# Patient Record
Sex: FEMALE | Race: BLACK OR AFRICAN AMERICAN | NOT HISPANIC OR LATINO | Employment: OTHER | URBAN - METROPOLITAN AREA
[De-identification: names, ages, dates, MRNs, and addresses within clinical notes are randomized per-mention and may not be internally consistent; named-entity substitution may affect disease eponyms.]

---

## 2017-01-12 ENCOUNTER — OFFICE VISIT (OUTPATIENT)
Dept: INTERNAL MEDICINE | Facility: CLINIC | Age: 62
End: 2017-01-12
Payer: COMMERCIAL

## 2017-01-12 ENCOUNTER — PATIENT OUTREACH (OUTPATIENT)
Dept: OTHER | Facility: OTHER | Age: 62
End: 2017-01-12
Payer: COMMERCIAL

## 2017-01-12 VITALS
DIASTOLIC BLOOD PRESSURE: 90 MMHG | OXYGEN SATURATION: 95 % | BODY MASS INDEX: 28.61 KG/M2 | TEMPERATURE: 98 F | WEIGHT: 171.75 LBS | SYSTOLIC BLOOD PRESSURE: 145 MMHG | HEART RATE: 91 BPM | HEIGHT: 65 IN

## 2017-01-12 DIAGNOSIS — R06.00 PAROXYSMAL DYSPNEA: ICD-10-CM

## 2017-01-12 DIAGNOSIS — I10 ESSENTIAL HYPERTENSION: ICD-10-CM

## 2017-01-12 DIAGNOSIS — J44.9 CHRONIC OBSTRUCTIVE PULMONARY DISEASE, UNSPECIFIED COPD TYPE: ICD-10-CM

## 2017-01-12 DIAGNOSIS — R00.2 PALPITATIONS: Primary | ICD-10-CM

## 2017-01-12 DIAGNOSIS — I51.81 TAKOTSUBO CARDIOMYOPATHY: ICD-10-CM

## 2017-01-12 PROCEDURE — 99999 PR PBB SHADOW E&M-EST. PATIENT-LVL III: CPT | Mod: PBBFAC,,, | Performed by: INTERNAL MEDICINE

## 2017-01-12 PROCEDURE — 93005 ELECTROCARDIOGRAM TRACING: CPT | Mod: S$GLB,,, | Performed by: INTERNAL MEDICINE

## 2017-01-12 PROCEDURE — 3077F SYST BP >= 140 MM HG: CPT | Mod: S$GLB,,, | Performed by: INTERNAL MEDICINE

## 2017-01-12 PROCEDURE — 99214 OFFICE O/P EST MOD 30 MIN: CPT | Mod: S$GLB,,, | Performed by: INTERNAL MEDICINE

## 2017-01-12 PROCEDURE — 1159F MED LIST DOCD IN RCRD: CPT | Mod: S$GLB,,, | Performed by: INTERNAL MEDICINE

## 2017-01-12 PROCEDURE — 93010 ELECTROCARDIOGRAM REPORT: CPT | Mod: S$GLB,,, | Performed by: INTERNAL MEDICINE

## 2017-01-12 PROCEDURE — 3080F DIAST BP >= 90 MM HG: CPT | Mod: S$GLB,,, | Performed by: INTERNAL MEDICINE

## 2017-01-12 RX ORDER — HYDROCHLOROTHIAZIDE 25 MG/1
25 TABLET ORAL DAILY
Qty: 30 TABLET | Refills: 1 | Status: SHIPPED | OUTPATIENT
Start: 2017-01-12 | End: 2017-03-19 | Stop reason: SDUPTHER

## 2017-01-12 NOTE — PATIENT INSTRUCTIONS
"  Heart Palpitations    Palpitations are the feeling that your heart is beating hard, fast, or irregular. Some describe it as "pounding" or "skipped beats." Palpitations may occur in someone with heart disease, but can also occur in a healthy person.  Heart-related causes:  · Arrhythmia (a change from the heart's normal rhythm)  · Heart valve disease  · Disease of the heart muscle  · Coronary artery disease  · High blood pressure  Non-heart-related causes:  · Certain medicines (such as asthma inhalers and decongestants)  · Some herbal supplements, energy drinks and pills, and weight loss pills  · Illegal stimulant drugs (such as cocaine, crank, methamphetamine, PCP, bath salts, ecstasy)  · Caffeine, alcohol, and tobacco  · Medical conditions such as thyroid disease, anemia, anxiety, and panic disorder  Sometimes the cause can't be found.  Home care  Follow these home care tips:  · Avoid excess caffeine, alcohol, tobacco, and any stimulant drugs.  · Tell your doctor about any prescription or over-the-counter or herbal medicines you take.  Follow-up care  · Follow up with your doctor, or as advised.  Call 911  This is the fastest and safest way to get to the emergency department. The paramedics can also begin treatment on the way to the hospital, if needed.  Don't wait until your symptoms are severe to call 911. These are reasons to call 911:  · Chest pain  · Shortness of breath  · Feeling lightheaded, faint, or dizzy  · Fainting or loss of consciousness  · Very irregular heartbeat  · Rapid heartbeat that makes you uncomfortable  · Slower than usual heart rate associated with symptoms  · Slower than usual heart rate  · Chest pain with weakness, dizziness, heavy sweating, nausea, or vomiting  · Extreme drowsiness or confusion  · Weakness of an arm or leg, or on 1 side of the face  · Difficulty with speech or vision  When to seek medical advice  Get prompt medical attention if you have palpitations and any of the " following:  · Weakness  · Dizziness  · Lightheadedness  · Fainting  © 7977-8144 [employer]. [address] [place] [address], [place] [address]. All rights reserved. This information is not intended as a substitute for professional medical care. Always follow your healthcare professional's instructions.

## 2017-01-12 NOTE — PROGRESS NOTES
Subjective:       Patient ID: Ana Aguila is a 61 y.o. female.    Chief Complaint: Hypertension and Headache    HPI  60 y/o woman here for urgent visit.    Came to clinic yesterday, seen briefly (not at visit) and reported headache, episodes of sudden dyspnea/chest tightness +palpitations +headache which started ~2 weeks ago. She felt that this was due to some of her medications, and has stopped several; her BP has been higher since doing this.    Initial episode happened at work, EMS was called, she was put on oxygen and given a breathing treatment, and was told her BP was high. They offered to take her to the hospital but she declined.   She is concerned that these episodes have started since she was put on medications for her BP / heart (NSTEMI, dx with takotsubo cardiomyopathy in 10/2016).   She has had ~7 episodes like this in the past 2 weeks.   These episodes last 5-10 min, happens sometimes if exerting herself (going up steps) or if she needs to urinate and can't get to bathroom; doesn't happen at rest or when sleeping. +sudden onset dyspnea, +palpitations / feels her heartbeat is fast, +severe headache. Reports that she checked her pulse ox with an episode on Monday and her SaO2 was 80; reports her pulse was also in the mid-80s during the episode. Episodes resolve only with using supplemental O2 (NC at 2L); headache takes some time to resolve.   Most recent episode was 4 days ago.  She was concerned that some of her medications were contributing to this, so 4 days ago she stopped her immodium, and then stopped her carvedilol, losartan, and atorvastatin. She felt better the next day after stopping this medication. She is not currently taking lasix, and has not for some time; she was previously taking this only for leg swelling.    Took HCTZ 12.5mg (medication she was on prior to hospitalization with NSTEMI/takotsubo CM) yesterday morning, then came to clinic because her BP was still high in the  "afternoon. Took losartan 25mg last night, and then took HCTZ 25mg this morning.   She does not want to restart the losartan; she is very anxious about this.  No repeat episode of dyspnea (beyond her usual severe COPD) since stopping these medications.    Review of Systems   HENT: Negative for congestion, postnasal drip and sore throat.    Respiratory: Positive for shortness of breath. Negative for cough and wheezing.    Cardiovascular: Positive for palpitations. Negative for chest pain and leg swelling.   Gastrointestinal: Negative for abdominal pain, diarrhea and nausea.   Genitourinary: Negative.    Musculoskeletal: Negative.    Skin: Negative.    Neurological: Positive for light-headedness and headaches. Negative for syncope, weakness and numbness.   Psychiatric/Behavioral: The patient is nervous/anxious.      As noted in HPI, otherwise negative.    Past medical history, surgical history, and family medical history reviewed and updated as appropriate.    Medications and allergies reviewed.     Objective:          Vitals:    01/12/17 1618   BP: (!) 145/90   BP Location: Right arm   Patient Position: Sitting   Pulse: 91   Temp: 98 °F (36.7 °C)   TempSrc: Oral   SpO2: 95%   Weight: 77.9 kg (171 lb 11.8 oz)   Height: 5' 5" (1.651 m)     Body mass index is 28.58 kg/(m^2).  Physical Exam   Constitutional: She is oriented to person, place, and time. She appears well-developed and well-nourished. No distress.   HENT:   Head: Normocephalic and atraumatic.   Nose: Nose normal.   Mouth/Throat: Oropharynx is clear and moist.   Eyes: Conjunctivae and EOM are normal. Pupils are equal, round, and reactive to light.   Neck: Normal range of motion. Neck supple.   Cardiovascular: Normal rate, regular rhythm, normal heart sounds and intact distal pulses.    No murmur heard.  Varicose veins b/l LE   Pulmonary/Chest: Effort normal. No respiratory distress. She has no wheezes. She has no rales.   Prolonged expiratory phase, but good " air movement throughout. No cough.   Abdominal: Soft. Bowel sounds are normal. She exhibits no distension. There is no tenderness.   Well-healed surgical scars  +hepatomegaly; liver edge palpable just below costal margin   Musculoskeletal: She exhibits no edema or tenderness.   Lymphadenopathy:     She has no cervical adenopathy.   Neurological: She is alert and oriented to person, place, and time. She has normal strength. No cranial nerve deficit. Gait normal.   Skin: Skin is warm and dry.   Psychiatric:   Anxious. Affect and speech normal   Vitals reviewed.      Lab Results   Component Value Date    WBC 4.91 10/18/2016    HGB 9.9 (L) 10/18/2016    HCT 29.7 (L) 10/18/2016     10/18/2016    CHOL 191 03/23/2016    TRIG 73 03/23/2016    HDL 61 03/23/2016    ALT 11 11/29/2016    AST 14 11/29/2016     11/29/2016    K 4.1 11/29/2016     11/29/2016    CREATININE 0.8 11/29/2016    BUN 16 11/29/2016    CO2 31 (H) 11/29/2016    TSH 2.69 04/04/2011    INR 1.2 10/18/2016    HGBA1C 5.4 10/18/2016     EKG performed in clinic - no significant arrythmia noted, NSR; lateral T waves now upright compared to 10/17 and 10/18    Assessment:       1. Palpitations    2. Paroxysmal dyspnea    3. Chronic obstructive pulmonary disease, unspecified COPD type    4. Essential hypertension    5. Takotsubo cardiomyopathy        Plan:   Ana was seen today for hypertension and headache.    Diagnoses and all orders for this visit:    Palpitations  -     IN OFFICE EKG 12-LEAD (to [facility])  -     Basic metabolic panel; Future  -     TSH; Future    Paroxysmal dyspnea  -     IN OFFICE EKG 12-LEAD (to [facility])    Chronic obstructive pulmonary disease, unspecified COPD type    Essential hypertension  -     hydrochlorothiazide (HYDRODIURIL) 25 MG tablet; Take 1 tablet (25 mg total) by mouth once daily.  -     Basic metabolic panel; Future  -     TSH; Future    Takotsubo cardiomyopathy    Unclear etiology of these episodes - acute  bronchospasm (?related to carvedilol) vs paroxysmal arrythmia vs anxiety attack; not clearly a COPD exacerbation, and no recurrence of signs of HF. Headaches in past 2 days may be related to high BP, though BP only mildly elevated today and she does have a similar headache.  EKG shows no arrythmia and is otherwise stable.  For now, ok to stop the carvedilol; she also refuses to restart the atorvastatin or losartan.   As patient has seen both her cardiologist and pulmonologist recently, will contact them for further recommendations -- could consider Holter or event monitor.   She is following with the digital HTN management program, and agrees to continue checking BP regularly; recommended continue HCTZ 25mg for now (as she needs an antihypertensive and is willing to take this), and restart losartan in 2-3 days if BP still >140/90.     Return in about 3 weeks (around 2/2/2017) for hypertension.    Erik Cool MD  Internal Medicine  [facility]  1/12/2017

## 2017-01-12 NOTE — Clinical Note
Dr Gaytan, Dr Dill - I saw Ms Aguila today for an urgent visit and would like to get your input on her symptoms. See note for details - she's been having brief episodes of sudden dyspnea, palpitations, and headache, and noted with one episode that her SaO2 on pulse ox dropped to 80%. These episodes last 5-10min and are relieved by using supplemental oxygen.  She stopped many of her new medications (coreg, losartan, atorvastatin) as she was afraid these were causing the problem. EKG in clinic was overall unchanged from her previous (other than having now-upright T waves).   Please read through and let me know what you think, and contact her if you would like to see her back in clinic?  Thank you! MD Catrachita Sherman - she's agreed to restart HCTZ for her BP, so right now this is the only antihypertensive she is taking.

## 2017-01-12 NOTE — MR AVS SNAPSHOT
[facility] - Internal Medicine  [address]  [facility] [address]  Phone: [phone]  Fax: [phone]                  Ana Aguila   2017 4:20 PM   Office Visit    Description:  Female : 1955   Provider:  Erik Cool MD   Department:  [facility] - Internal Medicine           Reason for Visit     Hypertension     Headache           Diagnoses this Visit        Comments    Palpitations    -  Primary     Chronic obstructive pulmonary disease, unspecified COPD type         Essential hypertension         Takotsubo cardiomyopathy                To Do List           Future Appointments        Provider Department Dept Phone    2017 9:20 AM Sommer Ace MD [facility] - Urology 4th Floor [phone]    2017 10:20 AM LAB, APPOINTMENT [facility] [phone]    2017 8:40 AM Ambar Muñoz NP [facility] - Hepatology [phone]    2017 10:40 AM Erik Cool MD [facility] - Internal Medicine [phone]      Goals (5 Years of Data)              Today    Today    Today    Blood Pressure <= 140/90   145/90  150/90  149/89    Notes - Note created  2016  1:28 PM by Lilly Dumont    It is important to consistently maintain a controlled blood pressure.      Exercise at least 150 minutes per week.           Maintain a low sodium diet           Take at least one BP reading per week at various times of the day             Follow-Up and Disposition     Return in about 3 weeks (around 2017) for hypertension.       These Medications        Disp Refills Start End    hydrochlorothiazide (HYDRODIURIL) 25 MG tablet 30 tablet 1 2017    Take 1 tablet (25 mg total) by mouth once daily. - Oral    [facility]: [facility] [address] AT [facility] [address] Ph #: [phone]         [facility]     [facility] Care [facility] -  Assistance  Registered nurses in  the [facility] provide clinical advisement, health education, appointment booking, and other advisory services.  Call for this free service at [phone].             Medications           Message regarding Medications     Verify the changes and/or additions to your medication regime listed below are the same as discussed with your clinician today.  If any of these changes or additions are incorrect, please notify your healthcare provider.        START taking these NEW medications        Refills    hydrochlorothiazide (HYDRODIURIL) 25 MG tablet 1    Sig: Take 1 tablet (25 mg total) by mouth once daily.    Class: Normal    Route: Oral           Verify that the below list of medications is an accurate representation of the medications you are currently taking.  If none reported, the list may be blank. If incorrect, please contact your healthcare provider. Carry this list with you in case of emergency.           Current Medications     ADVAIR DISKUS 250-50 mcg/dose diskus inhaler INHALE 1 PUFF BY MOUTH TWICE DAILY( EVERY 12 HOURS)    albuterol (PROAIR HFA) 90 mcg/actuation inhaler INHALE 2 PUFFS BY MOUTH INTO THE LUNGS FOUR TIMES DAILY prn    albuterol-ipratropium 2.5mg-0.5mg/3mL (DUO-NEB) 0.5 mg-3 mg(2.5 mg base)/3 mL nebulizer solution Take 3 mLs by nebulization every 6 (six) hours as needed for Wheezing or Shortness of Breath.    ergocalciferol (ERGOCALCIFEROL) 50,000 unit Cap TAKE 1 CAPSULE BY MOUTH EVERY 7 DAYS    levalbuterol (XOPENEX) 1.25 mg/0.5 mL nebulizer solution Qid prn for copd    loperamide (IMODIUM) 2 mg capsule Take 2 mg by mouth 4 (four) times daily as needed for Diarrhea.    psyllium 0.52 gram capsule Take 2 capsules by mouth once daily.    atorvastatin (LIPITOR) 40 MG tablet Take 1 tablet (40 mg total) by mouth once daily.    carvedilol (COREG) 3.125 MG tablet Take 1 tablet (3.125 mg total) by mouth 2 (two) times daily.    estradiol (ESTRACE) 0.01 % (0.1 mg/gram) vaginal cream Place  "1 g vaginally 3 (three) times a week. Place by fingertip application before bedtime three times a week (Monday, Wednesday, Friday)    furosemide (LASIX) 20 MG tablet Take 1 tablet (20 mg total) by mouth daily as needed (Lower extremity edema or swelling).    hydrochlorothiazide (HYDRODIURIL) 25 MG tablet Take 1 tablet (25 mg total) by mouth once daily.    losartan (COZAAR) 25 MG tablet Take 1 tablet (25 mg total) by mouth once daily.           Clinical Reference Information           Vital Signs - Last Recorded  Most recent update: 1/12/2017  4:21 PM by Kelley Mccollum MA    BP Pulse Temp Ht Wt LMP    (!) 145/90 (BP Location: Right arm, Patient Position: Sitting) 91 98 °F (36.7 °C) (Oral) 5' 5" (1.651 m) 77.9 kg (171 lb 11.8 oz) (LMP Unknown)    SpO2 BMI             95% 28.58 kg/m2         Blood Pressure          Most Recent Value    BP  (!)  145/90      Allergies as of 1/12/2017     Sudafed [Pseudoephedrine Hcl]      Immunizations Administered on Date of Encounter - 1/12/2017     None      Orders Placed During Today's Visit      Normal Orders This Visit    IN OFFICE EKG 12-LEAD (to [place])     Future Labs/Procedures Expected by Expires    Basic metabolic panel  1/12/2017 1/12/2018    TSH  1/12/2017 (Approximate) 1/12/2018      Instructions      Heart Palpitations    Palpitations are the feeling that your heart is beating hard, fast, or irregular. Some describe it as "pounding" or "skipped beats." Palpitations may occur in someone with heart disease, but can also occur in a healthy person.  Heart-related causes:  · Arrhythmia (a change from the heart's normal rhythm)  · Heart valve disease  · Disease of the heart muscle  · Coronary artery disease  · High blood pressure  Non-heart-related causes:  · Certain medicines (such as asthma inhalers and decongestants)  · Some herbal supplements, energy drinks and pills, and weight loss pills  · Illegal stimulant drugs (such as cocaine, crank, methamphetamine, PCP, bath " salts, ecstasy)  · Caffeine, alcohol, and tobacco  · Medical conditions such as thyroid disease, anemia, anxiety, and panic disorder  Sometimes the cause can't be found.  Home care  Follow these home care tips:  · Avoid excess caffeine, alcohol, tobacco, and any stimulant drugs.  · Tell your doctor about any prescription or over-the-counter or herbal medicines you take.  Follow-up care  · Follow up with your doctor, or as advised.  Call 911  This is the fastest and safest way to get to the emergency department. The paramedics can also begin treatment on the way to the hospital, if needed.  Don't wait until your symptoms are severe to call 911. These are reasons to call 911:  · Chest pain  · Shortness of breath  · Feeling lightheaded, faint, or dizzy  · Fainting or loss of consciousness  · Very irregular heartbeat  · Rapid heartbeat that makes you uncomfortable  · Slower than usual heart rate associated with symptoms  · Slower than usual heart rate  · Chest pain with weakness, dizziness, heavy sweating, nausea, or vomiting  · Extreme drowsiness or confusion  · Weakness of an arm or leg, or on 1 side of the face  · Difficulty with speech or vision  When to seek medical advice  Get prompt medical attention if you have palpitations and any of the following:  · Weakness  · Dizziness  · Lightheadedness  · Fainting  © 1295-6425 [employer]. [address] [place] [address], [place] [address]. All rights reserved. This information is not intended as a substitute for professional medical care. Always follow your healthcare professional's instructions.

## 2017-01-12 NOTE — LETTER
"   Catrachita Johns, PharmD  [address] [place] [address]     Dear Ana Aguila,    Welcome to the [facility]!         My name is Catrachita Johns and I am your dedicated clinical pharmacist.  As an expert in medication management, I will help ensure that the medications you are taking continue to provide you with the intended benefits.      This is Lilly Dumont and she will be your health  for the duration of the program.  Her job is to help you identify lifestyle changes to improve your blood pressure control.  You will talk about nutrition, exercise, and other ways that you may be able to adjust your current habits to better your health. Together, we will work to improve your overall health and encourage you to meet your goals for a healthier lifestyle.    What we expect from YOU:    You will need to take blood pressure readings multiple times a week and no less than one reading per week.   It is important that you take your measurements at different times during the day, when possible.     What you should expect from your Digital Medicine Care Team:   We will provide you with education about high blood pressure, including lifestyle changes that could help you to control your blood pressure.   We will review your weekly readings and provide you with monthly blood pressure progress reports after you have been in the program for more than 30 days.   We will send monthly progress reports on your blood pressure control to your physician so they can follow along with your progress as well.    You will be able to reach me by phone at   or through your [facility] account by clicking "Send a message to your doctor's office" on the home screen then selecting my name in the "To the office of:" field.     I look forward to working with you to achieve your blood pressure goals!    Sincerely,    Catrachita Johns PharmD  Your personal Clinical Pharmacist    Please " visit [email or web address] to learn more about high blood pressure and what you can do lower your blood pressure.                                                                                         Ana Aguila  [address] [place] [address] 216  [address] [id number]

## 2017-01-13 ENCOUNTER — PATIENT MESSAGE (OUTPATIENT)
Dept: GASTROENTEROLOGY | Facility: CLINIC | Age: 62
End: 2017-01-13

## 2017-01-13 ENCOUNTER — TELEPHONE (OUTPATIENT)
Dept: INTERNAL MEDICINE | Facility: CLINIC | Age: 62
End: 2017-01-13

## 2017-01-13 ENCOUNTER — OFFICE VISIT (OUTPATIENT)
Dept: UROLOGY | Facility: CLINIC | Age: 62
End: 2017-01-13
Payer: COMMERCIAL

## 2017-01-13 VITALS
WEIGHT: 166 LBS | SYSTOLIC BLOOD PRESSURE: 142 MMHG | HEIGHT: 65 IN | BODY MASS INDEX: 27.66 KG/M2 | DIASTOLIC BLOOD PRESSURE: 82 MMHG

## 2017-01-13 DIAGNOSIS — R35.0 INCREASED FREQUENCY OF URINATION: ICD-10-CM

## 2017-01-13 DIAGNOSIS — R39.15 URINARY URGENCY: ICD-10-CM

## 2017-01-13 DIAGNOSIS — R31.29 MICROSCOPIC HEMATURIA: Primary | ICD-10-CM

## 2017-01-13 PROCEDURE — 3079F DIAST BP 80-89 MM HG: CPT | Mod: S$GLB,,, | Performed by: UROLOGY

## 2017-01-13 PROCEDURE — 99999 PR PBB SHADOW E&M-EST. PATIENT-LVL III: CPT | Mod: PBBFAC,,, | Performed by: UROLOGY

## 2017-01-13 PROCEDURE — 99213 OFFICE O/P EST LOW 20 MIN: CPT | Mod: 25,S$GLB,, | Performed by: UROLOGY

## 2017-01-13 PROCEDURE — 81002 URINALYSIS NONAUTO W/O SCOPE: CPT | Mod: S$GLB,,, | Performed by: UROLOGY

## 2017-01-13 PROCEDURE — 1159F MED LIST DOCD IN RCRD: CPT | Mod: S$GLB,,, | Performed by: UROLOGY

## 2017-01-13 PROCEDURE — 3077F SYST BP >= 140 MM HG: CPT | Mod: S$GLB,,, | Performed by: UROLOGY

## 2017-01-13 RX ORDER — LIDOCAINE HYDROCHLORIDE 20 MG/ML
JELLY TOPICAL ONCE
Status: CANCELLED | OUTPATIENT
Start: 2017-01-13 | End: 2017-01-13

## 2017-01-13 RX ORDER — CIPROFLOXACIN 250 MG/1
500 TABLET, FILM COATED ORAL ONCE
Status: CANCELLED | OUTPATIENT
Start: 2017-01-13 | End: 2017-01-13

## 2017-01-13 NOTE — PROGRESS NOTES
CHIEF COMPLAINT:    Mrs. Aguila is a 61 y.o. female presenting for a follow up on urinary urgency, frequency.    PRESENTING ILLNESS:    Ana Aguila is a 61 y.o. female who returns for follow up.  She states that she has been having a problem with her blood pressure.  She has episodes which she believes elevate her blood pressure.  If she does not get to the toilet right when she needs to go, she develops a throbbing headache.  She has frequency x 5, nocturia x 2.  These episodes occur every other day (she has counted 7 in the last 2 weeks.)  She has been working with Dr. Cool to try to get to the bottom of them.  She is still averse to adding a medication for urinary urgency.  She has nocturia x 2, but it is mostly that she typically wakes up at 1 am and again at 5 am and since she wakes up, she uses the toilet.  The amounts are variable.  When asked about her bowel movements, she states she has a chronic problem with diarrhea ever since her partial colectomy, which occurred when she had a bowel perforation at the time of colonoscopy.  She saw her gastroenterologist who recommended that she take Immodium AD and take 2 metamucil tablets to firm the stools.  This worked for her but she discontinued using them because of the headaches.  The diarrhea has resumed and the headaches continue so she is thinking about resuming the Immodium AD and Metamucil.  She continues to use the Estrace cream but does not think it has any bearing on the urinary urgency.     On UA today, she was found to have 50 blood.  She denies a history of gross hematuria, she does have a remote history of smoking, states she quite in either 1993 or 1997, at the time the COPD was diagnosed.  No history of chemotherapy or radiation.      REVIEW OF SYSTEMS:    Review of Systems   Constitutional: Positive for chills.   Eyes: Negative for discharge and redness.   Respiratory: Positive for cough and shortness of breath.    Cardiovascular: Negative  for chest pain.   Gastrointestinal: Positive for diarrhea. Negative for abdominal pain.   Genitourinary: Positive for urgency.   Musculoskeletal: Negative.    Skin: Negative.    Neurological: Positive for headaches (she believes this is secondary to blood pressure).   Endo/Heme/Allergies: Does not bruise/bleed easily.   Psychiatric/Behavioral: Negative.      PATIENT HISTORY:    Past Medical History   Diagnosis Date    Asthma     Blood transfusion     COPD (chronic obstructive pulmonary disease)     Hyperlipidemia     Hypertension     Reflux 2013       Past Surgical History   Procedure Laterality Date    Hysterectomy  1986    Left leg surgery      Colon surgery  2011     secondary to perforation after c-scope    Cholecystectomy  2013     open    Hiatal hernia repair  2013    Abdominal surgery      Hernia repair      Appendectomy      Fracture surgery         Family History   Problem Relation Age of Onset    Cancer Mother      lung    Hypertension Mother     Coronary artery disease Father     Retinal detachment Father     Hypertension Sister     Hypertension Brother     Cataracts Maternal Grandmother     Abnormal EKG Daughter     Breast cancer Daughter 43     negative genetic testing    Breast cancer Other 44     Social History    Marital status: Single       Social History Main Topics    Smoking status: Former Smoker     Packs/day: 1.50     Years: 30.00     Types: Cigarettes     Quit date: 1/4/1997    Smokeless tobacco: Never Used    Alcohol use Yes      Comment: occaissionally    Drug use: No    Sexual activity: Yes     Partners: Male     Birth control/ protection: Surgical     Social History Narrative    Lives alone, brother lives in [place]. Brothers and sisters also living elsewhere. Children and grandchildren live in [place].        Allergies:  Sudafed [pseudoephedrine hcl]    Medications:  Outpatient Encounter Prescriptions as of 1/13/2017   Medication Sig Dispense Refill     ADVAIR DISKUS 250-50 mcg/dose diskus inhaler INHALE 1 PUFF BY MOUTH TWICE DAILY( EVERY 12 HOURS) 1 each 12    albuterol (PROAIR HFA) 90 mcg/actuation inhaler INHALE 2 PUFFS BY MOUTH INTO THE LUNGS FOUR TIMES DAILY prn 25.5 g 12    albuterol-ipratropium 2.5mg-0.5mg/3mL (DUO-NEB) 0.5 mg-3 mg(2.5 mg base)/3 mL nebulizer solution Take 3 mLs by nebulization every 6 (six) hours as needed for Wheezing or Shortness of Breath. 270 vial 12    atorvastatin (LIPITOR) 40 MG tablet Take 1 tablet (40 mg total) by mouth once daily. 90 tablet 3    carvedilol (COREG) 3.125 MG tablet Take 1 tablet (3.125 mg total) by mouth 2 (two) times daily. 60 tablet 11    ergocalciferol (ERGOCALCIFEROL) 50,000 unit Cap TAKE 1 CAPSULE BY MOUTH EVERY 7 DAYS 12 capsule 0    furosemide (LASIX) 20 MG tablet Take 1 tablet (20 mg total) by mouth daily as needed (Lower extremity edema or swelling). 30 tablet 3    hydrochlorothiazide (HYDRODIURIL) 25 MG tablet Take 1 tablet (25 mg total) by mouth once daily. 30 tablet 1    levalbuterol (XOPENEX) 1.25 mg/0.5 mL nebulizer solution Qid prn for copd 120 each 11    loperamide (IMODIUM) 2 mg capsule Take 2 mg by mouth 4 (four) times daily as needed for Diarrhea.      losartan (COZAAR) 25 MG tablet Take 1 tablet (25 mg total) by mouth once daily. 90 tablet 1    psyllium 0.52 gram capsule Take 2 capsules by mouth once daily.      estradiol (ESTRACE) 0.01 % (0.1 mg/gram) vaginal cream Place 1 g vaginally 3 (three) times a week. Place by fingertip application before bedtime three times a week (Monday, Wednesday, Friday) 45 g 3    [DISCONTINUED] aspirin 81 MG Chew Take 1 tablet (81 mg total) by mouth once daily.  0     No facility-administered encounter medications on file as of 1/13/2017.          PHYSICAL EXAMINATION:    The patient generally appears in good health, is appropriately interactive, and is in no apparent distress.    Skin: No lesions.    Mental: Cooperative with normal affect.    Neuro:  Grossly intact.    HEENT: Normal. No evidence of lymphadenopathy.    Chest: Clear to ascultation bilaterally, normal inspiratory effort.    Abdomen: Soft, non-tender. No masses or organomegaly. Bladder is not palpable. No evidence of flank discomfort. No evidence of inguinal hernia.    Extremities: No clubbing, cyanosis, or edema      LABS:    UA 1.005, pH 6, tr protein, 50 blood, otherwise, negative  Abdominal ultrasound was done on 12/27/2018 for right upper quadrant pain and the kidneys were noted to be normal.      IMPRESSION:    Encounter Diagnoses   Name Primary?    Microscopic hematuria Yes    Urinary urgency     Increased frequency of urination        PLAN:    1. Cystoscopy to complete the workup for microscopic hematuria.  Also, since she has the urgency, would be more comfortable with treating conservatively if this is negative  2.  Since she is averse to taking medications, Bladder Matters book was given to provide information on behavioral management of her symptoms.  Suggested timed voiding every 2-3 hours while awake.    3.  She will continue to work with Dr. Cool regarding her blood pressure issues

## 2017-01-13 NOTE — PATIENT INSTRUCTIONS

## 2017-01-13 NOTE — MR AVS SNAPSHOT
[facility] - Urology 4th Floor  [address]  [facility] [address]  Phone: [phone]                  Ana Aguila   2017 9:20 AM   Office Visit    Description:  Female : 1955   Provider:  Sommer Ace MD   Department:  [facility] - Urology 4th Floor           Diagnoses this Visit        Comments    Microscopic hematuria    -  Primary            To Do List           Future Appointments        Provider Department Dept Phone    2017 8:40 AM Ambar Muñoz NP [facility] - Hepatology [phone]    2017 10:40 AM Erik Cool MD [facility] - Internal Medicine [phone]      Goals (5 Years of Data)              Today    17    Blood Pressure <= 140/90   142/82  149/91  145/90    Notes - Note created  2016  1:28 PM by Lilly Dumont    It is important to consistently maintain a controlled blood pressure.      Exercise at least 150 minutes per week.           Maintain a low sodium diet           Take at least one BP reading per week at various times of the day             [facility]     [facility] Nurse Care Line -  Assistance  Registered nurses in the [facility] provide clinical advisement, health education, appointment booking, and other advisory services.  Call for this free service at [phone].             Medications           Message regarding Medications     Verify the changes and/or additions to your medication regime listed below are the same as discussed with your clinician today.  If any of these changes or additions are incorrect, please notify your healthcare provider.             Verify that the below list of medications is an accurate representation of the medications you are currently taking.  If none reported, the list may be blank. If incorrect, please contact your healthcare provider. Carry this list with you in case of emergency.           Current Medications     ADVAIR DISKUS 250-50 mcg/dose  "diskus inhaler INHALE 1 PUFF BY MOUTH TWICE DAILY( EVERY 12 HOURS)    albuterol (PROAIR HFA) 90 mcg/actuation inhaler INHALE 2 PUFFS BY MOUTH INTO THE LUNGS FOUR TIMES DAILY prn    albuterol-ipratropium 2.5mg-0.5mg/3mL (DUO-NEB) 0.5 mg-3 mg(2.5 mg base)/3 mL nebulizer solution Take 3 mLs by nebulization every 6 (six) hours as needed for Wheezing or Shortness of Breath.    atorvastatin (LIPITOR) 40 MG tablet Take 1 tablet (40 mg total) by mouth once daily.    carvedilol (COREG) 3.125 MG tablet Take 1 tablet (3.125 mg total) by mouth 2 (two) times daily.    ergocalciferol (ERGOCALCIFEROL) 50,000 unit Cap TAKE 1 CAPSULE BY MOUTH EVERY 7 DAYS    furosemide (LASIX) 20 MG tablet Take 1 tablet (20 mg total) by mouth daily as needed (Lower extremity edema or swelling).    hydrochlorothiazide (HYDRODIURIL) 25 MG tablet Take 1 tablet (25 mg total) by mouth once daily.    levalbuterol (XOPENEX) 1.25 mg/0.5 mL nebulizer solution Qid prn for copd    loperamide (IMODIUM) 2 mg capsule Take 2 mg by mouth 4 (four) times daily as needed for Diarrhea.    losartan (COZAAR) 25 MG tablet Take 1 tablet (25 mg total) by mouth once daily.    psyllium 0.52 gram capsule Take 2 capsules by mouth once daily.    estradiol (ESTRACE) 0.01 % (0.1 mg/gram) vaginal cream Place 1 g vaginally 3 (three) times a week. Place by fingertip application before bedtime three times a week (Monday, Wednesday, Friday)           Clinical Reference Information           Vital Signs - Last Recorded  Most recent update: 1/13/2017  9:34 AM by Loreta Dixon MA    BP Ht Wt LMP BMI    (!) 142/82 (BP Location: Left arm, Patient Position: Sitting, BP Method: Automatic) 5' 5" (1.651 m) 75.3 kg (166 lb 0.5 oz) (LMP Unknown) 27.63 kg/m2      Blood Pressure          Most Recent Value    BP  (!)  142/82      Allergies as of 1/13/2017     Sudafed [Pseudoephedrine Hcl]      Immunizations Administered on Date of Encounter - 1/13/2017     None      Orders Placed During Today's " Visit     Future Labs/Procedures Expected by Expires    Cystoscopy  As directed 1/13/2018      Instructions        Cystoscopy  Cystoscopy is a procedure that lets your doctor look directly inside your urethra and bladder. It can be used to:  · Help diagnose a problem with your urethra, bladder, or kidneys.  · Take a sample (biopsy) of bladder or urethral tissue.  · Treat certain problems (such as removing kidney stones).  · Place a stent to bypass an obstruction.  · Take special X-rays of the kidneys.  Based on the findings, your doctor may recommend other tests or treatments.  What is a cystoscope?  A cystoscope is a telescope-like instrument that contains lenses and fiberoptics (small glass wires that make bright light). The cystoscope may be straight and rigid, or flexible to bend around curves in the urethra. The doctor may look directly into the cystoscope, or project the image onto a monitor.  Getting ready  · Ask your doctor if you should stop taking any medications prior to the procedure.  · Ask whether you should avoid eating or drinking anything after midnight before the procedure.  · Follow any other instructions your doctor gives you.  Tell your doctor before the exam if you:  · Take any medications, such as aspirin or blood thinners  · Have allergies to any medications  · Are pregnant   The procedure  Cystoscopy is done in the doctors office or hospital. The doctor and a nurse are present during the procedure. It takes only a few minutes, longer if a biopsy, X-ray, or treatment needs to be done.  During the procedure:  · You lie on an exam table on your back, knees bent and legs apart. You are covered with a drape.  · Your urethra and the area around it are washed. Anesthetic jelly may be applied to numb the urethra. Other pain medication is usually not needed. In some cases, you may be offered a mild sedative to help you relax. If a more extensive procedure is to be done, such as a biopsy or kidney  stone removal, general anesthesia may be needed.  · The cystoscope is inserted. A sterile fluid is put into the bladder to expand it. You may feel pressure from this fluid.  · When the procedure is done, the cystoscope is removed.  After the procedure  If you had a sedative, general anesthesia, or spinal anesthesia, you must have someone drive you home. Once youre home:  · Drink plenty of fluids.  · You may have burning or light bleeding when you urinate--this is normal.  · Medications may be prescribed to ease any discomfort or prevent infection. Take these as directed.  · Call your doctor if you have heavy bleeding or blood clots, burning that lasts more than a day, a fever over 100°F  (38° C), or trouble urinating.  © 8315-8935 [employer]. [address] [place] [address], [place] [address]. All rights reserved. This information is not intended as a substitute for professional medical care. Always follow your healthcare professional's instructions.

## 2017-01-15 RX ORDER — ERGOCALCIFEROL 1.25 MG/1
CAPSULE ORAL
Qty: 12 CAPSULE | Refills: 0 | Status: SHIPPED | OUTPATIENT
Start: 2017-01-15 | End: 2017-04-08 | Stop reason: SDUPTHER

## 2017-01-16 ENCOUNTER — PROCEDURE VISIT (OUTPATIENT)
Dept: HEPATOLOGY | Facility: CLINIC | Age: 62
End: 2017-01-16
Attending: NURSE PRACTITIONER
Payer: COMMERCIAL

## 2017-01-16 ENCOUNTER — OFFICE VISIT (OUTPATIENT)
Dept: GASTROENTEROLOGY | Facility: CLINIC | Age: 62
End: 2017-01-16
Payer: COMMERCIAL

## 2017-01-16 ENCOUNTER — LAB VISIT (OUTPATIENT)
Dept: LAB | Facility: HOSPITAL | Age: 62
End: 2017-01-16
Payer: COMMERCIAL

## 2017-01-16 ENCOUNTER — PATIENT MESSAGE (OUTPATIENT)
Dept: HEPATOLOGY | Facility: CLINIC | Age: 62
End: 2017-01-16

## 2017-01-16 ENCOUNTER — PATIENT MESSAGE (OUTPATIENT)
Dept: INTERNAL MEDICINE | Facility: CLINIC | Age: 62
End: 2017-01-16

## 2017-01-16 ENCOUNTER — OFFICE VISIT (OUTPATIENT)
Dept: HEPATOLOGY | Facility: CLINIC | Age: 62
End: 2017-01-16
Payer: COMMERCIAL

## 2017-01-16 ENCOUNTER — HOSPITAL ENCOUNTER (OUTPATIENT)
Dept: PULMONOLOGY | Facility: CLINIC | Age: 62
Discharge: HOME | End: 2017-01-16
Payer: COMMERCIAL

## 2017-01-16 VITALS
HEIGHT: 65 IN | OXYGEN SATURATION: 93 % | DIASTOLIC BLOOD PRESSURE: 72 MMHG | RESPIRATION RATE: 20 BRPM | SYSTOLIC BLOOD PRESSURE: 122 MMHG | TEMPERATURE: 98 F | HEART RATE: 102 BPM | WEIGHT: 166.88 LBS | BODY MASS INDEX: 27.81 KG/M2

## 2017-01-16 VITALS — HEIGHT: 65 IN | BODY MASS INDEX: 27.49 KG/M2 | WEIGHT: 165 LBS

## 2017-01-16 VITALS
HEIGHT: 65 IN | BODY MASS INDEX: 27.88 KG/M2 | DIASTOLIC BLOOD PRESSURE: 82 MMHG | WEIGHT: 167.31 LBS | SYSTOLIC BLOOD PRESSURE: 128 MMHG | HEART RATE: 94 BPM

## 2017-01-16 DIAGNOSIS — J06.9 UPPER RESPIRATORY TRACT INFECTION, UNSPECIFIED TYPE: Primary | ICD-10-CM

## 2017-01-16 DIAGNOSIS — R16.0 HEPATOMEGALY: Primary | ICD-10-CM

## 2017-01-16 DIAGNOSIS — R16.0 HEPATOMEGALY: ICD-10-CM

## 2017-01-16 DIAGNOSIS — J44.0 CHRONIC OBSTRUCTIVE PULMONARY DISEASE WITH ACUTE LOWER RESPIRATORY INFECTION: ICD-10-CM

## 2017-01-16 DIAGNOSIS — R19.5 LOOSE STOOLS: Primary | ICD-10-CM

## 2017-01-16 DIAGNOSIS — J44.9 CHRONIC OBSTRUCTIVE PULMONARY DISEASE, UNSPECIFIED COPD TYPE: ICD-10-CM

## 2017-01-16 DIAGNOSIS — J44.9 CHRONIC OBSTRUCTIVE PULMONARY DISEASE, UNSPECIFIED COPD TYPE: Primary | ICD-10-CM

## 2017-01-16 LAB
CERULOPLASMIN SERPL-MCNC: 36 MG/DL
FERRITIN SERPL-MCNC: 98 NG/ML
IGG SERPL-MCNC: 800 MG/DL
IRON SERPL-MCNC: 44 UG/DL
SATURATED IRON: 13 %
TOTAL IRON BINDING CAPACITY: 337 UG/DL
TRANSFERRIN SERPL-MCNC: 228 MG/DL

## 2017-01-16 PROCEDURE — 99999 PR PBB SHADOW E&M-EST. PATIENT-LVL IV: CPT | Mod: PBBFAC,,, | Performed by: NURSE PRACTITIONER

## 2017-01-16 PROCEDURE — 86038 ANTINUCLEAR ANTIBODIES: CPT

## 2017-01-16 PROCEDURE — 86256 FLUORESCENT ANTIBODY TITER: CPT

## 2017-01-16 PROCEDURE — 3078F DIAST BP <80 MM HG: CPT | Mod: S$GLB,,, | Performed by: NURSE PRACTITIONER

## 2017-01-16 PROCEDURE — 82784 ASSAY IGA/IGD/IGG/IGM EACH: CPT

## 2017-01-16 PROCEDURE — 82390 ASSAY OF CERULOPLASMIN: CPT

## 2017-01-16 PROCEDURE — 3074F SYST BP LT 130 MM HG: CPT | Mod: S$GLB,,, | Performed by: NURSE PRACTITIONER

## 2017-01-16 PROCEDURE — 94620 PR PULMONARY STRESS TESTING,SIMPLE: CPT | Mod: S$GLB,,, | Performed by: INTERNAL MEDICINE

## 2017-01-16 PROCEDURE — 36415 COLL VENOUS BLD VENIPUNCTURE: CPT

## 2017-01-16 PROCEDURE — 3079F DIAST BP 80-89 MM HG: CPT | Mod: S$GLB,,, | Performed by: PHYSICIAN ASSISTANT

## 2017-01-16 PROCEDURE — 83540 ASSAY OF IRON: CPT

## 2017-01-16 PROCEDURE — 99999 PR PBB SHADOW E&M-EST. PATIENT-LVL III: CPT | Mod: PBBFAC,,, | Performed by: PHYSICIAN ASSISTANT

## 2017-01-16 PROCEDURE — 84466 ASSAY OF TRANSFERRIN: CPT

## 2017-01-16 PROCEDURE — 82728 ASSAY OF FERRITIN: CPT

## 2017-01-16 PROCEDURE — 99213 OFFICE O/P EST LOW 20 MIN: CPT | Mod: S$GLB,,, | Performed by: PHYSICIAN ASSISTANT

## 2017-01-16 PROCEDURE — 1159F MED LIST DOCD IN RCRD: CPT | Mod: S$GLB,,, | Performed by: PHYSICIAN ASSISTANT

## 2017-01-16 PROCEDURE — 82104 ALPHA-1-ANTITRYPSIN PHENO: CPT

## 2017-01-16 PROCEDURE — 1159F MED LIST DOCD IN RCRD: CPT | Mod: S$GLB,,, | Performed by: NURSE PRACTITIONER

## 2017-01-16 PROCEDURE — 86235 NUCLEAR ANTIGEN ANTIBODY: CPT | Mod: 91

## 2017-01-16 PROCEDURE — 99205 OFFICE O/P NEW HI 60 MIN: CPT | Mod: S$GLB,,, | Performed by: NURSE PRACTITIONER

## 2017-01-16 PROCEDURE — 91200 LIVER ELASTOGRAPHY: CPT | Mod: S$GLB,,, | Performed by: NURSE PRACTITIONER

## 2017-01-16 PROCEDURE — 3074F SYST BP LT 130 MM HG: CPT | Mod: S$GLB,,, | Performed by: PHYSICIAN ASSISTANT

## 2017-01-16 RX ORDER — AZITHROMYCIN 250 MG/1
TABLET, FILM COATED ORAL
Qty: 6 TABLET | Refills: 1 | Status: SHIPPED | OUTPATIENT
Start: 2017-01-16 | End: 2017-01-26

## 2017-01-16 RX ORDER — PREDNISONE 10 MG/1
TABLET ORAL
Qty: 30 TABLET | Refills: 3 | Status: SHIPPED | OUTPATIENT
Start: 2017-01-16 | End: 2017-04-23 | Stop reason: SDUPTHER

## 2017-01-16 NOTE — PROCEDURES
Procedures   Fibroscan Procedure     Name: Aan Aguila  Date of Procedure : 01/16/2017   :: Ambar Muñoz NP  Diagnosis: Hepatomegaly  Probe: XL    Fibroscan reading: 3.1 KPa    IQR/med:10 %    Fibrosis:F 0-1

## 2017-01-16 NOTE — LETTER
January 16, 2017      Erik Cool MD  [address]  [place] [address]           [facility] - Hepatology  [address]  [place] [address]  Phone: [phone]  Fax: [phone]          Patient: Ana Aguila   MR Number: [id number]   YOB: 1955   Date of Visit: 1/16/2017       Dear Dr. Erik Cool:    Thank you for referring Ana Aguila to me for evaluation. Attached you will find relevant portions of my assessment and plan of care.    If you have questions, please do not hesitate to call me. I look forward to following Ana Aguila along with you.    Sincerely,    Ambar Muñoz, BERENICE    Enclosure  CC:  No Recipients    If you would like to receive this communication electronically, please contact [email or web address] or [phone] to request more information on [employer] Link access.    For providers and/or their staff who would like to refer a patient to [facility], please contact us through our one-stop-shop provider referral line, [facility], at [phone].    If you feel you have received this communication in error or would no longer like to receive these types of communications, please e-mail [email or web address]

## 2017-01-16 NOTE — MR AVS SNAPSHOT
[facility] - Gastroenterology  [address] [facility] [address]  Phone: [phone]  Fax: [phone]                  Ana Aguila   2017 10:30 AM   Office Visit    Description:  Female : 1955   Provider:  Marizol Liriano PA-C   Department:  [facility] - Gastroenterology           Reason for Visit     GI Problem           Diagnoses this Visit        Comments    Loose stools    -  Primary            To Do List           Future Appointments        Provider Department Dept Phone    2017 9:30 AM [facility] [phone]    2017 10:40 AM Erik Cool MD [facility] Internal Medicine [phone]    2017 9:20 AM Ambar Muñoz NP [facility] Hepatology [phone]    3/20/2017 3:30 PM Yevgeniy Underwood MD [facility] Gastroenterology [phone]      Goals (5 Years of Data)              Today    Today    Today    Blood Pressure <= 140/90   128/82  122/72  116/76    Notes - Note created  2016  1:28 PM by Lilly Dumont    It is important to consistently maintain a controlled blood pressure.      Exercise at least 150 minutes per week.           Maintain a low sodium diet           Take at least one BP reading per week at various times of the day             [facility]     [facility] Nurse Care Line -  Assistance  Registered nurses in the [facility] provide clinical advisement, health education, appointment booking, and other advisory services.  Call for this free service at [phone].             Medications           Message regarding Medications     Verify the changes and/or additions to your medication regime listed below are the same as discussed with your clinician today.  If any of these changes or additions are incorrect, please notify your healthcare provider.        STOP taking these medications     furosemide (LASIX) 20 MG tablet Take 1 tablet (20 mg total) by mouth daily as  "needed (Lower extremity edema or swelling).           Verify that the below list of medications is an accurate representation of the medications you are currently taking.  If none reported, the list may be blank. If incorrect, please contact your healthcare provider. Carry this list with you in case of emergency.           Current Medications     ADVAIR DISKUS 250-50 mcg/dose diskus inhaler INHALE 1 PUFF BY MOUTH TWICE DAILY( EVERY 12 HOURS)    albuterol (PROAIR HFA) 90 mcg/actuation inhaler INHALE 2 PUFFS BY MOUTH INTO THE LUNGS FOUR TIMES DAILY prn    albuterol-ipratropium 2.5mg-0.5mg/3mL (DUO-NEB) 0.5 mg-3 mg(2.5 mg base)/3 mL nebulizer solution Take 3 mLs by nebulization every 6 (six) hours as needed for Wheezing or Shortness of Breath.    ergocalciferol (ERGOCALCIFEROL) 50,000 unit Cap TAKE 1 CAPSULE BY MOUTH EVERY 7 DAYS    hydrochlorothiazide (HYDRODIURIL) 25 MG tablet Take 1 tablet (25 mg total) by mouth once daily.    levalbuterol (XOPENEX) 1.25 mg/0.5 mL nebulizer solution Qid prn for copd    loperamide (IMODIUM) 2 mg capsule Take 2 mg by mouth 4 (four) times daily as needed for Diarrhea.    losartan (COZAAR) 25 MG tablet Take 1 tablet (25 mg total) by mouth once daily.    psyllium 0.52 gram capsule Take 2 capsules by mouth once daily.    estradiol (ESTRACE) 0.01 % (0.1 mg/gram) vaginal cream Place 1 g vaginally 3 (three) times a week. Place by fingertip application before bedtime three times a week (Monday, Wednesday, Friday)           Clinical Reference Information           Vital Signs - Last Recorded  Most recent update: 1/16/2017 10:45 AM by Desirae Martel MA    BP Pulse Ht Wt LMP BMI    128/82 94 5' 5" (1.651 m) 75.9 kg (167 lb 5.3 oz) (LMP Unknown) 27.85 kg/m2      Blood Pressure          Most Recent Value    BP  128/82      Allergies as of 1/16/2017     Sudafed [Pseudoephedrine Hcl]      Immunizations Administered on Date of Encounter - 1/16/2017     None      "

## 2017-01-16 NOTE — PROGRESS NOTES
[facility] Consultation Note    Reason for Consult:  The encounter diagnosis was Loose stools.    PCP:   Erik Cool       Referring MD:  No referring provider defined for this encounter.    HPI:  This is a 61 y.o. female here for evaluation of  loose stools and fecal incontinence.  Last saw Dr. Underwood 6/2016 for this issue. He advised imodium daily and 2 tablets of metamucil daily.Today she is doing well with the current regiment, but is worried than she can take imodium and metamucil longterm  Denies blood in the stool    She is still not interested in having a colonscopy due to the complication of her last colonoscopy that included perforation and perla-colectomy.    ROS:  Constitutional: No fevers, chills, No weight loss  ENT: No allergies  CV: No chest pain  Pulm: No cough, + shortness of breath  Ophtho: No vision changes  GI: see HPI  Derm: No rash  Heme: No lymphadenopathy, No bruising  MSK: No arthritis  : No dysuria, No hematuria  Endo: No hot or cold intolerance  Neuro: No syncope, No seizure  Psych: No anxiety, No depression    Medical History:  has a past medical history of Asthma; Blood transfusion; COPD (chronic obstructive pulmonary disease); Hyperlipidemia; Hypertension; and Reflux (2013).    Surgical History:  has a past surgical history that includes Hysterectomy (1986); Left leg surgery; Colon surgery (2011); Cholecystectomy (2013); Hiatal hernia repair (2013); Abdominal surgery; Hernia repair; Appendectomy; and Fracture surgery.    Family History: family history includes Abnormal EKG in her daughter; Breast cancer (age of onset: 43) in her daughter; Breast cancer (age of onset: 44) in her other; Cancer in her mother; Cataracts in her maternal grandmother; Coronary artery disease in her father; Hypertension in her brother, mother, and sister; Retinal detachment in her father. There is no history of Amblyopia, Blindness, Diabetes, Glaucoma, Macular degeneration,  Strabismus, Stroke, Thyroid disease, Colon cancer, or Ovarian cancer..     Social History:  reports that she quit smoking about 20 years ago. Her smoking use included Cigarettes. She has a 45.00 pack-year smoking history. She has never used smokeless tobacco. She reports that she drinks alcohol. She reports that she does not use illicit drugs.    Review of patient's allergies indicates:   Allergen Reactions    Sudafed [pseudoephedrine hcl] Nausea And Vomiting and Other (See Comments)     HEATHER       Current Outpatient Prescriptions on File Prior to Visit   Medication Sig Dispense Refill    ADVAIR DISKUS 250-50 mcg/dose diskus inhaler INHALE 1 PUFF BY MOUTH TWICE DAILY( EVERY 12 HOURS) 1 each 12    albuterol (PROAIR HFA) 90 mcg/actuation inhaler INHALE 2 PUFFS BY MOUTH INTO THE LUNGS FOUR TIMES DAILY prn 25.5 g 12    albuterol-ipratropium 2.5mg-0.5mg/3mL (DUO-NEB) 0.5 mg-3 mg(2.5 mg base)/3 mL nebulizer solution Take 3 mLs by nebulization every 6 (six) hours as needed for Wheezing or Shortness of Breath. 270 vial 12    ergocalciferol (ERGOCALCIFEROL) 50,000 unit Cap TAKE 1 CAPSULE BY MOUTH EVERY 7 DAYS 12 capsule 0    hydrochlorothiazide (HYDRODIURIL) 25 MG tablet Take 1 tablet (25 mg total) by mouth once daily. 30 tablet 1    levalbuterol (XOPENEX) 1.25 mg/0.5 mL nebulizer solution Qid prn for copd 120 each 11    loperamide (IMODIUM) 2 mg capsule Take 2 mg by mouth 4 (four) times daily as needed for Diarrhea.      losartan (COZAAR) 25 MG tablet Take 1 tablet (25 mg total) by mouth once daily. 90 tablet 1    psyllium 0.52 gram capsule Take 2 capsules by mouth once daily.      estradiol (ESTRACE) 0.01 % (0.1 mg/gram) vaginal cream Place 1 g vaginally 3 (three) times a week. Place by fingertip application before bedtime three times a week (Monday, Wednesday, Friday) 45 g 3    [DISCONTINUED] atorvastatin (LIPITOR) 40 MG tablet Take 1 tablet (40 mg total) by mouth once daily. 90 tablet 3    [DISCONTINUED]  "carvedilol (COREG) 3.125 MG tablet Take 1 tablet (3.125 mg total) by mouth 2 (two) times daily. 60 tablet 11    [DISCONTINUED] furosemide (LASIX) 20 MG tablet Take 1 tablet (20 mg total) by mouth daily as needed (Lower extremity edema or swelling). 30 tablet 3     No current facility-administered medications on file prior to visit.          Objective Findings:    Vital Signs:  Visit Vitals    /82    Pulse 94    Ht 5' 5" (1.651 m)    Wt 75.9 kg (167 lb 5.3 oz)    LMP  (LMP Unknown)    BMI 27.85 kg/m2     Body mass index is 27.85 kg/(m^2).    Physical Exam:  General Appearance: Well appearing in no acute distress, portable O2  Head:   Normocephalic, without obvious abnormality  Eyes:    No scleral icterus  ENT: Neck supple, Lips, mucosa, and tongue normal  Lungs: +mild wheezing in lower lung field B/L  Heart:  Regular rate and rhythm, S1, S2 normal, no murmurs heard  Abdomen: Soft, non tender, non distended with positive bowel sounds in all four quadrants. No hepatosplenomegaly, ascites, or mass  Extremities: 2+ pulses, no clubbing, cyanosis or edema  Skin: No rash  Neurologic: AAO x 3      Labs:  Lab Results   Component Value Date    WBC 4.91 10/18/2016    HGB 9.9 (L) 10/18/2016    HCT 29.7 (L) 10/18/2016     10/18/2016    CHOL 191 03/23/2016    TRIG 73 03/23/2016    HDL 61 03/23/2016    ALT 11 11/29/2016    AST 14 11/29/2016     01/12/2017    K 3.8 01/12/2017     01/12/2017    CREATININE 0.9 01/12/2017    BUN 9 01/12/2017    CO2 30 (H) 01/12/2017    TSH 2.072 01/12/2017    INR 1.2 10/18/2016    HGBA1C 5.4 10/18/2016       Imaging:    Endoscopy:    Colon 7/2010    Assessment:  1. Loose stools      sx under control with current regimen of taking imodium and metamucil    Recommendations:  1. Continue imodium once daily, and 2 tablets of metamucil daily    2.Will contact pt about Cologuard screening since she does not wish to have another colonoscopy. She is still not interested in having " a colonscopy due to the complication of her last colonoscopy that included perforation and perla-colectomy    No Follow-up on file.      Order summary:         Thank you so much for allowing me to participate in the care of Ana Liriano PA-C

## 2017-01-16 NOTE — MR AVS SNAPSHOT
[facility] - Hepatology  [address]  [place] [address]  Phone: [phone]  Fax: [phone]                  Ana Aguila   2017 8:40 AM   Office Visit    Description:  Female : 1955   Provider:  Ambar Muñoz NP   Department:  [facility] Hepatology           Reason for Visit     hepatomegaly           Diagnoses this Visit        Comments    Hepatomegaly    -  Primary            To Do List           Future Appointments        Provider Department Dept Phone    2017 10:30 AM Marizol Liriano PA-C [facility] Gastroenterology [phone]    2017 9:30 AM [facility] [phone]    2017 10:40 AM Erik Cool MD [facility] Internal Medicine [phone]    3/20/2017 3:30 PM Yevgeniy Underwood MD [facility] Gastroenterology [phone]      Goals (5 Years of Data)              Today    Today    Today    Blood Pressure <= 140/90   122/72  116/76  119/74    Notes - Note created  2016  1:28 PM by Lilly Dumont    It is important to consistently maintain a controlled blood pressure.      Exercise at least 150 minutes per week.           Maintain a low sodium diet           Take at least one BP reading per week at various times of the day             Follow-Up and Disposition     Follow-up and Disposition History      [facility]     [facility] -  Assistance  Registered nurses in the [facility] provide clinical advisement, health education, appointment booking, and other advisory services.  Call for this free service at [phone].             Medications           Message regarding Medications     Verify the changes and/or additions to your medication regime listed below are the same as discussed with your clinician today.  If any of these changes or additions are incorrect, please notify your healthcare provider.        STOP taking these medications     carvedilol  (COREG) 3.125 MG tablet Take 1 tablet (3.125 mg total) by mouth 2 (two) times daily.    atorvastatin (LIPITOR) 40 MG tablet Take 1 tablet (40 mg total) by mouth once daily.           Verify that the below list of medications is an accurate representation of the medications you are currently taking.  If none reported, the list may be blank. If incorrect, please contact your healthcare provider. Carry this list with you in case of emergency.           Current Medications     ADVAIR DISKUS 250-50 mcg/dose diskus inhaler INHALE 1 PUFF BY MOUTH TWICE DAILY( EVERY 12 HOURS)    albuterol (PROAIR HFA) 90 mcg/actuation inhaler INHALE 2 PUFFS BY MOUTH INTO THE LUNGS FOUR TIMES DAILY prn    albuterol-ipratropium 2.5mg-0.5mg/3mL (DUO-NEB) 0.5 mg-3 mg(2.5 mg base)/3 mL nebulizer solution Take 3 mLs by nebulization every 6 (six) hours as needed for Wheezing or Shortness of Breath.    ergocalciferol (ERGOCALCIFEROL) 50,000 unit Cap TAKE 1 CAPSULE BY MOUTH EVERY 7 DAYS    furosemide (LASIX) 20 MG tablet Take 1 tablet (20 mg total) by mouth daily as needed (Lower extremity edema or swelling).    hydrochlorothiazide (HYDRODIURIL) 25 MG tablet Take 1 tablet (25 mg total) by mouth once daily.    levalbuterol (XOPENEX) 1.25 mg/0.5 mL nebulizer solution Qid prn for copd    loperamide (IMODIUM) 2 mg capsule Take 2 mg by mouth 4 (four) times daily as needed for Diarrhea.    losartan (COZAAR) 25 MG tablet Take 1 tablet (25 mg total) by mouth once daily.    psyllium 0.52 gram capsule Take 2 capsules by mouth once daily.    estradiol (ESTRACE) 0.01 % (0.1 mg/gram) vaginal cream Place 1 g vaginally 3 (three) times a week. Place by fingertip application before bedtime three times a week (Monday, Wednesday, Friday)           Clinical Reference Information           Vital Signs - Last Recorded  Most recent update: 1/16/2017  8:59 AM by Avery Celis MA    BP Pulse Temp Resp Ht Wt    122/72 (BP Location: Left arm, Patient Position: Sitting) 102  "98.2 °F (36.8 °C) (Oral) 20 5' 5" (1.651 m) 75.7 kg (166 lb 14.2 oz)    LMP SpO2 BMI          (LMP Unknown) (!) 93% 27.77 kg/m2        Blood Pressure          Most Recent Value    BP  122/72      Allergies as of 1/16/2017     Sudafed [Pseudoephedrine Hcl]      Immunizations Administered on Date of Encounter - 1/16/2017     None      Orders Placed During Today's Visit     Future Labs/Procedures Expected by Expires    Alpha 1 Antitrypsin Phenotype  1/16/2017 3/17/2018    TACO  1/16/2017 3/17/2018    Anti-smooth muscle antibody  1/16/2017 3/17/2018    Antimitochondrial antibody  1/16/2017 3/17/2018    Ceruloplasmin  1/16/2017 3/17/2018    Ferritin  1/16/2017 3/17/2018    IgG  1/16/2017 3/17/2018    Iron and TIBC  1/16/2017 3/17/2018    US Elastography Liver  1/16/2017 1/16/2018    Recurring Lab Work Interval Expires    Hepatic function panel  Every 16 weeks until 3/17/2018 3/17/2018    US Abdomen Complete   1/17/2018      "

## 2017-01-16 NOTE — PROCEDURES
Ana Aguila is a 61 y.o.  female patient, who presents for a 6 minute walk test ordered by MD Fuentes.  The diagnosis is Qualify for Oxygen.  The patient's BMI is 27.5kg/m2. Predicted distance (lower limit of normal) is 344.94 meters.    Test Results:    The test was completed without stopping.    The total time walked was 360 seconds.  During walking, the patient reported:  Dyspnea, Lightheadedness. The patient used no assistive devices during testing.     01/16/2017---------Distance: 266.09 meters (873 feet)     O2 Sat % Supplemental Oxygen Heart Rate Blood Pressure Jessi Scale   Pre-exercise  (Resting) 86 % Room Air 99 bpm 122/73 4   During Exercise 70 % Room Air 120 bpm 155/76 7-8   Post-exercise   86 % Room Air  98 bpm         Recovery Time: 144 seconds    Oxygen Qualification:     O2 Sat % Supplemental Oxygen Heart Rate Blood Pressure Jessi Scale   Pre-exercise  (Resting) 92 % 4 L/M  96 bpm  127/69  5-6    During Exercise 92 %  4 L/M  101 bpm  129/78  3    Post-exercise   92 %  4 L/M  92 bpm            Recovery Time: 73 seconds    Performing nurse/tech: CANDY Dixon    PREVIOUS STUDY:   The patient had a previous study.    04/30/2014---------Distance: 304.8 meters (1000 feet)     O2 Sat % Supplemental Oxygen Heart Rate Blood Pressure Jessi   Scale   Pre-exercise  (Resting) 94 % Room Air 94 bpm 116/74 mmHg 0   During Exercise 88 % Room Air 107 bpm 139/73 mmHg 0.5   Post-exercise  (Recovery) 95 % Room Air  94 bpm 129/72 mmHg       CLINICAL INTERPRETATION:  Six minute walk distance is 266.09 meters (873 feet) with very heavy dyspnea.  During exercise, there was significant desaturation while breathing room air.  Both blood pressure and heart rate increased significantly with walking.  Tachycardia was present prior to exercise.  This may represent a hypertensive and a tachycardic response to exercise.  The patient reported non-pulmonary symptoms during exercise.  Significant exercise impairment is  likely due to desaturation and cardiovascular causes.  The patient may benefit from using supplemental oxygen.  Since the previous study in April 2014, exercise capacity is significantly worse.  Based upon age and body mass index, exercise capacity is less than predicted.   Oxygen saturation did improve while breathing supplemental oxygen.

## 2017-01-16 NOTE — PROGRESS NOTES
HEPATOLOGY CONSULTATION    Referring Physician: Erik Cool MD   Current Corresponding Physician: Erik Cool MD     Reason for Consultation: Consultation for evaluation of hepatomegaly    History of Present Illness: Ana Aguila is a 61 y.o. femalewho presents for evaluation of hepatomegaly. Per patient dx last year with her PCP.      Denies previously known liver disease or abnormal serologies  Denies symptoms of decompensation   Denies IVDU, intranasal drug use,   + tattoos   + blood transfusions 2010 r/t surgery  Alcohol use, rare social drink  Denies family hx of liver disease    Review of available records reveal:  Normal LFTs  Hx of elevated LFTs 2/2013 s/p lupe, LFTs normalized post surgery    Hep b sAg neg  11/2016  Hep C ab neg    US 12/2016: mild hepatomegaly, normal bile ducts, spleen, no ascites    Echo previously 25% 3 months ago, now 60%    Other noted hx: HTN, HLD, COPD, BMI 27  Chief Complaint   Patient presents with    hepatomegaly       Past Medical History   Diagnosis Date    Asthma     Blood transfusion     COPD (chronic obstructive pulmonary disease)     Hyperlipidemia     Hypertension     Reflux 2013     Outpatient Encounter Prescriptions as of 1/16/2017   Medication Sig Dispense Refill    ADVAIR DISKUS 250-50 mcg/dose diskus inhaler INHALE 1 PUFF BY MOUTH TWICE DAILY( EVERY 12 HOURS) 1 each 12    albuterol (PROAIR HFA) 90 mcg/actuation inhaler INHALE 2 PUFFS BY MOUTH INTO THE LUNGS FOUR TIMES DAILY prn 25.5 g 12    albuterol-ipratropium 2.5mg-0.5mg/3mL (DUO-NEB) 0.5 mg-3 mg(2.5 mg base)/3 mL nebulizer solution Take 3 mLs by nebulization every 6 (six) hours as needed for Wheezing or Shortness of Breath. 270 vial 12    atorvastatin (LIPITOR) 40 MG tablet Take 1 tablet (40 mg total) by mouth once daily. 90 tablet 3    carvedilol (COREG) 3.125 MG tablet Take 1 tablet (3.125 mg total) by mouth 2 (two) times daily. 60 tablet 11    ergocalciferol (ERGOCALCIFEROL)  50,000 unit Cap TAKE 1 CAPSULE BY MOUTH EVERY 7 DAYS 12 capsule 0    estradiol (ESTRACE) 0.01 % (0.1 mg/gram) vaginal cream Place 1 g vaginally 3 (three) times a week. Place by fingertip application before bedtime three times a week (Monday, Wednesday, Friday) 45 g 3    furosemide (LASIX) 20 MG tablet Take 1 tablet (20 mg total) by mouth daily as needed (Lower extremity edema or swelling). 30 tablet 3    hydrochlorothiazide (HYDRODIURIL) 25 MG tablet Take 1 tablet (25 mg total) by mouth once daily. 30 tablet 1    levalbuterol (XOPENEX) 1.25 mg/0.5 mL nebulizer solution Qid prn for copd 120 each 11    loperamide (IMODIUM) 2 mg capsule Take 2 mg by mouth 4 (four) times daily as needed for Diarrhea.      losartan (COZAAR) 25 MG tablet Take 1 tablet (25 mg total) by mouth once daily. 90 tablet 1    psyllium 0.52 gram capsule Take 2 capsules by mouth once daily.       No facility-administered encounter medications on file as of 1/16/2017.      Review of patient's allergies indicates:   Allergen Reactions    Sudafed [pseudoephedrine hcl] Nausea And Vomiting and Other (See Comments)     JITTERY     Family History   Problem Relation Age of Onset    Cancer Mother      lung    Hypertension Mother     Coronary artery disease Father     Retinal detachment Father     Hypertension Sister     Hypertension Brother     Cataracts Maternal Grandmother     Abnormal EKG Daughter     Breast cancer Daughter 43     negative genetic testing    Breast cancer Other 44    Amblyopia Neg Hx     Blindness Neg Hx     Diabetes Neg Hx     Glaucoma Neg Hx     Macular degeneration Neg Hx     Strabismus Neg Hx     Stroke Neg Hx     Thyroid disease Neg Hx     Colon cancer Neg Hx     Ovarian cancer Neg Hx        Social History     Social History    Marital status: Single     Spouse name: N/A    Number of children: N/A    Years of education: N/A     Occupational History    Not on file.     Social History Main Topics     Smoking status: Former Smoker     Packs/day: 1.50     Years: 30.00     Types: Cigarettes     Quit date: 1/4/1997    Smokeless tobacco: Never Used    Alcohol use Yes      Comment: occaissionally    Drug use: No    Sexual activity: Yes     Partners: Male     Birth control/ protection: Surgical     Other Topics Concern    Not on file     Social History Narrative    Lives alone, brother lives in [place]. Brothers and sisters also living elsewhere. Children and grandchildren live in [place].      Review of Systems   Constitutional: Negative for activity change, appetite change, chills, diaphoresis, fatigue, fever and unexpected weight change.   HENT: Negative for facial swelling and nosebleeds.    Respiratory: Negative for cough, chest tightness and shortness of breath.    Cardiovascular: Negative for chest pain, palpitations and leg swelling.   Gastrointestinal: Negative for abdominal distention, abdominal pain, blood in stool, constipation, diarrhea, nausea and vomiting.   Musculoskeletal: Negative for neck pain and neck stiffness.   Skin: Negative for color change, pallor and rash.   Neurological: Negative for dizziness, tremors, syncope, weakness, light-headedness and headaches.   Hematological: Negative for adenopathy. Does not bruise/bleed easily.   Psychiatric/Behavioral: Negative for agitation, behavioral problems, confusion and decreased concentration.     There were no vitals filed for this visit.    Physical Exam   Constitutional: She is oriented to person, place, and time. She appears well-developed and well-nourished. No distress.   HENT:   Head: Normocephalic and atraumatic.   Eyes: Conjunctivae are normal. Pupils are equal, round, and reactive to light. No scleral icterus.   Neck: Normal range of motion. Neck supple.   Cardiovascular: Normal rate.    Pulmonary/Chest: Effort normal.   Abdominal: Soft. She exhibits no distension and no mass. There is no tenderness. There is no rebound and no guarding.    Musculoskeletal: Normal range of motion.   Neurological: She is alert and oriented to person, place, and time. No cranial nerve deficit. She exhibits normal muscle tone. Coordination normal.   No asterixis   Skin: Skin is warm and dry. No rash noted. She is not diaphoretic. No erythema. No pallor.   Psychiatric: She has a normal mood and affect. Her behavior is normal. Judgment and thought content normal.       MELD-Na score: 8 at 10/19/2016  3:55 AM  MELD score: 8 at 10/19/2016  3:55 AM  Calculated from:  Serum Creatinine: 0.8 mg/dL (Rounded to 1) at 10/19/2016  3:55 AM  Serum Sodium: 144 mmol/L (Rounded to 137) at 10/19/2016  3:55 AM  Total Bilirubin: 0.4 mg/dL (Rounded to 1) at 10/19/2016  3:55 AM  INR(ratio): 1.2 at 10/18/2016 12:11 PM  Age: 61 years    Lab Results   Component Value Date    GLU 92 01/12/2017    BUN 9 01/12/2017    CREATININE 0.9 01/12/2017    CALCIUM 9.9 01/12/2017     01/12/2017    K 3.8 01/12/2017     01/12/2017    PROT 6.1 11/29/2016    CO2 30 (H) 01/12/2017    ANIONGAP 13 01/12/2017    WBC 4.91 10/18/2016    RBC 3.33 (L) 10/18/2016    HGB 9.9 (L) 10/18/2016    HCT 29.7 (L) 10/18/2016    MCV 89 10/18/2016    MCH 29.7 10/18/2016    MCHC 33.3 10/18/2016     Lab Results   Component Value Date    RDW 14.1 10/18/2016     10/18/2016    MPV 9.9 10/18/2016    GRAN 9.2 (H) 10/18/2016    GRAN 87.9 (H) 10/18/2016    LYMPH 1.1 10/18/2016    LYMPH 10.7 (L) 10/18/2016    MONO 0.1 (L) 10/18/2016    MONO 1.0 (L) 10/18/2016    EOSINOPHIL 0.1 10/18/2016    BASOPHIL 0.1 10/18/2016    EOS 0.0 10/18/2016    BASO 0.01 10/18/2016    APTT 31.0 10/18/2016    GROUPTRH O POS 10/18/2016    GROUPTRH O POS 07/23/2010     (H) 10/17/2016    CHOL 191 03/23/2016    TRIG 73 03/23/2016    HDL 61 03/23/2016    CHOLHDL 31.9 03/23/2016    TOTALCHOLEST 3.1 03/23/2016    ALBUMIN 3.6 11/29/2016    BILIDIR 0.2 12/07/2015    AST 14 11/29/2016    ALT 11 11/29/2016    ALKPHOS 57 11/29/2016    MG 2.0 10/19/2016     LABPROT 11.9 10/18/2016    INR 1.2 10/18/2016       Assessment and Plan:  Hepatomegaly : w/o evidence of advanced liver disease  -liver w/u serologies  -fibroscan today    Total duration of visit = 40 min, with > 50% spent counseling  RTC 4-6 weeks  Patient Active Problem List   Diagnosis    S/P cholecystectomy    Hypertension    Chronic obstructive pulmonary disease    Oral lesion    Hepatomegaly    Former smoker    Takotsubo cardiomyopathy    Microscopic hematuria    Urinary urgency    Increased frequency of urination     Ana Aguila is a 61 y.o. female withhepatomegaly

## 2017-01-17 LAB
ANA SER QL IF: NORMAL
MITOCHONDRIA AB TITR SER IF: NORMAL {TITER}
SMOOTH MUSCLE AB TITR SER IF: NORMAL {TITER}

## 2017-01-18 LAB
A1AT PHENOTYP SERPL-IMP: NORMAL BANDS
A1AT SERPL NEPH-MCNC: 162 MG/DL

## 2017-01-18 NOTE — PROGRESS NOTES
Last 5 Patient Entered Readings                                                               Current 30 Day Average: 139/88     Recent Readings 1/18/2017 1/17/2017 1/17/2017 1/16/2017 1/16/2017    Systolic BP (mmHg) 122 120 128 129 130    Diastolic BP (mmHg) 79 71 80 90 94    Pulse 88 96 99 99 93          Called patient to introduce her into the Hypertension Digital Medicine Program.     Was started on carvedilol and lisinopril after NSTEMI in October. Lisinopril caused cough and she was also taken off carvedilol because it was not helping her BP. She is now getting better readings on losartan 25 mg and HCTZ 25 mg QD. She is having frequent headaches that Dr. Cool is evaluating. Ms. Aguila feels the headaches are no longer related to her BP since her readings are improving.    Reviewed patient's medications and verified allergies on file.     Reviewed questionnaire:    Depression: n/a  Sleep apnea: not indicated    Explained that we expect her to obtain several blood pressures/week at random times of day. Also asked that the BP be taken at least 1 hour after taking BP medications.     Explained that our goal is to get her BP to consistently below 140/90mmHg.     Patient and I agreed that the patient will take her BP daily to every other day at varying times of the day.     I will plan to follow-up with the patient in 2 weeks.    Emailed patient link to [facility] webpage as well as my direct phone number in case she has in questions.

## 2017-01-23 ENCOUNTER — HOSPITAL ENCOUNTER (OUTPATIENT)
Dept: UROLOGY | Facility: HOSPITAL | Age: 62
Discharge: HOME | End: 2017-01-23
Attending: UROLOGY
Payer: COMMERCIAL

## 2017-01-23 ENCOUNTER — HOSPITAL ENCOUNTER (OUTPATIENT)
Dept: RADIOLOGY | Facility: HOSPITAL | Age: 62
Discharge: HOME | End: 2017-01-23
Attending: INTERNAL MEDICINE
Payer: COMMERCIAL

## 2017-01-23 VITALS
BODY MASS INDEX: 27.95 KG/M2 | SYSTOLIC BLOOD PRESSURE: 137 MMHG | TEMPERATURE: 98 F | DIASTOLIC BLOOD PRESSURE: 83 MMHG | HEART RATE: 86 BPM | HEIGHT: 65 IN | WEIGHT: 167.75 LBS

## 2017-01-23 DIAGNOSIS — J43.2 CENTRILOBULAR EMPHYSEMA: ICD-10-CM

## 2017-01-23 DIAGNOSIS — R31.29 MICROSCOPIC HEMATURIA: ICD-10-CM

## 2017-01-23 PROCEDURE — 71020 XR CHEST PA AND LATERAL: CPT | Mod: 26,,, | Performed by: RADIOLOGY

## 2017-01-23 PROCEDURE — 25000003 PHARM REV CODE 250: Performed by: UROLOGY

## 2017-01-23 PROCEDURE — 52000 CYSTOURETHROSCOPY: CPT | Mod: ,,, | Performed by: UROLOGY

## 2017-01-23 PROCEDURE — 71020 XR CHEST PA AND LATERAL: CPT | Mod: TC

## 2017-01-23 PROCEDURE — 52000 CYSTOURETHROSCOPY: CPT

## 2017-01-23 RX ORDER — LIDOCAINE HYDROCHLORIDE 20 MG/ML
JELLY TOPICAL ONCE
Status: COMPLETED | OUTPATIENT
Start: 2017-01-23 | End: 2017-01-23

## 2017-01-23 RX ORDER — CIPROFLOXACIN 500 MG/1
500 TABLET ORAL ONCE
Status: COMPLETED | OUTPATIENT
Start: 2017-01-23 | End: 2017-01-23

## 2017-01-23 RX ADMIN — LIDOCAINE HYDROCHLORIDE: 20 JELLY TOPICAL at 10:01

## 2017-01-23 RX ADMIN — CIPROFLOXACIN HYDROCHLORIDE 500 MG: 500 TABLET, FILM COATED ORAL at 11:01

## 2017-01-23 NOTE — IP AVS SNAPSHOT
[facility]  [address] [facility] [address]  Phone: [phone]  Fax: [phone]                  Ana Aguila   2017  9:30 AM   Cystoscopy    Description:  Female : 1955   Provider:  GISSELL UROLOGY   Department:  [facility] [address]           Visit Information     Date & Time Provider Department    2017  9:30 AM G[facility]LL UROLOGY [facility]      Recent Lab Values        2012                 11:30 AM  3:46 PM  3:15 PM  4:27 PM        Urine Culture NO SIGNIFICANT GROWTH - - No growth        Color YELLOW YELLOW Yellow -        Specific Gravity 1.006 1.021 1.020 -        pH 7.5 6.0 7.0 -        Nitrite Negative Negative Negative -        Ketones Negative Negative Negative -        Urobilinogen 0.2 0.2 Negative -        Comment for Color at 11:30 AM on 2010:  If formed elements are not present in the microscopic  examination, they are NOT mentioned in the report.      Comment for Color at  3:46 PM on 2012:  If formed elements are not present in the microscopicexamination, they are NOT mentioned in the report.      Procedures Performed This Visit     Procedure Authorizing Provider    Cystoscopy Sommer Ace MD      Reason for Visit     Hematuria           Diagnoses this Visit        Comments    Microscopic hematuria                To Do List           Your Scheduled Appointments     2017 11:15 AM CST   Diagnostic Xray with [facility] XROP3 485 LB LIMIT   [facility] )    [address] [facility] [address] [id number] [phone]            2017  3:00 PM CST   Urgent Care with Erik Cool MD   [facility] - Internal Medicine ([facility])    [address]  [place] [id number] [phone]            2017  9:00 AM CST   GASTROENTEROLOGY ESTABLISHED PATIENT with Marizol Liriano,  DENG Castaneda - Gastroenterology ([facility] )    [address]  [place] [address]   [phone]            Feb 02, 2017 10:40 AM CST   Established Patient Visit with MD Deshawn Sherman liliana - Internal Medicine ([facility])    [address]  [place] [address]   [phone]            Feb 14, 2017  9:20 AM CST   Established Patient Visit with BERENICE Whaley - Hepatology ([facility] )    [address]  [place] [address]   [phone]              Follow-Up and Disposition     Return in about 1 year (around 1/23/2018).      Goals (5 Years of Data)              Today    Today    1/22/17    Blood Pressure <= 140/90   137/83  118/76  123/79    Notes - Note created  12/19/2016  1:28 PM by Lilly Dumont    It is important to consistently maintain a controlled blood pressure.      Exercise at least 150 minutes per week.           Maintain a low sodium diet           Take at least one BP reading per week at various times of the day                  Medications                ** Verify the list of medication(s) below is accurate and up to date. Carry this with you in case of emergency. If your medications have changed, please notify your healthcare provider.             Medication List      TAKE these medications        Additional Info                      ADVAIR DISKUS 250-50 mcg/dose diskus inhaler   Quantity:  1 each   Refills:  12   Generic drug:  fluticasone-salmeterol 250-50 mcg/dose    Instructions:  INHALE 1 PUFF BY MOUTH TWICE DAILY( EVERY 12 HOURS)     Begin Date    AM    Noon    PM    Bedtime       albuterol 90 mcg/actuation inhaler   Commonly known as:  PROAIR HFA   Quantity:  25.5 g   Refills:  12    Instructions:  INHALE 2 PUFFS BY MOUTH INTO THE LUNGS FOUR TIMES DAILY prn     Begin Date    AM    Noon    PM    Bedtime       albuterol-ipratropium 2.5mg-0.5mg/3mL 0.5 mg-3 mg(2.5 mg base)/3 mL nebulizer  solution   Commonly known as:  DUO-NEB   Quantity:  270 vial   Refills:  12   Dose:  3 mL    Instructions:  Take 3 mLs by nebulization every 6 (six) hours as needed for Wheezing or Shortness of Breath.     Begin Date    AM    Noon    PM    Bedtime       azithromycin 250 MG tablet   Commonly known as:  ZITHROMAX Z-CHRISTOPHER   Quantity:  6 tablet   Refills:  1    Instructions:  2 tablets miguel, then 1 daily till finished     Begin Date    AM    Noon    PM    Bedtime       ergocalciferol 50,000 unit Cap   Commonly known as:  ERGOCALCIFEROL   Quantity:  12 capsule   Refills:  0    Instructions:  TAKE 1 CAPSULE BY MOUTH EVERY 7 DAYS     Begin Date    AM    Noon    PM    Bedtime       estradiol 0.01 % (0.1 mg/gram) vaginal cream   Commonly known as:  ESTRACE   Quantity:  45 g   Refills:  3   Dose:  1 g    Instructions:  Place 1 g vaginally 3 (three) times a week. Place by fingertip application before bedtime three times a week (Monday, Wednesday, Friday)     Begin Date    AM    Noon    PM    Bedtime       hydrochlorothiazide 25 MG tablet   Commonly known as:  HYDRODIURIL   Quantity:  30 tablet   Refills:  1   Dose:  25 mg    Instructions:  Take 1 tablet (25 mg total) by mouth once daily.     Begin Date    AM    Noon    PM    Bedtime       levalbuterol 1.25 mg/0.5 mL nebulizer solution   Commonly known as:  XOPENEX   Quantity:  120 each   Refills:  11    Instructions:  Qid prn for copd     Begin Date    AM    Noon    PM    Bedtime       loperamide 2 mg capsule   Commonly known as:  IMODIUM   Refills:  0   Dose:  2 mg    Instructions:  Take 2 mg by mouth 4 (four) times daily as needed for Diarrhea.     Begin Date    AM    Noon    PM    Bedtime       losartan 25 MG tablet   Commonly known as:  COZAAR   Quantity:  90 tablet   Refills:  1   Dose:  25 mg    Instructions:  Take 1 tablet (25 mg total) by mouth once daily.     Begin Date    AM    Noon    PM    Bedtime       predniSONE 10 MG tablet   Commonly known as:  DELTASONE  "  Quantity:  30 tablet   Refills:  3    Instructions:  2 tablets x 7 days, then 1 tablet daily     Begin Date    AM    Noon    PM    Bedtime       psyllium 0.52 gram capsule   Refills:  0   Dose:  2 capsule    Instructions:  Take 2 capsules by mouth once daily.     Begin Date    AM    Noon    PM    Bedtime               Your Vitals Were     BP Pulse Temp Height Weight Last Period    137/83 86 98.1 °F (36.7 °C) (Oral) 5' 5" (1.651 m) 76.1 kg (167 lb 12.3 oz) (LMP Unknown)    BMI                27.92 kg/m2          Allergies as of 1/23/2017     Sudafed [Pseudoephedrine Hcl]      Immunizations Administered on Date of Encounter - 1/23/2017     None      Orders Placed During Today's Visit      Normal Orders This Visit    Cystoscopy       Administrations This Visit     ciprofloxacin HCl tablet 500 mg     Admin Date Action Dose Route Administered By             01/23/2017 Given 500 mg Oral Brian Monroy LPN                    lidocaine HCl 2% urojet     Admin Date Action Dose Route Administered By             01/23/2017 Given   Urethral Brian Monroy LPN                      Instructions    What to Expect After a Cystoscopy  For the next 24-48 hours, you may feel a mild burning when you urinate. This burning is normal and expected. Drink plenty of water to dilute the urine to help relieve the burning sensation. You may also see a small amount of blood in your urine after the procedure.    Unless you are already taking antibiotics, you may be given an antibiotic after the test to prevent infection.    Signs and Symptoms to Report  Call the [facility] at [phone] if you develop any of the following:  · Fever of 101 degrees or higher  · Chills or persistent bleeding  Inability to urinateWhat to Expect After a Cystoscopy  For the next 24-48 hours, you may feel a mild burning when you urinate. This burning is normal and expected. Drink plenty of water to dilute the urine to help relieve the burning " sensation. You may also see a small amount of blood in your urine after the procedure.    Unless you are already taking antibiotics, you may be given an antibiotic after the test to prevent infection.    Signs and Symptoms to Report  Call the [facility] at [phone] if you develop any of the following:  Fever of 101 degrees or higher  Chills or persistent bleeding  · Inability to urinate       Advance Directives     An advance directive is a document which, in the event you are no longer able to make decisions for yourself, tells your healthcare team what kind of treatment you do or do not want to receive, or who you would like to make those decisions for you.  If you do not currently have an advance directive, [facility] encourages you to create one.  For more information call:  [phone]), [phone] ([phone]),  or log on to [email or web address].        [facility]     [facility] Care Line - 24/7 Assistance  Registered nurses in the [facility] provide clinical advisement, health education, appointment booking, and other advisory services.  Call for this free service at [phone].        Language Assistance Services     ATTENTION: Language assistance services are available, free of charge. Please call [phone].      ATENCIÓN: Si habla español, tiene a garcia disposición servicios gratuitos de asistencia lingüística. Llame al [phone].     CHÚ Ý: N?u b?n nói Ti?ng Vi?t, có các d?ch v? h? tr? ngôn ng? mi?n phí dành cho b?n. G?i s? [phone].         [facility] complies with applicable Federal civil rights laws and does not discriminate on the basis of race, color, national origin, age, disability, or sex.

## 2017-01-23 NOTE — PROCEDURES
CYSTOSCOPY REPORT    Pre Procedure Diagnosis:  Microscopic hematuria, urinary urgency    Post Procedure Diagnosis:  same    Anesthesia: 10 cc 2% lidocaine jelly applied per urethra.    14 FR Flexible Olympus cystoscope used.    FINDINGS:  Dome, anterior, posterior, lateral walls and bladder base free of urothelial abnormalities. Right and left ureteral orifices in the normal postion and configuration, both effluxed clear urine.  Bladder neck and urethra were normal.    Specimen:  none    The patient was taken to the cystoscopy suite and placed in dorsal lithotomy position.  The genitalia was prepped and draped  in the usual sterile fashion.  Two percent lidocaine jelly was inserted in the urethra.  After sufficent time had passed to allow good local anesthesia, the cystoscope was inserted in the urethra and passed into the bladder visualizing the urethra along its entire course.  The dome, anterior, posterior and lateral walls were examined systematically.  The ureteral orifices were in their usual position and configuration.  The cystoscope was turned upon itself 180 degrees to visualize the bladder neck.  The cystoscope was then brought to the level of the bladder neck, the water was turned on and the urethra was visualized.  The cystoscope was removed and the patient was instructed to urinate prior to leaving the office.     Post procedure medication:  cipro 500 mg x 1    ADDITIONAL NOTES:  Renal ultrasound which had been done previously showed no masses, hydronephrosis.       ASSESSMENT/PLAN:  61 year old woman status post flexible cystoscopy.  1. Push fluids for 24 hours.  2. May see blood in the urine, this should gradually improve over the next 2-3 days.  3. The patient was instructed to return to the office or go to the emergency should fever, chills, cloudy urine, or inability to urinate develop.  4. Follow up in 1 year.  Follow up sooner if has gross hematuria.  She continues to wish to manage her symptoms  with behavioral therapy.

## 2017-01-23 NOTE — H&P (VIEW-ONLY)
CHIEF COMPLAINT:    Mrs. Aguila is a 61 y.o. female presenting for a follow up on urinary urgency, frequency.    PRESENTING ILLNESS:    Ana Aguila is a 61 y.o. female who returns for follow up.  She states that she has been having a problem with her blood pressure.  She has episodes which she believes elevate her blood pressure.  If she does not get to the toilet right when she needs to go, she develops a throbbing headache.  She has frequency x 5, nocturia x 2.  These episodes occur every other day (she has counted 7 in the last 2 weeks.)  She has been working with Dr. Cool to try to get to the bottom of them.  She is still averse to adding a medication for urinary urgency.  She has nocturia x 2, but it is mostly that she typically wakes up at 1 am and again at 5 am and since she wakes up, she uses the toilet.  The amounts are variable.  When asked about her bowel movements, she states she has a chronic problem with diarrhea ever since her partial colectomy, which occurred when she had a bowel perforation at the time of colonoscopy.  She saw her gastroenterologist who recommended that she take Immodium AD and take 2 metamucil tablets to firm the stools.  This worked for her but she discontinued using them because of the headaches.  The diarrhea has resumed and the headaches continue so she is thinking about resuming the Immodium AD and Metamucil.  She continues to use the Estrace cream but does not think it has any bearing on the urinary urgency.     On UA today, she was found to have 50 blood.  She denies a history of gross hematuria, she does have a remote history of smoking, states she quite in either 1993 or 1997, at the time the COPD was diagnosed.  No history of chemotherapy or radiation.      REVIEW OF SYSTEMS:    Review of Systems   Constitutional: Positive for chills.   Eyes: Negative for discharge and redness.   Respiratory: Positive for cough and shortness of breath.    Cardiovascular: Negative  for chest pain.   Gastrointestinal: Positive for diarrhea. Negative for abdominal pain.   Genitourinary: Positive for urgency.   Musculoskeletal: Negative.    Skin: Negative.    Neurological: Positive for headaches (she believes this is secondary to blood pressure).   Endo/Heme/Allergies: Does not bruise/bleed easily.   Psychiatric/Behavioral: Negative.      PATIENT HISTORY:    Past Medical History   Diagnosis Date    Asthma     Blood transfusion     COPD (chronic obstructive pulmonary disease)     Hyperlipidemia     Hypertension     Reflux 2013       Past Surgical History   Procedure Laterality Date    Hysterectomy  1986    Left leg surgery      Colon surgery  2011     secondary to perforation after c-scope    Cholecystectomy  2013     open    Hiatal hernia repair  2013    Abdominal surgery      Hernia repair      Appendectomy      Fracture surgery         Family History   Problem Relation Age of Onset    Cancer Mother      lung    Hypertension Mother     Coronary artery disease Father     Retinal detachment Father     Hypertension Sister     Hypertension Brother     Cataracts Maternal Grandmother     Abnormal EKG Daughter     Breast cancer Daughter 43     negative genetic testing    Breast cancer Other 44     Social History    Marital status: Single       Social History Main Topics    Smoking status: Former Smoker     Packs/day: 1.50     Years: 30.00     Types: Cigarettes     Quit date: 1/4/1997    Smokeless tobacco: Never Used    Alcohol use Yes      Comment: occaissionally    Drug use: No    Sexual activity: Yes     Partners: Male     Birth control/ protection: Surgical     Social History Narrative    Lives alone, brother lives in [place]. Brothers and sisters also living elsewhere. Children and grandchildren live in [place].        Allergies:  Sudafed [pseudoephedrine hcl]    Medications:  Outpatient Encounter Prescriptions as of 1/13/2017   Medication Sig Dispense Refill     ADVAIR DISKUS 250-50 mcg/dose diskus inhaler INHALE 1 PUFF BY MOUTH TWICE DAILY( EVERY 12 HOURS) 1 each 12    albuterol (PROAIR HFA) 90 mcg/actuation inhaler INHALE 2 PUFFS BY MOUTH INTO THE LUNGS FOUR TIMES DAILY prn 25.5 g 12    albuterol-ipratropium 2.5mg-0.5mg/3mL (DUO-NEB) 0.5 mg-3 mg(2.5 mg base)/3 mL nebulizer solution Take 3 mLs by nebulization every 6 (six) hours as needed for Wheezing or Shortness of Breath. 270 vial 12    atorvastatin (LIPITOR) 40 MG tablet Take 1 tablet (40 mg total) by mouth once daily. 90 tablet 3    carvedilol (COREG) 3.125 MG tablet Take 1 tablet (3.125 mg total) by mouth 2 (two) times daily. 60 tablet 11    ergocalciferol (ERGOCALCIFEROL) 50,000 unit Cap TAKE 1 CAPSULE BY MOUTH EVERY 7 DAYS 12 capsule 0    furosemide (LASIX) 20 MG tablet Take 1 tablet (20 mg total) by mouth daily as needed (Lower extremity edema or swelling). 30 tablet 3    hydrochlorothiazide (HYDRODIURIL) 25 MG tablet Take 1 tablet (25 mg total) by mouth once daily. 30 tablet 1    levalbuterol (XOPENEX) 1.25 mg/0.5 mL nebulizer solution Qid prn for copd 120 each 11    loperamide (IMODIUM) 2 mg capsule Take 2 mg by mouth 4 (four) times daily as needed for Diarrhea.      losartan (COZAAR) 25 MG tablet Take 1 tablet (25 mg total) by mouth once daily. 90 tablet 1    psyllium 0.52 gram capsule Take 2 capsules by mouth once daily.      estradiol (ESTRACE) 0.01 % (0.1 mg/gram) vaginal cream Place 1 g vaginally 3 (three) times a week. Place by fingertip application before bedtime three times a week (Monday, Wednesday, Friday) 45 g 3    [DISCONTINUED] aspirin 81 MG Chew Take 1 tablet (81 mg total) by mouth once daily.  0     No facility-administered encounter medications on file as of 1/13/2017.          PHYSICAL EXAMINATION:    The patient generally appears in good health, is appropriately interactive, and is in no apparent distress.    Skin: No lesions.    Mental: Cooperative with normal affect.    Neuro:  Grossly intact.    HEENT: Normal. No evidence of lymphadenopathy.    Chest: Clear to ascultation bilaterally, normal inspiratory effort.    Abdomen: Soft, non-tender. No masses or organomegaly. Bladder is not palpable. No evidence of flank discomfort. No evidence of inguinal hernia.    Extremities: No clubbing, cyanosis, or edema      LABS:    UA 1.005, pH 6, tr protein, 50 blood, otherwise, negative  Abdominal ultrasound was done on 12/27/2018 for right upper quadrant pain and the kidneys were noted to be normal.      IMPRESSION:    Encounter Diagnoses   Name Primary?    Microscopic hematuria Yes    Urinary urgency     Increased frequency of urination        PLAN:    1. Cystoscopy to complete the workup for microscopic hematuria.  Also, since she has the urgency, would be more comfortable with treating conservatively if this is negative  2.  Since she is averse to taking medications, Bladder Matters book was given to provide information on behavioral management of her symptoms.  Suggested timed voiding every 2-3 hours while awake.    3.  She will continue to work with Dr. Cool regarding her blood pressure issues

## 2017-01-23 NOTE — PATIENT INSTRUCTIONS
What to Expect After a Cystoscopy  For the next 24-48 hours, you may feel a mild burning when you urinate. This burning is normal and expected. Drink plenty of water to dilute the urine to help relieve the burning sensation. You may also see a small amount of blood in your urine after the procedure.    Unless you are already taking antibiotics, you may be given an antibiotic after the test to prevent infection.    Signs and Symptoms to Report  Call the [facility] at [phone] if you develop any of the following:  · Fever of 101 degrees or higher  · Chills or persistent bleeding  Inability to urinateWhat to Expect After a Cystoscopy  For the next 24-48 hours, you may feel a mild burning when you urinate. This burning is normal and expected. Drink plenty of water to dilute the urine to help relieve the burning sensation. You may also see a small amount of blood in your urine after the procedure.    Unless you are already taking antibiotics, you may be given an antibiotic after the test to prevent infection.    Signs and Symptoms to Report  Call the [facility] at [phone] if you develop any of the following:  Fever of 101 degrees or higher  Chills or persistent bleeding  · Inability to urinate

## 2017-01-23 NOTE — INTERVAL H&P NOTE
The patient has been examined and the H&P has been reviewed:    I concur with the findings and no changes have occurred since H&P was written.        There are no hospital problems to display for this patient.    Proceed with planned cystoscopy

## 2017-01-24 ENCOUNTER — LAB VISIT (OUTPATIENT)
Dept: LAB | Facility: HOSPITAL | Age: 62
End: 2017-01-24
Attending: INTERNAL MEDICINE
Payer: COMMERCIAL

## 2017-01-24 ENCOUNTER — OFFICE VISIT (OUTPATIENT)
Dept: INTERNAL MEDICINE | Facility: CLINIC | Age: 62
End: 2017-01-24
Payer: COMMERCIAL

## 2017-01-24 VITALS
HEART RATE: 93 BPM | OXYGEN SATURATION: 99 % | TEMPERATURE: 98 F | WEIGHT: 168 LBS | HEIGHT: 65 IN | SYSTOLIC BLOOD PRESSURE: 125 MMHG | BODY MASS INDEX: 27.99 KG/M2 | DIASTOLIC BLOOD PRESSURE: 81 MMHG

## 2017-01-24 DIAGNOSIS — J44.9 CHRONIC OBSTRUCTIVE PULMONARY DISEASE, UNSPECIFIED COPD TYPE: Primary | ICD-10-CM

## 2017-01-24 DIAGNOSIS — M81.0 OSTEOPOROSIS: ICD-10-CM

## 2017-01-24 DIAGNOSIS — I10 ESSENTIAL HYPERTENSION: ICD-10-CM

## 2017-01-24 DIAGNOSIS — J44.9 CHRONIC OBSTRUCTIVE PULMONARY DISEASE, UNSPECIFIED COPD TYPE: ICD-10-CM

## 2017-01-24 DIAGNOSIS — Z85.038 HISTORY OF COLON CANCER: ICD-10-CM

## 2017-01-24 DIAGNOSIS — R00.2 PALPITATIONS: ICD-10-CM

## 2017-01-24 DIAGNOSIS — E55.9 VITAMIN D DEFICIENCY: ICD-10-CM

## 2017-01-24 DIAGNOSIS — I51.81 TAKOTSUBO CARDIOMYOPATHY: ICD-10-CM

## 2017-01-24 LAB
25(OH)D3+25(OH)D2 SERPL-MCNC: 32 NG/ML
ALBUMIN SERPL BCP-MCNC: 4 G/DL
ALP SERPL-CCNC: 66 U/L
ALT SERPL W/O P-5'-P-CCNC: 13 U/L
ANION GAP SERPL CALC-SCNC: 10 MMOL/L
AST SERPL-CCNC: 20 U/L
BASOPHILS # BLD AUTO: 0.02 K/UL
BASOPHILS NFR BLD: 0.1 %
BILIRUB SERPL-MCNC: 0.2 MG/DL
BUN SERPL-MCNC: 18 MG/DL
CALCIUM SERPL-MCNC: 9.5 MG/DL
CHLORIDE SERPL-SCNC: 100 MMOL/L
CO2 SERPL-SCNC: 32 MMOL/L
CREAT SERPL-MCNC: 1 MG/DL
DIFFERENTIAL METHOD: ABNORMAL
EOSINOPHIL # BLD AUTO: 0 K/UL
EOSINOPHIL NFR BLD: 0.1 %
ERYTHROCYTE [DISTWIDTH] IN BLOOD BY AUTOMATED COUNT: 14 %
EST. GFR  (AFRICAN AMERICAN): >60 ML/MIN/1.73 M^2
EST. GFR  (NON AFRICAN AMERICAN): >60 ML/MIN/1.73 M^2
GLUCOSE SERPL-MCNC: 116 MG/DL
HCT VFR BLD AUTO: 43.5 %
HGB BLD-MCNC: 13.8 G/DL
LYMPHOCYTES # BLD AUTO: 1.7 K/UL
LYMPHOCYTES NFR BLD: 12.1 %
MCH RBC QN AUTO: 29.7 PG
MCHC RBC AUTO-ENTMCNC: 31.7 %
MCV RBC AUTO: 94 FL
MONOCYTES # BLD AUTO: 0.3 K/UL
MONOCYTES NFR BLD: 2.4 %
NEUTROPHILS # BLD AUTO: 12.2 K/UL
NEUTROPHILS NFR BLD: 85.1 %
PLATELET # BLD AUTO: 403 K/UL
PMV BLD AUTO: 10.2 FL
POTASSIUM SERPL-SCNC: 3.5 MMOL/L
PROT SERPL-MCNC: 7.4 G/DL
RBC # BLD AUTO: 4.65 M/UL
SODIUM SERPL-SCNC: 142 MMOL/L
WBC # BLD AUTO: 14.34 K/UL

## 2017-01-24 PROCEDURE — 85025 COMPLETE CBC W/AUTO DIFF WBC: CPT

## 2017-01-24 PROCEDURE — 3079F DIAST BP 80-89 MM HG: CPT | Mod: S$GLB,,, | Performed by: INTERNAL MEDICINE

## 2017-01-24 PROCEDURE — 3074F SYST BP LT 130 MM HG: CPT | Mod: S$GLB,,, | Performed by: INTERNAL MEDICINE

## 2017-01-24 PROCEDURE — 99214 OFFICE O/P EST MOD 30 MIN: CPT | Mod: S$GLB,,, | Performed by: INTERNAL MEDICINE

## 2017-01-24 PROCEDURE — 82306 VITAMIN D 25 HYDROXY: CPT

## 2017-01-24 PROCEDURE — 80053 COMPREHEN METABOLIC PANEL: CPT

## 2017-01-24 PROCEDURE — 36415 COLL VENOUS BLD VENIPUNCTURE: CPT

## 2017-01-24 PROCEDURE — 99999 PR PBB SHADOW E&M-EST. PATIENT-LVL III: CPT | Mod: PBBFAC,,, | Performed by: INTERNAL MEDICINE

## 2017-01-24 NOTE — Clinical Note
Dr Dill - I'm seeing Ms Aguila for follow-up today, and I noticed in the chart that you started her on azithromycin and prednisone for a respiratory infection. Did you have a chance to talk with her about her recent symptoms? I wasn't able to find a note from you in Epic related to this.   She's been having episodes of sudden shortness of breath (with reported sudden drop in her SaO2), palpitations, and headaches; her cardiologist doesn't feel that these are due to a cardiac issue, though I am going to have her follow up with him a little earlier, as she has continued to have the palpitations. I'd appreciate your input as well, especially given her recent 6MWT results.   Thank you! Erik Cool MD

## 2017-01-24 NOTE — MR AVS SNAPSHOT
[facility] - Internal Medicine  [address]  [place] [address]  Phone: [phone]  Fax: [phone]                  Ana Aguila   2017 3:00 PM   Office Visit    Description:  Female : 1955   Provider:  Erik Cool MD   Department:  [facility] - Internal Medicine           Reason for Visit     Hypertension                To Do List           Future Appointments        Provider Department Dept Phone    2017 4:20 PM LAB, APPOINTMENT [facility] [phone]    2017 9:00 AM Marizol Liriano PA-C [facility] Gastroenterology [phone]    2017 10:40 AM Erik Cool MD [facility] Internal Medicine [phone]    2017 9:20 AM Ambar Muñoz NP [facility] Hepatology [phone]      Goals (5 Years of Data)              Today    Today    Today    Blood Pressure <= 140/90   133/87  125/81  129/88    Notes - Note created  2016  1:28 PM by Lilly Dumont    It is important to consistently maintain a controlled blood pressure.      Exercise at least 150 minutes per week.           Maintain a low sodium diet           Take at least one BP reading per week at various times of the day             Follow-Up and Disposition     Return in about 9 days (around 2017) for follow up as scheduled.      [facility]     [facility] -  Assistance  Registered nurses in the [facility] provide clinical advisement, health education, appointment booking, and other advisory services.  Call for this free service at [phone].             Medications           Message regarding Medications     Verify the changes and/or additions to your medication regime listed below are the same as discussed with your clinician today.  If any of these changes or additions are incorrect, please notify your healthcare provider.             Verify that the below list of medications is an accurate  "representation of the medications you are currently taking.  If none reported, the list may be blank. If incorrect, please contact your healthcare provider. Carry this list with you in case of emergency.           Current Medications     ADVAIR DISKUS 250-50 mcg/dose diskus inhaler INHALE 1 PUFF BY MOUTH TWICE DAILY( EVERY 12 HOURS)    albuterol (PROAIR HFA) 90 mcg/actuation inhaler INHALE 2 PUFFS BY MOUTH INTO THE LUNGS FOUR TIMES DAILY prn    albuterol-ipratropium 2.5mg-0.5mg/3mL (DUO-NEB) 0.5 mg-3 mg(2.5 mg base)/3 mL nebulizer solution Take 3 mLs by nebulization every 6 (six) hours as needed for Wheezing or Shortness of Breath.    ergocalciferol (ERGOCALCIFEROL) 50,000 unit Cap TAKE 1 CAPSULE BY MOUTH EVERY 7 DAYS    estradiol (ESTRACE) 0.01 % (0.1 mg/gram) vaginal cream Place 1 g vaginally 3 (three) times a week. Place by fingertip application before bedtime three times a week (Monday, Wednesday, Friday)    hydrochlorothiazide (HYDRODIURIL) 25 MG tablet Take 1 tablet (25 mg total) by mouth once daily.    levalbuterol (XOPENEX) 1.25 mg/0.5 mL nebulizer solution Qid prn for copd    predniSONE (DELTASONE) 10 MG tablet 2 tablets x 7 days, then 1 tablet daily    psyllium 0.52 gram capsule Take 2 capsules by mouth once daily.    azithromycin (ZITHROMAX Z-CHRISTOPHER) 250 MG tablet 2 tablets miguel, then 1 daily till finished    loperamide (IMODIUM) 2 mg capsule Take 2 mg by mouth 4 (four) times daily as needed for Diarrhea.    losartan (COZAAR) 25 MG tablet Take 1 tablet (25 mg total) by mouth once daily.           Clinical Reference Information           Vital Signs - Last Recorded  Most recent update: 1/24/2017  3:17 PM by Kelley Mccollum MA    BP Pulse Temp Ht Wt LMP    125/81 (BP Location: Left arm, Patient Position: Sitting) 93 98 °F (36.7 °C) (Oral) 5' 5" (1.651 m) 76.2 kg (167 lb 15.9 oz) (LMP Unknown)    SpO2 BMI             99% 27.96 kg/m2         Blood Pressure          Most Recent Value    BP  125/81    "   Allergies as of 1/24/2017     Sudafed [Pseudoephedrine Hcl]      Immunizations Administered on Date of Encounter - 1/24/2017     None      Instructions    For your symptoms (sinusitis, congestion, cough):  1. Saline nasal spray at least 2-3 times/day  2. Flonase nasal spray - twice a day for 1 week, then once a day thereafter  3. Claritin (loratadine) or Zyrtec (cetirizine) once a day  4. Mucinex (guaifenesin) every 8-12 hours with plenty of water.

## 2017-01-24 NOTE — PATIENT INSTRUCTIONS
For your symptoms (sinusitis, congestion, cough):  1. Saline nasal spray at least 2-3 times/day  2. Flonase nasal spray - twice a day for 1 week, then once a day thereafter  3. Claritin (loratadine) or Zyrtec (cetirizine) once a day  4. Mucinex (guaifenesin) every 8-12 hours with plenty of water.

## 2017-01-24 NOTE — PROGRESS NOTES
Subjective:       Patient ID: Ana Aguila is a 61 y.o. female.    Chief Complaint: Hypertension    HPI  62 y/o woman HTN, HLD, COPD, h/o hemicolectomy 2009 due to colonoscopy complication here for urgent visit for palpitations. Last seen 1/12 for this issue.     Since last visit, has continued to feel tired and overall not feeling well, still having episodes of palpitations. She has again stopped her losartan, and is taking only the HCTZ 25mg. Reports BP at home has been in 120s/70s in past several days.     Patient came in for a 6 min walk test with Pulmonology, and the appears to have been prescribed azithromycin and prednisone, apparently for respiratory infection (upper and lower respiratory infections both listed as diagnoses). Today she states that she is feeling better since starting the azithromycin and prednisone.  She was continuing to have headaches, and had thick sputum causing nasal congestion in the week since her last visit.     Had CXR that Dr Dill had ordered in 9/2016, does show some atalectasis vs scarring but this is stable from prior. No PNA noted.     Review of Systems   HENT: Negative for congestion, postnasal drip and sore throat.    Respiratory: Positive for shortness of breath. Negative for cough and wheezing.    Cardiovascular: Positive for palpitations. Negative for chest pain and leg swelling.   Gastrointestinal: Negative for abdominal pain, diarrhea and nausea.   Genitourinary: Negative.    Musculoskeletal: Negative.    Skin: Negative.    Neurological: Positive for light-headedness and headaches. Negative for syncope, weakness and numbness.   Psychiatric/Behavioral: The patient is nervous/anxious.          Past medical history, surgical history, and family medical history reviewed and updated as appropriate.    Medications and allergies reviewed.     Objective:          Vitals:    01/24/17 1512   BP: 125/81   BP Location: Left arm   Patient Position: Sitting   Pulse: 93   Temp: 98  "°F (36.7 °C)   TempSrc: Oral   SpO2: 99%   Weight: 76.2 kg (167 lb 15.9 oz)   Height: 5' 5" (1.651 m)     Body mass index is 27.96 kg/(m^2).  Physical Exam   Constitutional: She is oriented to person, place, and time. She appears well-developed and well-nourished. No distress.   HENT:   Head: Normocephalic and atraumatic.   Nose: Nose normal.   Mouth/Throat: Oropharynx is clear and moist.   Eyes: Conjunctivae and EOM are normal. Pupils are equal, round, and reactive to light.   Neck: Normal range of motion. Neck supple.   Cardiovascular: Normal rate, regular rhythm and normal heart sounds.    No murmur heard.  Varicose veins b/l LE   Pulmonary/Chest: Effort normal. No respiratory distress. She has no wheezes. She has no rales.   Prolonged expiratory phase, but good air movement throughout. No cough.   Abdominal: Soft. Bowel sounds are normal. She exhibits no distension. There is no tenderness.   Well-healed surgical scars   Musculoskeletal: She exhibits no edema or tenderness.   Lymphadenopathy:     She has no cervical adenopathy.   Neurological: She is alert and oriented to person, place, and time. She has normal strength. No cranial nerve deficit. Gait normal.   Skin: Skin is warm and dry.   Psychiatric:   Anxious but less than prior. Affect and speech normal   Vitals reviewed.      Lab Results   Component Value Date    WBC 14.34 (H) 01/24/2017    HGB 13.8 01/24/2017    HCT 43.5 01/24/2017     (H) 01/24/2017    CHOL 191 03/23/2016    TRIG 73 03/23/2016    HDL 61 03/23/2016    ALT 13 01/24/2017    AST 20 01/24/2017     01/24/2017    K 3.5 01/24/2017     01/24/2017    CREATININE 1.0 01/24/2017    BUN 18 01/24/2017    CO2 32 (H) 01/24/2017    TSH 2.072 01/12/2017    INR 1.2 10/18/2016    HGBA1C 5.4 10/18/2016     6 min walk test 1/16/17:  CLINICAL INTERPRETATION:  Six minute walk distance is 266.09 meters (873 feet) with very heavy dyspnea.  During exercise, there was significant desaturation while " breathing room air.  Both blood pressure and heart rate increased significantly with walking.  Tachycardia was present prior to exercise.  This may represent a hypertensive and a tachycardic response to exercise.  The patient reported non-pulmonary symptoms during exercise.  Significant exercise impairment is likely due to desaturation and cardiovascular causes.  The patient may benefit from using supplemental oxygen.  Since the previous study in April 2014, exercise capacity is significantly worse.  Based upon age and body mass index, exercise capacity is less than predicted.   Oxygen saturation did improve while breathing supplemental oxygen.    Assessment:       1. Chronic obstructive pulmonary disease, unspecified COPD type    2. Palpitations    3. Essential hypertension        Plan:   Ana was seen today for hypertension.    Diagnoses and all orders for this visit:    Chronic obstructive pulmonary disease, unspecified COPD type   Palpitations - per Dr Gaytan episodes more likely related to COPD and anxiety; currently being treated for respiratory infection. If component of bronchospasm then prednisone should also help. Continue this, and follow up with Dr Dill. ER / return precautions given.    Essential hypertension, h/o takotsubo CM - BP at goal today; continue current medication as she currently does not want to restart losartan or coreg. Recommended schedule follow up with Dr Gaytan.    Health maintenance reviewed with patient.   Will have previously-ordered labs done today.  Return in about 9 days (around 2/2/2017) for follow up as scheduled.    Erik Cool MD  Internal Medicine  [facility]  2/2/2017

## 2017-01-31 ENCOUNTER — TELEPHONE (OUTPATIENT)
Dept: GASTROENTEROLOGY | Facility: CLINIC | Age: 62
End: 2017-01-31

## 2017-01-31 ENCOUNTER — PATIENT OUTREACH (OUTPATIENT)
Dept: OTHER | Facility: OTHER | Age: 62
End: 2017-01-31
Payer: COMMERCIAL

## 2017-01-31 NOTE — PROGRESS NOTES
Last 5 Patient Entered Readings                                                               Current 30 Day Average: 129/81     Recent Readings 1/31/2017 1/31/2017 1/30/2017 1/29/2017 1/29/2017    Systolic BP (mmHg) 126 119 128 116 117    Diastolic BP (mmHg) 81 74 80 68 66    Pulse 74 85 70 90 85        Follow up with Ms. Ana Aguila completed. Ms. Aguila is doing well. She has been participating in pulmonary rehab. She pays $150 each quarter and is wanting to know if this is something that her insurance company will pay for. She is also staying active and watching her diet. Patient did not have any further questions or concerns. I will follow up in a few weeks to see how she is doing and progressing.

## 2017-02-01 ENCOUNTER — OFFICE VISIT (OUTPATIENT)
Dept: GASTROENTEROLOGY | Facility: CLINIC | Age: 62
End: 2017-02-01
Payer: COMMERCIAL

## 2017-02-01 VITALS
HEIGHT: 65 IN | DIASTOLIC BLOOD PRESSURE: 66 MMHG | BODY MASS INDEX: 28.21 KG/M2 | SYSTOLIC BLOOD PRESSURE: 118 MMHG | HEART RATE: 81 BPM | WEIGHT: 169.31 LBS

## 2017-02-01 DIAGNOSIS — R10.11 RUQ ABDOMINAL PAIN: Primary | ICD-10-CM

## 2017-02-01 PROCEDURE — 99999 PR PBB SHADOW E&M-EST. PATIENT-LVL IV: CPT | Mod: PBBFAC,,, | Performed by: PHYSICIAN ASSISTANT

## 2017-02-01 PROCEDURE — 3078F DIAST BP <80 MM HG: CPT | Mod: S$GLB,,, | Performed by: PHYSICIAN ASSISTANT

## 2017-02-01 PROCEDURE — 99213 OFFICE O/P EST LOW 20 MIN: CPT | Mod: S$GLB,,, | Performed by: PHYSICIAN ASSISTANT

## 2017-02-01 PROCEDURE — 3074F SYST BP LT 130 MM HG: CPT | Mod: S$GLB,,, | Performed by: PHYSICIAN ASSISTANT

## 2017-02-01 NOTE — PROGRESS NOTES
[facility] Consultation Note    Reason for Consult:  The encounter diagnosis was RUQ abdominal pain.    PCP:   Erik Cool       Referring MD:  No referring provider defined for this encounter.    HPI:  This is a 61 y.o. female here for a follow up.  Unclear why a 2 week follow up appointment was made.     Today she report having a gnagging RUQ pain. No relation to meals. S/p cholecystectomy. She is worried about this.     Last saw Dr. Underwood 6/2016 for loose stools and fecal incontinence.He advised imodium daily and 2 tablets of metamucil daily.Today she is doing well with the current regiment, but is worried than she can take imodium and metamucil longterm  Denies blood in the stool    She is still not interested in having a colonscopy due to the complication of her last colonoscopy that included perforation and perla-colectomy.    I spoke with her today about Cologuard. She is not interested.    ROS:  Constitutional: No fevers, chills, No weight loss  ENT: No allergies  CV: No chest pain  Pulm: No cough, + shortness of breath  Ophtho: No vision changes  GI: see HPI  Derm: No rash  Heme: No lymphadenopathy, No bruising  MSK: No arthritis  : No dysuria, No hematuria  Endo: No hot or cold intolerance  Neuro: No syncope, No seizure  Psych: No anxiety, No depression    Medical History:  has a past medical history of Asthma; Blood transfusion; COPD (chronic obstructive pulmonary disease); Hyperlipidemia; Hypertension; and Reflux (2013).    Surgical History:  has a past surgical history that includes Hysterectomy (1986); Left leg surgery; Colon surgery (2011); Cholecystectomy (2013); Hiatal hernia repair (2013); Abdominal surgery; Hernia repair; Appendectomy; and Fracture surgery.    Family History: family history includes Abnormal EKG in her daughter; Breast cancer (age of onset: 43) in her daughter; Breast cancer (age of onset: 44) in her other; Cancer in her mother; Cataracts in her  maternal grandmother; Coronary artery disease in her father; Hypertension in her brother, mother, and sister; Retinal detachment in her father. There is no history of Amblyopia, Blindness, Diabetes, Glaucoma, Macular degeneration, Strabismus, Stroke, Thyroid disease, Colon cancer, or Ovarian cancer..     Social History:  reports that she quit smoking about 20 years ago. Her smoking use included Cigarettes. She has a 45.00 pack-year smoking history. She has never used smokeless tobacco. She reports that she drinks alcohol. She reports that she does not use illicit drugs.    Review of patient's allergies indicates:   Allergen Reactions    Sudafed [pseudoephedrine hcl] Nausea And Vomiting and Other (See Comments)     HEATHER       Current Outpatient Prescriptions on File Prior to Visit   Medication Sig Dispense Refill    ADVAIR DISKUS 250-50 mcg/dose diskus inhaler INHALE 1 PUFF BY MOUTH TWICE DAILY( EVERY 12 HOURS) 1 each 12    albuterol (PROAIR HFA) 90 mcg/actuation inhaler INHALE 2 PUFFS BY MOUTH INTO THE LUNGS FOUR TIMES DAILY prn 25.5 g 12    albuterol-ipratropium 2.5mg-0.5mg/3mL (DUO-NEB) 0.5 mg-3 mg(2.5 mg base)/3 mL nebulizer solution Take 3 mLs by nebulization every 6 (six) hours as needed for Wheezing or Shortness of Breath. 270 vial 12    ergocalciferol (ERGOCALCIFEROL) 50,000 unit Cap TAKE 1 CAPSULE BY MOUTH EVERY 7 DAYS 12 capsule 0    hydrochlorothiazide (HYDRODIURIL) 25 MG tablet Take 1 tablet (25 mg total) by mouth once daily. 30 tablet 1    levalbuterol (XOPENEX) 1.25 mg/0.5 mL nebulizer solution Qid prn for copd 120 each 11    predniSONE (DELTASONE) 10 MG tablet 2 tablets x 7 days, then 1 tablet daily 30 tablet 3    estradiol (ESTRACE) 0.01 % (0.1 mg/gram) vaginal cream Place 1 g vaginally 3 (three) times a week. Place by fingertip application before bedtime three times a week (Monday, Wednesday, Friday) 45 g 3    loperamide (IMODIUM) 2 mg capsule Take 2 mg by mouth 4 (four) times daily as  "needed for Diarrhea.      losartan (COZAAR) 25 MG tablet Take 1 tablet (25 mg total) by mouth once daily. 90 tablet 1    psyllium 0.52 gram capsule Take 2 capsules by mouth once daily.       No current facility-administered medications on file prior to visit.          Objective Findings:    Vital Signs:  Visit Vitals    /66    Pulse 81    Ht 5' 5" (1.651 m)    Wt 76.8 kg (169 lb 5 oz)    LMP  (LMP Unknown)    BMI 28.18 kg/m2     Body mass index is 28.18 kg/(m^2).    Physical Exam:  General Appearance: Well appearing in no acute distress, portable O2  Head:   Normocephalic, without obvious abnormality  Eyes:    No scleral icterus  ENT: Neck supple, Lips, mucosa, and tongue normal  Lungs: +mild wheezing in lower lung field B/L  Heart:  Regular rate and rhythm, S1, S2 normal, no murmurs heard  Abdomen: Soft, RUQ tender, non distended with positive bowel sounds in all four quadrants. Also tender along right 10th rib  Extremities: 2+ pulses, no edema  Skin: No rash  Neurologic: AAO x 3      Labs:  Lab Results   Component Value Date    WBC 14.34 (H) 01/24/2017    HGB 13.8 01/24/2017    HCT 43.5 01/24/2017     (H) 01/24/2017    CHOL 191 03/23/2016    TRIG 73 03/23/2016    HDL 61 03/23/2016    ALT 13 01/24/2017    AST 20 01/24/2017     01/24/2017    K 3.5 01/24/2017     01/24/2017    CREATININE 1.0 01/24/2017    BUN 18 01/24/2017    CO2 32 (H) 01/24/2017    TSH 2.072 01/12/2017    INR 1.2 10/18/2016    HGBA1C 5.4 10/18/2016       Imaging:  ABD US - hepatomegaly, s/p cholecystectomy  Endoscopy:    Colon 7/2010    Assessment:  1. RUQ abdominal pain      RUQ pain may be related to scar tissue.     Recommendations:  Schedule CT abd/pel  to rule out masses or signs of inflammation in the RUQ    No Follow-up on file.      Order summary:  Orders Placed This Encounter    CT Abdomen Pelvis W Wo Contrast         Thank you so much for allowing me to participate in the care of Ana SMART " Mingo Liriano PA-C

## 2017-02-01 NOTE — MR AVS SNAPSHOT
[facility] - Gastroenterology  [address] [address]  Phone: [phone]  Fax: [phone]                  Ana Aguila   2017 9:00 AM   Office Visit    Description:  Female : 1955   Provider:  Marizol Liriano PA-C   Department:  [facility] - Gastroenterology           Reason for Visit     Diarrhea           Diagnoses this Visit        Comments    RUQ abdominal pain    -  Primary            To Do List           Future Appointments        Provider Department Dept Phone    2017 10:40 AM Erik Cool MD [facility] - Internal Medicine [phone]    2017 10:30 AM [facility] CT1 64- LIMIT 450 LBS [facility] [phone]    2017 11:00 AM Ferny Gaytan MD [facility] - Cardiology [phone]    2017 9:20 AM Ambar Muñoz NP [facility] - Hepatology [phone]    2017 2:30 PM Manpreet Dill MD [facility] - Pulmonary Services [phone]      Goals (5 Years of Data)              Today    Today    17    Blood Pressure <= 140/90   118/66  132/81  126/81    Notes - Note created  2016  1:28 PM by Llily Dumont    It is important to consistently maintain a controlled blood pressure.      Exercise at least 150 minutes per week.           Maintain a low sodium diet           Take at least one BP reading per week at various times of the day             [facility] -  Assistance  Registered nurses in the [facility] provide clinical advisement, health education, appointment booking, and other advisory services.  Call for this free service at [phone].             Medications           Message regarding Medications     Verify the changes and/or additions to your medication regime listed below are the same as discussed with your clinician today.  If any of these changes or additions are incorrect, please notify your healthcare provider.             Verify  "that the below list of medications is an accurate representation of the medications you are currently taking.  If none reported, the list may be blank. If incorrect, please contact your healthcare provider. Carry this list with you in case of emergency.           Current Medications     ADVAIR DISKUS 250-50 mcg/dose diskus inhaler INHALE 1 PUFF BY MOUTH TWICE DAILY( EVERY 12 HOURS)    albuterol (PROAIR HFA) 90 mcg/actuation inhaler INHALE 2 PUFFS BY MOUTH INTO THE LUNGS FOUR TIMES DAILY prn    albuterol-ipratropium 2.5mg-0.5mg/3mL (DUO-NEB) 0.5 mg-3 mg(2.5 mg base)/3 mL nebulizer solution Take 3 mLs by nebulization every 6 (six) hours as needed for Wheezing or Shortness of Breath.    ergocalciferol (ERGOCALCIFEROL) 50,000 unit Cap TAKE 1 CAPSULE BY MOUTH EVERY 7 DAYS    hydrochlorothiazide (HYDRODIURIL) 25 MG tablet Take 1 tablet (25 mg total) by mouth once daily.    levalbuterol (XOPENEX) 1.25 mg/0.5 mL nebulizer solution Qid prn for copd    predniSONE (DELTASONE) 10 MG tablet 2 tablets x 7 days, then 1 tablet daily    estradiol (ESTRACE) 0.01 % (0.1 mg/gram) vaginal cream Place 1 g vaginally 3 (three) times a week. Place by fingertip application before bedtime three times a week (Monday, Wednesday, Friday)    loperamide (IMODIUM) 2 mg capsule Take 2 mg by mouth 4 (four) times daily as needed for Diarrhea.    losartan (COZAAR) 25 MG tablet Take 1 tablet (25 mg total) by mouth once daily.    psyllium 0.52 gram capsule Take 2 capsules by mouth once daily.           Clinical Reference Information           Vital Signs - Last Recorded  Most recent update: 2/1/2017  9:21 AM by Joselin Dowd MA    BP Pulse Ht Wt LMP BMI    118/66 81 5' 5" (1.651 m) 76.8 kg (169 lb 5 oz) (LMP Unknown) 28.18 kg/m2      Blood Pressure          Most Recent Value    BP  118/66      Allergies as of 2/1/2017     Sudafed [Pseudoephedrine Hcl]      Immunizations Administered on Date of Encounter - 2/1/2017     None      Orders Placed During " Today's Visit     Future Labs/Procedures Expected by Expires    CT Abdomen Pelvis W Wo Contrast  2/1/2017 2/1/2018

## 2017-02-02 ENCOUNTER — OFFICE VISIT (OUTPATIENT)
Dept: INTERNAL MEDICINE | Facility: CLINIC | Age: 62
End: 2017-02-02
Payer: COMMERCIAL

## 2017-02-02 VITALS
HEIGHT: 65 IN | WEIGHT: 171.31 LBS | TEMPERATURE: 98 F | SYSTOLIC BLOOD PRESSURE: 120 MMHG | BODY MASS INDEX: 28.54 KG/M2 | DIASTOLIC BLOOD PRESSURE: 70 MMHG | HEART RATE: 92 BPM | OXYGEN SATURATION: 95 %

## 2017-02-02 DIAGNOSIS — K52.9 CHRONIC DIARRHEA: ICD-10-CM

## 2017-02-02 DIAGNOSIS — R39.15 URINARY URGENCY: ICD-10-CM

## 2017-02-02 DIAGNOSIS — J44.9 CHRONIC OBSTRUCTIVE PULMONARY DISEASE, UNSPECIFIED COPD TYPE: Primary | ICD-10-CM

## 2017-02-02 DIAGNOSIS — I10 ESSENTIAL HYPERTENSION: ICD-10-CM

## 2017-02-02 DIAGNOSIS — I51.81 TAKOTSUBO CARDIOMYOPATHY: ICD-10-CM

## 2017-02-02 PROCEDURE — 3074F SYST BP LT 130 MM HG: CPT | Mod: S$GLB,,, | Performed by: INTERNAL MEDICINE

## 2017-02-02 PROCEDURE — 99999 PR PBB SHADOW E&M-EST. PATIENT-LVL III: CPT | Mod: PBBFAC,,, | Performed by: INTERNAL MEDICINE

## 2017-02-02 PROCEDURE — 3078F DIAST BP <80 MM HG: CPT | Mod: S$GLB,,, | Performed by: INTERNAL MEDICINE

## 2017-02-02 PROCEDURE — 99214 OFFICE O/P EST MOD 30 MIN: CPT | Mod: S$GLB,,, | Performed by: INTERNAL MEDICINE

## 2017-02-02 NOTE — PROGRESS NOTES
Subjective:       Patient ID: Ana Aguila is a 61 y.o. female.    Chief Complaint: Follow-up and Results    HPI  62 y/o woman HTN, HLD, severe COPD with home O2, h/o hemicolectomy 2009 due to colonoscopy complication here for close follow-up on recent episodes of dyspnea, palpitations.    COPD, episodes of palpitations, sudden dyspnea / desaturation - No further episodes of palpitations or dyspnea / low O2 since last visit. Hasn't needed her supplemental O2. Completed course of azithromycin; she is on 3rd week of prednisone long taper. Has been feeling less anxiety as well.   Concerned by discussion re: possibility of lung transplant with Dr Dill at last visit. Using advair inhaler BID. Hasn't needed to use her rescue inhaler in weeks; was using nebulizer when feeling suddenly short of breath. Does use her duoneb nebulizer q6 hours, feels that she needs this at baseline.     Asks about elevated WBC on labs -- on steroids; no new symptoms.    HTN - checking BP at home, this has been running 120-130s/70-80, no high BP. No headache, vision changes, chest pain, or dyspnea.    Saw GI yesterday for chronic diarrhea and RUQ pain, planned for CT abdomen.     Had cystoscopy with Dr Ace 1/23 for microscopic hematuria, this was normal. Doing kegels and timed voiding for urinary urgency/incontinence. Doesn't want to take any medication for this.    Review of Systems   Constitutional: Negative for activity change and fever.   HENT: Negative for congestion, postnasal drip and sore throat.    Eyes: Negative for visual disturbance.   Respiratory: Positive for shortness of breath (back to baseline). Negative for cough and wheezing.    Cardiovascular: Negative for chest pain, palpitations and leg swelling.   Gastrointestinal: Positive for abdominal pain (mild RUQ ) and diarrhea. Negative for blood in stool and nausea.   Genitourinary: Negative.    Musculoskeletal: Negative.    Skin: Negative.    Neurological: Negative for  "syncope, weakness, light-headedness, numbness and headaches.   Psychiatric/Behavioral: The patient is nervous/anxious (improved).          Past medical history, surgical history, and family medical history reviewed and updated as appropriate.    Medications and allergies reviewed.     Objective:          Vitals:    02/02/17 1102   BP: 120/70   BP Location: Left arm   Patient Position: Sitting   Pulse: 92   Temp: 97.6 °F (36.4 °C)   TempSrc: Oral   SpO2: 95%   Weight: 77.7 kg (171 lb 4.8 oz)   Height: 5' 5" (1.651 m)     Body mass index is 28.51 kg/(m^2).  Physical Exam   Constitutional: She is oriented to person, place, and time. She appears well-developed and well-nourished. No distress.   HENT:   Head: Normocephalic and atraumatic.   Nose: Nose normal.   Mouth/Throat: Oropharynx is clear and moist.   Eyes: Conjunctivae and EOM are normal. Pupils are equal, round, and reactive to light.   Neck: Normal range of motion. Neck supple.   Cardiovascular: Normal rate, regular rhythm and normal heart sounds.    No murmur heard.  Varicose veins b/l LE   Pulmonary/Chest: Effort normal and breath sounds normal. No respiratory distress. She has no wheezes. She has no rales.   Abdominal: Soft. Bowel sounds are normal. She exhibits no distension. There is no tenderness (mild diffuse RUQ tenderness). There is no rebound, no guarding, no CVA tenderness and negative Neves's sign.   Well-healed surgical scars   Musculoskeletal: She exhibits no edema or tenderness.   Lymphadenopathy:     She has no cervical adenopathy.   Neurological: She is alert and oriented to person, place, and time. She has normal strength. No cranial nerve deficit. Gait normal.   Skin: Skin is warm and dry.   Psychiatric:   Anxious but less than prior. Affect and speech normal   Vitals reviewed.      Lab Results   Component Value Date    WBC 14.34 (H) 01/24/2017    HGB 13.8 01/24/2017    HCT 43.5 01/24/2017     (H) 01/24/2017    CHOL 191 03/23/2016    " TRIG 73 03/23/2016    HDL 61 03/23/2016    ALT 13 01/24/2017    AST 20 01/24/2017     01/24/2017    K 3.5 01/24/2017     01/24/2017    CREATININE 1.0 01/24/2017    BUN 18 01/24/2017    CO2 32 (H) 01/24/2017    TSH 2.072 01/12/2017    INR 1.2 10/18/2016    HGBA1C 5.4 10/18/2016       Assessment:       1. Chronic obstructive pulmonary disease, unspecified COPD type    2. Essential hypertension    3. Chronic diarrhea    4. Urinary urgency        Plan:   Ana was seen today for follow-up and results.    Diagnoses and all orders for this visit:    Chronic obstructive pulmonary disease, unspecified COPD type - given improvement with azithromycin + prednisone, with no recurrence of episodes, suspect these were due to COPD, ?bronchospasm. Continue to follow with Pulmonology; recommended that she contact here + pulmonology if symptoms return after she stops prednisone.     Essential hypertension, h/o takotsubo CM - at goal, continue current meds, follow up with Dr Gaytan as scheduled    Chronic diarrhea - continue to follow with GI, agree with imaging for RUQ pain    Urinary urgency - continue current exercises; she does not want to try medication at this time but will contact clinic / Urology if symptoms worsen.    Health maintenance reviewed with patient. Up to date.    Return in about 4 months (around 6/2/2017) for follow up.    Erik Cool MD  Internal Medicine  [facility]  2/3/2017

## 2017-02-02 NOTE — MR AVS SNAPSHOT
[facility] - Internal Medicine  [address]  [facility] [address]  Phone: [phone]  Fax: [phone]                  Ana Aguila   2017 10:40 AM   Office Visit    Description:  Female : 1955   Provider:  Erik Cool MD   Department:  [facility] - Internal Medicine           Reason for Visit     Follow-up     Results                To Do List           Future Appointments        Provider Department Dept Phone    2017 10:30 AM [facility] CT1 64- LIMIT 450 LBS [facility] [phone]    2017 11:00 AM Ferny Gaytan MD [facility] - Cardiology [phone]    2017 9:20 AM Ambar Muñoz NP [facility] - Hepatology [phone]    2017 2:30 PM Manpreet Dill MD [facility] - Pulmonary Services [phone]    2017 9:00 AM Erik Cool MD [facility] Internal Medicine [phone]      Goals (5 Years of Data)              Today    Today    17    Blood Pressure <= 140/90   120/70  133/79  131/85    Notes - Note created  2016  1:28 PM by Lilly Dumont    It is important to consistently maintain a controlled blood pressure.      Exercise at least 150 minutes per week.           Maintain a low sodium diet           Take at least one BP reading per week at various times of the day             Follow-Up and Disposition     Return in about 4 months (around 2017) for follow up.      [facility] On Call     [facility] -  Assistance  Registered nurses in the [facility] provide clinical advisement, health education, appointment booking, and other advisory services.  Call for this free service at [phone].             Medications           Message regarding Medications     Verify the changes and/or additions to your medication regime listed below are the same as discussed with your clinician today.  If any of these changes or additions are incorrect, please notify your healthcare  "provider.             Verify that the below list of medications is an accurate representation of the medications you are currently taking.  If none reported, the list may be blank. If incorrect, please contact your healthcare provider. Carry this list with you in case of emergency.           Current Medications     ADVAIR DISKUS 250-50 mcg/dose diskus inhaler INHALE 1 PUFF BY MOUTH TWICE DAILY( EVERY 12 HOURS)    albuterol (PROAIR HFA) 90 mcg/actuation inhaler INHALE 2 PUFFS BY MOUTH INTO THE LUNGS FOUR TIMES DAILY prn    albuterol-ipratropium 2.5mg-0.5mg/3mL (DUO-NEB) 0.5 mg-3 mg(2.5 mg base)/3 mL nebulizer solution Take 3 mLs by nebulization every 6 (six) hours as needed for Wheezing or Shortness of Breath.    ergocalciferol (ERGOCALCIFEROL) 50,000 unit Cap TAKE 1 CAPSULE BY MOUTH EVERY 7 DAYS    hydrochlorothiazide (HYDRODIURIL) 25 MG tablet Take 1 tablet (25 mg total) by mouth once daily.    levalbuterol (XOPENEX) 1.25 mg/0.5 mL nebulizer solution Qid prn for copd    loperamide (IMODIUM) 2 mg capsule Take 2 mg by mouth 4 (four) times daily as needed for Diarrhea.    losartan (COZAAR) 25 MG tablet Take 1 tablet (25 mg total) by mouth once daily.    predniSONE (DELTASONE) 10 MG tablet 2 tablets x 7 days, then 1 tablet daily    estradiol (ESTRACE) 0.01 % (0.1 mg/gram) vaginal cream Place 1 g vaginally 3 (three) times a week. Place by fingertip application before bedtime three times a week (Monday, Wednesday, Friday)    psyllium 0.52 gram capsule Take 2 capsules by mouth once daily.           Clinical Reference Information           Your Vitals Were     BP Pulse Temp Height Weight Last Period    120/70 (BP Location: Left arm, Patient Position: Sitting) 92 97.6 °F (36.4 °C) (Oral) 5' 5" (1.651 m) 77.7 kg (171 lb 4.8 oz) (LMP Unknown)    SpO2 BMI             95% 28.51 kg/m2         Blood Pressure          Most Recent Value    BP  120/70      Allergies as of 2/2/2017     Sudafed [Pseudoephedrine Hcl]      Immunizations " Administered on Date of Encounter - 2/2/2017     None      Language Assistance Services     ATTENTION: Language assistance services are available, free of charge. Please call [phone].      ATENCIÓN: Si habla dani, tiene a garcia disposición servicios gratuitos de asistencia lingüística. Llame al [phone].     CHÚ Ý: N?u b?n nói Ti?ng Vi?t, có các d?ch v? h? tr? ngôn ng? mi?n phí dành cho b?n. G?i s? [phone].         [facility] complies with applicable Federal civil rights laws and does not discriminate on the basis of race, color, national origin, age, disability, or sex.

## 2017-02-03 PROBLEM — K52.9 CHRONIC DIARRHEA: Status: ACTIVE | Noted: 2017-02-03

## 2017-02-07 ENCOUNTER — PATIENT OUTREACH (OUTPATIENT)
Dept: OTHER | Facility: OTHER | Age: 62
End: 2017-02-07
Payer: COMMERCIAL

## 2017-02-08 NOTE — PROGRESS NOTES
Last 5 Patient Entered Readings                                                               Current 30 Day Average: 125/78     Recent Readings 2/8/2017 2/7/2017 2/6/2017 2/6/2017 2/2/2017    Systolic BP (mmHg) 123 116 125 122 133    Diastolic BP (mmHg) 80 74 79 74 79    Pulse 77 85 83 80 86        Ms. Das BP is at goal. She denies any symptoms and has no questions/concerns. She is looking into starting yoga.     Current HTN regimen:  Hypertension Medications             hydrochlorothiazide (HYDRODIURIL) 25 MG tablet Take 1 tablet (25 mg total) by mouth once daily.    losartan (COZAAR) 25 MG tablet Take 1 tablet (25 mg total) by mouth once daily.          Will continue to monitor regularly. Will follow up in 1-2 months, sooner if BP begins to trend upward or downward.    Patient has my contact information and knows to call with any concerns or clinical changes.

## 2017-02-13 ENCOUNTER — OFFICE VISIT (OUTPATIENT)
Dept: CARDIOLOGY | Facility: CLINIC | Age: 62
End: 2017-02-13
Payer: COMMERCIAL

## 2017-02-13 VITALS
SYSTOLIC BLOOD PRESSURE: 130 MMHG | BODY MASS INDEX: 28.76 KG/M2 | WEIGHT: 172.63 LBS | DIASTOLIC BLOOD PRESSURE: 66 MMHG | HEART RATE: 87 BPM | OXYGEN SATURATION: 92 % | HEIGHT: 65 IN

## 2017-02-13 DIAGNOSIS — I51.81 TAKOTSUBO CARDIOMYOPATHY: ICD-10-CM

## 2017-02-13 DIAGNOSIS — I10 ESSENTIAL HYPERTENSION: Primary | ICD-10-CM

## 2017-02-13 DIAGNOSIS — Z87.891 FORMER SMOKER: Chronic | ICD-10-CM

## 2017-02-13 DIAGNOSIS — J44.9 CHRONIC OBSTRUCTIVE PULMONARY DISEASE, UNSPECIFIED COPD TYPE: ICD-10-CM

## 2017-02-13 PROCEDURE — 3078F DIAST BP <80 MM HG: CPT | Mod: S$GLB,,, | Performed by: INTERNAL MEDICINE

## 2017-02-13 PROCEDURE — 3075F SYST BP GE 130 - 139MM HG: CPT | Mod: S$GLB,,, | Performed by: INTERNAL MEDICINE

## 2017-02-13 PROCEDURE — 99999 PR PBB SHADOW E&M-EST. PATIENT-LVL III: CPT | Mod: PBBFAC,,, | Performed by: INTERNAL MEDICINE

## 2017-02-13 PROCEDURE — 99213 OFFICE O/P EST LOW 20 MIN: CPT | Mod: S$GLB,,, | Performed by: INTERNAL MEDICINE

## 2017-02-13 NOTE — PROGRESS NOTES
"Subjective:   Patient ID:  Ana Aguila is a 61 y.o. female who presents for follow-up of Takotsubo cardiomyopathy      HPI:   Patient is a 62 y/o F w/ PMHx Takatsubo cardiomyopathy, HTN, HLD, severe COPD here today at request of her PCP-->"BP at goal today; continue current medication as she currently does not want to restart losartan or coreg. Recommended schedule follow up with Dr Gaytan."  Essentially, she stopped losartan as she is under the impression that it was causing headaches.  BPs have been well controlled since she stopped.  She is still on HCTZ 25 mg daily with good control.  She is in the [facility] HTN management program.    She does have a h/o Takatsubo CM and LV function has recovered.     Vitals:    02/13/17 1053   BP: 130/66   BP Location: Left arm   Patient Position: Sitting   BP Method: Manual   Pulse: 87   SpO2: (!) 92%   Weight: 78.3 kg (172 lb 9.9 oz)   Height: 5' 5" (1.651 m)     Body mass index is 28.73 kg/(m^2).  CrCl cannot be calculated (Patient has no serum creatinine result on file.).    Lab Results   Component Value Date     01/24/2017    K 3.5 01/24/2017     01/24/2017    CO2 32 (H) 01/24/2017    BUN 18 01/24/2017    CREATININE 1.0 01/24/2017     (H) 01/24/2017    HGBA1C 5.4 10/18/2016    MG 2.0 10/19/2016    AST 20 01/24/2017    ALT 13 01/24/2017    ALBUMIN 4.0 01/24/2017    PROT 7.4 01/24/2017    BILITOT 0.2 01/24/2017    WBC 14.34 (H) 01/24/2017    HGB 13.8 01/24/2017    HCT 43.5 01/24/2017    MCV 94 01/24/2017     (H) 01/24/2017    INR 1.2 10/18/2016    TSH 2.072 01/12/2017    CHOL 191 03/23/2016    HDL 61 03/23/2016    LDLCALC 115.4 03/23/2016    TRIG 73 03/23/2016       Current Outpatient Prescriptions   Medication Sig    ADVAIR DISKUS 250-50 mcg/dose diskus inhaler INHALE 1 PUFF BY MOUTH TWICE DAILY( EVERY 12 HOURS)    albuterol (PROAIR HFA) 90 mcg/actuation inhaler INHALE 2 PUFFS BY MOUTH INTO THE LUNGS FOUR TIMES DAILY prn    albuterol-ipratropium " 2.5mg-0.5mg/3mL (DUO-NEB) 0.5 mg-3 mg(2.5 mg base)/3 mL nebulizer solution Take 3 mLs by nebulization every 6 (six) hours as needed for Wheezing or Shortness of Breath.    ergocalciferol (ERGOCALCIFEROL) 50,000 unit Cap TAKE 1 CAPSULE BY MOUTH EVERY 7 DAYS    estradiol (ESTRACE) 0.01 % (0.1 mg/gram) vaginal cream Place 1 g vaginally 3 (three) times a week. Place by fingertip application before bedtime three times a week (Monday, Wednesday, Friday)    hydrochlorothiazide (HYDRODIURIL) 25 MG tablet Take 1 tablet (25 mg total) by mouth once daily.    levalbuterol (XOPENEX) 1.25 mg/0.5 mL nebulizer solution Qid prn for copd    loperamide (IMODIUM) 2 mg capsule Take 2 mg by mouth 4 (four) times daily as needed for Diarrhea.    losartan (COZAAR) 25 MG tablet Take 1 tablet (25 mg total) by mouth once daily.    predniSONE (DELTASONE) 10 MG tablet 2 tablets x 7 days, then 1 tablet daily    psyllium 0.52 gram capsule Take 2 capsules by mouth once daily.     No current facility-administered medications for this visit.        Review of Systems   Constitution: Negative for malaise/fatigue.   Eyes: Negative for visual disturbance.   Cardiovascular: Positive for dyspnea on exertion. Negative for chest pain, claudication, irregular heartbeat, near-syncope, orthopnea and palpitations.   Respiratory: Positive for shortness of breath. Negative for sleep disturbances due to breathing.    Musculoskeletal: Negative for muscle cramps, muscle weakness and myalgias.   Gastrointestinal: Negative for abdominal pain and heartburn.   Genitourinary: Negative for nocturia.   Neurological: Negative for difficulty with concentration, excessive daytime sleepiness, light-headedness, loss of balance, paresthesias and vertigo.       Objective:   Physical Exam   Constitutional: She is oriented to person, place, and time. Vital signs are normal. She appears well-developed and well-nourished.   HENT:   Head: Normocephalic and atraumatic.   Neck:  Neck supple. Normal carotid pulses and no JVD present. Carotid bruit is not present. No edema present.   Cardiovascular: Normal rate, regular rhythm, normal heart sounds and intact distal pulses.  PMI is not displaced.  Exam reveals no gallop and no friction rub.    No murmur heard.  Pulmonary/Chest: Effort normal. No respiratory distress. She has decreased breath sounds. She has no wheezes. She has no rales. She exhibits no tenderness.   Abdominal: Soft. Normal appearance and bowel sounds are normal. There is no hepatosplenomegaly. There is no tenderness.   Musculoskeletal: Normal range of motion. She exhibits no edema.   Neurological: She is alert and oriented to person, place, and time.   Skin: Skin is warm and dry.   Psychiatric: She has a normal mood and affect. Her behavior is normal. Judgment and thought content normal.       Assessment:     1. Essential hypertension    2. Takotsubo cardiomyopathy    3. Former smoker    4. Chronic obstructive pulmonary disease, unspecified COPD type        Plan:   Continue HCTZ as BPs are well controlled.

## 2017-02-13 NOTE — MR AVS SNAPSHOT
[facility] - Cardiology  [address] [facility] [address] [facility] [address]  Phone: [phone]                  Ana Aguila   2017 11:00 AM   Office Visit    Description:  Female : 1955   Provider:  Ferny Gaytan MD   Department:  [facility] - Cardiology           Reason for Visit     Takotsubo cardiomyopathy           Diagnoses this Visit        Comments    Essential hypertension    -  Primary     Takotsubo cardiomyopathy         Former smoker         Chronic obstructive pulmonary disease, unspecified COPD type                To Do List           Future Appointments        Provider Department Dept Phone    2017 9:20 AM Ambar Muñoz NP [facility] - Hepatology [phone]    2017 10:00 AM [facility] CT2 64- LIMIT 600 LBS [facility] [phone]    2017 2:30 PM Manpreet Dill MD [facility] - Pulmonary Services [phone]    2017 9:00 AM Erik Cool MD [facility] - Internal Medicine [phone]      Goals (5 Years of Data)              Today    Today    2/10/17    Blood Pressure <= 140/90   130/66  133/83  133/80    Notes - Note created  2016  1:28 PM by Lilly Dumont    It is important to consistently maintain a controlled blood pressure.      Exercise at least 150 minutes per week.           Maintain a low sodium diet           Take at least one BP reading per week at various times of the day             [facility]     [facility] -  Assistance  Registered nurses in the [facility] provide clinical advisement, health education, appointment booking, and other advisory services.  Call for this free service at [phone].             Medications           Message regarding Medications     Verify the changes and/or additions to your medication regime listed below are the same as discussed with your clinician today.  If any of these changes or additions are incorrect, please notify your healthcare  "provider.             Verify that the below list of medications is an accurate representation of the medications you are currently taking.  If none reported, the list may be blank. If incorrect, please contact your healthcare provider. Carry this list with you in case of emergency.           Current Medications     ADVAIR DISKUS 250-50 mcg/dose diskus inhaler INHALE 1 PUFF BY MOUTH TWICE DAILY( EVERY 12 HOURS)    albuterol (PROAIR HFA) 90 mcg/actuation inhaler INHALE 2 PUFFS BY MOUTH INTO THE LUNGS FOUR TIMES DAILY prn    albuterol-ipratropium 2.5mg-0.5mg/3mL (DUO-NEB) 0.5 mg-3 mg(2.5 mg base)/3 mL nebulizer solution Take 3 mLs by nebulization every 6 (six) hours as needed for Wheezing or Shortness of Breath.    ergocalciferol (ERGOCALCIFEROL) 50,000 unit Cap TAKE 1 CAPSULE BY MOUTH EVERY 7 DAYS    estradiol (ESTRACE) 0.01 % (0.1 mg/gram) vaginal cream Place 1 g vaginally 3 (three) times a week. Place by fingertip application before bedtime three times a week (Monday, Wednesday, Friday)    hydrochlorothiazide (HYDRODIURIL) 25 MG tablet Take 1 tablet (25 mg total) by mouth once daily.    levalbuterol (XOPENEX) 1.25 mg/0.5 mL nebulizer solution Qid prn for copd    loperamide (IMODIUM) 2 mg capsule Take 2 mg by mouth 4 (four) times daily as needed for Diarrhea.    losartan (COZAAR) 25 MG tablet Take 1 tablet (25 mg total) by mouth once daily.    predniSONE (DELTASONE) 10 MG tablet 2 tablets x 7 days, then 1 tablet daily    psyllium 0.52 gram capsule Take 2 capsules by mouth once daily.           Clinical Reference Information           Your Vitals Were     BP Pulse Height Weight Last Period SpO2    130/66 (BP Location: Left arm, Patient Position: Sitting, BP Method: Manual) 87 5' 5" (1.651 m) 78.3 kg (172 lb 9.9 oz) (LMP Unknown) 92%    BMI                28.73 kg/m2          Blood Pressure          Most Recent Value    Right Arm BP - Sitting  136/70    Left Arm BP - Sitting  130/66    BP  130/66      Allergies as of " 2/13/2017     Sudafed [Pseudoephedrine Hcl]      Immunizations Administered on Date of Encounter - 2/13/2017     None      Language Assistance Services     ATTENTION: Language assistance services are available, free of charge. Please call [phone].      ATENCIÓN: Si habla dani, tiene a garcia disposición servicios gratuitos de asistencia lingüística. Llame al [phone].     CHÚ Ý: N?u b?n nói Ti?ng Vi?t, có các d?ch v? h? tr? ngôn ng? mi?n phí dành cho b?n. G?i s? [phone].         [facility] complies with applicable Federal civil rights laws and does not discriminate on the basis of race, color, national origin, age, disability, or sex.

## 2017-02-14 ENCOUNTER — HOSPITAL ENCOUNTER (OUTPATIENT)
Dept: RADIOLOGY | Facility: HOSPITAL | Age: 62
Discharge: HOME | End: 2017-02-14
Attending: INTERNAL MEDICINE
Payer: COMMERCIAL

## 2017-02-14 ENCOUNTER — OFFICE VISIT (OUTPATIENT)
Dept: HEPATOLOGY | Facility: CLINIC | Age: 62
End: 2017-02-14
Payer: COMMERCIAL

## 2017-02-14 VITALS
BODY MASS INDEX: 28.61 KG/M2 | HEART RATE: 84 BPM | OXYGEN SATURATION: 94 % | DIASTOLIC BLOOD PRESSURE: 72 MMHG | HEIGHT: 65 IN | WEIGHT: 171.75 LBS | TEMPERATURE: 97 F | RESPIRATION RATE: 18 BRPM | SYSTOLIC BLOOD PRESSURE: 137 MMHG

## 2017-02-14 DIAGNOSIS — R10.11 RUQ ABDOMINAL PAIN: ICD-10-CM

## 2017-02-14 DIAGNOSIS — R16.0 HEPATOMEGALY: Primary | ICD-10-CM

## 2017-02-14 PROCEDURE — 99999 PR PBB SHADOW E&M-EST. PATIENT-LVL III: CPT | Mod: PBBFAC,,, | Performed by: NURSE PRACTITIONER

## 2017-02-14 PROCEDURE — 3075F SYST BP GE 130 - 139MM HG: CPT | Mod: S$GLB,,, | Performed by: NURSE PRACTITIONER

## 2017-02-14 PROCEDURE — 3078F DIAST BP <80 MM HG: CPT | Mod: S$GLB,,, | Performed by: NURSE PRACTITIONER

## 2017-02-14 PROCEDURE — 99213 OFFICE O/P EST LOW 20 MIN: CPT | Mod: S$GLB,,, | Performed by: NURSE PRACTITIONER

## 2017-02-14 NOTE — LETTER
February 14, 2017        Erik Cool MD  [address]  [place] [address]             [facility] - Hepatology  [address]  [place] [address]  Phone: [phone]  Fax: [phone]   Patient: Ana Aguila   MR Number: [id number]   YOB: 1955   Date of Visit: 2/14/2017       Dear Dr. Cool:    Thank you for referring Ana Aguila to me for evaluation. Attached you will find relevant portions of my assessment and plan of care.    If you have questions, please do not hesitate to call me. I look forward to following Ana Aguila along with you.    Sincerely,      Ambar Muñoz NP            CC  No Recipients    Enclosure

## 2017-02-14 NOTE — PROGRESS NOTES
HEPATOLOGY CONSULTATION    Referring Physician: Erik Cool MD   Current Corresponding Physician: Erik Cool MD     Reason for Consultation: Consultation for evaluation of Follow-up    History of Present Illness: Ana Aguila is a 61 y.o. femalewho is here for f/u hepatomegaly. Initially dx last year with her PCP.    Liver w/u was negative for A1AT, Bao's, HH, AIH  Viral hepatitis screen negative  fibroscan F0-1  She has normal LFTs w/ normal synthetic liver function  Imaging shows mild hepatomegaly but otherwise unremarkable    Other noted hx: HTN, HLD, COPD, BMI 27  Chief Complaint   Patient presents with    Follow-up       Past Medical History   Diagnosis Date    Asthma     Blood transfusion     COPD (chronic obstructive pulmonary disease)     Hyperlipidemia     Hypertension     Reflux 2013     Outpatient Encounter Prescriptions as of 2/14/2017   Medication Sig Dispense Refill    ADVAIR DISKUS 250-50 mcg/dose diskus inhaler INHALE 1 PUFF BY MOUTH TWICE DAILY( EVERY 12 HOURS) 1 each 12    albuterol (PROAIR HFA) 90 mcg/actuation inhaler INHALE 2 PUFFS BY MOUTH INTO THE LUNGS FOUR TIMES DAILY prn 25.5 g 12    albuterol-ipratropium 2.5mg-0.5mg/3mL (DUO-NEB) 0.5 mg-3 mg(2.5 mg base)/3 mL nebulizer solution Take 3 mLs by nebulization every 6 (six) hours as needed for Wheezing or Shortness of Breath. 270 vial 12    ergocalciferol (ERGOCALCIFEROL) 50,000 unit Cap TAKE 1 CAPSULE BY MOUTH EVERY 7 DAYS 12 capsule 0    levalbuterol (XOPENEX) 1.25 mg/0.5 mL nebulizer solution Qid prn for copd 120 each 11    loperamide (IMODIUM) 2 mg capsule Take 2 mg by mouth 4 (four) times daily as needed for Diarrhea.      predniSONE (DELTASONE) 10 MG tablet 2 tablets x 7 days, then 1 tablet daily 30 tablet 3    psyllium 0.52 gram capsule Take 2 capsules by mouth once daily.      estradiol (ESTRACE) 0.01 % (0.1 mg/gram) vaginal cream Place 1 g vaginally 3 (three) times a week. Place by fingertip  application before bedtime three times a week (Monday, Wednesday, Friday) 45 g 3    hydrochlorothiazide (HYDRODIURIL) 25 MG tablet Take 1 tablet (25 mg total) by mouth once daily. 30 tablet 1     No facility-administered encounter medications on file as of 2/14/2017.      Review of patient's allergies indicates:   Allergen Reactions    Sudafed [pseudoephedrine hcl] Nausea And Vomiting and Other (See Comments)     JITTERY     Family History   Problem Relation Age of Onset    Cancer Mother      lung    Hypertension Mother     Coronary artery disease Father     Retinal detachment Father     Hypertension Sister     Hypertension Brother     Cataracts Maternal Grandmother     Abnormal EKG Daughter     Breast cancer Daughter 43     negative genetic testing    Breast cancer Other 44    Amblyopia Neg Hx     Blindness Neg Hx     Diabetes Neg Hx     Glaucoma Neg Hx     Macular degeneration Neg Hx     Strabismus Neg Hx     Stroke Neg Hx     Thyroid disease Neg Hx     Colon cancer Neg Hx     Ovarian cancer Neg Hx        Social History     Social History    Marital status: Single     Spouse name: N/A    Number of children: N/A    Years of education: N/A     Occupational History    Not on file.     Social History Main Topics    Smoking status: Former Smoker     Packs/day: 1.50     Years: 30.00     Types: Cigarettes     Quit date: 1/4/1997    Smokeless tobacco: Never Used    Alcohol use Yes      Comment: occaissionally    Drug use: No    Sexual activity: Yes     Partners: Male     Birth control/ protection: Surgical     Other Topics Concern    Not on file     Social History Narrative    Lives alone, brother lives in [place]. Brothers and sisters also living elsewhere. Children and grandchildren live in [place].      Review of Systems   Constitutional: Negative for activity change, appetite change, chills, diaphoresis, fatigue, fever and unexpected weight change.   HENT: Negative for facial swelling  and nosebleeds.    Respiratory: Negative for cough, chest tightness and shortness of breath.    Cardiovascular: Negative for chest pain, palpitations and leg swelling.   Gastrointestinal: Negative for abdominal distention, abdominal pain, blood in stool, constipation, diarrhea, nausea and vomiting.   Musculoskeletal: Negative for neck pain and neck stiffness.   Skin: Negative for color change, pallor and rash.   Neurological: Negative for dizziness, tremors, syncope, weakness, light-headedness and headaches.   Hematological: Negative for adenopathy. Does not bruise/bleed easily.   Psychiatric/Behavioral: Negative for agitation, behavioral problems, confusion and decreased concentration.     Vitals:    02/14/17 1002   BP: 137/72   Pulse: 84   Resp: 18   Temp: 96.5 °F (35.8 °C)       Physical Exam   Constitutional: She is oriented to person, place, and time. She appears well-developed and well-nourished. No distress.   HENT:   Head: Normocephalic and atraumatic.   Eyes: Conjunctivae are normal. Pupils are equal, round, and reactive to light. No scleral icterus.   Neck: Normal range of motion. Neck supple.   Cardiovascular: Normal rate.    Pulmonary/Chest: Effort normal.   Abdominal: Soft. She exhibits no distension and no mass. There is no tenderness. There is no rebound and no guarding.   Musculoskeletal: Normal range of motion.   Neurological: She is alert and oriented to person, place, and time. No cranial nerve deficit. She exhibits normal muscle tone. Coordination normal.   No asterixis   Skin: Skin is warm and dry. No rash noted. She is not diaphoretic. No erythema. No pallor.   Psychiatric: She has a normal mood and affect. Her behavior is normal. Judgment and thought content normal.       MELD-Na score: 8 at 10/19/2016  3:55 AM  MELD score: 8 at 10/19/2016  3:55 AM  Calculated from:  Serum Creatinine: 0.8 mg/dL (Rounded to 1) at 10/19/2016  3:55 AM  Serum Sodium: 144 mmol/L (Rounded to 137) at 10/19/2016  3:55  AM  Total Bilirubin: 0.4 mg/dL (Rounded to 1) at 10/19/2016  3:55 AM  INR(ratio): 1.2 at 10/18/2016 12:11 PM  Age: 61 years    Lab Results   Component Value Date     (H) 01/24/2017    BUN 18 01/24/2017    CREATININE 1.0 01/24/2017    CALCIUM 9.5 01/24/2017     01/24/2017    K 3.5 01/24/2017     01/24/2017    PROT 7.4 01/24/2017    CO2 32 (H) 01/24/2017    ANIONGAP 10 01/24/2017    WBC 14.34 (H) 01/24/2017    RBC 4.65 01/24/2017    HGB 13.8 01/24/2017    HCT 43.5 01/24/2017    MCV 94 01/24/2017    MCH 29.7 01/24/2017    MCHC 31.7 (L) 01/24/2017     Lab Results   Component Value Date    RDW 14.0 01/24/2017     (H) 01/24/2017    MPV 10.2 01/24/2017    GRAN 12.2 (H) 01/24/2017    GRAN 85.1 (H) 01/24/2017    LYMPH 1.7 01/24/2017    LYMPH 12.1 (L) 01/24/2017    MONO 0.3 01/24/2017    MONO 2.4 (L) 01/24/2017    EOSINOPHIL 0.1 01/24/2017    BASOPHIL 0.1 01/24/2017    EOS 0.0 01/24/2017    BASO 0.02 01/24/2017    APTT 31.0 10/18/2016    GROUPTRH O POS 10/18/2016    GROUPTRH O POS 07/23/2010     (H) 10/17/2016    CHOL 191 03/23/2016    TRIG 73 03/23/2016    HDL 61 03/23/2016    CHOLHDL 31.9 03/23/2016    TOTALCHOLEST 3.1 03/23/2016    ALBUMIN 4.0 01/24/2017    BILIDIR 0.2 12/07/2015    AST 20 01/24/2017    ALT 13 01/24/2017    ALKPHOS 66 01/24/2017    MG 2.0 10/19/2016    LABPROT 11.9 10/18/2016    INR 1.2 10/18/2016       Assessment and Plan:  Hepatomegaly : w/o evidence of advanced liver disease  -liver w/u serologies negative  -fibroscan normal  -likely etiology is fatty liver for which heart healthy diet and increased exercise is indicated along with optimal management of HTN  -no further f/u is needed at this time  -continue f/u with PCP for regular health maintenance and please refer as needed     EDUCATION:   -We discussed the manifestations of NAFLD. At this time there is no FDA approved therapy for NAFLD.   -The patient and I discussed the importance of diet, exercise, and weight loss  for management of NAFLD. We discussed a low fat, low carb/low sugar, high fiber diet, and a goal of 30 minutes of exercise 5 times per week.   -Pt is aware that fatty liver can progress to steatohepatitis and possibly to cirrhosis, putting one at increased risk for liver cancer, liver failure, and death.     Thank you for allowing me participate in this patient's care.     Patient Active Problem List   Diagnosis    S/P cholecystectomy    Hypertension    Chronic obstructive pulmonary disease    Oral lesion    Hepatomegaly    Former smoker    Takotsubo cardiomyopathy    Microscopic hematuria    Urinary urgency    Increased frequency of urination    Chronic diarrhea     Ana Aguila is a 61 y.o. female withFollow-up

## 2017-02-15 ENCOUNTER — HOSPITAL ENCOUNTER (OUTPATIENT)
Dept: RADIOLOGY | Facility: HOSPITAL | Age: 62
Discharge: HOME | End: 2017-02-15
Attending: INTERNAL MEDICINE
Payer: COMMERCIAL

## 2017-02-15 DIAGNOSIS — R10.11 RUQ ABDOMINAL PAIN: ICD-10-CM

## 2017-02-15 PROCEDURE — 25500020 PHARM REV CODE 255: Performed by: INTERNAL MEDICINE

## 2017-02-15 PROCEDURE — 74177 CT ABD & PELVIS W/CONTRAST: CPT | Mod: 26,,, | Performed by: RADIOLOGY

## 2017-02-15 PROCEDURE — 74177 CT ABD & PELVIS W/CONTRAST: CPT | Mod: TC

## 2017-02-15 RX ADMIN — IOHEXOL 15 ML: 350 INJECTION, SOLUTION INTRAVENOUS at 09:02

## 2017-02-15 RX ADMIN — IOHEXOL 15 ML: 350 INJECTION, SOLUTION INTRAVENOUS at 08:02

## 2017-02-15 RX ADMIN — IOHEXOL 100 ML: 350 INJECTION, SOLUTION INTRAVENOUS at 11:02

## 2017-02-21 DIAGNOSIS — J44.89 CHRONIC AIRWAY OBSTRUCTION, NOT ELSEWHERE CLASSIFIED: ICD-10-CM

## 2017-02-21 DIAGNOSIS — J44.9 CHRONIC OBSTRUCTIVE PULMONARY DISEASE, UNSPECIFIED COPD TYPE: ICD-10-CM

## 2017-02-21 RX ORDER — IPRATROPIUM BROMIDE AND ALBUTEROL SULFATE 2.5; .5 MG/3ML; MG/3ML
3 SOLUTION RESPIRATORY (INHALATION) EVERY 6 HOURS PRN
Qty: 270 VIAL | Refills: 12 | Status: SHIPPED | OUTPATIENT
Start: 2017-02-21 | End: 2018-03-14 | Stop reason: SDUPTHER

## 2017-02-23 ENCOUNTER — PATIENT MESSAGE (OUTPATIENT)
Dept: GASTROENTEROLOGY | Facility: CLINIC | Age: 62
End: 2017-02-23

## 2017-03-09 ENCOUNTER — PATIENT OUTREACH (OUTPATIENT)
Dept: OTHER | Facility: OTHER | Age: 62
End: 2017-03-09
Payer: COMMERCIAL

## 2017-03-09 NOTE — PROGRESS NOTES
Last 5 Patient Entered Readings                                                               Current 30 Day Average: 126/79     Recent Readings 3/9/2017 3/9/2017 3/9/2017 3/8/2017 3/6/2017    Systolic BP (mmHg) 123 132 127 130 123    Diastolic BP (mmHg) 66 75 80 83 79    Pulse 78 77 74 83 85        Ms. Das BP is at goal. She denies any symptoms and has no questions/concerns.     Current HTN regimen:  Hypertension Medications             hydrochlorothiazide (HYDRODIURIL) 25 MG tablet Take 1 tablet (25 mg total) by mouth once daily.          Will continue to monitor regularly. Will follow up in 3 months, sooner if BP begins to trend upward or downward.    Patient has my contact information and knows to call with any concerns or clinical changes.

## 2017-03-19 DIAGNOSIS — I10 ESSENTIAL HYPERTENSION: ICD-10-CM

## 2017-03-19 RX ORDER — HYDROCHLOROTHIAZIDE 25 MG/1
TABLET ORAL
Qty: 30 TABLET | Refills: 3 | Status: SHIPPED | OUTPATIENT
Start: 2017-03-19 | End: 2017-04-10 | Stop reason: ALTCHOICE

## 2017-03-23 ENCOUNTER — PATIENT OUTREACH (OUTPATIENT)
Dept: OTHER | Facility: OTHER | Age: 62
End: 2017-03-23
Payer: COMMERCIAL

## 2017-04-04 DIAGNOSIS — J44.9 CHRONIC OBSTRUCTIVE PULMONARY DISEASE, UNSPECIFIED COPD TYPE: Primary | ICD-10-CM

## 2017-04-04 RX ORDER — FLUTICASONE PROPIONATE AND SALMETEROL 250; 50 UG/1; UG/1
POWDER RESPIRATORY (INHALATION)
Qty: 1 EACH | Refills: 12 | Status: SHIPPED | OUTPATIENT
Start: 2017-04-04 | End: 2018-04-30 | Stop reason: SDUPTHER

## 2017-04-09 RX ORDER — ERGOCALCIFEROL 1.25 MG/1
CAPSULE ORAL
Qty: 12 CAPSULE | Refills: 0 | Status: SHIPPED | OUTPATIENT
Start: 2017-04-09 | End: 2018-04-08 | Stop reason: SDUPTHER

## 2017-04-10 ENCOUNTER — OFFICE VISIT (OUTPATIENT)
Dept: PULMONOLOGY | Facility: CLINIC | Age: 62
End: 2017-04-10
Payer: COMMERCIAL

## 2017-04-10 ENCOUNTER — HOSPITAL ENCOUNTER (OUTPATIENT)
Dept: PULMONOLOGY | Facility: CLINIC | Age: 62
Discharge: HOME | End: 2017-04-10
Payer: COMMERCIAL

## 2017-04-10 VITALS
DIASTOLIC BLOOD PRESSURE: 76 MMHG | HEART RATE: 84 BPM | BODY MASS INDEX: 29.66 KG/M2 | HEIGHT: 65 IN | WEIGHT: 178 LBS | SYSTOLIC BLOOD PRESSURE: 128 MMHG | OXYGEN SATURATION: 90 %

## 2017-04-10 DIAGNOSIS — J44.9 CHRONIC OBSTRUCTIVE PULMONARY DISEASE, UNSPECIFIED COPD TYPE: ICD-10-CM

## 2017-04-10 DIAGNOSIS — J43.2 CENTRILOBULAR EMPHYSEMA: Primary | ICD-10-CM

## 2017-04-10 LAB
PRE FEV1 FVC: 34
PRE FEV1: 0.56
PRE FVC: 1.67
PREDICTED FEV1 FVC: 79
PREDICTED FEV1: 2.46
PREDICTED FVC: 3.1

## 2017-04-10 PROCEDURE — 99999 PR PBB SHADOW E&M-EST. PATIENT-LVL III: CPT | Mod: PBBFAC,,, | Performed by: INTERNAL MEDICINE

## 2017-04-10 PROCEDURE — 94010 BREATHING CAPACITY TEST: CPT | Mod: S$GLB,,, | Performed by: INTERNAL MEDICINE

## 2017-04-10 PROCEDURE — 3074F SYST BP LT 130 MM HG: CPT | Mod: S$GLB,,, | Performed by: INTERNAL MEDICINE

## 2017-04-10 PROCEDURE — 99214 OFFICE O/P EST MOD 30 MIN: CPT | Mod: S$GLB,,, | Performed by: INTERNAL MEDICINE

## 2017-04-10 PROCEDURE — 1160F RVW MEDS BY RX/DR IN RCRD: CPT | Mod: S$GLB,,, | Performed by: INTERNAL MEDICINE

## 2017-04-10 PROCEDURE — 3078F DIAST BP <80 MM HG: CPT | Mod: S$GLB,,, | Performed by: INTERNAL MEDICINE

## 2017-04-10 RX ORDER — HYDROCHLOROTHIAZIDE 12.5 MG/1
1 TABLET ORAL EVERY MORNING
Refills: 6 | COMMUNITY
Start: 2017-03-13 | End: 2017-06-08 | Stop reason: SDUPTHER

## 2017-04-10 NOTE — PROGRESS NOTES
Subjective:       Patient ID: Ana Aguila is a 61 y.o. female.    Chief Complaint: COPD and Shortness of Breath    HPI   60 yo female with severe but Stable COPD comes for follow up. She is trying to get a small portable oxygen system so she can attend her granddaughter's graduation in [place] at the end of May.FVC: 1.67 liters and Fev-1: 0.56 liters.   Sa02: 90% on room air at rest. She also had to had a colectomy in the past when she sustained a colon perforation with her colonoscope.Still works on a regular basis at [employer].       No flowsheet data found.]  Review of Systems   Constitutional: Negative.    HENT: Negative.    Eyes: Negative.    Respiratory: Negative.  Positive for dyspnea on extertion.         Severe COPD   Cardiovascular: Negative.    Genitourinary: Negative.    Musculoskeletal: Negative.    Skin: Negative.    Gastrointestinal: Negative.         S/P Krunal colectomy    S/P Cholescytectomy   Neurological: Negative.    Psychiatric/Behavioral: Negative.        Objective:      Physical Exam   Constitutional: She is oriented to person, place, and time. She appears well-developed and well-nourished. No distress.   HENT:   Head: Normocephalic and atraumatic.   Right Ear: External ear normal.   Left Ear: External ear normal.   Nose: Nose normal.   Mouth/Throat: Oropharynx is clear and moist.   Eyes: Conjunctivae and EOM are normal. Pupils are equal, round, and reactive to light.   Neck: Normal range of motion. Neck supple. No JVD present. No thyromegaly present.   Cardiovascular: Normal rate, regular rhythm, normal heart sounds and intact distal pulses.  Exam reveals no gallop.    No murmur heard.  Pulmonary/Chest: Breath sounds normal. No stridor. No respiratory distress. She has no wheezes. She has no rales. She exhibits no tenderness.   Markedly decreased  Air entry without rales or wheezes.    Abdominal: Soft. Bowel sounds are normal. She exhibits no distension and no mass. There is no  tenderness. There is no rebound and no guarding.   Musculoskeletal: Normal range of motion. She exhibits no edema.   Lymphadenopathy:     She has no cervical adenopathy.   Neurological: She is alert and oriented to person, place, and time. She has normal reflexes. She displays normal reflexes. No cranial nerve deficit.   Skin: Skin is warm and dry. No rash noted.   Psychiatric: She has a normal mood and affect. Her behavior is normal. Judgment and thought content normal.   Nursing note and vitals reviewed.          Assessment:       No diagnosis found.    Outpatient Encounter Prescriptions as of 4/10/2017   Medication Sig Dispense Refill    albuterol (PROAIR HFA) 90 mcg/actuation inhaler INHALE 2 PUFFS BY MOUTH INTO THE LUNGS FOUR TIMES DAILY prn 25.5 g 12    albuterol-ipratropium 2.5mg-0.5mg/3mL (DUO-NEB) 0.5 mg-3 mg(2.5 mg base)/3 mL nebulizer solution Take 3 mLs by nebulization every 6 (six) hours as needed for Wheezing or Shortness of Breath. 270 vial 12    ergocalciferol (ERGOCALCIFEROL) 50,000 unit Cap TAKE 1 CAPSULE BY MOUTH EVERY 7 DAYS 12 capsule 0    estradiol (ESTRACE) 0.01 % (0.1 mg/gram) vaginal cream Place 1 g vaginally 3 (three) times a week. Place by fingertip application before bedtime three times a week (Monday, Wednesday, Friday) 45 g 3    fluticasone-salmeterol 250-50 mcg/dose (ADVAIR DISKUS) 250-50 mcg/dose diskus inhaler INHALE 1 PUFF BY MOUTH TWICE DAILY( EVERY 12 HOURS) 1 each 12    hydrochlorothiazide (HYDRODIURIL) 12.5 MG Tab 1 tablet every morning.  6    levalbuterol (XOPENEX) 1.25 mg/0.5 mL nebulizer solution Qid prn for copd 120 each 11    loperamide (IMODIUM) 2 mg capsule Take 2 mg by mouth 4 (four) times daily as needed for Diarrhea.      predniSONE (DELTASONE) 10 MG tablet 2 tablets x 7 days, then 1 tablet daily 30 tablet 3    psyllium 0.52 gram capsule Take 2 capsules by mouth once daily.      [DISCONTINUED] hydrochlorothiazide (HYDRODIURIL) 25 MG tablet TAKE 1 TABLET(25  MG) BY MOUTH EVERY DAY 30 tablet 3     No facility-administered encounter medications on file as of 4/10/2017.      No orders of the defined types were placed in this encounter.    Plan:        Stable COPD

## 2017-04-11 DIAGNOSIS — J44.9 CHRONIC OBSTRUCTIVE PULMONARY DISEASE, UNSPECIFIED COPD TYPE: Primary | ICD-10-CM

## 2017-04-13 ENCOUNTER — PATIENT OUTREACH (OUTPATIENT)
Dept: OTHER | Facility: OTHER | Age: 62
End: 2017-04-13
Payer: COMMERCIAL

## 2017-04-13 NOTE — PROGRESS NOTES
Last 5 Patient Entered Readings                                                               Current 30 Day Average: 126/82     Recent Readings 4/13/2017 4/12/2017 4/11/2017 4/9/2017 4/8/2017    Systolic BP (mmHg) 130 127 118 124 122    Diastolic BP (mmHg) 82 79 79 80 81    Pulse 84 84 81 82 89        Follow up with Ms. Ana Aguila completed. Ms. Aguila is doing well. Patient reports that she is maintaining a low sodium diet. Ms. Aguila has COPD and is interested in trying yoga. I will send her contact information for membership to [facility]. Patient did not have any further questions or concerns. I will follow up in a few weeks to see how she is doing and progressing.

## 2017-04-18 ENCOUNTER — PATIENT MESSAGE (OUTPATIENT)
Dept: OPTOMETRY | Facility: CLINIC | Age: 62
End: 2017-04-18

## 2017-04-23 DIAGNOSIS — J06.9 UPPER RESPIRATORY TRACT INFECTION, UNSPECIFIED TYPE: ICD-10-CM

## 2017-04-23 DIAGNOSIS — J44.0 CHRONIC OBSTRUCTIVE PULMONARY DISEASE WITH ACUTE LOWER RESPIRATORY INFECTION: ICD-10-CM

## 2017-04-23 RX ORDER — PREDNISONE 10 MG/1
TABLET ORAL
Qty: 30 TABLET | Refills: 0 | Status: SHIPPED | OUTPATIENT
Start: 2017-04-23 | End: 2017-06-19 | Stop reason: SDUPTHER

## 2017-04-25 ENCOUNTER — OFFICE VISIT (OUTPATIENT)
Dept: OPTOMETRY | Facility: CLINIC | Age: 62
End: 2017-04-25
Payer: COMMERCIAL

## 2017-04-25 DIAGNOSIS — H52.01 HYPEROPIA, RIGHT: ICD-10-CM

## 2017-04-25 DIAGNOSIS — H52.4 PRESBYOPIA OU: ICD-10-CM

## 2017-04-25 DIAGNOSIS — I10 ESSENTIAL HYPERTENSION: ICD-10-CM

## 2017-04-25 DIAGNOSIS — Z13.5 SCREENING FOR OTHER EYE CONDITIONS: ICD-10-CM

## 2017-04-25 DIAGNOSIS — H40.003 GLAUCOMA SUSPECT OF BOTH EYES: Primary | ICD-10-CM

## 2017-04-25 DIAGNOSIS — H25.13 NUCLEAR SCLEROSIS OF BOTH EYES: ICD-10-CM

## 2017-04-25 PROCEDURE — 92015 DETERMINE REFRACTIVE STATE: CPT | Mod: S$GLB,,, | Performed by: OPTOMETRIST

## 2017-04-25 PROCEDURE — 99999 PR PBB SHADOW E&M-EST. PATIENT-LVL III: CPT | Mod: PBBFAC,,, | Performed by: OPTOMETRIST

## 2017-04-25 PROCEDURE — 92004 COMPRE OPH EXAM NEW PT 1/>: CPT | Mod: S$GLB,,, | Performed by: OPTOMETRIST

## 2017-04-25 NOTE — PROGRESS NOTES
"HPI     Concerns About Ocular Health    Additional comments: Eye exam and refraction.   Eyes "itchy", and notes   decreased VA.  Feels that she has noted decreased VA since she starting   taking Prednisone (10 mg) for COPD           Comments   61 yr old BF        Approximate date of last eye examination:  01/13/2014  Name of last eye doctor seen:  Dr. Veliz       Wears glasses? Only uses +1.00 otc readers- never could get use to r/x   glasses  Wears CLs?:  no  Headaches? no  Eye pain/discomfort?  no                                                                                       Flashes?  no  Floaters?  No  Diplopia/Double vision?  no  Patient's Ocular History:         Any eye surgeries?  no         Any eye injury?  no         Any treatment for eye disease?  no  Family history of eye disease?  Late dad-detached retina- grandmother   cataracts  Significant patient medical history:         1. Diabetes?  no       If yes, IDDM or NIDDM?  n/a   2. HBP?  yes              3. Other (describe):  Borderline high cholesterol, COPD   ! OTC eyedrops currently using:  no   ! Prescription eye meds currently using: No   ! Any history of allergy/adverse reaction to any eye meds used   previously?  no    ! Any history of allergy/adverse reaction to eyedrops used during prior   eye exam(s)? no    ! Any history of allergy/adverse reaction to Novacaine or similar meds?   no   ! Any history of allergy/adverse reaction to Epinephrine or similar meds?   no    ! Patient okay with use of anesthetic eyedrops to check eye pressure? yes            ! Patient okay with use of eyedrops to dilate pupils today? yes    !  Allergies/Medications/Medical History/Family History reviewed today?    yes    PD =   68/64  Desired reading distance =  16.75"                                                                    Last edited by Harvey Veliz, OD on 4/25/2017 11:16 AM. (History)            Assessment /Plan     For exam results, see " Encounter Report.    1. Glaucoma suspect of both eyes  Lou Visual Field - OU - Extended - Both Eyes    Posterior Segment OCT Optic Nerve- Both eyes   2. Nuclear sclerosis of both eyes     3. Essential hypertension     4. Screening for other eye conditions     5. Hyperopia, right     6. Presbyopia OU                    Trace nuclear sclerosis in both eyes, consistent with age.  Emmetropia in the left eye, and slight hyperopia in the right eye.  Presbyopia consistent with age.  New spectacle lens Rx issued for use as desired, but okay to use +2.25 or +2.50 over the counter reading glasses if happy with near vision with those glasses, and if happy with unaided distance vision.    Prior diagnosis of glaucoma suspect in both eyes, based on the finding of larger than average optic nerve head cupping in each eye.  Last Lou visual field (HVF) test and OCT retinal nerve fiber layer (RNFL) thickness analysis done in 2014.  No evidence of overtly glaucomatous visual field defect in either eye at that time, but suggest repeat tests as a precaution.  MsNikolas Collins to call to schedule HVF test (24-2 EMETERIO Standard) and OCT RNFL thickness analysis, and follow-up visit with me (Dr. Veliz) on the same date.  Repeat general eye examination an refraction in 12 - 18 months othewise.

## 2017-04-25 NOTE — PATIENT INSTRUCTIONS
Trace nuclear sclerosis in both eyes, consistent with age.  Emmetropia in the left eye, and slight hyperopia in the right eye.  Presbyopia consistent with age.  New spectacle lens Rx issued for use as desired, but okay to use +2.25 or +2.50 over the counter reading glasses if happy with near vision with those glasses, and if happy with unaided distance vision.    Prior diagnosis of glaucoma suspect in both eyes, based on the finding of larger than average optic nerve head cupping in each eye.  Last Lou visual field (HVF) test and OCT retinal nerve fiber layer (RNFL) thickness analysis done in 2014.  No evidence of overtly glaucomatous visual field defect in either eye at that time, but suggest repeat tests as a precaution.  Ms. Collins to call to schedule HVF test (24-2 EMETERIO Standard) and OCT RNFL thickness analysis, and follow-up visit with me (Dr. Veliz) on the same date.  Repeat general eye examination an refraction in 12 - 18 months othewise.

## 2017-04-25 NOTE — MR AVS SNAPSHOT
[facility]  [address]  [facility] [address]  Phone: [phone]  Fax: [phone]                  Ana Aguila   2017 10:00 AM   Office Visit    Description:  Female : 1955   Provider:  Harvey Veliz OD   Department:  [facility]           Reason for Visit     Concerns About Ocular Health                To Do List           Future Appointments        Provider Department Dept Phone    2017 9:00 AM Erik Cool MD [facility] - Internal Medicine [phone]      Goals (5 Years of Data)              Today    17    Blood Pressure <= 140/90   122/85  122/85  122/85    Notes - Note created  2016  1:28 PM by Lilly Dumont    It is important to consistently maintain a controlled blood pressure.      Exercise at least 150 minutes per week.           Maintain a low sodium diet           Take at least one BP reading per week at various times of the day             [facility]     [facility] On Call Nurse Care Line -  Assistance  Unless otherwise directed by your provider, please contact [facility] On-[facility], our nurse care line that is available for  assistance.     Registered nurses in the [facility] On Call Center provide: appointment scheduling, clinical advisement, health education, and other advisory services.  Call: [phone] (toll free)               Medications           Message regarding Medications     Verify the changes and/or additions to your medication regime listed below are the same as discussed with your clinician today.  If any of these changes or additions are incorrect, please notify your healthcare provider.             Verify that the below list of medications is an accurate representation of the medications you are currently taking.  If none reported, the list may be blank. If incorrect, please contact your healthcare provider. Carry this list with you in case of emergency.           Current Medications      albuterol (PROAIR HFA) 90 mcg/actuation inhaler INHALE 2 PUFFS BY MOUTH INTO THE LUNGS FOUR TIMES DAILY prn    albuterol-ipratropium 2.5mg-0.5mg/3mL (DUO-NEB) 0.5 mg-3 mg(2.5 mg base)/3 mL nebulizer solution Take 3 mLs by nebulization every 6 (six) hours as needed for Wheezing or Shortness of Breath.    ergocalciferol (ERGOCALCIFEROL) 50,000 unit Cap TAKE 1 CAPSULE BY MOUTH EVERY 7 DAYS    fluticasone-salmeterol 250-50 mcg/dose (ADVAIR DISKUS) 250-50 mcg/dose diskus inhaler INHALE 1 PUFF BY MOUTH TWICE DAILY( EVERY 12 HOURS)    hydrochlorothiazide (HYDRODIURIL) 12.5 MG Tab 1 tablet every morning.    levalbuterol (XOPENEX) 1.25 mg/0.5 mL nebulizer solution Qid prn for copd    loperamide (IMODIUM) 2 mg capsule Take 2 mg by mouth 4 (four) times daily as needed for Diarrhea.    predniSONE (DELTASONE) 10 MG tablet TAKE 2 TABLETS FOR 7 DAYS, THEN 1 TABLET EVERY DAY    psyllium 0.52 gram capsule Take 2 capsules by mouth once daily.    estradiol (ESTRACE) 0.01 % (0.1 mg/gram) vaginal cream Place 1 g vaginally 3 (three) times a week. Place by fingertip application before bedtime three times a week (Monday, Wednesday, Friday)           Clinical Reference Information           Your Vitals Were     Last Period                   (LMP Unknown)           Allergies as of 4/25/2017     Sudafed [Pseudoephedrine Hcl]      Immunizations Administered on Date of Encounter - 4/25/2017     None      Instructions    Trace nuclear sclerosis in both eyes, consistent with age.  Emmetropia in the left eye, and slight hyperopia in the right eye.  Presbyopia consistent with age.  New spectacle lens Rx issued for use as desired, but okay to use +2.25 or +2.50 over the counter reading glasses if happy with near vision with those glasses, and if happy with unaided distance vision.    Prior diagnosis of glaucoma suspect in both eyes, based on the finding of larger than average optic nerve head cupping in each eye.  Last Lou visual field (HVF)  test and OCT retinal nerve fiber layer (RNFL) thickness analysis done in 2014.  No evidence of overtly glaucomatous visual field defect in either eye at that time, but suggest repeat tests as a precaution.  Ms. Collins to call to schedule HVF test (24-2 EMETERIO Standard) and OCT RNFL thickness analysis, and follow-up visit with me (Dr. Veliz) on the same date.  Repeat general eye examination an refraction in 12 - 18 months othewise.        Language Assistance Services     ATTENTION: Language assistance services are available, free of charge. Please call [phone].      ATENCIÓN: Si habla español, tiene a garcia disposición servicios gratuitos de asistencia lingüística. Llame al [phone].     CHÚ Ý: N?u b?n nói Ti?ng Vi?t, có các d?ch v? h? tr? ngôn ng? mi?n phí dành cho b?n. G?i s? [phone].         [facility] complies with applicable Federal civil rights laws and does not discriminate on the basis of race, color, national origin, age, disability, or sex.

## 2017-05-05 ENCOUNTER — PATIENT OUTREACH (OUTPATIENT)
Dept: OTHER | Facility: OTHER | Age: 62
End: 2017-05-05
Payer: COMMERCIAL

## 2017-05-15 ENCOUNTER — OFFICE VISIT (OUTPATIENT)
Dept: OPTOMETRY | Facility: CLINIC | Age: 62
End: 2017-05-15
Payer: COMMERCIAL

## 2017-05-15 ENCOUNTER — CLINICAL SUPPORT (OUTPATIENT)
Dept: OPHTHALMOLOGY | Facility: CLINIC | Age: 62
End: 2017-05-15
Payer: COMMERCIAL

## 2017-05-15 DIAGNOSIS — H40.003 GLAUCOMA SUSPECT OF BOTH EYES: ICD-10-CM

## 2017-05-15 DIAGNOSIS — H40.003 GLAUCOMA SUSPECT OF BOTH EYES: Primary | ICD-10-CM

## 2017-05-15 PROCEDURE — 99999 PR PBB SHADOW E&M-EST. PATIENT-LVL III: CPT | Mod: PBBFAC,,, | Performed by: OPTOMETRIST

## 2017-05-15 PROCEDURE — 92133 CPTRZD OPH DX IMG PST SGM ON: CPT | Mod: S$GLB,,, | Performed by: OPTOMETRIST

## 2017-05-15 PROCEDURE — 92083 EXTENDED VISUAL FIELD XM: CPT | Mod: S$GLB,,, | Performed by: OPTOMETRIST

## 2017-05-15 PROCEDURE — 92012 INTRM OPH EXAM EST PATIENT: CPT | Mod: S$GLB,,, | Performed by: OPTOMETRIST

## 2017-05-15 NOTE — PROGRESS NOTES
HPI     Concerns About Ocular Health    Additional comments: 2 week f/u            Comments   Patient in for progress check.    Being followed for (diagnosis):   POAG suspect     Date last seen: 04/25/17    Doctor last seen:  Dr. Veliz     Prescribed eye medications(s) using:  None     OTC eye medication(s) using:  None     Signs/symptoms of condition resolved/better/stable/worse?:  Stable     Allergies/Medications reviewed today?  Yes              Last edited by Willi Collins on 5/15/2017  3:06 PM. (History)            Assessment /Plan     For exam results, see Encounter Report.    1. Glaucoma suspect of both eyes  Posterior Segment OCT Optic Nerve- Both eyes                Prior diagnosis of glaucoma suspect, based on the finding of larger-than-average optic nerve head cupping in both eyes, with normal IOP in each eye.  HVF test (24-2) and OCT retinal nerve fiber layer (RNFL) thickness analysis done today.  RNFL thickness within normal range 360 degrees OD and OS.  HVF test results appear borderline reliable in each eye.  No overtly glaucomatous visual field defect in either eye.   Reviewed test results, and discussed with Ms. Aguila.  No treatment for IOP controlled initiated.  Continue to monitor regularly  Return for recheck in April, 2018.

## 2017-05-15 NOTE — MR AVS SNAPSHOT
[facility]  [address]  [facility] [address]  Phone: [phone]  Fax: [phone]                  Ana Aguila   5/15/2017 2:30 PM   Office Visit    Description:  Female : 1955   Provider:  Harvey Veliz OD   Department:  [facility]           Reason for Visit     Concerns About Ocular Health                To Do List           Future Appointments        Provider Department Dept Phone    2017 9:00 AM Erik Cool MD [facility] - Internal Medicine [phone]      Goals (5 Years of Data)              Today    17    Blood Pressure <= 140/90   122/81  122/81  122/81    Notes - Note created  2016  1:28 PM by Lilly Dumont    It is important to consistently maintain a controlled blood pressure.      Exercise at least 150 minutes per week.           Maintain a low sodium diet           Take at least one BP reading per week at various times of the day             [facility]     [facility] On Call Nurse Care Line -  Assistance  Unless otherwise directed by your provider, please contact [facility] On-Call, our nurse care line that is available for  assistance.     Registered nurses in the [facility] On Call Center provide: appointment scheduling, clinical advisement, health education, and other advisory services.  Call: [phone] (toll free)               Medications           Message regarding Medications     Verify the changes and/or additions to your medication regime listed below are the same as discussed with your clinician today.  If any of these changes or additions are incorrect, please notify your healthcare provider.             Verify that the below list of medications is an accurate representation of the medications you are currently taking.  If none reported, the list may be blank. If incorrect, please contact your healthcare provider. Carry this list with you in case of emergency.           Current Medications      albuterol (PROAIR HFA) 90 mcg/actuation inhaler INHALE 2 PUFFS BY MOUTH INTO THE LUNGS FOUR TIMES DAILY prn    albuterol-ipratropium 2.5mg-0.5mg/3mL (DUO-NEB) 0.5 mg-3 mg(2.5 mg base)/3 mL nebulizer solution Take 3 mLs by nebulization every 6 (six) hours as needed for Wheezing or Shortness of Breath.    ergocalciferol (ERGOCALCIFEROL) 50,000 unit Cap TAKE 1 CAPSULE BY MOUTH EVERY 7 DAYS    estradiol (ESTRACE) 0.01 % (0.1 mg/gram) vaginal cream Place 1 g vaginally 3 (three) times a week. Place by fingertip application before bedtime three times a week (Monday, Wednesday, Friday)    fluticasone-salmeterol 250-50 mcg/dose (ADVAIR DISKUS) 250-50 mcg/dose diskus inhaler INHALE 1 PUFF BY MOUTH TWICE DAILY( EVERY 12 HOURS)    hydrochlorothiazide (HYDRODIURIL) 12.5 MG Tab 1 tablet every morning.    levalbuterol (XOPENEX) 1.25 mg/0.5 mL nebulizer solution Qid prn for copd    loperamide (IMODIUM) 2 mg capsule Take 2 mg by mouth 4 (four) times daily as needed for Diarrhea.    predniSONE (DELTASONE) 10 MG tablet TAKE 2 TABLETS FOR 7 DAYS, THEN 1 TABLET EVERY DAY    psyllium 0.52 gram capsule Take 2 capsules by mouth once daily.           Clinical Reference Information           Your Vitals Were     Last Period                   (LMP Unknown)           Allergies as of 5/15/2017     Sudafed [Pseudoephedrine Hcl]      Immunizations Administered on Date of Encounter - 5/15/2017     None      Instructions    Prior diagnosis of glaucoma suspect, based on the finding of larger-than-average optic nerve head cupping in both eyes, with normal IOP in each eye.  HVF test (24-2) and OCT retinal nerve fiber layer (RNFL) thickness analysis done today.  Reviewed test results, and discussed with Ms. Aguila.  No treatment for IOP controlled initiated.  Continue to monitor regularly  Return for recheck in April, 2018.        Language Assistance Services     ATTENTION: Language assistance services are available, free of charge. Please call  [phone].      ATENCIÓN: Si habla español, tiene a garcia disposición servicios gratuitos de asistencia lingüística. Llame al [phone].     CHÚ Ý: N?u b?n nói Ti?ng Vi?t, có các d?ch v? h? tr? ngôn ng? mi?n phí dành cho b?n. G?i s? [phone].         [facility] complies with applicable Federal civil rights laws and does not discriminate on the basis of race, color, national origin, age, disability, or sex.

## 2017-05-15 NOTE — PATIENT INSTRUCTIONS
Prior diagnosis of glaucoma suspect, based on the finding of larger-than-average optic nerve head cupping in both eyes, with normal IOP in each eye.  HVF test (24-2) and OCT retinal nerve fiber layer (RNFL) thickness analysis done today.  RNFL thickness within normal range 360 degrees OD and OS.  HVF test results appear borderline reliable in each eye.  No overtly glaucomatous visual field defect in either eye.   Reviewed test results, and discussed with Ms. Aguila.  No treatment for IOP controlled initiated.  Continue to monitor regularly  Return for recheck in April, 2018.

## 2017-05-24 DIAGNOSIS — J43.1 PANLOBULAR EMPHYSEMA: Primary | ICD-10-CM

## 2017-05-29 ENCOUNTER — PATIENT OUTREACH (OUTPATIENT)
Dept: OTHER | Facility: OTHER | Age: 62
End: 2017-05-29
Payer: COMMERCIAL

## 2017-05-29 NOTE — PROGRESS NOTES
Last 5 Patient Entered Readings                                                               Current 30 Day Average: 120/81     Recent Readings 6/12/2017 6/11/2017 6/7/2017 6/7/2017 6/5/2017    Systolic BP (mmHg) 120 105 115 109 122    Diastolic BP (mmHg) 80 76 86 78 81    Pulse 85 88 91 76 86        LVM to follow up with Ms. Ana BERRY Mingo. Inquired about how her low sodium diet and exercise is going. Offered to reschedule appointment at [facility]. Advised pt to call or message back if she has any questions or concerns.

## 2017-06-01 ENCOUNTER — PATIENT MESSAGE (OUTPATIENT)
Dept: PULMONOLOGY | Facility: CLINIC | Age: 62
End: 2017-06-01

## 2017-06-01 DIAGNOSIS — Z12.31 SCREENING MAMMOGRAM, ENCOUNTER FOR: Primary | ICD-10-CM

## 2017-06-08 ENCOUNTER — OFFICE VISIT (OUTPATIENT)
Dept: INTERNAL MEDICINE | Facility: CLINIC | Age: 62
End: 2017-06-08
Payer: COMMERCIAL

## 2017-06-08 ENCOUNTER — LAB VISIT (OUTPATIENT)
Dept: LAB | Facility: HOSPITAL | Age: 62
End: 2017-06-08
Attending: INTERNAL MEDICINE
Payer: COMMERCIAL

## 2017-06-08 VITALS
OXYGEN SATURATION: 99 % | HEART RATE: 84 BPM | TEMPERATURE: 98 F | SYSTOLIC BLOOD PRESSURE: 118 MMHG | HEIGHT: 65 IN | DIASTOLIC BLOOD PRESSURE: 79 MMHG | WEIGHT: 183.88 LBS | BODY MASS INDEX: 30.64 KG/M2

## 2017-06-08 DIAGNOSIS — J44.9 CHRONIC OBSTRUCTIVE PULMONARY DISEASE, UNSPECIFIED COPD TYPE: ICD-10-CM

## 2017-06-08 DIAGNOSIS — I10 BENIGN ESSENTIAL HTN: ICD-10-CM

## 2017-06-08 DIAGNOSIS — R10.11 RUQ PAIN: ICD-10-CM

## 2017-06-08 DIAGNOSIS — K52.9 CHRONIC DIARRHEA: ICD-10-CM

## 2017-06-08 DIAGNOSIS — I51.81 TAKOTSUBO CARDIOMYOPATHY: ICD-10-CM

## 2017-06-08 DIAGNOSIS — K62.5 RECTAL BLEEDING: Primary | ICD-10-CM

## 2017-06-08 DIAGNOSIS — K62.5 RECTAL BLEEDING: ICD-10-CM

## 2017-06-08 LAB
ALBUMIN SERPL BCP-MCNC: 3.8 G/DL
ALP SERPL-CCNC: 63 U/L
ALT SERPL W/O P-5'-P-CCNC: 14 U/L
ANION GAP SERPL CALC-SCNC: 10 MMOL/L
AST SERPL-CCNC: 17 U/L
BASOPHILS # BLD AUTO: 0.02 K/UL
BASOPHILS NFR BLD: 0.2 %
BILIRUB SERPL-MCNC: 0.4 MG/DL
BUN SERPL-MCNC: 12 MG/DL
CALCIUM SERPL-MCNC: 9.5 MG/DL
CHLORIDE SERPL-SCNC: 101 MMOL/L
CO2 SERPL-SCNC: 32 MMOL/L
CREAT SERPL-MCNC: 0.8 MG/DL
DIFFERENTIAL METHOD: NORMAL
EOSINOPHIL # BLD AUTO: 0.1 K/UL
EOSINOPHIL NFR BLD: 1.3 %
ERYTHROCYTE [DISTWIDTH] IN BLOOD BY AUTOMATED COUNT: 14.2 %
EST. GFR  (AFRICAN AMERICAN): >60 ML/MIN/1.73 M^2
EST. GFR  (NON AFRICAN AMERICAN): >60 ML/MIN/1.73 M^2
GLUCOSE SERPL-MCNC: 88 MG/DL
HCT VFR BLD AUTO: 43.5 %
HGB BLD-MCNC: 14 G/DL
LYMPHOCYTES # BLD AUTO: 2.1 K/UL
LYMPHOCYTES NFR BLD: 22.7 %
MCH RBC QN AUTO: 29.8 PG
MCHC RBC AUTO-ENTMCNC: 32.2 %
MCV RBC AUTO: 93 FL
MONOCYTES # BLD AUTO: 0.6 K/UL
MONOCYTES NFR BLD: 6.2 %
NEUTROPHILS # BLD AUTO: 6.4 K/UL
NEUTROPHILS NFR BLD: 69.4 %
PLATELET # BLD AUTO: 289 K/UL
PMV BLD AUTO: 9.8 FL
POTASSIUM SERPL-SCNC: 3.6 MMOL/L
PROT SERPL-MCNC: 7 G/DL
RBC # BLD AUTO: 4.7 M/UL
SODIUM SERPL-SCNC: 143 MMOL/L
WBC # BLD AUTO: 9.19 K/UL

## 2017-06-08 PROCEDURE — 80053 COMPREHEN METABOLIC PANEL: CPT

## 2017-06-08 PROCEDURE — 99214 OFFICE O/P EST MOD 30 MIN: CPT | Mod: S$GLB,,, | Performed by: INTERNAL MEDICINE

## 2017-06-08 PROCEDURE — 36415 COLL VENOUS BLD VENIPUNCTURE: CPT

## 2017-06-08 PROCEDURE — 99999 PR PBB SHADOW E&M-EST. PATIENT-LVL III: CPT | Mod: PBBFAC,,, | Performed by: INTERNAL MEDICINE

## 2017-06-08 PROCEDURE — 85025 COMPLETE CBC W/AUTO DIFF WBC: CPT

## 2017-06-08 RX ORDER — HYDROCHLOROTHIAZIDE 25 MG/1
25 TABLET ORAL EVERY MORNING
Qty: 30 TABLET | Refills: 3 | Status: SHIPPED | OUTPATIENT
Start: 2017-06-08 | End: 2017-12-09 | Stop reason: SDUPTHER

## 2017-06-08 NOTE — PROGRESS NOTES
"Subjective:       Patient ID: Ana Aguila is a 61 y.o. female.    Chief Complaint: Flank Pain (Right) and Rectal Bleeding    HPI  62 y/o woman HTN, HLD, severe COPD with home O2, h/o hemicolectomy 2009 due to colonoscopy complication here for follow up. Primary concerns are flank pain and rectal bleeding.    Flank pain - notes "shooting pain" on right side, initially says started ~3 weeks ago, then says this has been going on since prior to her last visit 4 months ago. Did see GI for this, had CT abdomen done for RUQ pain which showed mild hepatomegaly but no clear explanation for her symptoms. At that time, pain was attributed to scar tissue / adhesions as she has had R hemicolectomy.   Today says that the pain improved for some time and then has been recurring, says somewhat more severe than previous. Happens 1-2 times, very brief, resolves without intervention.   Has gained weight ~10# since last visit - was on steroids recently. Was prescribed 10mg daily, but she was taking 5mg (1/2 tablet) daily until last weekend.    Rectal bleeding - has noted 2-3 episodes of bright red blood in toilet, and on toilet paper. Most recently yesterday. Does say that she has also been eating many red candies. No pain with defecation. Noting blood only when having BM, generally only if straining. Denies constipation - has BM "every time I eat". Had been taking imodium - takes 1/2 tablet sometime if going out to eat.   She does have known hemorrhoids. Uses Preparation H pads.     She also has questions about her umbilical hernia and her hiatal hernia.     COPD on home O2 - saw Dr Dill 4/10/17, recently on steroids as above.     HTN - taking HCTZ 25mg, not on any other HTN medications.     Review of Systems   Constitutional: Negative for activity change and fever.   HENT: Negative for congestion and sore throat.    Eyes: Negative for visual disturbance.   Respiratory: Positive for shortness of breath (back to baseline). Negative " "for cough and wheezing.    Cardiovascular: Negative for chest pain, palpitations and leg swelling.   Gastrointestinal: Positive for abdominal pain (mild RUQ ), anal bleeding, blood in stool and diarrhea. Negative for nausea and rectal pain.   Genitourinary: Negative.    Musculoskeletal: Negative.    Skin: Negative.    Neurological: Negative for syncope, weakness, light-headedness, numbness and headaches.   Psychiatric/Behavioral: The patient is nervous/anxious (improved).          Past medical history, surgical history, and family medical history reviewed and updated as appropriate.    Medications and allergies reviewed.     Objective:          Vitals:    06/08/17 0935   BP: 118/79   BP Location: Left arm   Patient Position: Sitting   Pulse: 84   Temp: 98.2 °F (36.8 °C)   TempSrc: Oral   SpO2: 99%   Weight: 83.4 kg (183 lb 13.8 oz)   Height: 5' 5" (1.651 m)     Body mass index is 30.6 kg/m².  Physical Exam   Constitutional: She is oriented to person, place, and time. She appears well-developed and well-nourished. No distress.   HENT:   Head: Normocephalic and atraumatic.   Nose: Nose normal.   Mouth/Throat: Oropharynx is clear and moist.   Eyes: Conjunctivae and EOM are normal. Pupils are equal, round, and reactive to light.   Neck: Normal range of motion. Neck supple.   Cardiovascular: Normal rate, regular rhythm and normal heart sounds.    No murmur heard.  Varicose veins b/l LE   Pulmonary/Chest: Effort normal and breath sounds normal. No respiratory distress. She has no wheezes. She has no rales.   Abdominal: Soft. Bowel sounds are normal. She exhibits no distension. There is tenderness (mild diffuse RUQ tenderness). There is no rebound, no guarding, no CVA tenderness and negative Neves's sign.   Well-healed surgical scars   Musculoskeletal: She exhibits no edema or tenderness.   Lymphadenopathy:     She has no cervical adenopathy.   Neurological: She is alert and oriented to person, place, and time. She has " normal strength. No cranial nerve deficit. Gait normal.   Skin: Skin is warm and dry.   Psychiatric:   Mood normal. Affect and speech normal   Vitals reviewed.      Lab Results   Component Value Date    WBC 14.34 (H) 01/24/2017    HGB 13.8 01/24/2017    HCT 43.5 01/24/2017     (H) 01/24/2017    CHOL 191 03/23/2016    TRIG 73 03/23/2016    HDL 61 03/23/2016    ALT 13 01/24/2017    AST 20 01/24/2017     01/24/2017    K 3.5 01/24/2017     01/24/2017    CREATININE 1.0 01/24/2017    BUN 18 01/24/2017    CO2 32 (H) 01/24/2017    TSH 2.072 01/12/2017    INR 1.2 10/18/2016    HGBA1C 5.4 10/18/2016       Assessment:       1. Rectal bleeding    2. RUQ pain    3. Chronic diarrhea    4. Chronic obstructive pulmonary disease, unspecified COPD type    5. Benign essential HTN    6. Takotsubo cardiomyopathy        Plan:   Ana was seen today for flank pain and rectal bleeding.    Diagnoses and all orders for this visit:    Rectal bleeding  -     CBC auto differential; Future  RUQ pain  -     CBC auto differential; Future  -     Comprehensive metabolic panel; Future  Chronic diarrhea  - patient follows with GI; recommended that she contact that clinic to make follow-up appt for these concerns    Chronic obstructive pulmonary disease, unspecified COPD type - following with Dr Dill, currently stable    Benign essential HTN - only on HCTZ; continue this medication  -     hydrochlorothiazide (HYDRODIURIL) 25 MG tablet; Take 1 tablet (25 mg total) by mouth every morning.    Takotsubo cardiomyopathy - LV function resolved on repeat echo 12/2016.     Health maintenance reviewed with patient. She is getting mammogram done and following up with breast specialist next week.    Return in about 3 months (around 9/8/2017) for follow up.    Erik Cool MD  Internal Medicine  [facility]  6/8/2017

## 2017-06-12 ENCOUNTER — PATIENT OUTREACH (OUTPATIENT)
Dept: OTHER | Facility: OTHER | Age: 62
End: 2017-06-12

## 2017-06-12 NOTE — PROGRESS NOTES
Last 5 Patient Entered Readings                                                               Current 30 Day Average: 119/80     Recent Readings 6/27/2017 6/26/2017 6/25/2017 6/24/2017 6/23/2017    Systolic BP (mmHg) 109 115 111 132 126    Diastolic BP (mmHg) 72 79 79 85 84    Pulse 81 91 93 85 82          Ms. Aguila's BP is at goal. She has no s/s of hypotension. She will continue taking current dose of HCTZ, but understands if she has LH/dizziness/fatigue to let me know and we will reduce dose to 12.5 mg QD.     Current HTN regimen:  Hypertension Medications             hydrochlorothiazide (HYDRODIURIL) 25 MG tablet Take 1 tablet (25 mg total) by mouth every morning.          Will continue to monitor regularly. Will follow up in 1-2 months, sooner if BP begins to trend upward or downward.    Patient has my contact information and knows to call with any concerns or clinical changes.

## 2017-06-13 ENCOUNTER — HOSPITAL ENCOUNTER (OUTPATIENT)
Dept: RADIOLOGY | Facility: HOSPITAL | Age: 62
Discharge: HOME | End: 2017-06-13
Attending: NURSE PRACTITIONER
Payer: COMMERCIAL

## 2017-06-13 VITALS — WEIGHT: 179 LBS | HEIGHT: 65 IN | BODY MASS INDEX: 29.82 KG/M2

## 2017-06-13 DIAGNOSIS — Z12.31 VISIT FOR SCREENING MAMMOGRAM: ICD-10-CM

## 2017-06-13 PROCEDURE — 77067 SCR MAMMO BI INCL CAD: CPT | Mod: TC

## 2017-06-13 PROCEDURE — 77063 BREAST TOMOSYNTHESIS BI: CPT | Mod: 26,,, | Performed by: RADIOLOGY

## 2017-06-13 PROCEDURE — 77067 SCR MAMMO BI INCL CAD: CPT | Mod: 26,,, | Performed by: RADIOLOGY

## 2017-06-14 ENCOUNTER — HOSPITAL ENCOUNTER (OUTPATIENT)
Dept: RADIOLOGY | Facility: HOSPITAL | Age: 62
Discharge: HOME | End: 2017-06-14
Attending: INTERNAL MEDICINE
Payer: COMMERCIAL

## 2017-06-14 ENCOUNTER — OFFICE VISIT (OUTPATIENT)
Dept: SURGERY | Facility: CLINIC | Age: 62
End: 2017-06-14
Payer: COMMERCIAL

## 2017-06-14 VITALS
WEIGHT: 182.44 LBS | DIASTOLIC BLOOD PRESSURE: 86 MMHG | BODY MASS INDEX: 30.39 KG/M2 | HEART RATE: 99 BPM | HEIGHT: 65 IN | TEMPERATURE: 98 F | SYSTOLIC BLOOD PRESSURE: 140 MMHG

## 2017-06-14 DIAGNOSIS — Z12.31 OTHER SCREENING MAMMOGRAM: ICD-10-CM

## 2017-06-14 DIAGNOSIS — Z12.39 SCREENING BREAST EXAMINATION: Primary | ICD-10-CM

## 2017-06-14 DIAGNOSIS — Z80.3 FAMILY HISTORY OF BREAST CANCER: ICD-10-CM

## 2017-06-14 DIAGNOSIS — J44.9 CHRONIC OBSTRUCTIVE PULMONARY DISEASE, UNSPECIFIED COPD TYPE: ICD-10-CM

## 2017-06-14 PROCEDURE — 71020 XR CHEST PA AND LATERAL: CPT | Mod: TC

## 2017-06-14 PROCEDURE — 71020 XR CHEST PA AND LATERAL: CPT | Mod: 26,,, | Performed by: RADIOLOGY

## 2017-06-14 PROCEDURE — 99212 OFFICE O/P EST SF 10 MIN: CPT | Mod: S$GLB,,, | Performed by: NURSE PRACTITIONER

## 2017-06-14 PROCEDURE — 99999 PR PBB SHADOW E&M-EST. PATIENT-LVL III: CPT | Mod: PBBFAC,,, | Performed by: NURSE PRACTITIONER

## 2017-06-14 NOTE — PROGRESS NOTES
Subjective:      Patient ID: Ana Aguila is a 61 y.o. female.    Chief Complaint: Breast Cancer Screening (CBE)      HPI: (PF, EPF - 1-3) (Detailed, Comp, - 4) returning patient presents for breast cancer screening, family history of breast cancer (daughter with negative BRCA1/2). Patient denies palpable breast mass, pain, nipple discharge, redness, increased warmth,   Screening mmg yesterday with no abnormality reported     Review of Systems  Objective:   Physical Exam   Pulmonary/Chest: She exhibits no mass, no tenderness, no laceration, no edema, no swelling and no retraction. Right breast exhibits tenderness. Right breast exhibits no inverted nipple, no mass, no nipple discharge and no skin change. Left breast exhibits tenderness. Left breast exhibits no inverted nipple, no mass, no nipple discharge and no skin change. There is no breast swelling.   She reports soreness involving the upper and lateral aspects of both breasts, no mass appreciated    Lymphadenopathy:     She has no cervical adenopathy.     She has no axillary adenopathy.        Right: No supraclavicular adenopathy present.        Left: No supraclavicular adenopathy present.     Assessment:       1. Screening breast examination    2. Family history of breast cancer        Plan:       Clinically [place], bilateral breast tenderness not suspicious for breast cancer  She desires continued f/u here  Return in one year with screening mmg  Call for any interval palpable breast mass, pain, nipple discharge, skin changes or other breast related concerns

## 2017-06-19 DIAGNOSIS — J44.0 CHRONIC OBSTRUCTIVE PULMONARY DISEASE WITH ACUTE LOWER RESPIRATORY INFECTION: ICD-10-CM

## 2017-06-19 DIAGNOSIS — J06.9 UPPER RESPIRATORY TRACT INFECTION, UNSPECIFIED TYPE: ICD-10-CM

## 2017-06-24 RX ORDER — PREDNISONE 10 MG/1
TABLET ORAL
Qty: 30 TABLET | Refills: 0 | Status: SHIPPED | OUTPATIENT
Start: 2017-06-24 | End: 2017-09-07

## 2017-07-17 DIAGNOSIS — J44.9 CHRONIC OBSTRUCTIVE PULMONARY DISEASE, UNSPECIFIED COPD TYPE: Primary | ICD-10-CM

## 2017-07-19 ENCOUNTER — HOSPITAL ENCOUNTER (OUTPATIENT)
Dept: RADIOLOGY | Facility: HOSPITAL | Age: 62
Discharge: HOME | End: 2017-07-19
Attending: INTERNAL MEDICINE
Payer: COMMERCIAL

## 2017-07-19 ENCOUNTER — OFFICE VISIT (OUTPATIENT)
Dept: PULMONOLOGY | Facility: CLINIC | Age: 62
End: 2017-07-19
Payer: COMMERCIAL

## 2017-07-19 VITALS
WEIGHT: 179.88 LBS | OXYGEN SATURATION: 89 % | SYSTOLIC BLOOD PRESSURE: 102 MMHG | HEIGHT: 65 IN | BODY MASS INDEX: 29.97 KG/M2 | HEART RATE: 92 BPM | DIASTOLIC BLOOD PRESSURE: 70 MMHG

## 2017-07-19 DIAGNOSIS — J44.9 CHRONIC OBSTRUCTIVE PULMONARY DISEASE, UNSPECIFIED COPD TYPE: ICD-10-CM

## 2017-07-19 DIAGNOSIS — J44.1 COPD EXACERBATION: Primary | ICD-10-CM

## 2017-07-19 PROCEDURE — 71020 XR CHEST PA AND LATERAL: CPT | Mod: 26,,, | Performed by: RADIOLOGY

## 2017-07-19 PROCEDURE — 71020 XR CHEST PA AND LATERAL: CPT | Mod: TC

## 2017-07-19 PROCEDURE — 99214 OFFICE O/P EST MOD 30 MIN: CPT | Mod: S$GLB,,, | Performed by: INTERNAL MEDICINE

## 2017-07-19 PROCEDURE — 99999 PR PBB SHADOW E&M-EST. PATIENT-LVL III: CPT | Mod: PBBFAC,,, | Performed by: INTERNAL MEDICINE

## 2017-07-19 RX ORDER — AZITHROMYCIN 250 MG/1
TABLET, FILM COATED ORAL
COMMUNITY
Start: 2017-07-14 | End: 2017-09-07

## 2017-07-19 NOTE — PROGRESS NOTES
Subjective:       Patient ID: Ana Aguila is a 61 y.o. female.    Chief Complaint: Fatigue; Shortness of Breath; Cough; and COPD    HPI  62 yo female with stable but severe COPD comes today because she has been feeling bad for ten days. She has just completed a course of a zpak and has been on 20 mg of prednisone. She is quite emotional and cried several times during the visit. She is  Afraid  When she can't breathe. I ask were there other issues to cause her to be distressed and she said no.She works for [employer] and has been off of work for the ten days.       No flowsheet data found.]  Review of Systems   Constitutional: Negative.    HENT: Negative.    Eyes: Negative.    Respiratory: Positive for cough, choking, wheezing and dyspnea on extertion.         Severe COPD   Cardiovascular: Negative.         Hx of takotsubo cardiomyopathy   Genitourinary: Negative.    Musculoskeletal: Negative.    Skin: Negative.    Gastrointestinal: Negative.         S/P Perforated viscus after colonoscope.  Right  Hemicolectomy--July, 2010    S/P Cholecystectomy   Neurological: Negative.    Psychiatric/Behavioral: Negative.        Objective:      Physical Exam   Pulmonary/Chest:   Faint wheezes with forced exhalation    Resting Peak flow 100 l/min that improved to 150 after a xopenex neublizer treatment.   Psychiatric:   Very teary and frightened           Assessment:       No diagnosis found.    Outpatient Encounter Prescriptions as of 7/19/2017   Medication Sig Dispense Refill    albuterol (PROAIR HFA) 90 mcg/actuation inhaler INHALE 2 PUFFS BY MOUTH INTO THE LUNGS FOUR TIMES DAILY prn 25.5 g 12    albuterol-ipratropium 2.5mg-0.5mg/3mL (DUO-NEB) 0.5 mg-3 mg(2.5 mg base)/3 mL nebulizer solution Take 3 mLs by nebulization every 6 (six) hours as needed for Wheezing or Shortness of Breath. 270 vial 12    azithromycin (Z-CHRISTOPHER) 250 MG tablet       ergocalciferol (ERGOCALCIFEROL) 50,000 unit Cap TAKE 1 CAPSULE BY MOUTH  EVERY 7 DAYS 12 capsule 0    fluticasone-salmeterol 250-50 mcg/dose (ADVAIR DISKUS) 250-50 mcg/dose diskus inhaler INHALE 1 PUFF BY MOUTH TWICE DAILY( EVERY 12 HOURS) 1 each 12    hydrochlorothiazide (HYDRODIURIL) 25 MG tablet Take 1 tablet (25 mg total) by mouth every morning. 30 tablet 3    loperamide (IMODIUM) 2 mg capsule Take 2 mg by mouth 4 (four) times daily as needed for Diarrhea.      predniSONE (DELTASONE) 10 MG tablet TAKE 2 TABLETS FOR 7 DAYS, THEN 1 TABLET EVERY DAY 30 tablet 0    psyllium 0.52 gram capsule Take 2 capsules by mouth once daily.      levalbuterol (XOPENEX) 1.25 mg/0.5 mL nebulizer solution Qid prn for copd 120 each 11     No facility-administered encounter medications on file as of 7/19/2017.      No orders of the defined types were placed in this encounter.    Plan:            Chest today no acute changes, has hyperinflation and pleural scar on right. IMP Exacerbation of COPD. Moderate emotional overlay  Will give prednisone 10 mg bid continue her advair bid and use her nebulizer midday and get Afrin nasal spray to use bid for 3 days.  Sao2 92% at the conclusion of the visit.

## 2017-07-24 ENCOUNTER — PATIENT OUTREACH (OUTPATIENT)
Dept: OTHER | Facility: OTHER | Age: 62
End: 2017-07-24

## 2017-07-24 DIAGNOSIS — I10 ESSENTIAL HYPERTENSION: Primary | ICD-10-CM

## 2017-07-24 NOTE — PROGRESS NOTES
Last 5 Patient Entered Red[place] Current 30 Day Average: 121/79     Recent Readings 8/8/2017 8/7/2017 8/4/2017 8/3/2017 8/2/2017    Systolic BP (mmHg) 118 123 129 116 125    Diastolic BP (mmHg) 77 81 84 82 81    Pulse 81 87 83 85 79        Follow up with Ms. Ana Aguila completed. Mrs. Aguila reports that she is doing well and keeping up with her diet. She would like to participate in water aerobics. Will submit referral for [facility] today. Patient did not have any further questions or concerns. I will follow up in a few weeks to see how she is doing and progressing.

## 2017-08-16 ENCOUNTER — PATIENT OUTREACH (OUTPATIENT)
Dept: OTHER | Facility: OTHER | Age: 62
End: 2017-08-16

## 2017-08-16 NOTE — PROGRESS NOTES
Last 5 Patient Entered Redings Current 30 Day Average: 123/80     Recent Readings 8/16/2017 8/15/2017 8/14/2017 8/11/2017 8/9/2017    Systolic BP (mmHg) 131 116 125 123 125    Diastolic BP (mmHg) 84 77 84 81 82    Pulse 80 85 81 91 88        Ms. Aguila has been feeling well. She is in the process of getting established with water aerobics at [facility] in [place]. BP continues to be well controlled.    Patient's BP is at goal. Patient denies any symptoms and has no questions/concerns.     Current HTN regimen:  Hypertension Medications             hydrochlorothiazide (HYDRODIURIL) 25 MG tablet Take 1 tablet (25 mg total) by mouth every morning.        Will continue to monitor regularly. Will follow up in 3-4 months, sooner if BP begins to trend upward or downward.    Patient has my contact information and knows to call with any concerns or clinical changes.

## 2017-08-24 ENCOUNTER — PATIENT OUTREACH (OUTPATIENT)
Dept: OTHER | Facility: OTHER | Age: 62
End: 2017-08-24

## 2017-08-24 NOTE — PROGRESS NOTES
Last 5 Patient Entered Redings Current 30 Day Average: 123/81     Recent Readings 8/31/2017 8/30/2017 8/27/2017 8/25/2017 8/24/2017    Systolic BP (mmHg) 125 128 131 119 116    Diastolic BP (mmHg) 78 85 81 84 78    Pulse 83 77 78 81 88        Hypertension Digital Medicine Program (HDMP): Health  Follow Up    Lifestyle Modifications:    1. Low sodium diet: yes Adrián reports that she is maintaining a low sodium diet.     2.Physical activity: no Ms. Aguila had her fitness consultation at [facility] yesterday. She is very excited about starting water aerobics and appreciates the opportunity.     3.Hypotension/Hypertension symptoms: no   Frequency/Alleviating factors/Precipitating factors, etc.     4. Patient has been compliant with the medicaiton regimen    Follow up with Ms. Ana Aguila completed. Ms. Aguila is doing well. No further questions or concerns. I will follow up in a few weeks to assess progress.

## 2017-09-07 ENCOUNTER — LAB VISIT (OUTPATIENT)
Dept: LAB | Facility: HOSPITAL | Age: 62
End: 2017-09-07
Attending: INTERNAL MEDICINE
Payer: COMMERCIAL

## 2017-09-07 ENCOUNTER — OFFICE VISIT (OUTPATIENT)
Dept: INTERNAL MEDICINE | Facility: CLINIC | Age: 62
End: 2017-09-07
Payer: COMMERCIAL

## 2017-09-07 VITALS
BODY MASS INDEX: 29.65 KG/M2 | HEART RATE: 93 BPM | HEIGHT: 65 IN | WEIGHT: 177.94 LBS | TEMPERATURE: 98 F | OXYGEN SATURATION: 92 % | DIASTOLIC BLOOD PRESSURE: 82 MMHG | SYSTOLIC BLOOD PRESSURE: 120 MMHG

## 2017-09-07 DIAGNOSIS — K92.1 BLOOD IN STOOL: ICD-10-CM

## 2017-09-07 DIAGNOSIS — I10 ESSENTIAL HYPERTENSION: ICD-10-CM

## 2017-09-07 DIAGNOSIS — K52.9 CHRONIC DIARRHEA: ICD-10-CM

## 2017-09-07 DIAGNOSIS — K76.0 FATTY LIVER DISEASE, NONALCOHOLIC: ICD-10-CM

## 2017-09-07 DIAGNOSIS — Z90.49 S/P RIGHT HEMICOLECTOMY: ICD-10-CM

## 2017-09-07 DIAGNOSIS — R10.11 CHRONIC RUQ PAIN: ICD-10-CM

## 2017-09-07 DIAGNOSIS — J44.9 CHRONIC OBSTRUCTIVE PULMONARY DISEASE, UNSPECIFIED COPD TYPE: Primary | ICD-10-CM

## 2017-09-07 DIAGNOSIS — F41.9 ANXIETY: ICD-10-CM

## 2017-09-07 DIAGNOSIS — G89.29 CHRONIC RUQ PAIN: ICD-10-CM

## 2017-09-07 DIAGNOSIS — J44.9 CHRONIC OBSTRUCTIVE PULMONARY DISEASE, UNSPECIFIED COPD TYPE: ICD-10-CM

## 2017-09-07 PROBLEM — R39.15 URINARY URGENCY: Status: RESOLVED | Noted: 2017-01-13 | Resolved: 2017-09-07

## 2017-09-07 PROBLEM — R35.0 INCREASED FREQUENCY OF URINATION: Status: RESOLVED | Noted: 2017-01-13 | Resolved: 2017-09-07

## 2017-09-07 LAB
ANION GAP SERPL CALC-SCNC: 10 MMOL/L
BUN SERPL-MCNC: 14 MG/DL
CALCIUM SERPL-MCNC: 9.3 MG/DL
CHLORIDE SERPL-SCNC: 103 MMOL/L
CHOLEST SERPL-MCNC: 230 MG/DL
CHOLEST/HDLC SERPL: 3.2 {RATIO}
CO2 SERPL-SCNC: 32 MMOL/L
CREAT SERPL-MCNC: 0.8 MG/DL
ERYTHROCYTE [DISTWIDTH] IN BLOOD BY AUTOMATED COUNT: 14.6 %
EST. GFR  (AFRICAN AMERICAN): >60 ML/MIN/1.73 M^2
EST. GFR  (NON AFRICAN AMERICAN): >60 ML/MIN/1.73 M^2
GLUCOSE SERPL-MCNC: 93 MG/DL
HCT VFR BLD AUTO: 42.3 %
HDLC SERPL-MCNC: 72 MG/DL
HDLC SERPL: 31.3 %
HGB BLD-MCNC: 13.6 G/DL
LDLC SERPL CALC-MCNC: 145.4 MG/DL
MCH RBC QN AUTO: 29.3 PG
MCHC RBC AUTO-ENTMCNC: 32.2 G/DL
MCV RBC AUTO: 91 FL
NONHDLC SERPL-MCNC: 158 MG/DL
PLATELET # BLD AUTO: 261 K/UL
PMV BLD AUTO: 10 FL
POTASSIUM SERPL-SCNC: 3.4 MMOL/L
RBC # BLD AUTO: 4.64 M/UL
SODIUM SERPL-SCNC: 145 MMOL/L
TRIGL SERPL-MCNC: 63 MG/DL
WBC # BLD AUTO: 8.3 K/UL

## 2017-09-07 PROCEDURE — 99214 OFFICE O/P EST MOD 30 MIN: CPT | Mod: S$GLB,,, | Performed by: INTERNAL MEDICINE

## 2017-09-07 PROCEDURE — 80061 LIPID PANEL: CPT

## 2017-09-07 PROCEDURE — 3079F DIAST BP 80-89 MM HG: CPT | Mod: S$GLB,,, | Performed by: INTERNAL MEDICINE

## 2017-09-07 PROCEDURE — 85027 COMPLETE CBC AUTOMATED: CPT

## 2017-09-07 PROCEDURE — 99999 PR PBB SHADOW E&M-EST. PATIENT-LVL IV: CPT | Mod: PBBFAC,,, | Performed by: INTERNAL MEDICINE

## 2017-09-07 PROCEDURE — 36415 COLL VENOUS BLD VENIPUNCTURE: CPT

## 2017-09-07 PROCEDURE — 3008F BODY MASS INDEX DOCD: CPT | Mod: S$GLB,,, | Performed by: INTERNAL MEDICINE

## 2017-09-07 PROCEDURE — 3074F SYST BP LT 130 MM HG: CPT | Mod: S$GLB,,, | Performed by: INTERNAL MEDICINE

## 2017-09-07 PROCEDURE — 80048 BASIC METABOLIC PNL TOTAL CA: CPT

## 2017-09-07 NOTE — PROGRESS NOTES
Subjective:       Patient ID: Ana Aguila is a 62 y.o. female.    Chief Complaint: Flank Pain (Right); Shortness of Breath; Neck Pain; and Rectal Bleeding    HPI  60 y/o woman HTN, HLD, severe COPD with home O2, h/o hemicolectomy  due to colonoscopy complication, chronic RUQ discomfort, NAFLD here for follow up.     COPD - saw Dr Dill , had COPD exacerbation earlier that month. Recommended to take prednisone 10mg BID, use nebulizer during the day.   Reports being more oxygen-dependent at home recently.  Lives in a 2nd-floor apartment that she feels she may not be able to manage much longer due to dyspnea with exertion.   Also has significant dyspnea when very anxious.     R flank pain, chronic diarrhea / loose stool - discussed at last visit, may be related to adhesions after hemicolectomy, does also have fatty liver. Had cholecystectomy in , reports her RUQ pain has been persistent since before then.  Was recommended to follow up with GI at last visit but hasn't yet done this. Last saw GI 2017, at which visit RUQ pain and chronic loose stool was addressed as well.  Reports bright red blood in stool 2 weeks ago, and dark stool with small amt of blood recently.  Chronic loose stool - she has stopped taking the imodium because she is concerned about effects on her heart (arrhythmias especially); she takes 1/2 tablet occasionally if she is going out but doesn't use daily. Does use metamucil.     Also saw Hepatology 2017 for hepatomegaly attributed to fatty liver, workup for other causes was negative; no further follow up was recommended after that visit. Mild hepatomegaly with no masses noted on CT 2017 as well.    Anxiety - feels she is struggling more with this recently. Did have a friend who  recently of cancer, and that her work has been stressful for her recently especially related to accommodations that she needs at work due to COPD.  Not interested in taking medication for this, but  "willing to see a counselor.    HTN - taking HCTZ 25mg, not on any other HTN medications. Monitoring BP daily at home, brings in record on her phone -- BP generally 110-120s/70-80s.     H/o Takotsubo cardiomyopathy - LV function resolved on repeat echo 12/2016. EKG 1/2017 also normal.    H/o vitamin D deficiency in the past, normal on last check 1/2017.    Review of Systems   Constitutional: Negative for activity change and fever.   HENT: Negative for congestion and sore throat.    Eyes: Negative for visual disturbance.   Respiratory: Positive for shortness of breath (back to baseline). Negative for cough and wheezing.    Cardiovascular: Negative for chest pain, palpitations and leg swelling.   Gastrointestinal: Positive for abdominal pain (mild RUQ ), blood in stool and diarrhea. Negative for nausea and rectal pain.   Genitourinary: Negative.    Musculoskeletal: Negative.    Skin: Negative.    Neurological: Negative for syncope, weakness, light-headedness, numbness and headaches.   Psychiatric/Behavioral: Negative for dysphoric mood. The patient is nervous/anxious (improved).          Past medical history, surgical history, and family medical history reviewed and updated as appropriate.    Medications and allergies reviewed.     Objective:          Vitals:    09/07/17 0911   BP: 120/82   BP Location: Left arm   Patient Position: Sitting   Pulse: 93   Temp: 98 °F (36.7 °C)   TempSrc: Oral   SpO2: (!) 92%   Weight: 80.7 kg (177 lb 14.6 oz)   Height: 5' 5" (1.651 m)     Body mass index is 29.61 kg/m².  Physical Exam   Constitutional: She is oriented to person, place, and time. She appears well-developed and well-nourished. No distress.   HENT:   Head: Normocephalic and atraumatic.   Mouth/Throat: Oropharynx is clear and moist.   Eyes: Conjunctivae and EOM are normal. Pupils are equal, round, and reactive to light.   Neck: Normal range of motion. Neck supple.   Cardiovascular: Normal rate, regular rhythm and normal " heart sounds.    No murmur heard.  Varicose veins b/l LE   Pulmonary/Chest: Effort normal and breath sounds normal. No respiratory distress. She has no wheezes. She has no rales.   Abdominal: Soft. Bowel sounds are normal. She exhibits no distension. There is tenderness (mild diffuse RUQ tenderness, no change from prior). There is no rebound, no guarding, no CVA tenderness and negative Neves's sign.   Well-healed surgical scars   Musculoskeletal: Normal range of motion. She exhibits no edema or tenderness.   Lymphadenopathy:     She has no cervical adenopathy.   Neurological: She is alert and oriented to person, place, and time. She has normal strength. No cranial nerve deficit. Gait normal.   Skin: Skin is warm and dry.   Psychiatric:   +anxiety, tearful at times during visit.   Affect normal, speech normal.   Vitals reviewed.      Lab Results   Component Value Date    WBC 9.19 06/08/2017    HGB 14.0 06/08/2017    HCT 43.5 06/08/2017     06/08/2017    CHOL 191 03/23/2016    TRIG 73 03/23/2016    HDL 61 03/23/2016    ALT 14 06/08/2017    AST 17 06/08/2017     06/08/2017    K 3.6 06/08/2017     06/08/2017    CREATININE 0.8 06/08/2017    BUN 12 06/08/2017    CO2 32 (H) 06/08/2017    TSH 2.072 01/12/2017    INR 1.2 10/18/2016    HGBA1C 5.4 10/18/2016       Assessment:       1. Chronic obstructive pulmonary disease, unspecified COPD type    2. Anxiety    3. Essential hypertension    4. Chronic RUQ pain    5. Fatty liver disease, nonalcoholic    6. Chronic diarrhea    7. S/P right hemicolectomy    8. Blood in stool        Plan:   Ana was seen today for flank pain, shortness of breath, neck pain and rectal bleeding.    Diagnoses and all orders for this visit:    Chronic obstructive pulmonary disease, unspecified COPD type - continue to follow with Dr Dill, can schedule for earlier follow up as she feels her baseline symptoms have progressed and she has some concerns she would like to  address  Recommended TDaP vaccine to cover pertussis especially; she declined today but will consider.  Recommended get flu vaccine; she defers this for now  -     Basic metabolic panel; Future    Anxiety - having significant impact on her life; anxiety is mostly related to stress at work related to her COPD as well as gradual changes in her level of function. She is amenable to working with a therapist who is experienced in working with people with chronic medical conditions  -     Ambulatory Referral to Psychology    Essential hypertension - at goal, continue HCTZ  -     Lipid panel; Future  -     Basic metabolic panel; Future    Chronic RUQ pain  Fatty liver disease, nonalcoholic - stable, encouraged her to continue working on healthy diet. Follow up with GI for chronic abdominal pain and diarrhea as well as blood in stool  -     Lipid panel; Future  -     Basic metabolic panel; Future    Chronic diarrhea  S/P right hemicolectomy - ok to take 1/2 - 1 tablet immodium as directed; don't take more than recommended dosage. She is very cautious about this. Continue fiber supplement. Follow up with GI.    Blood in stool - will check CBC, this has been stable in the past. Does have known hemorrhoids and has declined repeat colonoscopy.  -     CBC Without Differential; Future    Health maintenance reviewed with patient.     Return in about 4 months (around 1/7/2018) for follow up.    Erik Cool MD  Internal Medicine  [facility]  9/7/2017

## 2017-09-07 NOTE — PATIENT INSTRUCTIONS
Call to make a follow-up appointment with the GI clinic ([facility] or with Dr Underwood).    Call to make an appointment with a psychologist or counselor that has experience working with anxiety and stress related to chronic health conditions.

## 2017-09-08 DIAGNOSIS — J06.9 UPPER RESPIRATORY TRACT INFECTION, UNSPECIFIED TYPE: Primary | ICD-10-CM

## 2017-09-08 DIAGNOSIS — J44.9 CHRONIC OBSTRUCTIVE PULMONARY DISEASE, UNSPECIFIED COPD TYPE: ICD-10-CM

## 2017-09-08 RX ORDER — AZITHROMYCIN 250 MG/1
TABLET, FILM COATED ORAL
Qty: 6 TABLET | Refills: 1 | Status: SHIPPED | OUTPATIENT
Start: 2017-09-08 | End: 2018-03-26

## 2017-09-14 ENCOUNTER — OFFICE VISIT (OUTPATIENT)
Dept: GASTROENTEROLOGY | Facility: CLINIC | Age: 62
End: 2017-09-14
Payer: COMMERCIAL

## 2017-09-14 VITALS
HEART RATE: 96 BPM | SYSTOLIC BLOOD PRESSURE: 126 MMHG | WEIGHT: 177.5 LBS | BODY MASS INDEX: 29.57 KG/M2 | DIASTOLIC BLOOD PRESSURE: 86 MMHG | HEIGHT: 65 IN

## 2017-09-14 DIAGNOSIS — K62.5 RECTAL BLEEDING: ICD-10-CM

## 2017-09-14 DIAGNOSIS — R10.11 RUQ ABDOMINAL PAIN: Primary | ICD-10-CM

## 2017-09-14 PROCEDURE — 3074F SYST BP LT 130 MM HG: CPT | Mod: S$GLB,,, | Performed by: PHYSICIAN ASSISTANT

## 2017-09-14 PROCEDURE — 3008F BODY MASS INDEX DOCD: CPT | Mod: S$GLB,,, | Performed by: PHYSICIAN ASSISTANT

## 2017-09-14 PROCEDURE — 99213 OFFICE O/P EST LOW 20 MIN: CPT | Mod: S$GLB,,, | Performed by: PHYSICIAN ASSISTANT

## 2017-09-14 PROCEDURE — 99999 PR PBB SHADOW E&M-EST. PATIENT-LVL III: CPT | Mod: PBBFAC,,, | Performed by: PHYSICIAN ASSISTANT

## 2017-09-14 PROCEDURE — 3079F DIAST BP 80-89 MM HG: CPT | Mod: S$GLB,,, | Performed by: PHYSICIAN ASSISTANT

## 2017-09-14 NOTE — PROGRESS NOTES
[facility] Consultation Note    Reason for Consult:  The primary encounter diagnosis was RUQ abdominal pain. A diagnosis of Rectal bleeding was also pertinent to this visit.    PCP:   Erik Cool       Referring MD:  No referring provider defined for this encounter.    HPI:  This is a 62 y.o. female here for a follow up on her abdominal pain  Last seen 2/2017 for RUQ. CT scan was unrevealing.    Today she says that the RUQ pain restarted 1.5 weeks ago  Increases with movements  Pain may have been originated with diarrhea that has since resolved   Took imodium and metamucil regimen and the pain gradually resolved  Admits to rectal bleeding during this episode    S/p cholecystectomy.    She is still not interested in having a colonscopy due to the complication of her last colonoscopy that included perforation and perla-colectomy.    I spoke with her today about Cologuard. She is not interested.    ROS:  Constitutional: No fevers, chills, No weight loss  ENT: No allergies  CV: No chest pain  Pulm: No cough, + shortness of breath  Ophtho: No vision changes  GI: see HPI  Derm: No rash  Heme: No lymphadenopathy, No bruising  MSK: No arthritis  : No dysuria, No hematuria  Endo: No hot or cold intolerance  Neuro: No syncope, No seizure  Psych: No anxiety, No depression    Medical History:  has a past medical history of Asthma; Blood transfusion; Chronic diarrhea; COPD (chronic obstructive pulmonary disease); Hyperlipidemia; Hypertension; and Reflux (2013).    Surgical History:  has a past surgical history that includes Hysterectomy (1986); Left leg surgery; Colon surgery (2011); Cholecystectomy (2013); Hiatal hernia repair (2013); Abdominal surgery; Hernia repair; Appendectomy; Fracture surgery; and Colonoscopy.    Family History: family history includes Abnormal EKG in her daughter; Breast cancer (age of onset: 43) in her daughter; Breast cancer (age of onset: 44) in her other; Cancer in her  "mother; Cataracts in her maternal grandmother; Coronary artery disease in her father; Hypertension in her brother, mother, and sister; Retinal detachment in her father..     Social History:  reports that she quit smoking about 20 years ago. Her smoking use included Cigarettes. She has a 45.00 pack-year smoking history. She has never used smokeless tobacco. She reports that she drinks alcohol. She reports that she does not use drugs.    Review of patient's allergies indicates:   Allergen Reactions    Sudafed [pseudoephedrine hcl] Nausea And Vomiting and Other (See Comments)     HEATHER       Current Outpatient Prescriptions on File Prior to Visit   Medication Sig Dispense Refill    albuterol (PROAIR HFA) 90 mcg/actuation inhaler INHALE 2 PUFFS BY MOUTH INTO THE LUNGS FOUR TIMES DAILY prn 25.5 g 12    albuterol-ipratropium 2.5mg-0.5mg/3mL (DUO-NEB) 0.5 mg-3 mg(2.5 mg base)/3 mL nebulizer solution Take 3 mLs by nebulization every 6 (six) hours as needed for Wheezing or Shortness of Breath. 270 vial 12    azithromycin (ZITHROMAX Z-CHRISTOPHER) 250 MG tablet 2 tablets now, then 1 daily till finished 6 tablet 1    ergocalciferol (ERGOCALCIFEROL) 50,000 unit Cap TAKE 1 CAPSULE BY MOUTH EVERY 7 DAYS 12 capsule 0    fluticasone-salmeterol 250-50 mcg/dose (ADVAIR DISKUS) 250-50 mcg/dose diskus inhaler INHALE 1 PUFF BY MOUTH TWICE DAILY( EVERY 12 HOURS) 1 each 12    hydrochlorothiazide (HYDRODIURIL) 25 MG tablet Take 1 tablet (25 mg total) by mouth every morning. 30 tablet 3    levalbuterol (XOPENEX) 1.25 mg/0.5 mL nebulizer solution Qid prn for copd 120 each 11    loperamide (IMODIUM) 2 mg capsule Take 2 mg by mouth 4 (four) times daily as needed for Diarrhea.      psyllium 0.52 gram capsule Take 2 capsules by mouth once daily.       No current facility-administered medications on file prior to visit.          Objective Findings:    Vital Signs:  /86   Pulse 96   Ht 5' 5" (1.651 m)   Wt 80.5 kg (177 lb 7.5 oz)   " LMP  (LMP Unknown)   BMI 29.53 kg/m²   Body mass index is 29.53 kg/m².    Physical Exam:  General Appearance: Well appearing in no acute distress, portable O2  Head:   Normocephalic, without obvious abnormality  Eyes:    No scleral icterus  ENT: Neck supple, Lips, mucosa, and tongue normal  Lungs: CTA  Heart:  Regular rate and rhythm, S1, S2 normal, no murmurs heard  Abdomen: Soft, RUQ tender, non distended with positive bowel sounds in all four quadrants.   Extremities: 2+ pulses, no edema  Skin: No rash  Neurologic: AAO x 3      Labs:  Lab Results   Component Value Date    WBC 8.30 09/07/2017    HGB 13.6 09/07/2017    HCT 42.3 09/07/2017     09/07/2017    CHOL 230 (H) 09/07/2017    TRIG 63 09/07/2017    HDL 72 09/07/2017    ALT 14 06/08/2017    AST 17 06/08/2017     09/07/2017    K 3.4 (L) 09/07/2017     09/07/2017    CREATININE 0.8 09/07/2017    BUN 14 09/07/2017    CO2 32 (H) 09/07/2017    TSH 2.072 01/12/2017    INR 1.2 10/18/2016    HGBA1C 5.4 10/18/2016       Imaging:  ABD US - hepatomegaly, s/p cholecystectomy  Endoscopy:    Colon 7/2010- polyp x 1, complication of perforation    Assessment:  1. RUQ abdominal pain    2. Rectal bleeding      61yo F with chronic intermittent RUQ pain. She may have duodenitis. may be related to scar tissue    Recommendations:  1. Advised scheduling a EGD to rule out duodenitis but she declined. I also advised scheduling a colonoscopy but she declined    2. She did agree to check her stool for blood and she will consider getting the endoscopies based off of the result.      No Follow-up on file.      Order summary:  Orders Placed This Encounter    Occult blood x 1, stool    Occult blood x 1, stool    Occult blood x 1, stool         Thank you so much for allowing me to participate in the care of Anasebastián Liriano PA-C

## 2017-09-18 DIAGNOSIS — F32.A DEPRESSION, UNSPECIFIED DEPRESSION TYPE: ICD-10-CM

## 2017-09-18 DIAGNOSIS — F41.9 ANXIETY: Primary | ICD-10-CM

## 2017-09-21 ENCOUNTER — PATIENT OUTREACH (OUTPATIENT)
Dept: OTHER | Facility: OTHER | Age: 62
End: 2017-09-21

## 2017-09-21 NOTE — PROGRESS NOTES
Last 5 Patient Entered Red[place] Current 30 Day Average: 122/81     Recent Readings 9/21/2017 9/20/2017 9/20/2017 9/19/2017 9/17/2017    Systolic BP (mmHg) 133 126 130 122 125    Diastolic BP (mmHg) 83 89 90 85 77    Pulse 86 88 86 79 72        Hypertension Digital Medicine Program (HDMP): Health  Follow Up    Lifestyle Modifications:    1.Low sodium diet: yes Ms. Aguila is maintaining her diet.     2.Physical activity: no Patient reports that she has signed up with [facility], but has not had the time to start exercise. She hopes to go next week.    3.Hypotension/Hypertension symptoms: no   Frequency/Alleviating factors/Precipitating factors, etc.     4.Patient has been compliant with the medication regimen.     Follow up with Ms. Ana Aguila completed. No further questions or concerns. I will follow up in a few weeks to assess progress.

## 2017-09-22 ENCOUNTER — LAB VISIT (OUTPATIENT)
Dept: LAB | Facility: HOSPITAL | Age: 62
End: 2017-09-22
Attending: INTERNAL MEDICINE
Payer: COMMERCIAL

## 2017-09-22 DIAGNOSIS — R10.11 RUQ ABDOMINAL PAIN: ICD-10-CM

## 2017-09-22 PROCEDURE — 82272 OCCULT BLD FECES 1-3 TESTS: CPT

## 2017-09-23 LAB
OB PNL STL: NEGATIVE

## 2017-09-24 ENCOUNTER — PATIENT MESSAGE (OUTPATIENT)
Dept: GASTROENTEROLOGY | Facility: CLINIC | Age: 62
End: 2017-09-24

## 2017-09-27 RX ORDER — ERGOCALCIFEROL 1.25 MG/1
CAPSULE ORAL
Qty: 12 CAPSULE | Refills: 0 | Status: SHIPPED | OUTPATIENT
Start: 2017-09-27 | End: 2018-01-04 | Stop reason: SDUPTHER

## 2017-10-19 ENCOUNTER — OFFICE VISIT (OUTPATIENT)
Dept: PULMONOLOGY | Facility: CLINIC | Age: 62
End: 2017-10-19
Payer: COMMERCIAL

## 2017-10-19 ENCOUNTER — HOSPITAL ENCOUNTER (OUTPATIENT)
Dept: PULMONOLOGY | Facility: CLINIC | Age: 62
Discharge: HOME | End: 2017-10-19
Payer: COMMERCIAL

## 2017-10-19 VITALS
OXYGEN SATURATION: 86 % | HEART RATE: 91 BPM | WEIGHT: 176 LBS | SYSTOLIC BLOOD PRESSURE: 138 MMHG | DIASTOLIC BLOOD PRESSURE: 78 MMHG | BODY MASS INDEX: 29.32 KG/M2 | HEIGHT: 65 IN

## 2017-10-19 DIAGNOSIS — J96.11 CHRONIC RESPIRATORY FAILURE WITH HYPOXIA: ICD-10-CM

## 2017-10-19 DIAGNOSIS — J44.9 CHRONIC OBSTRUCTIVE PULMONARY DISEASE, UNSPECIFIED COPD TYPE: ICD-10-CM

## 2017-10-19 DIAGNOSIS — J43.2 CENTRILOBULAR EMPHYSEMA: Primary | ICD-10-CM

## 2017-10-19 LAB
PRE FEV1 FVC: 33
PRE FEV1: 0.49
PRE FVC: 1.48
PREDICTED FEV1 FVC: 79
PREDICTED FEV1: 2.44
PREDICTED FVC: 3.08

## 2017-10-19 PROCEDURE — 99999 PR PBB SHADOW E&M-EST. PATIENT-LVL III: CPT | Mod: PBBFAC,,, | Performed by: INTERNAL MEDICINE

## 2017-10-19 PROCEDURE — 99214 OFFICE O/P EST MOD 30 MIN: CPT | Mod: S$GLB,,, | Performed by: INTERNAL MEDICINE

## 2017-10-19 PROCEDURE — 94010 BREATHING CAPACITY TEST: CPT | Mod: S$GLB,,, | Performed by: INTERNAL MEDICINE

## 2017-10-19 NOTE — PROGRESS NOTES
Subjective:       Patient ID: Ana Aguila is a 62 y.o. female.    Chief Complaint: COPD and Shortness of Breath    HPI  61 yo female with severe COPD and on oxygen comes for her semi annual exam. She is working at the [employer] and does well. She is planning or resuming pulmonary rehab and needs to be certify. Her PFT's are unchanged from April, visit../ FVC: 1.48 liters 49% and Fev-1: 0.49 liters 33% of FVC  Sa02: 92% of  2 liters.  Has a normal EKG in January and a clear Chest x-ray is July.  Takes it is hard getting morning started and at times may make  Her late to work.      No flowsheet data found.]  Review of Systems   Constitutional: Negative.    HENT: Negative.    Eyes: Negative.    Respiratory: Negative.  Positive for dyspnea on extertion.         Emphysema on oxygen   Cardiovascular: Negative.         Myopathy has resolved   Genitourinary: Negative.    Musculoskeletal: Negative.    Skin: Negative.    Gastrointestinal: Negative.         S/P Cholecystectomy    Perforated viscus as part of colonoscope let to perla colecotmy   Neurological: Negative.    Psychiatric/Behavioral: The patient is nervous/anxious.        Objective:      Physical Exam   Pulmonary/Chest:   Decreased breath sounds without wheezes or rales   Musculoskeletal: She exhibits no edema.           Assessment:       No diagnosis found.    Outpatient Encounter Prescriptions as of 10/19/2017   Medication Sig Dispense Refill    albuterol (PROAIR HFA) 90 mcg/actuation inhaler INHALE 2 PUFFS BY MOUTH INTO THE LUNGS FOUR TIMES DAILY prn 25.5 g 12    albuterol-ipratropium 2.5mg-0.5mg/3mL (DUO-NEB) 0.5 mg-3 mg(2.5 mg base)/3 mL nebulizer solution Take 3 mLs by nebulization every 6 (six) hours as needed for Wheezing or Shortness of Breath. 270 vial 12    azithromycin (ZITHROMAX Z-CHRISTOPHER) 250 MG tablet 2 tablets now, then 1 daily till finished 6 tablet 1    ergocalciferol (ERGOCALCIFEROL) 50,000 unit Cap TAKE 1 CAPSULE BY MOUTH EVERY 7  DAYS 12 capsule 0    ergocalciferol (ERGOCALCIFEROL) 50,000 unit Cap TAKE 1 CAPSULE BY MOUTH EVERY 7 DAYS 12 capsule 0    fluticasone-salmeterol 250-50 mcg/dose (ADVAIR DISKUS) 250-50 mcg/dose diskus inhaler INHALE 1 PUFF BY MOUTH TWICE DAILY( EVERY 12 HOURS) 1 each 12    hydrochlorothiazide (HYDRODIURIL) 25 MG tablet Take 1 tablet (25 mg total) by mouth every morning. 30 tablet 3    levalbuterol (XOPENEX) 1.25 mg/0.5 mL nebulizer solution Qid prn for copd 120 each 11    loperamide (IMODIUM) 2 mg capsule Take 2 mg by mouth 4 (four) times daily as needed for Diarrhea.      psyllium 0.52 gram capsule Take 2 capsules by mouth once daily.       No facility-administered encounter medications on file as of 10/19/2017.      No orders of the defined types were placed in this encounter.    Plan:          Compensated respiratory failure secondary to emphysema,

## 2017-10-20 DIAGNOSIS — J44.9 CHRONIC OBSTRUCTIVE PULMONARY DISEASE, UNSPECIFIED COPD TYPE: Primary | ICD-10-CM

## 2017-10-31 ENCOUNTER — PATIENT OUTREACH (OUTPATIENT)
Dept: OTHER | Facility: OTHER | Age: 62
End: 2017-10-31

## 2017-10-31 NOTE — PROGRESS NOTES
Last 5 Patient Entered Readings Current 30 Day Average: 120/81     Recent Readings 10/24/2017 10/22/2017 10/15/2017 10/9/2017 10/6/2017    Systolic BP (mmHg) 115 119 111 126 124    Diastolic BP (mmHg) 84 74 78 87 81    Pulse 99 90 89 81 86        Hypertension Digital Medicine Program (HDMP): Health  Follow Up    Lifestyle Modifications:    1.Low sodium diet: Deferred    2.Physical activity: Deferred    3.Hypotension/Hypertension symptoms: no   Frequency/Alleviating factors/Precipitating factors, etc.     4.Patient has been compliant with the medication regimen.     Follow up with Ms. Ana Aguila completed. Ms. Aguila is doing well. Patient states that she is in the middle of a meeting and will call back. No further questions or concerns.

## 2017-11-29 ENCOUNTER — PATIENT OUTREACH (OUTPATIENT)
Dept: OTHER | Facility: OTHER | Age: 62
End: 2017-11-29

## 2017-11-29 NOTE — PROGRESS NOTES
Last 5 Patient Entered Readings Current 30 Day Average: 125/81     Recent Readings 11/25/2017 11/24/2017 11/23/2017 11/22/2017 11/21/2017    Systolic BP (mmHg) 139 121 116 123 135    Diastolic BP (mmHg) 86 82 78 72 84    Pulse 79 75 90 84 86        Hypertension Digital Medicine Program (HDMP): Health  Follow Up    Lifestyle Modifications:    1.Low sodium diet: yes Patient denies any changes to her diet.    2.Physical activity: yes Ms. Aguila has been participating in pulmonary rehab. She plans to call her insurance company to see if they will help her with a membership to [facility].    3.Hypotension/Hypertension symptoms: no   Frequency/Alleviating factors/Precipitating factors, etc.     4.Patient has been compliant with the medication regimen.     Follow up with Ms. Ana Aguila completed. Ms. Aguila is doing well. Patient is aware of the new guidelines for HTN (<130/80 mmHg). No further questions or concerns. I will follow up in a few weeks to assess progress.

## 2017-12-09 DIAGNOSIS — I10 BENIGN ESSENTIAL HTN: ICD-10-CM

## 2017-12-11 ENCOUNTER — PATIENT MESSAGE (OUTPATIENT)
Dept: INTERNAL MEDICINE | Facility: CLINIC | Age: 62
End: 2017-12-11

## 2017-12-11 RX ORDER — HYDROCHLOROTHIAZIDE 25 MG/1
TABLET ORAL
Qty: 90 TABLET | Refills: 1 | Status: SHIPPED | OUTPATIENT
Start: 2017-12-11 | End: 2018-09-27 | Stop reason: SDUPTHER

## 2017-12-13 ENCOUNTER — PATIENT MESSAGE (OUTPATIENT)
Dept: INTERNAL MEDICINE | Facility: CLINIC | Age: 62
End: 2017-12-13

## 2017-12-13 ENCOUNTER — PATIENT OUTREACH (OUTPATIENT)
Dept: OTHER | Facility: OTHER | Age: 62
End: 2017-12-13

## 2017-12-13 NOTE — PROGRESS NOTES
Last 5 Patient Entered Readings Current 30 Day Average: 122/81     Recent Readings 12/13/2017 12/12/2017 12/9/2017 12/7/2017 12/5/2017    Systolic BP (mmHg) 130 127 114 119 111    Diastolic BP (mmHg) 79 82 81 86 82    Pulse 78 76 98 85 82        Patient's BP average is controlled based on 2017 ACC/AHA HTN guidelines of goal BP <130/80.      Ms. Aguila is doing well. She is aware of the updated goal and is pleased to know her BP is within goal range.     Patient's health , Lilly Dumont, will be following up every 3-4 weeks. I will continue to monitor regularly and will follow up in 3-4 months, sooner if BP begins to trend upward or downward.    Patient has my contact information and knows to call with any concerns or clinical changes.     Current HTN regimen:  Hypertension Medications             hydroCHLOROthiazide (HYDRODIURIL) 25 MG tablet TAKE 1 TABLET(25 MG) BY MOUTH EVERY MORNING

## 2017-12-19 ENCOUNTER — OFFICE VISIT (OUTPATIENT)
Dept: INTERNAL MEDICINE | Facility: CLINIC | Age: 62
End: 2017-12-19
Payer: COMMERCIAL

## 2017-12-19 ENCOUNTER — HOSPITAL ENCOUNTER (OUTPATIENT)
Dept: RADIOLOGY | Facility: HOSPITAL | Age: 62
Discharge: HOME | End: 2017-12-19
Attending: INTERNAL MEDICINE
Payer: COMMERCIAL

## 2017-12-19 VITALS
BODY MASS INDEX: 28.98 KG/M2 | WEIGHT: 173.94 LBS | DIASTOLIC BLOOD PRESSURE: 82 MMHG | SYSTOLIC BLOOD PRESSURE: 122 MMHG | HEART RATE: 80 BPM | HEIGHT: 65 IN

## 2017-12-19 DIAGNOSIS — M54.6 ACUTE MIDLINE THORACIC BACK PAIN: Primary | ICD-10-CM

## 2017-12-19 DIAGNOSIS — M54.6 ACUTE MIDLINE THORACIC BACK PAIN: ICD-10-CM

## 2017-12-19 PROCEDURE — 99999 PR PBB SHADOW E&M-EST. PATIENT-LVL III: CPT | Mod: PBBFAC,,, | Performed by: INTERNAL MEDICINE

## 2017-12-19 PROCEDURE — 72080 X-RAY EXAM THORACOLMB 2/> VW: CPT | Mod: 26,,, | Performed by: RADIOLOGY

## 2017-12-19 PROCEDURE — 99214 OFFICE O/P EST MOD 30 MIN: CPT | Mod: S$GLB,,, | Performed by: INTERNAL MEDICINE

## 2017-12-19 PROCEDURE — 72080 X-RAY EXAM THORACOLMB 2/> VW: CPT | Mod: TC

## 2017-12-19 NOTE — PROGRESS NOTES
Subjective:       Patient ID: Ana Aguila is a 62 y.o. female.    Chief Complaint: Back Pain    HPI  61 y/o woman HTN, HLD, severe COPD with home O2, h/o hemicolectomy 2009 due to colonoscopy complication, chronic RUQ discomfort, NAFLD here for urgent visit for back pain.    Back pain - pain in mid-back for about 3 weeks, started on left lower back. Came on gradually over about a day, noted it more over the first week. Worse with particular movements - bending/twisting while reaching or lifting especially, sometimes also just with walking or bending over, also noted around mid-back, sometimes also having shoulder/neck soreness. No radiating pain. No difficulty walking or change in bowel/bladder function. No dysuria.  Has gone back to pulmonary rehab about 3-4 weeks ago, has been doing walking on treadmill, hand cycle, resistance band exercises with arms/upper body, lifting small weights.   Did have a cough for a little while with some change in sputum.  Hasn't taken any OTC medications for this. Used heating pad but didn't feel that this helped.    Drinks 1-2 bottles of water in the morning, not always good about keeping hydrated during the day.     Review of Systems   Constitutional: Negative for activity change and fever.   HENT: Negative for congestion and sore throat.    Eyes: Negative for visual disturbance.   Respiratory: Positive for shortness of breath (at/near baseline; exercise tolerance improved). Negative for cough and wheezing.    Cardiovascular: Negative for chest pain, palpitations and leg swelling.   Gastrointestinal: Positive for abdominal pain (chronic RUQ; improved from prior). Negative for blood in stool, diarrhea, nausea and rectal pain.   Genitourinary: Negative.    Musculoskeletal: Positive for back pain. Negative for arthralgias, gait problem and myalgias.   Skin: Negative.    Neurological: Negative for syncope, weakness and light-headedness.   Psychiatric/Behavioral: Negative for  "dysphoric mood. The patient is nervous/anxious (improved).          Past medical history, surgical history, and family medical history reviewed and updated as appropriate.    Medications and allergies reviewed.     Objective:          Vitals:    12/19/17 1629   BP: 122/82   BP Location: Left arm   Patient Position: Sitting   BP Method: Medium (Manual)   Pulse: 80   Weight: 78.9 kg (173 lb 15.1 oz)   Height: 5' 5" (1.651 m)     Body mass index is 28.95 kg/m².  Physical Exam   Constitutional: She is oriented to person, place, and time. She appears well-developed and well-nourished. No distress.   HENT:   Head: Normocephalic and atraumatic.   Mouth/Throat: Oropharynx is clear and moist.   Eyes: Conjunctivae and EOM are normal. Pupils are equal, round, and reactive to light.   Neck: Normal range of motion. Neck supple.   Cardiovascular: Normal rate, regular rhythm and normal heart sounds.    No murmur heard.  Varicose veins b/l LE   Pulmonary/Chest: Effort normal and breath sounds normal. No respiratory distress. She has no wheezes. She has no rales.   Abdominal: Soft. Bowel sounds are normal. She exhibits no distension. There is no tenderness. There is no rebound, no guarding, no CVA tenderness and negative Neves's sign.   Well-healed surgical scars   Musculoskeletal: Normal range of motion. She exhibits tenderness. She exhibits no edema.   Normal ROM of back. +tenderness in paraspinal muscles of mid-back, R > L but also some midline tenderness.    Lymphadenopathy:     She has no cervical adenopathy.   Neurological: She is alert and oriented to person, place, and time. She has normal strength. No cranial nerve deficit or sensory deficit. Gait normal.   Skin: Skin is warm and dry.   Psychiatric: She has a normal mood and affect.   Vitals reviewed.      Lab Results   Component Value Date    WBC 8.30 09/07/2017    HGB 13.6 09/07/2017    HCT 42.3 09/07/2017     09/07/2017    CHOL 230 (H) 09/07/2017    TRIG 63 " 09/07/2017    HDL 72 09/07/2017    ALT 14 06/08/2017    AST 17 06/08/2017     09/07/2017    K 3.4 (L) 09/07/2017     09/07/2017    CREATININE 0.8 09/07/2017    BUN 14 09/07/2017    CO2 32 (H) 09/07/2017    TSH 2.072 01/12/2017    INR 1.2 10/18/2016    HGBA1C 5.4 10/18/2016       Assessment:       1. Acute midline thoracic back pain        Plan:   Ana was seen today for back pain.    Diagnoses and all orders for this visit:    Acute midline thoracic back pain  -     X-Ray Thoracolumbar Spine AP Lateral; Future    Given persistent pain with some midline tenderness will check XR to evaluate and r/o compression fracture or other pathology.  Counseled re: home / conservative care for muscle spasm / back pain.   Discussed using muscle relaxant; she declines.  Can use tylenol prn, +heat or cold, +topical OTC medications.    Health maintenance deferred to next visit    Return in about 3 weeks (around 1/11/2018) for as scheduled, or earlier if needed.    Erik Cool MD  Internal Medicine  [facility]  12/19/2017

## 2017-12-19 NOTE — PATIENT INSTRUCTIONS
Back Spasm (No Trauma)    Spasm of the back muscles can occur after a sudden forceful twisting or bending force (such as in a car accident), after a simple awkward movement, or after lifting something heavy with poor body positioning. In any case, muscle spasm adds to the pain. Sleeping in an awkward position or on a poor quality mattress can also cause this. Some people respond to emotional stress by tensing the muscles of their back.  Pain that continues may need further evaluation or other types of treatment such as physical therapy.  You don't always need X-rays for the initial evaluation of back pain, unless you had a physical injury such as from a car accident or fall. If your pain continues and doesn't respond to medical treatment, X-rays and other tests may then be done.   Home care  · As soon as possible, start sitting or walking again to avoid problems from prolonged bed rest (muscle weakness, worsening back stiffness and pain, blood clots in the legs).  · When in bed, try to find a position of comfort. A firm mattress is best. Try lying flat on your back with pillows under your knees. You can also try lying on your side with your knees bent up toward your chest and a pillow between your knees.  · Avoid prolonged sitting, long car rides, or travel. This puts more stress on the lower back than standing or walking.   · During the first 24 to 72 hours after an injury or flare-up, apply an ice pack to the painful area for 20 minutes, then remove it for 20 minutes. Do this over a period of 60 to 90 minutes or several times a day. This will reduce swelling and pain. Always wrap ice packs in a thin towel.  · You can start with ice, then switch to heat. Heat (hot shower, hot bath, or heating pad) reduces pain, and works well for muscle spasms. Apply heat to the painful area for 20 minutes, then remove it for 20 minutes. Do this over a period of 60 to 90 minutes or several times a day. Do not sleep on a heating  pad as it can burn or damage skin.  · Alternate ice and heat therapies.  · Be aware of safe lifting methods and do not lift anything over 15 pounds until all the pain is gone.  Gentle stretching will help your back heal faster. Do this simple routine 2 to 3 times a day until your back is feeling better.  · Lie on your back with your knees bent and both feet on the ground  · Slowly raise your left knee to your chest as you flatten your lower back against the floor. Hold for 20 to 30 seconds.  · Relax and repeat the exercise with your right knee.  · Do 2 to 3 of these exercises for each leg.  · Repeat, hugging both knees to your chest at the same time.  · Do not bounce, but use a gentle pull.  Medicines  Talk to your doctor before using medicine, especially if you have other medical problems or are taking other medicines.  You may use acetaminophen or ibuprofen to control pain, unless your healthcare provider prescribed another pain medicine. If you have a chronic condition such as diabetes, liver or kidney disease, stomach ulcer, or gastrointestinal bleeding, or are taking blood thinners, talk with your healthcare provider before taking any medicines.  Be careful if you are given prescription pain medicine, narcotics, or medicine for muscle spasm. They can cause drowsiness, affect your coordination, reflexes, or judgment. Do not drive or operate heavy machinery when taking these medicines. Take pain medicine only as prescribed by your healthcare provider.  Follow-up care  Follow up with your doctor, or as advised. Physical therapy or further tests may be needed.  If X-rays were taken, they may be reviewed by a radiologist. You will be notified of any new findings that may affect your care.  Call 911  Seek emergency medical care if any of these occur:  · Trouble breathing  · Confusion  · Drowsiness or trouble awakening  · Fainting or loss of consciousness  · Rapid or very slow heart rate  · Loss of bowel or bladder  control  When to seek medical advice  Call your healthcare provider right away if any of these occur:  · Pain becomes worse or spreads to your legs  · Weakness or numbness in one or both legs  · Numbness in the groin or genital area  · Unexplained fever over 100.4ºF (38.0ºC)  · Burning or pain when passing urine  Date Last Reviewed: 6/1/2016  © 3561-3116 [employer]. [address] [place] [address], [place] [id number]. All rights reserved. This information is not intended as a substitute for professional medical care. Always follow your healthcare professional's instructions.

## 2017-12-21 ENCOUNTER — PATIENT MESSAGE (OUTPATIENT)
Dept: INTERNAL MEDICINE | Facility: CLINIC | Age: 62
End: 2017-12-21

## 2018-01-04 RX ORDER — ERGOCALCIFEROL 1.25 MG/1
CAPSULE ORAL
Qty: 12 CAPSULE | Refills: 0 | Status: SHIPPED | OUTPATIENT
Start: 2018-01-04 | End: 2018-01-11 | Stop reason: SDUPTHER

## 2018-01-11 ENCOUNTER — PATIENT OUTREACH (OUTPATIENT)
Dept: OTHER | Facility: OTHER | Age: 63
End: 2018-01-11

## 2018-01-11 ENCOUNTER — OFFICE VISIT (OUTPATIENT)
Dept: INTERNAL MEDICINE | Facility: CLINIC | Age: 63
End: 2018-01-11
Payer: COMMERCIAL

## 2018-01-11 VITALS
WEIGHT: 174.19 LBS | HEART RATE: 64 BPM | BODY MASS INDEX: 29.02 KG/M2 | HEIGHT: 65 IN | DIASTOLIC BLOOD PRESSURE: 78 MMHG | SYSTOLIC BLOOD PRESSURE: 136 MMHG

## 2018-01-11 DIAGNOSIS — M54.9 BILATERAL BACK PAIN, UNSPECIFIED BACK LOCATION, UNSPECIFIED CHRONICITY: Primary | ICD-10-CM

## 2018-01-11 DIAGNOSIS — J44.9 CHRONIC OBSTRUCTIVE PULMONARY DISEASE, UNSPECIFIED COPD TYPE: ICD-10-CM

## 2018-01-11 DIAGNOSIS — N63.10 BREAST MASS, RIGHT: ICD-10-CM

## 2018-01-11 DIAGNOSIS — J96.11 CHRONIC RESPIRATORY FAILURE WITH HYPOXIA, ON HOME O2 THERAPY: ICD-10-CM

## 2018-01-11 DIAGNOSIS — F41.9 ANXIETY: ICD-10-CM

## 2018-01-11 DIAGNOSIS — I10 ESSENTIAL HYPERTENSION: ICD-10-CM

## 2018-01-11 DIAGNOSIS — I70.0 AORTIC CALCIFICATION: ICD-10-CM

## 2018-01-11 DIAGNOSIS — Z99.81 CHRONIC RESPIRATORY FAILURE WITH HYPOXIA, ON HOME O2 THERAPY: ICD-10-CM

## 2018-01-11 DIAGNOSIS — N64.4 BREAST PAIN IN FEMALE: ICD-10-CM

## 2018-01-11 PROCEDURE — 99999 PR PBB SHADOW E&M-EST. PATIENT-LVL IV: CPT | Mod: PBBFAC,,, | Performed by: INTERNAL MEDICINE

## 2018-01-11 PROCEDURE — 99214 OFFICE O/P EST MOD 30 MIN: CPT | Mod: S$GLB,,, | Performed by: INTERNAL MEDICINE

## 2018-01-11 NOTE — PROGRESS NOTES
"Subjective:       Patient ID: Ana Aguila is a 62 y.o. female.    Chief Complaint: Follow-up    HPI  63 y/o woman with HTN, HLD, severe COPD with home O2, h/o hemicolectomy 2009 due to colonoscopy complication, chronic RUQ discomfort, NAFLD here for follow up.    Back pain - pain x 6 weeks, reports about the same as at previous visit. Did get xray after last visit for some midline tenderness; imaging was overall normal. No pain with sitting still, but pain returns with any movement.   Pain doesn't keep her from doing any of her activities or movements - able to bend and stretch. Describes as bothersome rather than severe; rates at 5/10.  Has used heat and ice which helped while they were applied but pain returned afterward.   Not taking any OTC medications regularly. Took 1/2 dose of tylenol once which she says helped her to sleep.  On further discussion, she is anxious that her back pain may be caused by "fluid" or "a cyst". Has not noted any masses or swelling on back, no abdominal distention, no swelling of LE or hands.    COPD on home O2 - stable from prior, thinks may have some improvement in exercise tolerance. Has been doing pulmonary rehab. Able to walk 26 minutes on treadmill.   Saw Dr Dill for semi-annual exam 10/19/17, noted as COPD with compensated respiratory failure.   Taking medications as prescribed.    HTN - following with digital HTN program. Taking HCTZ.     Anxiety - hasn't called to set up appt with counselor yet but says she is willing to do this.     Concerned about aortoiliac calcifications noted on xray. On discussion, she is willing to restart taking ASA, not willing to start statin (was prescribed previously, today says "I never took that.")     H/o Takotsubo cardiomyopathy - LV function resolved on repeat echo 12/2016. EKG 1/2017 also normal.    RUQ abdominal pain - this has resolved. Saw GI for this 9/2017, some concern noted for duodenitis but patient declined EGD. Did have FOBT x " "3 which was negative.    Saw Hepatology 2/2017 for hepatomegaly attributed to fatty liver, workup for other causes was negative; no further follow up was recommended after that visit. Mild hepatomegaly with no masses noted on CT 2/2017 as well.    H/o vitamin D deficiency in the past, normal on last check 1/2017.    Also reports bilateral breast pain for past 1-2 weeks, feels that there is a mass in R upper breast, as well as concern re: "little lumps" under the skin. Has had difficulty wearing a bra. Hasn't been wearing tight clothing. No new supplements or medications.     Review of Systems   Constitutional: Negative for activity change and fever.   HENT: Negative for congestion and sore throat.    Eyes: Negative for visual disturbance.   Respiratory: Positive for shortness of breath (at baseline; exercise tolerance improved ). Negative for cough and wheezing.    Cardiovascular: Negative for chest pain, palpitations and leg swelling.   Gastrointestinal: Negative for abdominal pain (improved from prior ), blood in stool, diarrhea, nausea and rectal pain.   Genitourinary: Negative.    Musculoskeletal: Positive for back pain. Negative for arthralgias, gait problem and myalgias.   Skin: Negative.    Neurological: Negative for syncope, weakness and light-headedness.   Psychiatric/Behavioral: Negative for dysphoric mood. The patient is nervous/anxious (improved).          Past medical history, surgical history, and family medical history reviewed and updated as appropriate.    Medications and allergies reviewed.     Objective:          Vitals:    01/11/18 0926   BP: 136/78   BP Location: Left arm   Patient Position: Sitting   BP Method: Large (Manual)   Pulse: 64   Weight: 79 kg (174 lb 2.6 oz)   Height: 5' 5" (1.651 m)     Body mass index is 28.98 kg/m².  Physical Exam   Constitutional: She is oriented to person, place, and time. She appears well-developed and well-nourished. No distress.   HENT:   Head: Normocephalic " and atraumatic.   Mouth/Throat: Oropharynx is clear and moist.   Eyes: Conjunctivae and EOM are normal.   Neck: Normal range of motion. Neck supple.   Cardiovascular: Normal rate, regular rhythm and normal heart sounds.    No murmur heard.  Varicose veins b/l LE   Pulmonary/Chest: Effort normal and breath sounds normal. No respiratory distress. She has no wheezes. She has no rales. She exhibits tenderness. Right breast exhibits tenderness. Right breast exhibits no inverted nipple, no nipple discharge and no skin change. Left breast exhibits tenderness. Left breast exhibits no inverted nipple, no nipple discharge and no skin change. Breasts are symmetrical.       Abdominal: Soft. Bowel sounds are normal. She exhibits no distension. There is no tenderness. There is no CVA tenderness and negative Neves's sign.   Well-healed surgical scars   Musculoskeletal: Normal range of motion. She exhibits tenderness. She exhibits no edema.   Normal ROM of back. +tenderness in paraspinal muscles of mid-back, R > L but also some mild midline tenderness. Neg straight leg raise.   Lymphadenopathy:     She has no cervical adenopathy.   Neurological: She is alert and oriented to person, place, and time. She has normal strength. No cranial nerve deficit or sensory deficit. Gait normal.   Skin: Skin is warm and dry.   Psychiatric: She has a normal mood and affect.   Vitals reviewed.      Lab Results   Component Value Date    WBC 8.30 09/07/2017    HGB 13.6 09/07/2017    HCT 42.3 09/07/2017     09/07/2017    CHOL 230 (H) 09/07/2017    TRIG 63 09/07/2017    HDL 72 09/07/2017    ALT 14 06/08/2017    AST 17 06/08/2017     09/07/2017    K 3.4 (L) 09/07/2017     09/07/2017    CREATININE 0.8 09/07/2017    BUN 14 09/07/2017    CO2 32 (H) 09/07/2017    TSH 2.072 01/12/2017    INR 1.2 10/18/2016    HGBA1C 5.4 10/18/2016       Assessment:       1. Bilateral back pain, unspecified back location, unspecified chronicity    2. Breast  pain in female    3. Breast mass, right    4. Chronic obstructive pulmonary disease, unspecified COPD type    5. Chronic respiratory failure with hypoxia, on home O2 therapy    6. Essential hypertension    7. Anxiety    8. Aortic calcification        Plan:   Ana was seen today for follow-up.    Diagnoses and all orders for this visit:    Bilateral back pain, unspecified back location, unspecified chronicity  Comments:  has tried heat/ice but no other conservative home treatments consistently. Recommended take OTC tylenol BID x 4-5 days, can also try OTC topical pain medication. Referring to PT as well.   Xray overall normal. Does have mild midline tenderness but no other red flags, and symptoms are stable.   She will contact clinic after 2-3 weeks of PT; if having pain that is the same or worse, will refer to PMR for evaluation  Orders:  -     Ambulatory Referral to Physical/Occupational Therapy    Breast pain in female  -     Mammo Digital Diagnostic Bilateral With CAD; Future  Breast mass, right - exam overall not suspicious but given new symptoms and FHx breast cancer in daughter will send for mammogram  -     Mammo Digital Diagnostic Bilateral With CAD; Future    Chronic obstructive pulmonary disease, unspecified COPD type  Chronic respiratory failure with hypoxia, on home O2 therapy - stable, continue pulmonary rehab, continue home O2 & current meds    Essential hypertension  Comments:  stable, continue current medication    Anxiety  Comments:  again encouraged her to establish with a counselor; clinic # given again    Aortic calcification  Comments:  reviewed role of ASA, statin in reducing predicted ASCVD risk. She is willing to restart ASA; not willing to start statin at this time. She will consider. Encouraged high-fiber diet, take omega-3FA supplement    Health maintenance reviewed with patient.   She declines flu shot today, will consider coming back for RN visit in the future for this.    Follow-up  in about 6 months (around 7/11/2018) for annual physical.    Erik Cool MD  Internal Medicine  [facility]  1/11/2018

## 2018-01-11 NOTE — PROGRESS NOTES
Last 5 Patient Entered Readings                                      Current 30 Day Average: 119/78     Recent Readings 1/25/2018 1/23/2018 1/22/2018 1/21/2018 1/20/2018    SBP (mmHg) 124 127 116 112 125    DBP (mmHg) 76 79 75 75 72    Pulse 77 76 78 88 87        Hypertension Digital Medicine Program (HDMP): Health  Follow Up    Lifestyle Modifications:    1.Low sodium diet: yes Patient denies any changes to her diet.    2.Physical activity: no Ms. Aguila started physical therapy on Monday (1/22/18) for back pain.      3.Hypotension/Hypertension symptoms: no   Frequency/Alleviating factors/Precipitating factors, etc.     4.Patient has been compliant with the medication regimen.     Follow up with Ms. Ana Aguila completed. Ms. Aguila is doing well. No further questions or concerns. I will follow up in a few weeks to assess progress.

## 2018-01-11 NOTE — MEDICAL/APP STUDENT
"Subjective:       Patient ID: Ana Aguila is a 62 y.o. female with severe COPD with home O2, HTN, HLD.    Chief Complaint: Follow-up    HPI Patient complains of back pain that was also present on her last visit. Pain is 5/10 sharp stabbing back pain, not present at rest. Pt has tried heat pads and ice packs and has also stopped certain exercises in pulmonary rehab but has not noticed a change in her pain.  She declines taking pain medication as she wishes to find the source of her pain and not cover it up.     Pt also complains of bilateral breast tenderness for the past week as well as a new "knot" in her right breast. She has not noticed any changes in the size since she first noticed it. Denies any discharge from the nipples.      Review of Systems   Constitutional: Negative for activity change, appetite change, chills, diaphoresis, fatigue, fever and unexpected weight change.   HENT: Negative for congestion, rhinorrhea, sinus pressure, sore throat and trouble swallowing.    Eyes: Negative.    Respiratory: Negative for cough, choking, chest tightness, shortness of breath, wheezing and stridor.    Cardiovascular: Negative for chest pain, palpitations and leg swelling.   Gastrointestinal: Negative.    Endocrine: Negative.    Genitourinary: Negative.    Musculoskeletal: Positive for back pain.       Objective:      Physical Exam   Constitutional: She is oriented to person, place, and time. She appears well-developed and well-nourished.   Cardiovascular: Normal rate, regular rhythm and normal heart sounds.  Exam reveals no friction rub.    No murmur heard.  Pulmonary/Chest: No respiratory distress. She has no wheezes. She has no rales.   Abdominal: Soft. Bowel sounds are normal. She exhibits no distension and no mass. There is no tenderness. There is no guarding.   Neurological: She is alert and oriented to person, place, and time.       Assessment:       1. Bilateral back pain, unspecified back location, " unspecified chronicity    2. Breast pain in female    3. Breast mass, right        Plan:      Back pain  -dicussed pt concerns and where she thinks the pain might be coming from  -results of her back XR were discussed with her   -recommended taking a 4-5 day course tylenol 2x day for the pain and seeing how it affects the pain  -referral to PM&R clinic  -follow up     COPD  -pt has no new complaints related to her COPD  -states her exercise tolerance has improved  -denies new onset of wheeze, cough, SOB, the need for increased home O2  -states that she has been compliant with her medications    HTN  -controlled today   -refused statin as she feel overwhelmed with the amount of medication she takes and was concerned over side effects from the statin although she could not remember which one specifically  -statin side effects were discussed but pt prefers not to take it

## 2018-01-11 NOTE — LETTER
January 11, 2018      [facility] - Internal Medicine  [address]  [facility] [address]  Phone: [phone]  Fax: [phone]       Patient: Ana Aguila   YOB: 1955  Date of Visit: 01/11/2018    To Whom It May Concern:    Zan Aguila  was at [facility] on 01/11/2018. She may return to work/school on 1/11/18. If you have any questions or concerns, or if I can be of further assistance, please do not hesitate to contact me.    Sincerely,        Erik Cool MD

## 2018-01-16 ENCOUNTER — HOSPITAL ENCOUNTER (OUTPATIENT)
Dept: RADIOLOGY | Facility: HOSPITAL | Age: 63
Discharge: HOME | End: 2018-01-16
Attending: INTERNAL MEDICINE
Payer: COMMERCIAL

## 2018-01-16 VITALS — BODY MASS INDEX: 28.99 KG/M2 | WEIGHT: 174 LBS | HEIGHT: 65 IN

## 2018-01-16 DIAGNOSIS — N63.10 BREAST MASS, RIGHT: ICD-10-CM

## 2018-01-16 DIAGNOSIS — N64.4 BREAST PAIN IN FEMALE: ICD-10-CM

## 2018-01-16 PROCEDURE — 77065 DX MAMMO INCL CAD UNI: CPT | Mod: 26,,, | Performed by: RADIOLOGY

## 2018-01-16 PROCEDURE — 77061 BREAST TOMOSYNTHESIS UNI: CPT | Mod: TC,PO

## 2018-01-16 PROCEDURE — 77065 DX MAMMO INCL CAD UNI: CPT | Mod: TC,PO

## 2018-01-16 PROCEDURE — 77061 BREAST TOMOSYNTHESIS UNI: CPT | Mod: 26,,, | Performed by: RADIOLOGY

## 2018-01-22 ENCOUNTER — CLINICAL SUPPORT (OUTPATIENT)
Dept: REHABILITATION | Facility: HOSPITAL | Age: 63
End: 2018-01-22
Attending: INTERNAL MEDICINE
Payer: COMMERCIAL

## 2018-01-22 DIAGNOSIS — M53.86 DECREASED ROM OF LUMBAR SPINE: ICD-10-CM

## 2018-01-22 DIAGNOSIS — M54.50 CHRONIC MIDLINE LOW BACK PAIN WITHOUT SCIATICA: ICD-10-CM

## 2018-01-22 DIAGNOSIS — G89.29 CHRONIC MIDLINE LOW BACK PAIN WITHOUT SCIATICA: ICD-10-CM

## 2018-01-22 PROCEDURE — 97161 PT EVAL LOW COMPLEX 20 MIN: CPT

## 2018-01-22 PROCEDURE — 97110 THERAPEUTIC EXERCISES: CPT

## 2018-01-22 NOTE — PLAN OF CARE
Physical Therapy Initial Evaluation     Name: Ana Aguila  [facility] Number: [id number]    Diagnosis:   Encounter Diagnoses   Name Primary?    Chronic midline low back pain without sciatica     Decreased ROM of lumbar spine      Physician: Erik Cool MD  Treatment Orders: PT Eval and Treat  Past Medical History:   Diagnosis Date    Asthma     Blood transfusion     Chronic diarrhea     COPD (chronic obstructive pulmonary disease)     Hyperlipidemia     Hypertension     Reflux 2013     Current Outpatient Prescriptions   Medication Sig    albuterol (PROAIR HFA) 90 mcg/actuation inhaler INHALE 2 PUFFS BY MOUTH INTO THE LUNGS FOUR TIMES DAILY prn    albuterol-ipratropium 2.5mg-0.5mg/3mL (DUO-NEB) 0.5 mg-3 mg(2.5 mg base)/3 mL nebulizer solution Take 3 mLs by nebulization every 6 (six) hours as needed for Wheezing or Shortness of Breath.    azithromycin (ZITHROMAX Z-CHRISTOPHER) 250 MG tablet 2 tablets now, then 1 daily till finished    ergocalciferol (ERGOCALCIFEROL) 50,000 unit Cap TAKE 1 CAPSULE BY MOUTH EVERY 7 DAYS    fluticasone-salmeterol 250-50 mcg/dose (ADVAIR DISKUS) 250-50 mcg/dose diskus inhaler INHALE 1 PUFF BY MOUTH TWICE DAILY( EVERY 12 HOURS)    hydroCHLOROthiazide (HYDRODIURIL) 25 MG tablet TAKE 1 TABLET(25 MG) BY MOUTH EVERY MORNING    levalbuterol (XOPENEX) 1.25 mg/0.5 mL nebulizer solution Qid prn for copd    loperamide (IMODIUM) 2 mg capsule Take 2 mg by mouth 4 (four) times daily as needed for Diarrhea.    psyllium 0.52 gram capsule Take 2 capsules by mouth once daily.     No current facility-administered medications for this visit.      Review of patient's allergies indicates:   Allergen Reactions    Sudafed [pseudoephedrine hcl] Nausea And Vomiting and Other (See Comments)     HEATHER       Evaluation Date: 01/22/2018  Visit # authorized: 20  Authorization period: 12/31/2018  Plan of care Expiration: 03/05/2018  MD referral:  Erik Cool MD  Precautions: O2 @ 2L as needed    Subjective     Patient states:  Pain started about 6-7 weeks ago, central low back with tenderness at bilateral hips. Pt states when she moves a certain way and bends, she notices increased pain. Pt unable to specify exact position that causes pain, instantaneous pain, non-consistent. Pt notices pain when lying on stomach. Pt may have pain when lifting oxygen bag. Pt uses oxygen 2L with prolonged walking, stairs and taxing activities. Pt with history of L broken tib/fib with surgery in 1999. Pt with COPD, noted labored breathing with activity.   Imaging: X-ray thoracolumbar   No significant abnormality.  No evidence of compression fracture.  Pain Scale: Ana rates pain on a scale of 0-10 to be 6 at worst; 1 currently; 1 at best .  Onset: insidious  Radicular symptoms:  B gluts  Aggravating factors: Bending, changing positions   Easing factors:  Heating pad - temporarily,   Prior Therapy: No previous therapy for current condition.   Home Environment (Steps/Adaptations): None  Functional Deficits Leading to Referral: Decreased walking tolerance, bending tolerance, lifting.   Prior functional status: Independent  DME owned/used: None  Occupation:  - desk work, occasional visits to site                      Pts goals:  Return to PLOF, improve activity level     Objective     Posture Alignment: Sitting posture fair, slouched forward, shoulders IR.  Standing posture fair, increased L/S lordosis, increased kyphosis.     LUMBAR SPINE AROM:   Flexion: 25% diop   Extension: 25% diop   Left Sidebend: 50% diop   Right Sidebend: 50% diop   Left Rotation: 25% diop   Right Rotation: 25% diop     REPEATED MOTIONS:  Flexion in standing  Increased *   Extension in standing Increased *   Flexion in lying Improved   Extension in lying Increased *   LTR Improved       LOWER EXTREMITY STRENGTH:   Left Right   Quadriceps 5/5 5/5   Hamstrings 5/5 5/5     Iliopsoas 4+/5  "4+/5   PGM 4/5 4/5   Hip IR 5/5 5/5   Hip ER 5/5 5/5   Hip Ext 4/5 4/5       DTR's:   Left Right   Patella Tendon 2+ 2+   Achilles Tendon 2+ 2+     Dermatomes: Sensation: Light Touch: Intact t/o.   Myotomes: WNL t/o  Flexibility: Mild restrictions B hamstrings, moderate restriction B paraspinals  Joint Mobility: Hypomobile T4-T12/L1    Special Tests:   Left Right   Slump Negative Negative   SLR Negative Negative   Hip scour Negative Negative   JOSE Negative Negative   Leg length Equal Equal     GAIT: Ana ambulates independently    GAIT DEVIATIONS: Ana displays normal gait pattern, increased lumbar lordosis.     Pt/family was provided educational information, including: role of PT, goals for PT, scheduling, HEP - pt verbalized understanding. Discussed insurance limitations with pt.     TREATMENT     Time In: 2:10 p  Time Out: 2:55 p    PT Evaluation Completed? Yes  Discussed Plan of Care with patient: Yes    Ana received 15 minutes of therapeutic exercise & instruction including:  SKTC 10x5" each  LTR 3x10   Scapular retractions 3x10  PPT 3x10   Open Books 10x5"     HEP instruction and performance by patient. HEP listed below.     Ana received 5 minutes of manual therapy including:  STM L paraspinals    Written Home Exercises Provided: yes    SKTC 10x5", 3x/day  LTR 3x10 3x/day  Scapular retractions 3x10. 3x/day  PPT 3x10, 3x/day  Open Books 10x5" 2-3x/day    Ana demo good understanding of the education provided. Patient demo good return demo of skill of exercises.    Assessment     Patient tolerated evaluation well. Patient presents to therapy with s/s consistent with diagnosis. Pt with primary deficits in L/S ROM, BLE strength and postural awareness. Patient with noted increased lumbar lordosis, hinge at T12/L1 with increased tone with TTP at B paraspinals. Patient reports minimal difficulty with ADL's and household chores, primary c/o twinges of pain with bending/lifting and changing " positions. Patient reported no increased symptoms following evaluation.  Patient performed HEP well and stated that they could perform exercises at home independently.       Pt prognosis is Excellent.  Pt will benefit from skilled outpatient physical therapy to address the above stated deficits, provide pt/family education and to maximize pt's level of independence.     History  Co-morbidities and personal factors that may impact the plan of care Examination  Body Structures and Functions, activity limitations and participation restrictions that may impact the plan of care    Clinical Presentation   Co-morbidities:   anxiety   Prior knee surgery        Personal Factors:   lifestyle  attitudes Body Regions:   back  lower extremities    Body Systems:    ROM  strength  gait  motor control  motor learning            Participation Restrictions:   None     Activity limitations:   Learning and applying knowledge  no deficits    General Tasks and Commands  no deficits    Communication  no deficits    Mobility  walking    Self care  no deficits    Domestic Life  cooking  doing house work (cleaning house, washing dishes, laundry)    Interactions/Relationships  no deficits    Life Areas  no deficits    Community and Social Life  no deficits         stable and uncomplicated                      low   low  low Decision Making/ Complexity Score:  low     Medical necessity is demonstrated by the following IMPAIRMENTS/PROBLEMS:  1. Increased Pain  2. Decreased Segmental Mobility & Decreased ROM  3. Decreased Core & BLE strength  4. Decreased Flexibility BLE  5. Decreased Tolerance to Functional Activities    Pt's spiritual, cultural and educational needs considered and pt agreeable to plan of care and goals as stated below:     Anticipated Barriers for physical therapy: Chronic pain    Short Term GOALS: 3 weeks. Pt agrees with goals set.  1. Patient demonstrates independence with HEP.   2. Patient demonstrates independence with  Postural Awareness.   3. Patient demonstrates independence with body mechanics.     Long Term GOALS: 6 weeks. Pt agrees with goals set.  1. Patient demonstrates increased L/S ROM by 25% to improve tolerance to functional activities pain free.   2. Patient demonstrates increased strength BLE's to 4+/5 or greater to improve tolerance to functional activities pain free.   3. Patient demonstrates improved overall function per FOTO Lumbar Survey to 20% Limitation or less.     Functional Limitations Reports - G Codes  Category: Mobility  Tool: FOTO Lumbar Survey  Score: 31% Limitation   Modifier  Impairment Limitation Restriction    CH  0 % impaired, limited or restricted    CI  @ least 1% but less than 20% impaired, limited or restricted    CJ  @ least 20%<40% impaired, limited or restricted    CK  @ least 40%<60% impaired, limited or restricted    CL  @ least 60% <80% impaired, limited or restricted    CM  @ least 80%<100% impaired limited or restricted    CN  100% impaired, limited or restricted     Current/: CJ = 31% Limitation   Goal/ : CI = <20% Limitation    PLAN     Outpatient physical therapy 2 times weekly to include: pt ed, hep, therapeutic exercises, neuromuscular re-education/ balance exercises, joint mobilizations, aquatic therapy and modalities prn. Cont PT for  6 weeks. Pt may be seen by PTA as part of the rehabilitation team.     Therapist: Janee Juares, PT  1/22/2018

## 2018-01-29 ENCOUNTER — CLINICAL SUPPORT (OUTPATIENT)
Dept: REHABILITATION | Facility: HOSPITAL | Age: 63
End: 2018-01-29
Attending: INTERNAL MEDICINE
Payer: COMMERCIAL

## 2018-01-29 DIAGNOSIS — M54.50 CHRONIC MIDLINE LOW BACK PAIN WITHOUT SCIATICA: ICD-10-CM

## 2018-01-29 DIAGNOSIS — G89.29 CHRONIC MIDLINE LOW BACK PAIN WITHOUT SCIATICA: ICD-10-CM

## 2018-01-29 DIAGNOSIS — M53.86 DECREASED ROM OF LUMBAR SPINE: ICD-10-CM

## 2018-01-29 PROCEDURE — 97140 MANUAL THERAPY 1/> REGIONS: CPT

## 2018-01-29 PROCEDURE — 97110 THERAPEUTIC EXERCISES: CPT

## 2018-01-29 NOTE — PROGRESS NOTES
"                                                    Physical Therapy Daily Note     Name: Ana Aguila  [facility] Number: [id number]  Diagnosis:   Encounter Diagnoses   Name Primary?    Chronic midline low back pain without sciatica     Decreased ROM of lumbar spine      Physician: Erik Cool MD  Precautions: O2 @ 2L as needed  Visit #: 2 of 12  PTA Visit #: -  Time In: 2:05 p  Time Out: 3:00 p  Total Treatment Time 1:1: 45    Evaluation Date: 01/22/2018  Visit # authorized: 20  Authorization period: 12/31/2018  Plan of care Expiration: 03/05/2018  MD referral: Erik Cool MD    Subjective     Pt reports: Patient reports mild pain at low back.  Patient has been compliant with HEP.   Pain Scale: Ana rates pain on a scale of 0-10 to be 3 currently.    FOTO from evaluation:  31% limitation  Objective     Ana received individual therapeutic exercises to develop strength, endurance, ROM, flexibility, posture and core stabilization for 35 minutes including:    SKTC 10x5" each  LTR 3x10   PPT 3x10   PPT with March x20 (maintain PPT)  PPT with BKFO  x20 (maintain PPT)  Bridges 2x10  SLR 1x10,1x5  Open Books 10x5"   Hip ABD/ADD 3x10  Piriformis Stretch 3x20" each    Scapular retractions 3x10 HEP    Ana received the following manual therapy techniques: Joint mobilizations, Myofacial release and Soft tissue Mobilization were applied to the: L/S paraspinals for 10 minutes including:  STM R paraspinals  L1-L5 central PA glides II-III     The patient received the following direct contact modalities after being cleared for contraindications: Ice 10 min post tx    Written Home Exercises Provided: Eval  Pt demo good understanding of the education provided. Ana demonstrated good return demonstration of activities.     Education provided re: performance of HEP three times per day to decrease symptoms.  Also educated patient on proper sitting and standing posture.   Ana verbalized good understanding " of education provided.   No spiritual or educational barriers to learning provided    Assessment     Patient tolerated treatment well.  Patient reported slight fatigue, mild pain at central L/S throughout treatment.  Introduced new exercises with good response.  Patient is a good candidate for therapy and is making good progress towards goals.     This is a 62 y.o. female referred to outpatient physical therapy and presents with a medical diagnosis of Low back pain and demonstrates limitations as described in the problem list. Pt prognosis is Excellent. Pt will continue to benefit from skilled outpatient physical therapy to address the deficits listed in the problem list, provide pt/family education and to maximize pt's level of independence in the home and community environment.     Goals as follows:  Short Term GOALS: 3 weeks. Pt agrees with goals set.  1. Patient demonstrates independence with HEP.   2. Patient demonstrates independence with Postural Awareness.   3. Patient demonstrates independence with body mechanics.      Long Term GOALS: 6 weeks. Pt agrees with goals set.  1. Patient demonstrates increased L/S ROM by 25% to improve tolerance to functional activities pain free.   2. Patient demonstrates increased strength BLE's to 4+/5 or greater to improve tolerance to functional activities pain free.   3. Patient demonstrates improved overall function per FOTO Lumbar Survey to 20% Limitation or less.      Plan     Continue with established Plan of Care towards PT goals.    Therapist: Janee Juares, PT  1/29/2018

## 2018-01-31 ENCOUNTER — CLINICAL SUPPORT (OUTPATIENT)
Dept: REHABILITATION | Facility: HOSPITAL | Age: 63
End: 2018-01-31
Attending: INTERNAL MEDICINE
Payer: COMMERCIAL

## 2018-01-31 DIAGNOSIS — G89.29 CHRONIC MIDLINE LOW BACK PAIN WITHOUT SCIATICA: ICD-10-CM

## 2018-01-31 DIAGNOSIS — M53.86 DECREASED ROM OF LUMBAR SPINE: ICD-10-CM

## 2018-01-31 DIAGNOSIS — M54.50 CHRONIC MIDLINE LOW BACK PAIN WITHOUT SCIATICA: ICD-10-CM

## 2018-01-31 PROCEDURE — 97110 THERAPEUTIC EXERCISES: CPT

## 2018-01-31 PROCEDURE — 97140 MANUAL THERAPY 1/> REGIONS: CPT

## 2018-01-31 NOTE — PROGRESS NOTES
"                                                    Physical Therapy Daily Note     Name: Ana Aguila  [facility] Number: [id number]  Diagnosis:   Encounter Diagnoses   Name Primary?    Chronic midline low back pain without sciatica     Decreased ROM of lumbar spine      Physician: Erik Cool MD  Precautions: O2 @ 2L as needed  Visit #: 3 of 12  PTA Visit #: -  Time In: 2:00 p  Time Out: 3:05 p  Total Treatment Time 1:1: 55    Evaluation Date: 01/22/2018  Visit # authorized: 20  Authorization period: 12/31/2018  Plan of care Expiration: 03/05/2018  MD referral: Erik Cool MD    Subjective     Pt reports: Patient reports mild pain at low back.  Patient has been compliant with HEP.   Pain Scale: Ana rates pain on a scale of 0-10 to be 3 currently.    FOTO from evaluation:  31% limitation  Objective     Ana received individual therapeutic exercises to develop strength, endurance, ROM, flexibility, posture and core stabilization for 45 minutes including:    Bike 5'  SKTC 10x5" each  LTR 3x10   PPT 3x10   PPT with March x20  PPT with BKFO  x20   Bridges 2x10  SLR 1x10,1x5  Open Books 10x5"   Hip ABD/ADD 3x10  Piriformis Stretch 3x20" each  Clams 3x10 B    Next Visit:  Standing hip abd/ext/flex    Scapular retractions 3x10 HEP    Ana received the following manual therapy techniques: Joint mobilizations, Myofacial release and Soft tissue Mobilization were applied to the: L/S paraspinals for 10 minutes including:  STM R paraspinals  L1-L5 central PA glides II-III     The patient received the following direct contact modalities after being cleared for contraindications: Ice 10 min post tx z-lye    Written Home Exercises Provided: Eval  Pt demo good understanding of the education provided. Ana demonstrated good return demonstration of activities.     Education provided re: performance of HEP three times per day to decrease symptoms.  Also educated patient on proper sitting and standing " posture.   Ana verbalized good understanding of education provided.   No spiritual or educational barriers to learning provided    Assessment     Patient tolerated treatment well.  Pt states she does not see progress in therapy, however, pt is tolerating more activities. Pt educated to increase activity level at home with reduced sitting and prolonged positions. Introduced bike today with minimal difficulty, will continue to progress to standing activities as tolerated.     This is a 62 y.o. female referred to outpatient physical therapy and presents with a medical diagnosis of Low back pain and demonstrates limitations as described in the problem list. Pt prognosis is Excellent. Pt will continue to benefit from skilled outpatient physical therapy to address the deficits listed in the problem list, provide pt/family education and to maximize pt's level of independence in the home and community environment.     Goals as follows:  Short Term GOALS: 3 weeks. Pt agrees with goals set.  1. Patient demonstrates independence with HEP.   2. Patient demonstrates independence with Postural Awareness.   3. Patient demonstrates independence with body mechanics.      Long Term GOALS: 6 weeks. Pt agrees with goals set.  1. Patient demonstrates increased L/S ROM by 25% to improve tolerance to functional activities pain free.   2. Patient demonstrates increased strength BLE's to 4+/5 or greater to improve tolerance to functional activities pain free.   3. Patient demonstrates improved overall function per FOTO Lumbar Survey to 20% Limitation or less.      Plan     Continue with established Plan of Care towards PT goals.    Therapist: Janee Juares, PT  1/31/2018

## 2018-02-05 ENCOUNTER — PATIENT MESSAGE (OUTPATIENT)
Dept: PULMONOLOGY | Facility: CLINIC | Age: 63
End: 2018-02-05

## 2018-02-05 ENCOUNTER — CLINICAL SUPPORT (OUTPATIENT)
Dept: REHABILITATION | Facility: HOSPITAL | Age: 63
End: 2018-02-05
Attending: INTERNAL MEDICINE
Payer: COMMERCIAL

## 2018-02-05 DIAGNOSIS — G89.29 CHRONIC MIDLINE LOW BACK PAIN WITHOUT SCIATICA: ICD-10-CM

## 2018-02-05 DIAGNOSIS — M54.50 CHRONIC MIDLINE LOW BACK PAIN WITHOUT SCIATICA: ICD-10-CM

## 2018-02-05 DIAGNOSIS — M53.86 DECREASED ROM OF LUMBAR SPINE: ICD-10-CM

## 2018-02-05 PROCEDURE — 97110 THERAPEUTIC EXERCISES: CPT

## 2018-02-05 NOTE — PROGRESS NOTES
"                                                    Physical Therapy Daily Note     Name: Ana Aguila  [facility] Number: [id number]  Diagnosis:   Encounter Diagnoses   Name Primary?    Chronic midline low back pain without sciatica     Decreased ROM of lumbar spine      Physician: Erik Cool MD  Precautions: O2 @ 2L as needed  Visit #: 4 of 12  PTA Visit #: -  Time In: 2:00 p  Time Out: 3:05 p  Total Treatment Time 1:1: 55    Evaluation Date: 01/22/2018  Visit # authorized: 20  Authorization period: 12/31/2018  Plan of care Expiration: 03/05/2018  MD referral: Erik Cool MD    Subjective     Pt reports: Patient reports her pain is about the same, minimal. Pt states she is still concerned with her low back and would like to receive imaging or talk with her physician.   Pain Scale: Ana rates pain on a scale of 0-10 to be 3 currently.    FOTO from evaluation:  31% limitation  Objective     Ana received individual therapeutic exercises to develop strength, endurance, ROM, flexibility, posture and core stabilization for 55 minutes including:    Bike 5'  SKTC 10x5" each  LTR 3x10   PPT 3x10   PPT with March x20  PPT with BKFO  x20   Bridges 2x10  SLR 2x10  Open Books 10x5"   Hip ABD/ADD 3x10  Piriformis Stretch 3x20" each  Clams 3x10 B    Standing hip abd/ext/flex 2x10  Rows 2x10 YTB  Extensions 2x10 YTB  Shuttle 2C B  Thoracic Ext with 1/2 FR sitting 10x10"    Scapular retractions 3x10 HEP    Ana received the following manual therapy techniques: Joint mobilizations, Myofacial release and Soft tissue Mobilization were applied to the: L/S paraspinals for 10 minutes including: NT  STM R paraspinals  L1-L5 central PA glides II-III     The patient received the following direct contact modalities after being cleared for contraindications: Ice 10 min post tx z-lye    Written Home Exercises Provided: Eval  Pt demo good understanding of the education provided. Ana demonstrated good return " demonstration of activities.     Education provided re: performance of HEP three times per day to decrease symptoms.  Also educated patient on proper sitting and standing posture.   Ana verbalized good understanding of education provided.   No spiritual or educational barriers to learning provided    Assessment     Patient tolerated treatment well, introduced shuttle and additional postural activities. Pt required O2 for shuttle but tolerated all tx today with minimal fatigue. Pt tends to be concerned with muscle burn during exercise - pt education given on exercise-related muscle fatigue and soreness. Will continue to progress as tolerated Nv.     This is a 62 y.o. female referred to outpatient physical therapy and presents with a medical diagnosis of Low back pain and demonstrates limitations as described in the problem list. Pt prognosis is Excellent. Pt will continue to benefit from skilled outpatient physical therapy to address the deficits listed in the problem list, provide pt/family education and to maximize pt's level of independence in the home and community environment.     Goals as follows:  Short Term GOALS: 3 weeks. Pt agrees with goals set.  1. Patient demonstrates independence with HEP.   2. Patient demonstrates independence with Postural Awareness.   3. Patient demonstrates independence with body mechanics.      Long Term GOALS: 6 weeks. Pt agrees with goals set.  1. Patient demonstrates increased L/S ROM by 25% to improve tolerance to functional activities pain free.   2. Patient demonstrates increased strength BLE's to 4+/5 or greater to improve tolerance to functional activities pain free.   3. Patient demonstrates improved overall function per FOTO Lumbar Survey to 20% Limitation or less.      Plan     Continue with established Plan of Care towards PT goals.    Therapist: Janee Juares, PT  2/5/2018

## 2018-02-07 ENCOUNTER — CLINICAL SUPPORT (OUTPATIENT)
Dept: REHABILITATION | Facility: HOSPITAL | Age: 63
End: 2018-02-07
Attending: INTERNAL MEDICINE
Payer: COMMERCIAL

## 2018-02-07 DIAGNOSIS — M53.86 DECREASED ROM OF LUMBAR SPINE: ICD-10-CM

## 2018-02-07 DIAGNOSIS — G89.29 CHRONIC MIDLINE LOW BACK PAIN WITHOUT SCIATICA: ICD-10-CM

## 2018-02-07 DIAGNOSIS — M54.50 CHRONIC MIDLINE LOW BACK PAIN WITHOUT SCIATICA: ICD-10-CM

## 2018-02-07 PROCEDURE — 97140 MANUAL THERAPY 1/> REGIONS: CPT

## 2018-02-07 PROCEDURE — 97110 THERAPEUTIC EXERCISES: CPT

## 2018-02-07 NOTE — PROGRESS NOTES
"                                                    Physical Therapy Daily Note     Name: Ana Aguila  [facility] Number: [id number]  Diagnosis:   No diagnosis found.  Physician: Erik Cool MD  Precautions: O2 @ 2L as needed  Visit #: 5 of 12  PTA Visit #: -  Time In: 2:00 p  Time Out: 3:05 p  Total Treatment Time 1:1: 55    Evaluation Date: 01/22/2018  Visit # authorized: 20  Authorization period: 12/31/2018  Plan of care Expiration: 03/05/2018  MD referral: Erik Cool MD    Subjective     Pt reports: Patient reports her pain is improving, feeling a little stronger. Pt is scheduled to see pulmonologist today.   Pain Scale: Ana rates pain on a scale of 0-10 to be 3 currently.    FOTO from evaluation:  31% limitation  Objective     Ana received individual therapeutic exercises to develop strength, endurance, ROM, flexibility, posture and core stabilization for 55 minutes including:    Bike 5'  SKTC 10x5" each  LTR 3x10   PPT 3x10   PPT with March x20  PPT with BKFO  x20   Bridges 2x10  SLR 2x10  Open Books 10x5"   Hip ABD/ADD 3x10  Piriformis Stretch 3x20" each  Clams 3x10 B    Steamboats B 2x10  Rows 2x10 YTB  Extensions 2x10 YTB  Shuttle 2C B  Thoracic Ext with 1/2 FR sitting 10x10"    Scapular retractions 3x10 HEP    Ana received the following manual therapy techniques: Joint mobilizations, Myofacial release and Soft tissue Mobilization were applied to the: L/S paraspinals for 10 minutes including:   STM B T/S and L/S paraspinals  L1-L5 central PA glides II-III     The patient received the following direct contact modalities after being cleared for contraindications: Ice 10 min post tx z-lye    Written Home Exercises Provided: Eval  Pt demo good understanding of the education provided. Ana demonstrated good return demonstration of activities.     Education provided re: performance of HEP three times per day to decrease symptoms.  Also educated patient on proper sitting and standing " posture.   Ana verbalized good understanding of education provided.   No spiritual or educational barriers to learning provided    Assessment     Patient improving with activity tolerance, using 02 for only half treatment time. Pt progressing well with exercises - noted restrictions at L/S paraspinals and hypomobility from L1-L5: improved slightly after manual tx.     This is a 62 y.o. female referred to outpatient physical therapy and presents with a medical diagnosis of Low back pain and demonstrates limitations as described in the problem list. Pt prognosis is Excellent. Pt will continue to benefit from skilled outpatient physical therapy to address the deficits listed in the problem list, provide pt/family education and to maximize pt's level of independence in the home and community environment.     Goals as follows:  Short Term GOALS: 3 weeks. Pt agrees with goals set.  1. Patient demonstrates independence with HEP.   2. Patient demonstrates independence with Postural Awareness.   3. Patient demonstrates independence with body mechanics.      Long Term GOALS: 6 weeks. Pt agrees with goals set.  1. Patient demonstrates increased L/S ROM by 25% to improve tolerance to functional activities pain free.   2. Patient demonstrates increased strength BLE's to 4+/5 or greater to improve tolerance to functional activities pain free.   3. Patient demonstrates improved overall function per FOTO Lumbar Survey to 20% Limitation or less.      Plan     Continue with established Plan of Care towards PT goals.    Therapist: Janee Juares, PT  2/7/2018

## 2018-02-08 ENCOUNTER — OFFICE VISIT (OUTPATIENT)
Dept: PULMONOLOGY | Facility: CLINIC | Age: 63
End: 2018-02-08
Payer: COMMERCIAL

## 2018-02-08 ENCOUNTER — HOSPITAL ENCOUNTER (OUTPATIENT)
Dept: PULMONOLOGY | Facility: CLINIC | Age: 63
Discharge: HOME | End: 2018-02-08
Payer: COMMERCIAL

## 2018-02-08 VITALS
HEART RATE: 86 BPM | DIASTOLIC BLOOD PRESSURE: 72 MMHG | SYSTOLIC BLOOD PRESSURE: 122 MMHG | WEIGHT: 170 LBS | BODY MASS INDEX: 29.02 KG/M2 | OXYGEN SATURATION: 92 % | HEIGHT: 64 IN

## 2018-02-08 DIAGNOSIS — J44.9 CHRONIC OBSTRUCTIVE PULMONARY DISEASE, UNSPECIFIED COPD TYPE: ICD-10-CM

## 2018-02-08 DIAGNOSIS — J43.2 CENTRILOBULAR EMPHYSEMA: Primary | ICD-10-CM

## 2018-02-08 LAB
PRE FEV1 FVC: 33
PRE FEV1: 0.46
PRE FVC: 1.4
PREDICTED FEV1 FVC: 80
PREDICTED FEV1: 2.36
PREDICTED FVC: 2.97

## 2018-02-08 PROCEDURE — 99214 OFFICE O/P EST MOD 30 MIN: CPT | Mod: S$GLB,,, | Performed by: INTERNAL MEDICINE

## 2018-02-08 PROCEDURE — 99999 PR PBB SHADOW E&M-EST. PATIENT-LVL III: CPT | Mod: PBBFAC,,, | Performed by: INTERNAL MEDICINE

## 2018-02-08 PROCEDURE — 94010 BREATHING CAPACITY TEST: CPT | Mod: S$GLB,,, | Performed by: INTERNAL MEDICINE

## 2018-02-08 PROCEDURE — 3008F BODY MASS INDEX DOCD: CPT | Mod: S$GLB,,, | Performed by: INTERNAL MEDICINE

## 2018-02-09 NOTE — PROGRESS NOTES
Subjective:       Patient ID: Ana Aguila is a 62 y.o. female.    Chief Complaint: COPD and Shortness of Breath    HPI 61 yo female with end stage COPD and on supplemental oxygen still works at [employer]  comes for assessment of vague muscular paraspinous pain. Her PFT's are stable and unchanged from October 19th. Fev-1: 0.46l iters 33% of FVC and FVC is reduced at 47%. Her pain is reproducible with deep palpation in the right Paraspinous mid thoracic region.  She has a flat affect and seems to be mildly distraught needs a paper stating the she is late to work secondary to her emphysema and that it takes her longer than others to get her days started.  No exacerbations on her underlying COPD in the time from Oct to now!!      No flowsheet data found.]  Review of Systems   Constitutional: Negative.    HENT: Negative.    Eyes: Negative.    Respiratory: Negative.  Positive for dyspnea on extertion.    Cardiovascular: Negative.    Genitourinary: Negative.    Musculoskeletal: Negative.    Skin: Negative.    Gastrointestinal: Negative.         S/P Partial colectomy for perforated viscus at the time of a colonoscope.   Neurological: Negative.    Psychiatric/Behavioral: The patient is nervous/anxious.        Objective:      Physical Exam   Constitutional: She is oriented to person, place, and time. She appears well-developed and well-nourished. No distress.   HENT:   Head: Normocephalic and atraumatic.   Right Ear: External ear normal.   Left Ear: External ear normal.   Nose: Nose normal.   Mouth/Throat: Oropharynx is clear and moist.   Eyes: Conjunctivae and EOM are normal. Pupils are equal, round, and reactive to light.   Neck: Normal range of motion. Neck supple. No JVD present. No thyromegaly present.   Cardiovascular: Normal rate, regular rhythm, normal heart sounds and intact distal pulses.  Exam reveals no gallop.    No murmur heard.  Pulmonary/Chest: Breath sounds normal. No stridor. No respiratory distress. She  has no wheezes. She has no rales. She exhibits no tenderness.   Markedly diminished breath sounds without wheezes. Sa02; 94% on 2 liters.   Abdominal: Soft. Bowel sounds are normal. She exhibits no distension and no mass. There is no tenderness. There is no rebound and no guarding.   Musculoskeletal: Normal range of motion. She exhibits no edema.   Lymphadenopathy:     She has no cervical adenopathy.   Neurological: She is alert and oriented to person, place, and time. She has normal reflexes. She displays normal reflexes. No cranial nerve deficit.   Skin: Skin is warm and dry. No rash noted.   Psychiatric: She has a normal mood and affect. Her behavior is normal. Judgment and thought content normal.   Nursing note and vitals reviewed.          Assessment:       No diagnosis found.    Outpatient Encounter Prescriptions as of 2/8/2018   Medication Sig Dispense Refill    albuterol (PROAIR HFA) 90 mcg/actuation inhaler INHALE 2 PUFFS BY MOUTH INTO THE LUNGS FOUR TIMES DAILY prn 25.5 g 12    albuterol-ipratropium 2.5mg-0.5mg/3mL (DUO-NEB) 0.5 mg-3 mg(2.5 mg base)/3 mL nebulizer solution Take 3 mLs by nebulization every 6 (six) hours as needed for Wheezing or Shortness of Breath. 270 vial 12    azithromycin (ZITHROMAX Z-CHRISTOPHER) 250 MG tablet 2 tablets now, then 1 daily till finished 6 tablet 1    ergocalciferol (ERGOCALCIFEROL) 50,000 unit Cap TAKE 1 CAPSULE BY MOUTH EVERY 7 DAYS 12 capsule 0    fluticasone-salmeterol 250-50 mcg/dose (ADVAIR DISKUS) 250-50 mcg/dose diskus inhaler INHALE 1 PUFF BY MOUTH TWICE DAILY( EVERY 12 HOURS) 1 each 12    hydroCHLOROthiazide (HYDRODIURIL) 25 MG tablet TAKE 1 TABLET(25 MG) BY MOUTH EVERY MORNING 90 tablet 1    levalbuterol (XOPENEX) 1.25 mg/0.5 mL nebulizer solution Qid prn for copd 120 each 11    loperamide (IMODIUM) 2 mg capsule Take 2 mg by mouth 4 (four) times daily as needed for Diarrhea.      psyllium 0.52 gram capsule Take 2 capsules by mouth once daily.       No  facility-administered encounter medications on file as of 2/8/2018.      No orders of the defined types were placed in this encounter.    Plan:              IMP Muscular skeletal pain may be due to posture. Stable advanced COPD

## 2018-02-12 DIAGNOSIS — J44.9 CHRONIC OBSTRUCTIVE PULMONARY DISEASE, UNSPECIFIED COPD TYPE: Primary | ICD-10-CM

## 2018-02-21 ENCOUNTER — PATIENT MESSAGE (OUTPATIENT)
Dept: PULMONOLOGY | Facility: CLINIC | Age: 63
End: 2018-02-21

## 2018-02-23 ENCOUNTER — PATIENT OUTREACH (OUTPATIENT)
Dept: OTHER | Facility: OTHER | Age: 63
End: 2018-02-23

## 2018-02-23 NOTE — PROGRESS NOTES
"Last 5 Patient Entered Readings                                      Current 30 Day Average: 118/79     Recent Readings 2/23/2018 2/22/2018 2/22/2018 2/21/2018 2/21/2018    SBP (mmHg) 111 114 121 126 105    DBP (mmHg) 72 82 78 83 86    Pulse 85 86 96 98 94        Digital Medicine: Health  Follow Up    Lifestyle Modifications:    1.Dietary Modifications (Sodium intake <2,000mg/day, food labels, dining out): Patient denies any changes to her diet.    2.Physical Activity: none    3.Medication Therapy: Patient has been compliant with the medication regimen.    4.Patient has the following medication side effects/concerns:   (Frequency/Alleviating factors/Precipitating factors, etc.)     Follow up with Ms. Ana Aguila completed. Ms. Aguila is doing okay. Patient reports having a "coughing bug". She attributes elevated BP to needing to replace the batteries in her digital cuff. She is pleased with her BP. No further questions or concerns. I will follow up in a few weeks to assess progress.      "

## 2018-02-26 ENCOUNTER — CLINICAL SUPPORT (OUTPATIENT)
Dept: REHABILITATION | Facility: HOSPITAL | Age: 63
End: 2018-02-26
Attending: INTERNAL MEDICINE
Payer: COMMERCIAL

## 2018-02-26 DIAGNOSIS — G89.29 CHRONIC MIDLINE LOW BACK PAIN WITHOUT SCIATICA: ICD-10-CM

## 2018-02-26 DIAGNOSIS — M54.50 CHRONIC MIDLINE LOW BACK PAIN WITHOUT SCIATICA: ICD-10-CM

## 2018-02-26 DIAGNOSIS — M53.86 DECREASED ROM OF LUMBAR SPINE: ICD-10-CM

## 2018-02-26 PROCEDURE — 97110 THERAPEUTIC EXERCISES: CPT

## 2018-02-26 PROCEDURE — G8978 MOBILITY CURRENT STATUS: HCPCS | Mod: CJ

## 2018-02-26 PROCEDURE — G8979 MOBILITY GOAL STATUS: HCPCS | Mod: CJ

## 2018-02-26 PROCEDURE — 97140 MANUAL THERAPY 1/> REGIONS: CPT

## 2018-02-26 NOTE — PROGRESS NOTES
"                                                    Physical Therapy Daily Note     Name: Ana Aguila  [facility] Number: [id number]  Diagnosis:   Encounter Diagnoses   Name Primary?    Chronic midline low back pain without sciatica     Decreased ROM of lumbar spine      Physician: Erik Cool MD  Precautions: O2 @ 2L as needed  Visit #: 6 of 12  PTA Visit #: -  Time In: 2:00 p  Time Out: 3:00 p  Total Treatment Time 1:1: 60    Evaluation Date: 01/22/2018  Visit # authorized: 20  Authorization period: 12/31/2018  Plan of care Expiration: 03/05/2018  MD referral: Erik Cool MD    Subjective     Pt reports: Patient reports her pain is improving, feeling a little stronger. Pt is scheduled to see pulmonologist today.   Pain Scale: Ana rates pain on a scale of 0-10 to be 3 currently.    FOTO from 2/26/18:  33% limitation  Objective     Ana received individual therapeutic exercises to develop strength, endurance, ROM, flexibility, posture and core stabilization for 45 minutes including:    Bike 5'  SKTC 10x5" each  LTR 3x10   PPT 3x10   PPT with March x20 NT  PPT with BKFO  x20 OTB  Bridges 2x10  SLR 2x10  Open Books 10x5" NT  Hip ABD/ADD 3x10  Piriformis Stretch 3x20" each NT  Clams 3x10 B    Steamboats B 2x10 NT  Rows 2x10 OTB  Extensions 2x10 OTB  Shuttle 2C B  Thoracic Ext with 1/2 FR sitting 10x10" NT    Scapular retractions 3x10 HEP    Ana received the following manual therapy techniques: Joint mobilizations, Myofacial release and Soft tissue Mobilization were applied to the: L/S paraspinals for 10 minutes including:   STM B T/S and L/S paraspinals  L1-L5 central PA glides II-III     The patient received the following direct contact modalities after being cleared for contraindications: Ice 10 min post tx z-lye    Written Home Exercises Provided: Eval  Pt demo good understanding of the education provided. Ana demonstrated good return demonstration of activities.     Education provided " re: performance of HEP three times per day to decrease symptoms.  Also educated patient on proper sitting and standing posture.   Ana verbalized good understanding of education provided.   No spiritual or educational barriers to learning provided    Assessment     Patient tolerated tx fair today- taxing effort with all activities, required rest breaks in between. Pt has had a cold over the last week - reduced exercises today due to pt's fatigue. Will continue to progress next visit as tolerated.     This is a 62 y.o. female referred to outpatient physical therapy and presents with a medical diagnosis of Low back pain and demonstrates limitations as described in the problem list. Pt prognosis is Excellent. Pt will continue to benefit from skilled outpatient physical therapy to address the deficits listed in the problem list, provide pt/family education and to maximize pt's level of independence in the home and community environment.     Goals as follows:  Short Term GOALS: 3 weeks. Pt agrees with goals set.  1. Patient demonstrates independence with HEP.   2. Patient demonstrates independence with Postural Awareness.   3. Patient demonstrates independence with body mechanics.      Long Term GOALS: 6 weeks. Pt agrees with goals set.  1. Patient demonstrates increased L/S ROM by 25% to improve tolerance to functional activities pain free.   2. Patient demonstrates increased strength BLE's to 4+/5 or greater to improve tolerance to functional activities pain free.   3. Patient demonstrates improved overall function per FOTO Lumbar Survey to 20% Limitation or less.      Plan     Continue with established Plan of Care towards PT goals.    Therapist: Janee Juares, PT  2/26/2018

## 2018-03-05 ENCOUNTER — CLINICAL SUPPORT (OUTPATIENT)
Dept: REHABILITATION | Facility: HOSPITAL | Age: 63
End: 2018-03-05
Attending: INTERNAL MEDICINE
Payer: COMMERCIAL

## 2018-03-05 DIAGNOSIS — M54.50 CHRONIC MIDLINE LOW BACK PAIN WITHOUT SCIATICA: ICD-10-CM

## 2018-03-05 DIAGNOSIS — M53.86 DECREASED ROM OF LUMBAR SPINE: ICD-10-CM

## 2018-03-05 DIAGNOSIS — G89.29 CHRONIC MIDLINE LOW BACK PAIN WITHOUT SCIATICA: ICD-10-CM

## 2018-03-05 PROCEDURE — 97140 MANUAL THERAPY 1/> REGIONS: CPT

## 2018-03-05 PROCEDURE — 97110 THERAPEUTIC EXERCISES: CPT

## 2018-03-05 NOTE — PLAN OF CARE
"                                                    Physical Therapy Daily Note     Name: Ana Aguila  [facility] Number: [id number]  Diagnosis:   Encounter Diagnoses   Name Primary?    Chronic midline low back pain without sciatica     Decreased ROM of lumbar spine      Physician: Erik Cool MD  Precautions: O2 @ 2L as needed  Visit #: 7 of 20  PTA Visit #: -  Time In: 2:00 p  Time Out: 3:00 p  Total Treatment Time 1:1: 60    Evaluation Date: 01/22/2018  Visit # authorized: 20  Authorization period: 12/31/2018  MD referral: Erik Cool MD    Plan of care Expiration: Re-assessed 3/5/18, updated POC through 04/16/2018    Subjective     Pt reports: Patient reports her pain is improving, feeling a little stronger. Pt is scheduled to see pulmonologist today.   Pain Scale: Ana rates pain on a scale of 0-10 to be 3 currently.    FOTO from 2/26/18:  33% limitation  Objective     Re-assessment on 3/5/18    LUMBAR SPINE AROM:   Flexion: WFL   Extension: WFL   Left Sidebend: 25% diop   Right Sidebend: 25% diop   Left Rotation: WFL   Right Rotation: WFL        LOWER EXTREMITY STRENGTH:    Left Right   Quadriceps 5/5 5/5   Hamstrings 5/5 5/5      Iliopsoas 5/5 5/5   PGM 4/5 4/5   Hip IR 5/5 5/5   Hip ER 5/5 5/5   Hip Ext 4+/5 4+/5       Ana received individual therapeutic exercises to develop strength, endurance, ROM, flexibility, posture and core stabilization for 45 minutes including:    Bike 5'  Hamstring Stretch 3x20" each  SKTC 10x5" each  LTR 3x10   PPT 3x10   PPT with March x20 NT  PPT with BKFO  x20 OTB  Bridges 2x10  SLR 2x10  Open Books 10x5" NT  Hip ABD/ADD 3x10  Piriformis Stretch 3x20" each   Clams 3x10 B    Steamboats B 2x10 on blue foam pad  Rows 2x10 OTB NT  Extensions 2x10 OTB NT  Shuttle 2C B 3x10  Thoracic Ext with 1/2 FR sitting 10x10" NT    Scapular retractions 3x10 HEP    Ana received the following manual therapy techniques for 10 min: Joint mobilizations, Myofacial release and " Soft tissue   STM and rolling stick to B IT Band    Mobilization were applied to the: L/S paraspinals for 00 minutes including: NT  STM B T/S and L/S paraspinals  L1-L5 central PA glides II-III     The patient received the following direct contact modalities after being cleared for contraindications: Ice 10 min post tx z-lye - pt declined today    Written Home Exercises Provided: Yeimi  Pt demo good understanding of the education provided. Ana demonstrated good return demonstration of activities.     Education provided re: performance of HEP three times per day to decrease symptoms.  Also educated patient on proper sitting and standing posture.   Ana verbalized good understanding of education provided.   No spiritual or educational barriers to learning provided    Assessment     Patient tolerated tx well today. Pt re-assessed - noted improvement in L/S ROM, still limited in BLE strength. Pt demonstrates improved activity tolerance. Pt with minimal difficulty completing household chores, ADL's, and community activities. Continue skilled PT 2x/wk for an additional 4-6 weeks to address remaining impairments and LTGs. Will continue to progress with B hip strength and improve overall endurance and functional mobility.     This is a 62 y.o. female referred to outpatient physical therapy and presents with a medical diagnosis of Low back pain and demonstrates limitations as described in the problem list. Pt prognosis is Excellent. Pt will continue to benefit from skilled outpatient physical therapy to address the deficits listed in the problem list, provide pt/family education and to maximize pt's level of independence in the home and community environment.     Goals as follows:  Short Term GOALS: 3 weeks. Pt agrees with goals set.  1. Patient demonstrates independence with HEP.   2. Patient demonstrates independence with Postural Awareness.   3. Patient demonstrates independence with body mechanics.      Long Term  GOALS: 6 weeks. Pt agrees with goals set.  1. Patient demonstrates increased L/S ROM by 25% to improve tolerance to functional activities pain free.   2. Patient demonstrates increased strength BLE's to 4+/5 or greater to improve tolerance to functional activities pain free.   3. Patient demonstrates improved overall function per FOTO Lumbar Survey to 20% Limitation or less.      Plan      Continue skilled PT 2x/wk for an additional 4-6 weeks to address remaining impairments and LTGs.    Therapist: Janee Juares, PT  3/5/2018

## 2018-03-05 NOTE — PROGRESS NOTES
"                                                    Physical Therapy Daily Note     Name: Ana Aguila  [facility] Number: [id number]  Diagnosis:   Encounter Diagnoses   Name Primary?    Chronic midline low back pain without sciatica     Decreased ROM of lumbar spine      Physician: Erik Cool MD  Precautions: O2 @ 2L as needed  Visit #: 7 of 20  PTA Visit #: -  Time In: 2:00 p  Time Out: 3:00 p  Total Treatment Time 1:1: 60    Evaluation Date: 01/22/2018  Visit # authorized: 20  Authorization period: 12/31/2018  MD referral: Erik Cool MD    Plan of care Expiration: Re-assessed 3/5/18, updated POC through 04/16/2018    Subjective     Pt reports: Patient reports her pain is improving, feeling a little stronger. Pt is scheduled to see pulmonologist today.   Pain Scale: Ana rates pain on a scale of 0-10 to be 3 currently.    FOTO from 2/26/18:  33% limitation  Objective     Re-assessment on 3/5/18    LUMBAR SPINE AROM:   Flexion: WFL   Extension: WFL   Left Sidebend: 25% diop   Right Sidebend: 25% diop   Left Rotation: WFL   Right Rotation: WFL        LOWER EXTREMITY STRENGTH:    Left Right   Quadriceps 5/5 5/5   Hamstrings 5/5 5/5      Iliopsoas 5/5 5/5   PGM 4/5 4/5   Hip IR 5/5 5/5   Hip ER 5/5 5/5   Hip Ext 4+/5 4+/5       Ana received individual therapeutic exercises to develop strength, endurance, ROM, flexibility, posture and core stabilization for 45 minutes including:    Bike 5'  Hamstring Stretch 3x20" each  SKTC 10x5" each  LTR 3x10   PPT 3x10   PPT with March x20 NT  PPT with BKFO  x20 OTB  Bridges 2x10  SLR 2x10  Open Books 10x5" NT  Hip ABD/ADD 3x10  Piriformis Stretch 3x20" each   Clams 3x10 B    Steamboats B 2x10 on blue foam pad  Rows 2x10 OTB NT  Extensions 2x10 OTB NT  Shuttle 2C B 3x10  Thoracic Ext with 1/2 FR sitting 10x10" NT    Scapular retractions 3x10 HEP    Ana received the following manual therapy techniques for 10 min: Joint mobilizations, Myofacial release and " Soft tissue   STM and rolling stick to B IT Band    Mobilization were applied to the: L/S paraspinals for 00 minutes including: NT  STM B T/S and L/S paraspinals  L1-L5 central PA glides II-III     The patient received the following direct contact modalities after being cleared for contraindications: Ice 10 min post tx z-lye - pt declined today    Written Home Exercises Provided: Yeimi  Pt demo good understanding of the education provided. Ana demonstrated good return demonstration of activities.     Education provided re: performance of HEP three times per day to decrease symptoms.  Also educated patient on proper sitting and standing posture.   Ana verbalized good understanding of education provided.   No spiritual or educational barriers to learning provided    Assessment     Patient tolerated tx well today. Pt re-assessed - noted improvement in L/S ROM, still limited in BLE strength. Pt demonstrates improved activity tolerance. Pt with minimal difficulty completing household chores, ADL's, and community activities. Continue skilled PT 2x/wk for an additional 4-6 weeks to address remaining impairments and LTGs. Will continue to progress with B hip strength and improve overall endurance and functional mobility. Pt educated [employer] program - pt interested in transitioning into this program within the next 2 weeks.     This is a 62 y.o. female referred to outpatient physical therapy and presents with a medical diagnosis of Low back pain and demonstrates limitations as described in the problem list. Pt prognosis is Excellent. Pt will continue to benefit from skilled outpatient physical therapy to address the deficits listed in the problem list, provide pt/family education and to maximize pt's level of independence in the home and community environment.     Goals as follows:  Short Term GOALS: 3 weeks. Pt agrees with goals set.  1. Patient demonstrates independence with HEP.   2. Patient demonstrates  independence with Postural Awareness.   3. Patient demonstrates independence with body mechanics.      Long Term GOALS: 6 weeks. Pt agrees with goals set.  1. Patient demonstrates increased L/S ROM by 25% to improve tolerance to functional activities pain free.   2. Patient demonstrates increased strength BLE's to 4+/5 or greater to improve tolerance to functional activities pain free.   3. Patient demonstrates improved overall function per FOTO Lumbar Survey to 20% Limitation or less.      Plan      Continue skilled PT 2x/wk for an additional 4-6 weeks to address remaining impairments and LTGs.    Therapist: Janee Juares, PT  3/5/2018

## 2018-03-07 ENCOUNTER — CLINICAL SUPPORT (OUTPATIENT)
Dept: REHABILITATION | Facility: HOSPITAL | Age: 63
End: 2018-03-07
Attending: INTERNAL MEDICINE
Payer: COMMERCIAL

## 2018-03-07 DIAGNOSIS — G89.29 CHRONIC MIDLINE LOW BACK PAIN WITHOUT SCIATICA: ICD-10-CM

## 2018-03-07 DIAGNOSIS — M54.50 CHRONIC MIDLINE LOW BACK PAIN WITHOUT SCIATICA: ICD-10-CM

## 2018-03-07 DIAGNOSIS — M53.86 DECREASED ROM OF LUMBAR SPINE: ICD-10-CM

## 2018-03-07 PROCEDURE — 97110 THERAPEUTIC EXERCISES: CPT

## 2018-03-07 NOTE — PROGRESS NOTES
"                                                    Physical Therapy Daily Note     Name: Ana Aguila  [facility] Number: [id number]  Diagnosis:   Encounter Diagnoses   Name Primary?    Chronic midline low back pain without sciatica     Decreased ROM of lumbar spine      Physician: Erik Cool MD  Precautions: O2 @ 2L as needed  Visit #: 8 of 20  PTA Visit #: 1  Time In: 2:30 p  Time Out: 3:08 p  Total Treatment Time 1:1: 38    Evaluation Date: 01/22/2018  Visit # authorized: 20  Authorization period: 12/31/2018  MD referral: Erik Cool MD    Plan of care Expiration: Re-assessed 3/5/18, updated POC through 04/16/2018    Subjective     Pt reports: feeling good. Only have 30 min today  Pain Scale: Ana rates pain on a scale of 0-10 to be 0 currently.    Objective       Ana received individual therapeutic exercises to develop strength, endurance, ROM, flexibility, posture and core stabilization for 38 minutes including:    Bike 5'  Hamstring Stretch 3x20" each  SKTC 10x10" each  LTR 3x10 -NT  PPT 3x10 -NT  PPT with March x20 NT  PPT with BKFO  x20 OTB  Bridges 2x10  SLR 2x10  Open Books 10x5" NT  Hip ABD/ADD 3x10-NT  Piriformis Stretch 3x20" each -NT  Clams 3x10 B- NT    Steamboats B 2x10 on blue foam pad YTB  Rows 2x10 OTB NT  Extensions 2x10 OTB NT  Shuttle 2C B 3x10  Thoracic Ext with 1/2 FR sitting 10x10" NT      Ana received the following manual therapy techniques for 10 min: Joint mobilizations, Myofacial release and Soft tissue -NT  STM and rolling stick to B IT Band    Mobilization were applied to the: L/S paraspinals for 00 minutes including: NT  STM B T/S and L/S paraspinals  L1-L5 central PA glides II-III     The patient received the following direct contact modalities after being cleared for contraindications: Ice 10 min post tx z-lye - pt declined today- NT    Written Home Exercises Provided: Eval  Pt demo good understanding of the education provided. Ana demonstrated good " return demonstration of activities.     Education provided re: performance of HEP three times per day to decrease symptoms.  Also educated patient on proper sitting and standing posture.   Ana verbalized good understanding of education provided.   No spiritual or educational barriers to learning provided    Assessment     Patient tolerated tx well today w/o adverse reaction. Unable to perform all cara 2* time constraint. Focused on hip strengthening ex today. Pt's goal is to go to [employer] in the next 2 weeks.     This is a 62 y.o. female referred to outpatient physical therapy and presents with a medical diagnosis of Low back pain and demonstrates limitations as described in the problem list. Pt prognosis is Excellent. Pt will continue to benefit from skilled outpatient physical therapy to address the deficits listed in the problem list, provide pt/family education and to maximize pt's level of independence in the home and community environment.     Goals as follows:  Short Term GOALS: 3 weeks. Pt agrees with goals set.  1. Patient demonstrates independence with HEP.   2. Patient demonstrates independence with Postural Awareness.   3. Patient demonstrates independence with body mechanics.      Long Term GOALS: 6 weeks. Pt agrees with goals set.  1. Patient demonstrates increased L/S ROM by 25% to improve tolerance to functional activities pain free.   2. Patient demonstrates increased strength BLE's to 4+/5 or greater to improve tolerance to functional activities pain free.   3. Patient demonstrates improved overall function per FOTO Lumbar Survey to 20% Limitation or less.      Plan      Continue skilled PT 2x/wk for an additional 4-6 weeks to address remaining impairments and LTGs.    Therapist: Sera Sinclair, PTA  3/7/2018

## 2018-03-12 ENCOUNTER — CLINICAL SUPPORT (OUTPATIENT)
Dept: REHABILITATION | Facility: HOSPITAL | Age: 63
End: 2018-03-12
Attending: INTERNAL MEDICINE
Payer: COMMERCIAL

## 2018-03-12 DIAGNOSIS — M53.86 DECREASED ROM OF LUMBAR SPINE: ICD-10-CM

## 2018-03-12 DIAGNOSIS — M54.50 CHRONIC MIDLINE LOW BACK PAIN WITHOUT SCIATICA: ICD-10-CM

## 2018-03-12 DIAGNOSIS — G89.29 CHRONIC MIDLINE LOW BACK PAIN WITHOUT SCIATICA: ICD-10-CM

## 2018-03-12 PROCEDURE — 97140 MANUAL THERAPY 1/> REGIONS: CPT

## 2018-03-12 PROCEDURE — 97110 THERAPEUTIC EXERCISES: CPT

## 2018-03-12 NOTE — PROGRESS NOTES
"                                                    Physical Therapy Daily Note     Name: Ana Aguila  [facility] Number: [id number]  Diagnosis:   Encounter Diagnoses   Name Primary?    Chronic midline low back pain without sciatica     Decreased ROM of lumbar spine      Physician: Erik Cool MD  Precautions: O2 @ 2L as needed  Visit #: 9 of 20  PTA Visit #: 2  Time In: 3:00 p  Time Out: 4:14 p  Total Treatment Time 1:1: 30    Evaluation Date: 01/22/2018  Visit # authorized: 20  Authorization period: 12/31/2018  MD referral: Erik Cool MD    Plan of care Expiration: Re-assessed 3/5/18, updated POC through 04/16/2018    Subjective     Pt reports: no complaints  Pain Scale: Ana rates pain on a scale of 0-10 to be 0 currently.    Objective       Ana received individual therapeutic exercises to develop strength, endurance, ROM, flexibility, posture and core stabilization for 64 minutes including:    Bike 5'  Hamstring Stretch 3x20" each  SKTC 10x10" each-NT  LTR 3x10  PPT with March x20  PPT with BKFO  x20 OTB  Bridges 3x10  SLR 3x10  Open Books 10x5"  Hip ABD/ADD 3x10-NT  Piriformis Stretch 3x20" each   Clams 2x10 B RTB    Steamboats B 2x10 on blue foam pad YTB  Rows 2x10 OTB   Extensions 2x10 OTB   Shuttle 2C B 3x10  Thoracic Ext with 1/2 FR sitting 10x10" NT      Ana received the following manual therapy techniques for 10 min: Joint mobilizations, Myofacial release and Soft tissue   STM and rolling stick to B IT Band    Mobilization were applied to the: L/S paraspinals for 00 minutes including: NT  STM B T/S and L/S paraspinals  L1-L5 central PA glides II-III     The patient received the following direct contact modalities after being cleared for contraindications: Ice 10 min post tx z-lye - pt declined today- NT    Written Home Exercises Provided: Eval  Pt demo good understanding of the education provided. Ana demonstrated good return demonstration of activities.     Education " provided re: performance of HEP three times per day to decrease symptoms.  Also educated patient on proper sitting and standing posture.   Ana verbalized good understanding of education provided.   No spiritual or educational barriers to learning provided    Assessment     Patient tolerated tx well today w/o adverse reaction.STM to IT band helps B LE.  Added resistance to clams to increase hip strength. Progressing well w/ poc.     This is a 62 y.o. female referred to outpatient physical therapy and presents with a medical diagnosis of Low back pain and demonstrates limitations as described in the problem list. Pt prognosis is Excellent. Pt will continue to benefit from skilled outpatient physical therapy to address the deficits listed in the problem list, provide pt/family education and to maximize pt's level of independence in the home and community environment.     Goals as follows:  Short Term GOALS: 3 weeks. Pt agrees with goals set.  1. Patient demonstrates independence with HEP.   2. Patient demonstrates independence with Postural Awareness.   3. Patient demonstrates independence with body mechanics.      Long Term GOALS: 6 weeks. Pt agrees with goals set.  1. Patient demonstrates increased L/S ROM by 25% to improve tolerance to functional activities pain free.   2. Patient demonstrates increased strength BLE's to 4+/5 or greater to improve tolerance to functional activities pain free.   3. Patient demonstrates improved overall function per FOTO Lumbar Survey to 20% Limitation or less.      Plan      Continue skilled PT 2x/wk for an additional 4-6 weeks to address remaining impairments and LTGs.    Therapist: Sera Sinclair, PTA  3/12/2018

## 2018-03-13 DIAGNOSIS — J44.1 CHRONIC OBSTRUCTIVE PULMONARY DISEASE WITH ACUTE EXACERBATION: Primary | ICD-10-CM

## 2018-03-13 RX ORDER — PREDNISONE 5 MG/1
TABLET ORAL
Qty: 60 TABLET | Refills: 2 | Status: SHIPPED | OUTPATIENT
Start: 2018-03-13 | End: 2018-06-15

## 2018-03-14 DIAGNOSIS — J44.9 CHRONIC OBSTRUCTIVE PULMONARY DISEASE, UNSPECIFIED COPD TYPE: ICD-10-CM

## 2018-03-14 RX ORDER — IPRATROPIUM BROMIDE AND ALBUTEROL SULFATE 2.5; .5 MG/3ML; MG/3ML
SOLUTION RESPIRATORY (INHALATION)
Qty: 270 ML | Refills: 0 | Status: SHIPPED | OUTPATIENT
Start: 2018-03-14 | End: 2018-04-09 | Stop reason: SDUPTHER

## 2018-03-19 ENCOUNTER — CLINICAL SUPPORT (OUTPATIENT)
Dept: REHABILITATION | Facility: HOSPITAL | Age: 63
End: 2018-03-19
Attending: INTERNAL MEDICINE
Payer: COMMERCIAL

## 2018-03-19 DIAGNOSIS — M54.50 CHRONIC MIDLINE LOW BACK PAIN WITHOUT SCIATICA: ICD-10-CM

## 2018-03-19 DIAGNOSIS — M53.86 DECREASED ROM OF LUMBAR SPINE: ICD-10-CM

## 2018-03-19 DIAGNOSIS — G89.29 CHRONIC MIDLINE LOW BACK PAIN WITHOUT SCIATICA: ICD-10-CM

## 2018-03-19 PROCEDURE — 97110 THERAPEUTIC EXERCISES: CPT

## 2018-03-19 NOTE — PROGRESS NOTES
"                                                    Physical Therapy Daily Note     Name: Ana Aguila  [facility] Number: [id number]  Diagnosis:   Encounter Diagnoses   Name Primary?    Chronic midline low back pain without sciatica     Decreased ROM of lumbar spine      Physician: Erik Cool MD  Precautions: O2 @ 2L as needed  Visit #: 10 of 20  PTA Visit #: 3  Time In: 2:00 p  Time Out: 3:04 p  Total Treatment Time 1:1: 30    Evaluation Date: 01/22/2018  Visit # authorized: 20  Authorization period: 12/31/2018  MD referral: Erik Cool MD    Plan of care Expiration: Re-assessed 3/5/18, updated POC through 04/16/2018    Subjective     Pt reports: feeling good today  Pain Scale: Ana rates pain on a scale of 0-10 to be 0 currently.    Objective       Ana received individual therapeutic exercises to develop strength, endurance, ROM, flexibility, posture and core stabilization for 58 minutes including:    Bike 5'  Hamstring Stretch 3x20" each  SKTC 10x10" each-NT  LTR 3x10  PPT with March x30  PPT with BKFO  x30 OTB  Bridges 3x10  SLR 3x10  Open Books 10x5"  Hip ABD/ADD 3x10-NT  Piriformis Stretch 3x20" each   Clams 3x10 B RTB    Steamboats B 2x10 on blue foam pad YTB  Rows 2x10 OTB   Extensions 2x10 OTB   Shuttle 2C B 3x10  Thoracic Ext in sitting 10x10"       Ana received the following manual therapy techniques for 6 min: Joint mobilizations, Myofacial release and Soft tissue   STM and rolling stick to B IT Band    Mobilization were applied to the: L/S paraspinals for 00 minutes including: NT  STM B T/S and L/S paraspinals  L1-L5 central PA glides II-III     The patient received the following direct contact modalities after being cleared for contraindications: Ice 10 min post tx z-lye - pt declined today- NT    Written Home Exercises Provided: Eval  Pt demo good understanding of the education provided. Ana demonstrated good return demonstration of activities.     Education provided " re: performance of HEP three times per day to decrease symptoms.  Also educated patient on proper sitting and standing posture.   Ana verbalized good understanding of education provided.   No spiritual or educational barriers to learning provided    Assessment     Patient tolerated tx well today w/o adverse reaction. Increased sets today. Pt states she is feeling better overall. Progressing well w/ poc    This is a 62 y.o. female referred to outpatient physical therapy and presents with a medical diagnosis of Low back pain and demonstrates limitations as described in the problem list. Pt prognosis is Excellent. Pt will continue to benefit from skilled outpatient physical therapy to address the deficits listed in the problem list, provide pt/family education and to maximize pt's level of independence in the home and community environment.     Goals as follows:  Short Term GOALS: 3 weeks. Pt agrees with goals set.  1. Patient demonstrates independence with HEP.   2. Patient demonstrates independence with Postural Awareness.   3. Patient demonstrates independence with body mechanics.      Long Term GOALS: 6 weeks. Pt agrees with goals set.  1. Patient demonstrates increased L/S ROM by 25% to improve tolerance to functional activities pain free.   2. Patient demonstrates increased strength BLE's to 4+/5 or greater to improve tolerance to functional activities pain free.   3. Patient demonstrates improved overall function per FOTO Lumbar Survey to 20% Limitation or less.      Plan      Continue skilled PT 2x/wk for an additional 4-6 weeks to address remaining impairments and LTGs.    Therapist: Sera Sinclair, PTA  3/19/2018

## 2018-03-21 ENCOUNTER — CLINICAL SUPPORT (OUTPATIENT)
Dept: REHABILITATION | Facility: HOSPITAL | Age: 63
End: 2018-03-21
Attending: INTERNAL MEDICINE
Payer: COMMERCIAL

## 2018-03-21 DIAGNOSIS — M53.86 DECREASED ROM OF LUMBAR SPINE: ICD-10-CM

## 2018-03-21 DIAGNOSIS — M54.50 CHRONIC MIDLINE LOW BACK PAIN WITHOUT SCIATICA: ICD-10-CM

## 2018-03-21 DIAGNOSIS — G89.29 CHRONIC MIDLINE LOW BACK PAIN WITHOUT SCIATICA: ICD-10-CM

## 2018-03-21 PROCEDURE — 97110 THERAPEUTIC EXERCISES: CPT

## 2018-03-21 PROCEDURE — 97140 MANUAL THERAPY 1/> REGIONS: CPT

## 2018-03-21 NOTE — PROGRESS NOTES
"                                                    Physical Therapy Daily Note     Name: Ana Aguila  [facility] Number: [id number]  Diagnosis:   Encounter Diagnoses   Name Primary?    Chronic midline low back pain without sciatica     Decreased ROM of lumbar spine      Physician: Erik Cool MD  Precautions: O2 @ 2L as needed  Visit #: 10 of 20  PTA Visit #: 3  Time In: 2:00 p  Time Out: 3:05 p  Total Treatment Time 1:1: 60    Evaluation Date: 01/22/2018  Visit # authorized: 20  Authorization period: 12/31/2018  MD referral: Erik Cool MD    Plan of care Expiration: Re-assessed 3/5/18, updated POC through 04/16/2018    Subjective     Pt reports: feeling good today  Pain Scale: Ana rates pain on a scale of 0-10 to be 0 currently.    Objective       Ana received individual therapeutic exercises to develop strength, endurance, ROM, flexibility, posture and core stabilization for 45 minutes including:    Bike 10'  Hamstring Stretch 3x20" each  SKTC 10x10" each-NT  LTR w SB 3x10  PPT with March x30 NT  PPT with BKFO  x30 OTB NT  Bridges 3x10  SLR 3x10  Open Books 10x5"  Hip ABD/ADD 3x10-NT  Piriformis Stretch 3x20" each   Clams 3x10 B RTB  DKTC w SB 3x10    Steamboats B 2x10 on blue foam pad RTB  Rows 3x10 OTB seated on green SB  Extensions 3x10 OTB seated on green SB  Shuttle 2C B 3x10  Thoracic Ext in sitting 10x10"   Step Ups L2 2x10 alt    Ana received the following manual therapy techniques for 10 min: Joint mobilizations, Myofacial release and Soft tissue   STM and rolling stick to B IT Band    Mobilization were applied to the: L/S paraspinals for 00 minutes including: NT  STM B T/S and L/S paraspinals  L1-L5 central PA glides II-III     The patient received the following direct contact modalities after being cleared for contraindications: Ice 10 min post tx z-lye - pt declined today- NT    Written Home Exercises Provided: Eval  Pt demo good understanding of the education provided. " Ana demonstrated good return demonstration of activities.     Education provided re: performance of HEP three times per day to decrease symptoms.  Also educated patient on proper sitting and standing posture.   Ana verbalized good understanding of education provided.   No spiritual or educational barriers to learning provided    Assessment     Patient tolerated tx well today w/o adverse reaction. Introduced step ups today, increased SOB but able to complete exercise. Added SB to rows/extensions to improve core stability and balance. Progressing well w/ poc, continue to advance as leodan.     This is a 62 y.o. female referred to outpatient physical therapy and presents with a medical diagnosis of Low back pain and demonstrates limitations as described in the problem list. Pt prognosis is Excellent. Pt will continue to benefit from skilled outpatient physical therapy to address the deficits listed in the problem list, provide pt/family education and to maximize pt's level of independence in the home and community environment.     Goals as follows:  Short Term GOALS: 3 weeks. Pt agrees with goals set.  1. Patient demonstrates independence with HEP.   2. Patient demonstrates independence with Postural Awareness.   3. Patient demonstrates independence with body mechanics.      Long Term GOALS: 6 weeks. Pt agrees with goals set.  1. Patient demonstrates increased L/S ROM by 25% to improve tolerance to functional activities pain free.   2. Patient demonstrates increased strength BLE's to 4+/5 or greater to improve tolerance to functional activities pain free.   3. Patient demonstrates improved overall function per FOTO Lumbar Survey to 20% Limitation or less.      Plan      Continue skilled PT 2x/wk for an additional 4-6 weeks to address remaining impairments and LTGs.    Therapist: Janee Juares, PT  3/21/2018

## 2018-03-26 ENCOUNTER — OFFICE VISIT (OUTPATIENT)
Dept: CARDIOLOGY | Facility: CLINIC | Age: 63
End: 2018-03-26
Payer: COMMERCIAL

## 2018-03-26 VITALS
WEIGHT: 177 LBS | HEIGHT: 65 IN | HEART RATE: 75 BPM | SYSTOLIC BLOOD PRESSURE: 152 MMHG | BODY MASS INDEX: 29.49 KG/M2 | DIASTOLIC BLOOD PRESSURE: 94 MMHG

## 2018-03-26 DIAGNOSIS — J44.9 CHRONIC OBSTRUCTIVE PULMONARY DISEASE, UNSPECIFIED COPD TYPE: ICD-10-CM

## 2018-03-26 DIAGNOSIS — I10 ESSENTIAL HYPERTENSION: Primary | ICD-10-CM

## 2018-03-26 DIAGNOSIS — Z99.81 CHRONIC RESPIRATORY FAILURE WITH HYPOXIA, ON HOME O2 THERAPY: ICD-10-CM

## 2018-03-26 DIAGNOSIS — J96.11 CHRONIC RESPIRATORY FAILURE WITH HYPOXIA, ON HOME O2 THERAPY: ICD-10-CM

## 2018-03-26 DIAGNOSIS — I51.81 TAKOTSUBO CARDIOMYOPATHY: ICD-10-CM

## 2018-03-26 PROCEDURE — 3077F SYST BP >= 140 MM HG: CPT | Mod: CPTII,S$GLB,, | Performed by: INTERNAL MEDICINE

## 2018-03-26 PROCEDURE — 3080F DIAST BP >= 90 MM HG: CPT | Mod: CPTII,S$GLB,, | Performed by: INTERNAL MEDICINE

## 2018-03-26 PROCEDURE — 99999 PR PBB SHADOW E&M-EST. PATIENT-LVL III: CPT | Mod: PBBFAC,,, | Performed by: INTERNAL MEDICINE

## 2018-03-26 PROCEDURE — 99214 OFFICE O/P EST MOD 30 MIN: CPT | Mod: S$GLB,,, | Performed by: INTERNAL MEDICINE

## 2018-03-26 NOTE — PROGRESS NOTES
"Subjective:   Patient ID:  Ana Aguila is a 62 y.o. female who presents for follow-up of Hypertension (1 yr f/u )      HPI:   Patient is a 63 y/o F w/ PMHx Takatsubo cardiomyopathy, HTN, severe COPD- now on home O2.  She did have Takatsubo CM several years ago however LV function has recovered.  She denies any exertional chest discomfort, palpitations, TIA's, syncope or presyncope.  Her activity level is limited by her lung disease.      She is in the [facility] HTN management program and well controlled.      131 88 79       3/26/2018  8:03 AM 3/26/2018  8:04 AM   86     3/26/2018  8:03 AM 3/26/2018  8:03  92      3/25/2018  1:43 PM 3/25/2018  1:43  78 84     3/25/2018  1:37 PM 3/25/2018  1:38  78 84     3/24/2018 10:11 AM 3/24/2018 10:11  77 78     3/24/2018  1:51 AM 3/24/2018  1:51 AM   85     3/24/2018  1:51 AM 3/24/2018  1:51  75      3/23/2018  8:21 AM 3/23/2018  8:21 AM   80     3/23/2018  8:21 AM 3/23/2018  8:21  93      3/23/2018  8:05 AM 3/23/2018  8:05  93 82     3/23/2018  7:02 AM 3/23/2018  7:02  92 89     3/23/2018  6:58 AM 3/23/2018  6:58  86 94     3/22/2018 10:05 PM 3/22/2018 10:05  75 91     3/22/2018  7:59 AM 3/22/2018  7:59 AM   79     3/22/2018  7:59 AM 3/22/2018  7:59  86      3/21/2018 10:46 PM 3/21/2018 10:46 PM   82     3/21/2018 10:46 PM 3/21/2018 10:46  77      3/21/2018  8:51 AM 3/21/2018  8:51  87 87     3/21/2018  8:40 AM 3/21/2018  8:40 AM   85     3/21/2018  8:40 AM 3/21/2018  8:40  86      3/20/2018  9:56 PM 3/20/2018  9:56  79 85         Vitals:    03/26/18 0931   BP: (!) 152/94   BP Location: Left arm   Patient Position: Sitting   BP Method: Medium (Manual)   Pulse: 75   Weight: 80.3 kg (177 lb 0.5 oz)   Height: 5' 5" (1.651 m)     Body mass index is 29.46 kg/m².  CrCl cannot be calculated (Patient's most recent lab result is older than the maximum 7 days allowed.).       Lab Results   Component " Value Date     09/07/2017    K 3.4 (L) 09/07/2017     09/07/2017    CO2 32 (H) 09/07/2017    BUN 14 09/07/2017    CREATININE 0.8 09/07/2017    GLU 93 09/07/2017    HGBA1C 5.4 10/18/2016    MG 2.0 10/19/2016    AST 17 06/08/2017    ALT 14 06/08/2017    ALBUMIN 3.8 06/08/2017    PROT 7.0 06/08/2017    BILITOT 0.4 06/08/2017    WBC 8.30 09/07/2017    HGB 13.6 09/07/2017    HCT 42.3 09/07/2017    MCV 91 09/07/2017     09/07/2017    INR 1.2 10/18/2016    TSH 2.072 01/12/2017    CHOL 230 (H) 09/07/2017    HDL 72 09/07/2017    LDLCALC 145.4 09/07/2017    TRIG 63 09/07/2017       Current Outpatient Prescriptions   Medication Sig    albuterol (PROAIR HFA) 90 mcg/actuation inhaler INHALE 2 PUFFS BY MOUTH INTO THE LUNGS FOUR TIMES DAILY prn    albuterol-ipratropium 2.5mg-0.5mg/3mL (DUO-NEB) 0.5 mg-3 mg(2.5 mg base)/3 mL nebulizer solution TAKE 3 MLS BY NEBULIZER EVERY 6 HOURS AS NEEDED FOR WHEEZING OR SHORTNESS OF BREATH    ergocalciferol (ERGOCALCIFEROL) 50,000 unit Cap TAKE 1 CAPSULE BY MOUTH EVERY 7 DAYS    fluticasone-salmeterol 250-50 mcg/dose (ADVAIR DISKUS) 250-50 mcg/dose diskus inhaler INHALE 1 PUFF BY MOUTH TWICE DAILY( EVERY 12 HOURS)    hydroCHLOROthiazide (HYDRODIURIL) 25 MG tablet TAKE 1 TABLET(25 MG) BY MOUTH EVERY MORNING    levalbuterol (XOPENEX) 1.25 mg/0.5 mL nebulizer solution Qid prn for copd    loperamide (IMODIUM) 2 mg capsule Take 2 mg by mouth 4 (four) times daily as needed for Diarrhea.    predniSONE (DELTASONE) 5 MG tablet 1 or 2 tabs daily    psyllium 0.52 gram capsule Take 2 capsules by mouth once daily.     No current facility-administered medications for this visit.      Review of Systems   Constitution: Negative for malaise/fatigue.   Eyes: Negative for visual disturbance.   Cardiovascular: Positive for dyspnea on exertion. Negative for chest pain, claudication, irregular heartbeat, near-syncope, orthopnea and palpitations.   Respiratory: Negative for shortness of  breath and sleep disturbances due to breathing.    Musculoskeletal: Negative for muscle cramps, muscle weakness and myalgias.   Gastrointestinal: Negative for abdominal pain and heartburn.   Genitourinary: Negative for nocturia.   Neurological: Negative for difficulty with concentration, excessive daytime sleepiness, light-headedness, loss of balance, paresthesias and vertigo.       Objective:   Physical Exam   Constitutional: She is oriented to person, place, and time. She appears well-developed and well-nourished.   HENT:   Head: Normocephalic and atraumatic.   Cardiovascular: Normal rate, regular rhythm and normal heart sounds.  Exam reveals no gallop and no friction rub.    No murmur heard.  Pulmonary/Chest: Effort normal and breath sounds normal.   Neurological: She is alert and oriented to person, place, and time.   Psychiatric: She has a normal mood and affect. Her behavior is normal. Judgment and thought content normal.       Assessment:     1. Essential hypertension    2. Takotsubo cardiomyopathy    3. Chronic obstructive pulmonary disease, unspecified COPD type    4. Chronic respiratory failure with hypoxia, on home O2 therapy        Plan:   From a cardiac standpoint, pt is doing well and is clinically stable. Pts lipid profile and BP's are at goal. Pt does not require any cardiac testing at this time

## 2018-04-05 ENCOUNTER — PATIENT OUTREACH (OUTPATIENT)
Dept: OTHER | Facility: OTHER | Age: 63
End: 2018-04-05

## 2018-04-05 NOTE — PROGRESS NOTES
Last 5 Patient Entered Readings                                      Current 30 Day Average: 126/84     Recent Readings 5/11/2018 5/11/2018 5/11/2018 5/9/2018 5/9/2018    SBP (mmHg) 131 119 142 121 132    DBP (mmHg) 86 87 92 85 91    Pulse 78 85 87 89 76        Digital Medicine: Health  Follow Up    Lifestyle Modifications:    1.Dietary Modifications (Sodium intake <2,000mg/day, food labels, dining out): Patient denies changes to her diet.    2.Physical Activity: Ms. Aguila is attending pulmonary rehab on Tuesdays, and physical therapy for back strengthening.    3.Medication Therapy: Patient has been compliant with the medication regimen.    4.Patient has the following medication side effects/concerns: None  (Frequency/Alleviating factors/Precipitating factors, etc.)     Follow up with Ms. Ana Aguila completed. Ms. Aguila is doing okay. She is concerned about fluctuations in her BP. Patient states that when she changes positioning of her arm, she notices that her BP changes. Encouraged patient to take reading with palm facing up. No further questions or concerns. Will continue follow up to achieve health goals.

## 2018-04-07 DIAGNOSIS — E55.9 VITAMIN D DEFICIENCY: ICD-10-CM

## 2018-04-07 DIAGNOSIS — D23.61: Primary | ICD-10-CM

## 2018-04-08 RX ORDER — ERGOCALCIFEROL 1.25 MG/1
CAPSULE ORAL
Qty: 12 CAPSULE | Refills: 1 | Status: SHIPPED | OUTPATIENT
Start: 2018-04-08 | End: 2018-09-27 | Stop reason: SDUPTHER

## 2018-04-09 DIAGNOSIS — J44.9 CHRONIC OBSTRUCTIVE PULMONARY DISEASE, UNSPECIFIED COPD TYPE: ICD-10-CM

## 2018-04-10 ENCOUNTER — PATIENT OUTREACH (OUTPATIENT)
Dept: OTHER | Facility: OTHER | Age: 63
End: 2018-04-10

## 2018-04-10 RX ORDER — IPRATROPIUM BROMIDE AND ALBUTEROL SULFATE 2.5; .5 MG/3ML; MG/3ML
SOLUTION RESPIRATORY (INHALATION)
Qty: 270 ML | Refills: 0 | Status: SHIPPED | OUTPATIENT
Start: 2018-04-10 | End: 2018-04-30 | Stop reason: SDUPTHER

## 2018-04-10 NOTE — PROGRESS NOTES
Last 5 Patient Entered Readings                                      Current 30 Day Average: 122/79     Recent Readings 4/10/2018 4/10/2018 4/9/2018 4/9/2018 4/8/2018    SBP (mmHg) 121 111 110 119 115    DBP (mmHg) 79 80 74 76 78    Pulse 90 88 88 97 96        Patient's BP average is controlled based on 2017 ACC/AHA HTN guidelines of goal BP <130/80.      Ms. Aguila's BP remains at goal. She had a spike in BP at cardiology that concerned her. Reassured her that it is not unusual or uncommon that our BP spikes especially due to diet, pain/discomfort, or lack of rest. She felt better knowing this. She has no other questions or concerns.     Patient's health , Lilly Dumont, will be following up every 3-4 weeks. I will continue to monitor regularly and will follow up in 3-4 months, sooner if BP begins to trend upward or downward.    Patient has my contact information and knows to call with any concerns or clinical changes.     Current HTN regimen:  Hypertension Medications             hydroCHLOROthiazide (HYDRODIURIL) 25 MG tablet TAKE 1 TABLET(25 MG) BY MOUTH EVERY MORNING

## 2018-04-18 ENCOUNTER — CLINICAL SUPPORT (OUTPATIENT)
Dept: REHABILITATION | Facility: HOSPITAL | Age: 63
End: 2018-04-18
Attending: INTERNAL MEDICINE
Payer: COMMERCIAL

## 2018-04-18 DIAGNOSIS — M54.50 CHRONIC MIDLINE LOW BACK PAIN WITHOUT SCIATICA: ICD-10-CM

## 2018-04-18 DIAGNOSIS — M53.86 DECREASED ROM OF LUMBAR SPINE: ICD-10-CM

## 2018-04-18 DIAGNOSIS — G89.29 CHRONIC MIDLINE LOW BACK PAIN WITHOUT SCIATICA: ICD-10-CM

## 2018-04-18 PROCEDURE — 97110 THERAPEUTIC EXERCISES: CPT

## 2018-04-18 NOTE — PROGRESS NOTES
"                                                    Physical Therapy Daily Note     Name: Ana Aguila  [facility] Number: [id number]  Diagnosis:   Encounter Diagnoses   Name Primary?    Chronic midline low back pain without sciatica     Decreased ROM of lumbar spine      Physician: Erik Cool MD  Precautions: O2 @ 2L as needed  Visit #: 11 of 20  PTA Visit #: 3  Time In: 3:30 p  Time Out: 4:00 p  Total Treatment Time 1:1: 30    Evaluation Date: 01/22/2018  Visit # authorized: 20  Authorization period: 12/31/2018  MD referral: Erik Cool MD    Plan of care Expiration: Re-assessed 04/16/2018    Subjective     Pt reports: she had an exacerbation of her COPD, unable to complete therapy, has been very sedentary lately due to increased fatigue.   Pain Scale: Ana rates pain on a scale of 0-10 to be 0 currently.    Objective     LUMBAR SPINE AROM:   Flexion: No diop   Extension: 25% diop   Left Sidebend: 25% diop   Right Sidebend: 25% diop   Left Rotation: No diop   Right Rotation: 25% diop     LOWER EXTREMITY STRENGTH:    Left Right   Quadriceps 5/5 5/5   Hamstrings 5/5 5/5      Iliopsoas 4+/5 4+/5   PGM 4/5 4/5   Hip IR 5/5 5/5   Hip ER 5/5 5/5   Hip Ext 4/5 4/5        Ana received individual therapeutic exercises to develop strength, endurance, ROM, flexibility, posture and core stabilization for 15 minutes including:    *Not performed today - reassessed all tests and measurements for 15 minutes - patient unable to complete exercises due to fatigue and decreased endurance, resume next visit.     Bike 10'  Hamstring Stretch 3x20" each  SKTC 10x10" each-NT  LTR w SB 3x10  PPT with March x30 NT  PPT with BKFO  x30 OTB NT  Bridges 3x10  SLR 3x10  Open Books 10x5"  Hip ABD/ADD 3x10-NT  Piriformis Stretch 3x20" each   Clams 3x10 B RTB  DKTC w SB 3x10    Steamboats B 2x10 on blue foam pad RTB  Rows 3x10 OTB seated on green SB  Extensions 3x10 OTB seated on green SB  Shuttle 2C B 3x10  Thoracic Ext in sitting " "10x10"   Step Ups L2 2x10 alt    Ana received the following manual therapy techniques for 10 min: Joint mobilizations, Myofacial release and Soft tissue  NT  STM and rolling stick to B IT Band    Mobilization were applied to the: L/S paraspinals for 00 minutes including: NT  STM B T/S and L/S paraspinals  L1-L5 central PA glides II-III     The patient received the following direct contact modalities after being cleared for contraindications: Ice 10 min post tx z-lye - pt declined today- NT    Written Home Exercises Provided: Eval  Pt demo good understanding of the education provided. Ana demonstrated good return demonstration of activities.     Education provided re: performance of HEP three times per day to decrease symptoms.  Also educated patient on proper sitting and standing posture.   Ana verbalized good understanding of education provided.   No spiritual or educational barriers to learning provided    Assessment     Patient re-assessed today due to POC expiration. Pt has not been seen for treatment since 3/21/2018 due to exacerbation of COPD. Pt demonstrates slight decline in BLE strength, endurance with standing and AMB, and difficulty with transfers. Pt with increased restrictions at B hamstring and piriformis. Pt with minimal LBP and demonstrates no change in L/S ROM since last reassessment on 3/17. Pt reports chief c/o fatigue and weakness in BLE's. Pt has been bed bound recently with decreased activity, states she has been too tired with SOB. Pt advised to continue HEP daily with focus on improving flexibility until she rebuilds strength and endurance. Pt would benefit from continued skilled PT intervention 1-2x/wk for an additional 4 weeks to address remaining impairments and LTG's.     This is a 62 y.o. female referred to outpatient physical therapy and presents with a medical diagnosis of Low back pain and demonstrates limitations as described in the problem list. Pt prognosis is " Excellent. Pt will continue to benefit from skilled outpatient physical therapy to address the deficits listed in the problem list, provide pt/family education and to maximize pt's level of independence in the home and community environment.     Goals as follows:  Short Term GOALS: 3 weeks. Pt agrees with goals set.  1. Patient demonstrates independence with HEP. (met)  2. Patient demonstrates independence with Postural Awareness. (met)  3. Patient demonstrates independence with body mechanics. (not met, in progress)     Long Term GOALS: 6 weeks. Pt agrees with goals set.  1. Patient demonstrates increased L/S ROM by 25% to improve tolerance to functional activities pain free. (met)  2. Patient demonstrates increased strength BLE's to 4+/5 or greater to improve tolerance to functional activities pain free. (not met, in progress)  3. Patient demonstrates improved overall function per FOTO Lumbar Survey to 20% Limitation or less. (not met, in progress)     Plan      Continue skilled PT 1-2x/wk for an additional 4 weeks to address remaining impairments and LTGs.    Therapist: Janee Juares, PT  4/18/2018

## 2018-04-18 NOTE — PLAN OF CARE
"                                                    Physical Therapy Daily Note     Name: Ana Aguila  [facility] Number: [id number]  Diagnosis:   Encounter Diagnoses   Name Primary?    Chronic midline low back pain without sciatica     Decreased ROM of lumbar spine      Physician: Erik Cool MD  Precautions: O2 @ 2L as needed  Visit #: 11 of 20  PTA Visit #: 3  Time In: 3:30 p  Time Out: 4:00 p  Total Treatment Time 1:1: 30    Evaluation Date: 01/22/2018  Visit # authorized: 20  Authorization period: 12/31/2018  MD referral: Erik Cool MD    Plan of care Expiration: Re-assessed 04/16/2018    Subjective     Pt reports: she had an exacerbation of her COPD, unable to complete therapy, has been very sedentary lately due to increased fatigue.   Pain Scale: Ana rates pain on a scale of 0-10 to be 0 currently.    Objective     LUMBAR SPINE AROM:   Flexion: No diop   Extension: 25% diop   Left Sidebend: 25% diop   Right Sidebend: 25% diop   Left Rotation: No diop   Right Rotation: 25% diop     LOWER EXTREMITY STRENGTH:    Left Right   Quadriceps 5/5 5/5   Hamstrings 5/5 5/5      Iliopsoas 4+/5 4+/5   PGM 4/5 4/5   Hip IR 5/5 5/5   Hip ER 5/5 5/5   Hip Ext 4/5 4/5        Ana received individual therapeutic exercises to develop strength, endurance, ROM, flexibility, posture and core stabilization for 45 minutes including:    Not performed today - reassessed all tests and measurements - patient unable to complete exercises due to fatigue and decreased endurance     Bike 10'  Hamstring Stretch 3x20" each  SKTC 10x10" each-NT  LTR w SB 3x10  PPT with March x30 NT  PPT with BKFO  x30 OTB NT  Bridges 3x10  SLR 3x10  Open Books 10x5"  Hip ABD/ADD 3x10-NT  Piriformis Stretch 3x20" each   Clams 3x10 B RTB  DKTC w SB 3x10    Steamboats B 2x10 on blue foam pad RTB  Rows 3x10 OTB seated on green SB  Extensions 3x10 OTB seated on green SB  Shuttle 2C B 3x10  Thoracic Ext in sitting 10x10"   Step Ups L2 2x10 " altagracia    Ana received the following manual therapy techniques for 10 min: Joint mobilizations, Myofacial release and Soft tissue  NT  STM and rolling stick to B IT Band    Mobilization were applied to the: L/S paraspinals for 00 minutes including: NT  STM B T/S and L/S paraspinals  L1-L5 central PA glides II-III     The patient received the following direct contact modalities after being cleared for contraindications: Ice 10 min post tx z-lye - pt declined today- NT    Written Home Exercises Provided: Eval  Pt demo good understanding of the education provided. Ana demonstrated good return demonstration of activities.     Education provided re: performance of HEP three times per day to decrease symptoms.  Also educated patient on proper sitting and standing posture.   Ana verbalized good understanding of education provided.   No spiritual or educational barriers to learning provided    Assessment     Patient re-assessed today due to POC expiration. Pt has not been seen for treatment since 3/21/2018 due to exacerbation of COPD. Pt demonstrates slight decline in BLE strength, endurance with standing and AMB, and difficulty with transfers. Pt with increased restrictions at B hamstring and piriformis. Pt with minimal LBP and demonstrates no change in L/S ROM since last reassessment on 3/17. Pt reports chief c/o fatigue and weakness in BLE's. Pt has been bed bound recently with decreased activity, states she has been too tired with SOB. Pt advised to continue HEP daily with focus on improving flexibility until she rebuilds strength and endurance. Pt would benefit from continued skilled PT intervention 1-2x/wk for an additional 4 weeks to address remaining impairments and LTG's.     This is a 62 y.o. female referred to outpatient physical therapy and presents with a medical diagnosis of Low back pain and demonstrates limitations as described in the problem list. Pt prognosis is Excellent. Pt will continue to  benefit from skilled outpatient physical therapy to address the deficits listed in the problem list, provide pt/family education and to maximize pt's level of independence in the home and community environment.     Goals as follows:  Short Term GOALS: 3 weeks. Pt agrees with goals set.  1. Patient demonstrates independence with HEP. (met)  2. Patient demonstrates independence with Postural Awareness. (met)  3. Patient demonstrates independence with body mechanics. (not met, in progress)     Long Term GOALS: 6 weeks. Pt agrees with goals set.  1. Patient demonstrates increased L/S ROM by 25% to improve tolerance to functional activities pain free. (met)  2. Patient demonstrates increased strength BLE's to 4+/5 or greater to improve tolerance to functional activities pain free. (not met, in progress)  3. Patient demonstrates improved overall function per FOTO Lumbar Survey to 20% Limitation or less. (not met, in progress)     Plan      Continue skilled PT 1-2x/wk for an additional 4 weeks to address remaining impairments and LTGs.    Therapist: Janee Juares, PT  4/18/2018

## 2018-04-23 ENCOUNTER — TELEPHONE (OUTPATIENT)
Dept: SURGERY | Facility: CLINIC | Age: 63
End: 2018-04-23

## 2018-04-23 NOTE — TELEPHONE ENCOUNTER
Contacted the patient regarding the message below.  The patient did not answer, message left for the patient to contact our office.

## 2018-04-23 NOTE — TELEPHONE ENCOUNTER
Returned the patient call regarding the message below.  The patient is scheduled to be seen on tomorrow Tuesday 4/24/18 at 10:30 am.  The patient voiced understanding of appointment date and time.

## 2018-04-23 NOTE — TELEPHONE ENCOUNTER
----- Message from Ramona Chappell sent at 4/23/2018 10:37 AM CDT -----  Contact: Pt  Pt is returning call and can be reached at [phone].    Thank you

## 2018-04-23 NOTE — TELEPHONE ENCOUNTER
----- Message from Ray Mccall sent at 4/23/2018  9:45 AM CDT -----  [phone]//pt states that she was returning a call in ref to some breast issues she is having/please call/thank you

## 2018-04-24 ENCOUNTER — OFFICE VISIT (OUTPATIENT)
Dept: SURGERY | Facility: CLINIC | Age: 63
End: 2018-04-24
Payer: COMMERCIAL

## 2018-04-24 VITALS
DIASTOLIC BLOOD PRESSURE: 86 MMHG | BODY MASS INDEX: 29.75 KG/M2 | SYSTOLIC BLOOD PRESSURE: 148 MMHG | WEIGHT: 178.56 LBS | HEIGHT: 65 IN | TEMPERATURE: 98 F

## 2018-04-24 DIAGNOSIS — N63.0 BREAST MASS: ICD-10-CM

## 2018-04-24 DIAGNOSIS — N64.4 BREAST PAIN: Primary | ICD-10-CM

## 2018-04-24 DIAGNOSIS — Z80.3 FAMILY HISTORY OF BREAST CANCER: ICD-10-CM

## 2018-04-24 PROCEDURE — 3077F SYST BP >= 140 MM HG: CPT | Mod: CPTII,S$GLB,, | Performed by: NURSE PRACTITIONER

## 2018-04-24 PROCEDURE — 3079F DIAST BP 80-89 MM HG: CPT | Mod: CPTII,S$GLB,, | Performed by: NURSE PRACTITIONER

## 2018-04-24 PROCEDURE — 99212 OFFICE O/P EST SF 10 MIN: CPT | Mod: S$GLB,,, | Performed by: NURSE PRACTITIONER

## 2018-04-24 PROCEDURE — 99999 PR PBB SHADOW E&M-EST. PATIENT-LVL III: CPT | Mod: PBBFAC,,, | Performed by: NURSE PRACTITIONER

## 2018-04-24 NOTE — PROGRESS NOTES
"Subjective:      Patient ID: Ana Aguila is a 62 y.o. female.    Chief Complaint: Breast Cancer Screening (CBE) and Breast Pain (Bilateral Breast )      HPI: (PF, EPF - 1-3) (Detailed, Comp, - 4)returning patient presents with complaints of "lump in both my breasts", upper aspect, onset in Jan , also reports generalized soreness/pain in both breasts and axilla. Denies nipple discharge, redness, increased warmth. She is tearful today, worried about breast cancer secondary to her daughter's history of cancer.     Family history of breast cancer (daughter with negative BRCA1/2).      Right mmg 1/2018 with no abnormality reported   Last bilat mmg 6-    Review of Systems  Objective:   Physical Exam   Pulmonary/Chest: Right breast exhibits tenderness. Right breast exhibits no inverted nipple, no mass, no nipple discharge and no skin change. Left breast exhibits tenderness. Left breast exhibits no inverted nipple, no mass, no nipple discharge and no skin change. Breasts are symmetrical. There is no breast swelling.   She reports generalized soreness in the lateral and upper aspect of both breasts, no focal area identified. She reports feeling a "mass" in the areas of tenderness, only normal breast tissue appreciated.    Lymphadenopathy:     She has no cervical adenopathy.     She has no axillary adenopathy.        Right: No supraclavicular adenopathy present.        Left: No supraclavicular adenopathy present.     Assessment:       1. Breast pain    2. Breast mass    3. Family history of breast cancer        Plan:       Reassurance provided, bilateral and generalized breast tenderness not suspicious for breast cancer, no mass or thickening appreciated on exam today. Discussed s/s of breast cancer. She will return in June for CBE and mmg, desires to see me at that time.   Call for any interval palpable breast mass, pain, nipple discharge, skin changes or other breast related concerns       "

## 2018-04-25 ENCOUNTER — TELEPHONE (OUTPATIENT)
Dept: INTERNAL MEDICINE | Facility: CLINIC | Age: 63
End: 2018-04-25

## 2018-04-25 NOTE — TELEPHONE ENCOUNTER
----- Message from Tiffanie CORRAL Tashi sent at 4/23/2018  1:58 PM CDT -----  Contact: PT Portal Request  Message     Appointment Request From: Ana Aguila    With Provider: Erik Cool MD [-Primary Care Physician-]    Would Accept With:Only the person I've selected    Preferred Date Range: From 4/22/2018 To 4/30/2018    Preferred Times: Any    Reason for visit: Request an Appt    Comments:  Good evening. I have been experiencing pain in my right side and in my stomach.  Please contact me to  schedule an appointment as soon as possible.  Thank you.

## 2018-04-26 ENCOUNTER — CLINICAL SUPPORT (OUTPATIENT)
Dept: REHABILITATION | Facility: HOSPITAL | Age: 63
End: 2018-04-26
Attending: INTERNAL MEDICINE
Payer: COMMERCIAL

## 2018-04-26 DIAGNOSIS — M54.50 CHRONIC MIDLINE LOW BACK PAIN WITHOUT SCIATICA: ICD-10-CM

## 2018-04-26 DIAGNOSIS — G89.29 CHRONIC MIDLINE LOW BACK PAIN WITHOUT SCIATICA: ICD-10-CM

## 2018-04-26 DIAGNOSIS — M53.86 DECREASED ROM OF LUMBAR SPINE: ICD-10-CM

## 2018-04-26 PROCEDURE — 97110 THERAPEUTIC EXERCISES: CPT

## 2018-04-26 NOTE — PROGRESS NOTES
Physical Therapy Daily Note     Name: Ana Aguila  [facility] Number: [id number]  Diagnosis:   Encounter Diagnoses   Name Primary?    Chronic midline low back pain without sciatica     Decreased ROM of lumbar spine      Physician: Erik Cool MD  Precautions: O2 @ 2L as needed  Visit #: 1 of 8  PTA Visit #: 3  Time In: 3:05 p  Time Out: 4:00 p  Total Treatment Time 1:1: 30    Evaluation Date: 01/22/2018  Visit # authorized: 20  Authorization period: 12/31/2018  MD referral: Erik Cool MD    Plan of care Expiration: 05/16/2018    Subjective     Pt reports: feeling much better than last visit.   Pain Scale: Ana rates pain on a scale of 0-10 to be 0 currently.    Objective     POC re-evaluation note from 4/18/2018:    Pt has not been seen for treatment since 3/21/2018 due to exacerbation of COPD. Pt demonstrates slight decline in BLE strength, endurance with standing and AMB, and difficulty with transfers. Pt with increased restrictions at B hamstring and piriformis. Pt with minimal LBP and demonstrates no change in L/S ROM since last reassessment on 3/17. Pt reports chief c/o fatigue and weakness in BLE's. Pt has been bed bound recently with decreased activity, states she has been too tired with SOB. Pt advised to continue HEP daily with focus on improving flexibility until she rebuilds strength and endurance. Pt would benefit from continued skilled PT intervention 1-2x/wk for an additional 4 weeks to address remaining impairments and LTG's.     LUMBAR SPINE AROM:   Flexion: No diop   Extension: 25% diop   Left Sidebend: 25% diop   Right Sidebend: 25% diop   Left Rotation: No diop   Right Rotation: 25% diop     LOWER EXTREMITY STRENGTH:    Left Right   Quadriceps 5/5 5/5   Hamstrings 5/5 5/5      Iliopsoas 4+/5 4+/5   PGM 4/5 4/5   Hip IR 5/5 5/5   Hip ER 5/5 5/5   Hip Ext 4/5 4/5        Ana received individual therapeutic exercises to develop  "strength, endurance, ROM, flexibility, posture and core stabilization for 45 minutes including:    Bike 10'  Hamstring Stretch 3x20" each  SKTC 10x10" each-NT  LTR w SB 3x10  PPT with March x30 NT  PPT with BKFO  x30 OTB NT  Bridges 3x10  SLR 3x10  Open Books 10x5"  Hip ABD/ADD 3x10-NT  Piriformis Stretch 3x20" each   Clams 3x10 B RTB  DKTC w SB 3x10    NT  Steamboats B 2x10 on blue foam pad RTB  Rows 3x10 OTB seated on green SB  Extensions 3x10 OTB seated on green SB  Shuttle 2C B 3x10  Thoracic Ext in sitting 10x10"   Step Ups L2 2x10 alt    Ana received the following manual therapy techniques for 10 min: Joint mobilizations, Myofacial release and Soft tissue  NT  STM and rolling stick to B IT Band    Mobilization were applied to the: L/S paraspinals for 00 minutes including: NT  STM B T/S and L/S paraspinals  L1-L5 central PA glides II-III     The patient received the following direct contact modalities after being cleared for contraindications: Ice 10 min post tx z-lye - pt declined today- NT    Written Home Exercises Provided: Eval  Pt demo good understanding of the education provided. Ana demonstrated good return demonstration of activities.     Education provided re: performance of HEP three times per day to decrease symptoms.  Also educated patient on proper sitting and standing posture.   Ana verbalized good understanding of education provided.   No spiritual or educational barriers to learning provided    Assessment     Patient tolerated tx with no adverse s/s, moderate fatigue. Pt completed bike and all supine exercises today. Will continue to resume standing activities as tolerated.      This is a 62 y.o. female referred to outpatient physical therapy and presents with a medical diagnosis of Low back pain and demonstrates limitations as described in the problem list. Pt prognosis is Excellent. Pt will continue to benefit from skilled outpatient physical therapy to address the deficits listed " in the problem list, provide pt/family education and to maximize pt's level of independence in the home and community environment.     Goals as follows:  Short Term GOALS: 3 weeks. Pt agrees with goals set.  1. Patient demonstrates independence with HEP. (met)  2. Patient demonstrates independence with Postural Awareness. (met)  3. Patient demonstrates independence with body mechanics. (not met, in progress)     Long Term GOALS: 6 weeks. Pt agrees with goals set.  1. Patient demonstrates increased L/S ROM by 25% to improve tolerance to functional activities pain free. (met)  2. Patient demonstrates increased strength BLE's to 4+/5 or greater to improve tolerance to functional activities pain free. (not met, in progress)  3. Patient demonstrates improved overall function per FOTO Lumbar Survey to 20% Limitation or less. (not met, in progress)     Plan      Continue skilled PT 1-2x/wk for an additional 4 weeks to address remaining impairments and LTGs.    Therapist: Janee Juares, PT  4/26/2018

## 2018-04-30 DIAGNOSIS — J44.9 CHRONIC OBSTRUCTIVE PULMONARY DISEASE, UNSPECIFIED COPD TYPE: ICD-10-CM

## 2018-05-01 RX ORDER — FLUTICASONE PROPIONATE AND SALMETEROL 50; 250 UG/1; UG/1
POWDER RESPIRATORY (INHALATION)
Qty: 1 EACH | Refills: 12 | Status: SHIPPED | OUTPATIENT
Start: 2018-05-01 | End: 2019-07-03 | Stop reason: SDUPTHER

## 2018-05-01 RX ORDER — IPRATROPIUM BROMIDE AND ALBUTEROL SULFATE 2.5; .5 MG/3ML; MG/3ML
SOLUTION RESPIRATORY (INHALATION)
Qty: 270 ML | Refills: 0 | Status: SHIPPED | OUTPATIENT
Start: 2018-05-01 | End: 2018-05-05 | Stop reason: SDUPTHER

## 2018-05-05 DIAGNOSIS — J44.9 CHRONIC OBSTRUCTIVE PULMONARY DISEASE, UNSPECIFIED COPD TYPE: ICD-10-CM

## 2018-05-05 RX ORDER — IPRATROPIUM BROMIDE AND ALBUTEROL SULFATE 2.5; .5 MG/3ML; MG/3ML
SOLUTION RESPIRATORY (INHALATION)
Qty: 270 ML | Refills: 0 | Status: SHIPPED | OUTPATIENT
Start: 2018-05-05 | End: 2018-05-11 | Stop reason: SDUPTHER

## 2018-05-07 ENCOUNTER — CLINICAL SUPPORT (OUTPATIENT)
Dept: REHABILITATION | Facility: HOSPITAL | Age: 63
End: 2018-05-07
Attending: INTERNAL MEDICINE
Payer: COMMERCIAL

## 2018-05-07 DIAGNOSIS — M53.86 DECREASED ROM OF LUMBAR SPINE: ICD-10-CM

## 2018-05-07 DIAGNOSIS — M54.50 CHRONIC MIDLINE LOW BACK PAIN WITHOUT SCIATICA: ICD-10-CM

## 2018-05-07 DIAGNOSIS — G89.29 CHRONIC MIDLINE LOW BACK PAIN WITHOUT SCIATICA: ICD-10-CM

## 2018-05-07 PROCEDURE — 97110 THERAPEUTIC EXERCISES: CPT

## 2018-05-07 NOTE — PROGRESS NOTES
"                                                    Physical Therapy Daily Note     Name: Ana Aguila  [facility] Number: [id number]  Diagnosis:   Encounter Diagnoses   Name Primary?    Chronic midline low back pain without sciatica     Decreased ROM of lumbar spine      Physician: Erik Cool MD  Precautions: O2 @ 2L as needed  Visit #: 2 of 8  PTA Visit #: 4  Time In: 3:02 p  Time Out: 3:58 p  Total Treatment Time 1:1: 28    Re-Evaluation Date: 04/18/2018  Visit # authorized: 20  Authorization period: 5/16/18  MD referral: Erik Cool MD    Plan of care Expiration: 05/16/2018    Subjective     Pt reports: feeling alright   Pain Scale: Ana rates pain on a scale of 0-10 to be 0 currently.    Objective        Ana received individual therapeutic exercises to develop strength, endurance, ROM, flexibility, posture and core stabilization for 56 minutes including:    Bike 10'  Hamstring Stretch 3x20" each  LTR w SB 3x10  PPT with March x30 NT  PPT with BKFO  x30 OTB NT  Bridges 3x10  SLR 3x10  Open Books 10x5"  Hip ABD/ADD 3x10-NT  Piriformis Stretch 3x20" each   Clams 3x10 B RTB ( no band this session, return next)  DKTC w SB 3x10    Steamboats B 2x10 on blue foam pad RTB-NT  Rows 3x10 GTB seated on green SB  Extensions 3x10 GTB seated on green SB  Shuttle 2C B 3x10  Thoracic Ext in sitting 10x10"   Step Ups L2 2x10 alt    Ana received the following manual therapy techniques for 10 min: Joint mobilizations, Myofacial release and Soft tissue  NT  STM and rolling stick to B IT Band    Mobilization were applied to the: L/S paraspinals for 00 minutes including: NT  STM B T/S and L/S paraspinals  L1-L5 central PA glides II-III     The patient received the following direct contact modalities after being cleared for contraindications: Ice 10 min post tx z-lye - pt declined today- NT    Written Home Exercises Provided: Eval  Pt demo good understanding of the education provided. Ana demonstrated " good return demonstration of activities.     Education provided re: performance of HEP three times per day to decrease symptoms.  Also educated patient on proper sitting and standing posture.   Ana verbalized good understanding of education provided.   No spiritual or educational barriers to learning provided    Assessment     Patient tolerated cara well w/o adverse reaction. Reintroduced standing cara today with SOB. Cont to progress as leodan. Cont to benefit form skilled PT.      This is a 62 y.o. female referred to outpatient physical therapy and presents with a medical diagnosis of Low back pain and demonstrates limitations as described in the problem list. Pt prognosis is Excellent. Pt will continue to benefit from skilled outpatient physical therapy to address the deficits listed in the problem list, provide pt/family education and to maximize pt's level of independence in the home and community environment.     Goals as follows:  Short Term GOALS: 3 weeks. Pt agrees with goals set.  1. Patient demonstrates independence with HEP. (met)  2. Patient demonstrates independence with Postural Awareness. (met)  3. Patient demonstrates independence with body mechanics. (not met, in progress)     Long Term GOALS: 6 weeks. Pt agrees with goals set.  1. Patient demonstrates increased L/S ROM by 25% to improve tolerance to functional activities pain free. (met)  2. Patient demonstrates increased strength BLE's to 4+/5 or greater to improve tolerance to functional activities pain free. (not met, in progress)  3. Patient demonstrates improved overall function per FOTO Lumbar Survey to 20% Limitation or less. (not met, in progress)     Plan      Continue skilled PT 1-2x/wk for an additional 4 weeks to address remaining impairments and LTGs.    Therapist: Sera Sinclair, PTA  5/7/2018

## 2018-05-11 DIAGNOSIS — J44.9 CHRONIC OBSTRUCTIVE PULMONARY DISEASE, UNSPECIFIED COPD TYPE: ICD-10-CM

## 2018-05-11 RX ORDER — IPRATROPIUM BROMIDE AND ALBUTEROL SULFATE 2.5; .5 MG/3ML; MG/3ML
SOLUTION RESPIRATORY (INHALATION)
Qty: 270 ML | Refills: 0 | Status: SHIPPED | OUTPATIENT
Start: 2018-05-11 | End: 2018-05-15 | Stop reason: SDUPTHER

## 2018-05-15 DIAGNOSIS — J44.9 CHRONIC OBSTRUCTIVE PULMONARY DISEASE, UNSPECIFIED COPD TYPE: ICD-10-CM

## 2018-05-15 RX ORDER — IPRATROPIUM BROMIDE AND ALBUTEROL SULFATE 2.5; .5 MG/3ML; MG/3ML
SOLUTION RESPIRATORY (INHALATION)
Qty: 270 ML | Refills: 0 | Status: SHIPPED | OUTPATIENT
Start: 2018-05-15 | End: 2018-05-19 | Stop reason: SDUPTHER

## 2018-05-19 DIAGNOSIS — J44.9 CHRONIC OBSTRUCTIVE PULMONARY DISEASE, UNSPECIFIED COPD TYPE: ICD-10-CM

## 2018-05-19 RX ORDER — IPRATROPIUM BROMIDE AND ALBUTEROL SULFATE 2.5; .5 MG/3ML; MG/3ML
SOLUTION RESPIRATORY (INHALATION)
Qty: 270 ML | Refills: 0 | Status: SHIPPED | OUTPATIENT
Start: 2018-05-19 | End: 2018-07-14 | Stop reason: SDUPTHER

## 2018-05-25 ENCOUNTER — PATIENT MESSAGE (OUTPATIENT)
Dept: GASTROENTEROLOGY | Facility: CLINIC | Age: 63
End: 2018-05-25

## 2018-05-29 ENCOUNTER — CLINICAL SUPPORT (OUTPATIENT)
Dept: REHABILITATION | Facility: HOSPITAL | Age: 63
End: 2018-05-29
Attending: INTERNAL MEDICINE
Payer: COMMERCIAL

## 2018-05-29 DIAGNOSIS — M53.86 DECREASED ROM OF LUMBAR SPINE: Primary | ICD-10-CM

## 2018-05-29 PROCEDURE — 97110 THERAPEUTIC EXERCISES: CPT

## 2018-05-29 NOTE — PROGRESS NOTES
"                                                    Physical Therapy Daily Note     Name: Ana Aguila  [facility] Number: [id number]  Diagnosis:   Encounter Diagnosis   Name Primary?    Decreased ROM of lumbar spine Yes     Physician: Erik Cool MD  Precautions: O2 @ 2L as needed  Visit #: 3 of 8  PTA Visit #: -  Time In: 1340  Time Out: 1430  Total Treatment Time 1:1: 40    Re-Evaluation Date: 04/18/2018  Visit # authorized: 20  Authorization period: 12/31/18  MD referral: Erik Cool MD    Plan of care Expiration: 05/16/2018; extended 6/29/18    Subjective     Pt reports:0/10 pain pre-tx but reports she is not ready to be discharged. Pt reports that she hasn't been into treatment in a while because she has health issues that prevent her from coming in. Pt reports that she doesn't always perform HEP but does sometimes. She reports she has difficulty with endurance and would like to build it up more before transitioning to the [facility] program. Pt reports that she realized that when she carries her oxygen on her L shoulder she has increased back pain so she tries to carry it in her hand-it just gets heavy sometimes.    Objective      Meausurements    Hip flexion 4+/5 B  Knee flexion 4/5 B  Knee extension 5/5 B  Ankle DF 5/5 B  Lumbar spine ROM FRANCHESCA Perez received individual therapeutic exercises to develop strength, endurance, ROM, flexibility, posture and core stabilization for 40 minutes including:    Bike 10' NP  Hamstring Stretch 3x20" each NP  LTR w SB 3x10  PPT with March x30   PPT with BKFO  x30 OTB   Bridges 3x10  SLR 3x10  Open Books 10x5" NP secondary to pain  Hip ABD/ADD 3x10-NP  Piriformis Stretch 3x20" each   Clams 3x10 B RTB  DKTC w SB 3x10  Steamboats B 2x10 on blue foam pad RTB NP  Rows 3x10 GTB seated on green SB  Extensions 3x10 GTB seated on green SB  Shuttle 2C B 3x10 NP  Thoracic Ext in sitting 10x10" NP  Step Ups L2 2x10 alt NP      The patient received the following direct " contact modalities after being cleared for contraindications: Ice 10 min post tx z-lye - pt declined today- NP    Written Home Exercises Provided: Yeimi  Pt demo good understanding of the education provided. Ana demonstrated good return demonstration of activities.     Education provided re: performance of HEP three times per day to decrease symptoms.  Also educated patient on proper sitting and standing posture.   Ana verbalized good understanding of education provided.   No spiritual or educational barriers to learning provided    PT reviewed FOTO scores for Ana Aguila on 18  FOTO score: 54  46% limited    Functional Limitations Reports - G Codes  Category: mobility  Tool: FOTO      Current ():  CK  Goal (): CJ      Assessment     Patient tolerated therex well; open books held secondary to neck pain with exercise. Pt has not been seen in treatment in 3 weeks, so her POC has  though her visits have not. At this point I am recommending extending POC to ensure transition to Med[facility] program; focus will be on increasing endurance and independence with HEP. Pt will continue to benefit from skilled PT in order to improve strength, endurance, and functional mobility.     This is a 62 y.o. female referred to outpatient physical therapy and presents with a medical diagnosis of Low back pain and demonstrates limitations as described in the problem list. Pt prognosis is Excellent. Pt will continue to benefit from skilled outpatient physical therapy to address the deficits listed in the problem list, provide pt/family education and to maximize pt's level of independence in the home and community environment.     Goals as follows:  Short Term GOALS: 3 weeks. Pt agrees with goals set.  1. Patient demonstrates independence with HEP. (met)  2. Patient demonstrates independence with Postural Awareness. (met)  3. Patient demonstrates independence with body mechanics. (not met, in progress)     Long  Term GOALS: 6 weeks. Pt agrees with goals set.  1. Patient demonstrates increased L/S ROM by 25% to improve tolerance to functional activities pain free. (met)  2. Patient demonstrates increased strength BLE's to 4+/5 or greater to improve tolerance to functional activities pain free. (not met, in progress)  3. Patient demonstrates improved overall function per FOTO Lumbar Survey to 20% Limitation or less. (not met, in progress)     Plan      Continue skilled PT 1-2x/wk for an additional 4 weeks to address remaining impairments and LTGs.    Therapist: Tiffanie Preston, PT  5/29/2018

## 2018-05-31 NOTE — PROGRESS NOTES
CRS Office Visit History and Physical    Referring Md:   Erik Cool Md  [address] [place] [address]    SUBJECTIVE:     Chief Complaint: perianal bump    History of Present Illness:  Patient is a 62 y.o. female presents with perianal abscessn. The patient is an established patient to this practice.   Course is as follows:  2010:  Colonoscopy with polypectomy ([facility]).  Pathology was tubular adenoma   - complicated by post polypectomy bleed   - repeat CSY done with old blood seen.  Perforation of transverse colon   -  Right colectomy ([facility]) for perforation.      She presents today with approximately 2 week history of a painful bulge near her anus. The pain is not worsening but appears to be constant and unchanging. She denies any spontaneous drainage or fevers. She states she has a history of internal hemorrhoids, usually painless with occasional blood when wiping. She has had fecal urgency ever since her right colectomy, has occasional accidents but overall is continent. Has 2-3 BM daily. Denies other complaints at this time. Is not interested in having any more colonoscopies due to her perforation on last colo 2010, she does do stool studies. Denies family history of CRC or IBD.      Last Colonoscopy: 2010:   Tubular adenoma in the cecum    Review of patient's allergies indicates:   Allergen Reactions    Sudafed [pseudoephedrine hcl] Nausea And Vomiting and Other (See Comments)     JITTERY       Past Medical History:   Diagnosis Date    Asthma     Blood transfusion     Chronic diarrhea     COPD (chronic obstructive pulmonary disease)     Hyperlipidemia     Hypertension     Reflux 2013     Past Surgical History:   Procedure Laterality Date    ABDOMINAL SURGERY      APPENDECTOMY      CHOLECYSTECTOMY  2013    open    COLON SURGERY  2011    secondary to perforation after c-scope    COLONOSCOPY      FRACTURE SURGERY      HERNIA REPAIR      HIATAL HERNIA REPAIR  2013    HYSTERECTOMY   "1986    Left leg surgery       Family History   Problem Relation Age of Onset    Cancer Mother         lung    Hypertension Mother     Coronary artery disease Father     Retinal detachment Father     Hypertension Sister     Hypertension Brother     Cataracts Maternal Grandmother     Abnormal EKG Daughter     Breast cancer Daughter 43        negative genetic testing    Breast cancer Other 44    Amblyopia Neg Hx     Blindness Neg Hx     Diabetes Neg Hx     Glaucoma Neg Hx     Macular degeneration Neg Hx     Strabismus Neg Hx     Stroke Neg Hx     Thyroid disease Neg Hx     Colon cancer Neg Hx     Ovarian cancer Neg Hx      Social History   Substance Use Topics    Smoking status: Former Smoker     Packs/day: 1.50     Years: 30.00     Types: Cigarettes     Quit date: 1/4/1997    Smokeless tobacco: Never Used    Alcohol use Yes      Comment: occaissionally        Review of Systems:  Review of Systems   Constitutional: Negative for chills, diaphoresis, fever, malaise/fatigue and weight loss.   HENT: Negative for congestion.    Respiratory: Positive for shortness of breath.    Cardiovascular: Positive for leg swelling. Negative for chest pain.   Gastrointestinal: Negative for abdominal pain, blood in stool, constipation, nausea and vomiting.   Genitourinary: Negative for dysuria.   Musculoskeletal: Negative for back pain and myalgias.   Skin: Negative for rash.   Neurological: Negative for dizziness and weakness.   Endo/Heme/Allergies: Does not bruise/bleed easily.   Psychiatric/Behavioral: Negative for depression.       OBJECTIVE:     Vital Signs (Most Recent)  BP (!) 141/84 (BP Location: Right arm, Patient Position: Sitting, BP Method: Large (Automatic))   Pulse 90   Ht 5' 5" (1.651 m)   Wt 80.6 kg (177 lb 11.1 oz)   LMP  (LMP Unknown)   BMI 29.57 kg/m²     Physical Exam:  General: Black or  female in no distress   Neuro: alert and oriented x 4.  Moves all extremities.   "   HEENT: no icterus.  Trachea midline  Respiratory: respirations are even and unlabored  Cardiac: regular rate  Abdomen: soft  Extremities: Warm dry and intact  Skin: no rashes  Anorectal: Left posterior area of induration by anal verge without any drainage or erythema. Mildly tender to palpation. MIRA with good tone, no prolapsing or enlarged internal hemorrhoids, no evidence of internal fistula opening.        ASSESSMENT/PLAN:     Diagnoses and all orders for this visit:    Perianal abscess    Discussed in detail with patient. Verbal consent obtained for office incision and drainage of abscess. RTC in 2 weeks.    Procedure : Incision and Drainage    The procedure was explained to the patient and gave verbal informed consent for Incision and Drainage.  The area was cleansed with betadine.  The skin overlying the abscess was anesthetized with subcutaneous infiltration of 1% lidocaine with epi.   The abscess in the left posterolateral position was incised with a #11 scalpel and the abscess cavity was drained with sanguinopurulent fluid removed.  Dry dressings were placed.    The patient tolerated the procedure well, without complications.      Postoperative instructions were given to patient.      FREDA Moreira MD  Staff Surgeon  Colon & Rectal Surgery

## 2018-06-01 ENCOUNTER — OFFICE VISIT (OUTPATIENT)
Dept: SURGERY | Facility: CLINIC | Age: 63
End: 2018-06-01
Payer: COMMERCIAL

## 2018-06-01 VITALS
WEIGHT: 177.69 LBS | HEART RATE: 90 BPM | DIASTOLIC BLOOD PRESSURE: 84 MMHG | HEIGHT: 65 IN | SYSTOLIC BLOOD PRESSURE: 141 MMHG | BODY MASS INDEX: 29.61 KG/M2

## 2018-06-01 DIAGNOSIS — K61.0 PERIANAL ABSCESS: ICD-10-CM

## 2018-06-01 PROCEDURE — 3079F DIAST BP 80-89 MM HG: CPT | Mod: CPTII,S$GLB,, | Performed by: COLON & RECTAL SURGERY

## 2018-06-01 PROCEDURE — 99999 PR PBB SHADOW E&M-EST. PATIENT-LVL II: CPT | Mod: PBBFAC,,, | Performed by: COLON & RECTAL SURGERY

## 2018-06-01 PROCEDURE — 3008F BODY MASS INDEX DOCD: CPT | Mod: CPTII,S$GLB,, | Performed by: COLON & RECTAL SURGERY

## 2018-06-01 PROCEDURE — 46050 I&D PERIANAL ABSCESS SUPFC: CPT | Mod: S$GLB,,, | Performed by: COLON & RECTAL SURGERY

## 2018-06-01 PROCEDURE — 99203 OFFICE O/P NEW LOW 30 MIN: CPT | Mod: 25,S$GLB,, | Performed by: COLON & RECTAL SURGERY

## 2018-06-01 PROCEDURE — 3077F SYST BP >= 140 MM HG: CPT | Mod: CPTII,S$GLB,, | Performed by: COLON & RECTAL SURGERY

## 2018-06-01 NOTE — LETTER
June 1, 2018      Erik Cool MD  [address]  [facility] [address]           [facility]  [address]  [facility] [address]  Phone: [phone]          Patient: Ana Aguila   MR Number: [id number]   YOB: 1955   Date of Visit: 6/1/2018       Dear Dr. Erik Cool:    Thank you for referring Ana Aguila to me for evaluation. Attached you will find relevant portions of my assessment and plan of care.    If you have questions, please do not hesitate to call me. I look forward to following Ana Aguila along with you.    Sincerely,    Manpreet Moreira MD    Enclosure  CC:  No Recipients    If you would like to receive this communication electronically, please contact [email or web address] or [phone] to request more information on [employer] access.    For providers and/or their staff who would like to refer a patient to [facility], please contact us through our one-stop-shop provider referral line, [facility], at [phone].    If you feel you have received this communication in error or would no longer like to receive these types of communications, please e-mail [email or web address]

## 2018-06-04 ENCOUNTER — TELEPHONE (OUTPATIENT)
Dept: INTERNAL MEDICINE | Facility: CLINIC | Age: 63
End: 2018-06-04

## 2018-06-04 ENCOUNTER — CLINICAL SUPPORT (OUTPATIENT)
Dept: REHABILITATION | Facility: HOSPITAL | Age: 63
End: 2018-06-04
Attending: INTERNAL MEDICINE
Payer: COMMERCIAL

## 2018-06-04 DIAGNOSIS — M53.86 DECREASED ROM OF LUMBAR SPINE: ICD-10-CM

## 2018-06-04 DIAGNOSIS — M54.40 LOW BACK PAIN WITH SCIATICA, SCIATICA LATERALITY UNSPECIFIED, UNSPECIFIED BACK PAIN LATERALITY, UNSPECIFIED CHRONICITY: Primary | ICD-10-CM

## 2018-06-04 PROCEDURE — 97140 MANUAL THERAPY 1/> REGIONS: CPT

## 2018-06-04 PROCEDURE — 97110 THERAPEUTIC EXERCISES: CPT

## 2018-06-04 NOTE — TELEPHONE ENCOUNTER
Patient due for annual exam with fasting labs prior to visit, please contact to schedule. OK to schedule for next available EPP unless patient has concerns she would like to address before then - EP follow up or urgent/sameday ok as needed.

## 2018-06-04 NOTE — PROGRESS NOTES
"                                                    Physical Therapy Daily Note     Name: Ana Aguila  [facility] Number: [id number]  Diagnosis:   Encounter Diagnoses   Name Primary?    Low back pain with sciatica, sciatica laterality unspecified, unspecified back pain laterality, unspecified chronicity Yes    Decreased ROM of lumbar spine      Physician: Erik Cool MD  Precautions: O2 @ 2L as needed  Visit #: 4 of 8  PTA Visit #: -  Time In: 1505  Time Out: 1600  Total Treatment Time 1:1: 55    Re-Evaluation Date: 04/18/2018  Visit # authorized: 20  Authorization period: 12/31/18  MD referral: Erik Cool MD    Plan of care Expiration: 05/16/2018; extended 6/29/18    Subjective     Pt reports:that she has a little bit of midline LBP that she thinks is from carrying her oxygen and she needs to stop carrying it around her shoulder. Pt reports that she had an abscess removed from her buttocks area on Saturday so she is unable to sit on hard surfaces. Pt reports she is going to attempt the mat exercises but may not be able to do everything because of the pain.    Objective        Ana received individual therapeutic exercises to develop strength, endurance, ROM, flexibility, posture and core stabilization for 47 minutes including:    Bike 10' NP -pt refused due to recent abscess removal  Hamstring Stretch 3x20" each NP  LTR w SB 3x10  PPT with March x30   PPT with BKFO  x30   Bridges 3x10  SLR 3x10 NP  Hip ABD/ADD 3x10-NP  Piriformis Stretch 3x20" each NP  Clams 3x10 B BTB  DKTC w SB 3x10 NP  Steamboats B 1x10 on blue foam pad  Rows 3x10 GTB seated on green SB (not on SB today)  Extensions 3x10 GTB seated on green SB (not on SB today)  Shuttle 2C B 3x10 NP  Thoracic Ext in sitting 10x10" NP  Step Ups L2 2x20 alternating with seated break between      Pt received the following manual therapy techniques for 8 minutes   STM with stick to B ITB    The patient received the following direct contact " modalities after being cleared for contraindications: Ice 10 min post tx z-lye - pt declined today- NP    Written Home Exercises Provided: Eval  Pt demo good understanding of the education provided. Ana demonstrated good return demonstration of activities.     Education provided re: performance of HEP three times per day to decrease symptoms.  Also educated patient on proper sitting and standing posture.   Ana verbalized good understanding of education provided.   No spiritual or educational barriers to learning provided    PT reviewed FOTO scores for Ana Aguila on 5/29/18  FOTO score: 54  46% limited    Functional Limitations Reports - G Codes  Category: mobility  Tool: FOTO      Current ():  CK  Goal (): CJ      Assessment     Patient tolerated therex well. Due to recent abscess removal near sacrum, patient unable to perform certain exercises such as SLR and pt unable to tolerate bike seat. Pt not back up to full endurance from when she last had a lapse in therapy as seen through decreased repetitions of exercises. Pt will continue to benefit from skilled PT in order to improve strength and endurance, and improve functional mobility.     This is a 62 y.o. female referred to outpatient physical therapy and presents with a medical diagnosis of Low back pain and demonstrates limitations as described in the problem list. Pt prognosis is Excellent. Pt will continue to benefit from skilled outpatient physical therapy to address the deficits listed in the problem list, provide pt/family education and to maximize pt's level of independence in the home and community environment.     Goals as follows:  Short Term GOALS: 3 weeks. Pt agrees with goals set.  1. Patient demonstrates independence with HEP. (met)  2. Patient demonstrates independence with Postural Awareness. (met)  3. Patient demonstrates independence with body mechanics. (not met, in progress)     Long Term GOALS: 6 weeks. Pt agrees with  goals set.  1. Patient demonstrates increased L/S ROM by 25% to improve tolerance to functional activities pain free. (met)  2. Patient demonstrates increased strength BLE's to 4+/5 or greater to improve tolerance to functional activities pain free. (not met, in progress)  3. Patient demonstrates improved overall function per FOTO Lumbar Survey to 20% Limitation or less. (not met, in progress)     Plan      Continue skilled PT 1-2x/wk for an additional 4 weeks to address remaining impairments and LTGs.    Therapist: Tiffanie Preston, PT  6/4/2018

## 2018-06-15 ENCOUNTER — OFFICE VISIT (OUTPATIENT)
Dept: SURGERY | Facility: CLINIC | Age: 63
End: 2018-06-15
Payer: COMMERCIAL

## 2018-06-15 VITALS
SYSTOLIC BLOOD PRESSURE: 139 MMHG | DIASTOLIC BLOOD PRESSURE: 82 MMHG | BODY MASS INDEX: 30.12 KG/M2 | WEIGHT: 180.75 LBS | HEART RATE: 80 BPM | HEIGHT: 65 IN

## 2018-06-15 DIAGNOSIS — K61.0 PERIANAL ABSCESS: ICD-10-CM

## 2018-06-15 DIAGNOSIS — K60.3 PERIANAL FISTULA: Primary | ICD-10-CM

## 2018-06-15 PROCEDURE — 99213 OFFICE O/P EST LOW 20 MIN: CPT | Mod: S$GLB,,, | Performed by: COLON & RECTAL SURGERY

## 2018-06-15 PROCEDURE — 99999 PR PBB SHADOW E&M-EST. PATIENT-LVL III: CPT | Mod: PBBFAC,,, | Performed by: COLON & RECTAL SURGERY

## 2018-06-15 PROCEDURE — 3008F BODY MASS INDEX DOCD: CPT | Mod: CPTII,S$GLB,, | Performed by: COLON & RECTAL SURGERY

## 2018-06-15 PROCEDURE — 3075F SYST BP GE 130 - 139MM HG: CPT | Mod: CPTII,S$GLB,, | Performed by: COLON & RECTAL SURGERY

## 2018-06-15 PROCEDURE — 3079F DIAST BP 80-89 MM HG: CPT | Mod: CPTII,S$GLB,, | Performed by: COLON & RECTAL SURGERY

## 2018-06-18 ENCOUNTER — PATIENT OUTREACH (OUTPATIENT)
Dept: OTHER | Facility: OTHER | Age: 63
End: 2018-06-18

## 2018-06-18 NOTE — PROGRESS NOTES
"Last 5 Patient Entered Readings                                      Current 30 Day Average: 122/87     Recent Readings 6/18/2018 6/18/2018 6/18/2018 6/18/2018 6/17/2018    SBP (mmHg) 114 119 131 136 120    DBP (mmHg) 85 92 84 84 88    Pulse 90 87 85 85 92        Digital Medicine: Health  Follow Up    Lifestyle Modifications:    1.Dietary Modifications (Sodium intake <2,000mg/day, food labels, dining out): Deferred    2.Physical Activity: Deferred    3.Medication Therapy: Patient has been compliant with the medication regimen.    4.Patient has the following medication side effects/concerns: None  (Frequency/Alleviating factors/Precipitating factors, etc.)     Follow up with Ms. Ana Aguila completed. Ms. Aguila is not doing to well. Patient states that she has been having "some difficulties" she's been trying to deal with. Ms. Aguila reports that her BP is making her quiet anxious. However, Ms. Aguila will continue to monitor. Encouraged patient to try deep breathing before checking her BP to help calm her down. Also encouraged patient to check BP in the same sitting position in order to avoid error. Patient verbalized understanding. No further questions or concerns. Will continue follow up to achieve health goals.          "

## 2018-06-19 ENCOUNTER — CLINICAL SUPPORT (OUTPATIENT)
Dept: REHABILITATION | Facility: HOSPITAL | Age: 63
End: 2018-06-19
Attending: INTERNAL MEDICINE
Payer: COMMERCIAL

## 2018-06-19 DIAGNOSIS — M54.40 LOW BACK PAIN WITH SCIATICA, SCIATICA LATERALITY UNSPECIFIED, UNSPECIFIED BACK PAIN LATERALITY, UNSPECIFIED CHRONICITY: Primary | ICD-10-CM

## 2018-06-19 DIAGNOSIS — M53.86 DECREASED ROM OF LUMBAR SPINE: ICD-10-CM

## 2018-06-19 PROCEDURE — 97110 THERAPEUTIC EXERCISES: CPT

## 2018-06-19 NOTE — PROGRESS NOTES
"                                                    Physical Therapy Daily Note     Name: Ana Aguila  [facility] Number: [id number]  Diagnosis:   Encounter Diagnoses   Name Primary?    Low back pain with sciatica, sciatica laterality unspecified, unspecified back pain laterality, unspecified chronicity Yes    Decreased ROM of lumbar spine      Physician: Erik Cool MD  Precautions: O2 @ 2L as needed  Visit #: 5 of 8  PTA Visit #: -  Time In: 1330  Time Out: 1420  Total Treatment Time 1:1: 40    Re-Evaluation Date: 04/18/2018  Visit # authorized: 20  Authorization period: 12/31/18  MD referral: Erik Cool MD    Plan of care Expiration: 05/16/2018; extended 6/29/18    Subjective     Pt reports:that the she didn't come last week because of her issues with the cyst. Pt reports that is going much better and she isn't having pain when sitting anymore. Pt reports she's not really having a lot of pain in her back.    Objective        Ana received individual therapeutic exercises to develop strength, endurance, ROM, flexibility, posture and core stabilization for 40 minutes including:    NuStep 5'  Pelvic tilt 10x5"  PPT with March x30   PPT with BKFO  x20   Hamstring Stretch 3x30" each   LTR w SB 3x10 NP  Bridges 3x10  SLR 3x10   Hip ABD/ADD 3x10-NP  Piriformis Stretch 3x30" each  clams 3x10 B BTB  DKTC w SB 3x10 NP  Steamboats B 2x10 on blue foam pad  Rows 3x10 GTB seated on green SB NP  Extensions 3x10 GTB seated on green SB NP  Shuttle 2C B 3x10 NP  Thoracic Ext in sitting 10x10" NP  Step Ups L3 2x20 alternating with seated break between      Pt received the following manual therapy techniques for 0 minutes: NP   STM with stick to B ITB    The patient received the following direct contact modalities after being cleared for contraindications: Ice 10 min post tx z-lye - pt declined today- NP    Written Home Exercises Provided: Eval  Pt demo good understanding of the education provided. Ana " demonstrated good return demonstration of activities.     Education provided re: performance of HEP three times per day to decrease symptoms.  Also educated patient on proper sitting and standing posture.   Ana verbalized good understanding of education provided.   No spiritual or educational barriers to learning provided    PT reviewed FOTO scores for Ana Aguila on 5/29/18  FOTO score: 54  46% limited    Functional Limitations Reports - G Codes  Category: mobility  Tool: FOTO      Current ():  CK  Goal (): CJ      Assessment     Patient tolerated therex well. Pt demos improved LE strength with steamboats and step ups. Pt with reports of fatigue at end of session so tx cut 10 minutes short. Pt will continue to benefit from skilled PT in order to improve strength and endurance, and improve functional mobility.     This is a 62 y.o. female referred to outpatient physical therapy and presents with a medical diagnosis of Low back pain and demonstrates limitations as described in the problem list. Pt prognosis is Excellent. Pt will continue to benefit from skilled outpatient physical therapy to address the deficits listed in the problem list, provide pt/family education and to maximize pt's level of independence in the home and community environment.     Goals as follows:  Short Term GOALS: 3 weeks. Pt agrees with goals set.  1. Patient demonstrates independence with HEP. (met)  2. Patient demonstrates independence with Postural Awareness. (met)  3. Patient demonstrates independence with body mechanics. (not met, in progress)     Long Term GOALS: 6 weeks. Pt agrees with goals set.  1. Patient demonstrates increased L/S ROM by 25% to improve tolerance to functional activities pain free. (met)  2. Patient demonstrates increased strength BLE's to 4+/5 or greater to improve tolerance to functional activities pain free. (not met, in progress)  3. Patient demonstrates improved overall function per FOTO  Lumbar Survey to 20% Limitation or less. (not met, in progress)     Plan      Continue skilled PT 1-2x/wk for an additional 4 weeks to address remaining impairments and LTGs.    Therapist: Tiffanie Preston, PT  6/19/2018

## 2018-06-20 ENCOUNTER — CLINICAL SUPPORT (OUTPATIENT)
Dept: REHABILITATION | Facility: HOSPITAL | Age: 63
End: 2018-06-20
Attending: INTERNAL MEDICINE
Payer: COMMERCIAL

## 2018-06-20 DIAGNOSIS — M53.86 DECREASED ROM OF LUMBAR SPINE: ICD-10-CM

## 2018-06-20 DIAGNOSIS — M54.40 ACUTE RIGHT-SIDED LOW BACK PAIN WITH SCIATICA, SCIATICA LATERALITY UNSPECIFIED: Primary | ICD-10-CM

## 2018-06-20 PROCEDURE — 97110 THERAPEUTIC EXERCISES: CPT

## 2018-06-20 NOTE — PROGRESS NOTES
"                                                    Physical Therapy Daily Note     Name: Ana Aguila  [facility] Number: [id number]  Diagnosis:   Encounter Diagnoses   Name Primary?    Acute right-sided low back pain with sciatica, sciatica laterality unspecified Yes    Decreased ROM of lumbar spine      Physician: Erik Cool MD  Precautions: O2 @ 2L as needed  Visit #: 6 of 8  PTA Visit #: -  Time In: 1400  Time Out: 1500  Total Treatment Time 1:1: 25    Re-Evaluation Date: 04/18/2018  Visit # authorized: 20  Authorization period: 12/31/18  MD referral: Erik Cool MD    Plan of care Expiration: 05/16/2018; extended 6/29/18    Subjective     Pt reports:she had to come two days in a row due to scheduling. Pt reports the step exercise at the end of treatment  Really wore her out yesterday.    Objective        Ana received individual therapeutic exercises to develop strength, endurance, ROM, flexibility, posture and core stabilization for 25 minutes including:    NuStep 5'  Pelvic tilt 20x5"  PPT with March x30   PPT with BKFO  x30   Hamstring Stretch 3x30" each   LTR w SB 3x10 NP  Bridges 3x10  SLR 3x10   Hip ABD/ADD 3x10-NP  Piriformis Stretch 3x30" each  clams 3x10 B BTB  DKTC w SB 3x10 NP  Steamboats B 2x10 on blue foam pad  Rows 3x10 GTB seated on green SB NP  Extensions 3x10 GTB seated on green SB NP  Shuttle 2C B 3x10 NP  Thoracic Ext in sitting 10x10" NP  Step Ups L3 2x20 alternating with seated break between      Pt received the following manual therapy techniques for 0 minutes: NP   STM with stick to B ITB    The patient received the following direct contact modalities after being cleared for contraindications: Ice 10 min post tx z-lye - pt declined today- NP    Written Home Exercises Provided: Eval  Pt demo good understanding of the education provided. Ana demonstrated good return demonstration of activities.     Education provided re: performance of HEP three times per day to " decrease symptoms.  Also educated patient on proper sitting and standing posture.   Ana verbalized good understanding of education provided.   No spiritual or educational barriers to learning provided    PT reviewed FOTO scores for Ana Aguila on 5/29/18  FOTO score: 54  46% limited    Functional Limitations Reports - G Codes  Category: mobility  Tool: FOTO      Current ():  CK  Goal (): CJ      Assessment     Patient tolerated therex well. Pt demos improved LE strength with steamboats and step ups. Tx not advanced as patient was fatigued after a session 24 hours ago. Pt continues to be limited by decreased exercise tolerance, endurance, strength, and oxygen saturation. Pt will continue to benefit from skilled PT in order to improve strength and endurance, and improve functional mobility.     This is a 62 y.o. female referred to outpatient physical therapy and presents with a medical diagnosis of Low back pain and demonstrates limitations as described in the problem list. Pt prognosis is Excellent. Pt will continue to benefit from skilled outpatient physical therapy to address the deficits listed in the problem list, provide pt/family education and to maximize pt's level of independence in the home and community environment.     Goals as follows:  Short Term GOALS: 3 weeks. Pt agrees with goals set.  1. Patient demonstrates independence with HEP. (met)  2. Patient demonstrates independence with Postural Awareness. (met)  3. Patient demonstrates independence with body mechanics. (not met, in progress)     Long Term GOALS: 6 weeks. Pt agrees with goals set.  1. Patient demonstrates increased L/S ROM by 25% to improve tolerance to functional activities pain free. (met)  2. Patient demonstrates increased strength BLE's to 4+/5 or greater to improve tolerance to functional activities pain free. (not met, in progress)  3. Patient demonstrates improved overall function per FOTO Lumbar Survey to 20%  Limitation or less. (not met, in progress)     Plan      Continue skilled PT 1-2x/wk for an additional 4 weeks to address remaining impairments and LTGs.    Therapist: Tiffanie Preston, PT  6/20/2018

## 2018-06-26 ENCOUNTER — OFFICE VISIT (OUTPATIENT)
Dept: SURGERY | Facility: CLINIC | Age: 63
End: 2018-06-26
Payer: COMMERCIAL

## 2018-06-26 ENCOUNTER — HOSPITAL ENCOUNTER (OUTPATIENT)
Dept: RADIOLOGY | Facility: HOSPITAL | Age: 63
Discharge: HOME | End: 2018-06-26
Attending: NURSE PRACTITIONER
Payer: COMMERCIAL

## 2018-06-26 VITALS
SYSTOLIC BLOOD PRESSURE: 130 MMHG | HEIGHT: 65 IN | TEMPERATURE: 99 F | BODY MASS INDEX: 29.8 KG/M2 | DIASTOLIC BLOOD PRESSURE: 88 MMHG | WEIGHT: 178.88 LBS

## 2018-06-26 DIAGNOSIS — Z12.31 SCREENING MAMMOGRAM, ENCOUNTER FOR: ICD-10-CM

## 2018-06-26 DIAGNOSIS — Z12.39 SCREENING BREAST EXAMINATION: Primary | ICD-10-CM

## 2018-06-26 DIAGNOSIS — Z80.3 FAMILY HISTORY OF BREAST CANCER: ICD-10-CM

## 2018-06-26 PROCEDURE — 99212 OFFICE O/P EST SF 10 MIN: CPT | Mod: S$GLB,,, | Performed by: NURSE PRACTITIONER

## 2018-06-26 PROCEDURE — 99999 PR PBB SHADOW E&M-EST. PATIENT-LVL III: CPT | Mod: PBBFAC,,, | Performed by: NURSE PRACTITIONER

## 2018-06-26 PROCEDURE — 77067 SCR MAMMO BI INCL CAD: CPT | Mod: 26,,, | Performed by: RADIOLOGY

## 2018-06-26 PROCEDURE — 77063 BREAST TOMOSYNTHESIS BI: CPT | Mod: 26,,, | Performed by: RADIOLOGY

## 2018-06-26 PROCEDURE — 3079F DIAST BP 80-89 MM HG: CPT | Mod: CPTII,S$GLB,, | Performed by: NURSE PRACTITIONER

## 2018-06-26 PROCEDURE — 3075F SYST BP GE 130 - 139MM HG: CPT | Mod: CPTII,S$GLB,, | Performed by: NURSE PRACTITIONER

## 2018-06-26 PROCEDURE — 77067 SCR MAMMO BI INCL CAD: CPT | Mod: TC,PO

## 2018-06-26 PROCEDURE — 3008F BODY MASS INDEX DOCD: CPT | Mod: CPTII,S$GLB,, | Performed by: NURSE PRACTITIONER

## 2018-06-26 NOTE — PROGRESS NOTES
Subjective:      Patient ID: Ana Aguila is a 62 y.o. female.    Chief Complaint: Breast Cancer Screening (CBE)      HPI: (PF, EPF - 1-3) (Detailed, Comp, - 4) returning patient presents for breast cancer screening and f/u. Denies palpable breast changes, focal breast pain, nipple discharge, redness, increased warmth.       Family history of breast cancer (daughter with negative BRCA1/2).       Right mmg 1/16/2018 with no abnormality reported   Last bilat mmg 6-      Review of Systems  Objective:   Physical Exam   Pulmonary/Chest: Right breast exhibits no inverted nipple, no mass, no nipple discharge, no skin change and no tenderness. Left breast exhibits no inverted nipple, no mass, no nipple discharge, no skin change and no tenderness. Breasts are symmetrical. There is no breast swelling.   Lymphadenopathy:     She has no cervical adenopathy.     She has no axillary adenopathy.        Right: No supraclavicular adenopathy present.        Left: No supraclavicular adenopathy present.     Assessment:       1. Screening breast examination    2. Family history of breast cancer        Plan:       mmg today, results pending, if no changes or abnormality she can return prn and can see her GYN or PCP for annual breast cancer screenings

## 2018-06-27 ENCOUNTER — CLINICAL SUPPORT (OUTPATIENT)
Dept: REHABILITATION | Facility: HOSPITAL | Age: 63
End: 2018-06-27
Attending: INTERNAL MEDICINE
Payer: COMMERCIAL

## 2018-06-27 DIAGNOSIS — M53.86 DECREASED ROM OF LUMBAR SPINE: ICD-10-CM

## 2018-06-27 DIAGNOSIS — M54.40 ACUTE RIGHT-SIDED LOW BACK PAIN WITH SCIATICA, SCIATICA LATERALITY UNSPECIFIED: Primary | ICD-10-CM

## 2018-06-27 PROCEDURE — 97110 THERAPEUTIC EXERCISES: CPT

## 2018-06-27 NOTE — PROGRESS NOTES
"                                                    Physical Therapy Daily Note     Name: Ana Aguila  [facility] Number: [id number]  Diagnosis:   Encounter Diagnoses   Name Primary?    Acute right-sided low back pain with sciatica, sciatica laterality unspecified Yes    Decreased ROM of lumbar spine      Physician: Erik Cool MD  Precautions: O2 @ 2L as needed  Visit #: 7 of 8  PTA Visit #: -  Time In: 1333  Time Out: 1430  Total Treatment Time 1:1: 57    Re-Evaluation Date: 04/18/2018  Visit # authorized: 20  Authorization period: 12/31/18  MD referral: Erik Cool MD    Plan of care Expiration: 05/16/2018; extended 6/29/18    Subjective     Pt reports:no pain pre-tx. Reports her calves feel tight due to the new exercises. Pt reports it is very hot outside and they recommended people with breathing conditions stay inside so she needs to rest more before starting exercise.    Objective        Ana received individual therapeutic exercises to develop strength, endurance, ROM, flexibility, posture and core stabilization for 57  minutes including:    NuStep 5'  Pelvic tilt 20x5"  PPT with March x30   PPT with BKFO  x30   Hamstring Stretch 3x30" each   LTR w SB 3x10   Bridges with adduction squeeze 3x10  SLR 2x10   Piriformis Stretch 3x30" each  clams 3x10 B BTB  DKTC w SB 3x10   Steamboats B 2x10 on blue foam pad  Rows 3x10 GTB seated on green SB NP  Extensions 3x10 GTB seated on green SB NP  Shuttle 2C B 3x10 NP  Step Ups L3 x20 B      The patient received the following direct contact modalities after being cleared for contraindications: Ice 10 min post tx z-lye - pt declined today- NP    Written Home Exercises Provided: Ppt, PPT with march, hamstring stretch, piriformis stretch, clamshells, slr, bridges with adduction, step ups, and ltr  Pt demo good understanding of the education provided. Ana demonstrated good return demonstration of activities.     Education provided re: performance of HEP " three times per day to decrease symptoms.  Also educated patient on proper sitting and standing posture.   Ana verbalized good understanding of education provided.   No spiritual or educational barriers to learning provided    PT reviewed FOTO scores for Ana Aguila on 5/29/18  FOTO score: 54  46% limited    Functional Limitations Reports - G Codes  Category: mobility  Tool: FOTO      Current ():  CK  Goal (): CJ      Assessment     Patient tolerated therex well. Pt given extensive HEP as listed above and band for clamshells at home. Pt continues to be limited by decreased exercise tolerance, endurance, strength, and oxygen saturation. Anticipate discharge from PT next visit with HEP and referral to [facility] program. Pt will continue to benefit from skilled PT in order to improve strength and endurance, and improve functional mobility.  This is a 62 y.o. female referred to outpatient physical therapy and presents with a medical diagnosis of Low back pain and demonstrates limitations as described in the problem list. Pt prognosis is Excellent. Pt will continue to benefit from skilled outpatient physical therapy to address the deficits listed in the problem list, provide pt/family education and to maximize pt's level of independence in the home and community environment.     Goals as follows:  Short Term GOALS: 3 weeks. Pt agrees with goals set.  1. Patient demonstrates independence with HEP. (met)  2. Patient demonstrates independence with Postural Awareness. (met)  3. Patient demonstrates independence with body mechanics. (not met, in progress)     Long Term GOALS: 6 weeks. Pt agrees with goals set.  1. Patient demonstrates increased L/S ROM by 25% to improve tolerance to functional activities pain free. (met)  2. Patient demonstrates increased strength BLE's to 4+/5 or greater to improve tolerance to functional activities pain free. (not met, in progress)  3. Patient demonstrates improved overall  function per FOTO Lumbar Survey to 20% Limitation or less. (not met, in progress)     Plan      Continue per POC. Plan to d/c tomorrow with HEP and Med[facility] referral.    Therapist: Tiffanie Preston, PT  6/27/2018

## 2018-06-28 ENCOUNTER — PATIENT MESSAGE (OUTPATIENT)
Dept: INTERNAL MEDICINE | Facility: CLINIC | Age: 63
End: 2018-06-28

## 2018-07-14 DIAGNOSIS — J44.9 CHRONIC OBSTRUCTIVE PULMONARY DISEASE, UNSPECIFIED COPD TYPE: ICD-10-CM

## 2018-07-15 RX ORDER — IPRATROPIUM BROMIDE AND ALBUTEROL SULFATE 2.5; .5 MG/3ML; MG/3ML
SOLUTION RESPIRATORY (INHALATION)
Qty: 270 ML | Refills: 0 | Status: SHIPPED | OUTPATIENT
Start: 2018-07-15 | End: 2018-07-18 | Stop reason: SDUPTHER

## 2018-07-18 DIAGNOSIS — J44.9 CHRONIC OBSTRUCTIVE PULMONARY DISEASE, UNSPECIFIED COPD TYPE: ICD-10-CM

## 2018-07-18 RX ORDER — IPRATROPIUM BROMIDE AND ALBUTEROL SULFATE 2.5; .5 MG/3ML; MG/3ML
SOLUTION RESPIRATORY (INHALATION)
Qty: 270 ML | Refills: 0 | Status: SHIPPED | OUTPATIENT
Start: 2018-07-18 | End: 2018-07-22 | Stop reason: SDUPTHER

## 2018-07-19 NOTE — PROGRESS NOTES
CRS Office Visit Followup    SUBJECTIVE:     Chief Complaint: perianal abscess    History of Present Illness:  Patient is a 62 y.o. female presents with perianal abscess. The patient is an established patient to this practice.   Course is as follows:  2010:  Colonoscopy with polypectomy ([facility]).  Pathology was tubular adenoma   - complicated by post polypectomy bleed   - repeat CSY done with old blood seen.  Perforation of transverse colon   -  Right colectomy ([facility]) for perforation.      6/1/18:  Presented with perianal abscess  On the left posterior.  Incision and drainage done in clinic.   6/15/18:  Presents for follow-up.  Hadrecurrence perianal swelling with spontaneous drainage approximately 5 days following her clinic visit.  She feels better since then has not had ongoing drainage. No further pain.  Exam demonstrated left-sided external opening.  This was probed and this tracked to the left posterior anal canal in radial fashion. She also had a slight irregularity can be felt on the left posterior dentate line.   EUA with fistulotomy vs. Seton was recommended but she wished to defer given her past complications following surgery.     7/20/18:  Presents today.  Feeling very well. No further drainage or pain. Having regular bowel movements.  Overall feels substantially improved.  No further abscess    Denies family history of CRC or IBD.      Review of Systems:  Review of Systems   Constitutional: Negative for chills, diaphoresis, fever, malaise/fatigue and weight loss.   HENT: Negative for congestion.    Respiratory: Positive for shortness of breath.    Cardiovascular: Positive for leg swelling. Negative for chest pain.   Gastrointestinal: Negative for abdominal pain, blood in stool, constipation, nausea and vomiting.   Genitourinary: Negative for dysuria.   Musculoskeletal: Negative for back pain and myalgias.   Skin: Negative for rash.   Neurological: Negative for dizziness and weakness.  "  Endo/Heme/Allergies: Does not bruise/bleed easily.   Psychiatric/Behavioral: Negative for depression.       OBJECTIVE:     Vital Signs (Most Recent)  /80 (BP Location: Right arm, Patient Position: Sitting, BP Method: Large (Automatic))   Ht 5' 5" (1.651 m)   Wt 80.8 kg (178 lb 2.1 oz)   LMP  (LMP Unknown)   BMI 29.64 kg/m²     Physical Exam:  General: Black or  female in no distress   Neuro: alert and oriented x 4.  Moves all extremities.     HEENT: no icterus.  Trachea midline  Respiratory: respirations are even and unlabored  Cardiac: regular rate  Abdomen: soft  Extremities: Warm dry and intact  Skin: no rashes  Anorectal:  External exam demonstrates small scar posteriorly on the left side.  This is not appear to track anywhere.  No purulence upon compression of the surrounding tissue. No underlying fluid collection.  No tenderness.    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  She overall appears to be healing well.  I agree with deferring any exam under anesthesia or intervention given that she is improved.  She can follow up with me as needed or with any concerns.          FREDA Moreira MD  Staff Surgeon  Colon & Rectal Surgery  "

## 2018-07-20 ENCOUNTER — OFFICE VISIT (OUTPATIENT)
Dept: SURGERY | Facility: CLINIC | Age: 63
End: 2018-07-20
Payer: COMMERCIAL

## 2018-07-20 VITALS
SYSTOLIC BLOOD PRESSURE: 130 MMHG | HEIGHT: 65 IN | WEIGHT: 178.13 LBS | BODY MASS INDEX: 29.68 KG/M2 | DIASTOLIC BLOOD PRESSURE: 80 MMHG

## 2018-07-20 DIAGNOSIS — K61.0 PERIANAL ABSCESS: Primary | ICD-10-CM

## 2018-07-20 PROCEDURE — 99999 PR PBB SHADOW E&M-EST. PATIENT-LVL III: CPT | Mod: PBBFAC,,, | Performed by: COLON & RECTAL SURGERY

## 2018-07-20 PROCEDURE — 99213 OFFICE O/P EST LOW 20 MIN: CPT | Mod: S$GLB,,, | Performed by: COLON & RECTAL SURGERY

## 2018-07-20 PROCEDURE — 3008F BODY MASS INDEX DOCD: CPT | Mod: CPTII,S$GLB,, | Performed by: COLON & RECTAL SURGERY

## 2018-07-20 PROCEDURE — 3075F SYST BP GE 130 - 139MM HG: CPT | Mod: CPTII,S$GLB,, | Performed by: COLON & RECTAL SURGERY

## 2018-07-20 PROCEDURE — 3079F DIAST BP 80-89 MM HG: CPT | Mod: CPTII,S$GLB,, | Performed by: COLON & RECTAL SURGERY

## 2018-07-20 NOTE — LETTER
July 20, 2018      Erik Cool MD  [address]  [facility] [address]           [facility]  [address]  [facility] [address]  Phone: [phone]          Patient: Ana Aguila   MR Number: [id number]   YOB: 1955   Date of Visit: 7/20/2018       Dear Dr. Erik Cool:    Thank you for referring Ana Aguila to me for evaluation. Attached you will find relevant portions of my assessment and plan of care.    If you have questions, please do not hesitate to call me. I look forward to following Ana Aguila along with you.    Sincerely,    Manpreet Moreira MD    Enclosure  CC:  No Recipients    If you would like to receive this communication electronically, please contact [email or web address] or [phone] to request more information on [employer] access.    For providers and/or their staff who would like to refer a patient to [facility], please contact us through our one-stop-shop provider referral line, [facility] [place], [facility], at [phone].    If you feel you have received this communication in error or would no longer like to receive these types of communications, please e-mail [email or web address]

## 2018-07-22 DIAGNOSIS — J44.9 CHRONIC OBSTRUCTIVE PULMONARY DISEASE, UNSPECIFIED COPD TYPE: ICD-10-CM

## 2018-07-23 RX ORDER — IPRATROPIUM BROMIDE AND ALBUTEROL SULFATE 2.5; .5 MG/3ML; MG/3ML
SOLUTION RESPIRATORY (INHALATION)
Qty: 270 ML | Refills: 0 | Status: SHIPPED | OUTPATIENT
Start: 2018-07-23 | End: 2018-07-26 | Stop reason: SDUPTHER

## 2018-07-25 ENCOUNTER — PATIENT OUTREACH (OUTPATIENT)
Dept: OTHER | Facility: OTHER | Age: 63
End: 2018-07-25

## 2018-07-25 NOTE — PROGRESS NOTES
Last 5 Patient Entered Readings                                      Current 30 Day Average: 127/85     Recent Readings 7/24/2018 7/24/2018 7/24/2018 7/24/2018 7/24/2018    SBP (mmHg) 122 122 139 139 138    DBP (mmHg) 76 76 87 87 98    Pulse 91 91 85 85 87        Digital Medicine: Health  Follow Up    Left voicemail to follow up with Ms. Ana Aguila.  Current BP average 127/85 mmHg is not at goal, <130/80 mmHg.  Will follow up in 2 weeks.

## 2018-07-26 DIAGNOSIS — J44.9 CHRONIC OBSTRUCTIVE PULMONARY DISEASE, UNSPECIFIED COPD TYPE: ICD-10-CM

## 2018-07-26 RX ORDER — IPRATROPIUM BROMIDE AND ALBUTEROL SULFATE 2.5; .5 MG/3ML; MG/3ML
SOLUTION RESPIRATORY (INHALATION)
Qty: 270 ML | Refills: 0 | Status: SHIPPED | OUTPATIENT
Start: 2018-07-26 | End: 2018-07-30 | Stop reason: SDUPTHER

## 2018-07-30 DIAGNOSIS — J44.9 CHRONIC OBSTRUCTIVE PULMONARY DISEASE, UNSPECIFIED COPD TYPE: ICD-10-CM

## 2018-07-30 RX ORDER — IPRATROPIUM BROMIDE AND ALBUTEROL SULFATE 2.5; .5 MG/3ML; MG/3ML
SOLUTION RESPIRATORY (INHALATION)
Qty: 270 ML | Refills: 0 | Status: SHIPPED | OUTPATIENT
Start: 2018-07-30 | End: 2018-08-03 | Stop reason: SDUPTHER

## 2018-07-31 ENCOUNTER — PATIENT OUTREACH (OUTPATIENT)
Dept: OTHER | Facility: OTHER | Age: 63
End: 2018-07-31

## 2018-07-31 NOTE — PROGRESS NOTES
Last 5 Patient Entered Readings                                      Current 30 Day Average: 128/84     Recent Readings 8/6/2018 8/6/2018 8/6/2018 8/6/2018 8/5/2018    SBP (mmHg) 129 129 129 129 130    DBP (mmHg) 91 91 96 96 88    Pulse 87 87 90 90 85        MsNikolas Aguila's DBP remains above goal of <80. Encouraged her to review diet with health , Lilly. She has labs scheduled on 9/10/18. Based on these results will consider changing HCTZ to chlorthalidone.    Will continue to monitor regularly. Will follow up in 3-4 weeks, sooner if BP begins to trend upward or downward.    Patient has my contact information and knows to call with any concerns or clinical changes.     Current HTN regimen:  Hypertension Medications             hydroCHLOROthiazide (HYDRODIURIL) 25 MG tablet TAKE 1 TABLET(25 MG) BY MOUTH EVERY MORNING

## 2018-08-03 DIAGNOSIS — J44.9 CHRONIC OBSTRUCTIVE PULMONARY DISEASE, UNSPECIFIED COPD TYPE: ICD-10-CM

## 2018-08-03 RX ORDER — IPRATROPIUM BROMIDE AND ALBUTEROL SULFATE 2.5; .5 MG/3ML; MG/3ML
SOLUTION RESPIRATORY (INHALATION)
Qty: 270 ML | Refills: 0 | Status: SHIPPED | OUTPATIENT
Start: 2018-08-03 | End: 2018-08-07 | Stop reason: SDUPTHER

## 2018-08-07 DIAGNOSIS — J44.9 CHRONIC OBSTRUCTIVE PULMONARY DISEASE, UNSPECIFIED COPD TYPE: ICD-10-CM

## 2018-08-07 RX ORDER — IPRATROPIUM BROMIDE AND ALBUTEROL SULFATE 2.5; .5 MG/3ML; MG/3ML
SOLUTION RESPIRATORY (INHALATION)
Qty: 270 ML | Refills: 0 | Status: SHIPPED | OUTPATIENT
Start: 2018-08-07 | End: 2018-08-11 | Stop reason: SDUPTHER

## 2018-08-11 DIAGNOSIS — J44.9 CHRONIC OBSTRUCTIVE PULMONARY DISEASE, UNSPECIFIED COPD TYPE: ICD-10-CM

## 2018-08-11 RX ORDER — IPRATROPIUM BROMIDE AND ALBUTEROL SULFATE 2.5; .5 MG/3ML; MG/3ML
SOLUTION RESPIRATORY (INHALATION)
Qty: 270 ML | Refills: 0 | Status: SHIPPED | OUTPATIENT
Start: 2018-08-11 | End: 2018-08-15 | Stop reason: SDUPTHER

## 2018-08-15 DIAGNOSIS — J44.9 CHRONIC OBSTRUCTIVE PULMONARY DISEASE, UNSPECIFIED COPD TYPE: ICD-10-CM

## 2018-08-15 RX ORDER — IPRATROPIUM BROMIDE AND ALBUTEROL SULFATE 2.5; .5 MG/3ML; MG/3ML
SOLUTION RESPIRATORY (INHALATION)
Qty: 270 ML | Refills: 0 | Status: SHIPPED | OUTPATIENT
Start: 2018-08-15 | End: 2018-08-19 | Stop reason: SDUPTHER

## 2018-08-16 NOTE — PROGRESS NOTES
"Last 5 Patient Entered Readings                                      Current 30 Day Average: 130/84     Recent Readings 8/16/2018 8/16/2018 8/14/2018 8/14/2018 8/14/2018    SBP (mmHg) 129 129 133 133 146    DBP (mmHg) 80 80 86 86 88    Pulse 75 75 82 82 79        Digital Medicine: Health  Follow Up    Lifestyle Modifications:    1.Dietary Modifications (Sodium intake <2,000mg/day, food labels, dining out): Patient reports that she is "working on": trying to cut back on salt. Reviewed recommended sodium intake per AHA <2,000mg/daily. Encouraged patient not to add salt to foods, review food labels, and look for "no salt added" or "lower sodium" options.    2.Physical Activity: Ms. Aguila reports that she is "getting around better", and working to increase her walking. She is still attending pulmonary rehab during the week. She also joined [email or web address] at [facility]. She is starting a 30 day free trial.     3.Medication Therapy: Patient has been compliant with the medication regimen.    4.Patient has the following medication side effects/concerns: None  (Frequency/Alleviating factors/Precipitating factors, etc.)     Follow up with Ms. Ana Aguila completed. Ms. Aguila is doing okay. She states that her BP readings are lower when taking at pulmonary rehab, as compared to her digital cuff. She is checking her BP using the proper technique, resting prior too, and keeping the cuff charged. Her readings always seem to come down after retesting a few times. She may bring the cuff with her to her next pulmonary visit to have it compared to a manual reading. No further questions or concerns. Will continue follow up to achieve health goals.      "

## 2018-08-19 DIAGNOSIS — J44.9 CHRONIC OBSTRUCTIVE PULMONARY DISEASE, UNSPECIFIED COPD TYPE: ICD-10-CM

## 2018-08-19 RX ORDER — IPRATROPIUM BROMIDE AND ALBUTEROL SULFATE 2.5; .5 MG/3ML; MG/3ML
SOLUTION RESPIRATORY (INHALATION)
Qty: 270 ML | Refills: 0 | Status: SHIPPED | OUTPATIENT
Start: 2018-08-19 | End: 2018-09-24 | Stop reason: SDUPTHER

## 2018-08-23 DIAGNOSIS — J44.9 CHRONIC OBSTRUCTIVE PULMONARY DISEASE, UNSPECIFIED COPD TYPE: ICD-10-CM

## 2018-09-10 ENCOUNTER — LAB VISIT (OUTPATIENT)
Dept: LAB | Facility: HOSPITAL | Age: 63
End: 2018-09-10
Attending: INTERNAL MEDICINE
Payer: COMMERCIAL

## 2018-09-10 ENCOUNTER — TELEPHONE (OUTPATIENT)
Dept: INTERNAL MEDICINE | Facility: CLINIC | Age: 63
End: 2018-09-10

## 2018-09-10 DIAGNOSIS — T50.2X5A DIURETIC-INDUCED HYPOKALEMIA: Primary | ICD-10-CM

## 2018-09-10 DIAGNOSIS — J96.11 CHRONIC RESPIRATORY FAILURE WITH HYPOXIA, ON HOME O2 THERAPY: ICD-10-CM

## 2018-09-10 DIAGNOSIS — I10 ESSENTIAL HYPERTENSION: ICD-10-CM

## 2018-09-10 DIAGNOSIS — Z99.81 CHRONIC RESPIRATORY FAILURE WITH HYPOXIA, ON HOME O2 THERAPY: ICD-10-CM

## 2018-09-10 DIAGNOSIS — J44.9 CHRONIC OBSTRUCTIVE PULMONARY DISEASE, UNSPECIFIED COPD TYPE: ICD-10-CM

## 2018-09-10 DIAGNOSIS — E87.6 DIURETIC-INDUCED HYPOKALEMIA: Primary | ICD-10-CM

## 2018-09-10 DIAGNOSIS — I70.0 AORTIC CALCIFICATION: ICD-10-CM

## 2018-09-10 LAB
ALBUMIN SERPL BCP-MCNC: 3.7 G/DL
ALP SERPL-CCNC: 65 U/L
ALT SERPL W/O P-5'-P-CCNC: 8 U/L
ANION GAP SERPL CALC-SCNC: 11 MMOL/L
AST SERPL-CCNC: 17 U/L
BASOPHILS # BLD AUTO: 0.05 K/UL
BASOPHILS NFR BLD: 0.5 %
BILIRUB SERPL-MCNC: 0.5 MG/DL
BUN SERPL-MCNC: 8 MG/DL
CALCIUM SERPL-MCNC: 9.1 MG/DL
CHLORIDE SERPL-SCNC: 105 MMOL/L
CHOLEST SERPL-MCNC: 219 MG/DL
CHOLEST/HDLC SERPL: 3.3 {RATIO}
CO2 SERPL-SCNC: 29 MMOL/L
CREAT SERPL-MCNC: 0.8 MG/DL
DIFFERENTIAL METHOD: ABNORMAL
EOSINOPHIL # BLD AUTO: 0.4 K/UL
EOSINOPHIL NFR BLD: 3.9 %
ERYTHROCYTE [DISTWIDTH] IN BLOOD BY AUTOMATED COUNT: 15.4 %
EST. GFR  (AFRICAN AMERICAN): >60 ML/MIN/1.73 M^2
EST. GFR  (NON AFRICAN AMERICAN): >60 ML/MIN/1.73 M^2
GLUCOSE SERPL-MCNC: 89 MG/DL
HCT VFR BLD AUTO: 38.5 %
HDLC SERPL-MCNC: 66 MG/DL
HDLC SERPL: 30.1 %
HGB BLD-MCNC: 12.3 G/DL
IMM GRANULOCYTES # BLD AUTO: 0.03 K/UL
IMM GRANULOCYTES NFR BLD AUTO: 0.3 %
LDLC SERPL CALC-MCNC: 140.2 MG/DL
LYMPHOCYTES # BLD AUTO: 1.8 K/UL
LYMPHOCYTES NFR BLD: 19.3 %
MCH RBC QN AUTO: 28.3 PG
MCHC RBC AUTO-ENTMCNC: 31.9 G/DL
MCV RBC AUTO: 89 FL
MONOCYTES # BLD AUTO: 0.4 K/UL
MONOCYTES NFR BLD: 4.5 %
NEUTROPHILS # BLD AUTO: 6.5 K/UL
NEUTROPHILS NFR BLD: 71.5 %
NONHDLC SERPL-MCNC: 153 MG/DL
NRBC BLD-RTO: 0 /100 WBC
PLATELET # BLD AUTO: 285 K/UL
PMV BLD AUTO: 10.3 FL
POTASSIUM SERPL-SCNC: 3.1 MMOL/L
PROT SERPL-MCNC: 6.9 G/DL
RBC # BLD AUTO: 4.35 M/UL
SODIUM SERPL-SCNC: 145 MMOL/L
TRIGL SERPL-MCNC: 64 MG/DL
WBC # BLD AUTO: 9.16 K/UL

## 2018-09-10 PROCEDURE — 85025 COMPLETE CBC W/AUTO DIFF WBC: CPT

## 2018-09-10 PROCEDURE — 36415 COLL VENOUS BLD VENIPUNCTURE: CPT

## 2018-09-10 PROCEDURE — 80053 COMPREHEN METABOLIC PANEL: CPT

## 2018-09-10 PROCEDURE — 80061 LIPID PANEL: CPT

## 2018-09-10 RX ORDER — POTASSIUM CHLORIDE 20 MEQ/1
TABLET, EXTENDED RELEASE ORAL
Qty: 30 TABLET | Refills: 0 | Status: SHIPPED | OUTPATIENT
Start: 2018-09-10 | End: 2018-09-27 | Stop reason: SDUPTHER

## 2018-09-10 NOTE — TELEPHONE ENCOUNTER
----- Message from Erik Cool MD sent at 9/10/2018 12:02 PM CDT -----  Patient on hydrochlorothiazide with low potassium, needs to start taking potassium supplement. Have sent in rx with instructions to take 40mEq x 1 week then 20mEq daily thereafter, with lab recheck prior to her visit with me in 2 weeks. Please call patient to review result and instructions and schedule for lab appt.   Also forwarding note to her Digital HTN team.  Erik Cool MD

## 2018-09-10 NOTE — PROGRESS NOTES
Patient on hydrochlorothiazide with low potassium, needs to start taking potassium supplement. Have sent in rx with instructions to take 40mEq x 1 week then 20mEq daily thereafter, with lab recheck prior to her visit with me in 2 weeks. Please call patient to review result and instructions and schedule for lab appt.   Also forwarding note to her Digital HTN team.  Erik Cool MD

## 2018-09-11 ENCOUNTER — PATIENT OUTREACH (OUTPATIENT)
Dept: OTHER | Facility: OTHER | Age: 63
End: 2018-09-11

## 2018-09-11 NOTE — PROGRESS NOTES
"Last 5 Patient Entered Readings                                      Current 30 Day Average: 131/85     Recent Readings 9/10/2018 9/10/2018 9/10/2018 9/10/2018 9/9/2018    SBP (mmHg) 132 132 126 126 143    DBP (mmHg) 88 88 88 88 93    Pulse 80 80 87 87 84        Ms. Aguila's BP average is approaching goal. She reports being "frustrated" with her BP monitor because her initial reading will be elevated, but then improves on repeat check. Explained that most readings are similar and she doesn't have to continue to repeat the readings unless her BP is unusually high. She will bring her monitor with her to PCP visit on 9/27. Will follow up after this visit.     She is now on potassium due to low K on labs yesterday. She will have repeat BMP prior to seeing PCP in 2 weeks.     Patient's BP average is above goal of <130/80.    Will continue to monitor regularly. Will follow up in 3-4 weeks, sooner if BP begins to trend upward or downward.    Patient has my contact information and knows to call with any concerns or clinical changes.     Current HTN regimen:  Hypertension Medications             hydroCHLOROthiazide (HYDRODIURIL) 25 MG tablet TAKE 1 TABLET(25 MG) BY MOUTH EVERY MORNING              "

## 2018-09-20 ENCOUNTER — OFFICE VISIT (OUTPATIENT)
Dept: INTERNAL MEDICINE | Facility: CLINIC | Age: 63
End: 2018-09-20
Payer: COMMERCIAL

## 2018-09-20 VITALS
OXYGEN SATURATION: 90 % | WEIGHT: 175.94 LBS | HEART RATE: 106 BPM | HEIGHT: 65 IN | BODY MASS INDEX: 29.31 KG/M2 | SYSTOLIC BLOOD PRESSURE: 122 MMHG | DIASTOLIC BLOOD PRESSURE: 78 MMHG

## 2018-09-20 DIAGNOSIS — J96.11 CHRONIC RESPIRATORY FAILURE WITH HYPOXIA, ON HOME O2 THERAPY: Primary | ICD-10-CM

## 2018-09-20 DIAGNOSIS — Z99.81 CHRONIC RESPIRATORY FAILURE WITH HYPOXIA, ON HOME O2 THERAPY: Primary | ICD-10-CM

## 2018-09-20 DIAGNOSIS — J44.0 CHRONIC OBSTRUCTIVE PULMONARY DISEASE WITH ACUTE LOWER RESPIRATORY INFECTION: ICD-10-CM

## 2018-09-20 PROBLEM — K61.0 PERIANAL ABSCESS: Status: RESOLVED | Noted: 2018-06-01 | Resolved: 2018-09-20

## 2018-09-20 PROCEDURE — 3008F BODY MASS INDEX DOCD: CPT | Mod: CPTII,S$GLB,, | Performed by: INTERNAL MEDICINE

## 2018-09-20 PROCEDURE — 99999 PR PBB SHADOW E&M-EST. PATIENT-LVL III: CPT | Mod: PBBFAC,,, | Performed by: INTERNAL MEDICINE

## 2018-09-20 PROCEDURE — 99214 OFFICE O/P EST MOD 30 MIN: CPT | Mod: S$GLB,,, | Performed by: INTERNAL MEDICINE

## 2018-09-20 PROCEDURE — 3078F DIAST BP <80 MM HG: CPT | Mod: CPTII,S$GLB,, | Performed by: INTERNAL MEDICINE

## 2018-09-20 PROCEDURE — 3074F SYST BP LT 130 MM HG: CPT | Mod: CPTII,S$GLB,, | Performed by: INTERNAL MEDICINE

## 2018-09-20 RX ORDER — DEXAMETHASONE 4 MG/1
4 TABLET ORAL EVERY 8 HOURS
Qty: 15 TABLET | Refills: 0 | Status: SHIPPED | OUTPATIENT
Start: 2018-09-20 | End: 2018-09-25

## 2018-09-20 RX ORDER — DOXYCYCLINE 100 MG/1
100 CAPSULE ORAL EVERY 12 HOURS
Qty: 14 CAPSULE | Refills: 0 | Status: SHIPPED | OUTPATIENT
Start: 2018-09-20 | End: 2018-09-27

## 2018-09-20 RX ORDER — ACETAMINOPHEN 325 MG/1
325 TABLET ORAL EVERY 8 HOURS PRN
Refills: 0 | COMMUNITY
Start: 2018-09-20 | End: 2018-09-30

## 2018-09-20 NOTE — PATIENT INSTRUCTIONS
Increase oxygen to 3L for 5 days.  Use nebulizer every 4 hours while awake until back to baseline.  Take Doxycyline 100 mg twice daily with food for 7 days.  Take Decadron 4 mg 1 tablet three times daily for 5 days.  Take Tylenol 325 mg 1 tablet every 8 hours as needed for pain for the next 10 days.

## 2018-09-20 NOTE — PROGRESS NOTES
INTERNAL MEDICINE CLINIC - SAME DAY APPOINTMENT  Progress Note    PRESENTING HISTORY     PCP: Erik Cool MD  Chief Complaint/Reason for Visit:     Chief Complaint   Patient presents with    Fatigue     History of Present Illness & ROS : Ms. Ana Aguila is a 63 y.o. female.     Yesterday had sore throat.  Today with headache and body ache.  Neck glands swollen.  No fever.  Positive cough with yellow mucous.  Breathing is difficult.  She is on oxygen 2L, normal 98% at home.    Positive chest pain with respiration.  Mild chronic diarrhea.    PAST HISTORY:     Past Medical History:   Diagnosis Date    Aortic calcification 1/11/2018    Chronic diarrhea     Chronic obstructive pulmonary disease 8/27/2013    Chronic respiratory failure with hypoxia, on home O2 therapy 1/11/2018    COPD (chronic obstructive pulmonary disease)     Former smoker 10/18/2016    Hepatomegaly 12/21/2015    Hyperlipidemia     Hypertension     Microscopic hematuria 1/13/2017    Perianal abscess 6/1/2018    Reflux 2013    S/P right hemicolectomy 9/7/2017    Performed in 2011 after perforation during colonoscopy    Takotsubo cardiomyopathy 10/18/2016    Noted on echo 10/2016, recovered on repeat echo 12/2016       Past Surgical History:   Procedure Laterality Date    ABDOMINAL SURGERY      APPENDECTOMY      CATHETERIZATION, HEART, LEFT N/A 9/6/2013    Performed by Eagle Gutierrez [facility] at [facility] CATH LAB    CHOLANGIOGRAM N/A 2/18/2013    Performed by Zhou Hay Jr., MD at [facility] OR [address] [place] [address]    CHOLECYSTECTOMY  2013    open    CHOLECYSTECTOMY N/A 2/18/2013    Performed by Zhou Hay Jr., MD at [facility] OR [address] [place] [address]    CHOLECYSTECTOMY, LAPAROSCOPIC N/A 2/18/2013    Performed by Zhou Hay Jr., MD at [facility] OR [address] [place] [address]    COLON SURGERY  2011    secondary to perforation after c-scope    COLONOSCOPY      FRACTURE SURGERY      HERNIA REPAIR      HIATAL HERNIA REPAIR  2013    HYSTERECTOMY  1986    Left leg  surgery         Family History   Problem Relation Age of Onset    Cancer Mother         lung    Hypertension Mother     Coronary artery disease Father     Retinal detachment Father     Hypertension Sister     Hypertension Brother     Cataracts Maternal Grandmother     Abnormal EKG Daughter     Breast cancer Daughter 43        negative genetic testing    Breast cancer Other 44    Amblyopia Neg Hx     Blindness Neg Hx     Diabetes Neg Hx     Glaucoma Neg Hx     Macular degeneration Neg Hx     Strabismus Neg Hx     Stroke Neg Hx     Thyroid disease Neg Hx     Colon cancer Neg Hx     Ovarian cancer Neg Hx        Social History     Socioeconomic History    Marital status: Single     Spouse name: None    Number of children: None    Years of education: None    Highest education level: None   Social Needs    Financial resource strain: None    Food insecurity - worry: None    Food insecurity - inability: None    Transportation needs - medical: None    Transportation needs - non-medical: None   Occupational History    None   Tobacco Use    Smoking status: Former Smoker     Packs/day: 1.50     Years: 30.00     Pack years: 45.00     Types: Cigarettes     Last attempt to quit: 1997     Years since quittin.7    Smokeless tobacco: Never Used   Substance and Sexual Activity    Alcohol use: Yes     Comment: occaissionally    Drug use: No    Sexual activity: Yes     Partners: Male     Birth control/protection: Surgical   Other Topics Concern    None   Social History Narrative    Lives alone, brother lives in [place]. Brothers and sisters also living elsewhere. Children and grandchildren live in [place].     Lives in 2nd-story apartment.    Works as a  with [employer].       MEDICATIONS & ALLERGIES:     Current Outpatient Medications on File Prior to Visit   Medication Sig Dispense Refill    ADVAIR DISKUS 250-50 mcg/dose diskus inhaler INHALE 1 PUFF BY MOUTH EVERY 12  HOURS 1 each 12    albuterol (PROAIR HFA) 90 mcg/actuation inhaler INHALE 2 PUFFS BY MOUTH INTO THE LUNGS FOUR TIMES DAILY prn 25.5 g 12    albuterol-ipratropium (DUO-NEB) 2.5 mg-0.5 mg/3 mL nebulizer solution TAKE 3 MLS BY NEBULIZER EVERY 6 HOURS AS NEEDED FOR WHEEZING OR SHORTNESS OF BREATH 270 mL 0    ergocalciferol (ERGOCALCIFEROL) 50,000 unit Cap TAKE 1 CAPSULE BY MOUTH EVERY 7 DAYS 12 capsule 1    hydroCHLOROthiazide (HYDRODIURIL) 25 MG tablet TAKE 1 TABLET(25 MG) BY MOUTH EVERY MORNING 90 tablet 1    levalbuterol (XOPENEX) 1.25 mg/0.5 mL nebulizer solution Qid prn for copd 120 each 11    loperamide (IMODIUM) 2 mg capsule Take 2 mg by mouth 4 (four) times daily as needed for Diarrhea.      potassium chloride SA (K-DUR,KLOR-CON) 20 MEQ tablet Take twice daily for 1 week, then once daily after this. Take with food. 30 tablet 0    psyllium 0.52 gram capsule Take 2 capsules by mouth once daily.       No current facility-administered medications on file prior to visit.         Review of patient's allergies indicates:   Allergen Reactions    Sudafed [pseudoephedrine hcl] Nausea And Vomiting and Other (See Comments)     JITTERY       Medications Reconciliation:   I have reconciled the patient's home medications with the patient/family. I have updated all changes.  Refer to After-Visit Medication List.    OBJECTIVE:     Vital Signs:  Vitals:    09/20/18 1331   BP: 122/78   Pulse: 106     Wt Readings from Last 1 Encounters:   09/20/18 1331 79.8 kg (175 lb 14.8 oz)     Body mass index is 29.28 kg/m².     Physical Exam:  General: Well developed, well nourished.    HEENT: Head is normocephalic, atraumatic; ears are normal.    Eyes: Clear conjunctiva.  Neck: Supple, symmetrical neck; trachea midline.  Lungs: on 2L.  Decreased breath sounds bilaterally with faint exp wheezing.  Cardiovascular: Heart with regular rate and rhythm.    Extremities: No LE edema.    Abdomen: Abdomen is soft, non-tender non-distended with  normal bowel sounds.  Skin: Skin color, texture, turgor normal. No rashes.  Psychiatric: Normal affect. Alert.    Laboratory  Lab Results   Component Value Date    WBC 9.16 09/10/2018    HGB 12.3 09/10/2018    HCT 38.5 09/10/2018     09/10/2018    CHOL 219 (H) 09/10/2018    TRIG 64 09/10/2018    HDL 66 09/10/2018    ALT 8 (L) 09/10/2018    AST 17 09/10/2018     09/10/2018    K 3.1 (L) 09/10/2018     09/10/2018    CREATININE 0.8 09/10/2018    BUN 8 09/10/2018    CO2 29 09/10/2018    TSH 2.072 01/12/2017    INR 1.2 10/18/2016    HGBA1C 5.4 10/18/2016       ASSESSMENT & PLAN:     Chronic respiratory failure with hypoxia, on home O2 therapy  Chronic obstructive pulmonary disease with acute lower respiratory infection  - Mild-moderate exacerbation with decreased pulse oximetry to 90% on 2L.    Plan: See instructions below.    Orders:  -     dexamethasone (DECADRON) 4 MG Tab; Take 1 tablet (4 mg total) by mouth every 8 (eight) hours. for 5 days  Dispense: 15 tablet; Refill: 0  -     doxycycline (MONODOX) 100 MG capsule; Take 1 capsule (100 mg total) by mouth every 12 (twelve) hours. for 7 days  Dispense: 14 capsule; Refill: 0    Other orders  -     acetaminophen (TYLENOL) 325 MG tablet; Take 1 tablet (325 mg total) by mouth every 8 (eight) hours as needed for Pain.; Refill: 0    Work excuse given to patient.    Instructions for the patient:  Increase oxygen to 3L for 5 days.  Use nebulizer every 4 hours while awake until back to baseline.  Take Doxycyline 100 mg twice daily with food for 7 days.  Take Decadron 4 mg 1 tablet three times daily for 5 days.  Take Tylenol 325 mg 1 tablet every 8 hours as needed for pain for the next 10 days.    Scheduled Follow-up :  Future Appointments   Date Time Provider Department Center   9/24/2018  9:15 AM LAB, APPOINTMENT [facility] IM [address]   9/24/2018 10:15 AM PULMONARY FUNCTION [facility] PULMLAB [address] [address]   9/24/2018 10:30 AM Manpreet Dill MD  [facility]ULMS[facility] [address]   9/27/2018  1:30 PM Erik Cool MD [facility] IM [facility] [address] PCW       After Visit Medication List :     Medication List           Accurate as of 9/20/18  1:44 PM. If you have any questions, ask your nurse or doctor.               START taking these medications    acetaminophen 325 MG tablet  Commonly known as:  TYLENOL  Take 1 tablet (325 mg total) by mouth every 8 (eight) hours as needed for Pain.  Started by:  Frederic Arriola MD     dexamethasone 4 MG Tab  Commonly known as:  DECADRON  Take 1 tablet (4 mg total) by mouth every 8 (eight) hours. for 5 days  Started by:  Frederic Arriola MD     doxycycline 100 MG capsule  Commonly known as:  MONODOX  Take 1 capsule (100 mg total) by mouth every 12 (twelve) hours. for 7 days  Started by:  Frederic Arriola MD        CONTINUE taking these medications    ADVAIR DISKUS 250-50 mcg/dose diskus inhaler  Generic drug:  fluticasone-salmeterol 250-50 mcg/dose  INHALE 1 PUFF BY MOUTH EVERY 12 HOURS     albuterol 90 mcg/actuation inhaler  Commonly known as:  PROAIR HFA  INHALE 2 PUFFS BY MOUTH INTO THE LUNGS FOUR TIMES DAILY prn     albuterol-ipratropium 2.5 mg-0.5 mg/3 mL nebulizer solution  Commonly known as:  DUO-NEB  TAKE 3 MLS BY NEBULIZER EVERY 6 HOURS AS NEEDED FOR WHEEZING OR SHORTNESS OF BREATH     ergocalciferol 50,000 unit Cap  Commonly known as:  ERGOCALCIFEROL  TAKE 1 CAPSULE BY MOUTH EVERY 7 DAYS     hydroCHLOROthiazide 25 MG tablet  Commonly known as:  HYDRODIURIL  TAKE 1 TABLET(25 MG) BY MOUTH EVERY MORNING     levalbuterol 1.25 mg/0.5 mL nebulizer solution  Commonly known as:  XOPENEX  Qid prn for copd     loperamide 2 mg capsule  Commonly known as:  IMODIUM     potassium chloride SA 20 MEQ tablet  Commonly known as:  K-DUR,KLOR-CON  Take twice daily for 1 week, then once daily after this. Take with food.     psyllium 0.52 gram capsule           Where to Get Your Medications      These medications were sent to [facility] [address] [place] [address]  [place] - [address] [facility] [address]  [address] [address]    Phone:  [phone]   · dexamethasone 4 MG Tab  · doxycycline 100 MG capsule     You can get these medications from any pharmacy    You don't need a prescription for these medications  · acetaminophen 325 MG tablet         Signing Physician:  Frederic Arriola MD

## 2018-09-24 ENCOUNTER — OFFICE VISIT (OUTPATIENT)
Dept: PULMONOLOGY | Facility: CLINIC | Age: 63
End: 2018-09-24
Payer: COMMERCIAL

## 2018-09-24 ENCOUNTER — HOSPITAL ENCOUNTER (OUTPATIENT)
Dept: RADIOLOGY | Facility: HOSPITAL | Age: 63
Discharge: HOME | End: 2018-09-24
Attending: INTERNAL MEDICINE
Payer: COMMERCIAL

## 2018-09-24 ENCOUNTER — HOSPITAL ENCOUNTER (OUTPATIENT)
Dept: PULMONOLOGY | Facility: CLINIC | Age: 63
Discharge: HOME | End: 2018-09-24
Payer: COMMERCIAL

## 2018-09-24 VITALS
HEIGHT: 65 IN | BODY MASS INDEX: 28.99 KG/M2 | SYSTOLIC BLOOD PRESSURE: 128 MMHG | DIASTOLIC BLOOD PRESSURE: 74 MMHG | WEIGHT: 174 LBS

## 2018-09-24 VITALS — WEIGHT: 174 LBS | BODY MASS INDEX: 28.99 KG/M2 | HEIGHT: 65 IN

## 2018-09-24 DIAGNOSIS — J44.9 CHRONIC OBSTRUCTIVE PULMONARY DISEASE, UNSPECIFIED COPD TYPE: Primary | ICD-10-CM

## 2018-09-24 DIAGNOSIS — Z99.81 CHRONIC RESPIRATORY FAILURE WITH HYPOXIA, ON HOME O2 THERAPY: ICD-10-CM

## 2018-09-24 DIAGNOSIS — J44.9 CHRONIC OBSTRUCTIVE PULMONARY DISEASE, UNSPECIFIED COPD TYPE: ICD-10-CM

## 2018-09-24 DIAGNOSIS — J96.11 CHRONIC RESPIRATORY FAILURE WITH HYPOXIA, ON HOME O2 THERAPY: ICD-10-CM

## 2018-09-24 LAB
PRE FEV1 FVC: 35
PRE FEV1: 0.51
PRE FVC: 1.44
PREDICTED FEV1 FVC: 79
PREDICTED FEV1: 2.42
PREDICTED FVC: 3.06

## 2018-09-24 PROCEDURE — 71046 X-RAY EXAM CHEST 2 VIEWS: CPT | Mod: 26,,, | Performed by: RADIOLOGY

## 2018-09-24 PROCEDURE — 99214 OFFICE O/P EST MOD 30 MIN: CPT | Mod: 25,S$GLB,, | Performed by: INTERNAL MEDICINE

## 2018-09-24 PROCEDURE — 3078F DIAST BP <80 MM HG: CPT | Mod: CPTII,S$GLB,, | Performed by: INTERNAL MEDICINE

## 2018-09-24 PROCEDURE — 3008F BODY MASS INDEX DOCD: CPT | Mod: CPTII,S$GLB,, | Performed by: INTERNAL MEDICINE

## 2018-09-24 PROCEDURE — 3074F SYST BP LT 130 MM HG: CPT | Mod: CPTII,S$GLB,, | Performed by: INTERNAL MEDICINE

## 2018-09-24 PROCEDURE — 99999 PR PBB SHADOW E&M-EST. PATIENT-LVL III: CPT | Mod: PBBFAC,,, | Performed by: INTERNAL MEDICINE

## 2018-09-24 PROCEDURE — 71046 X-RAY EXAM CHEST 2 VIEWS: CPT | Mod: TC,FY

## 2018-09-24 PROCEDURE — 94010 BREATHING CAPACITY TEST: CPT | Mod: 59,S$GLB,, | Performed by: INTERNAL MEDICINE

## 2018-09-24 PROCEDURE — 94618 PULMONARY STRESS TESTING: CPT | Mod: S$GLB,,, | Performed by: INTERNAL MEDICINE

## 2018-09-24 RX ORDER — IPRATROPIUM BROMIDE AND ALBUTEROL SULFATE 2.5; .5 MG/3ML; MG/3ML
3 SOLUTION RESPIRATORY (INHALATION) EVERY 6 HOURS PRN
Qty: 270 ML | Refills: 11 | Status: SHIPPED | OUTPATIENT
Start: 2018-09-24 | End: 2018-09-28

## 2018-09-24 NOTE — PROGRESS NOTES
Subjective:      Patient ID: Ana Aguila is a 63 y.o. female.    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  Hve PFT's and six minute walk from today and will get a chest x-ray and write a supporting letter.      No flowsheet data found.  Review of Systems   Constitutional: Negative.    HENT: Negative.    Eyes: Negative.    Respiratory: Negative.  Positive for dyspnea on extertion.         Severe Emphysema with respiratory failure. Sa02: 87% on room air.   Cardiovascular: Negative.    Genitourinary: Negative.    Musculoskeletal: Negative.    Skin: Negative.    Gastrointestinal: Negative.         S/P Partial colectomy after perforated viscus with colonoscope.   Neurological: Negative.    Psychiatric/Behavioral: The patient is nervous/anxious.      Objective:     Physical Exam   Constitutional: She is oriented to person, place, and time. She appears well-developed and well-nourished. No distress.   HENT:   Head: Normocephalic and atraumatic.   Right Ear: External ear normal.   Left Ear: External ear normal.   Nose: Nose normal.   Mouth/Throat: Oropharynx is clear and moist.   Eyes: Conjunctivae and EOM are normal. Pupils are equal, round, and reactive to light.   Neck: Normal range of motion. Neck supple. No JVD present. No thyromegaly present.   Cardiovascular: Normal rate, regular rhythm, normal heart sounds and intact distal pulses. Exam reveals no gallop.   No murmur heard.  Pulmonary/Chest: Breath sounds normal. No stridor. No respiratory distress. She has no wheezes. She has no rales. She exhibits no tenderness.   Decreased air entry through out.   Abdominal: Soft. Bowel sounds are normal. She exhibits no distension and no mass. There is no tenderness. There is no rebound and no guarding.   Musculoskeletal: Normal range of motion. She  exhibits no edema.   Lymphadenopathy:     She has no cervical adenopathy.   Neurological: She is alert and oriented to person, place, and time. She has normal reflexes. She displays normal reflexes. No cranial nerve deficit.   Skin: Skin is warm and dry. No rash noted.   Psychiatric: She has a normal mood and affect. Her behavior is normal. Judgment and thought content normal.   Nursing note and vitals reviewed.      Assessment:     1. Chronic obstructive pulmonary disease, unspecified COPD type    2. Chronic respiratory failure with hypoxia, on home O2 therapy      Outpatient Encounter Medications as of 9/24/2018   Medication Sig Dispense Refill    acetaminophen (TYLENOL) 325 MG tablet Take 1 tablet (325 mg total) by mouth every 8 (eight) hours as needed for Pain.  0    ADVAIR DISKUS 250-50 mcg/dose diskus inhaler INHALE 1 PUFF BY MOUTH EVERY 12 HOURS 1 each 12    albuterol (PROAIR HFA) 90 mcg/actuation inhaler INHALE 2 PUFFS BY MOUTH INTO THE LUNGS FOUR TIMES DAILY prn 25.5 g 12    albuterol-ipratropium (DUO-NEB) 2.5 mg-0.5 mg/3 mL nebulizer solution Take 3 mLs by nebulization every 6 (six) hours as needed for Wheezing. Rescue 270 mL 11    dexamethasone (DECADRON) 4 MG Tab Take 1 tablet (4 mg total) by mouth every 8 (eight) hours. for 5 days 15 tablet 0    doxycycline (MONODOX) 100 MG capsule Take 1 capsule (100 mg total) by mouth every 12 (twelve) hours. for 7 days 14 capsule 0    ergocalciferol (ERGOCALCIFEROL) 50,000 unit Cap TAKE 1 CAPSULE BY MOUTH EVERY 7 DAYS 12 capsule 1    hydroCHLOROthiazide (HYDRODIURIL) 25 MG tablet TAKE 1 TABLET(25 MG) BY MOUTH EVERY MORNING 90 tablet 1    levalbuterol (XOPENEX) 1.25 mg/0.5 mL nebulizer solution Qid prn for copd 120 each 11    loperamide (IMODIUM) 2 mg capsule Take 2 mg by mouth 4 (four) times daily as needed for Diarrhea.      potassium chloride SA (K-DUR,KLOR-CON) 20 MEQ tablet Take twice daily for 1 week, then once daily after this. Take with food. 30  tablet 0    psyllium 0.52 gram capsule Take 2 capsules by mouth once daily.      [DISCONTINUED] albuterol-ipratropium (DUO-NEB) 2.5 mg-0.5 mg/3 mL nebulizer solution TAKE 3 MLS BY NEBULIZER EVERY 6 HOURS AS NEEDED FOR WHEEZING OR SHORTNESS OF BREATH 270 mL 0     No facility-administered encounter medications on file as of 9/24/2018.        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          Current Assessment & Plan     Will get PFT's , six minute walk and Chest X-ray today;    FEV-1: 0.51 liters 35% of FVC, Sa02 87% on room air      Chest x-ray Stable with hyperinflation.

## 2018-09-24 NOTE — PROCEDURES
Ana Aguila is a 63 y.o.  female patient, who presents for a 6 minute walk test ordered by MD Fuentes.  The diagnosis is Qualify for Oxygen.  The patient's BMI is 29kg/m2. Predicted distance (lower limit of normal) is 323.92 meters.    Test Results:    The test was completed without stopping. The total time walked was 360 seconds.  During walking, the patient reported:  Lightheadedness, Dyspnea. The patient used no assistive devices during testing.     09/24/2018---------Distance: 259.08 meters (850 feet)     O2 Sat % Supplemental Oxygen Heart Rate Blood Pressure Jessi Scale   Pre-exercise  (Resting) 97 % 3 L/M 67 bpm 143/86 3   During Exercise 90 % 3 L/M 86 bpm 191/88 4   Post-exercise   98 % 3 L/M  60 bpm 163/81       Recovery Time: 465 seconds    Oxygen Qualification:     O2 Sat % Supplemental Oxygen Heart Rate Blood Pressure Jessi Scale   Pre-exercise  (Resting) 87 % Room Air  70 bpm  182/87  4    During Exercise                  Post-exercise                      Recovery Time:      Performing nurse/tech: Kwesi GUPTA    PREVIOUS STUDY:   The patient had a previous study.    01/16/2017---------Distance: 266.09 meters (873 feet)       O2 Sat % Supplemental Oxygen Heart Rate Blood Pressure Jessi Scale   Pre-exercise  (Resting) 86 % Room Air 99 bpm 122/73 4   During Exercise 70 % Room Air 120 bpm 155/76 7-8   Post-exercise    86 % Room Air  98 bpm          CLINICAL INTERPRETATION:  Six minute walk distance is 259.08 meters (850 feet) with very, very heavy dyspnea.  During exercise, there was significant desaturation while breathing supplemental oxygen.  Blood pressure increased significantly and Heart rate remained stable with walking.  This may represent a hypertensive response to exercise.  The patient reported non-pulmonary symptoms during exercise.  Significant exercise impairment is likely due to desaturation and cardiovascular causes.  The patient may benefit from using supplemental  oxygen.  Since the previous study in January 2017, exercise capacity is unchanged.  Based upon age and body mass index, exercise capacity is less than predicted.   Oxygen saturation did improve while breathing supplemental oxygen.

## 2018-09-24 NOTE — ASSESSMENT & PLAN NOTE
Will get PFT's , six minute walk and Chest X-ray today;    FEV-1: 0.51 liters 35% of FVC, Sa02 87% on room air      Chest x-ray Stable with hyperinflation.

## 2018-09-27 ENCOUNTER — OFFICE VISIT (OUTPATIENT)
Dept: INTERNAL MEDICINE | Facility: CLINIC | Age: 63
End: 2018-09-27
Payer: COMMERCIAL

## 2018-09-27 VITALS
BODY MASS INDEX: 29.05 KG/M2 | SYSTOLIC BLOOD PRESSURE: 132 MMHG | TEMPERATURE: 98 F | OXYGEN SATURATION: 98 % | DIASTOLIC BLOOD PRESSURE: 76 MMHG | WEIGHT: 174.38 LBS | HEIGHT: 65 IN | HEART RATE: 73 BPM

## 2018-09-27 DIAGNOSIS — I10 BENIGN ESSENTIAL HTN: ICD-10-CM

## 2018-09-27 DIAGNOSIS — Z00.00 ANNUAL PHYSICAL EXAM: Primary | ICD-10-CM

## 2018-09-27 DIAGNOSIS — E55.9 VITAMIN D DEFICIENCY: ICD-10-CM

## 2018-09-27 DIAGNOSIS — K76.0 FATTY LIVER DISEASE, NONALCOHOLIC: ICD-10-CM

## 2018-09-27 DIAGNOSIS — M81.0 AGE-RELATED OSTEOPOROSIS WITHOUT CURRENT PATHOLOGICAL FRACTURE: ICD-10-CM

## 2018-09-27 DIAGNOSIS — Z78.0 POSTMENOPAUSAL: ICD-10-CM

## 2018-09-27 DIAGNOSIS — E87.6 DIURETIC-INDUCED HYPOKALEMIA: ICD-10-CM

## 2018-09-27 DIAGNOSIS — E78.5 DYSLIPIDEMIA: ICD-10-CM

## 2018-09-27 DIAGNOSIS — I51.81 TAKOTSUBO CARDIOMYOPATHY: ICD-10-CM

## 2018-09-27 DIAGNOSIS — T50.2X5A DIURETIC-INDUCED HYPOKALEMIA: ICD-10-CM

## 2018-09-27 PROCEDURE — 99999 PR PBB SHADOW E&M-EST. PATIENT-LVL IV: CPT | Mod: PBBFAC,,, | Performed by: INTERNAL MEDICINE

## 2018-09-27 PROCEDURE — 3075F SYST BP GE 130 - 139MM HG: CPT | Mod: CPTII,S$GLB,, | Performed by: INTERNAL MEDICINE

## 2018-09-27 PROCEDURE — 99396 PREV VISIT EST AGE 40-64: CPT | Mod: S$GLB,,, | Performed by: INTERNAL MEDICINE

## 2018-09-27 PROCEDURE — 3078F DIAST BP <80 MM HG: CPT | Mod: CPTII,S$GLB,, | Performed by: INTERNAL MEDICINE

## 2018-09-27 RX ORDER — POTASSIUM CHLORIDE 750 MG/1
10 TABLET, EXTENDED RELEASE ORAL DAILY
Qty: 30 TABLET | Refills: 2 | Status: ON HOLD | OUTPATIENT
Start: 2018-09-27 | End: 2018-11-21 | Stop reason: HOSPADM

## 2018-09-27 RX ORDER — ERGOCALCIFEROL 1.25 MG/1
50000 CAPSULE ORAL
Qty: 12 CAPSULE | Refills: 1 | Status: SHIPPED | OUTPATIENT
Start: 2018-09-27 | End: 2019-07-07 | Stop reason: SDUPTHER

## 2018-09-27 RX ORDER — HYDROCHLOROTHIAZIDE 12.5 MG/1
12.5 TABLET ORAL DAILY
Qty: 90 TABLET | Refills: 1 | Status: ON HOLD | OUTPATIENT
Start: 2018-09-27 | End: 2018-11-21 | Stop reason: HOSPADM

## 2018-09-27 NOTE — PROGRESS NOTES
Subjective:       Patient ID: Ana Aguila is a 63 y.o. female.    Chief Complaint: Annual Exam    HPI  62 y/o woman with HTN, HLD, severe COPD with home O2, h/o hemicolectomy 2009 due to colonoscopy complication, chronic RUQ discomfort, NAFLD here for annual exam.  Last seen 1/2018.    COPD, chronic hypoxia on home O2 - seen 9/20/18 for urgent visit, found to have COPD exacerbation, treated with PO steroid, doxycycline, increase in home O2. Saw Dr Dill 9/24; discussed with him a request for medical leave due to her advanced COPD. PFTs, 6 min walk, CXR done at that visit; noted as SaO2 87% on room air. CXR stable.  Overall feels that she is doing much better with treatment. She feels that the dexamethasone PO works better for her - good improvement in symptoms without the side effects that she usually gets with prednisone.   No problems with the doxycycline.     HTN - following with digital HTN program. Taking HCTZ 12.5mg (prescribed 25mg, reports she reduced the dose on her own about 2 months ago).   Diuretic-associated hypokalemia noted on recent labs, started KCl 40mEq for short time then 20mEq daily and potassium level now normal on repeat.  Has been following higher-potassium diet.  Working on low-sodium diet.  Didn't bring home cuff in today to get verified. Does check sometimes multiple times/day and is sometimes anxious about this.     H/o Takotsubo cardiomyopathy - LV function resolved on repeat echo 12/2016. EKG 1/2017 also normal.  Aortic calcifications / atherosclerosis noted on previous imaging (xray) - ASA restarted last visit; she declined starting a statin.  Saw Dr Gaytan with cardiology 3/2018, no further cardiac testing recommended.    Saw Hepatology 2/2017 for hepatomegaly attributed to fatty liver, workup for other causes was negative; no further follow up was recommended after that visit. Mild hepatomegaly with no masses noted on CT 2/2017 as well.    H/o vitamin D deficiency in the past,  "normal on last check     Back pain discussed at last visit - referred to PT, she feels this has helped a good deal. Feels that the portable O2 she carries contributes to her being off balance / having back strain. PT did help, now doing med fitness program.     Saw Dr Emery 4/2018 for follow up on breast soreness, no abnormalities noted; followed up for CBE 6/2018 and mammogram which was normal.    Saw Dr Moreira with [facility] 6/2018 for perianal abscess, treated with I&D, resolved.    Review of Systems   Constitutional: Negative for activity change and fever.   HENT: Negative.  Negative for congestion.    Eyes: Negative for visual disturbance.   Respiratory: Negative for cough and wheezing. Shortness of breath: at baseline; exercise tolerance improved.    Cardiovascular: Negative for chest pain, palpitations and leg swelling.   Gastrointestinal: Negative for abdominal pain (improved from prior ), blood in stool, nausea and rectal pain.   Endocrine: Negative.    Genitourinary: Negative.    Musculoskeletal: Negative for arthralgias, back pain (improved), gait problem and myalgias.   Skin: Negative.    Neurological: Negative for syncope, weakness and light-headedness.   Psychiatric/Behavioral: Negative for dysphoric mood. The patient is nervous/anxious (improved).          Past medical history, surgical history, and family medical history reviewed and updated as appropriate.    Medications and allergies reviewed.     Objective:          Vitals:    09/27/18 1349 09/27/18 1448   BP: 136/78 132/76   BP Location: Right arm    Patient Position: Sitting    Pulse: 73    Temp: 98.1 °F (36.7 °C)    TempSrc: Oral    SpO2: 98%    Weight: 79.1 kg (174 lb 6.1 oz)    Height: 5' 5" (1.651 m)      Body mass index is 29.02 kg/m².  Physical Exam   Constitutional: She is oriented to person, place, and time. She appears well-developed and well-nourished. No distress.   Pleasant, comfortable. On portable O2 during visit.   HENT:   Head: " Normocephalic and atraumatic.   Nose: Nose normal.   Mouth/Throat: Oropharynx is clear and moist.   Eyes: Conjunctivae and EOM are normal. Pupils are equal, round, and reactive to light.   Neck: Neck supple. No thyromegaly present.   Cardiovascular: Normal rate, regular rhythm, normal heart sounds and intact distal pulses.   No murmur heard.  Pulmonary/Chest: Effort normal and breath sounds normal. No respiratory distress. She has no wheezes. She has no rales.   Abdominal: Soft. Bowel sounds are normal. She exhibits no distension. There is no tenderness.   Musculoskeletal: She exhibits no edema or tenderness.   Lymphadenopathy:     She has cervical adenopathy (slight nontender submandibular LAD bilaterally).   Neurological: She is alert and oriented to person, place, and time. She has normal strength. No cranial nerve deficit or sensory deficit. Gait normal.   Skin: Skin is warm and dry.   Psychiatric: She has a normal mood and affect. Her behavior is normal. Thought content normal.   Vitals reviewed.      Lab Results   Component Value Date    WBC 9.16 09/10/2018    HGB 12.3 09/10/2018    HCT 38.5 09/10/2018     09/10/2018    CHOL 219 (H) 09/10/2018    TRIG 64 09/10/2018    HDL 66 09/10/2018    ALT 8 (L) 09/10/2018    AST 17 09/10/2018     09/24/2018    K 3.7 09/24/2018     09/24/2018    CREATININE 0.8 09/24/2018    BUN 18 09/24/2018    CO2 31 (H) 09/24/2018    TSH 2.072 01/12/2017    INR 1.2 10/18/2016    HGBA1C 5.4 10/18/2016       Assessment:       1. Annual physical exam    2. Benign essential HTN    3. Diuretic-induced hypokalemia    4. Dyslipidemia    5. Vitamin D deficiency    6. Fatty liver disease, nonalcoholic    7. Takotsubo cardiomyopathy    8. Age-related osteoporosis without current pathological fracture    9. Postmenopausal        Plan:   Ana was seen today for annual exam.    Diagnoses and all orders for this visit:    Annual physical exam - Overall stable. Reviewed recent,  chronic, and preventive health concerns.    Benign essential HTN  Comments:  at/near goal today despite not taking dose of HCTZ 12.5mg; recommended continue at this dose, follow with Digital HTN program. come in for home cuff validation  Orders:  -     hydroCHLOROthiazide (HYDRODIURIL) 12.5 MG Tab; Take 1 tablet (12.5 mg total) by mouth once daily. For blood pressure  -     potassium chloride SA (K-DUR,KLOR-CON) 10 MEQ tablet; Take 1 tablet (10 mEq total) by mouth once daily.  -     Comprehensive metabolic panel; Future    Diuretic-induced hypokalemia  Comments:  reducing to 10mEq daily, recheck in ~2 weeks. follow high-potassium diet  Orders:  -     potassium chloride SA (K-DUR,KLOR-CON) 10 MEQ tablet; Take 1 tablet (10 mEq total) by mouth once daily.  -     Basic metabolic panel; Future  -     Magnesium; Future  -     Comprehensive metabolic panel; Future    Dyslipidemia  Comments:  again declines starting statin; willing to discuss again in 6 months if this does not improve with diet changes  Orders:  -     Lipid panel; Future    Vitamin D deficiency  Comments:  continue weekly high-dose vitamin D  Orders:  -     ergocalciferol (ERGOCALCIFEROL) 50,000 unit Cap; Take 1 capsule (50,000 Units total) by mouth every 7 days.    Fatty liver disease, nonalcoholic  Comments:  LFTs normal, will continue to monitor    Takotsubo cardiomyopathy  Comments:  resolved, cardiac function stable, no further cardiac testing recommended unless new symptoms    Age-related osteoporosis without current pathological fracture  Comments:  previously declined starting medication for this; due for repeat bone scan  Orders:  -     DXA Bone Density Spine And Hip; Future  -     Vitamin D; Future    Postmenopausal  -     DXA Bone Density Spine And Hip; Future    Health maintenance reviewed with patient.   BMP, Mg in ~2 weeks  Other labs in 6 months before next visit  Recommended Shingrix  Recommended flu vaccine    Follow-up in about 6 months  (around 3/27/2019) for follow up; earlier if needed.    Erik Cool MD  Internal Medicine  [facility]  9/27/2018

## 2018-09-27 NOTE — PATIENT INSTRUCTIONS
I recommend that you check with pharmacy re: insurance coverage for new shingles vaccine (Shingrix); I do suggest that you get this if possible!    Please do get the flu vaccine this year!

## 2018-09-28 DIAGNOSIS — J44.9 CHRONIC OBSTRUCTIVE PULMONARY DISEASE, UNSPECIFIED COPD TYPE: ICD-10-CM

## 2018-09-28 RX ORDER — IPRATROPIUM BROMIDE AND ALBUTEROL SULFATE 2.5; .5 MG/3ML; MG/3ML
SOLUTION RESPIRATORY (INHALATION)
Qty: 270 ML | Refills: 0 | Status: ON HOLD | OUTPATIENT
Start: 2018-09-28 | End: 2018-11-21 | Stop reason: HOSPADM

## 2018-09-29 ENCOUNTER — NURSE TRIAGE (OUTPATIENT)
Dept: ADMINISTRATIVE | Facility: CLINIC | Age: 63
End: 2018-09-29

## 2018-09-29 ENCOUNTER — HOSPITAL ENCOUNTER (EMERGENCY)
Facility: HOSPITAL | Age: 63
Discharge: HOME | End: 2018-09-29
Attending: EMERGENCY MEDICINE
Payer: COMMERCIAL

## 2018-09-29 VITALS
DIASTOLIC BLOOD PRESSURE: 77 MMHG | HEIGHT: 65 IN | WEIGHT: 174 LBS | RESPIRATION RATE: 19 BRPM | OXYGEN SATURATION: 99 % | SYSTOLIC BLOOD PRESSURE: 137 MMHG | HEART RATE: 82 BPM | BODY MASS INDEX: 28.99 KG/M2 | TEMPERATURE: 99 F

## 2018-09-29 DIAGNOSIS — J44.9 CHRONIC OBSTRUCTIVE PULMONARY DISEASE, UNSPECIFIED COPD TYPE: ICD-10-CM

## 2018-09-29 DIAGNOSIS — D72.829 LEUKOCYTOSIS, UNSPECIFIED TYPE: ICD-10-CM

## 2018-09-29 DIAGNOSIS — R07.9 CHEST PAIN: ICD-10-CM

## 2018-09-29 DIAGNOSIS — R07.89 CHEST WALL PAIN: Primary | ICD-10-CM

## 2018-09-29 LAB
ALBUMIN SERPL BCP-MCNC: 3.4 G/DL
ALP SERPL-CCNC: 69 U/L
ALT SERPL W/O P-5'-P-CCNC: 12 U/L
ANION GAP SERPL CALC-SCNC: 9 MMOL/L
AST SERPL-CCNC: 15 U/L
BASOPHILS # BLD AUTO: 0.02 K/UL
BASOPHILS NFR BLD: 0.1 %
BILIRUB SERPL-MCNC: 0.3 MG/DL
BNP SERPL-MCNC: 40 PG/ML
BUN SERPL-MCNC: 21 MG/DL
CALCIUM SERPL-MCNC: 8.7 MG/DL
CHLORIDE SERPL-SCNC: 106 MMOL/L
CO2 SERPL-SCNC: 28 MMOL/L
CREAT SERPL-MCNC: 0.8 MG/DL
DIFFERENTIAL METHOD: ABNORMAL
EOSINOPHIL # BLD AUTO: 0.3 K/UL
EOSINOPHIL NFR BLD: 1.9 %
ERYTHROCYTE [DISTWIDTH] IN BLOOD BY AUTOMATED COUNT: 15.5 %
EST. GFR  (AFRICAN AMERICAN): >60 ML/MIN/1.73 M^2
EST. GFR  (NON AFRICAN AMERICAN): >60 ML/MIN/1.73 M^2
GLUCOSE SERPL-MCNC: 88 MG/DL
HCT VFR BLD AUTO: 39.2 %
HGB BLD-MCNC: 12.3 G/DL
IMM GRANULOCYTES # BLD AUTO: 0.08 K/UL
IMM GRANULOCYTES NFR BLD AUTO: 0.5 %
LYMPHOCYTES # BLD AUTO: 3.8 K/UL
LYMPHOCYTES NFR BLD: 22.9 %
MCH RBC QN AUTO: 28.4 PG
MCHC RBC AUTO-ENTMCNC: 31.4 G/DL
MCV RBC AUTO: 91 FL
MONOCYTES # BLD AUTO: 0.8 K/UL
MONOCYTES NFR BLD: 5 %
NEUTROPHILS # BLD AUTO: 11.5 K/UL
NEUTROPHILS NFR BLD: 69.6 %
NRBC BLD-RTO: 0 /100 WBC
PLATELET # BLD AUTO: 353 K/UL
PMV BLD AUTO: 10 FL
POTASSIUM SERPL-SCNC: 3.6 MMOL/L
PROT SERPL-MCNC: 6.3 G/DL
RBC # BLD AUTO: 4.33 M/UL
SODIUM SERPL-SCNC: 143 MMOL/L
TROPONIN I SERPL DL<=0.01 NG/ML-MCNC: <0.006 NG/ML
WBC # BLD AUTO: 16.49 K/UL

## 2018-09-29 PROCEDURE — 85025 COMPLETE CBC W/AUTO DIFF WBC: CPT

## 2018-09-29 PROCEDURE — 83880 ASSAY OF NATRIURETIC PEPTIDE: CPT

## 2018-09-29 PROCEDURE — 84484 ASSAY OF TROPONIN QUANT: CPT

## 2018-09-29 PROCEDURE — 63600175 PHARM REV CODE 636 W HCPCS: Performed by: EMERGENCY MEDICINE

## 2018-09-29 PROCEDURE — 96374 THER/PROPH/DIAG INJ IV PUSH: CPT

## 2018-09-29 PROCEDURE — 99284 EMERGENCY DEPT VISIT MOD MDM: CPT | Mod: ,,, | Performed by: EMERGENCY MEDICINE

## 2018-09-29 PROCEDURE — 93010 ELECTROCARDIOGRAM REPORT: CPT | Mod: ,,, | Performed by: INTERNAL MEDICINE

## 2018-09-29 PROCEDURE — 80053 COMPREHEN METABOLIC PANEL: CPT

## 2018-09-29 PROCEDURE — 99284 EMERGENCY DEPT VISIT MOD MDM: CPT | Mod: 25

## 2018-09-29 RX ORDER — KETOROLAC TROMETHAMINE 30 MG/ML
15 INJECTION, SOLUTION INTRAMUSCULAR; INTRAVENOUS
Status: COMPLETED | OUTPATIENT
Start: 2018-09-29 | End: 2018-09-29

## 2018-09-29 RX ORDER — KETOROLAC TROMETHAMINE 30 MG/ML
30 INJECTION, SOLUTION INTRAMUSCULAR; INTRAVENOUS
Status: DISCONTINUED | OUTPATIENT
Start: 2018-09-29 | End: 2018-09-29

## 2018-09-29 RX ORDER — ASPIRIN 325 MG
325 TABLET ORAL
Status: DISCONTINUED | OUTPATIENT
Start: 2018-09-29 | End: 2018-09-29 | Stop reason: HOSPADM

## 2018-09-29 RX ADMIN — KETOROLAC TROMETHAMINE 15 MG: 30 INJECTION, SOLUTION INTRAMUSCULAR at 07:09

## 2018-09-29 NOTE — TELEPHONE ENCOUNTER
"  Reason for Disposition   Pain also present in shoulder(s) or arm(s) or jaw  (Exception: pain is clearly made worse by movement)    Answer Assessment - Initial Assessment Questions  1. LOCATION: "Where does it hurt?"       CP, last week had infection on abx and steroids. Hx COPD. Pt on O2.   yest having muscle pain across back and chest. Now burping and ate a lot of green beans and potatoes. No SOB, no nausea, no heavy perspiration   Hurts to touch chest. Constipated on doxycycline - just had BM today.   2. RADIATION: "Does the pain go anywhere else?" (e.g., into neck, jaw, arms, back)    Arm bilat   3. ONSET: "When did the chest pain begin?" (Minutes, hours or days)      9/28   4. PATTERN "Does the pain come and go, or has it been constant since it started?"  "Does it get worse with exertion?"     Pain only when touching chest   5. DURATION: "How long does it last" (e.g., seconds, minutes, hours)     9/28  6. SEVERITY: "How bad is the pain?"  (e.g., Scale 1-10; mild, moderate, or severe)     - MILD (1-3): doesn't interfere with normal activities      - MODERATE (4-7): interferes with normal activities or awakens from sleep     - SEVERE (8-10): excruciating pain, unable to do any normal activities      6-7   7. CARDIAC RISK FACTORS: "Do you have any history of heart problems or risk factors for heart disease?" (e.g., prior heart attack, angina; high blood pressure, diabetes, being overweight, high cholesterol, smoking, or strong family history of heart disease)    HBP, COPD  8. PULMONARY RISK FACTORS: "Do you have any history of lung disease?"  (e.g., blood clots in lung, asthma, emphysema, birth control pills)    COPD -just finished abx thurs   9. CAUSE: "What do you think is causing the chest pain?"     Indigestion   10. OTHER SYMPTOMS: "Do you have any other symptoms?" (e.g., dizziness, nausea, vomiting, sweating, fever, difficulty breathing, cough)     denies    Protocols used: ST CHEST PAIN-A-  Rec ED due " to pain in arms, pains since yest, hx COPD. pt states she is sitting in front of ED now. rec ED. rec EMS if sx worsen. call back with questions.

## 2018-09-29 NOTE — ED PROVIDER NOTES
"Encounter Date: 9/29/2018    SCRIBE #1 NOTE: I, Son Nirali, am scribing for, and in the presence of,  Dr. Mcgraw. I have scribed the following portions of the note - Other sections scribed: HPI ROS PE.       History     Chief Complaint   Patient presents with    Chest Pain     burping, on home oxygen for copd,     Time patient was seen by the provider: 5:34 PM      Ms. Aguila is a 63 y.o. female with co-morbidities including: aortic calcification, chronic diarrhea, COPD (on 2 L NC), HTN who presents to the ED with a complaint of chest pain. Yesterday, after work, she reports of eating green beans and potatoes. When she sat up, she noticed that she started to have "gassy burps" that has been persistent today. She also endorses chest wall tenderness. She reports of moderate to severe chest wall pain when palpated. Took half of a tylenol, but no relief. She is currently on 2L home O2. Denies history of GERD. Denies fever, vomiting, shortness of breath, diaphoresis, abdominal pain.       The history is provided by the patient and medical records.     Review of patient's allergies indicates:   Allergen Reactions    Sudafed [pseudoephedrine hcl] Nausea And Vomiting and Other (See Comments)     JITTERY     Past Medical History:   Diagnosis Date    Aortic calcification 1/11/2018    Chronic diarrhea     Chronic obstructive pulmonary disease 8/27/2013    Chronic respiratory failure with hypoxia, on home O2 therapy 1/11/2018    COPD (chronic obstructive pulmonary disease)     Former smoker 10/18/2016    Hepatomegaly 12/21/2015    Hyperlipidemia     Hypertension     Microscopic hematuria 1/13/2017    Perianal abscess 6/1/2018    Reflux 2013    S/P right hemicolectomy 9/7/2017    Performed in 2011 after perforation during colonoscopy    Takotsubo cardiomyopathy 10/18/2016    Noted on echo 10/2016, recovered on repeat echo 12/2016     Past Surgical History:   Procedure Laterality Date    ABDOMINAL SURGERY      " APPENDECTOMY      CATHETERIZATION, HEART, LEFT N/A 2013    Performed [facility] at [facility] C[facility] LAB    CHOLANGIOGRAM N/A 2013    Performed by Zhou Hay Jr., [facility] at [facility] OR 2ND FLR    CHOLECYSTECTOMY      open    CHOLECYSTECTOMY N/A 2013    Performed by [facility] Jr[facility] at [facility] OR 2ND FLR    CHOLECYSTECTOMY, LAPAROSCOPIC N/A 2013    Performed by Zhou [facility] Jr[facility] at [facility] OR 2ND FLR    COLON SURGERY      secondary to perforation after c-scope    COLONOSCOPY      FRACTURE SURGERY      HERNIA REPAIR      HIATAL HERNIA REPAIR      HYSTERECTOMY  1986    Left leg surgery       Family History   Problem Relation Age of Onset    Cancer Mother         lung    Hypertension Mother     Coronary artery disease Father     Retinal detachment Father     Hypertension Sister     Hypertension Brother     Cataracts Maternal Grandmother     Abnormal EKG Daughter     Breast cancer Daughter 43        negative genetic testing    Breast cancer Other 44    Amblyopia Neg Hx     Blindness Neg Hx     Diabetes Neg Hx     Glaucoma Neg Hx     Macular degeneration Neg Hx     Strabismus Neg Hx     Stroke Neg Hx     Thyroid disease Neg Hx     Colon cancer Neg Hx     Ovarian cancer Neg Hx      Social History     Tobacco Use    Smoking status: Former Smoker     Packs/day: 1.50     Years: 30.00     Pack years: 45.00     Types: Cigarettes     Last attempt to quit: 1997     Years since quittin.7    Smokeless tobacco: Never Used   Substance Use Topics    Alcohol use: Yes     Comment: occaissionally    Drug use: No     Review of Systems   Constitutional: Negative for diaphoresis and fever.   Respiratory: Negative for shortness of breath.    Cardiovascular: Positive for chest pain.   Gastrointestinal: Negative for abdominal distention and vomiting.   All other systems reviewed and are negative.      Physical Exam     Initial Vitals [18 1659]    BP Pulse Resp Temp SpO2   (!) 141/71 94 20 99.1 °F (37.3 °C) 95 %      MAP       --         Physical Exam    Nursing note and vitals reviewed.  Constitutional: She appears well-developed and well-nourished. No distress.   HENT:   Head: Normocephalic and atraumatic.   Right Ear: External ear normal.   Left Ear: External ear normal.   Nose: Nose normal.   Mouth/Throat: Oropharynx is clear and moist. No oropharyngeal exudate.   Nasal canula in place   Eyes: EOM are normal. Pupils are equal, round, and reactive to light. Right eye exhibits no discharge. Left eye exhibits no discharge.   Neck: Normal range of motion. Neck supple.   Cardiovascular: Normal rate, regular rhythm, normal heart sounds and intact distal pulses.   Pulmonary/Chest: Breath sounds normal. She has no wheezes. She exhibits tenderness (diffuse).   Lungs clear  Good air movement    Abdominal: Soft. She exhibits no distension. There is no tenderness.   Musculoskeletal: Normal range of motion. She exhibits no edema or tenderness.   Neurological: She is alert and oriented to person, place, and time. She has normal strength.   Skin: Skin is warm and dry. Capillary refill takes less than 2 seconds.   Psychiatric: She has a normal mood and affect.         ED Course   Procedures  Labs Reviewed   CBC W/ AUTO DIFFERENTIAL - Abnormal; Notable for the following components:       Result Value    WBC 16.49 (*)     MCHC 31.4 (*)     RDW 15.5 (*)     Platelets 353 (*)     Gran # (ANC) 11.5 (*)     Immature Grans (Abs) 0.08 (*)     All other components within normal limits   COMPREHENSIVE METABOLIC PANEL - Abnormal; Notable for the following components:    Albumin 3.4 (*)     All other components within normal limits   TROPONIN I   B-TYPE NATRIURETIC PEPTIDE     EKG Readings: (Independently Interpreted)   Initial Reading: No STEMI. Rhythm: Normal Sinus Rhythm. Heart Rate: 84 bpm.   No ischemic changes, normal intervals        Imaging Results          X-Ray Chest PA  And Lateral (Final result)  Result time 09/29/18 18:32:58    Final result by Darci Morales MD (09/29/18 18:32:58)                 Impression:      No detrimental change or radiographic acute intrathoracic process seen.      Electronically signed by: Darci Morales MD  Date:    09/29/2018  Time:    18:32             Narrative:    EXAMINATION:  XR CHEST PA AND LATERAL    CLINICAL HISTORY:  Chest Pain;    TECHNIQUE:  PA and lateral views of the chest were performed.    COMPARISON:  Chest radiograph 09/24/2018    FINDINGS:  No detrimental change.  Cardiomediastinal silhouette is stable without evidence of failure.  Bibasilar platelike scarring versus atelectasis, unchanged.  The lungs are symmetrically deeply inflated similar to prior without large consolidation or new focal opacity.  No pleural effusion or pneumothorax.  No acute osseous process seen.                                 Medical Decision Making:   History:   Old Medical Records: I decided to obtain old medical records.  Clinical Tests:   Lab Tests: Ordered and Reviewed  Radiological Study: Ordered and Reviewed  Medical Tests: Ordered and Reviewed        Additional MDM:   Heart Score:    History:          Slightly suspicious.  ECG:             Normal  Age:               45-65 years  Risk factors: 1-2 risk factors  Troponin:       Less than or equal to normal limit  Final Score: 2             Scribe Attestation:   Scribe #1: I performed the above scribed service and the documentation accurately describes the services I performed. I attest to the accuracy of the note.    Attending Attestation:           Physician Attestation for Scribe:  Physician Attestation Statement for Scribe #1: I, Lucien RYAN, reviewed documentation, as scribed by in my presence, and it is both accurate and complete.         Attending ED Notes:   Vitals normal. Afebrile. EKG unremarkable. Sounds to MSK pain as she states it worsens with carrying her portable oxygen. Reproducible pain.  Lung sounds normal. CBC, CMP, troponin, BNP grossly WNL except WBC 16.49. CXR read as no change from prior or acute process. Uncertain etiology of leukocytosis. Possibly due to recent steroid use (finished 1 week course 2 days ago). However, I would have suspected it to have normalized by now if that was the source. Heart score 2 which supports d/c home. Patient requesting discharge. Advised to f/u w/ PCP for leukocytosis. Toradol given prior to discharge for pain. Stable for discharge. Return precautions discussed.             Clinical Impression:   The primary encounter diagnosis was Chest wall pain. Diagnoses of Chest pain, Leukocytosis, unspecified type, and Chronic obstructive pulmonary disease, unspecified COPD type were also pertinent to this visit.      Disposition:   Disposition: Discharged  Condition: Stable                        Lauri Mcgraw MD  09/29/18 233       Lauri Mcgraw MD  09/29/18 2651

## 2018-09-29 NOTE — ED NOTES
Patient identifiers verified and correct for Ana Aguila.     LOC: The patient is awake, alert and aware of environment with an appropriate affect, the patient is oriented x 3 and speaking appropriately.   APPEARANCE: Patient appears comfortable and in no acute distress, patient is clean and well groomed.  SKIN: The skin is warm and dry, color consistent with ethnicity, patient has normal skin turgor and moist mucus membranes, skin intact, no breakdown or bruising noted.   MUSCULOSKELETAL: Patient moving all extremities spontaneously, no swelling noted.  RESPIRATORY: Airway is open and patent, respirations are spontaneous, patient has a normal effort and rate, no accessory muscle use noted, pt placed on continuous pulse ox with O2 sats noted at 97% on room air.  CARDIAC: Pt placed on cardiac monitor. Patient has a normal rate and regular rhythm, no edema noted, capillary refill < 3 seconds. Complaining of bilateral chest tenderness.  GASTRO: Soft and non tender to palpation, no distention noted. Pt states loose bowel movement today.   : Pt denies any pain or frequency with urination.  NEURO: Pt opens eyes spontaneously, behavior appropriate to situation, follows commands, facial expression symmetrical, bilateral hand grasp equal and even, purposeful motor response noted, normal sensation in all extremities when touched with a finger.

## 2018-09-29 NOTE — PROVIDER PROGRESS NOTES - EMERGENCY DEPT.
Encounter Date: 9/29/2018    ED Physician Progress Notes         EKG - STEMI Decision  Initial Reading: No STEMI present.  Response: No Action Needed.

## 2018-09-29 NOTE — ED TRIAGE NOTES
Pt reporting a pain that she describes as tenderness across the upper chest and around into the middle back since yesterday.  Pt also reporting increased belching and feeling as if the gas is getting stuck in her chest.

## 2018-10-01 DIAGNOSIS — J44.9 CHRONIC OBSTRUCTIVE PULMONARY DISEASE, UNSPECIFIED COPD TYPE: ICD-10-CM

## 2018-10-01 RX ORDER — IPRATROPIUM BROMIDE AND ALBUTEROL SULFATE 2.5; .5 MG/3ML; MG/3ML
SOLUTION RESPIRATORY (INHALATION)
Qty: 270 ML | Refills: 0 | Status: ON HOLD | OUTPATIENT
Start: 2018-10-01 | End: 2018-11-18

## 2018-10-05 ENCOUNTER — PATIENT OUTREACH (OUTPATIENT)
Dept: OTHER | Facility: OTHER | Age: 63
End: 2018-10-05

## 2018-10-05 DIAGNOSIS — I10 BENIGN ESSENTIAL HTN: ICD-10-CM

## 2018-10-05 RX ORDER — HYDROCHLOROTHIAZIDE 25 MG/1
TABLET ORAL
Qty: 90 TABLET | Refills: 0 | OUTPATIENT
Start: 2018-10-05

## 2018-10-05 NOTE — TELEPHONE ENCOUNTER
Please call pharmacy to let them know that dose of this med was changed 1 week ago from 25mg to 12.5mg, please cancel rx for 25mg tablets

## 2018-10-05 NOTE — PROGRESS NOTES
Last 5 Patient Entered Readings                                      Current 30 Day Average: 127/84     Recent Readings 10/18/2018 10/18/2018 10/18/2018 10/18/2018 10/17/2018    SBP (mmHg) 119 119 134 134 130    DBP (mmHg) 82 82 87 87 78    Pulse 85 85 86 86 93        Patient's BP average is approaching goal of <130/80.     Ms. Aguila was concerned that her BP monitor was inaccurate, so she had it checked at the clinic with a manual BP reading. Manual readings done in clinic were similar to readings on her home monitor. Her readings at home are fairly well controlled on current regimen. No medication changes required at this time.     Will continue to monitor regularly. Will follow up in 3-4 months, sooner if BP begins to trend upward or downward.    Patient has my contact information and knows to call with any concerns or clinical changes.     Current HTN regimen:  Hypertension Medications             hydroCHLOROthiazide (HYDRODIURIL) 12.5 MG Tab Take 1 tablet (12.5 mg total) by mouth once daily. For blood pressure

## 2018-10-11 ENCOUNTER — HOSPITAL ENCOUNTER (OUTPATIENT)
Dept: RADIOLOGY | Facility: CLINIC | Age: 63
Discharge: HOME | End: 2018-10-11
Attending: INTERNAL MEDICINE
Payer: COMMERCIAL

## 2018-10-11 ENCOUNTER — LAB VISIT (OUTPATIENT)
Dept: LAB | Facility: HOSPITAL | Age: 63
End: 2018-10-11
Attending: INTERNAL MEDICINE
Payer: COMMERCIAL

## 2018-10-11 ENCOUNTER — CLINICAL SUPPORT (OUTPATIENT)
Dept: INTERNAL MEDICINE | Facility: CLINIC | Age: 63
End: 2018-10-11
Payer: COMMERCIAL

## 2018-10-11 ENCOUNTER — TELEPHONE (OUTPATIENT)
Dept: INTERNAL MEDICINE | Facility: CLINIC | Age: 63
End: 2018-10-11

## 2018-10-11 DIAGNOSIS — M81.0 AGE-RELATED OSTEOPOROSIS WITHOUT CURRENT PATHOLOGICAL FRACTURE: ICD-10-CM

## 2018-10-11 DIAGNOSIS — T50.2X5A DIURETIC-INDUCED HYPOKALEMIA: ICD-10-CM

## 2018-10-11 DIAGNOSIS — E87.6 DIURETIC-INDUCED HYPOKALEMIA: ICD-10-CM

## 2018-10-11 DIAGNOSIS — Z78.0 POSTMENOPAUSAL: ICD-10-CM

## 2018-10-11 LAB
ANION GAP SERPL CALC-SCNC: 8 MMOL/L
BUN SERPL-MCNC: 14 MG/DL
CALCIUM SERPL-MCNC: 9.1 MG/DL
CHLORIDE SERPL-SCNC: 108 MMOL/L
CO2 SERPL-SCNC: 29 MMOL/L
CREAT SERPL-MCNC: 0.8 MG/DL
EST. GFR  (AFRICAN AMERICAN): >60 ML/MIN/1.73 M^2
EST. GFR  (NON AFRICAN AMERICAN): >60 ML/MIN/1.73 M^2
GLUCOSE SERPL-MCNC: 90 MG/DL
MAGNESIUM SERPL-MCNC: 1.9 MG/DL
POTASSIUM SERPL-SCNC: 3.6 MMOL/L
SODIUM SERPL-SCNC: 145 MMOL/L

## 2018-10-11 PROCEDURE — 77080 DXA BONE DENSITY AXIAL: CPT | Mod: TC

## 2018-10-11 PROCEDURE — 83735 ASSAY OF MAGNESIUM: CPT

## 2018-10-11 PROCEDURE — 36415 COLL VENOUS BLD VENIPUNCTURE: CPT

## 2018-10-11 PROCEDURE — 80048 BASIC METABOLIC PNL TOTAL CA: CPT

## 2018-10-11 PROCEDURE — 77080 DXA BONE DENSITY AXIAL: CPT | Mod: 26,,, | Performed by: INTERNAL MEDICINE

## 2018-10-11 NOTE — PROGRESS NOTES
115 pm- B/P 160/90 lt arm medium cuff manually taken.  Pt's reading with digital cuff 127/85.  122 pm-repeat blanche 138/90.  133 pm- 134/78.  Home readings from today 121/89 and days prior are 131/79, 127/88, 125/84, 117/81, 129/89 142/92 and 158/91. Readings reported to Dr Cool. Message to be sent to the Andreas Woodson D patient out reach digital medicine to evaluate the reading and call the patient with recommendations.

## 2018-10-12 ENCOUNTER — PATIENT OUTREACH (OUTPATIENT)
Dept: OTHER | Facility: OTHER | Age: 63
End: 2018-10-12

## 2018-10-12 NOTE — PROGRESS NOTES
Last 5 Patient Entered Readings                                      Current 30 Day Average: 128/85     Recent Readings 10/11/2018 10/11/2018 10/11/2018 10/11/2018 10/9/2018    SBP (mmHg) 127 127 121 121 131    DBP (mmHg) 85 85 89 89 79    Pulse 88 88 89 89 91        Digital Medicine: Health  Follow Up    Left voicemail to follow up with Ms. Ana Aguila.  Current BP average 128/85 mmHg is not at goal, <130/80 mmHg.  Will continue to monitor.

## 2018-10-17 ENCOUNTER — DOCUMENTATION ONLY (OUTPATIENT)
Dept: REHABILITATION | Facility: HOSPITAL | Age: 63
End: 2018-10-17

## 2018-10-17 NOTE — PROGRESS NOTES
Name: Ana Aguila  Referring Provider: Silvina  PT Order: PT evaluate and treat  Clinical #: [id number]  Discharge Summary Date: 10/17/2018  Diagnosis: LBP    Patient was seen for 19 OP PT visits from 1/22/18 to 6/27/18. Pt met all STGs and 1/3 LTGs. Unable to assess remaining LTGs as patient failed to return to final session. Treatment included: evaluation, HEP, pt education, manual therapy, ther ex, and stretching. PT unable to fully assess goal achievement as pt did not return for follow up sessions/did not reschedule follow up visits. This patient is discharged from OP PT Services.    Tiffanie Preston, PT, DPT

## 2018-10-29 ENCOUNTER — TELEPHONE (OUTPATIENT)
Dept: INTERNAL MEDICINE | Facility: CLINIC | Age: 63
End: 2018-10-29

## 2018-10-29 NOTE — PROGRESS NOTES
Bone density scan shows borderline low bone density (osteopenia). She does not need any prescription medications at this time, but it is important to get enough calcium through diet + supplements, and to do weight-bearing exercise regularly.  She should continue the weekly high-dose vitamin D.  We will also plan to recheck her DEXA bone density scan in 2 years.   Please call patient to inform.

## 2018-10-29 NOTE — TELEPHONE ENCOUNTER
----- Message from Erik Cool MD sent at 10/29/2018 10:24 AM CDT -----  Bone density scan shows borderline low bone density (osteopenia). She does not need any prescription medications at this time, but it is important to get enough calcium through diet + supplements, and to do weight-bearing exercise regularly.  She should continue the weekly high-dose vitamin D.  We will also plan to recheck her DEXA bone density scan in 2 years.   Please call patient to inform.

## 2018-10-30 NOTE — TELEPHONE ENCOUNTER
"Pt. is now complaining of "right side pain where her gall bladder use to be" x 2 weeks. Pain is persistent and worsens in certain positions. She orginally thought that she pulled a muscle during therapy. She has not tried anything OTC because she "doesn't like to take a whole lot of medicine".  "

## 2018-11-05 NOTE — PROGRESS NOTES
Last 5 Patient Entered Readings                                      Current 30 Day Average: 128/86     Recent Readings 11/4/2018 11/4/2018 11/2/2018 11/2/2018 11/2/2018    SBP (mmHg) 135 135 144 144 122    DBP (mmHg) 91 91 88 88 84    Pulse 99 99 87 87 87          Digital Medicine: Health  Follow Up    Left voicemail to follow up with Ms. Ana Aguila.  Current BP average 128/86 mmHg is not at goal, <130/80 mmHg.  Will continue to monitor. Follow up in 3 weeks.

## 2018-11-15 DIAGNOSIS — Z99.81 CHRONIC RESPIRATORY FAILURE WITH HYPOXIA, ON HOME O2 THERAPY: ICD-10-CM

## 2018-11-15 DIAGNOSIS — J44.0 CHRONIC OBSTRUCTIVE PULMONARY DISEASE WITH ACUTE LOWER RESPIRATORY INFECTION: Primary | ICD-10-CM

## 2018-11-15 DIAGNOSIS — J96.11 CHRONIC RESPIRATORY FAILURE WITH HYPOXIA, ON HOME O2 THERAPY: ICD-10-CM

## 2018-11-18 ENCOUNTER — HOSPITAL ENCOUNTER (INPATIENT)
Facility: HOSPITAL | Age: 63
LOS: 3 days | Discharge: HOME | DRG: 190 | End: 2018-11-21
Attending: HOSPITALIST | Admitting: HOSPITALIST
Payer: COMMERCIAL

## 2018-11-18 DIAGNOSIS — Z90.49 S/P RIGHT HEMICOLECTOMY: ICD-10-CM

## 2018-11-18 DIAGNOSIS — R16.0 HEPATOMEGALY: ICD-10-CM

## 2018-11-18 DIAGNOSIS — J44.1 COPD EXACERBATION: ICD-10-CM

## 2018-11-18 DIAGNOSIS — I51.81 TAKOTSUBO CARDIOMYOPATHY: ICD-10-CM

## 2018-11-18 DIAGNOSIS — K76.0 FATTY LIVER DISEASE, NONALCOHOLIC: ICD-10-CM

## 2018-11-18 DIAGNOSIS — F43.21 ADJUSTMENT DISORDER WITH DEPRESSED MOOD: ICD-10-CM

## 2018-11-18 DIAGNOSIS — F43.21 SITUATIONAL DEPRESSION: ICD-10-CM

## 2018-11-18 DIAGNOSIS — I10 ESSENTIAL HYPERTENSION: ICD-10-CM

## 2018-11-18 DIAGNOSIS — I42.9 CARDIOMYOPATHY: ICD-10-CM

## 2018-11-18 DIAGNOSIS — Z99.81 CHRONIC RESPIRATORY FAILURE WITH HYPOXIA, ON HOME OXYGEN THERAPY: ICD-10-CM

## 2018-11-18 DIAGNOSIS — J44.9 COPD (CHRONIC OBSTRUCTIVE PULMONARY DISEASE): ICD-10-CM

## 2018-11-18 DIAGNOSIS — J96.21 ACUTE ON CHRONIC RESPIRATORY FAILURE WITH HYPOXIA: ICD-10-CM

## 2018-11-18 DIAGNOSIS — J96.11 CHRONIC RESPIRATORY FAILURE WITH HYPOXIA, ON HOME OXYGEN THERAPY: ICD-10-CM

## 2018-11-18 LAB
ALBUMIN SERPL BCP-MCNC: 3.5 G/DL
ALLENS TEST: ABNORMAL
ALP SERPL-CCNC: 80 U/L
ALT SERPL W/O P-5'-P-CCNC: 41 U/L
ANION GAP SERPL CALC-SCNC: 13 MMOL/L
AST SERPL-CCNC: 72 U/L
BACTERIA #/AREA URNS AUTO: NORMAL /HPF
BASOPHILS # BLD AUTO: 0.07 K/UL
BASOPHILS NFR BLD: 0.4 %
BILIRUB SERPL-MCNC: 0.2 MG/DL
BILIRUB UR QL STRIP: NEGATIVE
BNP SERPL-MCNC: 55 PG/ML
BUN SERPL-MCNC: 19 MG/DL
CALCIUM SERPL-MCNC: 8.8 MG/DL
CHLORIDE SERPL-SCNC: 106 MMOL/L
CLARITY UR REFRACT.AUTO: CLEAR
CO2 SERPL-SCNC: 23 MMOL/L
COLOR UR AUTO: COLORLESS
CREAT SERPL-MCNC: 0.9 MG/DL
DELSYS: ABNORMAL
DIFFERENTIAL METHOD: ABNORMAL
EOSINOPHIL # BLD AUTO: 0.5 K/UL
EOSINOPHIL NFR BLD: 2.7 %
ERYTHROCYTE [DISTWIDTH] IN BLOOD BY AUTOMATED COUNT: 14.8 %
EST. GFR  (AFRICAN AMERICAN): >60 ML/MIN/1.73 M^2
EST. GFR  (NON AFRICAN AMERICAN): >60 ML/MIN/1.73 M^2
FLOW: 2
GLUCOSE SERPL-MCNC: 245 MG/DL
GLUCOSE UR QL STRIP: NEGATIVE
HCO3 UR-SCNC: 25.9 MMOL/L (ref 24–28)
HCT VFR BLD AUTO: 36.4 %
HGB BLD-MCNC: 11.5 G/DL
HGB UR QL STRIP: ABNORMAL
IMM GRANULOCYTES # BLD AUTO: 0.08 K/UL
IMM GRANULOCYTES NFR BLD AUTO: 0.4 %
KETONES UR QL STRIP: NEGATIVE
LEUKOCYTE ESTERASE UR QL STRIP: NEGATIVE
LYMPHOCYTES # BLD AUTO: 4.9 K/UL
LYMPHOCYTES NFR BLD: 27.1 %
MCH RBC QN AUTO: 29 PG
MCHC RBC AUTO-ENTMCNC: 31.6 G/DL
MCV RBC AUTO: 92 FL
MICROSCOPIC COMMENT: NORMAL
MODE: ABNORMAL
MONOCYTES # BLD AUTO: 0.9 K/UL
MONOCYTES NFR BLD: 4.8 %
NEUTROPHILS # BLD AUTO: 11.6 K/UL
NEUTROPHILS NFR BLD: 64.6 %
NITRITE UR QL STRIP: NEGATIVE
NRBC BLD-RTO: 0 /100 WBC
PCO2 BLDA: 43.1 MMHG (ref 35–45)
PH SMN: 7.39 [PH] (ref 7.35–7.45)
PH UR STRIP: 5 [PH] (ref 5–8)
PLATELET # BLD AUTO: 302 K/UL
PMV BLD AUTO: 10.7 FL
PO2 BLDA: 68 MMHG (ref 80–100)
POC BE: 1 MMOL/L
POC SATURATED O2: 93 % (ref 95–100)
POC TCO2: 27 MMOL/L (ref 23–27)
POCT GLUCOSE: 119 MG/DL (ref 70–110)
POCT GLUCOSE: 123 MG/DL (ref 70–110)
POCT GLUCOSE: 126 MG/DL (ref 70–110)
POTASSIUM SERPL-SCNC: 3.9 MMOL/L
PROT SERPL-MCNC: 7 G/DL
PROT UR QL STRIP: NEGATIVE
RBC # BLD AUTO: 3.96 M/UL
RBC #/AREA URNS AUTO: 1 /HPF (ref 0–4)
SAMPLE: ABNORMAL
SITE: ABNORMAL
SODIUM SERPL-SCNC: 142 MMOL/L
SP GR UR STRIP: 1.01 (ref 1–1.03)
SP02: 94
TROPONIN I SERPL DL<=0.01 NG/ML-MCNC: 0.06 NG/ML
TROPONIN I SERPL DL<=0.01 NG/ML-MCNC: 0.7 NG/ML
TROPONIN I SERPL DL<=0.01 NG/ML-MCNC: 0.89 NG/ML
TROPONIN I SERPL DL<=0.01 NG/ML-MCNC: 1.47 NG/ML
TROPONIN I SERPL DL<=0.01 NG/ML-MCNC: 1.51 NG/ML
TSH SERPL DL<=0.005 MIU/L-ACNC: 1.14 UIU/ML
URN SPEC COLLECT METH UR: ABNORMAL
WBC # BLD AUTO: 17.99 K/UL
WBC #/AREA URNS AUTO: 0 /HPF (ref 0–5)

## 2018-11-18 PROCEDURE — 94645 CONT INHLJ TX EACH ADDL HOUR: CPT

## 2018-11-18 PROCEDURE — 81001 URINALYSIS AUTO W/SCOPE: CPT

## 2018-11-18 PROCEDURE — 27000221 HC OXYGEN, UP TO 24 HOURS

## 2018-11-18 PROCEDURE — 99900035 HC TECH TIME PER 15 MIN (STAT)

## 2018-11-18 PROCEDURE — 25000003 PHARM REV CODE 250: Performed by: STUDENT IN AN ORGANIZED HEALTH CARE EDUCATION/TRAINING PROGRAM

## 2018-11-18 PROCEDURE — 36415 COLL VENOUS BLD VENIPUNCTURE: CPT

## 2018-11-18 PROCEDURE — 87086 URINE CULTURE/COLONY COUNT: CPT

## 2018-11-18 PROCEDURE — 96374 THER/PROPH/DIAG INJ IV PUSH: CPT

## 2018-11-18 PROCEDURE — 93010 ELECTROCARDIOGRAM REPORT: CPT | Mod: ,,, | Performed by: INTERNAL MEDICINE

## 2018-11-18 PROCEDURE — 99223 1ST HOSP IP/OBS HIGH 75: CPT | Mod: ,,, | Performed by: INTERNAL MEDICINE

## 2018-11-18 PROCEDURE — 99283 EMERGENCY DEPT VISIT LOW MDM: CPT | Mod: ,,, | Performed by: EMERGENCY MEDICINE

## 2018-11-18 PROCEDURE — 63600175 PHARM REV CODE 636 W HCPCS: Performed by: STUDENT IN AN ORGANIZED HEALTH CARE EDUCATION/TRAINING PROGRAM

## 2018-11-18 PROCEDURE — 82962 GLUCOSE BLOOD TEST: CPT

## 2018-11-18 PROCEDURE — 25000242 PHARM REV CODE 250 ALT 637 W/ HCPCS: Performed by: STUDENT IN AN ORGANIZED HEALTH CARE EDUCATION/TRAINING PROGRAM

## 2018-11-18 PROCEDURE — B246ZZZ ULTRASONOGRAPHY OF RIGHT AND LEFT HEART: ICD-10-PCS | Performed by: INTERNAL MEDICINE

## 2018-11-18 PROCEDURE — 25500020 PHARM REV CODE 255: Performed by: STUDENT IN AN ORGANIZED HEALTH CARE EDUCATION/TRAINING PROGRAM

## 2018-11-18 PROCEDURE — 84443 ASSAY THYROID STIM HORMONE: CPT

## 2018-11-18 PROCEDURE — 36600 WITHDRAWAL OF ARTERIAL BLOOD: CPT

## 2018-11-18 PROCEDURE — 84484 ASSAY OF TROPONIN QUANT: CPT | Mod: 91

## 2018-11-18 PROCEDURE — 99285 EMERGENCY DEPT VISIT HI MDM: CPT | Mod: 25

## 2018-11-18 PROCEDURE — 94640 AIRWAY INHALATION TREATMENT: CPT

## 2018-11-18 PROCEDURE — 85025 COMPLETE CBC W/AUTO DIFF WBC: CPT

## 2018-11-18 PROCEDURE — 83880 ASSAY OF NATRIURETIC PEPTIDE: CPT

## 2018-11-18 PROCEDURE — 80053 COMPREHEN METABOLIC PANEL: CPT

## 2018-11-18 PROCEDURE — 82803 BLOOD GASES ANY COMBINATION: CPT

## 2018-11-18 PROCEDURE — 11000001 HC ACUTE MED/SURG PRIVATE ROOM

## 2018-11-18 PROCEDURE — 84484 ASSAY OF TROPONIN QUANT: CPT

## 2018-11-18 PROCEDURE — 94761 N-INVAS EAR/PLS OXIMETRY MLT: CPT

## 2018-11-18 PROCEDURE — 87040 BLOOD CULTURE FOR BACTERIA: CPT

## 2018-11-18 PROCEDURE — 93005 ELECTROCARDIOGRAM TRACING: CPT

## 2018-11-18 PROCEDURE — 94644 CONT INHLJ TX 1ST HOUR: CPT

## 2018-11-18 RX ORDER — AMOXICILLIN 500 MG/1
500 CAPSULE ORAL
Status: COMPLETED | OUTPATIENT
Start: 2018-11-18 | End: 2018-11-18

## 2018-11-18 RX ORDER — GLUCAGON 1 MG
1 KIT INJECTION
Status: DISCONTINUED | OUTPATIENT
Start: 2018-11-18 | End: 2018-11-21 | Stop reason: HOSPADM

## 2018-11-18 RX ORDER — ALBUTEROL SULFATE 0.83 MG/ML
SOLUTION RESPIRATORY (INHALATION)
Status: DISPENSED
Start: 2018-11-18 | End: 2018-11-18

## 2018-11-18 RX ORDER — SODIUM CHLORIDE 0.9 % (FLUSH) 0.9 %
3 SYRINGE (ML) INJECTION
Status: DISCONTINUED | OUTPATIENT
Start: 2018-11-18 | End: 2018-11-21 | Stop reason: HOSPADM

## 2018-11-18 RX ORDER — IPRATROPIUM BROMIDE AND ALBUTEROL SULFATE 2.5; .5 MG/3ML; MG/3ML
3 SOLUTION RESPIRATORY (INHALATION) EVERY 4 HOURS
Status: DISCONTINUED | OUTPATIENT
Start: 2018-11-18 | End: 2018-11-18

## 2018-11-18 RX ORDER — ACETAMINOPHEN 325 MG/1
325 TABLET ORAL EVERY 6 HOURS PRN
Status: DISCONTINUED | OUTPATIENT
Start: 2018-11-18 | End: 2018-11-21 | Stop reason: HOSPADM

## 2018-11-18 RX ORDER — HYDROCHLOROTHIAZIDE 12.5 MG/1
12.5 TABLET ORAL DAILY
Status: DISCONTINUED | OUTPATIENT
Start: 2018-11-18 | End: 2018-11-18

## 2018-11-18 RX ORDER — ALBUTEROL SULFATE 90 UG/1
2 AEROSOL, METERED RESPIRATORY (INHALATION) EVERY 4 HOURS PRN
Status: DISCONTINUED | OUTPATIENT
Start: 2018-11-18 | End: 2018-11-20

## 2018-11-18 RX ORDER — DILTIAZEM HYDROCHLORIDE 30 MG/1
30 TABLET, FILM COATED ORAL EVERY 6 HOURS
Status: DISCONTINUED | OUTPATIENT
Start: 2018-11-18 | End: 2018-11-18

## 2018-11-18 RX ORDER — ENOXAPARIN SODIUM 100 MG/ML
40 INJECTION SUBCUTANEOUS EVERY 24 HOURS
Status: DISCONTINUED | OUTPATIENT
Start: 2018-11-18 | End: 2018-11-21 | Stop reason: HOSPADM

## 2018-11-18 RX ORDER — IBUPROFEN 200 MG
24 TABLET ORAL
Status: DISCONTINUED | OUTPATIENT
Start: 2018-11-18 | End: 2018-11-21 | Stop reason: HOSPADM

## 2018-11-18 RX ORDER — FUROSEMIDE 10 MG/ML
20 INJECTION INTRAMUSCULAR; INTRAVENOUS ONCE
Status: COMPLETED | OUTPATIENT
Start: 2018-11-18 | End: 2018-11-18

## 2018-11-18 RX ORDER — AZITHROMYCIN 250 MG/1
500 TABLET, FILM COATED ORAL DAILY
Status: DISCONTINUED | OUTPATIENT
Start: 2018-11-18 | End: 2018-11-18

## 2018-11-18 RX ORDER — IPRATROPIUM BROMIDE AND ALBUTEROL SULFATE 2.5; .5 MG/3ML; MG/3ML
3 SOLUTION RESPIRATORY (INHALATION) EVERY 6 HOURS PRN
Status: DISCONTINUED | OUTPATIENT
Start: 2018-11-18 | End: 2018-11-18

## 2018-11-18 RX ORDER — ACETAMINOPHEN 325 MG/1
325 TABLET ORAL EVERY 4 HOURS PRN
Status: DISCONTINUED | OUTPATIENT
Start: 2018-11-18 | End: 2018-11-18

## 2018-11-18 RX ORDER — AZITHROMYCIN 250 MG/1
250 TABLET, FILM COATED ORAL DAILY
Status: DISCONTINUED | OUTPATIENT
Start: 2018-11-19 | End: 2018-11-21 | Stop reason: HOSPADM

## 2018-11-18 RX ORDER — PREDNISONE 20 MG/1
60 TABLET ORAL DAILY
Status: DISCONTINUED | OUTPATIENT
Start: 2018-11-19 | End: 2018-11-18

## 2018-11-18 RX ORDER — INSULIN ASPART 100 [IU]/ML
0-5 INJECTION, SOLUTION INTRAVENOUS; SUBCUTANEOUS
Status: DISCONTINUED | OUTPATIENT
Start: 2018-11-18 | End: 2018-11-21 | Stop reason: HOSPADM

## 2018-11-18 RX ORDER — ONDANSETRON 2 MG/ML
INJECTION INTRAMUSCULAR; INTRAVENOUS
Status: DISPENSED
Start: 2018-11-18 | End: 2018-11-18

## 2018-11-18 RX ORDER — FLUTICASONE FUROATE AND VILANTEROL 100; 25 UG/1; UG/1
1 POWDER RESPIRATORY (INHALATION) DAILY
Status: DISCONTINUED | OUTPATIENT
Start: 2018-11-18 | End: 2018-11-21 | Stop reason: HOSPADM

## 2018-11-18 RX ORDER — DEXAMETHASONE 1 MG/1
2 TABLET ORAL EVERY 12 HOURS
Status: DISCONTINUED | OUTPATIENT
Start: 2018-11-18 | End: 2018-11-21

## 2018-11-18 RX ORDER — ASPIRIN 325 MG
TABLET ORAL
Status: DISPENSED
Start: 2018-11-18 | End: 2018-11-18

## 2018-11-18 RX ORDER — IPRATROPIUM BROMIDE 0.5 MG/2.5ML
0.5 SOLUTION RESPIRATORY (INHALATION) EVERY 4 HOURS
Status: DISCONTINUED | OUTPATIENT
Start: 2018-11-18 | End: 2018-11-21 | Stop reason: HOSPADM

## 2018-11-18 RX ORDER — ASPIRIN 325 MG
325 TABLET ORAL ONCE
Status: DISCONTINUED | OUTPATIENT
Start: 2018-11-18 | End: 2018-11-21

## 2018-11-18 RX ORDER — IBUPROFEN 200 MG
16 TABLET ORAL
Status: DISCONTINUED | OUTPATIENT
Start: 2018-11-18 | End: 2018-11-21 | Stop reason: HOSPADM

## 2018-11-18 RX ORDER — LORAZEPAM 0.5 MG/1
0.5 TABLET ORAL 3 TIMES DAILY
Status: DISCONTINUED | OUTPATIENT
Start: 2018-11-18 | End: 2018-11-19

## 2018-11-18 RX ADMIN — AMOXICILLIN 500 MG: 500 CAPSULE ORAL at 06:11

## 2018-11-18 RX ADMIN — IPRATROPIUM BROMIDE AND ALBUTEROL SULFATE 3 ML: .5; 3 SOLUTION RESPIRATORY (INHALATION) at 03:11

## 2018-11-18 RX ADMIN — AZITHROMYCIN 500 MG: 250 TABLET, FILM COATED ORAL at 09:11

## 2018-11-18 RX ADMIN — IPRATROPIUM BROMIDE AND ALBUTEROL SULFATE 3 ML: .5; 3 SOLUTION RESPIRATORY (INHALATION) at 10:11

## 2018-11-18 RX ADMIN — ACETAMINOPHEN 325 MG: 325 TABLET ORAL at 08:11

## 2018-11-18 RX ADMIN — ENOXAPARIN SODIUM 40 MG: 100 INJECTION SUBCUTANEOUS at 05:11

## 2018-11-18 RX ADMIN — IOHEXOL 75 ML: 350 INJECTION, SOLUTION INTRAVENOUS at 05:11

## 2018-11-18 RX ADMIN — DILTIAZEM HYDROCHLORIDE 30 MG: 30 TABLET, FILM COATED ORAL at 05:11

## 2018-11-18 RX ADMIN — IPRATROPIUM BROMIDE 0.5 MG: 0.5 SOLUTION RESPIRATORY (INHALATION) at 09:11

## 2018-11-18 RX ADMIN — DEXAMETHASONE 2 MG: 1 TABLET ORAL at 08:11

## 2018-11-18 RX ADMIN — FUROSEMIDE 20 MG: 10 INJECTION, SOLUTION INTRAMUSCULAR; INTRAVENOUS at 09:11

## 2018-11-18 NOTE — SUBJECTIVE & OBJECTIVE
Review of Systems   Constitutional: Negative for chills, fatigue and fever.   HENT: Negative for trouble swallowing.    Eyes: Negative for visual disturbance.   Respiratory: Positive for chest tightness and shortness of breath.    Cardiovascular: Negative for palpitations and leg swelling.   Gastrointestinal: Negative for abdominal distention, abdominal pain and constipation.   Genitourinary: Positive for frequency and urgency.        Hx of incontinence since 2010   Musculoskeletal: Negative for arthralgias and gait problem.   Skin: Negative for wound.   Neurological: Positive for light-headedness. Negative for dizziness, speech difficulty, weakness and numbness.   Psychiatric/Behavioral: Negative for agitation and confusion.     Objective:     Vital Signs (Most Recent):  Temp: 97.8 °F (36.6 °C) (11/18/18 1000)  Pulse: (!) 117 (11/18/18 1027)  Resp: 18 (11/18/18 1027)  BP: (!) 130/96 (11/18/18 1000)  SpO2: 97 % (11/18/18 1027) Vital Signs (24h Range):  Temp:  [97.8 °F (36.6 °C)-98.4 °F (36.9 °C)] 97.8 °F (36.6 °C)  Pulse:  [115-128] 117  Resp:  [18-36] 18  SpO2:  [95 %-100 %] 97 %  BP: (119-138)/(81-96) 130/96     Weight: 78.9 kg (174 lb)  Body mass index is 28.96 kg/m².  No intake or output data in the 24 hours ending 11/18/18 1121   Physical Exam   Constitutional: She is oriented to person, place, and time. She appears well-developed and well-nourished.   HENT:   Head: Normocephalic and atraumatic.   Eyes: EOM are normal. Pupils are equal, round, and reactive to light.   Neck: Normal range of motion.   Cardiovascular: Regular rhythm and normal heart sounds.   Tachycardic   Pulmonary/Chest:   Increased work of breathing. Diminished breath sounds at base bilateraly    Abdominal: Soft. Bowel sounds are normal.   Musculoskeletal: Normal range of motion.   Neurological: She is alert and oriented to person, place, and time.   Skin: Skin is warm and dry.   Psychiatric: She has a normal mood and affect.        Significant Labs:   ABGs:   Recent Labs   Lab 11/18/18  0746   PH 7.386   PCO2 43.1   HCO3 25.9   POCSATURATED 93*   BE 1     BMP:   Recent Labs   Lab 11/18/18  0230   *      K 3.9      CO2 23   BUN 19   CREATININE 0.9   CALCIUM 8.8     CMP:   Recent Labs   Lab 11/18/18  0230      K 3.9      CO2 23   *   BUN 19   CREATININE 0.9   CALCIUM 8.8   PROT 7.0   ALBUMIN 3.5   BILITOT 0.2   ALKPHOS 80   AST 72*   ALT 41   ANIONGAP 13   EGFRNONAA >60.0     All pertinent labs within the past 24 hours have been reviewed.    Significant Imaging: I have reviewed all pertinent imaging results/findings within the past 24 hours.

## 2018-11-18 NOTE — ED NOTES
Dr Loera with Hospital Medicine states to administer IV lasix as ordered, aware of pt BNP result 55

## 2018-11-18 NOTE — HOSPITAL COURSE
11/18 Admitted to hospital medicine for suspected COPD exacerbation in setting of stress induced cardiomyopathy. Cardiology consulted and TTE showed reduced EF 37%. Pt initially refused to start BB as last time coreg caused her to have constant pounding headache, and lisinopril made her to cough, she refused. Deltizine was another chose before her echo showed reduced EF, digoxin also was another option to control her heart rate as pt been refusing beta blocker, she the agreed and  Started on Metoprolol tartrate. Psychiatry consulted for her emotional stress. Patient wish that she does not want any mood alterning medications. Patient started on metoprolol tartrate  12.5 BID which she tolerated well, then switch to me Lopressor 25 mg daily, pt counseled and educated about heart failure from several speciality, pt has sever anxiety about starting new medication, she finally agreed to start valsartan with lowest dose, pt feel more comfortable if she titrate her medication with either Dr. Arriola/Dr. Cool and Dr. Veloz.

## 2018-11-18 NOTE — ED NOTES
Hourly rounding complete. Patient resting in stretcher and is in NAD at this time. Pt is awake alert and oriented x4.. Pt aware of POC. Pt soiled with urine, cleaned by staff x2, assisted to BSC for BM, assisted back into stretcher, placed on external female catheter. Bed low and locked with side rails up x2, call bell in pt reach. Pt voices no needs at this time.

## 2018-11-18 NOTE — ASSESSMENT & PLAN NOTE
[facility] in 2016 consistent w/ findings of Takotsubos Cardiomyopathy with an EF of 25%  -- 2D echo at the end of 2016 showed resolution with EF 65%  -- Repeat Echo ordered  -- Troponins trending upward: .05, .7, and most recently .8.  -- Cardiology consulted.

## 2018-11-18 NOTE — ED PROVIDER NOTES
Encounter Date: 11/18/2018       History     Chief Complaint   Patient presents with    Respiratory Distress     reports SOB and respiratory distress that started tonight, hx of COPD, pt on CPAP on EMS arrival     63-year-old female with a history of COPD brought in by EMS for acute worsening of shortness of breath.     Patient called EMS due to increased work of breathing.  He EMS found patient to have saturation in the 70s,    with a heart rate in the 150s on arrival, hand an exam notable for poor air movement bilaterally with faint    expiratory and inspiratory wheezing.  Patient was provided with 1 DuoNeb treatment, 125 mg of Solu-Medrol, and    subsequently a 2nd albuterol treatment as well as 2 mg of magnesium, with subsequent improvement in air    movement.  Heart rate improved to 120 since saturations now in the 90s on arrival.  Patient endorses shortness    of breath as typical of her usual COPD exacerbation, without known trigger.  Denies chest pain. Denies any    fever, chills, nausea/vomiting, cough, abdominal pain, recent prolonged immobility, or prior history of    clotting disorders.          Review of patient's allergies indicates:   Allergen Reactions    Sudafed [pseudoephedrine hcl] Nausea And Vomiting and Other (See Comments)     JITTERY     Past Medical History:   Diagnosis Date    Aortic calcification 1/11/2018    Chronic diarrhea     Chronic obstructive pulmonary disease 8/27/2013    Chronic respiratory failure with hypoxia, on home O2 therapy 1/11/2018    COPD (chronic obstructive pulmonary disease)     Former smoker 10/18/2016    Hepatomegaly 12/21/2015    Hyperlipidemia     Hypertension     Microscopic hematuria 1/13/2017    Perianal abscess 6/1/2018    Reflux 2013    S/P right hemicolectomy 9/7/2017    Performed in 2011 after perforation during colonoscopy    Takotsubo cardiomyopathy 10/18/2016    Noted on echo 10/2016, recovered on repeat echo 12/2016     Past Surgical  History:   Procedure Laterality Date    ABDOMINAL SURGERY      APPENDECTOMY      CATHETERIZATION, HEART, LEFT N/A 2013    Performed by [facility] CATH LAB    CHOLANGIOGRAM N/A 2013    Performed by Zhou Hay Jr., [facility] at [facility] OR 2ND FLR    CHOLECYSTECTOMY      open    CHOLECYSTECTOMY N/A 2013    Performed by [facility] Jr[facility] at [facility] OR 2ND FLR    CHOLECYSTECTOMY, LAPAROSCOPIC N/A 2013    Performed by [facility] Jr[facility] at [facility] OR 2ND FLR    COLON SURGERY      secondary to perforation after c-scope    COLONOSCOPY      FRACTURE SURGERY      HERNIA REPAIR      HIATAL HERNIA REPAIR  2013    HYSTERECTOMY  1986    Left leg surgery       Family History   Problem Relation Age of Onset    Cancer Mother         lung    Hypertension Mother     Coronary artery disease Father     Retinal detachment Father     Hypertension Sister     Hypertension Brother     Cataracts Maternal Grandmother     Abnormal EKG Daughter     Breast cancer Daughter 43        negative genetic testing    Breast cancer Other 44    Amblyopia Neg Hx     Blindness Neg Hx     Diabetes Neg Hx     Glaucoma Neg Hx     Macular degeneration Neg Hx     Strabismus Neg Hx     Stroke Neg Hx     Thyroid disease Neg Hx     Colon cancer Neg Hx     Ovarian cancer Neg Hx      Social History     Tobacco Use    Smoking status: Former Smoker     Packs/day: 1.50     Years: 30.00     Pack years: 45.00     Types: Cigarettes     Last attempt to quit: 1997     Years since quittin.8    Smokeless tobacco: Never Used   Substance Use Topics    Alcohol use: Yes     Comment: occaissionally    Drug use: No     Review of Systems   Constitutional: Negative for chills and fever.   HENT: Negative for congestion and rhinorrhea.    Respiratory: Positive for cough, shortness of breath and wheezing.    Cardiovascular: Negative for palpitations.   Gastrointestinal: Negative for  nausea and vomiting.   Genitourinary: Negative for dysuria and frequency.   Musculoskeletal: Negative for arthralgias and myalgias.   Skin: Negative for color change and pallor.   Neurological: Negative for dizziness and headaches.   Psychiatric/Behavioral: Negative for agitation and confusion.   All other systems reviewed and are negative.      Physical Exam     Initial Vitals   BP Pulse Resp Temp SpO2   -- -- -- -- --      MAP       --         Physical Exam  Not in acute distress. CPAP by EMS in place on arrival.  Good color to the face.  Decreased air movement bilaterally in all lung fields, with biphasic wheezing, faint.  No murmur/rubs/gallops.  Sinus tachycardia.  Distal DP is intact and symmetrically times.  Distal radial pulse intact symmetrically time.  Both 2+.  ED Course   Procedures  Labs Reviewed   TROPONIN I - Abnormal; Notable for the following components:       Result Value    Troponin I 0.058 (*)     All other components within normal limits   COMPREHENSIVE METABOLIC PANEL - Abnormal; Notable for the following components:    Glucose 245 (*)     AST 72 (*)     All other components within normal limits   CBC W/ AUTO DIFFERENTIAL - Abnormal; Notable for the following components:    WBC 17.99 (*)     RBC 3.96 (*)     Hemoglobin 11.5 (*)     Hematocrit 36.4 (*)     MCHC 31.6 (*)     RDW 14.8 (*)     Gran # (ANC) 11.6 (*)     Immature Grans (Abs) 0.08 (*)     Lymph # 4.9 (*)     All other components within normal limits   B-TYPE NATRIURETIC PEPTIDE         HO-3 MDM:  This is an emergent evaluation of a 63-year-old female with history of COPD presenting with acute onset    shortness of breath just prior to arrival, with exam and presentation notable for sinus tachycardia,    normotensive, saturations in the 90s after albuterol treatments and magnesium by EMS, decreased air movement    bilaterally with biphasic wheezing, but otherwise not in acute distress. On arrival patient is able to answer    questions  verbally.   DX includes was not limited to likely COPD exacerbation, without currently known trigger, pneumothorax,    pneumonia, PE, ACS, metabolic derangement.  Exam and prior history of exacerbations endorses been similar but    patient has reassuring for COPD exacerbation. Patient has no overt PE risk factors with a low Wells score, and    will provide initially albuterol treatment, continues on BiPAP, and reassess.  Basic labs, cardiac biomarkers,    EKG and chest x-ray ordered.  Dispo pending reassessment.  Vick Rushing M.D.  Emergency Medicine, PGY-3  11/18/2018 3:00 AM    HO-3 Update:  EKG w/ no ST/TW changes suggestive of ischemia.  Placed on nonrebreather w/ continuous albuterol with improvement in air movement, although remains w/ mild wheezing. Will provide 2nd treatment in ER. Tn elevated, so decision made to obtained CTPE given acuity of onset of SOB. Dispo pending re-assessment and CTPE.  Vick Rushing M.D.  Emergency Medicine, PGY-3  11/18/2018 5:21 AM    HO-3 Update:  Pt admitted in stable condition to medicine service for scheduled nebs, pending CTPE study w/ recs to follow-up results. Also recommend Tn trending.   Vick Rushing M.D.  Emergency Medicine, PGY-3  11/18/2018 6:19 AM          Imaging Results    None                       Attending Attestation:   Physician Attestation Statement for Resident:  As the supervising MD   Physician Attestation Statement: I have personally seen and examined this patient.  . -: NO SIGNS OF ACUTE AIRWAY COMPROMISE                      Clinical Impression:   The encounter diagnosis was COPD exacerbation.                             Vick Burns MD  Resident  11/18/18 1419       John Bull,   11/18/18 1154

## 2018-11-18 NOTE — ED NOTES
Ana Aguila, a 63 y.o. female presents to the ED this AM with CC of SOB, remains SOB at this time, being admitting for COPD exacerbation, updated on POC.    Patient identifiers verified verbally with patient and armband and correct for Ana Aguila.    LOC/ APPEARANCE: The patient is AAOx4. Pt is speaking appropriately, no slurred speech. Pt changed into hospital gown. Continuous cardiac monitor, cont pulse ox, and auto BP cuff remains on patient. Pt is clean and well groomed. No JVD visible. Pt c/o headache. Pt repositioned in stretcher with pillows for comfort. Bed low and locked with side rails up x2, call bell in pt reach.  SKIN: Skin is warm dry and intact, and color is consistent with ethnicity. Capillary refill <3 seconds. No breakdown or brusing visible. Mucus membranes moist, acyanotic.  RESPIRATORY: Airway is open and patent. Respirations-shallow, increased RR, equal bilaterally on inspiration and expiration. Diminished breath sounds noted throughout.  CARDIAC: Patient tachycardic. Patient has no c/o chest pain. Peripheral pulses present equal and strong throughout.  ABDOMEN: Soft and non-tender to palpation with no distention noted.  NEUROLOGIC: Eyes open spontaneously and facial expression symmetrical. Pt behavior appropriate to situation, and pt follows commands. Pt reports sensation present in all extremities when touched with a finger, denies any numbness or tingling. PERRLA  MUSCULOSKELETAL: Spontaneous movement noted to all extremities. Hand  equal and leg strength strong +5 bilaterally.

## 2018-11-18 NOTE — ED TRIAGE NOTES
Pt presents to ED in respiratory distress that started tonight. Initial sats of 70% on room air. Pt arrived on CPAP per EMS. Received a duoneb in route and magnesium. Pt in respiratory distress on arrival and placed on BiPAP. 02 sats of 98% on BiPAP. Pt has hx of COPD.

## 2018-11-18 NOTE — ED NOTES
Tele box [id number] applied to pt. Monae in war room states able to see pt on monitor, rhythm sinus tachycardia with .

## 2018-11-18 NOTE — ASSESSMENT & PLAN NOTE
Patient presents to ED after increased SOB and work of breathing while at home. Likely 2/2 to COPD exacerbation. Pt uses 2L of home O2 at all times.  -- Duoneb treatments   -- Starting Decadron 2mg BID  -- Blood culture x2, Sputum culture collected   -- Given Amoxicillin in ED  -- Starting Azithromycin 5 day course   -- Goal O2 Sat 88-92%

## 2018-11-18 NOTE — ASSESSMENT & PLAN NOTE
-- Holding HCTZ in setting of tachycardia and hx of Takotsubo cardiomyopathy   -- Starting Cardizem 30mg q6

## 2018-11-18 NOTE — ED NOTES
Hourly rounding complete. Patient resting in stretcher and is in NAD at this time. Pt is awake alert and oriented x4. Pt aware of POC, family at bedside. Bed low and locked with side rails up x2, call bell in pt reach. Pt voices no needs at this time.

## 2018-11-18 NOTE — PROGRESS NOTES
0230 patient received from EMS services on Cpap with oxygen attached. Patient C/O being nauseated therefore nausea medication orderd by MD and patient removed from CPAP and placed on 6lpm nc with Sao2 100%. Bipap at [facility] if needed. 250 patient started on Continous 10 mg Albuterol tx. RT to follow.

## 2018-11-19 PROBLEM — I50.21 ACUTE SYSTOLIC HEART FAILURE: Status: ACTIVE | Noted: 2018-11-19

## 2018-11-19 LAB
ALBUMIN SERPL BCP-MCNC: 3.2 G/DL
ALP SERPL-CCNC: 67 U/L
ALT SERPL W/O P-5'-P-CCNC: 31 U/L
ANION GAP SERPL CALC-SCNC: 9 MMOL/L
AST SERPL-CCNC: 42 U/L
BACTERIA UR CULT: NO GROWTH
BASOPHILS # BLD AUTO: 0.01 K/UL
BASOPHILS NFR BLD: 0.1 %
BILIRUB SERPL-MCNC: 0.4 MG/DL
BSA FOR ECHO PROCEDURE: 1.9 M2
BUN SERPL-MCNC: 22 MG/DL
CALCIUM SERPL-MCNC: 9.1 MG/DL
CHLORIDE SERPL-SCNC: 106 MMOL/L
CO2 SERPL-SCNC: 28 MMOL/L
CREAT SERPL-MCNC: 0.8 MG/DL
DIFFERENTIAL METHOD: ABNORMAL
EOSINOPHIL # BLD AUTO: 0 K/UL
EOSINOPHIL NFR BLD: 0 %
ERYTHROCYTE [DISTWIDTH] IN BLOOD BY AUTOMATED COUNT: 14.9 %
EST. GFR  (AFRICAN AMERICAN): >60 ML/MIN/1.73 M^2
EST. GFR  (NON AFRICAN AMERICAN): >60 ML/MIN/1.73 M^2
GLUCOSE SERPL-MCNC: 113 MG/DL
HCT VFR BLD AUTO: 34.1 %
HGB BLD-MCNC: 10.8 G/DL
IMM GRANULOCYTES # BLD AUTO: 0.04 K/UL
IMM GRANULOCYTES NFR BLD AUTO: 0.3 %
LYMPHOCYTES # BLD AUTO: 1.4 K/UL
LYMPHOCYTES NFR BLD: 11.3 %
MAGNESIUM SERPL-MCNC: 2 MG/DL
MCH RBC QN AUTO: 28.5 PG
MCHC RBC AUTO-ENTMCNC: 31.7 G/DL
MCV RBC AUTO: 90 FL
MONOCYTES # BLD AUTO: 0.7 K/UL
MONOCYTES NFR BLD: 5.4 %
NEUTROPHILS # BLD AUTO: 10.4 K/UL
NEUTROPHILS NFR BLD: 82.9 %
NRBC BLD-RTO: 0 /100 WBC
PHOSPHATE SERPL-MCNC: 4.6 MG/DL
PLATELET # BLD AUTO: 283 K/UL
PMV BLD AUTO: 10.7 FL
POCT GLUCOSE: 107 MG/DL (ref 70–110)
POCT GLUCOSE: 116 MG/DL (ref 70–110)
POCT GLUCOSE: 134 MG/DL (ref 70–110)
POCT GLUCOSE: 96 MG/DL (ref 70–110)
POTASSIUM SERPL-SCNC: 4.5 MMOL/L
PROT SERPL-MCNC: 6.1 G/DL
RA PRESSURE: 8 MMHG
RBC # BLD AUTO: 3.79 M/UL
RIGHT VENTRICULAR END-DIASTOLIC DIMENSION: 4.3 CM
SODIUM SERPL-SCNC: 143 MMOL/L
WBC # BLD AUTO: 12.57 K/UL

## 2018-11-19 PROCEDURE — G8978 MOBILITY CURRENT STATUS: HCPCS | Mod: CJ

## 2018-11-19 PROCEDURE — 80053 COMPREHEN METABOLIC PANEL: CPT

## 2018-11-19 PROCEDURE — 97161 PT EVAL LOW COMPLEX 20 MIN: CPT

## 2018-11-19 PROCEDURE — 25000003 PHARM REV CODE 250: Performed by: STUDENT IN AN ORGANIZED HEALTH CARE EDUCATION/TRAINING PROGRAM

## 2018-11-19 PROCEDURE — 90792 PSYCH DIAG EVAL W/MED SRVCS: CPT | Mod: ,,, | Performed by: PSYCHIATRY & NEUROLOGY

## 2018-11-19 PROCEDURE — 85025 COMPLETE CBC W/AUTO DIFF WBC: CPT

## 2018-11-19 PROCEDURE — 25000242 PHARM REV CODE 250 ALT 637 W/ HCPCS: Performed by: STUDENT IN AN ORGANIZED HEALTH CARE EDUCATION/TRAINING PROGRAM

## 2018-11-19 PROCEDURE — 63600175 PHARM REV CODE 636 W HCPCS: Performed by: STUDENT IN AN ORGANIZED HEALTH CARE EDUCATION/TRAINING PROGRAM

## 2018-11-19 PROCEDURE — 27000221 HC OXYGEN, UP TO 24 HOURS

## 2018-11-19 PROCEDURE — 36415 COLL VENOUS BLD VENIPUNCTURE: CPT

## 2018-11-19 PROCEDURE — 94761 N-INVAS EAR/PLS OXIMETRY MLT: CPT

## 2018-11-19 PROCEDURE — 97165 OT EVAL LOW COMPLEX 30 MIN: CPT

## 2018-11-19 PROCEDURE — 63600175 PHARM REV CODE 636 W HCPCS: Performed by: INTERNAL MEDICINE

## 2018-11-19 PROCEDURE — 94640 AIRWAY INHALATION TREATMENT: CPT

## 2018-11-19 PROCEDURE — 11000001 HC ACUTE MED/SURG PRIVATE ROOM

## 2018-11-19 PROCEDURE — 99222 1ST HOSP IP/OBS MODERATE 55: CPT | Mod: ,,, | Performed by: INTERNAL MEDICINE

## 2018-11-19 PROCEDURE — G8979 MOBILITY GOAL STATUS: HCPCS | Mod: CI

## 2018-11-19 PROCEDURE — 99233 SBSQ HOSP IP/OBS HIGH 50: CPT | Mod: ,,, | Performed by: INTERNAL MEDICINE

## 2018-11-19 PROCEDURE — 25000003 PHARM REV CODE 250: Performed by: INTERNAL MEDICINE

## 2018-11-19 PROCEDURE — 83735 ASSAY OF MAGNESIUM: CPT

## 2018-11-19 PROCEDURE — 84100 ASSAY OF PHOSPHORUS: CPT

## 2018-11-19 PROCEDURE — 97530 THERAPEUTIC ACTIVITIES: CPT

## 2018-11-19 RX ORDER — DIGOXIN 125 MCG
0.12 TABLET ORAL DAILY
Status: DISCONTINUED | OUTPATIENT
Start: 2018-11-19 | End: 2018-11-20

## 2018-11-19 RX ORDER — FUROSEMIDE 10 MG/ML
20 INJECTION INTRAMUSCULAR; INTRAVENOUS ONCE
Status: COMPLETED | OUTPATIENT
Start: 2018-11-19 | End: 2018-11-19

## 2018-11-19 RX ORDER — METOPROLOL TARTRATE 25 MG/1
12.5 TABLET ORAL DAILY
Status: DISCONTINUED | OUTPATIENT
Start: 2018-11-19 | End: 2018-11-20

## 2018-11-19 RX ADMIN — IPRATROPIUM BROMIDE 0.5 MG: 0.5 SOLUTION RESPIRATORY (INHALATION) at 01:11

## 2018-11-19 RX ADMIN — IPRATROPIUM BROMIDE 0.5 MG: 0.5 SOLUTION RESPIRATORY (INHALATION) at 04:11

## 2018-11-19 RX ADMIN — ACETAMINOPHEN 325 MG: 325 TABLET ORAL at 10:11

## 2018-11-19 RX ADMIN — FLUTICASONE FUROATE AND VILANTEROL TRIFENATATE 1 PUFF: 100; 25 POWDER RESPIRATORY (INHALATION) at 08:11

## 2018-11-19 RX ADMIN — IPRATROPIUM BROMIDE 0.5 MG: 0.5 SOLUTION RESPIRATORY (INHALATION) at 12:11

## 2018-11-19 RX ADMIN — FUROSEMIDE 20 MG: 10 INJECTION, SOLUTION INTRAMUSCULAR; INTRAVENOUS at 08:11

## 2018-11-19 RX ADMIN — DEXAMETHASONE 2 MG: 1 TABLET ORAL at 08:11

## 2018-11-19 RX ADMIN — AZITHROMYCIN MONOHYDRATE 250 MG: 250 TABLET ORAL at 08:11

## 2018-11-19 RX ADMIN — IPRATROPIUM BROMIDE 0.5 MG: 0.5 SOLUTION RESPIRATORY (INHALATION) at 07:11

## 2018-11-19 RX ADMIN — IPRATROPIUM BROMIDE 0.5 MG: 0.5 SOLUTION RESPIRATORY (INHALATION) at 08:11

## 2018-11-19 RX ADMIN — ENOXAPARIN SODIUM 40 MG: 100 INJECTION SUBCUTANEOUS at 05:11

## 2018-11-19 RX ADMIN — METOPROLOL TARTRATE 12.5 MG: 25 TABLET, FILM COATED ORAL at 04:11

## 2018-11-19 RX ADMIN — SODIUM CHLORIDE 500 ML: 9 INJECTION, SOLUTION INTRAVENOUS at 06:11

## 2018-11-19 RX ADMIN — DIGOXIN 0.12 MG: 125 TABLET ORAL at 08:11

## 2018-11-19 RX ADMIN — DEXAMETHASONE 2 MG: 1 TABLET ORAL at 09:11

## 2018-11-19 NOTE — PLAN OF CARE
Erik Cool MD  [address] [facility] [address]  [phone]    Extended Emergency Contact Information  Primary Emergency Contact: Maria Luz Honeycutt  Address: [address] [place], [place]  Mobile Phone: [phone]  Relation: Daughter      [facility] [place] - [address] AT [facility] [address]  [address] [place] [address]  Phone: [phone] Fax: [phone]    [facility] - [place] - [address] AT [facility] OF [facility] [address]  [address]  [place] [address]  Phone: [phone] Fax: [phone]         11/19/18 155   Discharge Assessment   Assessment Type Discharge Planning Assessment   Confirmed/corrected address and phone number on facesheet? Yes   Assessment information obtained from? Patient   Expected Length of Stay (days) 4   Communicated expected length of stay with patient/caregiver yes   Prior to hospitilization cognitive status: Alert/Oriented   Prior to hospitalization functional status: Assistive Equipment   Current cognitive status: Alert/Oriented   Current Functional Status: Assistive Equipment   Facility Arrived From: N/A   Lives With alone   Able to Return to Prior Arrangements yes   Is patient able to care for self after discharge? Yes   Who are your caregiver(s) and their phone number(s)? Maria Luz [email or web address]-zisrolnp-[phone]   Patient's perception of discharge disposition home or selfcare   Readmission Within The Last 30 Days no previous admission in last 30 days   Patient currently being followed by outpatient case management? No   Patient currently receives any other outside agency services? No   Equipment Currently Used at Home nebulizer;oxygen   Do you have any problems affording any of your prescribed medications? No   Is the patient taking medications as prescribed? yes   Does the patient have transportation home? Yes    Transportation Available car;family or friend will provide   Dialysis Name and Scheduled days N/A   Does the patient receive services at the [facility]? No   Discharge Plan A Home   Discharge Plan B Home with family   Patient/Family In Agreement With Plan yes     63 year old female living alone, in 2nd floor apartment, CM discussed with the patient about asking for a downstairs apartment for ease of breathing. She has family provided transportation at discharge. Pt has O2 provided by [facility] . She has had [facility] in the past. Cm will continue to follow for discharge needs.

## 2018-11-19 NOTE — ASSESSMENT & PLAN NOTE
Patient is a 64 y/o F w/ PMHx Takatsubo cardiomyopathy (LVEF 25% w [facility] WNL in 2016 -> 60%), HTN, HLD, COPD (on home O2), who presented after an episode of anxiety and dyspnea on exertion. Likely having recurrence of stress-induced cardiomyopathy.    BNP WNL  CTA PE protocol negative  Trop 1.5 -> 1.4    Given history of stress-induced cardiomyopathy, rapid troponin elevation and decrease (which decreases likelihood of myocarditis), lack of angina (decreasing likelihood of CAD or vasospasm) similar presentation to when she was diagnosed with her cardiomyopathy 2 years ago, I decided to perform a bedside TTE which evidenced severely reduced LV function (<35%) with apical ballooning/hypokynesis suggestive of stress-induced cardiomyopathy. IVC was thin and collapsible on inspiration. Of note, her previous TTE had shown a LVEF 60% and she has been off cardiac medications    TTE read on bedside echo: EF ~35%, incomplete endocardial visualization; mild RV enlargement, LV diastolic dysfunction, multiple WMAs in the LAD distribution concerning for stress induced vs. ischemic cardiomyopathy    Recommendations:  -Patient will to try lopressor 12.5mg per primary team - should switch to toprol for a daily dosing or will require lopressor bid for management.   -If patient is adamant about refusing beta-blockers or starts to experience side effects, can try ACEi e.g. entresto or lisinopril  -Avoid CCB given depressed LVEF, also her tachycardia, may be compensatory for her depressed LVEF  -Do not recommend digoxin in this patient as beta blockers can better control ventricular rate control and withdrawal of digoxin may induce worsening of HF   -Control her anxiety cautiously since she has COPD as well as history of substance abuse  -Avoid albuterol, she is not wheezing and it can worsen tachycardia and has arrhythmogenic potential

## 2018-11-19 NOTE — PT/OT/SLP EVAL
Physical Therapy Evaluation    Patient Name:  Ana Aguila   MRN:  [id number]    Recommendations:     Discharge Recommendations:  outpatient PT   Discharge Equipment Recommendations: none   Barriers to discharge: Inaccessible home and Decreased caregiver support    Assessment:     Ana Aguila is a 63 y.o. female admitted with a medical diagnosis of Acute on chronic respiratory failure with hypoxia.  She presents with the following impairments/functional limitations:  impaired endurance, weakness, impaired functional mobilty, decreased lower extremity function, impaired self care skills, impaired cardiopulmonary response to activity .Patient tolerated evaluation  fairly well. Patient limited at this time by increased anxiety and fear during gait and stair trial. Pt able to gait x 60 feet with CGA/SBA with no AD. Pt able to ascend stairs with CGA ( 8 stairs with R HR) but req increased time 2/2  SOB and anxiety.  Patient will continue to require skilled PT services to address the above impairments to return to prior level of function as independent as possible. Pt would benefit from cardio rehab once d/c from acute PT services.       Rehab Prognosis:  good; patient would benefit from acute skilled PT services to address these deficits and reach maximum level of function.      Recent Surgery: * No surgery found *      Plan:     During this hospitalization, patient to be seen 3 x/week to address the above listed problems via gait training, therapeutic activities, therapeutic exercises, neuromuscular re-education  · Plan of Care Expires:  12/18/18   Plan of Care Reviewed with: patient    Subjective     Communicated with RN  prior to session.  Patient found supine upon PT entry to room, agreeable to evaluation.      Chief Complaint: fear/anxiety with gait and stairs and all mobility.   Patient comments/goals: to return home and get back to OP PT  Pain/Comfort:  · Pain Rating 1: 0/10  · Pain Rating Post-Intervention  1: 0/10    Patients cultural, spiritual, Zoroastrian conflicts given the current situation: none stated    Living Environment:  Pt lives in a 2SH with BHR alone.   Prior to admission, patients level of function was ( I) with all ADLs and working.  Patient has the following equipment: oxygen.  DME owned (not currently used): none.  Upon discharge, patient will have assistance from neighbors PRN.    Objective:     Patient found with: telemetry, oxygen     General Precautions: Standard, fall   Orthopedic Precautions:N/A   Braces: N/A     Exams:  · Cognitive Exam:  Patient is oriented to Person, Place, Time and Situation  · Fine Motor Coordination: -       Intact  · Gross Motor Coordination:  WFL  · Postural Exam:  Patient presented with the following abnormalities: -       Rounded shoulders  · -       Forward head  · Sensation: -       Intact  · Skin Integrity/Edema:  -       Skin integrity: Visible skin intact  · RLE ROM: WFL  · RLE Strength: WFL  · LLE ROM: WFL  · LLE Strength: WFL    Functional Mobility:  · Bed Mobility:     · Transfers:  Sit to Stand:  contact guard assistance with no AD  · Bed to Chair: contact guard assistance with  no AD  using  Step Transfer  · Gait: x 60 feet no AD, CGA/SBA with no increase in sway or LOB  · Balance: CGA/SBA with gait  · Stairs:  Pt ascended/descended 1 flight(s) with No Assistive Device with right handrail with Contact Guard Assistance.     AM-PAC 6 CLICK MOBILITY  Total Score:20       Therapeutic Activities and Exercises:     Patient education  · Patient educated on the role of PT and POC  · Patient educated on importance  activity while in the hosptial per tolerance for improved endurance and to limit deconditioning   · Patient educated on safe transfers with nursing as appropriate  · Patient educated on energy conservation, pursed lip breathing  · Patient educated on proper transfer mechanics and safety  · All of patients questions were answered within the scope of  PT        Patient left up in chair with all lines intact, call button in reach and RN notified.    GOALS:   Multidisciplinary Problems     Physical Therapy Goals        Problem: Physical Therapy Goal    Goal Priority Disciplines Outcome Goal Variances Interventions   Physical Therapy Goal     PT, PT/OT Ongoing (interventions implemented as appropriate)     Description:  Goals to be met by: 18     Patient will increase functional independence with mobility by performin. Sit to stand transfer with Supervision  2. Bed to chair transfer with Supervision using LRD.   3. Gait  x 100 feet with Stand-by Assistance using LRD.   4. Ascend/descend 16 stair with bilateral Handrails Contact Guard Assistance using LRD.   5. Lower extremity exercise program x20 reps per handout, with independence                      History:     Past Medical History:   Diagnosis Date    Aortic calcification 2018    Chronic diarrhea     Chronic obstructive pulmonary disease 2013    Chronic respiratory failure with hypoxia, on home O2 therapy 2018    COPD (chronic obstructive pulmonary disease)     Essential hypertension 2013    Former smoker 10/18/2016    Hepatomegaly 2015    Hyperlipidemia     Hypertension     Microscopic hematuria 2017    Perianal abscess 2018    Reflux 2013    S/P right hemicolectomy 2017    Performed in  after perforation during colonoscopy    Takotsubo cardiomyopathy 10/18/2016    Noted on echo 10/2016, recovered on repeat echo 2016       Past Surgical History:   Procedure Laterality Date    ABDOMINAL SURGERY      APPENDECTOMY      CATHETERIZATION, HEART, LEFT N/A 2013    [facility] LAB    CHOLANGIOGRAM N/A 2013    [facility]    CHOLECYSTECTOMY      open    CHOLECYSTECTOMY N/A 2013    [facility]     CHOLECYSTECTOMY, LAPAROSCOPIC N/A 2/18/2013    Performed by Zhou [facility] Jr[facility] at [facility] OR [address] [place] [address]    COLON SURGERY  2011    secondary to perforation after c-scope    COLONOSCOPY      FRACTURE SURGERY      HERNIA REPAIR      HIATAL HERNIA REPAIR  2013    HYSTERECTOMY  1986    Left leg surgery         Clinical Decision Making:     History  Co-morbidities and personal factors that may impact the plan of care Examination  Body Structures and Functions, activity limitations and participation restrictions that may impact the plan of care Clinical Presentation   Decision Making/ Complexity Score   Co-morbidities:   [] Time since onset of injury / illness / exacerbation  [] Status of current condition  []Patient's cognitive status and safety concerns    [x] Multiple Medical Problems (see med hx)  Personal Factors:   [] Patient's age  [] Prior Level of function   [] Patient's home situation (environment and family support)  [] Patient's level of motivation  [] Expected progression of patient      HISTORY:(criteria)    [] 83658 - no personal factors/history    [x] 71833 - has 1-2 personal factor/comorbidity     [] 60748 - has >3 personal factor/comorbidity     Body Regions:  [] Objective examination findings  [] Head     []  Neck  [] Trunk   [] Upper Extremity  [x] Lower Extremity    Body Systems:  [] For communication ability, affect, cognition, language, and learning style: the assessment of the ability to make needs known, consciousness, orientation (person, place, and time), expected emotional /behavioral responses, and learning preferences (eg, learning barriers, education  needs)  [] For the neuromuscular system: a general assessment of gross coordinated movement (eg, balance, gait, locomotion, transfers, and transitions) and motor function  (motor control and motor learning)  [x] For the musculoskeletal system: the assessment of gross symmetry, gross range of motion, gross strength, height, and  weight  [] For the integumentary system: the assessment of pliability(texture), presence of scar formation, skin color, and skin integrity  [x] For cardiovascular/pulmonary system: the assessment of heart rate, respiratory rate, blood pressure, and edema     Activity limitations:    [] Patient's cognitive status and saf ety concerns          [] Status of current condition      [] Weight bearing restriction  [] Cardiopulmunary Restriction    Participation Restrictions:   [] Goals and goal agreement with the patient     [] Rehab potential (prognosis) and probable outcome      Examination of Body System: (criteria)    [] 20800 - addressing 1-2 elements    [x] 91486 - addressing a total of 3 or more elements     [] 11991 -  Addressing a total of 4 or more elements         Clinical Presentation: (criteria)  Stable - 07843     On examination of body system using standardized tests and measures patient presents with 1-2 elements from any of the following: body structures and functions, activity limitations, and/or participation restrictions.  Leading to a clinical presentation that is considered stable and/or uncomplicated                              Clinical Decision Making  (Eval Complexity):  Low- 51137     Time Tracking:     PT Received On: 11/19/18  PT Start Time: 0842     PT Stop Time: 0910  PT Total Time (min): 28 min     Billable Minutes: Evaluation 25 min      Portillo Aguilera PT  11/19/2018

## 2018-11-19 NOTE — CONSULTS
[facility]  Cardiology  Consult Note    Patient Name: Ana Aguila  MRN: [id number]  Admission Date: 11/18/2018  Hospital Length of Stay: 0 days  Code Status: Full Code   Attending Provider: Frederic Arriola MD   Consulting Provider: Shady Jacome MD  Primary Care Physician: Erik Cool MD  Principal Problem:Acute on chronic respiratory failure with hypoxia    Patient information was obtained from patient, past medical records and ER records.     Inpatient consult to Cardiology  Consult performed by: Shady Jacome MD  Consult ordered by: Jarek Toth MD        Subjective:     Chief Complaint:  Dyspnea     HPI:   Reason for Consult: Elevated troponins    HPI:  Patient is a 64 y/o F w/ PMHx Takatsubo cardiomyopathy (LVEF 25% w [facility] WNL in 2016 -> 60%), HTN, HLD, COPD (on home O2), who presented after an episode of anxiety and dyspnea on exertion.    She was lifting heavy bags and had the urge to go to the restroom, rushed, got dyspneic and afraid so she decided to be brought to the ED. Reportedly, her O2 sat was on the 70s% when EMS found her (per ED note). She denies progressive exercise intolerance, angina, lightheadedness, or syncope. She has dyspnea at baseline due to her COPD but she feels pulmonary rehab has helped her. She has been feeling anxious/sad for the last 2 months since her brother got sick.    In the ED (reportedly, records limited by Epic downtime), her vitals were relevant for tachycardia on the 150s that decreased to 120s -> 110s-100s. She was treated for COPD exacerbation (per ED note she had wheezes) with steroids and duonebs but later today she was CTA on examination by primary team. Given initial dyspnea, primary team also gave lasix 20 IV once (no urine output recorded).     Her O2 sats have remained in the high 90s-100 here.      Trop 0.05 -> 1.5 -> 1.4 (no angina, recorded BP 130s), BNP WNL    CTA PE protocol negative for PE  but LV looked dilated to me (although not mentioned in the report).    Primary team inquired if she could get dilt for tachycardia (she was refusing BB), I recommended against treating tachycardia with dilt for reasons detailed below on the assessment and plan. She also received lorazepam 0.5 for anxiety (once suspicion for COPD exacerbation became very low).    During our encounter the patient seems to be emotionally labile and anxious. She strongly refuses any beta blockers and would not like other medications from us at this point and she feels overwhelmed.    Given history of stress-induced cardiomyopathy, rapid troponin elevation and decrease, similar presentation to when she was diagnosed with her cardiomyopathy 2 years ago, I decided to perform a bedside TTE which evidenced severely reduced LV function (<35%) with apical ballooning/hypokynesis suggestive of stress-induced cardiomyopathy. IVC was thin and collapsible on inspiration.    Past Medical History:   Diagnosis Date    Aortic calcification 1/11/2018    Chronic diarrhea     Chronic obstructive pulmonary disease 8/27/2013    Chronic respiratory failure with hypoxia, on home O2 therapy 1/11/2018    COPD (chronic obstructive pulmonary disease)     Essential hypertension 8/12/2013    Former smoker 10/18/2016    Hepatomegaly 12/21/2015    Hyperlipidemia     Hypertension     Microscopic hematuria 1/13/2017    Perianal abscess 6/1/2018    Reflux 2013    S/P right hemicolectomy 9/7/2017    Performed in 2011 after perforation during colonoscopy    Takotsubo cardiomyopathy 10/18/2016    Noted on echo 10/2016, recovered on repeat echo 12/2016       Past Surgical History:   Procedure Laterality Date    ABDOMINAL SURGERY      APPENDECTOMY      CATHETERIZATION, HEART, LEFT N/A 9/6/2013    [facility] LAB    CHOLANGIOGRAM N/A 2/18/2013    [facility]    CHOLECYSTECTOMY   2013    open    CHOLECYSTECTOMY N/A 2/18/2013    Performed by [facility] at [facility] OR [facility]ND [facility]LR    CHOLECYSTECTOMY, LAPAROSCOPIC N/A 2/18/2013    [facility]formed [facility] at [facility] OR [facility]    COLON SURGERY  2011    secondary to perforation after c-scope    COLONOSCOPY      FRACTURE SURGERY      HERNIA REPAIR      HIATAL HERNIA REPAIR  2013    HYSTERECTOMY  1986    Left leg surgery         Review of patient's allergies indicates:   Allergen Reactions    Sudafed [pseudoephedrine hcl] Nausea And Vomiting and Other (See Comments)     JITTERY       No current facility-administered medications on file prior to encounter.      Current Outpatient Medications on File Prior to Encounter   Medication Sig    ADVAIR DISKUS 250-50 mcg/dose diskus inhaler INHALE 1 PUFF BY MOUTH EVERY 12 HOURS    albuterol (PROAIR HFA) 90 mcg/actuation inhaler INHALE 2 PUFFS BY MOUTH INTO THE LUNGS FOUR TIMES DAILY prn    albuterol-ipratropium (DUO-NEB) 2.5 mg-0.5 mg/3 mL nebulizer solution TAKE 3 MLS BY NEBULIZER EVERY 6 HOURS AS NEEDED FOR WHEEZING OR SHORTNESS OF BREATH (Patient taking differently: TAKE 3 MLS BY NEBULIZER EVERY 4 HOURS AS NEEDED FOR WHEEZING OR SHORTNESS OF BREATH)    ergocalciferol (ERGOCALCIFEROL) 50,000 unit Cap Take 1 capsule (50,000 Units total) by mouth every 7 days. (Patient taking differently: Take 50,000 Units by mouth every Monday. )    hydroCHLOROthiazide (HYDRODIURIL) 12.5 MG Tab Take 1 tablet (12.5 mg total) by mouth once daily. For blood pressure    loperamide (IMODIUM) 2 mg capsule Take 2 mg by mouth 4 (four) times daily as needed for Diarrhea.    potassium chloride SA (K-DUR,KLOR-CON) 10 MEQ tablet Take 1 tablet (10 mEq total) by mouth once daily. (Patient taking differently: Take 10 mEq by mouth every other day. )    [DISCONTINUED] albuterol-ipratropium (DUO-NEB) 2.5 mg-0.5 mg/3 mL nebulizer solution TAKE 3 MLS BY NEBULIZER EVERY 6 HOURS AS NEEDED FOR WHEEZING OR  SHORTNESS OF BREATH    [DISCONTINUED] levalbuterol (XOPENEX) 1.25 mg/0.5 mL nebulizer solution Qid prn for copd    [DISCONTINUED] psyllium 0.52 gram capsule Take 2 capsules by mouth once daily.     Family History     Problem Relation (Age of Onset)    Abnormal EKG Daughter    Breast cancer Daughter (43), Other (44)    Cancer Mother    Cataracts Maternal Grandmother    Coronary artery disease Father    Hypertension Mother, Sister, Brother    Retinal detachment Father        Tobacco Use    Smoking status: Former Smoker     Packs/day: 1.50     Years: 30.00     Pack years: 45.00     Types: Cigarettes     Last attempt to quit: 1997     Years since quittin.8    Smokeless tobacco: Never Used   Substance and Sexual Activity    Alcohol use: Yes     Comment: occaissionally    Drug use: No    Sexual activity: Yes     Partners: Male     Birth control/protection: Surgical     Review of Systems   Constitution: Negative for chills and decreased appetite.   HENT: Negative for congestion, ear discharge and ear pain.    Eyes: Negative for blurred vision and discharge.   Cardiovascular: Positive for dyspnea on exertion. Negative for chest pain, claudication and cyanosis.   Respiratory: Positive for shortness of breath. Negative for cough and hemoptysis.    Endocrine: Negative for cold intolerance and heat intolerance.   Skin: Negative for color change and nail changes.   Musculoskeletal: Negative for arthritis and back pain.   Gastrointestinal: Negative for bloating and abdominal pain.   Genitourinary: Negative for bladder incontinence and decreased libido.   Neurological: Negative for aphonia and brief paralysis.   Psychiatric/Behavioral: The patient is nervous/anxious.      Objective:     Vital Signs (Most Recent):  Temp: 98.8 °F (37.1 °C) (18)  Pulse: 107 (18)  Resp: 20 (18)  BP: 118/80 (18)  SpO2: 98 % (18) Vital Signs (24h Range):  Temp:  [97.8 °F (36.6  °C)-98.8 °F (37.1 °C)] 98.8 °F (37.1 °C)  Pulse:  [102-128] 107  Resp:  [17-36] 20  SpO2:  [93 %-100 %] 98 %  BP: (118-138)/(80-96) 118/80     Weight: 78.9 kg (174 lb)  Body mass index is 28.96 kg/m².    SpO2: 98 %  O2 Device (Oxygen Therapy): nasal cannula    No intake or output data in the 24 hours ending 11/18/18 2337    Lines/Drains/Airways     Drain            Female External Urinary Catheter 11/18/18 1008 less than 1 day          Peripheral Intravenous Line                 Peripheral IV - Single Lumen 11/18/18 0514 Left Antecubital less than 1 day         Peripheral IV - Single Lumen 11/18/18 0514 Right Antecubital less than 1 day                Physical Exam   Constitutional: She is oriented to person, place, and time. She appears well-developed and well-nourished.   HENT:   Head: Normocephalic and atraumatic.   Nose: Nose normal.   Mouth/Throat: No oropharyngeal exudate.   Eyes: Right eye exhibits no discharge. Left eye exhibits no discharge. No scleral icterus.   Neck: Normal range of motion. Neck supple. No JVD present.   Cardiovascular: Normal rate, regular rhythm, S1 normal and S2 normal. Exam reveals no gallop, no S3, no S4, no distant heart sounds, no friction rub, no midsystolic click and no opening snap.   No murmur heard.  Pulses:       Radial pulses are 2+ on the right side, and 2+ on the left side.        Femoral pulses are 2+ on the right side, and 2+ on the left side.  Decreased intensity of S1   Pulmonary/Chest: Effort normal and breath sounds normal. No respiratory distress. She has no wheezes. She has no rales. She exhibits no tenderness.   Abdominal: Soft. Bowel sounds are normal. She exhibits no distension. There is no tenderness. There is no rebound.   Musculoskeletal: Normal range of motion. She exhibits no edema, tenderness or deformity.   Lymphadenopathy:     She has no cervical adenopathy.   Neurological: She is alert and oriented to person, place, and time. No cranial nerve deficit.    Skin: Skin is warm and dry.   Psychiatric:   Very anxious, emotionally labile, almost in tears at times when inquired about life stressors       Significant Labs:   BMP:   Recent Labs   Lab 11/18/18  0230   *      K 3.9      CO2 23   BUN 19   CREATININE 0.9   CALCIUM 8.8   , CMP   Recent Labs   Lab 11/18/18  0230      K 3.9      CO2 23   *   BUN 19   CREATININE 0.9   CALCIUM 8.8   PROT 7.0   ALBUMIN 3.5   BILITOT 0.2   ALKPHOS 80   AST 72*   ALT 41   ANIONGAP 13   ESTGFRAFRICA >60.0   EGFRNONAA >60.0    and CBC   Recent Labs   Lab 11/18/18  0230   WBC 17.99*   HGB 11.5*   HCT 36.4*          Significant Imaging: CTA, TTE done by me    Assessment and Plan:     Takotsubo cardiomyopathy    Patient is a 64 y/o F w/ PMHx Takatsubo cardiomyopathy (LVEF 25% w [facility] WNL in 2016 -> 60%), HTN, HLD, COPD (on home O2), who presented after an episode of anxiety and dyspnea on exertion. Likely having recurrence of stress-induced cardiomyopathy.    BNP WNL  CTA PE protocol negative  Trop 1.5 -> 1.4    Given history of stress-induced cardiomyopathy, rapid troponin elevation and decrease (which decreases likelihood of myocarditis), lack of angina (decreasing likelihood of CAD or vasospasm) similar presentation to when she was diagnosed with her cardiomyopathy 2 years ago, I decided to perform a bedside TTE which evidenced severely reduced LV function (<35%) with apical ballooning/hypokynesis suggestive of stress-induced cardiomyopathy. IVC was thin and collapsible on inspiration. Of note, her previous TTE had shown a LVEF 60% and she has been off cardiac medications    Recommendations:  -Carvedilol 3.125 and titrate as tolerated (if she accepts, refusing today given presumed history of headaches w BB)  -Get formal TTE in the AM, to assess this preliminary diagnosis, evaluate for LV thrombosis and LVOT obstruction. If no LVOT obstruction, may also consider starting ARB, spironolactone (if  LVEF <35%) after achieving euvolemia.   -Avoid CCB given depressed LVEF, also her tachycardia, may be compensatory for her depressed LVEF  -Give at least 500 cc of NaCl bolus given collapsible IVC  -Control her anxiety cautiously since she has COPD   -Avoid albuterol, she is not wheezing and it can worsen tachycardia and has arrhythmogenic potential         VTE Risk Mitigation (From admission, onward)        Ordered     enoxaparin injection 40 mg  Daily      11/18/18 0757     Place sequential compression device  Until discontinued      11/18/18 0738     IP VTE HIGH RISK PATIENT  Once      11/18/18 0738     Place ANTOINE hose  Until discontinued      11/18/18 0617          Shady Mercer MD  Cardiology   [facility]
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
## 2018-11-19 NOTE — H&P
[facility] Hospital Medicine  History & Physical    Patient Name: Ana Aguila  MRN: [id number]  Admission Date: 11/18/2018  Attending Physician: Frederic Arriola MD   Primary Care Provider: Erik Cool MD    [facility] Medicine Team: [facility] HOSP MED 2 Jarek Toth MD     Patient information was obtained from patient, past medical records and ER records.     Subjective:     Principal Problem:Acute on chronic respiratory failure with hypoxia    Chief Complaint:   Chief Complaint   Patient presents with    Respiratory Distress     reports SOB and respiratory distress that started tonight, hx of COPD, pt on CPAP on EMS arrival        HPI: Mrs. Aguila is a 63-year-old female with a history of COPD, HTN, HLD, hepatomegaly, and history of LHC in 2016 consistent w/ findings of takotsubos (stress induced) cardiomyopathy, brought in by EMS for acute worsening of shortness of breath. Patient called EMS due to increased work of breathing after an attempt to make it to the bathroom around 130am this morning. EMS found patient to have saturation in the 70s, with a heart rate in the 150s on arrival, hand an exam notable for poor air movement bilaterally with faint expiratory and inspiratory wheezing.  Patient was provided with 1 DuoNeb treatment, 125 mg of Solu-Medrol, and subsequently a 2nd albuterol treatment as well as 2 mg of magnesium. These interventions resulted in improvement of oxygen saturation.  Heart rate improved to 120 since saturations now in the 90s on arrival.  Patient endorses shortness of breath as typical of her usual COPD exacerbation, without known trigger. She endorses diffuse chest pain that is exacerbated with deep breaths. Along with nausea, anxiety, and states she had an episode of urinary incontinence this morning as well (which she has had since 2010). She denies fatigue, fever, emesis, or diarrhea. In the ED, EKG was read as normal without any significant ST changes. However,  her troponins were found to be trending upwards: .05, .7, and most recently .8. Cardiology consulted. Admitted to hospital medicine for further treatment of COPD exacerbation.         Review of Systems   Constitutional: Negative for chills, fatigue and fever.   HENT: Negative for trouble swallowing.    Eyes: Negative for visual disturbance.   Respiratory: Positive for chest tightness and shortness of breath.    Cardiovascular: Negative for palpitations and leg swelling.   Gastrointestinal: Negative for abdominal distention, abdominal pain and constipation.   Genitourinary: Positive for frequency and urgency.        Hx of incontinence since 2010   Musculoskeletal: Negative for arthralgias and gait problem.   Skin: Negative for wound.   Neurological: Positive for light-headedness. Negative for dizziness, speech difficulty, weakness and numbness.   Psychiatric/Behavioral: Negative for agitation and confusion.     Objective:     Vital Signs (Most Recent):  Temp: 97.8 °F (36.6 °C) (11/18/18 1000)  Pulse: (!) 117 (11/18/18 1027)  Resp: 18 (11/18/18 1027)  BP: (!) 130/96 (11/18/18 1000)  SpO2: 97 % (11/18/18 1027) Vital Signs (24h Range):  Temp:  [97.8 °F (36.6 °C)-98.4 °F (36.9 °C)] 97.8 °F (36.6 °C)  Pulse:  [115-128] 117  Resp:  [18-36] 18  SpO2:  [95 %-100 %] 97 %  BP: (119-138)/(81-96) 130/96     Weight: 78.9 kg (174 lb)  Body mass index is 28.96 kg/m².  No intake or output data in the 24 hours ending 11/18/18 1121   Physical Exam   Constitutional: She is oriented to person, place, and time. She appears well-developed and well-nourished.   HENT:   Head: Normocephalic and atraumatic.   Eyes: EOM are normal. Pupils are equal, round, and reactive to light.   Neck: Normal range of motion.   Cardiovascular: Regular rhythm and normal heart sounds.   Tachycardic   Pulmonary/Chest:   Increased work of breathing. Diminished breath sounds at base bilateraly    Abdominal: Soft. Bowel sounds are normal.   Musculoskeletal: Normal  range of motion.   Neurological: She is alert and oriented to person, place, and time.   Skin: Skin is warm and dry.   Psychiatric: She has a normal mood and affect.       Significant Labs:   ABGs:   Recent Labs   Lab 11/18/18  0746   PH 7.386   PCO2 43.1   HCO3 25.9   POCSATURATED 93*   BE 1     BMP:   Recent Labs   Lab 11/18/18  0230   *      K 3.9      CO2 23   BUN 19   CREATININE 0.9   CALCIUM 8.8     CMP:   Recent Labs   Lab 11/18/18  0230      K 3.9      CO2 23   *   BUN 19   CREATININE 0.9   CALCIUM 8.8   PROT 7.0   ALBUMIN 3.5   BILITOT 0.2   ALKPHOS 80   AST 72*   ALT 41   ANIONGAP 13   EGFRNONAA >60.0     All pertinent labs within the past 24 hours have been reviewed.    Significant Imaging: I have reviewed all pertinent imaging results/findings within the past 24 hours.    Assessment/Plan:     * Acute on chronic respiratory failure with hypoxia    Patient presents to ED after increased SOB and work of breathing while at home. Likely 2/2 to COPD exacerbation. Pt uses 2L of home O2 at all times.  -- Duoneb treatments   -- Starting Decadron 2mg BID  -- Blood culture x2, Sputum culture collected   -- Given Amoxicillin in ED  -- Starting Azithromycin 5 day course   -- Goal O2 Sat 88-92%     Situational depression    -- Starting Ativan .5mg TID   -- Patient would like to speak with psych before initiating SSRI therapy  -- Will get psych consult tomorrow        COPD exacerbation    -- See Acute on chronic respiratory failure        S/P right hemicolectomy    2010:  Colonoscopy with polypectomy ([facility]).  Pathology was tubular adenoma   - complicated by post polypectomy bleed   - repeat CSY done with old blood seen.  Perforation of transverse colon   -  Right colectomy ([facility]) for perforation.         Fatty liver disease, nonalcoholic           Takotsubo cardiomyopathy    [facility] in 2016 consistent w/ findings of Takotsubos Cardiomyopathy with an EF of 25%  -- 2D echo at the end of  2016 showed resolution with EF 65%  -- Repeat Echo ordered  -- Troponins trending upward: .05, .7, and most recently .8.  -- Cardiology consulted.        Essential hypertension    -- Holding HCTZ in setting of tachycardia and hx of Takotsubo cardiomyopathy   -- Starting Cardizem 30mg q6         VTE Risk Mitigation (From admission, onward)        Ordered     enoxaparin injection 40 mg  Daily      11/18/18 0757     Place sequential compression device  Until discontinued      11/18/18 0738     IP VTE HIGH RISK PATIENT  Once      11/18/18 0738     Place ANTOINE hose  Until discontinued      11/18/18 0617             Jarek Toth MD  Department of Hospital Medicine   [facility]
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
## 2018-11-19 NOTE — ASSESSMENT & PLAN NOTE
-- Holding HCTZ in setting of tachycardia and hx of Takotsubo cardiomyopathy   -- Starting on digoxin to reduce BP

## 2018-11-19 NOTE — HPI
Reason for Consult: Elevated troponins    HPI:  Patient is a 62 y/o F w/ PMHx Takatsubo cardiomyopathy (LVEF 25% w [facility] WNL in 2016 -> 60%), HTN, HLD, COPD (on home O2), who presented after an episode of anxiety and dyspnea on exertion.    She was lifting heavy bags and had the urge to go to the restroom, rushed, got dyspneic and afraid so she decided to be brought to the ED. Reportedly, her O2 sat was on the 70s% when EMS found her (per ED note). She denies progressive exercise intolerance, angina, lightheadedness, or syncope. She has dyspnea at baseline due to her COPD but she feels pulmonary rehab has helped her. She has been feeling anxious/sad for the last 2 months since her brother got sick.    In the ED (reportedly, records limited by Epic downtime), her vitals were relevant for tachycardia on the 150s that decreased to 120s -> 110s-100s. She was treated for COPD exacerbation (per ED note she had wheezes) with steroids and duonebs but later today she was CTA on examination by primary team. Given initial dyspnea, primary team also gave lasix 20 IV once (no urine output recorded).     Her O2 sats have remained in the high 90s-100 here.      Trop 0.05 -> 1.5 -> 1.4 (no angina, recorded BP 130s), BNP WNL    CTA PE protocol negative for PE but LV looked dilated to me (although not mentioned in the report).    Primary team inquired if she could get dilt for tachycardia (she was refusing BB), I recommended against treating tachycardia with dilt for reasons detailed below on the assessment and plan. She also received lorazepam 0.5 for anxiety (once suspicion for COPD exacerbation became very low).    During our encounter the patient seems to be emotionally labile and anxious. She strongly refuses any beta blockers and would not like other medications from us at this point and she feels overwhelmed.    Given history of stress-induced cardiomyopathy, rapid troponin elevation and decrease, similar presentation to when  she was diagnosed with her cardiomyopathy 2 years ago, I decided to perform a bedside TTE which evidenced severely reduced LV function (<35%) with apical ballooning/hypokynesis suggestive of stress-induced cardiomyopathy. IVC was thin and collapsible on inspiration.

## 2018-11-19 NOTE — HPI
Mrs. Aguila is a 63-year-old female with a history of COPD, HTN, HLD, hepatomegaly, and history of LHC in 2016 consistent w/ findings of takotsubos (stress induced) cardiomyopathy, brought in by EMS for acute worsening of shortness of breath. Patient called EMS due to increased work of breathing after an attempt to make it to the bathroom around 130am this morning. EMS found patient to have saturation in the 70s, with a heart rate in the 150s on arrival, hand an exam notable for poor air movement bilaterally with faint expiratory and inspiratory wheezing.  Patient was provided with 1 DuoNeb treatment, 125 mg of Solu-Medrol, and subsequently a 2nd albuterol treatment as well as 2 mg of magnesium. These interventions resulted in improvement of oxygen saturation.  Heart rate improved to 120 since saturations now in the 90s on arrival.  Patient endorses shortness of breath as typical of her usual COPD exacerbation, without known trigger. She endorses diffuse chest pain that is exacerbated with deep breaths. Along with nausea, anxiety, and states she had an episode of urinary incontinence this morning as well (which she has had since 2010). She denies fatigue, fever, emesis, or diarrhea. In the ED, EKG was read as normal without any significant ST changes. However, her troponins were found to be trending upwards: .05, .7, and most recently .8. Cardiology consulted. Admitted to hospital medicine for further treatment of COPD exacerbation.
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
## 2018-11-19 NOTE — CONSULTS
[facility]  Cardiology  Consult Note    Patient Name: Ana Aguila  MRN: [id number]  Admission Date: 11/18/2018  Hospital Length of Stay: 1 days  Code Status: Full Code   Attending Provider: Frederic Arriola MD   Consulting Provider: Luz Paul MD  Primary Care Physician: Erik Cool MD  Principal Problem:Acute on chronic respiratory failure with hypoxia    Patient information was obtained from patient, past medical records and ER records.     Consults  Subjective:     HPI:   Reason for Consult: Elevated troponins    HPI:  Patient is a 64 y/o F w/ PMHx Takatsubo cardiomyopathy (LVEF 25% w [facility] WNL in 2016 -> 60%), HTN, HLD, COPD (on home O2), who presented after an episode of anxiety and dyspnea on exertion.    She was lifting heavy bags and had the urge to go to the restroom, rushed, got dyspneic and afraid so she decided to be brought to the ED. Reportedly, her O2 sat was on the 70s% when EMS found her (per ED note). She denies progressive exercise intolerance, angina, lightheadedness, or syncope. She has dyspnea at baseline due to her COPD but she feels pulmonary rehab has helped her. She has been feeling anxious/sad for the last 2 months since her brother got sick.    In the ED (reportedly, records limited by Epic downtime), her vitals were relevant for tachycardia on the 150s that decreased to 120s -> 110s-100s. She was treated for COPD exacerbation (per ED note she had wheezes) with steroids and duonebs but later today she was CTA on examination by primary team. Given initial dyspnea, primary team also gave lasix 20 IV once (no urine output recorded).     Her O2 sats have remained in the high 90s-100 here.      Trop 0.05 -> 1.5 -> 1.4 (no angina, recorded BP 130s), BNP WNL    CTA PE protocol negative for PE but LV looked dilated to me (although not mentioned in the report).    Primary team inquired if she could get dilt for tachycardia (she was refusing BB), I recommended  against treating tachycardia with dilt for reasons detailed below on the assessment and plan. She also received lorazepam 0.5 for anxiety (once suspicion for COPD exacerbation became very low).    During our encounter the patient seems to be emotionally labile and anxious. She strongly refuses any beta blockers and would not like other medications from us at this point and she feels overwhelmed.    Given history of stress-induced cardiomyopathy, rapid troponin elevation and decrease, similar presentation to when she was diagnosed with her cardiomyopathy 2 years ago, I decided to perform a bedside TTE which evidenced severely reduced LV function (<35%) with apical ballooning/hypokynesis suggestive of stress-induced cardiomyopathy. IVC was thin and collapsible on inspiration.    Past Medical History:   Diagnosis Date    Aortic calcification 1/11/2018    Chronic diarrhea     Chronic obstructive pulmonary disease 8/27/2013    Chronic respiratory failure with hypoxia, on home O2 therapy 1/11/2018    COPD (chronic obstructive pulmonary disease)     Essential hypertension 8/12/2013    Former smoker 10/18/2016    Hepatomegaly 12/21/2015    Hyperlipidemia     Hypertension     Microscopic hematuria 1/13/2017    Perianal abscess 6/1/2018    Reflux 2013    S/P right hemicolectomy 9/7/2017    Performed in 2011 after perforation during colonoscopy    Takotsubo cardiomyopathy 10/18/2016    Noted on echo 10/2016, recovered on repeat echo 12/2016       Past Surgical History:   Procedure Laterality Date    ABDOMINAL SURGERY      APPENDECTOMY      CATHETERIZATION, HEART, LEFT N/A 9/6/2013    Performed [facility] at [facility] CATH LAB    CHOLANGIOGRAM N/A 2/18/2013    Performed by Zhou Hay Jr., MD at [facility] OR 2ND FLR    CHOLECYSTECTOMY  2013    open    CHOLECYSTECTOMY N/A 2/18/2013    Performed by [facility] at [facility] OR 2ND FLR    CHOLECYSTECTOMY, LAPAROSCOPIC N/A 2/18/2013     [facility] at [facility] OR [address] [place] [address]    COLON SURGERY  2011    secondary to perforation after c-scope    COLONOSCOPY      FRACTURE SURGERY      HERNIA REPAIR      HIATAL HERNIA REPAIR  2013    HYSTERECTOMY  1986    Left leg surgery         Review of patient's allergies indicates:   Allergen Reactions    Sudafed [pseudoephedrine hcl] Nausea And Vomiting and Other (See Comments)     JITTERY       No current facility-administered medications on file prior to encounter.      Current Outpatient Medications on File Prior to Encounter   Medication Sig    ADVAIR DISKUS 250-50 mcg/dose diskus inhaler INHALE 1 PUFF BY MOUTH EVERY 12 HOURS    albuterol (PROAIR HFA) 90 mcg/actuation inhaler INHALE 2 PUFFS BY MOUTH INTO THE LUNGS FOUR TIMES DAILY prn    albuterol-ipratropium (DUO-NEB) 2.5 mg-0.5 mg/3 mL nebulizer solution TAKE 3 MLS BY NEBULIZER EVERY 6 HOURS AS NEEDED FOR WHEEZING OR SHORTNESS OF BREATH (Patient taking differently: TAKE 3 MLS BY NEBULIZER EVERY 4 HOURS AS NEEDED FOR WHEEZING OR SHORTNESS OF BREATH)    ergocalciferol (ERGOCALCIFEROL) 50,000 unit Cap Take 1 capsule (50,000 Units total) by mouth every 7 days. (Patient taking differently: Take 50,000 Units by mouth every Monday. )    hydroCHLOROthiazide (HYDRODIURIL) 12.5 MG Tab Take 1 tablet (12.5 mg total) by mouth once daily. For blood pressure    loperamide (IMODIUM) 2 mg capsule Take 2 mg by mouth 4 (four) times daily as needed for Diarrhea.    potassium chloride SA (K-DUR,KLOR-CON) 10 MEQ tablet Take 1 tablet (10 mEq total) by mouth once daily. (Patient taking differently: Take 10 mEq by mouth every other day. )     Family History     Problem Relation (Age of Onset)    Abnormal EKG Daughter    Breast cancer Daughter (43), Other (44)    Cancer Mother    Cataracts Maternal Grandmother    Coronary artery disease Father    Hypertension Mother, Sister, Brother    Retinal detachment Father        Tobacco Use    Smoking status:  Former Smoker     Packs/day: 1.50     Years: 30.00     Pack years: 45.00     Types: Cigarettes     Last attempt to quit: 1997     Years since quittin.8    Smokeless tobacco: Never Used   Substance and Sexual Activity    Alcohol use: Yes     Comment: occaissionally    Drug use: No    Sexual activity: Yes     Partners: Male     Birth control/protection: Surgical     Review of Systems   Constitution: Positive for malaise/fatigue. Negative for decreased appetite and fever.   HENT: Negative for congestion and hoarse voice.    Eyes: Negative for pain, photophobia and redness.   Cardiovascular: Positive for dyspnea on exertion. Negative for leg swelling and palpitations.   Respiratory: Positive for cough and shortness of breath. Negative for hemoptysis and wheezing.    Musculoskeletal: Negative for joint swelling, muscle weakness and myalgias.   Gastrointestinal: Negative for bloating, abdominal pain, nausea and vomiting.   Genitourinary: Negative for dysuria, hematuria and urgency.   Neurological: Negative for dizziness and seizures.   Psychiatric/Behavioral: Positive for depression. Negative for altered mental status and hallucinations. The patient is nervous/anxious.      Objective:     Vital Signs (Most Recent):  Temp: 98.2 °F (36.8 °C) (18 0900)  Pulse: 100 (18 1225)  Resp: 17 (18 1225)  BP: (!) 123/90 (18 0900)  SpO2: (!) 93 % (18 1225) Vital Signs (24h Range):  Temp:  [98.2 °F (36.8 °C)-98.8 °F (37.1 °C)] 98.2 °F (36.8 °C)  Pulse:  [] 100  Resp:  [16-20] 17  SpO2:  [92 %-98 %] 93 %  BP: (118-125)/(80-90) 123/90     Weight: 78.9 kg (174 lb)  Body mass index is 28.96 kg/m².    SpO2: (!) 93 %  O2 Device (Oxygen Therapy): nasal cannula      Intake/Output Summary (Last 24 hours) at 2018 133  Last data filed at 2018 0845  Gross per 24 hour   Intake 300 ml   Output --   Net 300 ml       Lines/Drains/Airways     Drain            Female External Urinary Catheter  11/18/18 1008 1 day          Peripheral Intravenous Line                 Peripheral IV - Single Lumen 11/18/18 0514 Left Antecubital 1 day         Peripheral IV - Single Lumen 11/18/18 0514 Right Antecubital 1 day                Physical Exam   Constitutional: She is oriented to person, place, and time. She appears well-developed and well-nourished. No distress.   HENT:   Mouth/Throat: Oropharynx is clear and moist. No oropharyngeal exudate.   Eyes: Conjunctivae are normal. Scleral icterus is present.   Neck: Normal range of motion. Neck supple. No thyromegaly present.   Cardiovascular: Regular rhythm, normal heart sounds and intact distal pulses. Tachycardia present.   No murmur heard.  Pulmonary/Chest: Breath sounds normal. No respiratory distress. She has no wheezes. She has no rales.   Abdominal: Soft. Bowel sounds are normal. She exhibits no distension. There is no tenderness.   Musculoskeletal: Normal range of motion. She exhibits no edema or tenderness.   Lymphadenopathy:     She has no cervical adenopathy.   Neurological: She is alert and oriented to person, place, and time.   Skin: She is not diaphoretic.   Nursing note and vitals reviewed.      Significant Labs:   CMP   Recent Labs   Lab 11/18/18  0230 11/19/18  0614    143   K 3.9 4.5    106   CO2 23 28   * 113*   BUN 19 22   CREATININE 0.9 0.8   CALCIUM 8.8 9.1   PROT 7.0 6.1   ALBUMIN 3.5 3.2*   BILITOT 0.2 0.4   ALKPHOS 80 67   AST 72* 42*   ALT 41 31   ANIONGAP 13 9   ESTGFRAFRICA >60.0 >60.0   EGFRNONAA >60.0 >60.0   , CBC   Recent Labs   Lab 11/18/18  0230 11/19/18  0614   WBC 17.99* 12.57   HGB 11.5* 10.8*   HCT 36.4* 34.1*    283   , Troponin   Recent Labs   Lab 11/18/18  0929 11/18/18  1457 11/18/18  1854   TROPONINI 0.888* 1.506* 1.471*    and       Significant Imaging: Echocardiogram:   2D echo with color flow doppler:   Results for orders placed or performed during the hospital encounter of 12/12/16   2D echo with  color flow doppler   Result Value Ref Range    QEF 60 55 - 65    Diastolic Dysfunction No     Mitral Valve Mobility NORMAL      Assessment and Plan:     Takotsubo cardiomyopathy    Patient is a 62 y/o F w/ PMHx Takatsubo cardiomyopathy (LVEF 25% w [facility] WNL in 2016 -> 60%), HTN, HLD, COPD (on home O2), who presented after an episode of anxiety and dyspnea on exertion. Likely having recurrence of stress-induced cardiomyopathy.    BNP WNL  CTA PE protocol negative  Trop 1.5 -> 1.4    Given history of stress-induced cardiomyopathy, rapid troponin elevation and decrease (which decreases likelihood of myocarditis), lack of angina (decreasing likelihood of CAD or vasospasm) similar presentation to when she was diagnosed with her cardiomyopathy 2 years ago, I decided to perform a bedside TTE which evidenced severely reduced LV function (<35%) with apical ballooning/hypokynesis suggestive of stress-induced cardiomyopathy. IVC was thin and collapsible on inspiration. Of note, her previous TTE had shown a LVEF 60% and she has been off cardiac medications    TTE read on bedside echo: EF ~35%, incomplete endocardial visualization; mild RV enlargement, LV diastolic dysfunction, multiple WMAs in the LAD distribution concerning for stress induced vs. ischemic cardiomyopathy    Recommendations:  -Patient will to try lopressor 12.5mg per primary team - should switch to toprol for a daily dosing or will require lopressor bid for management.   -If patient is adamant about refusing beta-blockers or starts to experience side effects, can try ACEi e.g. entresto or lisinopril  -Avoid CCB given depressed LVEF, also her tachycardia, may be compensatory for her depressed LVEF  -Do not recommend digoxin in this patient as beta blockers can better control ventricular rate control and withdrawal of digoxin may induce worsening of HF   -Control her anxiety cautiously since she has COPD as well as history of substance abuse  -Avoid albuterol, she  is not wheezing and it can worsen tachycardia and has arrhythmogenic potential         VTE Risk Mitigation (From admission, onward)        Ordered     enoxaparin injection 40 mg  Daily      11/18/18 0757     Place sequential compression device  Until discontinued      11/18/18 0738     IP VTE HIGH RISK PATIENT  Once      11/18/18 0738     Place ANTOINE hose  Until discontinued      11/18/18 0617          Thank you for your consult. I will follow-up with patient. Please contact us if you have any additional questions.    Luz Paul MD  Cardiology   [facility]

## 2018-11-19 NOTE — ASSESSMENT & PLAN NOTE
Patient is a 64 y/o F w/ PMHx Takatsubo cardiomyopathy (LVEF 25% w [facility] WNL in 2016 -> 60%), HTN, HLD, COPD (on home O2), who presented after an episode of anxiety and dyspnea on exertion. Likely having recurrence of stress-induced cardiomyopathy.    BNP WNL  CTA PE protocol negative  Trop 1.5 -> 1.4    Given history of stress-induced cardiomyopathy, rapid troponin elevation and decrease (which decreases likelihood of myocarditis), lack of angina (decreasing likelihood of CAD or vasospasm) similar presentation to when she was diagnosed with her cardiomyopathy 2 years ago, I decided to perform a bedside TTE which evidenced severely reduced LV function (<35%) with apical ballooning/hypokynesis suggestive of stress-induced cardiomyopathy. IVC was thin and collapsible on inspiration. Of note, her previous TTE had shown a LVEF 60% and she has been off cardiac medications    Recommendations:  -Carvedilol 3.125 and titrate as tolerated (if she accepts, refusing today given presumed history of headaches w BB)  -Get formal TTE in the AM, to assess this preliminary diagnosis and to evaluate for LV thrombosis  -Avoid CCB given depressed LVEF, also her tachycardia, may be compensatory for her depressed LVEF  -Give at least 500 cc of NaCl bolus given collapsible IVC  -Control her anxiety cautiously since she has COPD   -Avoid albuterol, she is not wheezing and it can worsen tachycardia and has arrhythmogenic potential

## 2018-11-19 NOTE — PT/OT/SLP EVAL
Occupational Therapy   Evaluation    Name: Ana Aguila  MRN: [id number]  Admitting Diagnosis:  Acute on chronic respiratory failure with hypoxia      Recommendations:     Discharge Recommendations: outpatient PT  Discharge Equipment Recommendations:  none  Barriers to discharge:  Decreased caregiver support    History:     Occupational Profile:  Living Environment: Pt lives alone in a 2nd floor apt which has 16 steps to enter (with 1 platform detention and BHRs)  Previous level of function: (I) with ADLs/walking - works as a .  Equipment Used at Home:  oxygen  Assistance upon Discharge: neighbors    Past Medical History:   Diagnosis Date    Aortic calcification 1/11/2018    Chronic diarrhea     Chronic obstructive pulmonary disease 8/27/2013    Chronic respiratory failure with hypoxia, on home O2 therapy 1/11/2018    COPD (chronic obstructive pulmonary disease)     Essential hypertension 8/12/2013    Former smoker 10/18/2016    Hepatomegaly 12/21/2015    Hyperlipidemia     Hypertension     Microscopic hematuria 1/13/2017    Perianal abscess 6/1/2018    Reflux 2013    S/P right hemicolectomy 9/7/2017    Performed in 2011 after perforation during colonoscopy    Takotsubo cardiomyopathy 10/18/2016    Noted on echo 10/2016, recovered on repeat echo 12/2016       Past Surgical History:   Procedure Laterality Date    ABDOMINAL SURGERY      APPENDECTOMY      CATHETERIZATION, HEART, LEFT N/A 9/6/2013    Performed by Eagle [facility]rez MD at [facility] CATH LAB    CHOLANGIOGRAM N/A 2/18/2013    Performed by Zhou Hay Jr., MD at [facility] OR 2ND FLR    CHOLECYSTECTOMY  2013    open    CHOLECYSTECTOMY N/A 2/18/2013    Performed by Zhou Hay Jr., MD at [facility] OR 2ND FLR    CHOLECYSTECTOMY, LAPAROSCOPIC N/A 2/18/2013    Performed by Zhou Hay Jr[facility] MD at [facility] OR 2ND FLR    COLON SURGERY  2011    secondary to perforation after c-scope    COLONOSCOPY      FRACTURE SURGERY       HERNIA REPAIR      HIATAL HERNIA REPAIR  2013    HYSTERECTOMY  1986    Left leg surgery         Subjective     Chief Complaint: Anxiety when doing stairs  Patient/Family Comments/goals: Be (I).    Pain/Comfort:  · Pain Rating 1: 0/10    Patients cultural, spiritual, Mu-ism conflicts given the current situation:      Objective:     Communicated with: RN prior to session.  Patient found with: all lines intact and call button in reach and oxygen upon OT entry to room.    General Precautions: Standard, fall   Orthopedic Precautions:    Braces:       Occupational Performance:    Bed Mobility:    · Sitting EOB.    Functional Mobility/Transfers:  · Patient completed Sit <> Stand Transfer with contact guard assistance  with  no assistive device   · Patient completed Bed <> Chair Transfer using Step Transfer technique with stand by assistance with no assistive device  · Functional Mobility: SBA/CGA for ~ 60; with no AD. Able to do 10 steps with CGA and rest breaks.    Activities of Daily Living:  · Grooming: independence seated.  · Upper Body Dressing: supervision     Cognitive/Visual Perceptual:  Cognitive/Psychosocial Skills:     -       Oriented to: Person, Place, Time and Situation   -       Safety awareness/insight to disability: intact     Physical Exam:  BUE AROM/MMT: WNL    AMPAC 6 Click ADL:  AMPAC Total Score: 21    Treatment & Education:  UE ROM/MMT  Bed mobility training / assessment  Functional mobility assessment  Sit/standing balance assessment  Educated on importance of sitting OOB in bedside chair to promote increased strength, endurance & breathing.  Discussed OT POC / Post-acute plan  Education:    Patient left up in chair with all lines intact and call button in reach    Assessment:     Ana Aguila is a 63 y.o. female with a medical diagnosis of Acute on chronic respiratory failure with hypoxia.  She presents with the following performance deficits affecting function: impaired cardiopulmonary  "response to activity, impaired endurance, impaired balance, gait instability, impaired self care skills.  Pt. currently demonstrates decreased (I) with ADLs, functional mobility & t/fs as well as decreased overall strength, ROM, endurance and balance. Pt would benefit from skilled OT services to address these deficits and to facilitate improving (I) with daily tasks.    Rehab Prognosis: Good; patient would benefit from acute skilled OT services to address these deficits and reach maximum level of function.         Clinical Decision Makin.  OT Low:  "Pt evaluation falls under low complexity for evaluation coding due to performance deficits noted in 1-3 areas as stated above and 0 co-morbities affecting current functional status. Data obtained from problem focused assessments. No modifications or assistance was required for completion of evaluation. Only brief occupational profile and history review completed."     Plan:     Patient to be seen 2 x/week to address the above listed problems via self-care/home management, therapeutic activities, therapeutic exercises  · Plan of Care Expires: 18  · Plan of Care Reviewed with: patient    This Plan of care has been discussed with the patient who was involved in its development and understands and is in agreement with the identified goals and treatment plan    GOALS:   Multidisciplinary Problems     Occupational Therapy Goals        Problem: Occupational Therapy Goal    Goal Priority Disciplines Outcome Interventions   Occupational Therapy Goal     OT, PT/OT Ongoing (interventions implemented as appropriate)    Description:  Goals to be met by: 18    Patient will increase functional independence with ADLs by performing:    LE Dressing with Set-up Assistance.  Grooming while standing at sink with Set-up Assistance.  Toilet transfer to toilet with Modified [place].  Pt will perform functional mobility at household distances with Mod (I).                "       Time Tracking:     OT Date of Treatment: 11/19/18  OT Start Time: 0841  OT Stop Time: 0910  OT Total Time (min): 29 min    Billable Minutes:Evaluation 19  Therapeutic Activity 10    BEBA [place]  11/19/2018

## 2018-11-19 NOTE — SUBJECTIVE & OBJECTIVE
Interval History: Patient rested well over night and no acute event was reported. Patient met with psychiatry and refused SSRI treatment. Also patient refused her Coreg overnight due to her previous experience with Coreg which gave her a severe headache whenever she was on the medication. Today patient continues to experience elevated HR but feeling much better.     Review of Systems   Constitutional: Negative for chills, fatigue and fever.   HENT: Negative for congestion and trouble swallowing.    Eyes: Negative for visual disturbance.   Respiratory: Positive for shortness of breath. Negative for chest tightness.    Cardiovascular: Negative for chest pain, palpitations and leg swelling.   Gastrointestinal: Negative for abdominal distention, abdominal pain and constipation.   Genitourinary: Positive for frequency and urgency.        Hx of incontinence since 2010   Musculoskeletal: Negative for arthralgias and gait problem.   Skin: Negative for wound.   Neurological: Negative for dizziness, weakness, light-headedness, numbness and headaches.   Psychiatric/Behavioral: Negative for agitation and confusion.     Objective:     Vital Signs (Most Recent):  Temp: 98.2 °F (36.8 °C) (11/19/18 0900)  Pulse: 102 (11/19/18 1612)  Resp: 17 (11/19/18 1612)  BP: (!) 123/90 (11/19/18 0900)  SpO2: 95 % (11/19/18 1612) Vital Signs (24h Range):  Temp:  [98.2 °F (36.8 °C)-98.8 °F (37.1 °C)] 98.2 °F (36.8 °C)  Pulse:  [] 102  Resp:  [16-20] 17  SpO2:  [92 %-98 %] 95 %  BP: (118-125)/(80-90) 123/90     Weight: 78.9 kg (174 lb)  Body mass index is 28.96 kg/m².    Intake/Output Summary (Last 24 hours) at 11/19/2018 1633  Last data filed at 11/19/2018 0845  Gross per 24 hour   Intake 300 ml   Output --   Net 300 ml      Physical Exam   Constitutional: She is oriented to person, place, and time. She appears well-developed and well-nourished.   Patient sitting comfortably on reclining chair not in distress.    HENT:   Head: Normocephalic  and atraumatic.   Eyes: EOM are normal. Pupils are equal, round, and reactive to light.   Neck: Normal range of motion.   Cardiovascular: Regular rhythm and normal heart sounds.   Tachycardic   Pulmonary/Chest:   Increased work of breathing. Diminished breath sounds at base bilateraly    Abdominal: Soft. Bowel sounds are normal.   Musculoskeletal: Normal range of motion.   Neurological: She is alert and oriented to person, place, and time.   Skin: Skin is warm and dry.   Psychiatric: She has a normal mood and affect.       Significant Labs:   BMP:   Recent Labs   Lab 11/19/18  0614   *      K 4.5      CO2 28   BUN 22   CREATININE 0.8   CALCIUM 9.1   MG 2.0     CBC:   Recent Labs   Lab 11/18/18  0230 11/19/18  0614   WBC 17.99* 12.57   HGB 11.5* 10.8*   HCT 36.4* 34.1*    283       Significant Imaging: I have reviewed all pertinent imaging results/findings within the past 24 hours.

## 2018-11-19 NOTE — PLAN OF CARE
Problem: Physical Therapy Goal  Goal: Physical Therapy Goal  Goals to be met by: 18     Patient will increase functional independence with mobility by performin. Sit to stand transfer with Supervision  2. Bed to chair transfer with Supervision using LRD.   3. Gait  x 100 feet with Stand-by Assistance using LRD.   4. Ascend/descend 16 stair with bilateral Handrails Contact Guard Assistance using LRD.   5. Lower extremity exercise program x20 reps per handout, with independence    Outcome: Ongoing (interventions implemented as appropriate)  Patient evaluated today. All goals established are appropriate for patient progression at this time.   Portillo Aguilera PT, DPT  2018  Pager: [phone]

## 2018-11-19 NOTE — SUBJECTIVE & OBJECTIVE
Past Medical History:   Diagnosis Date    Aortic calcification 1/11/2018    Chronic diarrhea     Chronic obstructive pulmonary disease 8/27/2013    Chronic respiratory failure with hypoxia, on home O2 therapy 1/11/2018    COPD (chronic obstructive pulmonary disease)     Essential hypertension 8/12/2013    Former smoker 10/18/2016    Hepatomegaly 12/21/2015    Hyperlipidemia     Hypertension     Microscopic hematuria 1/13/2017    Perianal abscess 6/1/2018    Reflux 2013    S/P right hemicolectomy 9/7/2017    Performed in 2011 after perforation during colonoscopy    Takotsubo cardiomyopathy 10/18/2016    Noted on echo 10/2016, recovered on repeat echo 12/2016       Past Surgical History:   Procedure Laterality Date    ABDOMINAL SURGERY      APPENDECTOMY      CATHETERIZATION, HEART, LEFT N/A 9/6/2013    Performed by [facility] at [facility] CATH [facility]    CHOLANGIOGRAM N/A 2/18/2013    Performed by Zhou Hay Jr., [facility] at [facility] OR 2ND FLR    CHOLECYSTECTOMY  2013    open    CHOLECYSTECTOMY N/A 2/18/2013    [facility] at [facility] 2ND FLR    CHOLECYSTECTOMY, LAPAROSCOPIC N/A 2/18/2013    Performed by Zhou [facility] Jr[facility] at [facility] OR 2ND FLR    COLON SURGERY  2011    secondary to perforation after c-scope    COLONOSCOPY      FRACTURE SURGERY      HERNIA REPAIR      HIATAL HERNIA REPAIR  2013    HYSTERECTOMY  1986    Left leg surgery         Review of patient's allergies indicates:   Allergen Reactions    Sudafed [pseudoephedrine hcl] Nausea And Vomiting and Other (See Comments)     JITTERY       No current facility-administered medications on file prior to encounter.      Current Outpatient Medications on File Prior to Encounter   Medication Sig    ADVAIR DISKUS 250-50 mcg/dose diskus inhaler INHALE 1 PUFF BY MOUTH EVERY 12 HOURS    albuterol (PROAIR HFA) 90 mcg/actuation inhaler INHALE 2 PUFFS BY MOUTH INTO THE LUNGS FOUR TIMES DAILY prn     albuterol-ipratropium (DUO-NEB) 2.5 mg-0.5 mg/3 mL nebulizer solution TAKE 3 MLS BY NEBULIZER EVERY 6 HOURS AS NEEDED FOR WHEEZING OR SHORTNESS OF BREATH (Patient taking differently: TAKE 3 MLS BY NEBULIZER EVERY 4 HOURS AS NEEDED FOR WHEEZING OR SHORTNESS OF BREATH)    ergocalciferol (ERGOCALCIFEROL) 50,000 unit Cap Take 1 capsule (50,000 Units total) by mouth every 7 days. (Patient taking differently: Take 50,000 Units by mouth every Monday. )    hydroCHLOROthiazide (HYDRODIURIL) 12.5 MG Tab Take 1 tablet (12.5 mg total) by mouth once daily. For blood pressure    loperamide (IMODIUM) 2 mg capsule Take 2 mg by mouth 4 (four) times daily as needed for Diarrhea.    potassium chloride SA (K-DUR,KLOR-CON) 10 MEQ tablet Take 1 tablet (10 mEq total) by mouth once daily. (Patient taking differently: Take 10 mEq by mouth every other day. )    [DISCONTINUED] albuterol-ipratropium (DUO-NEB) 2.5 mg-0.5 mg/3 mL nebulizer solution TAKE 3 MLS BY NEBULIZER EVERY 6 HOURS AS NEEDED FOR WHEEZING OR SHORTNESS OF BREATH    [DISCONTINUED] levalbuterol (XOPENEX) 1.25 mg/0.5 mL nebulizer solution Qid prn for copd    [DISCONTINUED] psyllium 0.52 gram capsule Take 2 capsules by mouth once daily.     Family History     Problem Relation (Age of Onset)    Abnormal EKG Daughter    Breast cancer Daughter (43), Other (44)    Cancer Mother    Cataracts Maternal Grandmother    Coronary artery disease Father    Hypertension Mother, Sister, Brother    Retinal detachment Father        Tobacco Use    Smoking status: Former Smoker     Packs/day: 1.50     Years: 30.00     Pack years: 45.00     Types: Cigarettes     Last attempt to quit: 1997     Years since quittin.8    Smokeless tobacco: Never Used   Substance and Sexual Activity    Alcohol use: Yes     Comment: occaissionally    Drug use: No    Sexual activity: Yes     Partners: Male     Birth control/protection: Surgical     Review of Systems   Constitution: Negative for chills  and decreased appetite.   HENT: Negative for congestion, ear discharge and ear pain.    Eyes: Negative for blurred vision and discharge.   Cardiovascular: Positive for dyspnea on exertion. Negative for chest pain, claudication and cyanosis.   Respiratory: Positive for shortness of breath. Negative for cough and hemoptysis.    Endocrine: Negative for cold intolerance and heat intolerance.   Skin: Negative for color change and nail changes.   Musculoskeletal: Negative for arthritis and back pain.   Gastrointestinal: Negative for bloating and abdominal pain.   Genitourinary: Negative for bladder incontinence and decreased libido.   Neurological: Negative for aphonia and brief paralysis.   Psychiatric/Behavioral: The patient is nervous/anxious.      Objective:     Vital Signs (Most Recent):  Temp: 98.8 °F (37.1 °C) (11/18/18 1814)  Pulse: 107 (11/18/18 2158)  Resp: 20 (11/18/18 2158)  BP: 118/80 (11/18/18 1814)  SpO2: 98 % (11/18/18 2158) Vital Signs (24h Range):  Temp:  [97.8 °F (36.6 °C)-98.8 °F (37.1 °C)] 98.8 °F (37.1 °C)  Pulse:  [102-128] 107  Resp:  [17-36] 20  SpO2:  [93 %-100 %] 98 %  BP: (118-138)/(80-96) 118/80     Weight: 78.9 kg (174 lb)  Body mass index is 28.96 kg/m².    SpO2: 98 %  O2 Device (Oxygen Therapy): nasal cannula    No intake or output data in the 24 hours ending 11/18/18 2337    Lines/Drains/Airways     Drain            Female External Urinary Catheter 11/18/18 1008 less than 1 day          Peripheral Intravenous Line                 Peripheral IV - Single Lumen 11/18/18 0514 Left Antecubital less than 1 day         Peripheral IV - Single Lumen 11/18/18 0514 Right Antecubital less than 1 day                Physical Exam   Constitutional: She is oriented to person, place, and time. She appears well-developed and well-nourished.   HENT:   Head: Normocephalic and atraumatic.   Nose: Nose normal.   Mouth/Throat: No oropharyngeal exudate.   Eyes: Right eye exhibits no discharge. Left eye exhibits  no discharge. No scleral icterus.   Neck: Normal range of motion. Neck supple. No JVD present.   Cardiovascular: Normal rate, regular rhythm, S1 normal and S2 normal. Exam reveals no gallop, no S3, no S4, no distant heart sounds, no friction rub, no midsystolic click and no opening snap.   No murmur heard.  Pulses:       Radial pulses are 2+ on the right side, and 2+ on the left side.        Femoral pulses are 2+ on the right side, and 2+ on the left side.  Decreased intensity of S1   Pulmonary/Chest: Effort normal and breath sounds normal. No respiratory distress. She has no wheezes. She has no rales. She exhibits no tenderness.   Abdominal: Soft. Bowel sounds are normal. She exhibits no distension. There is no tenderness. There is no rebound.   Musculoskeletal: Normal range of motion. She exhibits no edema, tenderness or deformity.   Lymphadenopathy:     She has no cervical adenopathy.   Neurological: She is alert and oriented to person, place, and time. No cranial nerve deficit.   Skin: Skin is warm and dry.   Psychiatric:   Very anxious, emotionally labile, almost in tears at times when inquired about life stressors       Significant Labs:   BMP:   Recent Labs   Lab 11/18/18  0230   *      K 3.9      CO2 23   BUN 19   CREATININE 0.9   CALCIUM 8.8   , CMP   Recent Labs   Lab 11/18/18  0230      K 3.9      CO2 23   *   BUN 19   CREATININE 0.9   CALCIUM 8.8   PROT 7.0   ALBUMIN 3.5   BILITOT 0.2   ALKPHOS 80   AST 72*   ALT 41   ANIONGAP 13   ESTGFRAFRICA >60.0   EGFRNONAA >60.0    and CBC   Recent Labs   Lab 11/18/18  0230   WBC 17.99*   HGB 11.5*   HCT 36.4*          Significant Imaging: CTA, TTE done by me

## 2018-11-19 NOTE — SUBJECTIVE & OBJECTIVE
Past Medical History:   Diagnosis Date    Aortic calcification 1/11/2018    Chronic diarrhea     Chronic obstructive pulmonary disease 8/27/2013    Chronic respiratory failure with hypoxia, on home O2 therapy 1/11/2018    COPD (chronic obstructive pulmonary disease)     Essential hypertension 8/12/2013    Former smoker 10/18/2016    Hepatomegaly 12/21/2015    Hyperlipidemia     Hypertension     Microscopic hematuria 1/13/2017    Perianal abscess 6/1/2018    Reflux 2013    S/P right hemicolectomy 9/7/2017    Performed in 2011 after perforation during colonoscopy    Takotsubo cardiomyopathy 10/18/2016    Noted on echo 10/2016, recovered on repeat echo 12/2016       Past Surgical History:   Procedure Laterality Date    ABDOMINAL SURGERY      APPENDECTOMY      CATHETERIZATION, HEART, LEFT N/A 9/6/2013    Performed by Eagle [facility]rez MD at [facility] CATH LAB    CHOLANGIOGRAM N/A 2/18/2013    Performed by Zhou Hay Jr., MD at [facility] OR 2ND FLR    CHOLECYSTECTOMY  2013    open    CHOLECYSTECTOMY N/A 2/18/2013    Performed by Zhou Hay Jr[facility] at [facility] OR 2ND FLR    CHOLECYSTECTOMY, LAPAROSCOPIC N/A 2/18/2013    Performed by Zhou Hay Jr., MD at [facility] OR 2ND FLR    COLON SURGERY  2011    secondary to perforation after c-scope    COLONOSCOPY      FRACTURE SURGERY      HERNIA REPAIR      HIATAL HERNIA REPAIR  2013    HYSTERECTOMY  1986    Left leg surgery         Review of patient's allergies indicates:   Allergen Reactions    Sudafed [pseudoephedrine hcl] Nausea And Vomiting and Other (See Comments)     JITTERY       No current facility-administered medications on file prior to encounter.      Current Outpatient Medications on File Prior to Encounter   Medication Sig    ADVAIR DISKUS 250-50 mcg/dose diskus inhaler INHALE 1 PUFF BY MOUTH EVERY 12 HOURS    albuterol (PROAIR HFA) 90 mcg/actuation inhaler INHALE 2 PUFFS BY MOUTH INTO THE LUNGS FOUR TIMES DAILY prn     albuterol-ipratropium (DUO-NEB) 2.5 mg-0.5 mg/3 mL nebulizer solution TAKE 3 MLS BY NEBULIZER EVERY 6 HOURS AS NEEDED FOR WHEEZING OR SHORTNESS OF BREATH (Patient taking differently: TAKE 3 MLS BY NEBULIZER EVERY 4 HOURS AS NEEDED FOR WHEEZING OR SHORTNESS OF BREATH)    ergocalciferol (ERGOCALCIFEROL) 50,000 unit Cap Take 1 capsule (50,000 Units total) by mouth every 7 days. (Patient taking differently: Take 50,000 Units by mouth every Monday. )    hydroCHLOROthiazide (HYDRODIURIL) 12.5 MG Tab Take 1 tablet (12.5 mg total) by mouth once daily. For blood pressure    loperamide (IMODIUM) 2 mg capsule Take 2 mg by mouth 4 (four) times daily as needed for Diarrhea.    potassium chloride SA (K-DUR,KLOR-CON) 10 MEQ tablet Take 1 tablet (10 mEq total) by mouth once daily. (Patient taking differently: Take 10 mEq by mouth every other day. )     Family History     Problem Relation (Age of Onset)    Abnormal EKG Daughter    Breast cancer Daughter (43), Other (44)    Cancer Mother    Cataracts Maternal Grandmother    Coronary artery disease Father    Hypertension Mother, Sister, Brother    Retinal detachment Father        Tobacco Use    Smoking status: Former Smoker     Packs/day: 1.50     Years: 30.00     Pack years: 45.00     Types: Cigarettes     Last attempt to quit: 1997     Years since quittin.8    Smokeless tobacco: Never Used   Substance and Sexual Activity    Alcohol use: Yes     Comment: occaissionally    Drug use: No    Sexual activity: Yes     Partners: Male     Birth control/protection: Surgical     Review of Systems   Constitution: Positive for malaise/fatigue. Negative for decreased appetite and fever.   HENT: Negative for congestion and hoarse voice.    Eyes: Negative for pain, photophobia and redness.   Cardiovascular: Positive for dyspnea on exertion. Negative for leg swelling and palpitations.   Respiratory: Positive for cough and shortness of breath. Negative for hemoptysis and wheezing.     Musculoskeletal: Negative for joint swelling, muscle weakness and myalgias.   Gastrointestinal: Negative for bloating, abdominal pain, nausea and vomiting.   Genitourinary: Negative for dysuria, hematuria and urgency.   Neurological: Negative for dizziness and seizures.   Psychiatric/Behavioral: Positive for depression. Negative for altered mental status and hallucinations. The patient is nervous/anxious.      Objective:     Vital Signs (Most Recent):  Temp: 98.2 °F (36.8 °C) (11/19/18 0900)  Pulse: 100 (11/19/18 1225)  Resp: 17 (11/19/18 1225)  BP: (!) 123/90 (11/19/18 0900)  SpO2: (!) 93 % (11/19/18 1225) Vital Signs (24h Range):  Temp:  [98.2 °F (36.8 °C)-98.8 °F (37.1 °C)] 98.2 °F (36.8 °C)  Pulse:  [] 100  Resp:  [16-20] 17  SpO2:  [92 %-98 %] 93 %  BP: (118-125)/(80-90) 123/90     Weight: 78.9 kg (174 lb)  Body mass index is 28.96 kg/m².    SpO2: (!) 93 %  O2 Device (Oxygen Therapy): nasal cannula      Intake/Output Summary (Last 24 hours) at 11/19/2018 1334  Last data filed at 11/19/2018 0845  Gross per 24 hour   Intake 300 ml   Output --   Net 300 ml       Lines/Drains/Airways     Drain            Female External Urinary Catheter 11/18/18 1008 1 day          Peripheral Intravenous Line                 Peripheral IV - Single Lumen 11/18/18 0514 Left Antecubital 1 day         Peripheral IV - Single Lumen 11/18/18 0514 Right Antecubital 1 day                Physical Exam   Constitutional: She is oriented to person, place, and time. She appears well-developed and well-nourished. No distress.   HENT:   Mouth/Throat: Oropharynx is clear and moist. No oropharyngeal exudate.   Eyes: Conjunctivae are normal. Scleral icterus is present.   Neck: Normal range of motion. Neck supple. No thyromegaly present.   Cardiovascular: Regular rhythm, normal heart sounds and intact distal pulses. Tachycardia present.   No murmur heard.  Pulmonary/Chest: Breath sounds normal. No respiratory distress. She has no wheezes. She  has no rales.   Abdominal: Soft. Bowel sounds are normal. She exhibits no distension. There is no tenderness.   Musculoskeletal: Normal range of motion. She exhibits no edema or tenderness.   Lymphadenopathy:     She has no cervical adenopathy.   Neurological: She is alert and oriented to person, place, and time.   Skin: She is not diaphoretic.   Nursing note and vitals reviewed.      Significant Labs:   CMP   Recent Labs   Lab 11/18/18  0230 11/19/18  0614    143   K 3.9 4.5    106   CO2 23 28   * 113*   BUN 19 22   CREATININE 0.9 0.8   CALCIUM 8.8 9.1   PROT 7.0 6.1   ALBUMIN 3.5 3.2*   BILITOT 0.2 0.4   ALKPHOS 80 67   AST 72* 42*   ALT 41 31   ANIONGAP 13 9   ESTGFRAFRICA >60.0 >60.0   EGFRNONAA >60.0 >60.0   , CBC   Recent Labs   Lab 11/18/18  0230 11/19/18  0614   WBC 17.99* 12.57   HGB 11.5* 10.8*   HCT 36.4* 34.1*    283   , Troponin   Recent Labs   Lab 11/18/18  0929 11/18/18  1457 11/18/18  1854   TROPONINI 0.888* 1.506* 1.471*    and       Significant Imaging: Echocardiogram:   2D echo with color flow doppler:   Results for orders placed or performed during the hospital encounter of 12/12/16   2D echo with color flow doppler   Result Value Ref Range    QEF 60 55 - 65    Diastolic Dysfunction No     Mitral Valve Mobility NORMAL

## 2018-11-19 NOTE — PHARMACY MED REC
"Admission Medication Reconciliation - Pharmacy Consult Note    The home medication history was taken by Eliana Campuzano Pharmacy Tech.     Based on information gathered and subsequent review by the clinical pharmacist, the items below may need attention.     You may go to "Admission" then "Reconcile Home Medications" tabs to review and/or act upon these items.     Potentially problematic discrepancies with current MAR  o Patient IS NOT taking the following which was ordered upon admit  o Diltiazem    Potential issues to be addressed PRIOR TO DISCHARGE  o Would avoid non-dihydropyridine calcium channel blockers in this patient with EF < 40%    Please address this information as you see fit.  Feel free to contact us if you have any questions or require assistance.    Avery Chacon, Pharm.D., BCPS  [id number]                      .    .            "

## 2018-11-19 NOTE — PLAN OF CARE
Problem: Patient Care Overview  Goal: Individualization & Mutuality  Outcome: Ongoing (interventions implemented as appropriate)  No acute events.  VSS.  Remains tachycardic, cardizem being added and discontinue albuterol.  Overall shortness of breath and dyspnea has improved since arrival to the floor.  Patient can tolerate walking to the bathroom and can hold a conversation.  MD explain POC w/ patient and family at bedside.  Patient has concerns about taking antidepressants or antianxiety medications.  Will continue to monitor.

## 2018-11-19 NOTE — PLAN OF CARE
Problem: Occupational Therapy Goal  Goal: Occupational Therapy Goal  Goals to be met by: 11/26/18    Patient will increase functional independence with ADLs by performing:    LE Dressing with Set-up Assistance.  Grooming while standing at sink with Set-up Assistance.  Toilet transfer to toilet with Modified [place].  Pt will perform functional mobility at household distances with Mod (I).    Outcome: Ongoing (interventions implemented as appropriate)  Evaluation completed and POC established.    BEBA Hernandez

## 2018-11-19 NOTE — PROCEDURES
Bedside TTE    Limited study to assess LV function and shape.    Technically challenging given COPD, parasternal views were not obtained due to poor quality and patient's complaints of discomfort during the test.    Findings: Severely reduced LV systolic function (<35%) with apical ballooning/severe hypokynesis suggestive of stress-induced cardiomyopathy. IVC was of small diameter and collapsible on inspiration. There was small pericardial effusion but no paradoxical septal movement on diastole.

## 2018-11-19 NOTE — ASSESSMENT & PLAN NOTE
-- Starting Ativan .5mg TID   -- Patient would like to speak with psych before initiating SSRI therapy  -- Will get psych consult tomorrow

## 2018-11-19 NOTE — ASSESSMENT & PLAN NOTE
[facility] in 2016 consistent w/ findings of Takotsubos Cardiomyopathy with an EF of 25%  -- 2D echo at the end of 2016 showed resolution with EF 65%  -- Repeat Echo ordered  -- Troponins trending upward: .05, .7, and most recently 1.4  -- Starting 12.5mg of Metoprolol tartrate once to see if she can tolerate the medication  -- per Cardiology  -Avoid CCB given depressed LVEF, also her tachycardia, may be compensatory for her depressed LVEF  -Give at least 500 cc of NaCl bolus given collapsible IVC  -Avoid albuterol, she is not wheezing and it can worsen tachycardia and has arrhythmogenic potential

## 2018-11-20 LAB
ALBUMIN SERPL BCP-MCNC: 3.1 G/DL
ALP SERPL-CCNC: 60 U/L
ALT SERPL W/O P-5'-P-CCNC: 24 U/L
ANION GAP SERPL CALC-SCNC: 9 MMOL/L
AST SERPL-CCNC: 26 U/L
BASOPHILS # BLD AUTO: 0.01 K/UL
BASOPHILS NFR BLD: 0.1 %
BILIRUB SERPL-MCNC: 0.3 MG/DL
BUN SERPL-MCNC: 25 MG/DL
CALCIUM SERPL-MCNC: 8.6 MG/DL
CHLORIDE SERPL-SCNC: 106 MMOL/L
CO2 SERPL-SCNC: 28 MMOL/L
CREAT SERPL-MCNC: 0.8 MG/DL
DIFFERENTIAL METHOD: ABNORMAL
EOSINOPHIL # BLD AUTO: 0 K/UL
EOSINOPHIL NFR BLD: 0 %
ERYTHROCYTE [DISTWIDTH] IN BLOOD BY AUTOMATED COUNT: 14.8 %
EST. GFR  (AFRICAN AMERICAN): >60 ML/MIN/1.73 M^2
EST. GFR  (NON AFRICAN AMERICAN): >60 ML/MIN/1.73 M^2
GLUCOSE SERPL-MCNC: 104 MG/DL
HCT VFR BLD AUTO: 32 %
HGB BLD-MCNC: 10.3 G/DL
IMM GRANULOCYTES # BLD AUTO: 0.05 K/UL
IMM GRANULOCYTES NFR BLD AUTO: 0.4 %
LYMPHOCYTES # BLD AUTO: 2 K/UL
LYMPHOCYTES NFR BLD: 16.5 %
MAGNESIUM SERPL-MCNC: 2.1 MG/DL
MCH RBC QN AUTO: 28.5 PG
MCHC RBC AUTO-ENTMCNC: 32.2 G/DL
MCV RBC AUTO: 89 FL
MONOCYTES # BLD AUTO: 0.6 K/UL
MONOCYTES NFR BLD: 5.3 %
NEUTROPHILS # BLD AUTO: 9.2 K/UL
NEUTROPHILS NFR BLD: 77.7 %
NRBC BLD-RTO: 0 /100 WBC
PHOSPHATE SERPL-MCNC: 4.4 MG/DL
PLATELET # BLD AUTO: 274 K/UL
PMV BLD AUTO: 10.4 FL
POCT GLUCOSE: 113 MG/DL (ref 70–110)
POCT GLUCOSE: 91 MG/DL (ref 70–110)
POCT GLUCOSE: 94 MG/DL (ref 70–110)
POCT GLUCOSE: 99 MG/DL (ref 70–110)
POTASSIUM SERPL-SCNC: 4.1 MMOL/L
PROT SERPL-MCNC: 5.8 G/DL
RBC # BLD AUTO: 3.61 M/UL
SODIUM SERPL-SCNC: 143 MMOL/L
WBC # BLD AUTO: 11.85 K/UL

## 2018-11-20 PROCEDURE — 94640 AIRWAY INHALATION TREATMENT: CPT

## 2018-11-20 PROCEDURE — 25000242 PHARM REV CODE 250 ALT 637 W/ HCPCS: Performed by: STUDENT IN AN ORGANIZED HEALTH CARE EDUCATION/TRAINING PROGRAM

## 2018-11-20 PROCEDURE — 27000221 HC OXYGEN, UP TO 24 HOURS

## 2018-11-20 PROCEDURE — 83735 ASSAY OF MAGNESIUM: CPT

## 2018-11-20 PROCEDURE — 84100 ASSAY OF PHOSPHORUS: CPT

## 2018-11-20 PROCEDURE — 63600175 PHARM REV CODE 636 W HCPCS: Performed by: STUDENT IN AN ORGANIZED HEALTH CARE EDUCATION/TRAINING PROGRAM

## 2018-11-20 PROCEDURE — 36415 COLL VENOUS BLD VENIPUNCTURE: CPT

## 2018-11-20 PROCEDURE — 99232 SBSQ HOSP IP/OBS MODERATE 35: CPT | Mod: ,,, | Performed by: HOSPITALIST

## 2018-11-20 PROCEDURE — 25000003 PHARM REV CODE 250: Performed by: INTERNAL MEDICINE

## 2018-11-20 PROCEDURE — 25000003 PHARM REV CODE 250: Performed by: STUDENT IN AN ORGANIZED HEALTH CARE EDUCATION/TRAINING PROGRAM

## 2018-11-20 PROCEDURE — 85025 COMPLETE CBC W/AUTO DIFF WBC: CPT

## 2018-11-20 PROCEDURE — 94761 N-INVAS EAR/PLS OXIMETRY MLT: CPT

## 2018-11-20 PROCEDURE — 99900035 HC TECH TIME PER 15 MIN (STAT)

## 2018-11-20 PROCEDURE — 99232 SBSQ HOSP IP/OBS MODERATE 35: CPT | Mod: ,,, | Performed by: INTERNAL MEDICINE

## 2018-11-20 PROCEDURE — 80053 COMPREHEN METABOLIC PANEL: CPT

## 2018-11-20 PROCEDURE — 11000001 HC ACUTE MED/SURG PRIVATE ROOM

## 2018-11-20 RX ORDER — METOPROLOL SUCCINATE 25 MG/1
25 TABLET, EXTENDED RELEASE ORAL DAILY
Status: DISCONTINUED | OUTPATIENT
Start: 2018-11-21 | End: 2018-11-21 | Stop reason: HOSPADM

## 2018-11-20 RX ORDER — METOPROLOL SUCCINATE 25 MG/1
25 TABLET, EXTENDED RELEASE ORAL DAILY
Status: DISCONTINUED | OUTPATIENT
Start: 2018-11-20 | End: 2018-11-20

## 2018-11-20 RX ORDER — METOPROLOL TARTRATE 25 MG/1
12.5 TABLET ORAL ONCE
Status: COMPLETED | OUTPATIENT
Start: 2018-11-20 | End: 2018-11-20

## 2018-11-20 RX ADMIN — ACETAMINOPHEN 325 MG: 325 TABLET ORAL at 09:11

## 2018-11-20 RX ADMIN — DEXAMETHASONE 2 MG: 1 TABLET ORAL at 09:11

## 2018-11-20 RX ADMIN — METOPROLOL TARTRATE 12.5 MG: 25 TABLET, FILM COATED ORAL at 09:11

## 2018-11-20 RX ADMIN — AZITHROMYCIN MONOHYDRATE 250 MG: 250 TABLET ORAL at 09:11

## 2018-11-20 RX ADMIN — IPRATROPIUM BROMIDE 0.5 MG: 0.5 SOLUTION RESPIRATORY (INHALATION) at 12:11

## 2018-11-20 RX ADMIN — IPRATROPIUM BROMIDE 0.5 MG: 0.5 SOLUTION RESPIRATORY (INHALATION) at 08:11

## 2018-11-20 RX ADMIN — FLUTICASONE FUROATE AND VILANTEROL TRIFENATATE 1 PUFF: 100; 25 POWDER RESPIRATORY (INHALATION) at 09:11

## 2018-11-20 RX ADMIN — DIGOXIN 0.12 MG: 125 TABLET ORAL at 09:11

## 2018-11-20 RX ADMIN — IPRATROPIUM BROMIDE 0.5 MG: 0.5 SOLUTION RESPIRATORY (INHALATION) at 03:11

## 2018-11-20 RX ADMIN — ENOXAPARIN SODIUM 40 MG: 100 INJECTION SUBCUTANEOUS at 06:11

## 2018-11-20 NOTE — ASSESSMENT & PLAN NOTE
Patient is a 64 y/o F w/ PMHx Takatsubo cardiomyopathy (LVEF 25% w [facility] WNL in 2016 -> 60%), HTN, HLD, COPD (on home O2), who presented after an episode of anxiety and dyspnea on exertion. Likely having recurrence of stress-induced cardiomyopathy.    BNP WNL  CTA PE protocol negative  Trop 1.5 -> 1.4    Given history of stress-induced cardiomyopathy, rapid troponin elevation and decrease (which decreases likelihood of myocarditis), lack of angina (decreasing likelihood of CAD or vasospasm) similar presentation to when she was diagnosed with her cardiomyopathy 2 years ago, I decided to perform a bedside TTE which evidenced severely reduced LV function (<35%) with apical ballooning/hypokynesis suggestive of stress-induced cardiomyopathy. IVC was thin and collapsible on inspiration. Of note, her previous TTE had shown a LVEF 60% and she has been off cardiac medications    TTE read on bedside echo: EF ~35%, incomplete endocardial visualization; mild RV enlargement, LV diastolic dysfunction, multiple WMAs in the LAD distribution concerning for stress induced vs. ischemic cardiomyopathy    Recommendations:  -Patient with slight headache after starting metoprolol, but is amenable to continuing a trial of lopressor - okay to continue lopressor 12.5mg bid, or switch to toprol 25mg daily  --Should consider atenolol as an alternative therapy if HA persists as it is hydrophilic and would not cross BBB  -Patient should be on an ACEi/ARB; however, given prior side effects from lisinopril, she should be started on valsartan to help with afterload  -Avoid CCB given depressed LVEF, also her tachycardia, may be compensatory for her depressed LVEF  -Do not recommend digoxin in this patient as beta blockers can better control ventricular rate control and withdrawal of digoxin may induce worsening of HF   -Control her anxiety cautiously since she has COPD as well as history of substance abuse  -Avoid albuterol, she is not wheezing and it  can worsen tachycardia and has arrhythmogenic potential

## 2018-11-20 NOTE — PROGRESS NOTES
[facility]  Cardiology  Progress Note    Patient Name: Ana Aguila  MRN: [id number]  Admission Date: 11/18/2018  Hospital Length of Stay: 2 days  Code Status: Full Code   Attending Physician: Manpreet Avelar MD   Primary Care Physician: Erik Cool MD  Expected Discharge Date: 11/23/2018  Principal Problem:Acute on chronic respiratory failure with hypoxia    Subjective:     HPI:   Reason for Consult: Elevated troponins     HPI:  Patient is a 62 y/o F w/ PMHx Takatsubo cardiomyopathy (LVEF 25% w [facility] WNL in 2016 -> 60%), HTN, HLD, COPD (on home O2), who presented after an episode of anxiety and dyspnea on exertion.     She was lifting heavy bags and had the urge to go to the restroom, rushed, got dyspneic and afraid so she decided to be brought to the ED. Reportedly, her O2 sat was on the 70s% when EMS found her (per ED note). She denies progressive exercise intolerance, angina, lightheadedness, or syncope. She has dyspnea at baseline due to her COPD but she feels pulmonary rehab has helped her. She has been feeling anxious/sad for the last 2 months since her brother got sick.     In the ED (reportedly, records limited by Epic downtime), her vitals were relevant for tachycardia on the 150s that decreased to 120s -> 110s-100s. She was treated for COPD exacerbation (per ED note she had wheezes) with steroids and duonebs but later today she was CTA on examination by primary team. Given initial dyspnea, primary team also gave lasix 20 IV once (no urine output recorded).      Her O2 sats have remained in the high 90s-100 here.       Trop 0.05 -> 1.5 -> 1.4 (no angina, recorded BP 130s), BNP WNL     CTA PE protocol negative for PE but LV looked dilated to me (although not mentioned in the report).     Primary team inquired if she could get dilt for tachycardia (she was refusing BB), I recommended against treating tachycardia with dilt for reasons detailed below on the assessment and plan. She  also received lorazepam 0.5 for anxiety (once suspicion for COPD exacerbation became very low).     During our encounter the patient seems to be emotionally labile and anxious. She strongly refuses any beta blockers and would not like other medications from us at this point and she feels overwhelmed.     Given history of stress-induced cardiomyopathy, rapid troponin elevation and decrease, similar presentation to when she was diagnosed with her cardiomyopathy 2 years ago, I decided to perform a bedside TTE which evidenced severely reduced LV function (<35%) with apical ballooning/hypokynesis suggestive of stress-induced cardiomyopathy. IVC was thin and collapsible on inspiration.    Interval History: Patient having HAs after starting metoprolol, but admits that this may be due to caffeine withdrawal. Otherwise, she is doing well. Still anxious, but has a good support system. Breathing is also improving. Denies N/V, constipation, diarrhea.    Review of Systems   Constitution: Positive for malaise/fatigue. Negative for decreased appetite and fever.   HENT: Negative for congestion and hoarse voice.    Eyes: Negative for pain, photophobia and redness.   Cardiovascular: Positive for dyspnea on exertion. Negative for leg swelling and palpitations.   Respiratory: Positive for cough and shortness of breath (improved). Negative for hemoptysis and wheezing.    Musculoskeletal: Negative for joint swelling, muscle weakness and myalgias.   Gastrointestinal: Negative for bloating, abdominal pain, nausea and vomiting.   Genitourinary: Negative for dysuria, hematuria and urgency.   Neurological: Positive for headaches (5/10). Negative for dizziness and seizures.   Psychiatric/Behavioral: Positive for depression. Negative for altered mental status and hallucinations. The patient is nervous/anxious.      Objective:     Vital Signs (Most Recent):  Temp: 98.5 °F (36.9 °C) (11/20/18 0745)  Pulse: 68 (11/20/18 1206)  Resp: 20 (11/20/18  1206)  BP: 134/79 (11/20/18 1138)  SpO2: 98 % (11/20/18 1138) Vital Signs (24h Range):  Temp:  [98.1 °F (36.7 °C)-98.5 °F (36.9 °C)] 98.5 °F (36.9 °C)  Pulse:  [] 68  Resp:  [16-20] 20  SpO2:  [94 %-99 %] 98 %  BP: (102-134)/(63-88) 134/79     Weight: 79.7 kg (175 lb 11.3 oz)  Body mass index is 29.24 kg/m².     SpO2: 98 %  O2 Device (Oxygen Therapy): nasal cannula    No intake or output data in the 24 hours ending 11/20/18 1433    Lines/Drains/Airways     Drain            Female External Urinary Catheter 11/18/18 1008 2 days          Peripheral Intravenous Line                 Peripheral IV - Single Lumen 11/18/18 0514 Left Antecubital 2 days         Peripheral IV - Single Lumen 11/18/18 0514 Right Antecubital 2 days                Physical Exam   Constitutional: She is oriented to person, place, and time. She appears well-developed and well-nourished. No distress.   HENT:   Mouth/Throat: Oropharynx is clear and moist. No oropharyngeal exudate.   Eyes: Conjunctivae are normal. No scleral icterus.   Neck: Normal range of motion. Neck supple. No thyromegaly present.   Cardiovascular: Normal rate, regular rhythm, normal heart sounds and intact distal pulses.   No murmur heard.  Pulmonary/Chest: Breath sounds normal. No respiratory distress. She has no wheezes. She has no rales.   Abdominal: Soft. Bowel sounds are normal. She exhibits no distension. There is no tenderness.   Musculoskeletal: Normal range of motion. She exhibits no edema or tenderness.   Lymphadenopathy:     She has no cervical adenopathy.   Neurological: She is alert and oriented to person, place, and time.   Skin: She is not diaphoretic.   Psychiatric: Her behavior is normal. Judgment normal.   Nursing note and vitals reviewed.      Significant Labs:   CMP   Recent Labs   Lab 11/19/18  0614 11/20/18  0629    143   K 4.5 4.1    106   CO2 28 28   * 104   BUN 22 25*   CREATININE 0.8 0.8   CALCIUM 9.1 8.6*   PROT 6.1 5.8*    ALBUMIN 3.2* 3.1*   BILITOT 0.4 0.3   ALKPHOS 67 60   AST 42* 26   ALT 31 24   ANIONGAP 9 9   ESTGFRAFRICA >60.0 >60.0   EGFRNONAA >60.0 >60.0    and CBC   Recent Labs   Lab 11/19/18  0614 11/20/18  0629   WBC 12.57 11.85   HGB 10.8* 10.3*   HCT 34.1* 32.0*    274       Significant Imaging: Echocardiogram:   2D echo with color flow doppler:   Results for orders placed or performed during the hospital encounter of 12/12/16   2D echo with color flow doppler   Result Value Ref Range    QEF 60 55 - 65    Diastolic Dysfunction No     Mitral Valve Mobility NORMAL      Assessment and Plan:     Takotsubo cardiomyopathy    Patient is a 64 y/o F w/ PMHx Takatsubo cardiomyopathy (LVEF 25% w [facility] WNL in 2016 -> 60%), HTN, HLD, COPD (on home O2), who presented after an episode of anxiety and dyspnea on exertion. Likely having recurrence of stress-induced cardiomyopathy.    BNP WNL  CTA PE protocol negative  Trop 1.5 -> 1.4    Given history of stress-induced cardiomyopathy, rapid troponin elevation and decrease (which decreases likelihood of myocarditis), lack of angina (decreasing likelihood of CAD or vasospasm) similar presentation to when she was diagnosed with her cardiomyopathy 2 years ago, I decided to perform a bedside TTE which evidenced severely reduced LV function (<35%) with apical ballooning/hypokynesis suggestive of stress-induced cardiomyopathy. IVC was thin and collapsible on inspiration. Of note, her previous TTE had shown a LVEF 60% and she has been off cardiac medications    TTE read on bedside echo: EF ~35%, incomplete endocardial visualization; mild RV enlargement, LV diastolic dysfunction, multiple WMAs in the LAD distribution concerning for stress induced vs. ischemic cardiomyopathy    Recommendations:  -Patient with slight headache after starting metoprolol, but is amenable to continuing a trial of lopressor - okay to continue lopressor 12.5mg bid, or switch to toprol 25mg daily  --Should consider  atenolol as an alternative therapy if HA persists as it is hydrophilic and would not cross BBB  -Patient should be on an ACEi/ARB; however, given prior side effects from lisinopril, she should be started on valsartan to help with afterload  -Avoid CCB given depressed LVEF, also her tachycardia, may be compensatory for her depressed LVEF  -Do not recommend digoxin in this patient as beta blockers can better control ventricular rate control and withdrawal of digoxin may induce worsening of HF   -Control her anxiety cautiously since she has COPD as well as history of substance abuse  -Avoid albuterol, she is not wheezing and it can worsen tachycardia and has arrhythmogenic potential         VTE Risk Mitigation (From admission, onward)        Ordered     enoxaparin injection 40 mg  Daily      11/18/18 0757     Place sequential compression device  Until discontinued      11/18/18 0738     IP VTE HIGH RISK PATIENT  Once      11/18/18 0738     Place ANTOINE hose  Until discontinued      11/18/18 0617          Luz Paul MD  Cardiology  [facility]

## 2018-11-20 NOTE — SUBJECTIVE & OBJECTIVE
Interval History: Patient rested well overnight without any issues. Patient continues to be anxious about her heart due to generalize anxiety disorder which she does not wishes to take medication. Patient was started on low does metoprolol 12.5mg yesterday and able to tolerate that without headache. Will switch over to XL tomorrow.     Review of Systems   Constitutional: Negative for chills, fatigue and fever.   HENT: Negative for congestion and trouble swallowing.    Eyes: Negative for visual disturbance.   Respiratory: Positive for shortness of breath (due to anxiety ). Negative for chest tightness.    Cardiovascular: Negative for chest pain, palpitations and leg swelling.   Gastrointestinal: Negative for abdominal distention, abdominal pain and constipation.   Genitourinary: Positive for frequency and urgency.        Hx of incontinence since 2010   Musculoskeletal: Negative for arthralgias and gait problem.   Skin: Negative for wound.   Neurological: Negative for dizziness, weakness, light-headedness, numbness and headaches.   Psychiatric/Behavioral: Negative for agitation and confusion.     Objective:     Vital Signs (Most Recent):  Temp: 98.5 °F (36.9 °C) (11/20/18 0745)  Pulse: 68 (11/20/18 1206)  Resp: 20 (11/20/18 1206)  BP: 134/79 (11/20/18 1138)  SpO2: 98 % (11/20/18 1138) Vital Signs (24h Range):  Temp:  [98.1 °F (36.7 °C)-98.5 °F (36.9 °C)] 98.5 °F (36.9 °C)  Pulse:  [] 68  Resp:  [16-20] 20  SpO2:  [94 %-99 %] 98 %  BP: (102-134)/(63-88) 134/79     Weight: 79.7 kg (175 lb 11.3 oz)  Body mass index is 29.24 kg/m².  No intake or output data in the 24 hours ending 11/20/18 1620   Physical Exam   Constitutional: She is oriented to person, place, and time. She appears well-developed and well-nourished.   Patient sitting comfortably on reclining chair not in distress.    HENT:   Head: Normocephalic and atraumatic.   Eyes: EOM are normal. Pupils are equal, round, and reactive to light.   Neck: Normal  range of motion.   Cardiovascular: Normal rate, regular rhythm and normal heart sounds.   Tachycardic   Pulmonary/Chest: Effort normal and breath sounds normal. No respiratory distress. She has no wheezes.   On 1 L of oxygen   Abdominal: Soft. Bowel sounds are normal.   Musculoskeletal: Normal range of motion. She exhibits no edema or deformity.   Neurological: She is alert and oriented to person, place, and time.   Skin: Skin is warm and dry.   Psychiatric: She has a normal mood and affect.       Significant Labs:   CBC:   Recent Labs   Lab 11/19/18 0614 11/20/18  0629   WBC 12.57 11.85   HGB 10.8* 10.3*   HCT 34.1* 32.0*    274     CMP:   Recent Labs   Lab 11/19/18 0614 11/20/18  0629    143   K 4.5 4.1    106   CO2 28 28   * 104   BUN 22 25*   CREATININE 0.8 0.8   CALCIUM 9.1 8.6*   PROT 6.1 5.8*   ALBUMIN 3.2* 3.1*   BILITOT 0.4 0.3   ALKPHOS 67 60   AST 42* 26   ALT 31 24   ANIONGAP 9 9   EGFRNONAA >60.0 >60.0       Significant Imaging: I have reviewed all pertinent imaging results/findings within the past 24 hours.

## 2018-11-20 NOTE — SUBJECTIVE & OBJECTIVE
Interval History: Patient having HAs after starting metoprolol, but admits that this may be due to caffeine withdrawal. Otherwise, she is doing well. Still anxious, but has a good support system. Breathing is also improving. Denies N/V, constipation, diarrhea.    Review of Systems   Constitution: Positive for malaise/fatigue. Negative for decreased appetite and fever.   HENT: Negative for congestion and hoarse voice.    Eyes: Negative for pain, photophobia and redness.   Cardiovascular: Positive for dyspnea on exertion. Negative for leg swelling and palpitations.   Respiratory: Positive for cough and shortness of breath (improved). Negative for hemoptysis and wheezing.    Musculoskeletal: Negative for joint swelling, muscle weakness and myalgias.   Gastrointestinal: Negative for bloating, abdominal pain, nausea and vomiting.   Genitourinary: Negative for dysuria, hematuria and urgency.   Neurological: Positive for headaches (5/10). Negative for dizziness and seizures.   Psychiatric/Behavioral: Positive for depression. Negative for altered mental status and hallucinations. The patient is nervous/anxious.      Objective:     Vital Signs (Most Recent):  Temp: 98.5 °F (36.9 °C) (11/20/18 0745)  Pulse: 68 (11/20/18 1206)  Resp: 20 (11/20/18 1206)  BP: 134/79 (11/20/18 1138)  SpO2: 98 % (11/20/18 1138) Vital Signs (24h Range):  Temp:  [98.1 °F (36.7 °C)-98.5 °F (36.9 °C)] 98.5 °F (36.9 °C)  Pulse:  [] 68  Resp:  [16-20] 20  SpO2:  [94 %-99 %] 98 %  BP: (102-134)/(63-88) 134/79     Weight: 79.7 kg (175 lb 11.3 oz)  Body mass index is 29.24 kg/m².     SpO2: 98 %  O2 Device (Oxygen Therapy): nasal cannula    No intake or output data in the 24 hours ending 11/20/18 1433    Lines/Drains/Airways     Drain            Female External Urinary Catheter 11/18/18 1008 2 days          Peripheral Intravenous Line                 Peripheral IV - Single Lumen 11/18/18 0514 Left Antecubital 2 days         Peripheral IV - Single  Lumen 11/18/18 0514 Right Antecubital 2 days                Physical Exam   Constitutional: She is oriented to person, place, and time. She appears well-developed and well-nourished. No distress.   HENT:   Mouth/Throat: Oropharynx is clear and moist. No oropharyngeal exudate.   Eyes: Conjunctivae are normal. No scleral icterus.   Neck: Normal range of motion. Neck supple. No thyromegaly present.   Cardiovascular: Normal rate, regular rhythm, normal heart sounds and intact distal pulses.   No murmur heard.  Pulmonary/Chest: Breath sounds normal. No respiratory distress. She has no wheezes. She has no rales.   Abdominal: Soft. Bowel sounds are normal. She exhibits no distension. There is no tenderness.   Musculoskeletal: Normal range of motion. She exhibits no edema or tenderness.   Lymphadenopathy:     She has no cervical adenopathy.   Neurological: She is alert and oriented to person, place, and time.   Skin: She is not diaphoretic.   Psychiatric: Her behavior is normal. Judgment normal.   Nursing note and vitals reviewed.      Significant Labs:   CMP   Recent Labs   Lab 11/19/18  0614 11/20/18  0629    143   K 4.5 4.1    106   CO2 28 28   * 104   BUN 22 25*   CREATININE 0.8 0.8   CALCIUM 9.1 8.6*   PROT 6.1 5.8*   ALBUMIN 3.2* 3.1*   BILITOT 0.4 0.3   ALKPHOS 67 60   AST 42* 26   ALT 31 24   ANIONGAP 9 9   ESTGFRAFRICA >60.0 >60.0   EGFRNONAA >60.0 >60.0    and CBC   Recent Labs   Lab 11/19/18  0614 11/20/18  0629   WBC 12.57 11.85   HGB 10.8* 10.3*   HCT 34.1* 32.0*    274       Significant Imaging: Echocardiogram:   2D echo with color flow doppler:   Results for orders placed or performed during the hospital encounter of 12/12/16   2D echo with color flow doppler   Result Value Ref Range    QEF 60 55 - 65    Diastolic Dysfunction No     Mitral Valve Mobility NORMAL

## 2018-11-20 NOTE — ASSESSMENT & PLAN NOTE
[facility] in 2016 consistent w/ findings of Takotsubos Cardiomyopathy with an EF of 25%  -- 2D echo at the end of 2016 showed resolution with EF 65% but most recently 35%  -- Repeat Echo ordered  -- Troponins trending upward: .05, .7, and most recently 1.4  -- Starting 12.5mg of Metoprolol tartrate BID  -- Starting 25mg of metoprolol succinate XL QD tomorrow  -- per Cardiology  -Avoid CCB given depressed LVEF, also her tachycardia, may be compensatory for her depressed LVEF  -Give at least 500 cc of NaCl bolus given collapsible IVC  -Avoid albuterol, she is not wheezing and it can worsen tachycardia and has arrhythmogenic potential
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
## 2018-11-20 NOTE — PLAN OF CARE
Problem: Patient Care Overview  Goal: Plan of Care Review  Outcome: Ongoing (interventions implemented as appropriate)  Patient remained in stable condition through shift. Patient verbalized fear and anxiety over recently being diagnosed with heart failure and cardiomegaly, stating that she lives alone, her children live out of state, and that she doesn't know what she's going to do. Patient stated she's a  and stated how much she enjoys what she does and hopes she's still able to continue to work despite recent diagnosis. Patient also stated that she didn't want to take any medications to help her anxiety. Remained free of falls and slept through the night. Obtained daily weight for patient per orders. Bed in lowest and locked position, call light in reach, all questions answered, declines any further needs at this time. Will continue to monitor.

## 2018-11-20 NOTE — ASSESSMENT & PLAN NOTE
Patient presents to ED after increased SOB and work of breathing while at home. Likely 2/2 to COPD exacerbation. Pt uses 2L of home O2 at all times.  -- Duoneb treatments   -- Starting Decadron 2mg BID  -- Blood culture x2, Sputum culture collected   -- Given Amoxicillin in ED  -- Starting Azithromycin 5 day course (11/19)  -- Goal O2 Sat 88-92%

## 2018-11-20 NOTE — ASSESSMENT & PLAN NOTE
-- Holding HCTZ in setting of tachycardia and hx of Takotsubo cardiomyopathy   -- STOP digoxin   -- on metoprolol tartrate 12.5mg BID  -- Start on Metoprolol Succinate XL 25mg QD tomorrow

## 2018-11-21 VITALS
OXYGEN SATURATION: 97 % | RESPIRATION RATE: 19 BRPM | HEART RATE: 91 BPM | HEIGHT: 65 IN | DIASTOLIC BLOOD PRESSURE: 84 MMHG | WEIGHT: 177.5 LBS | TEMPERATURE: 99 F | BODY MASS INDEX: 29.57 KG/M2 | SYSTOLIC BLOOD PRESSURE: 139 MMHG

## 2018-11-21 LAB
ALBUMIN SERPL BCP-MCNC: 3.5 G/DL
ALP SERPL-CCNC: 62 U/L
ALT SERPL W/O P-5'-P-CCNC: 25 U/L
ANION GAP SERPL CALC-SCNC: 10 MMOL/L
AST SERPL-CCNC: 25 U/L
BASOPHILS # BLD AUTO: 0.02 K/UL
BASOPHILS NFR BLD: 0.2 %
BILIRUB SERPL-MCNC: 0.3 MG/DL
BUN SERPL-MCNC: 24 MG/DL
CALCIUM SERPL-MCNC: 9.2 MG/DL
CHLORIDE SERPL-SCNC: 106 MMOL/L
CO2 SERPL-SCNC: 25 MMOL/L
CREAT SERPL-MCNC: 0.8 MG/DL
DIFFERENTIAL METHOD: ABNORMAL
EOSINOPHIL # BLD AUTO: 0 K/UL
EOSINOPHIL NFR BLD: 0 %
ERYTHROCYTE [DISTWIDTH] IN BLOOD BY AUTOMATED COUNT: 14.9 %
EST. GFR  (AFRICAN AMERICAN): >60 ML/MIN/1.73 M^2
EST. GFR  (NON AFRICAN AMERICAN): >60 ML/MIN/1.73 M^2
GLUCOSE SERPL-MCNC: 91 MG/DL
HCT VFR BLD AUTO: 37.1 %
HGB BLD-MCNC: 11.6 G/DL
IMM GRANULOCYTES # BLD AUTO: 0.05 K/UL
IMM GRANULOCYTES NFR BLD AUTO: 0.4 %
LYMPHOCYTES # BLD AUTO: 2.4 K/UL
LYMPHOCYTES NFR BLD: 17.9 %
MAGNESIUM SERPL-MCNC: 2.4 MG/DL
MCH RBC QN AUTO: 28.9 PG
MCHC RBC AUTO-ENTMCNC: 31.3 G/DL
MCV RBC AUTO: 93 FL
MONOCYTES # BLD AUTO: 0.7 K/UL
MONOCYTES NFR BLD: 5.4 %
NEUTROPHILS # BLD AUTO: 10.2 K/UL
NEUTROPHILS NFR BLD: 76.1 %
NRBC BLD-RTO: 0 /100 WBC
PHOSPHATE SERPL-MCNC: 4.4 MG/DL
PLATELET # BLD AUTO: 308 K/UL
PMV BLD AUTO: 10.5 FL
POCT GLUCOSE: 84 MG/DL (ref 70–110)
POCT GLUCOSE: 85 MG/DL (ref 70–110)
POTASSIUM SERPL-SCNC: 4.2 MMOL/L
PROT SERPL-MCNC: 6.6 G/DL
RBC # BLD AUTO: 4.01 M/UL
SODIUM SERPL-SCNC: 141 MMOL/L
WBC # BLD AUTO: 13.33 K/UL

## 2018-11-21 PROCEDURE — G8979 MOBILITY GOAL STATUS: HCPCS | Mod: CI

## 2018-11-21 PROCEDURE — 27000221 HC OXYGEN, UP TO 24 HOURS

## 2018-11-21 PROCEDURE — 94640 AIRWAY INHALATION TREATMENT: CPT

## 2018-11-21 PROCEDURE — 85025 COMPLETE CBC W/AUTO DIFF WBC: CPT

## 2018-11-21 PROCEDURE — 25000003 PHARM REV CODE 250: Performed by: STUDENT IN AN ORGANIZED HEALTH CARE EDUCATION/TRAINING PROGRAM

## 2018-11-21 PROCEDURE — G8980 MOBILITY D/C STATUS: HCPCS | Mod: CI

## 2018-11-21 PROCEDURE — 84100 ASSAY OF PHOSPHORUS: CPT

## 2018-11-21 PROCEDURE — 97116 GAIT TRAINING THERAPY: CPT

## 2018-11-21 PROCEDURE — 99231 SBSQ HOSP IP/OBS SF/LOW 25: CPT | Mod: ,,, | Performed by: INTERNAL MEDICINE

## 2018-11-21 PROCEDURE — 25000003 PHARM REV CODE 250: Performed by: HOSPITALIST

## 2018-11-21 PROCEDURE — 94761 N-INVAS EAR/PLS OXIMETRY MLT: CPT

## 2018-11-21 PROCEDURE — 80053 COMPREHEN METABOLIC PANEL: CPT

## 2018-11-21 PROCEDURE — 25000242 PHARM REV CODE 250 ALT 637 W/ HCPCS: Performed by: STUDENT IN AN ORGANIZED HEALTH CARE EDUCATION/TRAINING PROGRAM

## 2018-11-21 PROCEDURE — 83735 ASSAY OF MAGNESIUM: CPT

## 2018-11-21 PROCEDURE — 36415 COLL VENOUS BLD VENIPUNCTURE: CPT

## 2018-11-21 PROCEDURE — 99238 HOSP IP/OBS DSCHRG MGMT 30/<: CPT | Mod: ,,, | Performed by: HOSPITALIST

## 2018-11-21 RX ORDER — IPRATROPIUM BROMIDE 0.5 MG/2.5ML
500 SOLUTION RESPIRATORY (INHALATION) 4 TIMES DAILY
Qty: 1 BOX | Refills: 3 | Status: SHIPPED | OUTPATIENT
Start: 2018-11-21 | End: 2018-11-30 | Stop reason: SDUPTHER

## 2018-11-21 RX ORDER — AZITHROMYCIN 250 MG/1
250 TABLET, FILM COATED ORAL DAILY
Qty: 2 TABLET | Refills: 0 | Status: SHIPPED | OUTPATIENT
Start: 2018-11-21 | End: 2018-11-27 | Stop reason: ALTCHOICE

## 2018-11-21 RX ORDER — METOPROLOL SUCCINATE 25 MG/1
25 TABLET, EXTENDED RELEASE ORAL DAILY
Qty: 30 TABLET | Refills: 11 | Status: SHIPPED | OUTPATIENT
Start: 2018-11-21 | End: 2019-12-14 | Stop reason: SDUPTHER

## 2018-11-21 RX ORDER — VALSARTAN 40 MG/1
20 TABLET ORAL DAILY
Qty: 45 TABLET | Refills: 3 | Status: SHIPPED | OUTPATIENT
Start: 2018-11-21 | End: 2018-11-21 | Stop reason: HOSPADM

## 2018-11-21 RX ORDER — LOSARTAN POTASSIUM 25 MG/1
25 TABLET ORAL DAILY
Qty: 30 TABLET | Refills: 0 | Status: SHIPPED | OUTPATIENT
Start: 2018-11-21 | End: 2018-11-27

## 2018-11-21 RX ADMIN — FLUTICASONE FUROATE AND VILANTEROL TRIFENATATE 1 PUFF: 100; 25 POWDER RESPIRATORY (INHALATION) at 09:11

## 2018-11-21 RX ADMIN — IPRATROPIUM BROMIDE 0.5 MG: 0.5 SOLUTION RESPIRATORY (INHALATION) at 12:11

## 2018-11-21 RX ADMIN — AZITHROMYCIN MONOHYDRATE 250 MG: 250 TABLET ORAL at 09:11

## 2018-11-21 RX ADMIN — IPRATROPIUM BROMIDE 0.5 MG: 0.5 SOLUTION RESPIRATORY (INHALATION) at 04:11

## 2018-11-21 RX ADMIN — IPRATROPIUM BROMIDE 0.5 MG: 0.5 SOLUTION RESPIRATORY (INHALATION) at 02:11

## 2018-11-21 RX ADMIN — IPRATROPIUM BROMIDE 0.5 MG: 0.5 SOLUTION RESPIRATORY (INHALATION) at 08:11

## 2018-11-21 RX ADMIN — METOPROLOL SUCCINATE 25 MG: 25 TABLET, EXTENDED RELEASE ORAL at 09:11

## 2018-11-21 RX ADMIN — VALSARTAN 20 MG: 40 TABLET, FILM COATED ORAL at 01:11

## 2018-11-21 NOTE — NURSING
PT discharged today. Awake, alert, and oriented with no acute distress noted.  Family at bedside. Reviewed discharge orders including ;medicine orders, prescriptions, followup appts, and patient education materials for diet and diagnosis.  Belongings packed for transport to home. Ambulating out per wheelchair at time of discharge.

## 2018-11-21 NOTE — PLAN OF CARE
Problem: Patient Care Overview  Goal: Plan of Care Review  Patient alert and orientated , follows commands , bed in low position , call light within reach and patient demonstrated proper usage. Bed locked with brake , Room clutter free and personal items with reach, addressed care plan for today , all questions answered and allow patient time for clarification. Medications and diet fluid balance  instructions with signs and symptoms and the importance to continue once discharged .Reviewed discharged , next  Follow up appointment.

## 2018-11-21 NOTE — SUBJECTIVE & OBJECTIVE
Interval History: Patient doing well on metoprolol - reports that headaches have subsided. Reports improvement in breathing. Otherwise, no other complaints.    Review of Systems   Constitution: Positive for malaise/fatigue. Negative for decreased appetite and fever.   HENT: Negative for congestion and hoarse voice.    Eyes: Negative for pain, photophobia and redness.   Cardiovascular: Positive for dyspnea on exertion. Negative for leg swelling and palpitations.   Respiratory: Positive for cough and shortness of breath (improved). Negative for hemoptysis and wheezing.    Musculoskeletal: Negative for joint swelling, muscle weakness and myalgias.   Gastrointestinal: Negative for bloating, abdominal pain, nausea and vomiting.   Genitourinary: Negative for dysuria, hematuria and urgency.   Neurological: Negative for dizziness, headaches (resolved) and seizures.   Psychiatric/Behavioral: Negative for altered mental status and hallucinations. The patient is nervous/anxious.      Objective:     Vital Signs (Most Recent):  Temp: 98.4 °F (36.9 °C) (11/21/18 0900)  Pulse: 88 (11/21/18 0938)  Resp: 18 (11/21/18 0938)  BP: (!) 141/77 (11/21/18 0900)  SpO2: 98 % (11/21/18 0938) Vital Signs (24h Range):  Temp:  [98 °F (36.7 °C)-98.4 °F (36.9 °C)] 98.4 °F (36.9 °C)  Pulse:  [68-98] 88  Resp:  [14-22] 18  SpO2:  [96 %-100 %] 98 %  BP: (132-141)/(77-95) 141/77     Weight: 80.5 kg (177 lb 7.5 oz)  Body mass index is 29.53 kg/m².     SpO2: 98 %  O2 Device (Oxygen Therapy): nasal cannula      Intake/Output Summary (Last 24 hours) at 11/21/2018 1007  Last data filed at 11/21/2018 0900  Gross per 24 hour   Intake 900 ml   Output 600 ml   Net 300 ml       Lines/Drains/Airways          None          Physical Exam   Constitutional: She is oriented to person, place, and time. She appears well-developed and well-nourished. No distress.   HENT:   Mouth/Throat: Oropharynx is clear and moist. No oropharyngeal exudate.   Eyes: Conjunctivae are  normal. No scleral icterus.   Neck: Normal range of motion. Neck supple. No thyromegaly present.   Cardiovascular: Normal rate, regular rhythm, normal heart sounds and intact distal pulses.   No murmur heard.  Pulmonary/Chest: Breath sounds normal. No respiratory distress. She has no wheezes. She has no rales.   Abdominal: Soft. Bowel sounds are normal. She exhibits no distension. There is no tenderness.   Musculoskeletal: Normal range of motion. She exhibits no edema or tenderness.   Lymphadenopathy:     She has no cervical adenopathy.   Neurological: She is alert and oriented to person, place, and time.   Skin: She is not diaphoretic.   Psychiatric: Her behavior is normal. Judgment normal.   Nursing note and vitals reviewed.      Significant Labs:   CMP   Recent Labs   Lab 11/20/18  0629 11/21/18  0618    141   K 4.1 4.2    106   CO2 28 25    91   BUN 25* 24*   CREATININE 0.8 0.8   CALCIUM 8.6* 9.2   PROT 5.8* 6.6   ALBUMIN 3.1* 3.5   BILITOT 0.3 0.3   ALKPHOS 60 62   AST 26 25   ALT 24 25   ANIONGAP 9 10   ESTGFRAFRICA >60.0 >60.0   EGFRNONAA >60.0 >60.0    and CBC   Recent Labs   Lab 11/20/18  0629 11/21/18  0618   WBC 11.85 13.33*   HGB 10.3* 11.6*   HCT 32.0* 37.1    308       Significant Imaging: Echocardiogram:   2D echo with color flow doppler:   Results for orders placed or performed during the hospital encounter of 12/12/16   2D echo with color flow doppler   Result Value Ref Range    QEF 60 55 - 65    Diastolic Dysfunction No     Mitral Valve Mobility NORMAL

## 2018-11-21 NOTE — PLAN OF CARE
"Problem: Patient Care Overview  Goal: Plan of Care Review  Outcome: Ongoing (interventions implemented as appropriate)  Patient remained in stable condition through shift. Remained free of falls and slept through the night. Complained of pain at her IV site, stated nothing has helped despite adjustments and requested it be taken out. Explained to patient that she needs an IV and if this one is removed, another one will have to be placed. Patient verbalized understanding and stated "We just need to take this one out ant start from scratch". Daily weights obtained per order. Bed in lowest and locked position, call light in reach, all questions answered, declines any further needs at this time. Will continue to monitor.      "
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
## 2018-11-21 NOTE — ASSESSMENT & PLAN NOTE
Patient is a 64 y/o F w/ PMHx Takatsubo cardiomyopathy (LVEF 25% w [facility] WNL in 2016 -> 60%), HTN, HLD, COPD (on home O2), who presented after an episode of anxiety and dyspnea on exertion. Likely having recurrence of stress-induced cardiomyopathy.    BNP WNL  CTA PE protocol negative  Trop 1.5 -> 1.4    Given history of stress-induced cardiomyopathy, rapid troponin elevation and decrease (which decreases likelihood of myocarditis), lack of angina (decreasing likelihood of CAD or vasospasm) similar presentation to when she was diagnosed with her cardiomyopathy 2 years ago, I decided to perform a bedside TTE which evidenced severely reduced LV function (<35%) with apical ballooning/hypokynesis suggestive of stress-induced cardiomyopathy. IVC was thin and collapsible on inspiration. Of note, her previous TTE had shown a LVEF 60% and she has been off cardiac medications    TTE read on bedside echo: EF ~35%, incomplete endocardial visualization; mild RV enlargement, LV diastolic dysfunction, multiple WMAs in the LAD distribution concerning for stress induced vs. ischemic cardiomyopathy    Recommendations:  -Patient tolerating metoprolol well - continue Toprol-XL 25mg   --Can consider atenolol as an alternative therapy if HA persists as it is hydrophilic and would not cross BBB  -Had discussion with patient this am about being on an ARB/Entresto - patient appears amenable to trying  --Recommend valsartan 80mg to start with titration as needed  -Avoid CCB given depressed LVEF, also her tachycardia, may be compensatory for her depressed LVEF  -Do not recommend digoxin in this patient as beta blockers can better control ventricular rate control and withdrawal of digoxin may induce worsening of HF   -Control her anxiety cautiously since she has COPD as well as history of substance abuse  -Avoid albuterol, she is not wheezing and it can worsen tachycardia and has arrhythmogenic potential  -Follow-up with cardiology outpatient  after discharge  -We will sign-off. Please contact cardiology with any further questions.

## 2018-11-21 NOTE — PROGRESS NOTES
[facility]  Cardiology  Progress Note    Patient Name: Ana Aguila  MRN: [id number]  Admission Date: 11/18/2018  Hospital Length of Stay: 3 days  Code Status: Full Code   Attending Physician: Manpreet Avelar MD   Primary Care Physician: Erik Cool MD  Expected Discharge Date: 11/21/2018  Principal Problem:Acute on chronic respiratory failure with hypoxia    Subjective:     Reason for Consult: Elevated troponins    HPI:  Patient is a 62 y/o F w/ PMHx Takatsubo cardiomyopathy (LVEF 25% w [facility] WNL in 2016 -> 60%), HTN, HLD, COPD (on home O2), who presented after an episode of anxiety and dyspnea on exertion.    She was lifting heavy bags and had the urge to go to the restroom, rushed, got dyspneic and afraid so she decided to be brought to the ED. Reportedly, her O2 sat was on the 70s% when EMS found her (per ED note). She denies progressive exercise intolerance, angina, lightheadedness, or syncope. She has dyspnea at baseline due to her COPD but she feels pulmonary rehab has helped her. She has been feeling anxious/sad for the last 2 months since her brother got sick.    In the ED (reportedly, records limited by Epic downtime), her vitals were relevant for tachycardia on the 150s that decreased to 120s -> 110s-100s. She was treated for COPD exacerbation (per ED note she had wheezes) with steroids and duonebs but later today she was CTA on examination by primary team. Given initial dyspnea, primary team also gave lasix 20 IV once (no urine output recorded).     Trop 0.05 -> 1.5 -> 1.4 (no angina, recorded BP 130s), BNP WNL    Given history of stress-induced cardiomyopathy, rapid troponin elevation and decrease, similar presentation to when she was diagnosed with her cardiomyopathy 2 years ago, I decided to perform a bedside TTE which evidenced severely reduced LV function (<35%) with apical ballooning/hypokynesis suggestive of stress-induced cardiomyopathy. IVC was thin and collapsible on  inspiration.    Interval History: Patient doing well on metoprolol - reports that headaches have subsided. Reports improvement in breathing. Otherwise, no other complaints.    Review of Systems   Constitution: Positive for malaise/fatigue. Negative for decreased appetite and fever.   HENT: Negative for congestion and hoarse voice.    Eyes: Negative for pain, photophobia and redness.   Cardiovascular: Positive for dyspnea on exertion. Negative for leg swelling and palpitations.   Respiratory: Positive for cough and shortness of breath (improved). Negative for hemoptysis and wheezing.    Musculoskeletal: Negative for joint swelling, muscle weakness and myalgias.   Gastrointestinal: Negative for bloating, abdominal pain, nausea and vomiting.   Genitourinary: Negative for dysuria, hematuria and urgency.   Neurological: Negative for dizziness, headaches (resolved) and seizures.   Psychiatric/Behavioral: Negative for altered mental status and hallucinations. The patient is nervous/anxious.      Objective:     Vital Signs (Most Recent):  Temp: 98.4 °F (36.9 °C) (11/21/18 0900)  Pulse: 88 (11/21/18 0938)  Resp: 18 (11/21/18 0938)  BP: (!) 141/77 (11/21/18 0900)  SpO2: 98 % (11/21/18 0938) Vital Signs (24h Range):  Temp:  [98 °F (36.7 °C)-98.4 °F (36.9 °C)] 98.4 °F (36.9 °C)  Pulse:  [68-98] 88  Resp:  [14-22] 18  SpO2:  [96 %-100 %] 98 %  BP: (132-141)/(77-95) 141/77     Weight: 80.5 kg (177 lb 7.5 oz)  Body mass index is 29.53 kg/m².     SpO2: 98 %  O2 Device (Oxygen Therapy): nasal cannula      Intake/Output Summary (Last 24 hours) at 11/21/2018 1007  Last data filed at 11/21/2018 0900  Gross per 24 hour   Intake 900 ml   Output 600 ml   Net 300 ml       Lines/Drains/Airways          None          Physical Exam   Constitutional: She is oriented to person, place, and time. She appears well-developed and well-nourished. No distress.   HENT:   Mouth/Throat: Oropharynx is clear and moist. No oropharyngeal exudate.   Eyes:  Conjunctivae are normal. No scleral icterus.   Neck: Normal range of motion. Neck supple. No thyromegaly present.   Cardiovascular: Normal rate, regular rhythm, normal heart sounds and intact distal pulses.   No murmur heard.  Pulmonary/Chest: Breath sounds normal. No respiratory distress. She has no wheezes. She has no rales.   Abdominal: Soft. Bowel sounds are normal. She exhibits no distension. There is no tenderness.   Musculoskeletal: Normal range of motion. She exhibits no edema or tenderness.   Lymphadenopathy:     She has no cervical adenopathy.   Neurological: She is alert and oriented to person, place, and time.   Skin: She is not diaphoretic.   Psychiatric: Her behavior is normal. Judgment normal.   Nursing note and vitals reviewed.      Significant Labs:   CMP   Recent Labs   Lab 11/20/18  0629 11/21/18  0618    141   K 4.1 4.2    106   CO2 28 25    91   BUN 25* 24*   CREATININE 0.8 0.8   CALCIUM 8.6* 9.2   PROT 5.8* 6.6   ALBUMIN 3.1* 3.5   BILITOT 0.3 0.3   ALKPHOS 60 62   AST 26 25   ALT 24 25   ANIONGAP 9 10   ESTGFRAFRICA >60.0 >60.0   EGFRNONAA >60.0 >60.0    and CBC   Recent Labs   Lab 11/20/18  0629 11/21/18  0618   WBC 11.85 13.33*   HGB 10.3* 11.6*   HCT 32.0* 37.1    308       Significant Imaging: Echocardiogram:   2D echo with color flow doppler:   Results for orders placed or performed during the hospital encounter of 12/12/16   2D echo with color flow doppler   Result Value Ref Range    QEF 60 55 - 65    Diastolic Dysfunction No     Mitral Valve Mobility NORMAL      Assessment and Plan:     Takotsubo cardiomyopathy    Patient is a 62 y/o F w/ PMHx Takatsubo cardiomyopathy (LVEF 25% w [facility] WNL in 2016 -> 60%), HTN, HLD, COPD (on home O2), who presented after an episode of anxiety and dyspnea on exertion. Likely having recurrence of stress-induced cardiomyopathy.    BNP WNL  CTA PE protocol negative  Trop 1.5 -> 1.4    Given history of stress-induced cardiomyopathy,  rapid troponin elevation and decrease (which decreases likelihood of myocarditis), lack of angina (decreasing likelihood of CAD or vasospasm) similar presentation to when she was diagnosed with her cardiomyopathy 2 years ago, I decided to perform a bedside TTE which evidenced severely reduced LV function (<35%) with apical ballooning/hypokynesis suggestive of stress-induced cardiomyopathy. IVC was thin and collapsible on inspiration. Of note, her previous TTE had shown a LVEF 60% and she has been off cardiac medications    TTE read on bedside echo: EF ~35%, incomplete endocardial visualization; mild RV enlargement, LV diastolic dysfunction, multiple WMAs in the LAD distribution concerning for stress induced vs. ischemic cardiomyopathy    Recommendations:  -Patient tolerating metoprolol well - continue Toprol-XL 25mg   --Can consider atenolol as an alternative therapy if HA persists as it is hydrophilic and would not cross BBB  -Had discussion with patient this am about being on an ARB/Entresto - patient appears amenable to trying  --Recommend valsartan 80mg to start with titration as needed  -Avoid CCB given depressed LVEF, also her tachycardia, may be compensatory for her depressed LVEF  -Do not recommend digoxin in this patient as beta blockers can better control ventricular rate control and withdrawal of digoxin may induce worsening of HF   -Control her anxiety cautiously since she has COPD as well as history of substance abuse  -Avoid albuterol, she is not wheezing and it can worsen tachycardia and has arrhythmogenic potential  -Follow-up with cardiology outpatient after discharge  -We will sign-off. Please contact cardiology with any further questions.         VTE Risk Mitigation (From admission, onward)        Ordered     enoxaparin injection 40 mg  Daily      11/18/18 4912     Place sequential compression device  Until discontinued      11/18/18 3938     IP VTE HIGH RISK PATIENT  Once      11/18/18 0738      Place ANTOINE hose  Until discontinued      11/18/18 0617          Luz Paul MD  Cardiology  [facility]

## 2018-11-21 NOTE — ASSESSMENT & PLAN NOTE
Patient presents to ED after increased SOB and work of breathing while at home. Likely 2/2 to COPD exacerbation. Pt uses 2L of home O2 at all times.  -- Duoneb treatments   -- S/p Decadron 2mg BID x4/4  -- Given Amoxicillin in EDx1  -- Starting Azithromycin 5 day course (11/19) end date (11/23)  -- Goal O2 Sat 88-92%

## 2018-11-21 NOTE — ASSESSMENT & PLAN NOTE
2010:  Colonoscopy with polypectomy ([facility]).  Pathology was tubular adenoma   - complicated by post polypectomy bleed   - repeat CSY done with old blood seen.  Perforation of transverse colon   -  Right colectomy ([facility]) for perforation.

## 2018-11-21 NOTE — ASSESSMENT & PLAN NOTE
[facility] in 2016 consistent w/ findings of Takotsubos Cardiomyopathy with an EF of 25%  -- 2D echo at the end of 2016 showed resolution with EF 65% but most recently 35%    -- 11/21 Starting 25mg of metoprolol succinate XL QD   -- 11/21 starting valsartan 20 mg daily.  -Avoid albuterol, she is not wheezing and it can worsen tachycardia and has arrhythmogenic potential D/C home refill   -- cardiology follow up with Dr. Veloz.

## 2018-11-21 NOTE — NURSING
Called to room by patient in regards to new medication, valsartan.  Patient states that she looked up the medication and found that there was a recall earlier this year for impurities that cause cancer.  She feels uncomfortable taking this medication now and would like it changed to something else.  Notified IM2 of patient's request.

## 2018-11-21 NOTE — PT/OT/SLP PROGRESS
Physical Therapy Treatment    Patient Name:  Ana Aguila   MRN:  [id number]    Recommendations:     Discharge Recommendations:  outpatient PT   Discharge Equipment Recommendations: transfer tub bench.  Patient stated she has difficulty standing to take a shower.  Barriers to discharge: None    Assessment:     Ana Aguila is a 63 y.o. female admitted with a medical diagnosis of Acute on chronic respiratory failure with hypoxia.  She presents with the following impairments/functional limitations:  impaired cardiopulmonary response to activity. Mrs. Aguila tolerated increase distance with gait training well. She required less assistance with mobility and transfers. She demonstrated good balance with gait.  Mrs. Aguila would benefit from family assistance upon discharge from hospital.      Rehab Prognosis:  Good; patient would benefit from acute skilled PT services to address these deficits and reach maximum level of function.      Recent Surgery: * No surgery found *      Plan:     During this hospitalization, patient to be seen 3 x/week to address the above listed problems via gait training, therapeutic activities, therapeutic exercises, neuromuscular re-education  · Plan of Care Expires:  12/18/18   Plan of Care Reviewed with: patient    Subjective     Communicated with NSG prior to session.  Patient found seated in BS chair upon PT entry to room, agreeable to treatment.      Chief Complaint: Patient denied any pain  Patient comments/goals: I don't want to do the stairs. I get to anxious just thinking about the stairs.  I know how to do them. Recommended to Mrs. Aguila to try going up sideways using B UE support if needed.  Mrs. Aguila was able to verbalize proper technique, but didn't want to try it.  Pain/Comfort:  · Pain Rating 1: 0/10    Patients cultural, spiritual, Tenriism conflicts given the current situation: none stated    Objective:     Patient found with: telemetry, oxygen     General Precautions:  Standard, fall   Orthopedic Precautions:N/A   Braces:   none    Functional Mobility:  · Transfers:     · Sit to Stand:  modified independence with no AD  · Gait: amb 210-225 feet with no AD independently.  No LOB or lateral sway.  Patient required 1 standing rest break and was followed with O2 @ 3L.      AM-PAC 6 CLICK MOBILITY  Turning over in bed (including adjusting bedclothes, sheets and blankets)?: 4  Sitting down on and standing up from a chair with arms (e.g., wheelchair, bedside commode, etc.): 4  Moving from lying on back to sitting on the side of the bed?: 4  Moving to and from a bed to a chair (including a wheelchair)?: 4  Need to walk in hospital room?: 4  Climbing 3-5 steps with a railing?: 3  Basic Mobility Total Score: 23       Therapeutic Activities and Exercises:   Patient was educated on stair climbing     Patient left up in chair with all lines intact and call button in reach..    GOALS:   Multidisciplinary Problems     Physical Therapy Goals        Problem: Physical Therapy Goal    Goal Priority Disciplines Outcome Goal Variances Interventions   Physical Therapy Goal     PT, PT/OT Ongoing (interventions implemented as appropriate)     Description:  Goals to be met by: 18     Patient will increase functional independence with mobility by performin. Sit to stand transfer with Supervision met  2. Bed to chair transfer with Supervision using LRD. met  3. Gait  x 100 feet with Stand-by Assistance using LRD. met  4. Ascend/descend 16 stair with bilateral Handrails Contact Guard Assistance using LRD.   5. Lower extremity exercise program x20 reps per handout, with independence                       Time Tracking:     PT Received On: 18  PT Start Time: 1208     PT Stop Time: 1225  PT Total Time (min): 17 min     Billable Minutes: Gait Training 17    Treatment Type: Treatment  PT/PTA: PTA     PTA Visit Number: 1     Jimbo Samaniego II, PTA  2018

## 2018-11-21 NOTE — PLAN OF CARE
11/21/18 0915   Final Note   Assessment Type Final Discharge Note   Anticipated Discharge Disposition Home   What phone number can be called within the next 1-3 days to see how you are doing after discharge? [phone]   Hospital Follow Up  Appt(s) scheduled? Yes   Discharge plans and expectations educations in teach back method with documentation complete? Yes   Right Care Referral Info   Post Acute Recommendation No Care     Future Appointments   Date Time Provider Department Center   11/30/2018  1:30 PM [facility]

## 2018-11-21 NOTE — PLAN OF CARE
Problem: Physical Therapy Goal  Goal: Physical Therapy Goal  Goals to be met by: 18     Patient will increase functional independence with mobility by performin. Sit to stand transfer with Supervision met  2. Bed to chair transfer with Supervision using LRD. met  3. Gait  x 100 feet with Stand-by Assistance using LRD. met  4. Ascend/descend 16 stair with bilateral Handrails Contact Guard Assistance using LRD.   5. Lower extremity exercise program x20 reps per handout, with independence     Patient met 3/6 goals.  PT to reassess goals next visit if patient doesn't discharge today. Jimbo Samaniego,PTA

## 2018-11-21 NOTE — PLAN OF CARE
Problem: Patient Care Overview  Goal: Plan of Care Review  Outcome: Outcome(s) achieved Date Met: 11/21/18  Pt turns and repositions self independently. No skin breakdown noted. Pt pain and safety monitored q 1-2 hrs this shift. Blood glucose monitored throughout shift. Bed locked and in lowest position. Rails elevated x 3. Brakes on. Call light and personal belongings in reach. Patient ready for discharge.    11/21/18 9210   Coping/Psychosocial   Plan Of Care Reviewed With patient

## 2018-11-22 PROBLEM — F43.21 ADJUSTMENT DISORDER WITH DEPRESSED MOOD: Status: ACTIVE | Noted: 2018-11-22

## 2018-11-23 ENCOUNTER — TELEPHONE (OUTPATIENT)
Dept: CARDIOLOGY | Facility: CLINIC | Age: 63
End: 2018-11-23

## 2018-11-23 LAB
BACTERIA BLD CULT: NORMAL
BACTERIA BLD CULT: NORMAL

## 2018-11-23 NOTE — TELEPHONE ENCOUNTER
----- Message from Alison Rose sent at 11/23/2018 10:23 AM CST -----  Contact: Patient  The Pt is calling to speak with you regarding her new medication losartan (COZAAR) 25 MG tablet and she is worried about the medication because of the recall. Please call her back @ [phone]. Thanks, Alison

## 2018-11-25 NOTE — PT/OT/SLP DISCHARGE
Physical Therapy Discharge Summary    Name: Ana Aguila  MRN: [id number]   Principal Problem: Acute on chronic respiratory failure with hypoxia     Patient Discharged from acute Physical Therapy on 18.  Please refer to prior PT noted date on 18 for functional status.     Assessment:     Patient was discharged unexpectedly.  Information required to complete an accurate discharge summary is unknown.  Refer to therapy initial evaluation and last progress note for initial and most recent functional status and goal achievement.  Recommendations made may be found in medical record.    Objective:     GOALS:   Multidisciplinary Problems     Physical Therapy Goals        Problem: Physical Therapy Goal    Goal Priority Disciplines Outcome Goal Variances Interventions   Physical Therapy Goal     PT, PT/OT Ongoing (interventions implemented as appropriate)     Description:  Goals to be met by: 18     Patient will increase functional independence with mobility by performin. Sit to stand transfer with Supervision met  2. Bed to chair transfer with Supervision using LRD. met  3. Gait  x 100 feet with Stand-by Assistance using LRD. met  4. Ascend/descend 16 stair with bilateral Handrails Contact Guard Assistance using LRD.   5. Lower extremity exercise program x20 reps per handout, with independence                       Reasons for Discontinuation of Therapy Services  Transfer to alternate level of care.      Plan:     Patient Discharged to: home: PT/OT rec cardiac rehab.    Portillo Aguilera, PT  2018

## 2018-11-27 ENCOUNTER — OFFICE VISIT (OUTPATIENT)
Dept: CARDIOLOGY | Facility: CLINIC | Age: 63
End: 2018-11-27
Payer: COMMERCIAL

## 2018-11-27 VITALS
HEART RATE: 77 BPM | WEIGHT: 177 LBS | HEIGHT: 65 IN | OXYGEN SATURATION: 97 % | DIASTOLIC BLOOD PRESSURE: 70 MMHG | SYSTOLIC BLOOD PRESSURE: 128 MMHG | BODY MASS INDEX: 29.49 KG/M2

## 2018-11-27 DIAGNOSIS — I10 ESSENTIAL HYPERTENSION: ICD-10-CM

## 2018-11-27 DIAGNOSIS — I51.81 TAKOTSUBO CARDIOMYOPATHY: ICD-10-CM

## 2018-11-27 PROCEDURE — 3078F DIAST BP <80 MM HG: CPT | Mod: CPTII,S$GLB,, | Performed by: STUDENT IN AN ORGANIZED HEALTH CARE EDUCATION/TRAINING PROGRAM

## 2018-11-27 PROCEDURE — 99213 OFFICE O/P EST LOW 20 MIN: CPT | Mod: S$GLB,,, | Performed by: STUDENT IN AN ORGANIZED HEALTH CARE EDUCATION/TRAINING PROGRAM

## 2018-11-27 PROCEDURE — 3074F SYST BP LT 130 MM HG: CPT | Mod: CPTII,S$GLB,, | Performed by: STUDENT IN AN ORGANIZED HEALTH CARE EDUCATION/TRAINING PROGRAM

## 2018-11-27 PROCEDURE — 3008F BODY MASS INDEX DOCD: CPT | Mod: CPTII,S$GLB,, | Performed by: STUDENT IN AN ORGANIZED HEALTH CARE EDUCATION/TRAINING PROGRAM

## 2018-11-27 PROCEDURE — 99999 PR PBB SHADOW E&M-EST. PATIENT-LVL III: CPT | Mod: PBBFAC,,, | Performed by: STUDENT IN AN ORGANIZED HEALTH CARE EDUCATION/TRAINING PROGRAM

## 2018-11-27 RX ORDER — CANDESARTAN 4 MG/1
4 TABLET ORAL DAILY
Qty: 90 TABLET | Refills: 3 | Status: SHIPPED | OUTPATIENT
Start: 2018-11-27 | End: 2019-09-25

## 2018-11-27 NOTE — PROGRESS NOTES
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 doctor.               CHANGE how you take these medications    ergocalciferol 50,000 unit Cap  Commonly known as:  ERGOCALCIFEROL  Take 1 capsule (50,000 Units total) by mouth every 7 days.  What changed:  when to take this     * ipratropium 17 mcg/actuation inhaler  Commonly known as:  ATROVENT HFA  Inhale 2 puffs into the lungs every 6 (six) hours as needed for Wheezing. Rescue  What changed:  You were already taking a medication with the same name, and this prescription was added. Make sure you understand how and when to take each.  Changed by:  KATHI Stockton     * ipratropium 0.02 % nebulizer solution  Commonly known as:  ATROVENT  Take 2.5 mLs (500 mcg total) by nebulization 4 (four) times daily. Rescue  What changed:  Another medication with the same name was added. Make sure you understand how and when to take each.  Changed by:  Zena Valdez  MÓNICAP         * This list has 2 medication(s) that are the same as other medications prescribed for you. Read the directions carefully, and ask your doctor or other care provider to review them with you.            CONTINUE taking these medications    ADVAIR DISKUS 250-50 mcg/dose diskus inhaler  Generic drug:  fluticasone-salmeterol 250-50 mcg/dose  INHALE 1 PUFF BY MOUTH EVERY 12 HOURS     candesartan 4 MG tablet  Commonly known as:  ATACAND  Take 1 tablet (4 mg total) by mouth once daily.     loperamide 2 mg capsule  Commonly known as:  IMODIUM     metoprolol succinate 25 MG 24 hr tablet  Commonly known as:  TOPROL-XL  Take 1 tablet (25 mg total) by mouth once daily.           Where to Get Your Medications      These medications were sent to [facility] [id number]  [address] [facility] [address]  [address] [address]    Phone:  [phone]   · ipratropium 0.02 % nebulizer solution  · ipratropium 17 mcg/actuation inhaler       Signing Physician:  KATHI Stockton

## 2018-11-27 NOTE — PROGRESS NOTES
PCP - Erik Cool MD  Subjective:   Patient ID:  Ana Aguila is a 63 y.o. female who presents for hospital discharge follow up.    Medical history is notable for Takotsubo cardiomyopathy with EF recovered to 60-65% by echo 12/2016, severe COPD (on home O2) and hypertension.    Patient was hospitalized at [facility] from 11/18 - 11/21 with acute on chronic hypoxic respiratory failure secondary to COPD exacerbation. During her hospitalization she underwent a repeat TTE which demonstrated reduced EF 35%. She was seen by Cardiology in consultation who felt she had recurrence of stress induced cardiomyopathy based on evidence of left ventricle apical ballooning on her echocardiogram and normal coronaries by [place] in 10/2016. Recommendations were made to optimize medical therapy and so she was started on metoprolol (which she tolerated) and valsartan. Patient was ultimately discharged home in stable condition.     Today she presents to clinic with concerns for valsartan due to recent recall. States she would feel more comfortable and at peace of mind if her medication can be switched to an alternative agent. She does not tolerate ACEi due to coughing (listed as allergy). Overall her breathing has improved since hospitalization. She denies anginal symptoms, palpitations, cough, wheezing, orthopnea, PND, or peripheral swelling. Her weight has been stable (dry weight ~175-180 lb per patient).       History:     Past Medical History:   Diagnosis Date    Aortic calcification 1/11/2018    Chronic diarrhea     Chronic obstructive pulmonary disease 8/27/2013    Chronic respiratory failure with hypoxia, on home O2 therapy 1/11/2018    COPD (chronic obstructive pulmonary disease)     Essential hypertension 8/12/2013    Former smoker 10/18/2016    Hepatomegaly 12/21/2015    Hyperlipidemia     Hypertension     Microscopic hematuria 1/13/2017    Perianal abscess 6/1/2018    Reflux 2013    S/P right hemicolectomy  2017    Performed in  after perforation during colonoscopy    Takotsubo cardiomyopathy 10/18/2016    Noted on echo 10/2016, recovered on repeat echo 2016     Past Surgical History:   Procedure Laterality Date    ABDOMINAL SURGERY      APPENDECTOMY      CATHETERIZATION, HEART, LEFT N/A 2013    Performed by [facility] Gut[facility]rez [facility] at [facility] CATH LAB    CHOLANGIOGRAM N/A 2013    Performed by Zhou Hay Jr., [facility] at [facility] OR 2ND FLR    CHOLECYSTECTOMY      open    CHOLECYSTECTOMY N/A 2013    Performed by Zhou aHy Jr., MD at [facility] OR 2ND FLR    CHOLECYSTECTOMY, LAPAROSCOPIC N/A 2013    Performed by Zhou Hay Jr., [facility] at [facility] OR 2ND FLR    COLON SURGERY      secondary to perforation after c-scope    COLONOSCOPY      FRACTURE SURGERY      HERNIA REPAIR      HIATAL HERNIA REPAIR      HYSTERECTOMY  1986    Left leg surgery       Social History     Tobacco Use    Smoking status: Former Smoker     Packs/day: 1.50     Years: 30.00     Pack years: 45.00     Types: Cigarettes     Last attempt to quit: 1997     Years since quittin.9    Smokeless tobacco: Never Used   Substance Use Topics    Alcohol use: Yes     Comment: occaissionally     Family History   Problem Relation Age of Onset    Cancer Mother         lung    Hypertension Mother     Coronary artery disease Father     Retinal detachment Father     Hypertension Sister     Hypertension Brother     Cataracts Maternal Grandmother     Abnormal EKG Daughter     Breast cancer Daughter 43        negative genetic testing    Breast cancer Other 44    Amblyopia Neg Hx     Blindness Neg Hx     Diabetes Neg Hx     Glaucoma Neg Hx     Macular degeneration Neg Hx     Strabismus Neg Hx     Stroke Neg Hx     Thyroid disease Neg Hx     Colon cancer Neg Hx     Ovarian cancer Neg Hx        Meds:     Review of patient's allergies indicates:   Allergen Reactions    Ace inhibitors       "cough    Coreg [carvedilol]      Headache     Sudafed [pseudoephedrine hcl] Nausea And Vomiting and Other (See Comments)     JITTERY       Current Outpatient Medications:     ADVAIR DISKUS 250-50 mcg/dose diskus inhaler, INHALE 1 PUFF BY MOUTH EVERY 12 HOURS, Disp: 1 each, Rfl: 12    ergocalciferol (ERGOCALCIFEROL) 50,000 unit Cap, Take 1 capsule (50,000 Units total) by mouth every 7 days. (Patient taking differently: Take 50,000 Units by mouth every Monday. ), Disp: 12 capsule, Rfl: 1    ipratropium (ATROVENT) 0.02 % nebulizer solution, Take 2.5 mLs (500 mcg total) by nebulization 4 (four) times daily. Rescue, Disp: 1 Box, Rfl: 3    loperamide (IMODIUM) 2 mg capsule, Take 2 mg by mouth 4 (four) times daily as needed for Diarrhea., Disp: , Rfl:     metoprolol succinate (TOPROL-XL) 25 MG 24 hr tablet, Take 1 tablet (25 mg total) by mouth once daily., Disp: 30 tablet, Rfl: 11      Review of Systems   Constitutional: Negative for chills, diaphoresis, fever, malaise/fatigue and weight loss.   Eyes: Negative for blurred vision, double vision, pain and redness.   Respiratory: Positive for shortness of breath (chronic, stable). Negative for cough and wheezing.    Cardiovascular: Negative for chest pain, palpitations, orthopnea, claudication, leg swelling and PND.   Gastrointestinal: Negative for abdominal pain, constipation, diarrhea, nausea and vomiting.   Neurological: Negative for dizziness, focal weakness, seizures, loss of consciousness, weakness and headaches.   Psychiatric/Behavioral: Negative for depression. The patient is nervous/anxious.        Objective:   /70 (BP Location: Left arm, Patient Position: Sitting, BP Method: Large (Manual))   Pulse 77   Ht 5' 5" (1.651 m)   Wt 80.3 kg (177 lb 0.5 oz)   LMP  (LMP Unknown)   SpO2 97%   BMI 29.46 kg/m²   Physical Exam   Constitutional: She is oriented to person, place, and time and well-developed, well-nourished, and in no distress. No distress. "   HENT:   Head: Normocephalic and atraumatic.   Mouth/Throat: Oropharynx is clear and moist.   Eyes: Conjunctivae and EOM are normal. Pupils are equal, round, and reactive to light.   Neck: Normal range of motion. Neck supple. No JVD present.   Cardiovascular: Normal rate, regular rhythm and normal heart sounds. Exam reveals no gallop and no friction rub.   No murmur heard.  Pulmonary/Chest: Effort normal. No respiratory distress. She has no wheezes. She has no rales. She exhibits no tenderness.   Diminished breath sounds throughout   Abdominal: Soft. Bowel sounds are normal. She exhibits no distension. There is no tenderness.   Musculoskeletal: Normal range of motion. She exhibits no edema.   Neurological: She is alert and oriented to person, place, and time.   Skin: Skin is warm and dry. No erythema.   Psychiatric: Mood, memory, affect and judgment normal.       Labs:     Lab Results   Component Value Date     11/21/2018    K 4.2 11/21/2018     11/21/2018    CO2 25 11/21/2018    BUN 24 (H) 11/21/2018    CREATININE 0.8 11/21/2018    ANIONGAP 10 11/21/2018     Lab Results   Component Value Date    HGBA1C 5.4 10/18/2016     Lab Results   Component Value Date    BNP 55 11/18/2018    BNP 40 09/29/2018     (H) 10/17/2016       Lab Results   Component Value Date    WBC 13.33 (H) 11/21/2018    HGB 11.6 (L) 11/21/2018    HCT 37.1 11/21/2018     11/21/2018    GRAN 10.2 (H) 11/21/2018    GRAN 76.1 (H) 11/21/2018     Lab Results   Component Value Date    CHOL 219 (H) 09/10/2018    HDL 66 09/10/2018    LDLCALC 140.2 09/10/2018    TRIG 64 09/10/2018       Lab Results   Component Value Date     11/21/2018    K 4.2 11/21/2018     11/21/2018    CO2 25 11/21/2018    BUN 24 (H) 11/21/2018    CREATININE 0.8 11/21/2018    ANIONGAP 10 11/21/2018     Lab Results   Component Value Date    HGBA1C 5.4 10/18/2016     Lab Results   Component Value Date    BNP 55 11/18/2018    BNP 40 09/29/2018      (H) 10/17/2016    Lab Results   Component Value Date    WBC 13.33 (H) 11/21/2018    HGB 11.6 (L) 11/21/2018    HCT 37.1 11/21/2018     11/21/2018    GRAN 10.2 (H) 11/21/2018    GRAN 76.1 (H) 11/21/2018     Lab Results   Component Value Date    CHOL 219 (H) 09/10/2018    HDL 66 09/10/2018    LDLCALC 140.2 09/10/2018    TRIG 64 09/10/2018            Cardiovascular Imaging:   Echo 11/19/2018:  · This was a limited echo performed by fellow at patients bedside in the emergent setting.  · Moderately decreased left ventricular systolic function. The estimated ejection fraction is 35%, however incomplete endocardial visualization makes precise quantification unobtainable.  · Mild right ventricular enlargement.  · Normal right ventricular systolic function.  · Intermediate central venous pressure (8 mm Hg).  · Left ventricular diastolic dysfunction.  · Trivial Pericardial effusion.  · Multiple wall motion abnormalities in the LAD distribution, ddx stress induced vs. ischemic cardiomyopathy        [facility] 10/18/2016:  Diagnostic:          Patient has a right dominant coronary artery.        - Left Main Coronary Artery:             The LM is normal. There is TO 3 flow.     - Left Anterior Descending Artery:             The LAD is normal. There is TO 3 flow. Large wraparound LAD.     - Left Circumflex Artery:             The LCX is normal. There is TO 3 flow.     - Right Coronary Artery:             The RCA is normal. There is TO 3 flow.    Assessment & Plan:   Takotsubo cardiomyopathy  Patient diagnosed with stress induced cardiomyopathy (recurrent) during recent admission for AECOPD, EF 35% from 60-65% previously. Patient requesting to discontinue valsartan due to recall - she is intolerant to ACEi.     Plan:  -- Transitioned to candesartan from valsartan per patient request.  -- Continue Toprol XL.  -- Patient euvolemic off diuretics, counseling provided on sodium and fluid restricted diet.     Essential  hypertension  Blood pressure controlled at goal.    Plan:  -- Continue ARB and BB as above      Signed:  Lyric Mcelroy M.D.  Cardiovascular Fellow PGY-IV  [facility]

## 2018-11-28 NOTE — PHYSICIAN QUERY
PT Name: Ana Aguila  MR #: [id number]     Physician Query Form - Documentation Clarification      CDS/: Zaina Laureano   RN CCDS            Contact information: [email or web address]    This form is a permanent document in the medical record.     Query Date: November 28, 2018    By submitting this query, we are merely seeking further clarification of documentation. Please utilize your independent clinical judgment when addressing the question(s) below.    The Medical record reflects the following:    Supporting Clinical Findings Location in Medical Record     [facility] in 2016 consistent w/ findings of Takotsubos Cardiomyopathy with an EF of 25%  -- 2D echo at the end of 2016 showed resolution with EF 65% but most recently 35%     -- 11/21 Starting 25mg of metoprolol succinate XL QD   -- 11/21 starting valsartan 20 mg daily.  -Avoid albuterol, she is not wheezing and it can worsen tachycardia and has arrhythmogenic potential D/C home refill   -- cardiology follow up with Dr. Veloz.       PN 11/21   Acute systolic CHF     Furosemide 20 mg IV x 1  Furosemide 20 mg IV x 1       DS    MAR                                                                            Please further clarify the etiology of the Acute Systolic CHF.  Thank you.    Provider Use Only        [   ] Acute Systolic CHF related to takotsubo cardiomyopathy    [   ] Acute Systolic CHF related to hypertension    [   ] Other__________________                                                                                                                  Clinically Undetermined

## 2018-11-30 ENCOUNTER — OFFICE VISIT (OUTPATIENT)
Dept: PRIMARY CARE CLINIC | Facility: CLINIC | Age: 63
End: 2018-11-30
Payer: COMMERCIAL

## 2018-11-30 VITALS
SYSTOLIC BLOOD PRESSURE: 124 MMHG | HEIGHT: 65 IN | DIASTOLIC BLOOD PRESSURE: 88 MMHG | HEART RATE: 86 BPM | WEIGHT: 178.38 LBS | BODY MASS INDEX: 29.72 KG/M2 | OXYGEN SATURATION: 98 %

## 2018-11-30 DIAGNOSIS — J96.11 CHRONIC RESPIRATORY FAILURE WITH HYPOXIA, ON HOME OXYGEN THERAPY: ICD-10-CM

## 2018-11-30 DIAGNOSIS — I51.81 TAKOTSUBO CARDIOMYOPATHY: Primary | ICD-10-CM

## 2018-11-30 DIAGNOSIS — I10 ESSENTIAL HYPERTENSION: ICD-10-CM

## 2018-11-30 DIAGNOSIS — J44.1 COPD EXACERBATION: ICD-10-CM

## 2018-11-30 DIAGNOSIS — J96.21 ACUTE ON CHRONIC RESPIRATORY FAILURE WITH HYPOXIA: ICD-10-CM

## 2018-11-30 DIAGNOSIS — Z99.81 CHRONIC RESPIRATORY FAILURE WITH HYPOXIA, ON HOME OXYGEN THERAPY: ICD-10-CM

## 2018-11-30 DIAGNOSIS — J44.0 CHRONIC OBSTRUCTIVE PULMONARY DISEASE WITH ACUTE LOWER RESPIRATORY INFECTION: ICD-10-CM

## 2018-11-30 PROCEDURE — 99214 OFFICE O/P EST MOD 30 MIN: CPT | Mod: S$GLB,,, | Performed by: NURSE PRACTITIONER

## 2018-11-30 PROCEDURE — 3079F DIAST BP 80-89 MM HG: CPT | Mod: CPTII,S$GLB,, | Performed by: NURSE PRACTITIONER

## 2018-11-30 PROCEDURE — 3074F SYST BP LT 130 MM HG: CPT | Mod: CPTII,S$GLB,, | Performed by: NURSE PRACTITIONER

## 2018-11-30 PROCEDURE — 99999 PR PBB SHADOW E&M-EST. PATIENT-LVL III: CPT | Mod: PBBFAC,,, | Performed by: NURSE PRACTITIONER

## 2018-11-30 PROCEDURE — 3008F BODY MASS INDEX DOCD: CPT | Mod: CPTII,S$GLB,, | Performed by: NURSE PRACTITIONER

## 2018-11-30 RX ORDER — IPRATROPIUM BROMIDE 0.5 MG/2.5ML
500 SOLUTION RESPIRATORY (INHALATION) 4 TIMES DAILY
Qty: 1 BOX | Refills: 3 | Status: SHIPPED | OUTPATIENT
Start: 2018-11-30 | End: 2019-01-09 | Stop reason: SDUPTHER

## 2018-11-30 NOTE — PHYSICIAN QUERY
PT Name: Ana Aguila  MR #: [id number]     Physician Query Form - Documentation Clarification      CDS/: Zaina Laureano   RN CCDS            Contact information: [email or web address]    This form is a permanent document in the medical record.     Query Date: November 30, 2018    By submitting this query, we are merely seeking further clarification of documentation. Please utilize your independent clinical judgment when addressing the question(s) below.    The Medical record reflects the following:    Supporting Clinical Findings Location in Medical Record       [facility] in 2016 consistent w/ findings of Takotsubos Cardiomyopathy with an EF of 25%  -- 2D echo at the end of 2016 showed resolution with EF 65% but most recently 35%     -- 11/21 Starting 25mg of metoprolol succinate XL QD   -- 11/21 starting valsartan 20 mg daily.  -Avoid albuterol, she is not wheezing and it can worsen tachycardia and has arrhythmogenic potential D/C home refill   -- cardiology follow up with Dr. Veloz.            PN 11/21     Acute systolic CHF    Furosemide 20 mg IV x1 11/18  Furosemide 20 mg IV x1  11/19         DS    MAR             Please further clarify the etiology of the Acute Systolic CHF.  Thank you.      Provider Use Only      [ x  ] Acute Systolic CHF related to takotsubo cardiomyopathy     [   ] Acute Systolic CHF related to hypertension     [   ] Other__________________                                                                                                                   [  ] Clinically Undetermined

## 2018-11-30 NOTE — LETTER
November 30, 2018      Asia Nelson MD  [address] [facility] [address]           [facility]  [address]  [place] [address]  Phone: [phone]          Patient: Ana Aguila   MR Number: [id number]   YOB: 1955   Date of Visit: 11/30/2018       Dear Dr. Asia Nelson:    Thank you for referring Ana Aguila to me for evaluation. Attached you will find relevant portions of my assessment and plan of care.    If you have questions, please do not hesitate to call me. I look forward to following Ana Aguila along with you.    Sincerely,    Zena Valdez, [facility]    Enclosure  CC:  No Recipients    If you would like to receive this communication electronically, please contact [email or web address] or [phone] to request more information on [employer] Link access.    For providers and/or their staff who would like to refer a patient to [facility], please contact us through our one-stop-shop provider referral line, [facility], at [phone].    If you feel you have received this communication in error or would no longer like to receive these types of communications, please e-mail [email or web address]

## 2018-12-03 ENCOUNTER — PATIENT MESSAGE (OUTPATIENT)
Dept: INTERNAL MEDICINE | Facility: CLINIC | Age: 63
End: 2018-12-03

## 2018-12-03 ENCOUNTER — TELEPHONE (OUTPATIENT)
Dept: INTERNAL MEDICINE | Facility: CLINIC | Age: 63
End: 2018-12-03

## 2018-12-03 NOTE — TELEPHONE ENCOUNTER
----- Message from Lauren Monsivais sent at 12/3/2018 10:13 AM CST -----  Contact: self/[phone]  Patient called in regards needing to talk with  Courtney about the return letter that she was given last week. Please call and advise. Thank you

## 2018-12-03 NOTE — TELEPHONE ENCOUNTER
Please advise     Pt had a FMLA letter filled out by provider and she wants the work only 4 hour restriction taken off of the letter so she can return to work and not have that restriction on her paperwork.

## 2018-12-03 NOTE — TELEPHONE ENCOUNTER
----- Message from Lauren Monsivais sent at 12/3/2018 10:41 AM CST -----  Contact: self/[phone]  Patient called in regards needing to talk with Dr Cool medical assistant (patient did not want to specify what for). Please call and advise. Thank you!!!

## 2018-12-04 NOTE — PROGRESS NOTES
I have personally taken the history and examined this patient and agree with the Fellow's note as stated above.    Agree with change of Rx.

## 2018-12-05 ENCOUNTER — PATIENT MESSAGE (OUTPATIENT)
Dept: INTERNAL MEDICINE | Facility: CLINIC | Age: 63
End: 2018-12-05

## 2018-12-06 NOTE — PROGRESS NOTES
"Last 5 Patient Entered Readings                                      Current 30 Day Average: 129/85     Recent Readings 12/6/2018 12/6/2018 12/6/2018 12/6/2018 12/5/2018    SBP (mmHg) 134 134 145 145 125    DBP (mmHg) 92 92 88 88 75    Pulse 95 95 93 93 82        Digital Medicine: Health  Follow Up    Lifestyle Modifications:    1.Dietary Modifications (Sodium intake <2,000mg/day, food labels, dining out): Deferred    2.Physical Activity: Ms. Aguila is trying to get back into pulmonary rehab.    3.Medication Therapy: Patient has been compliant with the medication regimen. Patient states that her antihypertensive medications have changed since being discharged from the hospital. She states that HCTZ was discontinued, and metoprolol and candesartan was started. She is feeling okay since this change.     4.Patient has the following medication side effects/concerns: None  (Frequency/Alleviating factors/Precipitating factors, etc.)     Follow up with Ms. Ana Aguila completed. Ms. Aguila is doing okay. She was admitted from the ED on 11/18 for exacerbation, and is feeling better. She feels that her BP is "okay". She reporting having some occasional pain in her foot, which she feels could be some of the cause for spikes in her BP. She has also noticed some "dizziness every now and then", and her "toes feel heavy" since being discharged. She will see Dr. Gaytan next week. She thanks me for calling, and has questions or concerns currently. Will continue to follow up to achieve health goals.      "

## 2018-12-07 ENCOUNTER — PATIENT MESSAGE (OUTPATIENT)
Dept: INTERNAL MEDICINE | Facility: CLINIC | Age: 63
End: 2018-12-07

## 2018-12-14 ENCOUNTER — OFFICE VISIT (OUTPATIENT)
Dept: CARDIOLOGY | Facility: CLINIC | Age: 63
End: 2018-12-14
Payer: COMMERCIAL

## 2018-12-14 VITALS
HEIGHT: 65 IN | SYSTOLIC BLOOD PRESSURE: 140 MMHG | HEART RATE: 84 BPM | DIASTOLIC BLOOD PRESSURE: 88 MMHG | WEIGHT: 174.81 LBS | BODY MASS INDEX: 29.12 KG/M2

## 2018-12-14 DIAGNOSIS — J44.0 CHRONIC OBSTRUCTIVE PULMONARY DISEASE WITH ACUTE LOWER RESPIRATORY INFECTION: ICD-10-CM

## 2018-12-14 DIAGNOSIS — J96.11 CHRONIC RESPIRATORY FAILURE WITH HYPOXIA, ON HOME OXYGEN THERAPY: ICD-10-CM

## 2018-12-14 DIAGNOSIS — I10 ESSENTIAL HYPERTENSION: ICD-10-CM

## 2018-12-14 DIAGNOSIS — Z99.81 CHRONIC RESPIRATORY FAILURE WITH HYPOXIA, ON HOME OXYGEN THERAPY: ICD-10-CM

## 2018-12-14 DIAGNOSIS — I51.81 TAKOTSUBO CARDIOMYOPATHY: Primary | ICD-10-CM

## 2018-12-14 PROBLEM — J44.1 COPD EXACERBATION: Status: RESOLVED | Noted: 2018-11-18 | Resolved: 2018-12-14

## 2018-12-14 PROBLEM — J96.21 ACUTE ON CHRONIC RESPIRATORY FAILURE WITH HYPOXIA: Status: RESOLVED | Noted: 2018-01-11 | Resolved: 2018-12-14

## 2018-12-14 PROBLEM — I50.21 ACUTE SYSTOLIC HEART FAILURE: Status: RESOLVED | Noted: 2018-11-19 | Resolved: 2018-12-14

## 2018-12-14 PROCEDURE — 3008F BODY MASS INDEX DOCD: CPT | Mod: CPTII,S$GLB,, | Performed by: INTERNAL MEDICINE

## 2018-12-14 PROCEDURE — 99214 OFFICE O/P EST MOD 30 MIN: CPT | Mod: S$GLB,,, | Performed by: INTERNAL MEDICINE

## 2018-12-14 PROCEDURE — 99999 PR PBB SHADOW E&M-EST. PATIENT-LVL III: CPT | Mod: PBBFAC,,, | Performed by: INTERNAL MEDICINE

## 2018-12-14 PROCEDURE — 3079F DIAST BP 80-89 MM HG: CPT | Mod: CPTII,S$GLB,, | Performed by: INTERNAL MEDICINE

## 2018-12-14 PROCEDURE — 3077F SYST BP >= 140 MM HG: CPT | Mod: CPTII,S$GLB,, | Performed by: INTERNAL MEDICINE

## 2018-12-14 NOTE — LETTER
December 14, 2018      Asia Nelson MD  [address] [facility] [address]           [facility]  [address]  [facility] [address]  Phone: [phone]          Patient: Ana Aguila   MR Number: [id number]   YOB: 1955   Date of Visit: 12/14/2018       Dear Dr. Asia Nelson:    Thank you for referring Ana Aguila to me for evaluation. Attached you will find relevant portions of my assessment and plan of care.    If you have questions, please do not hesitate to call me. I look forward to following Ana Aguila along with you.    Sincerely,    Ferny Gaytan MD    Enclosure  CC:  No Recipients    If you would like to receive this communication electronically, please contact [email or web address] or [phone] to request more information on [employer] Link access.    For providers and/or their staff who would like to refer a patient to [facility], please contact us through our one-stop-shop provider referral line, [facility], at [phone].    If you feel you have received this communication in error or would no longer like to receive these types of communications, please e-mail [email or web address]

## 2018-12-14 NOTE — PROGRESS NOTES
Subjective:   Patient ID:  Ana Aguila is a 63 y.o. female who presents for follow-up of Essential hypertension and Foot Pain (bilateral x 2 weeks)      HPI:   Patient is a 61 y/o F w/ PMHx Takatsubo cardiomyopathy, HTN, severe COPD- on home O2.  She did have Takatsubo CM several years ago and LV function recovered.   Patient was hospitalized at [facility] from 11/18 - 11/21 with acute on chronic hypoxic respiratory failure secondary to COPD exacerbation. During her hospitalization she underwent a repeat TTE which demonstrated reduced EF 35%. She was seen by Cardiology in consultation who felt she had recurrence of stress induced cardiomyopathy based on evidence of left ventricle apical ballooning on her echocardiogram and normal coronaries by [facility] in 10/2016. Recommendations were made to optimize medical therapy and so she was started on metoprolol (which she tolerated) and valsartan. Patient was ultimately discharged home in stable condition.     She was seen post-hospitalization and did not want valsartan bc of recent recall and was given candesartan.  She didn't start it bc was scared given the recall with valsartan.     Overall her breathing has improved since hospitalization. She denies anginal symptoms, palpitations, cough, wheezing, orthopnea, PND, or peripheral swelling. Her weight has been stable (dry weight ~175-180 lb per patient).     She is in the Dig HTN management program    Pt requesting to go back to work full duty as .    Echo 11/19/2018:  · This was a limited echo performed by fellow at patients bedside in the emergent setting.  · Moderately decreased left ventricular systolic function. The estimated ejection fraction is 35%, however incomplete endocardial visualization makes precise quantification unobtainable.  · Mild right ventricular enlargement.  · Normal right ventricular systolic function.  · Intermediate central venous pressure (8 mm Hg).  · Left ventricular diastolic  "dysfunction.  · Trivial Pericardial effusion.  · Multiple wall motion abnormalities in the LAD distribution, ddx stress induced vs. ischemic cardiomyopathy         [facility] 10/18/2016:  Diagnostic:          Patient has a right dominant coronary artery.        - Left Main Coronary Artery:             The LM is normal. There is TO 3 flow.     - Left Anterior Descending Artery:             The LAD is normal. There is TO 3 flow. Large wraparound LAD.     - Left Circumflex Artery:             The LCX is normal. There is TO 3 flow.     - Right Coronary Artery:             The RCA is normal. There is TO 3 flow.      Vitals:    12/14/18 1009   BP: (!) 140/88   BP Location: Left arm   Patient Position: Sitting   BP Method: Large (Manual)   Pulse: 84   Weight: 79.3 kg (174 lb 13.2 oz)   Height: 5' 5" (1.651 m)     Body mass index is 29.09 kg/m².  CrCl cannot be calculated (Patient's most recent lab result is older than the maximum 7 days allowed.).    Lab Results   Component Value Date     11/21/2018    K 4.2 11/21/2018     11/21/2018    CO2 25 11/21/2018    BUN 24 (H) 11/21/2018    CREATININE 0.8 11/21/2018    GLU 91 11/21/2018    HGBA1C 5.4 10/18/2016    MG 2.4 11/21/2018    AST 25 11/21/2018    ALT 25 11/21/2018    ALBUMIN 3.5 11/21/2018    PROT 6.6 11/21/2018    BILITOT 0.3 11/21/2018    WBC 13.33 (H) 11/21/2018    HGB 11.6 (L) 11/21/2018    HCT 37.1 11/21/2018    MCV 93 11/21/2018     11/21/2018    INR 1.2 10/18/2016    TSH 1.139 11/18/2018    CHOL 219 (H) 09/10/2018    HDL 66 09/10/2018    LDLCALC 140.2 09/10/2018    TRIG 64 09/10/2018       Current Outpatient Medications   Medication Sig    ADVAIR DISKUS 250-50 mcg/dose diskus inhaler INHALE 1 PUFF BY MOUTH EVERY 12 HOURS    candesartan (ATACAND) 4 MG tablet Take 1 tablet (4 mg total) by mouth once daily.    ergocalciferol (ERGOCALCIFEROL) 50,000 unit Cap Take 1 capsule (50,000 Units total) by mouth every 7 days. (Patient taking differently: " Take 50,000 Units by mouth every Monday. )    ipratropium (ATROVENT) 0.02 % nebulizer solution Take 2.5 mLs (500 mcg total) by nebulization 4 (four) times daily. Rescue    loperamide (IMODIUM) 2 mg capsule Take 2 mg by mouth 4 (four) times daily as needed for Diarrhea.    metoprolol succinate (TOPROL-XL) 25 MG 24 hr tablet Take 1 tablet (25 mg total) by mouth once daily.    ipratropium (ATROVENT HFA) 17 mcg/actuation inhaler Inhale 2 puffs into the lungs every 6 (six) hours as needed for Wheezing. Rescue     No current facility-administered medications for this visit.        Review of Systems   Constitution: Negative for malaise/fatigue.   Eyes: Negative for visual disturbance.   Cardiovascular: Positive for dyspnea on exertion. Negative for chest pain, claudication, irregular heartbeat, near-syncope, orthopnea and palpitations.   Respiratory: Positive for shortness of breath. Negative for sleep disturbances due to breathing.    Musculoskeletal: Negative for muscle cramps, muscle weakness and myalgias.   Gastrointestinal: Negative for abdominal pain and heartburn.   Genitourinary: Negative for nocturia.   Neurological: Negative for difficulty with concentration, excessive daytime sleepiness, light-headedness, loss of balance, paresthesias and vertigo.       Objective:   Physical Exam   Constitutional: She is oriented to person, place, and time. Vital signs are normal. She appears well-developed and well-nourished.   HENT:   Head: Normocephalic and atraumatic.   Neck: Neck supple. Normal carotid pulses and no JVD present. Carotid bruit is not present. No edema present.   Cardiovascular: Normal rate, regular rhythm, normal heart sounds and intact distal pulses. PMI is not displaced. Exam reveals no gallop and no friction rub.   No murmur heard.  Pulmonary/Chest: Effort normal. No respiratory distress. She has decreased breath sounds. She has no wheezes. She has no rales. She exhibits no tenderness.   Abdominal:  Soft. Normal appearance and bowel sounds are normal. There is no hepatosplenomegaly. There is no tenderness.   Musculoskeletal: Normal range of motion. She exhibits no edema.   Neurological: She is alert and oriented to person, place, and time.   Skin: Skin is warm and dry.   Psychiatric: She has a normal mood and affect. Her behavior is normal. Judgment and thought content normal.       Assessment:     1. Takotsubo cardiomyopathy    2. Chronic respiratory failure with hypoxia, on home oxygen therapy    3. Chronic obstructive pulmonary disease with acute lower respiratory infection    4. Essential hypertension        Plan:   Discussed safety of ARB and she states that she will start taking.  repeat echo in 3 months.  Ok for back to work, full duty and no restrictions.       Orders Placed This Encounter   Procedures    Transthoracic echo (TTE) complete (Cupid Only)

## 2019-01-09 RX ORDER — IPRATROPIUM BROMIDE 0.5 MG/2.5ML
500 SOLUTION RESPIRATORY (INHALATION) 4 TIMES DAILY
Qty: 1 BOX | Refills: 0 | Status: SHIPPED | OUTPATIENT
Start: 2019-01-09 | End: 2019-02-13 | Stop reason: SDUPTHER

## 2019-01-17 ENCOUNTER — PATIENT OUTREACH (OUTPATIENT)
Dept: OTHER | Facility: OTHER | Age: 64
End: 2019-01-17

## 2019-01-17 NOTE — PROGRESS NOTES
Last 5 Patient Entered Readings                                      Current 30 Day Average: 134/89     Recent Readings 1/17/2019 1/17/2019 1/17/2019 1/17/2019 1/17/2019    SBP (mmHg) 130 130 139 139 144    DBP (mmHg) 90 90 90 90 95    Pulse 86 86 84 84 83        Digital Medicine: Health  Follow Up    Left voicemail to follow up with Ms. Ana Aguila.  Current BP average 134/89 mmHg is not at goal, <130/80 mmHg.  Will follow up in 3 weeks.

## 2019-01-21 ENCOUNTER — PATIENT MESSAGE (OUTPATIENT)
Dept: INTERNAL MEDICINE | Facility: CLINIC | Age: 64
End: 2019-01-21

## 2019-01-21 ENCOUNTER — OFFICE VISIT (OUTPATIENT)
Dept: PSYCHIATRY | Facility: CLINIC | Age: 64
End: 2019-01-21
Payer: COMMERCIAL

## 2019-01-21 ENCOUNTER — PATIENT MESSAGE (OUTPATIENT)
Dept: PULMONOLOGY | Facility: CLINIC | Age: 64
End: 2019-01-21

## 2019-01-21 DIAGNOSIS — F43.22 ADJUSTMENT DISORDER WITH ANXIETY: Primary | ICD-10-CM

## 2019-01-21 PROCEDURE — 99999 PR PBB SHADOW E&M-EST. PATIENT-LVL II: CPT | Mod: PBBFAC,,, | Performed by: SOCIAL WORKER

## 2019-01-21 PROCEDURE — 90791 PR PSYCHIATRIC DIAGNOSTIC EVALUATION: ICD-10-PCS | Mod: S$GLB,,, | Performed by: SOCIAL WORKER

## 2019-01-21 PROCEDURE — 99999 PR PBB SHADOW E&M-EST. PATIENT-LVL II: ICD-10-PCS | Mod: PBBFAC,,, | Performed by: SOCIAL WORKER

## 2019-01-21 PROCEDURE — 90791 PSYCH DIAGNOSTIC EVALUATION: CPT | Mod: S$GLB,,, | Performed by: SOCIAL WORKER
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
## 2019-01-22 ENCOUNTER — PATIENT MESSAGE (OUTPATIENT)
Dept: INTERNAL MEDICINE | Facility: CLINIC | Age: 64
End: 2019-01-22

## 2019-01-31 ENCOUNTER — LAB VISIT (OUTPATIENT)
Dept: LAB | Facility: HOSPITAL | Age: 64
End: 2019-01-31
Attending: INTERNAL MEDICINE
Payer: COMMERCIAL

## 2019-01-31 ENCOUNTER — OFFICE VISIT (OUTPATIENT)
Dept: INTERNAL MEDICINE | Facility: CLINIC | Age: 64
End: 2019-01-31
Payer: COMMERCIAL

## 2019-01-31 ENCOUNTER — TELEPHONE (OUTPATIENT)
Dept: SURGERY | Facility: CLINIC | Age: 64
End: 2019-01-31

## 2019-01-31 ENCOUNTER — DOCUMENTATION ONLY (OUTPATIENT)
Dept: INTERNAL MEDICINE | Facility: CLINIC | Age: 64
End: 2019-01-31

## 2019-01-31 VITALS
OXYGEN SATURATION: 95 % | HEIGHT: 65 IN | BODY MASS INDEX: 28.95 KG/M2 | HEART RATE: 96 BPM | SYSTOLIC BLOOD PRESSURE: 138 MMHG | DIASTOLIC BLOOD PRESSURE: 78 MMHG | TEMPERATURE: 99 F | WEIGHT: 173.75 LBS

## 2019-01-31 DIAGNOSIS — I10 BENIGN ESSENTIAL HTN: ICD-10-CM

## 2019-01-31 DIAGNOSIS — K42.9 UMBILICAL HERNIA WITHOUT OBSTRUCTION AND WITHOUT GANGRENE: ICD-10-CM

## 2019-01-31 DIAGNOSIS — I10 ESSENTIAL HYPERTENSION: ICD-10-CM

## 2019-01-31 DIAGNOSIS — R16.0 HEPATOMEGALY: ICD-10-CM

## 2019-01-31 DIAGNOSIS — M25.572 BILATERAL ANKLE PAIN, UNSPECIFIED CHRONICITY: ICD-10-CM

## 2019-01-31 DIAGNOSIS — R19.5 CHANGE IN CONSISTENCY OF STOOL: ICD-10-CM

## 2019-01-31 DIAGNOSIS — G89.29 CHRONIC RUQ PAIN: Primary | ICD-10-CM

## 2019-01-31 DIAGNOSIS — K76.0 FATTY LIVER DISEASE, NONALCOHOLIC: ICD-10-CM

## 2019-01-31 DIAGNOSIS — I42.9 CARDIOMYOPATHY, UNSPECIFIED TYPE: ICD-10-CM

## 2019-01-31 DIAGNOSIS — N64.4 PAIN IN BREAST: ICD-10-CM

## 2019-01-31 DIAGNOSIS — Z99.81 CHRONIC RESPIRATORY FAILURE WITH HYPOXIA, ON HOME OXYGEN THERAPY: ICD-10-CM

## 2019-01-31 DIAGNOSIS — I70.0 AORTIC CALCIFICATION: ICD-10-CM

## 2019-01-31 DIAGNOSIS — T50.2X5A DIURETIC-INDUCED HYPOKALEMIA: ICD-10-CM

## 2019-01-31 DIAGNOSIS — J42 CHRONIC BRONCHITIS, UNSPECIFIED CHRONIC BRONCHITIS TYPE: ICD-10-CM

## 2019-01-31 DIAGNOSIS — K52.9 CHRONIC DIARRHEA: ICD-10-CM

## 2019-01-31 DIAGNOSIS — J96.11 CHRONIC RESPIRATORY FAILURE WITH HYPOXIA, ON HOME OXYGEN THERAPY: ICD-10-CM

## 2019-01-31 DIAGNOSIS — M25.571 BILATERAL ANKLE PAIN, UNSPECIFIED CHRONICITY: ICD-10-CM

## 2019-01-31 DIAGNOSIS — E87.6 DIURETIC-INDUCED HYPOKALEMIA: ICD-10-CM

## 2019-01-31 DIAGNOSIS — M81.0 AGE-RELATED OSTEOPOROSIS WITHOUT CURRENT PATHOLOGICAL FRACTURE: ICD-10-CM

## 2019-01-31 DIAGNOSIS — Z12.11 SCREENING FOR COLON CANCER: ICD-10-CM

## 2019-01-31 DIAGNOSIS — R10.11 CHRONIC RUQ PAIN: Primary | ICD-10-CM

## 2019-01-31 DIAGNOSIS — Z12.31 ENCOUNTER FOR SCREENING MAMMOGRAM FOR BREAST CANCER: ICD-10-CM

## 2019-01-31 LAB
25(OH)D3+25(OH)D2 SERPL-MCNC: 31 NG/ML
ALBUMIN SERPL BCP-MCNC: 3.9 G/DL
ALP SERPL-CCNC: 72 U/L
ALT SERPL W/O P-5'-P-CCNC: 7 U/L
ANION GAP SERPL CALC-SCNC: 7 MMOL/L
AST SERPL-CCNC: 17 U/L
BILIRUB SERPL-MCNC: 0.2 MG/DL
BUN SERPL-MCNC: 12 MG/DL
CALCIUM SERPL-MCNC: 9.3 MG/DL
CHLORIDE SERPL-SCNC: 104 MMOL/L
CO2 SERPL-SCNC: 33 MMOL/L
CREAT SERPL-MCNC: 0.8 MG/DL
EST. GFR  (AFRICAN AMERICAN): >60 ML/MIN/1.73 M^2
EST. GFR  (NON AFRICAN AMERICAN): >60 ML/MIN/1.73 M^2
GLUCOSE SERPL-MCNC: 81 MG/DL
POTASSIUM SERPL-SCNC: 3.1 MMOL/L
PROT SERPL-MCNC: 7.3 G/DL
SODIUM SERPL-SCNC: 144 MMOL/L

## 2019-01-31 PROCEDURE — 80053 COMPREHEN METABOLIC PANEL: CPT

## 2019-01-31 PROCEDURE — 99215 PR OFFICE/OUTPT VISIT, EST, LEVL V, 40-54 MIN: ICD-10-PCS | Mod: S$GLB,,, | Performed by: INTERNAL MEDICINE

## 2019-01-31 PROCEDURE — 99999 PR PBB SHADOW E&M-EST. PATIENT-LVL V: ICD-10-PCS | Mod: PBBFAC,,, | Performed by: INTERNAL MEDICINE

## 2019-01-31 PROCEDURE — 3075F SYST BP GE 130 - 139MM HG: CPT | Mod: CPTII,S$GLB,, | Performed by: INTERNAL MEDICINE

## 2019-01-31 PROCEDURE — 82306 VITAMIN D 25 HYDROXY: CPT

## 2019-01-31 PROCEDURE — 3078F PR MOST RECENT DIASTOLIC BLOOD PRESSURE < 80 MM HG: ICD-10-PCS | Mod: CPTII,S$GLB,, | Performed by: INTERNAL MEDICINE

## 2019-01-31 PROCEDURE — 99999 PR PBB SHADOW E&M-EST. PATIENT-LVL V: CPT | Mod: PBBFAC,,, | Performed by: INTERNAL MEDICINE

## 2019-01-31 PROCEDURE — 3078F DIAST BP <80 MM HG: CPT | Mod: CPTII,S$GLB,, | Performed by: INTERNAL MEDICINE

## 2019-01-31 PROCEDURE — 36415 COLL VENOUS BLD VENIPUNCTURE: CPT

## 2019-01-31 PROCEDURE — 3008F BODY MASS INDEX DOCD: CPT | Mod: CPTII,S$GLB,, | Performed by: INTERNAL MEDICINE

## 2019-01-31 PROCEDURE — 3008F PR BODY MASS INDEX (BMI) DOCUMENTED: ICD-10-PCS | Mod: CPTII,S$GLB,, | Performed by: INTERNAL MEDICINE

## 2019-01-31 PROCEDURE — 99215 OFFICE O/P EST HI 40 MIN: CPT | Mod: S$GLB,,, | Performed by: INTERNAL MEDICINE

## 2019-01-31 PROCEDURE — 3075F PR MOST RECENT SYSTOLIC BLOOD PRESS GE 130-139MM HG: ICD-10-PCS | Mod: CPTII,S$GLB,, | Performed by: INTERNAL MEDICINE

## 2019-01-31 NOTE — PATIENT INSTRUCTIONS
Check in with Dr Dill about whether since the atrovent alone is not working as well, if trying xopenex (levalbuterol) would be worthwhile.

## 2019-01-31 NOTE — TELEPHONE ENCOUNTER
Contacted the patient regarding the message below.  The patient did not answer, message left for the patient to contact our office regarding this matter.   Essential hypertension

## 2019-01-31 NOTE — TELEPHONE ENCOUNTER
----- Message from Ruth Armendariz sent at 1/31/2019  4:48 PM CST -----  Contact: Inga with Dr.Robin Cool's office  Patient Requesting Appointment.     Reason for appt.: Pain in breast    Communication Preference: # [phone]  Additional Information:
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
## 2019-01-31 NOTE — Clinical Note
"Dr Dill - Ms Aguila let me know today that she was recently told to stop her albuterol (after hospital discharge, due to recurrent cardiomyopathy), and she feels this isn't working as well with just atrovent.She would like to try what sounds like xopenex (she isn't clear, but does say "like albuterol" but will less palpitations and jitter) -- can you send this in for her? I can also do this if you would like.Thanks!Erik Cool MD"

## 2019-02-05 ENCOUNTER — HOSPITAL ENCOUNTER (OUTPATIENT)
Dept: RADIOLOGY | Facility: HOSPITAL | Age: 64
Discharge: HOME | End: 2019-02-05
Attending: INTERNAL MEDICINE
Payer: COMMERCIAL

## 2019-02-05 ENCOUNTER — TELEPHONE (OUTPATIENT)
Dept: SURGERY | Facility: CLINIC | Age: 64
End: 2019-02-05

## 2019-02-05 DIAGNOSIS — R10.11 CHRONIC RUQ PAIN: ICD-10-CM

## 2019-02-05 DIAGNOSIS — K42.9 UMBILICAL HERNIA WITHOUT OBSTRUCTION AND WITHOUT GANGRENE: ICD-10-CM

## 2019-02-05 DIAGNOSIS — G89.29 CHRONIC RUQ PAIN: ICD-10-CM

## 2019-02-05 PROCEDURE — 76705 ECHO EXAM OF ABDOMEN: CPT | Mod: 26,59,, | Performed by: RADIOLOGY

## 2019-02-05 PROCEDURE — 76700 US ABDOMEN COMPLETE: ICD-10-PCS | Mod: 26,,, | Performed by: RADIOLOGY

## 2019-02-05 PROCEDURE — 76700 US EXAM ABDOM COMPLETE: CPT | Mod: 26,,, | Performed by: RADIOLOGY

## 2019-02-05 PROCEDURE — 76700 US EXAM ABDOM COMPLETE: CPT | Mod: TC

## 2019-02-05 PROCEDURE — 76705 US ABDOMEN LIMITED: ICD-10-PCS | Mod: 26,59,, | Performed by: RADIOLOGY

## 2019-02-05 PROCEDURE — 76705 ECHO EXAM OF ABDOMEN: CPT | Mod: TC

## 2019-02-05 NOTE — TELEPHONE ENCOUNTER
----- Message from Chelsey Peterson sent at 2/5/2019  9:02 AM CST -----  Pt returning your call.    [phone]

## 2019-02-05 NOTE — TELEPHONE ENCOUNTER
----- Message from Ruth Armendariz sent at 2/5/2019  8:39 AM CST -----  Contact: Pt  Patient Returning Call from [facility]    Who Left Message for Patient: Steffany    Communication Preference: # [phone]    Additional Information:

## 2019-02-05 NOTE — TELEPHONE ENCOUNTER
Returned the patient call regarding the message below.  The patient is scheduled to be seen on next Tuesday 2/12/19 at 10:30 am with Derrick Ramirez NP.  The patient voiced understanding of appointment date and time.  Reminder letter mailed to the patient.

## 2019-02-07 NOTE — PROGRESS NOTES
Last 5 Patient Entered Readings                                      Current 30 Day Average: 135/89     Recent Readings 2/7/2019 2/7/2019 2/7/2019 2/7/2019 2/6/2019    SBP (mmHg) 133 133 150 150 128    DBP (mmHg) 87 87 87 87 77    Pulse 89 89 86 86 93        Digital Medicine: Health  Follow Up    Lifestyle Modifications:    1.Dietary Modifications (Sodium intake <2,000mg/day, food labels, dining out): Patient reports that she has been eating less salt.     2.Physical Activity: Patient has benefited from pulmonary rehab in the past. She states that she has some exercises she can do at home, but does not always do them. Reviewed benefits of exercise for COPD and HTN. Encouraged patient to resume, and message PCP to see about a new referral for pulmonary rehab.     3.Medication Therapy: Patient has been compliant with the medication regimen.    4.Patient has the following medication side effects/concerns: None  (Frequency/Alleviating factors/Precipitating factors, etc.)     Follow up with Ms. Ana Aguila completed. Ms. Aguila is doing okay. She is a little frustrated with her BP, because she feels that the medications she is taking should be helping to control it. Her first reading is always higher, despite trying to relax and a few minutes of meditation. Encouraged patient to continue to monitor. She thanks me for calling, and has no further questions or concerns. Will continue to follow up to achieve health goals.

## 2019-02-08 ENCOUNTER — PATIENT OUTREACH (OUTPATIENT)
Dept: OTHER | Facility: OTHER | Age: 64
End: 2019-02-08

## 2019-02-08 NOTE — PROGRESS NOTES
Last 5 Patient Entered Readings                                      Current 30 Day Average: 135/89     Recent Readings 2/7/2019 2/7/2019 2/7/2019 2/7/2019 2/6/2019    SBP (mmHg) 133 133 150 150 128    DBP (mmHg) 87 87 87 87 77    Pulse 89 89 86 86 93        Ms. Aguila was upset today because of changes made to her medication regimen. She states that she wants to be back on HCTZ and off of candesartan and metoprolol. Tried to explain the benefits of candesartan and metoprolol for cardiomyopathy. She apologized for being upset and asked that we talk later as she is at work.     Patient's BP average is above goal of <130/80.     Will continue to monitor regularly. Will follow up in 4-6 eeks, sooner if BP begins to trend upward or downward.    Patient has my contact information and knows to call with any concerns or clinical changes.     Current HTN regimen:  Hypertension Medications             candesartan (ATACAND) 4 MG tablet Take 1 tablet (4 mg total) by mouth once daily.    metoprolol succinate (TOPROL-XL) 25 MG 24 hr tablet Take 1 tablet (25 mg total) by mouth once daily.

## 2019-02-11 ENCOUNTER — TELEPHONE (OUTPATIENT)
Dept: INTERNAL MEDICINE | Facility: CLINIC | Age: 64
End: 2019-02-11

## 2019-02-11 DIAGNOSIS — E87.6 HYPOKALEMIA: Primary | ICD-10-CM

## 2019-02-11 NOTE — PROGRESS NOTES
"Patient ID: Ana Aguila is a 63 y.o. female.    Chief Complaint: Breast Pain        HPI:  Established patient of the Department of Breast Surgery, previously seen by KATHI Joseph, and new to me, presents today for breast cancer screening.    Breast History:    Personal history of breast cancer:  no  Personal history of breast biopsy:  no  Personal history of breast surgery:  no    Interval Breast History    Patient reports bilateral breast pain to upper regions and lateral regions.  Upper region pain is constant but worse with touch.  Upper regions associated with "raised muscles, not lumps," first noticed about 1-2 month(s) ago.  Outer region pain is only tender to touch.  Outer regions associated with diffuse lumps/knots bilaterally.  Outer region lumps "have always been there."  Onset of all above pain was "years ago."  Has tried decreasing caffeine consumption, which helped somewhat.  Has not tried any medication.  Pain is presently moderate bilaterally.    Patient denies breast-related skin changes, nipple discharge and nipple retraction.  Patient denies breast pain or breast lumps other than abovementioned.    Breast Imaging  Last mmg (bilat) 18 report reviewed:  The breasts have scattered areas of fibroglandular density. There is no evidence of suspicious masses, microcalcifications or architectural distortion.   Impression:  No mammographic evidence of malignancy.   BI-RADS Category 1: Negative   Recommendation:  Routine screening mammogram in 1 year is recommended.  The patient's estimated lifetime risk of breast cancer (to age 85) based on Tyrer-Cuzick - 7 risk assessment model is: Tyrer-Cuzick: 8.1 %    GYN History:  Age of menarche:  11 y/o  Uterus and ovaries:  s/p hysterectomy at 32 y/o, pt unsure whether ovaries remain intact  Menopause:  32 y/o  HRT usage:  never  , first live birth at 17 y/o    Other Oncologic History:  Personal history of other cancer not abovementioned:  " "no  Personal history of genetic testing:  no    Social History:  Tobacco use:  quit smoking around over 20 years ago    Family Oncologic History:    Ashkenazi Evangelical ancestry:  no    Family History   Problem Relation Age of Onset    Cancer Mother         lung    Breast cancer Maternal Grandmother         unk age of onset    Breast cancer Daughter 43        negative genetic testing, unilateral breast ca    Breast cancer Niece, daughter of pt's maternal half-sister 44        thinks negative genetic testing, bilat ca    Ovarian cancer Neg Hx            Patient's Breast Cancer Risk Assessment Scores:  Taking into account the above information, the patient's breast cancer risk assessment scores were calculated today as follows:  Tyrer-Cuzick lifetime risk:  9.4%  Valerie model 5-year risk:  2.6%      Review of Systems - See HPI.  Denies chest pain.  Objective:   Physical Exam   Pulmonary/Chest: Effort normal. No respiratory distress. She exhibits no mass, no edema and no deformity. Right breast exhibits no inverted nipple, no mass, no nipple discharge, no skin change and no tenderness. Left breast exhibits no inverted nipple, no mass, no nipple discharge, no skin change and no tenderness. Breasts are symmetrical. There is no breast swelling.   -Bilat breasts diffusely TTP.  -Left "breast" 2:00 region 15cmfn, i.e. in region lateral to breast and not in breast itself but directly inferior to axilla, with lump reported by patient.  Very vague thickening appreciated in this region.  No discrete mass appreciated to this area or corresponding contralateral region.  Left side marked for imaging.  Very low clinical suspicion.  Advised Rad to add mmg if indicated.  -No other mass appreciated to either breast, either axilla, or either region lateral to breasts.   Lymphadenopathy:     She has no cervical adenopathy.     She has no axillary adenopathy.        Right: No supraclavicular adenopathy present.        Left: No " supraclavicular adenopathy present.     Assessment:       1. Breast pain    2. Breast lump    3. Family history of breast cancer          Plan:     Left breast ltd US today.  If negative/benign, pt to RTC around 6/27/19 for CBE and annual bilat mmg.    Pt's report bilat breast pain not suggestive of pathology.  Pt reported pain that is longstanding, diffuse, and non-focal and is not associated with breast mass, asymmetric thickening or nodularity in the breast, nipple discharge, or breast-related skin changes.  Area undergoing US today is of extremely low clinical suspicion.  Given this information, reassurance was provided to the patient today, per current NCCN guidelines.  First-line therapy for this type of breast pain includes physical support of the breasts with the use of support garments and compression, which were discussed with the patient.  First-line therapy can also include pharmacologic management w/ oral NSAID, which was recommended to pt today.  Patient is to contact clinic or RTC with any changes pertaining to this problem at any time.      Offered pt visit w/ Med Onc given increased 5-year risk.  Pt declined.    Pt to f/u w/ Cardiology within next week to ensure no underlying cardiac cause for bilat pain as abovementioned.  Advised pt to go to ER w/ any CP, any pain radiating down left arm, any unusual SOB or SOB not relieved within a few minutes (pt has COPD).     Pt did state she occasionally has bilat arm pain, R>L.  Not suggestive of having any relation to breast pain based on reported symptoms.  Pt to discuss w/ Cardiology at next visit, which she was advised to schedule for within the next week.  Also offered pt referral to Ortho for further eval and management, which she declined at this time.  Advised pt to let me know if she changes her mind.    Recommended f/u w/ Dermatology.    Encouraged breast awareness, including monthly breast self-exams.  Advised patient to RTC with any interval  changes or concerns.  Questions were encouraged and answered to patient's satisfaction, and patient verbalized understanding of information and agreement with the plan.

## 2019-02-11 NOTE — TELEPHONE ENCOUNTER
----- Message from Erik Cool MD sent at 2/11/2019  2:29 AM CST -----  Please contact patient - her potassium was low, so I recommended she restart her potassium supplement (was taking 10mEq daily) for 1-2 weeks then recheck BMP  Please schedule for BMP

## 2019-02-12 ENCOUNTER — OFFICE VISIT (OUTPATIENT)
Dept: SURGERY | Facility: CLINIC | Age: 64
End: 2019-02-12
Payer: COMMERCIAL

## 2019-02-12 VITALS
WEIGHT: 173.38 LBS | HEIGHT: 65 IN | DIASTOLIC BLOOD PRESSURE: 96 MMHG | BODY MASS INDEX: 28.89 KG/M2 | SYSTOLIC BLOOD PRESSURE: 152 MMHG | TEMPERATURE: 98 F

## 2019-02-12 DIAGNOSIS — N64.4 BREAST PAIN: Primary | ICD-10-CM

## 2019-02-12 DIAGNOSIS — Z80.3 FAMILY HISTORY OF BREAST CANCER: ICD-10-CM

## 2019-02-12 DIAGNOSIS — N63.0 BREAST LUMP: ICD-10-CM

## 2019-02-12 PROCEDURE — 99213 PR OFFICE/OUTPT VISIT, EST, LEVL III, 20-29 MIN: ICD-10-PCS | Mod: S$GLB,,, | Performed by: NURSE PRACTITIONER

## 2019-02-12 PROCEDURE — 99999 PR PBB SHADOW E&M-EST. PATIENT-LVL III: CPT | Mod: PBBFAC,,, | Performed by: NURSE PRACTITIONER

## 2019-02-12 PROCEDURE — 3077F PR MOST RECENT SYSTOLIC BLOOD PRESSURE >= 140 MM HG: ICD-10-PCS | Mod: CPTII,S$GLB,, | Performed by: NURSE PRACTITIONER

## 2019-02-12 PROCEDURE — 3080F PR MOST RECENT DIASTOLIC BLOOD PRESSURE >= 90 MM HG: ICD-10-PCS | Mod: CPTII,S$GLB,, | Performed by: NURSE PRACTITIONER

## 2019-02-12 PROCEDURE — 3008F PR BODY MASS INDEX (BMI) DOCUMENTED: ICD-10-PCS | Mod: CPTII,S$GLB,, | Performed by: NURSE PRACTITIONER

## 2019-02-12 PROCEDURE — 3077F SYST BP >= 140 MM HG: CPT | Mod: CPTII,S$GLB,, | Performed by: NURSE PRACTITIONER

## 2019-02-12 PROCEDURE — 3008F BODY MASS INDEX DOCD: CPT | Mod: CPTII,S$GLB,, | Performed by: NURSE PRACTITIONER

## 2019-02-12 PROCEDURE — 3080F DIAST BP >= 90 MM HG: CPT | Mod: CPTII,S$GLB,, | Performed by: NURSE PRACTITIONER

## 2019-02-12 PROCEDURE — 99213 OFFICE O/P EST LOW 20 MIN: CPT | Mod: S$GLB,,, | Performed by: NURSE PRACTITIONER

## 2019-02-12 PROCEDURE — 99999 PR PBB SHADOW E&M-EST. PATIENT-LVL III: ICD-10-PCS | Mod: PBBFAC,,, | Performed by: NURSE PRACTITIONER

## 2019-02-14 RX ORDER — IPRATROPIUM BROMIDE 0.5 MG/2.5ML
SOLUTION RESPIRATORY (INHALATION)
Qty: 62.5 ML | Refills: 0 | Status: SHIPPED | OUTPATIENT
Start: 2019-02-14 | End: 2021-07-27 | Stop reason: SDUPTHER

## 2019-02-19 ENCOUNTER — PATIENT MESSAGE (OUTPATIENT)
Dept: PULMONOLOGY | Facility: CLINIC | Age: 64
End: 2019-02-19

## 2019-02-19 ENCOUNTER — HOSPITAL ENCOUNTER (OUTPATIENT)
Dept: RADIOLOGY | Facility: HOSPITAL | Age: 64
Discharge: HOME | End: 2019-02-19
Attending: NURSE PRACTITIONER
Payer: COMMERCIAL

## 2019-02-19 DIAGNOSIS — N63.0 BREAST LUMP: ICD-10-CM

## 2019-02-19 PROCEDURE — 77065 DX MAMMO INCL CAD UNI: CPT | Mod: 26,LT,, | Performed by: RADIOLOGY

## 2019-02-19 PROCEDURE — 77061 BREAST TOMOSYNTHESIS UNI: CPT | Mod: TC,PO,LT

## 2019-02-19 PROCEDURE — 77065 MAMMO DIGITAL DIAGNOSTIC LEFT WITH TOMOSYNTHESIS_CAD: ICD-10-PCS | Mod: 26,LT,, | Performed by: RADIOLOGY

## 2019-02-19 PROCEDURE — 76642 ULTRASOUND BREAST LIMITED: CPT | Mod: TC,PO,LT

## 2019-02-19 PROCEDURE — 76642 US BREAST LEFT LIMITED: ICD-10-PCS | Mod: 26,LT,, | Performed by: RADIOLOGY

## 2019-02-19 PROCEDURE — 77065 DX MAMMO INCL CAD UNI: CPT | Mod: TC,PO,LT

## 2019-02-19 PROCEDURE — 76642 ULTRASOUND BREAST LIMITED: CPT | Mod: 26,LT,, | Performed by: RADIOLOGY

## 2019-02-19 PROCEDURE — 77061 BREAST TOMOSYNTHESIS UNI: CPT | Mod: 26,LT,, | Performed by: RADIOLOGY

## 2019-02-19 PROCEDURE — 77061 MAMMO DIGITAL DIAGNOSTIC LEFT WITH TOMOSYNTHESIS_CAD: ICD-10-PCS | Mod: 26,LT,, | Performed by: RADIOLOGY

## 2019-02-19 NOTE — PROGRESS NOTES
Notified pt of result.    Marimar:  Pt needs to RTC around 6/27/19 for CBE and annual bilat mmg; however, I did tell her that if she is now feeling lumps (as reported in her imaging report) she should come in sooner.  Please call her to see if she'd like to schedule an appt with me at my next available appt time.    Thanks,  Derrick

## 2019-02-21 ENCOUNTER — OFFICE VISIT (OUTPATIENT)
Dept: PSYCHIATRY | Facility: CLINIC | Age: 64
End: 2019-02-21
Payer: COMMERCIAL

## 2019-02-21 ENCOUNTER — TELEPHONE (OUTPATIENT)
Dept: SURGERY | Facility: CLINIC | Age: 64
End: 2019-02-21

## 2019-02-21 DIAGNOSIS — F43.21 ADJUSTMENT DISORDER WITH DEPRESSED MOOD: Primary | ICD-10-CM

## 2019-02-21 PROCEDURE — 90834 PSYTX W PT 45 MINUTES: CPT | Mod: S$GLB,,, | Performed by: SOCIAL WORKER

## 2019-02-21 PROCEDURE — 99999 PR PBB SHADOW E&M-EST. PATIENT-LVL I: ICD-10-PCS | Mod: PBBFAC,,, | Performed by: SOCIAL WORKER

## 2019-02-21 PROCEDURE — 99999 PR PBB SHADOW E&M-EST. PATIENT-LVL I: CPT | Mod: PBBFAC,,, | Performed by: SOCIAL WORKER

## 2019-02-21 PROCEDURE — 90834 PR PSYCHOTHERAPY W/PATIENT, 45 MIN: ICD-10-PCS | Mod: S$GLB,,, | Performed by: SOCIAL WORKER

## 2019-02-21 NOTE — PROGRESS NOTES
Individual Psychotherapy (PhD/LCSW)    2/21/2019    Site:  [facility]         Therapeutic Intervention: Met with patient.  Outpatient - Insight oriented psychotherapy 45 min - CPT code 87621    Chief complaint/reason for encounter: depression, anxiety and behavior     Interval history and content of current session: discussed doing better, medical and health issues, sleep, job, and how to solve job conflicts addressed, and she was recently successful in doing this at her job by speaking up, wants to work one and one half more years and then can retire, coping skills, taking care of herself, one thing at a time all focused on. Stress management skills, and respect for herself, and getting information for a MCC all addressed.    Treatment plan:  · Target symptoms: depression, anxiety , adjustment, work stress  · Why chosen therapy is appropriate versus another modality: relevant to diagnosis, patient responds to this modality, evidence based practice  · Outcome monitoring methods: self-report, observation  · Therapeutic intervention type: insight oriented psychotherapy, behavior modifying psychotherapy, supportive psychotherapy    Risk parameters:  Patient reports no suicidal ideation  Patient reports no homicidal ideation  Patient reports no self-injurious behavior  Patient reports no violent behavior    Verbal deficits: None    Patient's response to intervention:  The patient's response to intervention is accepting, motivated.    Progress toward goals and other mental status changes:  The patient's progress toward goals is fair .    Diagnosis:     ICD-10-CM ICD-9-CM   1. Adjustment disorder with depressed mood F43.21 309.0       Plan:  individual psychotherapy    Return to clinic: 3 weeks    Length of Service (minutes): 45

## 2019-02-21 NOTE — TELEPHONE ENCOUNTER
Call  regarding returning to clinic to see Derrick in June . Booked apt foe 6/18/19 @ 2:30 pm. Informed patient of date & time of her new apt mailed out apt slip.

## 2019-02-22 DIAGNOSIS — J44.9 CHRONIC OBSTRUCTIVE PULMONARY DISEASE, UNSPECIFIED COPD TYPE: ICD-10-CM

## 2019-02-22 RX ORDER — IPRATROPIUM BROMIDE AND ALBUTEROL SULFATE 2.5; .5 MG/3ML; MG/3ML
SOLUTION RESPIRATORY (INHALATION)
Qty: 270 ML | Refills: 0 | Status: SHIPPED | OUTPATIENT
Start: 2019-02-22 | End: 2019-04-24 | Stop reason: SDUPTHER

## 2019-02-25 ENCOUNTER — LAB VISIT (OUTPATIENT)
Dept: LAB | Facility: HOSPITAL | Age: 64
End: 2019-02-25
Attending: INTERNAL MEDICINE
Payer: COMMERCIAL

## 2019-02-25 DIAGNOSIS — E87.6 HYPOKALEMIA: ICD-10-CM

## 2019-02-25 LAB
ANION GAP SERPL CALC-SCNC: 8 MMOL/L
BUN SERPL-MCNC: 10 MG/DL
CALCIUM SERPL-MCNC: 9.4 MG/DL
CHLORIDE SERPL-SCNC: 106 MMOL/L
CO2 SERPL-SCNC: 30 MMOL/L
CREAT SERPL-MCNC: 0.8 MG/DL
EST. GFR  (AFRICAN AMERICAN): >60 ML/MIN/1.73 M^2
EST. GFR  (NON AFRICAN AMERICAN): >60 ML/MIN/1.73 M^2
GLUCOSE SERPL-MCNC: 87 MG/DL
POTASSIUM SERPL-SCNC: 4 MMOL/L
SODIUM SERPL-SCNC: 144 MMOL/L

## 2019-02-25 PROCEDURE — 80048 BASIC METABOLIC PNL TOTAL CA: CPT

## 2019-02-25 PROCEDURE — 36415 COLL VENOUS BLD VENIPUNCTURE: CPT

## 2019-02-25 NOTE — PROGRESS NOTES
CRS Office Visit Followup    SUBJECTIVE:     Chief Complaint: perianal abscess    History of Present Illness:  Patient is a 63 y.o. female presents with perianal abscess. The patient is an established patient to this practice.   Course is as follows:  2010:  Colonoscopy with polypectomy (Johnathan).  Pathology was tubular adenoma   - complicated by post polypectomy bleed   - repeat CSY done with old blood seen.  Perforation of transverse colon   -  Right colectomy (Jackson) for perforation.      6/1/18:  Presented with perianal abscess  On the left posterior.  Incision and drainage done in clinic.   6/15/18:  Presents for follow-up.  Had recurrence perianal swelling with spontaneous drainage approximately 5 days following her clinic visit.  She feels better since then has not had ongoing drainage. No further pain.  Exam demonstrated left-sided external opening.  This was probed and this tracked to the left posterior anal canal in radial fashion. She also had a slight irregularity can be felt on the left posterior dentate line.   EUA with fistulotomy vs. Seton was recommended but she wished to defer given her past complications following surgery.     7/20/18:  Presents today.  Feeling very well. No further drainage or pain. Having regular bowel movements.  Overall feels substantially improved.  No further abscess  2/25/19:  Presents for evaluation for intermittent perirectal bleeding that is painless.  This is only associated with bowel movements.  The blood is bright red and is on the toilet paper.  No associated prolapse of tissue.  Denies straining.  No drainage from her prior perianal abscess.    Denies family history of CRC or IBD.    FIT test 02/05/2019 was negative    Review of Systems:  Review of Systems   Constitutional: Negative for chills, diaphoresis, fever, malaise/fatigue and weight loss.   HENT: Negative for congestion.    Respiratory: Positive for shortness of breath.    Cardiovascular: Positive for leg  "swelling. Negative for chest pain.   Gastrointestinal: Negative for abdominal pain, blood in stool, constipation, nausea and vomiting.   Genitourinary: Negative for dysuria.   Musculoskeletal: Negative for back pain and myalgias.   Skin: Negative for rash.   Neurological: Negative for dizziness and weakness.   Endo/Heme/Allergies: Does not bruise/bleed easily.   Psychiatric/Behavioral: Negative for depression.       OBJECTIVE:     Vital Signs (Most Recent)  /80 (BP Location: Left arm, Patient Position: Sitting, BP Method: Large (Automatic))   Pulse 82   Ht 5' 5" (1.651 m)   Wt 78 kg (172 lb)   LMP  (LMP Unknown)   BMI 28.62 kg/m²     Physical Exam:  General: Black or  female in no distress   Neuro: alert and oriented x 4.  Moves all extremities.     HEENT: no icterus.  Trachea midline  Respiratory: respirations are even and unlabored  Cardiac: regular rate  Abdomen: soft  Extremities: Warm dry and intact  Skin: no rashes  Anorectal:  External exam was normal. No external fistula tracts identified. Digital exam was performed.  Normal tone. No masses palpated.  Anoscopy was then performed. Grade 1 internal hemorrhoids seen right anterior and left lateral.  No evidence of recent bleeding. No fissures.    ASSESSMENT/PLAN:     Ana was seen today for perianal abcess.    Diagnoses and all orders for this visit:    Grade I hemorrhoids         62-year-old woman with history of colonic perforation with colonoscopy status post colectomy presents with intermittent rectal bleeding with grade 1 internal hemorrhoids.  Reassurance was provided today.  She does not wish for any intervention to be performed.  She can follow up with me as needed.          FREDA Moreira MD  Staff Surgeon  Colon & Rectal Surgery  "

## 2019-02-26 ENCOUNTER — OFFICE VISIT (OUTPATIENT)
Dept: SURGERY | Facility: CLINIC | Age: 64
End: 2019-02-26
Payer: COMMERCIAL

## 2019-02-26 VITALS
DIASTOLIC BLOOD PRESSURE: 80 MMHG | BODY MASS INDEX: 28.66 KG/M2 | SYSTOLIC BLOOD PRESSURE: 135 MMHG | HEART RATE: 82 BPM | WEIGHT: 172 LBS | HEIGHT: 65 IN

## 2019-02-26 DIAGNOSIS — K64.0 GRADE I HEMORRHOIDS: Primary | ICD-10-CM

## 2019-02-26 PROCEDURE — 3075F SYST BP GE 130 - 139MM HG: CPT | Mod: CPTII,S$GLB,, | Performed by: COLON & RECTAL SURGERY

## 2019-02-26 PROCEDURE — 99213 OFFICE O/P EST LOW 20 MIN: CPT | Mod: 25,S$GLB,, | Performed by: COLON & RECTAL SURGERY

## 2019-02-26 PROCEDURE — 46600 DIAGNOSTIC ANOSCOPY SPX: CPT | Mod: S$GLB,,, | Performed by: COLON & RECTAL SURGERY

## 2019-02-26 PROCEDURE — 3079F PR MOST RECENT DIASTOLIC BLOOD PRESSURE 80-89 MM HG: ICD-10-PCS | Mod: CPTII,S$GLB,, | Performed by: COLON & RECTAL SURGERY

## 2019-02-26 PROCEDURE — 99999 PR PBB SHADOW E&M-EST. PATIENT-LVL III: CPT | Mod: PBBFAC,,, | Performed by: COLON & RECTAL SURGERY

## 2019-02-26 PROCEDURE — 46600 PR DIAG2STIC A2SCOPY: ICD-10-PCS | Mod: S$GLB,,, | Performed by: COLON & RECTAL SURGERY

## 2019-02-26 PROCEDURE — 99999 PR PBB SHADOW E&M-EST. PATIENT-LVL III: ICD-10-PCS | Mod: PBBFAC,,, | Performed by: COLON & RECTAL SURGERY

## 2019-02-26 PROCEDURE — 99213 PR OFFICE/OUTPT VISIT, EST, LEVL III, 20-29 MIN: ICD-10-PCS | Mod: 25,S$GLB,, | Performed by: COLON & RECTAL SURGERY

## 2019-02-26 PROCEDURE — 3008F PR BODY MASS INDEX (BMI) DOCUMENTED: ICD-10-PCS | Mod: CPTII,S$GLB,, | Performed by: COLON & RECTAL SURGERY

## 2019-02-26 PROCEDURE — 3075F PR MOST RECENT SYSTOLIC BLOOD PRESS GE 130-139MM HG: ICD-10-PCS | Mod: CPTII,S$GLB,, | Performed by: COLON & RECTAL SURGERY

## 2019-02-26 PROCEDURE — 3079F DIAST BP 80-89 MM HG: CPT | Mod: CPTII,S$GLB,, | Performed by: COLON & RECTAL SURGERY

## 2019-02-26 PROCEDURE — 3008F BODY MASS INDEX DOCD: CPT | Mod: CPTII,S$GLB,, | Performed by: COLON & RECTAL SURGERY

## 2019-02-26 RX ORDER — ALBUTEROL SULFATE 90 UG/1
AEROSOL, METERED RESPIRATORY (INHALATION)
Refills: 11 | COMMUNITY
Start: 2019-02-19 | End: 2020-07-28

## 2019-03-08 ENCOUNTER — HOSPITAL ENCOUNTER (OUTPATIENT)
Dept: CARDIOLOGY | Facility: CLINIC | Age: 64
Discharge: HOME | End: 2019-03-08
Attending: INTERNAL MEDICINE
Payer: COMMERCIAL

## 2019-03-08 ENCOUNTER — PATIENT MESSAGE (OUTPATIENT)
Dept: CARDIOLOGY | Facility: CLINIC | Age: 64
End: 2019-03-08

## 2019-03-08 VITALS
BODY MASS INDEX: 28.66 KG/M2 | DIASTOLIC BLOOD PRESSURE: 80 MMHG | HEIGHT: 65 IN | HEART RATE: 84 BPM | SYSTOLIC BLOOD PRESSURE: 138 MMHG | WEIGHT: 172 LBS

## 2019-03-08 DIAGNOSIS — I10 ESSENTIAL HYPERTENSION: ICD-10-CM

## 2019-03-08 DIAGNOSIS — I51.81 TAKOTSUBO CARDIOMYOPATHY: ICD-10-CM

## 2019-03-08 LAB
ASCENDING AORTA: 3.14 CM
AV INDEX (PROSTH): 0.75
AV MEAN GRADIENT: 4.08 MMHG
AV PEAK GRADIENT: 9.24 MMHG
AV VALVE AREA: 2.73 CM2
AV VELOCITY RATIO: 0.65
BSA FOR ECHO PROCEDURE: 1.89 M2
CV ECHO LV RWT: 0.38 CM
DOP CALC AO PEAK VEL: 1.52 M/S
DOP CALC AO VTI: 27.57 CM
DOP CALC LVOT AREA: 3.66 CM2
DOP CALC LVOT DIAMETER: 2.16 CM
DOP CALC LVOT PEAK VEL: 0.99 M/S
DOP CALC LVOT STROKE VOLUME: 75.26 CM3
DOP CALCLVOT PEAK VEL VTI: 20.55 CM
E WAVE DECELERATION TIME: 162.12 MSEC
E/A RATIO: 0.79
E/E' RATIO: 13.5
ECHO LV POSTERIOR WALL: 0.89 CM (ref 0.6–1.1)
FRACTIONAL SHORTENING: 32 % (ref 28–44)
INTERVENTRICULAR SEPTUM: 0.93 CM (ref 0.6–1.1)
LA MAJOR: 4.19 CM
LA MINOR: 4.12 CM
LA WIDTH: 4.04 CM
LEFT ATRIUM SIZE: 3.33 CM
LEFT ATRIUM VOLUME INDEX: 25.6 ML/M2
LEFT ATRIUM VOLUME: 47.51 CM3
LEFT INTERNAL DIMENSION IN SYSTOLE: 3.14 CM (ref 2.1–4)
LEFT VENTRICLE DIASTOLIC VOLUME INDEX: 53.66 ML/M2
LEFT VENTRICLE DIASTOLIC VOLUME: 99.56 ML
LEFT VENTRICLE MASS INDEX: 76.4 G/M2
LEFT VENTRICLE SYSTOLIC VOLUME INDEX: 21.1 ML/M2
LEFT VENTRICLE SYSTOLIC VOLUME: 39.22 ML
LEFT VENTRICULAR INTERNAL DIMENSION IN DIASTOLE: 4.64 CM (ref 3.5–6)
LEFT VENTRICULAR MASS: 141.78 G
LV LATERAL E/E' RATIO: 11.57
LV SEPTAL E/E' RATIO: 16.2
MV PEAK A VEL: 1.02 M/S
MV PEAK E VEL: 0.81 M/S
PISA TR MAX VEL: 2.6 M/S
PULM VEIN S/D RATIO: 0.96
PV PEAK D VEL: 0.5 M/S
PV PEAK S VEL: 0.48 M/S
RA MAJOR: 4.25 CM
RA PRESSURE: 3 MMHG
RA WIDTH: 2.59 CM
RIGHT VENTRICULAR END-DIASTOLIC DIMENSION: 2.67 CM
RV TISSUE DOPPLER FREE WALL SYSTOLIC VELOCITY 1 (APICAL 4 CHAMBER VIEW): 10 M/S
SINUS: 2.85 CM
STJ: 2.84 CM
TDI LATERAL: 0.07
TDI SEPTAL: 0.05
TDI: 0.06
TR MAX PG: 27.04 MMHG
TRICUSPID ANNULAR PLANE SYSTOLIC EXCURSION: 1.77 CM
TV REST PULMONARY ARTERY PRESSURE: 30 MMHG

## 2019-03-08 PROCEDURE — 93306 TRANSTHORACIC ECHO (TTE) COMPLETE (CUPID ONLY): ICD-10-PCS | Mod: S$GLB,,, | Performed by: INTERNAL MEDICINE

## 2019-03-08 PROCEDURE — 93306 TTE W/DOPPLER COMPLETE: CPT | Mod: S$GLB,,, | Performed by: INTERNAL MEDICINE

## 2019-03-11 ENCOUNTER — PATIENT MESSAGE (OUTPATIENT)
Dept: INTERNAL MEDICINE | Facility: CLINIC | Age: 64
End: 2019-03-11

## 2019-03-11 DIAGNOSIS — E87.6 DIURETIC-INDUCED HYPOKALEMIA: ICD-10-CM

## 2019-03-11 DIAGNOSIS — I10 BENIGN ESSENTIAL HTN: ICD-10-CM

## 2019-03-11 DIAGNOSIS — T50.2X5A DIURETIC-INDUCED HYPOKALEMIA: ICD-10-CM

## 2019-03-13 RX ORDER — POTASSIUM CHLORIDE 750 MG/1
TABLET, EXTENDED RELEASE ORAL
Qty: 30 TABLET | Refills: 0 | OUTPATIENT
Start: 2019-03-13

## 2019-03-13 RX ORDER — POTASSIUM CHLORIDE 750 MG/1
10 TABLET, EXTENDED RELEASE ORAL DAILY
Qty: 30 TABLET | Refills: 3 | Status: SHIPPED | OUTPATIENT
Start: 2019-03-13 | End: 2020-01-27

## 2019-03-15 DIAGNOSIS — J42 CHRONIC BRONCHITIS, UNSPECIFIED CHRONIC BRONCHITIS TYPE: Primary | ICD-10-CM

## 2019-03-18 ENCOUNTER — PATIENT OUTREACH (OUTPATIENT)
Dept: OTHER | Facility: OTHER | Age: 64
End: 2019-03-18

## 2019-03-18 NOTE — PROGRESS NOTES
Last 5 Patient Entered Readings                                      Current 30 Day Average: 132/91     Recent Readings 3/18/2019 3/18/2019 3/18/2019 3/18/2019 3/16/2019    SBP (mmHg) 138 138 133 133 112    DBP (mmHg) 84 84 94 94 89    Pulse 80 80 82 82 85          Digital Medicine: Health  Follow Up    Left voicemail to follow up with Ms. Ana Aguila.  Current BP average 132/91 mmHg is not at goal, <130/80 mmHg.  PharmD, BERRY Johns, also contacting patient. Will follow up in 4 weeks.

## 2019-03-19 ENCOUNTER — HOSPITAL ENCOUNTER (OUTPATIENT)
Dept: RADIOLOGY | Facility: HOSPITAL | Age: 64
Discharge: HOME | End: 2019-03-19
Attending: INTERNAL MEDICINE
Payer: COMMERCIAL

## 2019-03-19 DIAGNOSIS — J42 CHRONIC BRONCHITIS, UNSPECIFIED CHRONIC BRONCHITIS TYPE: ICD-10-CM

## 2019-03-19 PROCEDURE — 71046 X-RAY EXAM CHEST 2 VIEWS: CPT | Mod: 26,,, | Performed by: RADIOLOGY

## 2019-03-19 PROCEDURE — 71046 X-RAY EXAM CHEST 2 VIEWS: CPT | Mod: TC

## 2019-03-19 PROCEDURE — 71046 XR CHEST PA AND LATERAL: ICD-10-PCS | Mod: 26,,, | Performed by: RADIOLOGY

## 2019-03-26 ENCOUNTER — TELEPHONE (OUTPATIENT)
Dept: SURGERY | Facility: CLINIC | Age: 64
End: 2019-03-26

## 2019-03-26 DIAGNOSIS — Z12.31 VISIT FOR SCREENING MAMMOGRAM: Primary | ICD-10-CM

## 2019-03-26 NOTE — TELEPHONE ENCOUNTER
----- Message from Geena Colin sent at 3/26/2019 12:05 PM CDT -----  Contact: Pt.Self  Patient Returning Call from [facility]    Who Left Message for Patient:       Communication Preference:  [phone]     Additional Information:    Thank You :)

## 2019-03-26 NOTE — TELEPHONE ENCOUNTER
Returned the patient call regarding the message below.  Explained to the patient that Derrick Ramirez NP would like for her to have a mammogram as well as breast exam in June.  Stated to the patient to come to her appointment at the time that it is scheduled and she will have the mammogram done after the breast exam.The patient is scheduled to be seen on Tuesday 6/18/19 2:30 pm breast exam with Derrick Ramirez NP and mammogram to follow at 3:15 pm the same day.  The patient voiced understanding of this information.  Reminder letter mailed to the patient.

## 2019-03-26 NOTE — TELEPHONE ENCOUNTER
Message left for  regarding message left regarding her apt on 6/18/19 @ 2:30 pm . Message left for patient to please call Marimar flynn @ [phone] . I will be happy to help her with her questions regarding her apt,

## 2019-04-01 ENCOUNTER — PATIENT OUTREACH (OUTPATIENT)
Dept: OTHER | Facility: OTHER | Age: 64
End: 2019-04-01

## 2019-04-01 NOTE — PROGRESS NOTES
Last 5 Patient Entered Readings                                      Current 30 Day Average: 130/89     Recent Readings 3/28/2019 3/28/2019 3/28/2019 3/28/2019 3/26/2019    SBP (mmHg) 136 136 147 147 136    DBP (mmHg) 86 86 93 93 84    Pulse 80 80 80 80 80        Per 30 day average, 130/89 mmHg, patient's BP is approaching  goal.     Ms. Aguila does acknowledge that her BP is not at goal. Discussed increasing candesartan to 8 mg QD as this is the standard starting dose for HTN, but she declines. She states that she does not want to increase any medication doses at this time. She will be seeing Dr. Cool next month and will discuss this with her. Will follow up after this visit.     Will continue to monitor regularly. Will follow up in 2-3 months, sooner if BP begins to trend upward or downward.    Asked patient to call or message with questions or concerns.     Current HTN regimen:  Hypertension Medications             candesartan (ATACAND) 4 MG tablet Take 1 tablet (4 mg total) by mouth once daily.    metoprolol succinate (TOPROL-XL) 25 MG 24 hr tablet Take 1 tablet (25 mg total) by mouth once daily.

## 2019-04-02 ENCOUNTER — PATIENT MESSAGE (OUTPATIENT)
Dept: INTERNAL MEDICINE | Facility: CLINIC | Age: 64
End: 2019-04-02

## 2019-04-03 ENCOUNTER — HOSPITAL ENCOUNTER (OUTPATIENT)
Dept: PULMONOLOGY | Facility: CLINIC | Age: 64
Discharge: HOME | End: 2019-04-03
Payer: COMMERCIAL

## 2019-04-03 ENCOUNTER — OFFICE VISIT (OUTPATIENT)
Dept: PULMONOLOGY | Facility: CLINIC | Age: 64
End: 2019-04-03
Payer: COMMERCIAL

## 2019-04-03 VITALS
BODY MASS INDEX: 28.46 KG/M2 | SYSTOLIC BLOOD PRESSURE: 142 MMHG | HEART RATE: 81 BPM | OXYGEN SATURATION: 92 % | WEIGHT: 171 LBS | DIASTOLIC BLOOD PRESSURE: 78 MMHG

## 2019-04-03 DIAGNOSIS — R91.1 LUNG NODULE: ICD-10-CM

## 2019-04-03 DIAGNOSIS — J43.2 CENTRILOBULAR EMPHYSEMA: Primary | ICD-10-CM

## 2019-04-03 DIAGNOSIS — J96.11 CHRONIC RESPIRATORY FAILURE WITH HYPOXIA: ICD-10-CM

## 2019-04-03 DIAGNOSIS — J42 CHRONIC BRONCHITIS, UNSPECIFIED CHRONIC BRONCHITIS TYPE: Primary | ICD-10-CM

## 2019-04-03 DIAGNOSIS — J42 CHRONIC BRONCHITIS: ICD-10-CM

## 2019-04-03 DIAGNOSIS — J42 CHRONIC BRONCHITIS, UNSPECIFIED CHRONIC BRONCHITIS TYPE: ICD-10-CM

## 2019-04-03 DIAGNOSIS — J44.9 CHRONIC OBSTRUCTIVE PULMONARY DISEASE, UNSPECIFIED COPD TYPE: ICD-10-CM

## 2019-04-03 LAB
POST FEV1 FVC: 0.36
POST FEV1: 0.49
POST FVC: 1.37
PRE FEV1 FVC: 34
PRE FEV1: 0.5
PRE FVC: 1.45
PREDICTED FEV1 FVC: 79
PREDICTED FEV1: 2.42
PREDICTED FVC: 3.06

## 2019-04-03 PROCEDURE — 3077F PR MOST RECENT SYSTOLIC BLOOD PRESSURE >= 140 MM HG: ICD-10-PCS | Mod: CPTII,S$GLB,, | Performed by: INTERNAL MEDICINE

## 2019-04-03 PROCEDURE — 3078F PR MOST RECENT DIASTOLIC BLOOD PRESSURE < 80 MM HG: ICD-10-PCS | Mod: CPTII,S$GLB,, | Performed by: INTERNAL MEDICINE

## 2019-04-03 PROCEDURE — 3008F PR BODY MASS INDEX (BMI) DOCUMENTED: ICD-10-PCS | Mod: CPTII,S$GLB,, | Performed by: INTERNAL MEDICINE

## 2019-04-03 PROCEDURE — 3078F DIAST BP <80 MM HG: CPT | Mod: CPTII,S$GLB,, | Performed by: INTERNAL MEDICINE

## 2019-04-03 PROCEDURE — 99214 OFFICE O/P EST MOD 30 MIN: CPT | Mod: 25,S$GLB,, | Performed by: INTERNAL MEDICINE

## 2019-04-03 PROCEDURE — 94060 EVALUATION OF WHEEZING: CPT | Mod: S$GLB,,, | Performed by: INTERNAL MEDICINE

## 2019-04-03 PROCEDURE — 94060 PR EVAL OF BRONCHOSPASM: ICD-10-PCS | Mod: S$GLB,,, | Performed by: INTERNAL MEDICINE

## 2019-04-03 PROCEDURE — 3008F BODY MASS INDEX DOCD: CPT | Mod: CPTII,S$GLB,, | Performed by: INTERNAL MEDICINE

## 2019-04-03 PROCEDURE — 3077F SYST BP >= 140 MM HG: CPT | Mod: CPTII,S$GLB,, | Performed by: INTERNAL MEDICINE

## 2019-04-03 PROCEDURE — 99999 PR PBB SHADOW E&M-EST. PATIENT-LVL III: ICD-10-PCS | Mod: PBBFAC,,, | Performed by: INTERNAL MEDICINE

## 2019-04-03 PROCEDURE — 99999 PR PBB SHADOW E&M-EST. PATIENT-LVL III: CPT | Mod: PBBFAC,,, | Performed by: INTERNAL MEDICINE

## 2019-04-03 PROCEDURE — 99214 PR OFFICE/OUTPT VISIT, EST, LEVL IV, 30-39 MIN: ICD-10-PCS | Mod: 25,S$GLB,, | Performed by: INTERNAL MEDICINE

## 2019-04-03 RX ORDER — AMILORIDE HYDROCHLORIDE AND HYDROCHLOROTHIAZIDE 5; 50 MG/1; MG/1
1 TABLET ORAL DAILY
COMMUNITY
Start: 2017-10-25 | End: 2019-05-29 | Stop reason: ALTCHOICE

## 2019-04-03 NOTE — PROGRESS NOTES
Subjective:      Patient ID: Ana Aguila is a 63 y.o. female.    Chief Complaint: COPD and Shortness of Breath    HPI 62 yo female with compensated respiratory failure on supplemental oxygen secondary to severe pulmonary emphysema comes her periodic visit. She is still working at [employer] and says that her new boss is an improvement and understands the hardships that she has secondary to her illness. She is concern about a CT report done in January when she was admitted with chest pain. She has a 0.7 cm density in the subpleural area of the JANIS. Needs a follow up in January, 2020.       No flowsheet data found.  Review of Systems   Constitutional: Negative.    HENT: Negative.    Eyes: Negative.    Respiratory: Negative.  Positive for dyspnea on extertion.         On supplemental oxygen    Pulmonary Emphysema    Sa02:94%  Reviewed her CT from Jan. 2019  Reassured her that it was not a major worry and would repeat a CT in Jan. 2020   Cardiovascular: Negative.    Genitourinary: Negative.    Musculoskeletal: Negative.    Skin: Negative.    Gastrointestinal: Negative.         S/P Partial colectomy after perforation during a colonoscope   Neurological: Negative.    Psychiatric/Behavioral: The patient is nervous/anxious.         Is worried about the CT report, I told her that I was paid to worry and I am not worried. She seemed relieved    Her granddaughter is moving to [place] in July to go to work at [employer], She is very happy about that.      Objective:     Physical Exam   Constitutional: She is oriented to person, place, and time. She appears well-developed and well-nourished. No distress.   HENT:   Head: Normocephalic and atraumatic.   Right Ear: External ear normal.   Left Ear: External ear normal.   Nose: Nose normal.   Mouth/Throat: Oropharynx is clear and moist.   Eyes: Pupils are equal, round, and reactive to light. Conjunctivae and EOM are normal.   Neck: Normal range of motion. Neck supple.  No JVD present. No thyromegaly present.   Cardiovascular: Normal rate, regular rhythm, normal heart sounds and intact distal pulses. Exam reveals no gallop.   No murmur heard.  Pulmonary/Chest: Breath sounds normal. No stridor. No respiratory distress. She has no wheezes. She has no rales. She exhibits no tenderness.   FEV-1: 34% the same as it was in April, 2017!!  Decreased air entry throughout. No focal wheezes or rales.   Abdominal: Soft. Bowel sounds are normal. She exhibits no distension and no mass. There is no tenderness. There is no rebound and no guarding.   Musculoskeletal: Normal range of motion. She exhibits no edema.   Lymphadenopathy:     She has no cervical adenopathy.   Neurological: She is alert and oriented to person, place, and time. She has normal reflexes. She displays normal reflexes. No cranial nerve deficit.   Skin: Skin is warm and dry. No rash noted.   Psychiatric: She has a normal mood and affect. Her behavior is normal. Judgment and thought content normal.   Nursing note and vitals reviewed.      Assessment:     1. Centrilobular emphysema    2. Chronic respiratory failure with hypoxia      Outpatient Encounter Medications as of 4/3/2019   Medication Sig Dispense Refill    ADVAIR DISKUS 250-50 mcg/dose diskus inhaler INHALE 1 PUFF BY MOUTH EVERY 12 HOURS (Patient taking differently: INHALE 1 PUFF BY MOUTH EVERY 12 HOURS AS NEEDED) 1 each 12    albuterol-ipratropium (DUO-NEB) 2.5 mg-0.5 mg/3 mL nebulizer solution TAKE 3 MLS BY NEBULIZER EVERY 6 HOURS AS NEEDED FOR WHEEZING OR SHORTNESS OF BREATH 270 mL 0    amiloride-hydrochlorothiazide 5-50mg (MODURETIC 5-50) 5-50 mg Tab 1 tablet once daily.      candesartan (ATACAND) 4 MG tablet Take 1 tablet (4 mg total) by mouth once daily. 90 tablet 3    ergocalciferol (ERGOCALCIFEROL) 50,000 unit Cap Take 1 capsule (50,000 Units total) by mouth every 7 days. (Patient taking differently: Take 50,000 Units by mouth every Monday. ) 12 capsule 1     ipratropium (ATROVENT HFA) 17 mcg/actuation inhaler Inhale 2 puffs into the lungs every 6 (six) hours as needed for Wheezing. Rescue 1 Inhaler 2    ipratropium (ATROVENT) 0.02 % nebulizer solution UNWRAP AND INHALE THE CONTENTS OF 1 VIAL VIA NEBULIZER UP TO FOUR TIMES DAILY IF NEEDED (RESCUE) 62.5 mL 0    loperamide (IMODIUM) 2 mg capsule Take 2 mg by mouth 4 (four) times daily as needed for Diarrhea.      metoprolol succinate (TOPROL-XL) 25 MG 24 hr tablet Take 1 tablet (25 mg total) by mouth once daily. 30 tablet 11    potassium chloride (KLOR-CON) 10 MEQ TbSR Take 1 tablet (10 mEq total) by mouth once daily. 30 tablet 3    PROAIR HFA 90 mcg/actuation inhaler INHALE 2 PUFFS PO QID PRN FOR COPD  11     No facility-administered encounter medications on file as of 4/3/2019.        Plan:   IMP Compensated COPD with respiratory failure. Reassured her that the small nodule in the JANIS is of no concern. Sign forms so she can return  To the pulmonary rehab program at [facility]  Problem List Items Addressed This Visit     Chronic obstructive pulmonary disease - Primary      Other Visit Diagnoses     Chronic respiratory failure with hypoxia

## 2019-04-04 NOTE — TELEPHONE ENCOUNTER
Spoke with pt she states her concern was from her CTA Chest Non- Coronary on 11/18/2019 should a  0.7 cm ground-glass nodule within the left upper lobe---but she was able to see her pulmonologist yesterday 04/03/2019.

## 2019-04-04 NOTE — TELEPHONE ENCOUNTER
Please check on what her concern is. If urgent and nothing comes open on my schedule, please schedule to see Lesly on a day that I am here, so that I can be reached if necessary  Msg also sent to patient

## 2019-04-13 DIAGNOSIS — J44.9 CHRONIC OBSTRUCTIVE PULMONARY DISEASE, UNSPECIFIED COPD TYPE: ICD-10-CM

## 2019-04-15 RX ORDER — IPRATROPIUM BROMIDE AND ALBUTEROL SULFATE 2.5; .5 MG/3ML; MG/3ML
SOLUTION RESPIRATORY (INHALATION)
Qty: 270 ML | Refills: 0 | OUTPATIENT
Start: 2019-04-15

## 2019-04-17 NOTE — PROGRESS NOTES
Last 5 Patient Entered Readings                                      Current 30 Day Average: 128/88     Recent Readings 4/17/2019 4/17/2019 4/16/2019 4/16/2019 4/15/2019    SBP (mmHg) 129 129 122 122 126    DBP (mmHg) 83 83 79 79 87    Pulse 84 84 80 80 87        Digital Medicine: Health  Follow Up    Lifestyle Modifications:    1.Dietary Modifications (Sodium intake <2,000mg/day, food labels, dining out): Patient reports that she has lost some weight by cutting back on portions.     2.Physical Activity: Ms. Aguila is waiting to complete paperwork to resume pulmonary rehab.     3.Medication Therapy: Patient has been compliant with the medication regimen.    4.Patient has the following medication side effects/concerns: None  (Frequency/Alleviating factors/Precipitating factors, etc.)     Follow up with Ms. Ana Aguila completed. Ms. Aguila is doing okay. She is pleased with recent BP readings, but cannot attribute any lifestyle changes to the improvement. She will continue to monitor. No further questions or concerns. Will continue to follow up to achieve health goals.

## 2019-04-24 DIAGNOSIS — J44.9 CHRONIC OBSTRUCTIVE PULMONARY DISEASE, UNSPECIFIED COPD TYPE: ICD-10-CM

## 2019-04-24 RX ORDER — IPRATROPIUM BROMIDE AND ALBUTEROL SULFATE 2.5; .5 MG/3ML; MG/3ML
SOLUTION RESPIRATORY (INHALATION)
Qty: 270 ML | Refills: 0 | Status: SHIPPED | OUTPATIENT
Start: 2019-04-24 | End: 2019-04-28 | Stop reason: SDUPTHER

## 2019-04-28 DIAGNOSIS — J44.9 CHRONIC OBSTRUCTIVE PULMONARY DISEASE, UNSPECIFIED COPD TYPE: ICD-10-CM

## 2019-04-28 RX ORDER — IPRATROPIUM BROMIDE AND ALBUTEROL SULFATE 2.5; .5 MG/3ML; MG/3ML
SOLUTION RESPIRATORY (INHALATION)
Qty: 270 ML | Refills: 0 | Status: SHIPPED | OUTPATIENT
Start: 2019-04-28 | End: 2019-05-14 | Stop reason: SDUPTHER

## 2019-05-02 ENCOUNTER — OFFICE VISIT (OUTPATIENT)
Dept: GASTROENTEROLOGY | Facility: CLINIC | Age: 64
End: 2019-05-02
Payer: COMMERCIAL

## 2019-05-02 VITALS
SYSTOLIC BLOOD PRESSURE: 136 MMHG | HEIGHT: 65 IN | HEART RATE: 75 BPM | BODY MASS INDEX: 28.54 KG/M2 | DIASTOLIC BLOOD PRESSURE: 84 MMHG | WEIGHT: 171.31 LBS

## 2019-05-02 DIAGNOSIS — R07.81 RIB PAIN ON RIGHT SIDE: Primary | ICD-10-CM

## 2019-05-02 PROCEDURE — 3079F DIAST BP 80-89 MM HG: CPT | Mod: CPTII,S$GLB,, | Performed by: PHYSICIAN ASSISTANT

## 2019-05-02 PROCEDURE — 99999 PR PBB SHADOW E&M-EST. PATIENT-LVL IV: CPT | Mod: PBBFAC,,, | Performed by: PHYSICIAN ASSISTANT

## 2019-05-02 PROCEDURE — 3008F BODY MASS INDEX DOCD: CPT | Mod: CPTII,S$GLB,, | Performed by: PHYSICIAN ASSISTANT

## 2019-05-02 PROCEDURE — 3008F PR BODY MASS INDEX (BMI) DOCUMENTED: ICD-10-PCS | Mod: CPTII,S$GLB,, | Performed by: PHYSICIAN ASSISTANT

## 2019-05-02 PROCEDURE — 3079F PR MOST RECENT DIASTOLIC BLOOD PRESSURE 80-89 MM HG: ICD-10-PCS | Mod: CPTII,S$GLB,, | Performed by: PHYSICIAN ASSISTANT

## 2019-05-02 PROCEDURE — 99213 OFFICE O/P EST LOW 20 MIN: CPT | Mod: S$GLB,,, | Performed by: PHYSICIAN ASSISTANT

## 2019-05-02 PROCEDURE — 99213 PR OFFICE/OUTPT VISIT, EST, LEVL III, 20-29 MIN: ICD-10-PCS | Mod: S$GLB,,, | Performed by: PHYSICIAN ASSISTANT

## 2019-05-02 PROCEDURE — 99999 PR PBB SHADOW E&M-EST. PATIENT-LVL IV: ICD-10-PCS | Mod: PBBFAC,,, | Performed by: PHYSICIAN ASSISTANT

## 2019-05-02 PROCEDURE — 3075F PR MOST RECENT SYSTOLIC BLOOD PRESS GE 130-139MM HG: ICD-10-PCS | Mod: CPTII,S$GLB,, | Performed by: PHYSICIAN ASSISTANT

## 2019-05-02 PROCEDURE — 3075F SYST BP GE 130 - 139MM HG: CPT | Mod: CPTII,S$GLB,, | Performed by: PHYSICIAN ASSISTANT

## 2019-05-02 NOTE — PROGRESS NOTES
[facility] Consultation Note    Reason for Consult:  The encounter diagnosis was Rib pain on right side.    PCP:   Erik Cool       Referring MD:  Erik Cool Md  [address] [place] [address]    HPI:  This is a 63 y.o. female here for a follow up on her abdominal pain  Last seen 2/2017 for RUQ. CT scan was unrevealing.    Interval history  Today she continues to have RUQ  RUQ is Tender to the touch  Saw some blood recently when wiping on the tissue  On 2 occasions she has Seen her pills in her stool  She thinks it may be her potassium pill  Denies diarrhea, melena  CT scan to evaluate her right upper quadrant pain was unrevealing  09/22/2017 blood occult stools tests were negative x3  She says she is still not interested in having an EGD or colonoscopy    Today she says that the RUQ pain restarted 1.5 weeks ago  Increases with movements  Pain may have been originated with diarrhea that has since resolved   Took imodium and metamucil regimen and the pain gradually resolved  Admits to rectal bleeding during this episode    S/p cholecystectomy.    She is still not interested in having a colonscopy due to the complication of her last colonoscopy that included perforation and perla-colectomy.    I spoke with her today about Cologuard. She is not interested.    ROS:  Constitutional: No fevers, chills, No weight loss  ENT: No allergies  CV: No chest pain  Pulm: No cough, + shortness of breath  Ophtho: No vision changes  GI: see HPI  Derm: No rash  Heme: No lymphadenopathy, No bruising  MSK: No arthritis  : No dysuria, No hematuria  Endo: No hot or cold intolerance  Neuro: No syncope, No seizure  Psych: No anxiety, No depression    Medical History:  has a past medical history of Addiction to drug, Anxiety, Aortic calcification (1/11/2018), Chronic diarrhea, Chronic obstructive pulmonary disease (8/27/2013), Chronic respiratory failure with hypoxia, on home O2 therapy  (1/11/2018), COPD (chronic obstructive pulmonary disease), Depression, Essential hypertension (8/12/2013), Former smoker (10/18/2016), Hepatomegaly (12/21/2015), Hyperlipidemia, Hypertension, MI (myocardial infarction), Microscopic hematuria (1/13/2017), Panic disorder, Perianal abscess (6/1/2018), Reflux (2013), S/P right hemicolectomy (9/7/2017), Sleep difficulties, and Takotsubo cardiomyopathy (10/18/2016).    Surgical History:  has a past surgical history that includes Hysterectomy (1986); Left leg surgery; Colon surgery (2011); Cholecystectomy (2013); Hiatal hernia repair (2013); Abdominal surgery; Hernia repair; Appendectomy; Fracture surgery; and Colonoscopy.    Family History: family history includes Abnormal EKG in her daughter; Breast cancer in her maternal grandmother; Breast cancer (age of onset: 43) in her daughter; Breast cancer (age of onset: 44) in her other; Cancer in her mother; Cataracts in her maternal grandmother; Coronary artery disease in her father; Hypertension in her brother, mother, and sister; Retinal detachment in her father..     Social History:  reports that she quit smoking about 22 years ago. Her smoking use included cigarettes. She has a 45.00 pack-year smoking history. She has never used smokeless tobacco. She reports that she does not drink alcohol or use drugs.    Review of patient's allergies indicates:   Allergen Reactions    Sudafed [pseudoephedrine hcl] Nausea And Vomiting and Other (See Comments)     JITTERY       Current Outpatient Medications on File Prior to Visit   Medication Sig Dispense Refill    ADVAIR DISKUS 250-50 mcg/dose diskus inhaler INHALE 1 PUFF BY MOUTH EVERY 12 HOURS (Patient taking differently: INHALE 1 PUFF BY MOUTH EVERY 12 HOURS AS NEEDED) 1 each 12    albuterol-ipratropium (DUO-NEB) 2.5 mg-0.5 mg/3 mL nebulizer solution TAKE 3 MLS BY NEBULIZER EVERY 6 HOURS AS NEEDED FOR WHEEZING OR SHORTNESS OF BREATH 270 mL 0    candesartan (ATACAND) 4 MG tablet  "Take 1 tablet (4 mg total) by mouth once daily. 90 tablet 3    ergocalciferol (ERGOCALCIFEROL) 50,000 unit Cap Take 1 capsule (50,000 Units total) by mouth every 7 days. (Patient taking differently: Take 50,000 Units by mouth every Monday. ) 12 capsule 1    ipratropium (ATROVENT HFA) 17 mcg/actuation inhaler Inhale 2 puffs into the lungs every 6 (six) hours as needed for Wheezing. Rescue 1 Inhaler 2    ipratropium (ATROVENT) 0.02 % nebulizer solution UNWRAP AND INHALE THE CONTENTS OF 1 VIAL VIA NEBULIZER UP TO FOUR TIMES DAILY IF NEEDED (RESCUE) 62.5 mL 0    loperamide (IMODIUM) 2 mg capsule Take 2 mg by mouth 4 (four) times daily as needed for Diarrhea.      metoprolol succinate (TOPROL-XL) 25 MG 24 hr tablet Take 1 tablet (25 mg total) by mouth once daily. 30 tablet 11    potassium chloride (KLOR-CON) 10 MEQ TbSR Take 1 tablet (10 mEq total) by mouth once daily. 30 tablet 3    PROAIR HFA 90 mcg/actuation inhaler INHALE 2 PUFFS PO QID PRN FOR COPD  11    amiloride-hydrochlorothiazide 5-50mg (MODURETIC 5-50) 5-50 mg Tab 1 tablet once daily.       No current facility-administered medications on file prior to visit.          Objective Findings:    Vital Signs:  /84 (BP Location: Left arm, Patient Position: Sitting)   Pulse 75   Ht 5' 5" (1.651 m)   Wt 77.7 kg (171 lb 4.8 oz)   LMP  (LMP Unknown)   BMI 28.51 kg/m²   Body mass index is 28.51 kg/m².    Physical Exam:  General Appearance: Well appearing in no acute distress, portable O2  Head:   Normocephalic, without obvious abnormality  Eyes:    No scleral icterus  ENT: Neck supple, Lips, mucosa, and tongue normal  Lungs: CTA  Heart:  Regular rate and rhythm, S1, S2 normal, no murmurs heard  Abdomen: Soft, right ribs hypersensitive to palpation, non distended with positive bowel sounds in all four quadrants.   Extremities: 2+ pulses, no edema  Skin: No rash  Neurologic: AAO x 3      Labs:  Lab Results   Component Value Date    WBC 13.33 (H) 11/21/2018 "    HGB 11.6 (L) 11/21/2018    HCT 37.1 11/21/2018     11/21/2018    CHOL 219 (H) 09/10/2018    TRIG 64 09/10/2018    HDL 66 09/10/2018    ALT 7 (L) 01/31/2019    AST 17 01/31/2019     02/25/2019    K 4.0 02/25/2019     02/25/2019    CREATININE 0.8 02/25/2019    BUN 10 02/25/2019    CO2 30 (H) 02/25/2019    TSH 1.139 11/18/2018    INR 1.2 10/18/2016    HGBA1C 5.4 10/18/2016       Imaging:  ABD US - hepatomegaly, s/p cholecystectomy  Endoscopy:    Colon 7/2010- polyp x 1, complication of perforation    Assessment:  1. Rib pain on right side      61yo F with chronic intermittent RUQ pain.  On exam her right ribs are hypersensitive to palpation. The right upper quadrant itself is nontender 03/19/2019 chest x-ray did not reveal a rib fracture.  Pain does not seem GI related.    Recommendations:  1. Advised scheduling a EGD to rule out duodenitis but she declined. I also advised scheduling a colonoscopy but she declined    2.  I offered a trial of PPI and dicyclomine but she also declined    3.  Advised tens units and heating pad for the pain. Also advised she talk with her PCP about a trial of Neurontin.  Fibromyalgia is also positive  No follow-ups on file.      Order summary:         Thank you so much for allowing me to participate in the care of Ana Liriano PA-C

## 2019-05-02 NOTE — LETTER
May 3, 2019      Erik Cool MD  [address]  [place] [address]           [facility]  [address]  [place] [address]  Phone: [phone]  Fax: [phone]          Patient: Ana Aguila   MR Number: [id number]   YOB: 1955   Date of Visit: 5/2/2019       Dear Dr. Erik Cool:    Thank you for referring Ana Aguila to me for evaluation. Attached you will find relevant portions of my assessment and plan of care.    If you have questions, please do not hesitate to call me. I look forward to following Ana Aguila along with you.    Sincerely,    Marizol Liriano PA-C    Enclosure  CC:  No Recipients    If you would like to receive this communication electronically, please contact [email or web address] or [phone] to request more information on [employer] access.    For providers and/or their staff who would like to refer a patient to [facility], please contact us through our one-stop-shop provider referral line, [facility], at [phone].    If you feel you have received this communication in error or would no longer like to receive these types of communications, please e-mail [email or web address]

## 2019-05-14 DIAGNOSIS — J44.9 CHRONIC OBSTRUCTIVE PULMONARY DISEASE, UNSPECIFIED COPD TYPE: ICD-10-CM

## 2019-05-14 NOTE — TELEPHONE ENCOUNTER
I called [facility] today seeking approval for outpatient Pulmonary Rehab @ [facility]. Unfortunately [facility] is out of network for  Ro policy. The nurse [phone] will try to get approval, however patient may have to use [facility]. This phone call to [employer] took 25 minutes and approval is pending. Anastacia Chavez LPN..

## 2019-05-14 NOTE — TELEPHONE ENCOUNTER
----- Message from Kandy Levi MA sent at 5/13/2019  3:45 PM CDT -----  Contact:      Arielle        ----- Message -----  From: Daisy Underwood  Sent: 5/13/2019   3:39 PM  To: [facility] Staff    Needs Advice  /  Pulmonary Rehab [facility]     Reason for call:     Authorization paper is needed from [facility] ... Pt upcoming darius is on this coming Thursday ..  Will have to cancel pt appt  if not received         Communication Preference:   Phone   [phone]    Additional Information:

## 2019-05-14 NOTE — TELEPHONE ENCOUNTER
----- Message from Kandy Levi MA sent at 5/13/2019  3:45 PM CDT -----  Contact:      Arielle        ----- Message -----  From: Daisy Underwood  Sent: 5/13/2019   3:39 PM  To: [facility] Staff    Needs Advice  /  Pulmonary Rehab [facility]     Reason for call:     Authorization paper is needed from [facility] ... Pt upcoming darius is on this coming Thursday ..  Will have to cancel pt appt  if not received         Communication Preference:   Phone   [phone]    Additional Information:

## 2019-05-15 RX ORDER — IPRATROPIUM BROMIDE AND ALBUTEROL SULFATE 2.5; .5 MG/3ML; MG/3ML
SOLUTION RESPIRATORY (INHALATION)
Qty: 270 ML | Refills: 11 | Status: SHIPPED | OUTPATIENT
Start: 2019-05-15 | End: 2020-02-29

## 2019-05-20 ENCOUNTER — NURSE TRIAGE (OUTPATIENT)
Dept: ADMINISTRATIVE | Facility: CLINIC | Age: 64
End: 2019-05-20

## 2019-05-20 DIAGNOSIS — J44.0 CHRONIC OBSTRUCTIVE PULMONARY DISEASE WITH ACUTE LOWER RESPIRATORY INFECTION: ICD-10-CM

## 2019-05-20 NOTE — TELEPHONE ENCOUNTER
Automated refill request rec'd for dexamethasone, which is not active on her med list and was last prescribed for COPD exacerbation.  Please reach out to patient to see if she intended to request this medication, and if so why. If she is having COPD exacerbation symptoms - if so I would recommend she schedule UC visit today or see her pulmonologist if possible.

## 2019-05-21 ENCOUNTER — HOSPITAL ENCOUNTER (OUTPATIENT)
Dept: RADIOLOGY | Facility: HOSPITAL | Age: 64
Discharge: HOME | End: 2019-05-21
Attending: INTERNAL MEDICINE
Payer: COMMERCIAL

## 2019-05-21 ENCOUNTER — OFFICE VISIT (OUTPATIENT)
Dept: INTERNAL MEDICINE | Facility: CLINIC | Age: 64
End: 2019-05-21
Payer: COMMERCIAL

## 2019-05-21 VITALS
DIASTOLIC BLOOD PRESSURE: 80 MMHG | SYSTOLIC BLOOD PRESSURE: 160 MMHG | HEIGHT: 65 IN | WEIGHT: 171.75 LBS | HEART RATE: 93 BPM | OXYGEN SATURATION: 89 % | BODY MASS INDEX: 28.61 KG/M2

## 2019-05-21 DIAGNOSIS — J44.1 COPD WITH ACUTE EXACERBATION: ICD-10-CM

## 2019-05-21 DIAGNOSIS — J44.1 COPD WITH ACUTE EXACERBATION: Primary | ICD-10-CM

## 2019-05-21 PROCEDURE — 71046 X-RAY EXAM CHEST 2 VIEWS: CPT | Mod: 26,,, | Performed by: RADIOLOGY

## 2019-05-21 PROCEDURE — 3077F SYST BP >= 140 MM HG: CPT | Mod: CPTII,S$GLB,, | Performed by: INTERNAL MEDICINE

## 2019-05-21 PROCEDURE — 3079F DIAST BP 80-89 MM HG: CPT | Mod: CPTII,S$GLB,, | Performed by: INTERNAL MEDICINE

## 2019-05-21 PROCEDURE — 3008F BODY MASS INDEX DOCD: CPT | Mod: CPTII,S$GLB,, | Performed by: INTERNAL MEDICINE

## 2019-05-21 PROCEDURE — 99999 PR PBB SHADOW E&M-EST. PATIENT-LVL III: CPT | Mod: PBBFAC,,, | Performed by: INTERNAL MEDICINE

## 2019-05-21 PROCEDURE — 99214 PR OFFICE/OUTPT VISIT, EST, LEVL IV, 30-39 MIN: ICD-10-PCS | Mod: S$GLB,,, | Performed by: INTERNAL MEDICINE

## 2019-05-21 PROCEDURE — 99999 PR PBB SHADOW E&M-EST. PATIENT-LVL III: ICD-10-PCS | Mod: PBBFAC,,, | Performed by: INTERNAL MEDICINE

## 2019-05-21 PROCEDURE — 99214 OFFICE O/P EST MOD 30 MIN: CPT | Mod: S$GLB,,, | Performed by: INTERNAL MEDICINE

## 2019-05-21 PROCEDURE — 3079F PR MOST RECENT DIASTOLIC BLOOD PRESSURE 80-89 MM HG: ICD-10-PCS | Mod: CPTII,S$GLB,, | Performed by: INTERNAL MEDICINE

## 2019-05-21 PROCEDURE — 71046 X-RAY EXAM CHEST 2 VIEWS: CPT | Mod: TC

## 2019-05-21 PROCEDURE — 3008F PR BODY MASS INDEX (BMI) DOCUMENTED: ICD-10-PCS | Mod: CPTII,S$GLB,, | Performed by: INTERNAL MEDICINE

## 2019-05-21 PROCEDURE — 3077F PR MOST RECENT SYSTOLIC BLOOD PRESSURE >= 140 MM HG: ICD-10-PCS | Mod: CPTII,S$GLB,, | Performed by: INTERNAL MEDICINE

## 2019-05-21 PROCEDURE — 71046 XR CHEST PA AND LATERAL: ICD-10-PCS | Mod: 26,,, | Performed by: RADIOLOGY

## 2019-05-21 RX ORDER — DEXAMETHASONE 4 MG/1
TABLET ORAL
Qty: 15 TABLET | Refills: 0 | OUTPATIENT
Start: 2019-05-21

## 2019-05-21 RX ORDER — DEXAMETHASONE 4 MG/1
4 TABLET ORAL EVERY 12 HOURS
Qty: 10 TABLET | Refills: 0 | Status: SHIPPED | OUTPATIENT
Start: 2019-05-21 | End: 2019-11-14 | Stop reason: SDUPTHER

## 2019-05-21 RX ORDER — AZITHROMYCIN 250 MG/1
TABLET, FILM COATED ORAL
Qty: 6 TABLET | Refills: 0 | Status: SHIPPED | OUTPATIENT
Start: 2019-05-21 | End: 2019-05-26

## 2019-05-21 NOTE — TELEPHONE ENCOUNTER
Patient called to report the following:     -non productive cough and fatigue, difficulty breathing   -hx of COPD and 02 dependent   -denies severe difficulty breathing, chest pain   -advised to report to ED   -would like steroid and  abx called in   -on call MD Dr Morillo notified via pager and cell no answer   -pt advised to report to ED   -pt has appt UC in the am     Reason for Disposition   Difficulty breathing    Protocols used: COUGH - ACUTE NON-PRODUCTIVE-A-AH

## 2019-05-21 NOTE — TELEPHONE ENCOUNTER
I spoke to the patient. She stats that she went to urgent care this am because she did not hear back from the practice. Please cancel the request for this drug.

## 2019-05-21 NOTE — TELEPHONE ENCOUNTER
Called patient, reviewed UC visit note and CXR result. She has not yet picked up meds from pharmacy - strongly encouraged her to go do that right now.  Also discussed with her that if she is having increasing cough or more shortness of breath or low SaO2, it is *very important* that she go for an evaluation, and that it would not be safe to manage this just by sending a refill to a pharmacy. Reminded her that [facility] are available that have walk-in / after-hours care as well, and she can always call [facility] to help her navigate through this.  She expressed understanding.  Has close follow up with me already scheduled.    Erik Cool MD

## 2019-05-21 NOTE — PROGRESS NOTES
Subjective:       Patient ID: Ana Aguila is a 63 y.o. female.    Chief Complaint: Cough (1 week)    HPI - Pt with oxygen-dependent COPD presents with one week of nagging cough, difficulty sleeping or laying flat.  No f/c.  Cough is non productive.  Using nebulizer ever 4-5 hours now; puffer prn.  Vicks helps some.  Hasn't noted wheezing    Pmh/meds:  Reviewed and reconciled in EPIC with patient during visit today.    Review of Systems   Constitutional: Positive for fatigue.   HENT: Positive for congestion.    Respiratory: Positive for cough and wheezing.    Cardiovascular: Negative for chest pain.   Gastrointestinal: Negative for abdominal pain.   Genitourinary: Negative for difficulty urinating.   Musculoskeletal: Negative for arthralgias.   Skin: Negative for rash.   Neurological: Negative for headaches.   Psychiatric/Behavioral: Positive for sleep disturbance.       Objective:      Physical Exam   Constitutional: She is oriented to person, place, and time. She appears well-developed and well-nourished.   Fatigued-appearing woman with occasional cough, wearing oxygen   HENT:   Head: Normocephalic and atraumatic.   Cardiovascular: Normal rate, regular rhythm and normal heart sounds. Exam reveals no gallop and no friction rub.   No murmur heard.  Pulmonary/Chest: Effort normal. No respiratory distress. She has wheezes (left base, faint). She has no rales. She exhibits no tenderness.   Neurological: She is alert and oriented to person, place, and time.   Skin: Skin is warm and dry. No erythema.   Psychiatric: She has a normal mood and affect.   Nursing note and vitals reviewed.      Assessment:       1. COPD with acute exacerbation        Plan:       Ana was seen today for cough.    Diagnoses and all orders for this visit:    COPD with acute exacerbation - unstable.  Start with CXR and treat with abx and steroids.  Adjust therapy based on CXR findings.  -     X-Ray Chest PA And Lateral; Future  -      azithromycin (Z-CHRISTOPHER) 250 MG tablet; Take 2 tablets by mouth on day 1; Take 1 tablet by mouth on days 2-5  -     dexamethasone (DECADRON) 4 MG Tab; Take 1 tablet (4 mg total) by mouth every 12 (twelve) hours. For three days, then reduce to daily for 3 days then stop    rtc prn.  Copy to Dr. Silvina Wick MD MPH  Staff Internist

## 2019-05-29 ENCOUNTER — OFFICE VISIT (OUTPATIENT)
Dept: INTERNAL MEDICINE | Facility: CLINIC | Age: 64
End: 2019-05-29
Payer: COMMERCIAL

## 2019-05-29 ENCOUNTER — LAB VISIT (OUTPATIENT)
Dept: LAB | Facility: HOSPITAL | Age: 64
End: 2019-05-29
Attending: INTERNAL MEDICINE
Payer: COMMERCIAL

## 2019-05-29 ENCOUNTER — DOCUMENTATION ONLY (OUTPATIENT)
Dept: INTERNAL MEDICINE | Facility: CLINIC | Age: 64
End: 2019-05-29

## 2019-05-29 VITALS
HEIGHT: 65 IN | DIASTOLIC BLOOD PRESSURE: 70 MMHG | HEART RATE: 85 BPM | BODY MASS INDEX: 28.5 KG/M2 | OXYGEN SATURATION: 98 % | WEIGHT: 171.06 LBS | SYSTOLIC BLOOD PRESSURE: 124 MMHG | TEMPERATURE: 99 F

## 2019-05-29 DIAGNOSIS — E87.6 HYPOKALEMIA: Primary | ICD-10-CM

## 2019-05-29 DIAGNOSIS — G89.29 CHRONIC RUQ PAIN: ICD-10-CM

## 2019-05-29 DIAGNOSIS — R19.5 CHANGE IN CONSISTENCY OF STOOL: ICD-10-CM

## 2019-05-29 DIAGNOSIS — R10.11 CHRONIC RUQ PAIN: ICD-10-CM

## 2019-05-29 DIAGNOSIS — J42 CHRONIC BRONCHITIS, UNSPECIFIED CHRONIC BRONCHITIS TYPE: ICD-10-CM

## 2019-05-29 DIAGNOSIS — Z99.81 CHRONIC RESPIRATORY FAILURE WITH HYPOXIA, ON HOME OXYGEN THERAPY: ICD-10-CM

## 2019-05-29 DIAGNOSIS — J96.11 CHRONIC RESPIRATORY FAILURE WITH HYPOXIA, ON HOME OXYGEN THERAPY: ICD-10-CM

## 2019-05-29 DIAGNOSIS — I10 ESSENTIAL HYPERTENSION: ICD-10-CM

## 2019-05-29 LAB
ALBUMIN SERPL BCP-MCNC: 3.5 G/DL (ref 3.5–5.2)
ALP SERPL-CCNC: 75 U/L (ref 55–135)
ALT SERPL W/O P-5'-P-CCNC: 8 U/L (ref 10–44)
ANION GAP SERPL CALC-SCNC: 9 MMOL/L (ref 8–16)
AST SERPL-CCNC: 14 U/L (ref 10–40)
BILIRUB SERPL-MCNC: 0.2 MG/DL (ref 0.1–1)
BUN SERPL-MCNC: 13 MG/DL (ref 8–23)
CALCIUM SERPL-MCNC: 8.5 MG/DL (ref 8.7–10.5)
CHLORIDE SERPL-SCNC: 106 MMOL/L (ref 95–110)
CO2 SERPL-SCNC: 29 MMOL/L (ref 23–29)
CREAT SERPL-MCNC: 0.8 MG/DL (ref 0.5–1.4)
EST. GFR  (AFRICAN AMERICAN): >60 ML/MIN/1.73 M^2
EST. GFR  (NON AFRICAN AMERICAN): >60 ML/MIN/1.73 M^2
GLUCOSE SERPL-MCNC: 104 MG/DL (ref 70–110)
POTASSIUM SERPL-SCNC: 3.3 MMOL/L (ref 3.5–5.1)
PROT SERPL-MCNC: 6.4 G/DL (ref 6–8.4)
SODIUM SERPL-SCNC: 144 MMOL/L (ref 136–145)

## 2019-05-29 PROCEDURE — 3078F DIAST BP <80 MM HG: CPT | Mod: CPTII,S$GLB,, | Performed by: INTERNAL MEDICINE

## 2019-05-29 PROCEDURE — 3008F PR BODY MASS INDEX (BMI) DOCUMENTED: ICD-10-PCS | Mod: CPTII,S$GLB,, | Performed by: INTERNAL MEDICINE

## 2019-05-29 PROCEDURE — 3008F BODY MASS INDEX DOCD: CPT | Mod: CPTII,S$GLB,, | Performed by: INTERNAL MEDICINE

## 2019-05-29 PROCEDURE — 99999 PR PBB SHADOW E&M-EST. PATIENT-LVL IV: CPT | Mod: PBBFAC,,, | Performed by: INTERNAL MEDICINE

## 2019-05-29 PROCEDURE — 36415 COLL VENOUS BLD VENIPUNCTURE: CPT

## 2019-05-29 PROCEDURE — 99999 PR PBB SHADOW E&M-EST. PATIENT-LVL IV: ICD-10-PCS | Mod: PBBFAC,,, | Performed by: INTERNAL MEDICINE

## 2019-05-29 PROCEDURE — 3078F PR MOST RECENT DIASTOLIC BLOOD PRESSURE < 80 MM HG: ICD-10-PCS | Mod: CPTII,S$GLB,, | Performed by: INTERNAL MEDICINE

## 2019-05-29 PROCEDURE — 3074F SYST BP LT 130 MM HG: CPT | Mod: CPTII,S$GLB,, | Performed by: INTERNAL MEDICINE

## 2019-05-29 PROCEDURE — 99214 PR OFFICE/OUTPT VISIT, EST, LEVL IV, 30-39 MIN: ICD-10-PCS | Mod: S$GLB,,, | Performed by: INTERNAL MEDICINE

## 2019-05-29 PROCEDURE — 99214 OFFICE O/P EST MOD 30 MIN: CPT | Mod: S$GLB,,, | Performed by: INTERNAL MEDICINE

## 2019-05-29 PROCEDURE — 80053 COMPREHEN METABOLIC PANEL: CPT

## 2019-05-29 PROCEDURE — 3074F PR MOST RECENT SYSTOLIC BLOOD PRESSURE < 130 MM HG: ICD-10-PCS | Mod: CPTII,S$GLB,, | Performed by: INTERNAL MEDICINE

## 2019-05-29 NOTE — PROGRESS NOTES
Subjective:       Patient ID: Ana Aguila is a 63 y.o. female.    Chief Complaint: Follow-up    HPI  62 y/o woman with HTN, HLD, severe COPD with home O2, h/o hemicolectomy 2009 due to colonoscopy complication, chronic RUQ discomfort, NAFLD here for follow up.  Concerns about medications. Has seen multiple specialists since last visit 4 months ago.    Seen 5/21 for COPD exacerbation, treated with steroid taper and azithromycin.  Last saw Dr Dill 4/2019, recommended to f/u again at 6 months; form signed for pulmonary rehab at  - she was able to do this.   Now feeling much better.    Hypokalemia after last visit in January, started on potassium supplement and level normalized in February.  Recently has had 4 times in the past 1-2 months where her potassium pill was not digested and passed whole.    Stress-induced cardiomyopathy (recent recurrence), HTN, HLD - following with cardiology. On toprol XL, candesartan    Occasoional RUQ abdominal pain - saw GI for this, pain attributed to rib pain, possibly related to intercostal nerves. Had abdominal CT which was unremarkable. FOBT x 3 were negative.    FIT done 2/2019, also negative    Review of Systems   Constitutional: Negative for activity change and fever.   HENT: Negative.  Negative for congestion.    Eyes: Negative for visual disturbance.   Respiratory: Negative for cough and wheezing. Shortness of breath: at baseline; exercise tolerance improved.    Cardiovascular: Negative for chest pain, palpitations and leg swelling.   Gastrointestinal: Negative for abdominal pain, blood in stool and nausea.   Endocrine: Negative.    Genitourinary: Negative.    Musculoskeletal: Negative for arthralgias, back pain (improved) and gait problem.   Skin: Negative.    Neurological: Negative for syncope, weakness and light-headedness.   Psychiatric/Behavioral: Negative for dysphoric mood. The patient is nervous/anxious (improved).          Past medical history, surgical history,  "and family medical history reviewed and updated as appropriate.    Medications and allergies reviewed.     Objective:          Vitals:    05/29/19 1522   BP: 124/70   BP Location: Left arm   Patient Position: Sitting   Pulse: 85   Temp: 98.6 °F (37 °C)   TempSrc: Oral   SpO2: 98%   Weight: 77.6 kg (171 lb 1.2 oz)   Height: 5' 5" (1.651 m)     Body mass index is 28.47 kg/m².  Physical Exam   Constitutional: She is oriented to person, place, and time. She appears well-developed and well-nourished. No distress.   Pleasant, comfortable. On portable O2 during visit.   HENT:   Head: Normocephalic and atraumatic.   Mouth/Throat: Oropharynx is clear and moist.   Eyes: Conjunctivae and EOM are normal.   Neck: Neck supple. No thyromegaly present.   Cardiovascular: Normal rate, regular rhythm and normal heart sounds.   No murmur heard.  Pulmonary/Chest: Effort normal and breath sounds normal. No respiratory distress. She has no wheezes. She has no rales.   Abdominal: Soft. Bowel sounds are normal. She exhibits no distension. There is no tenderness.   Musculoskeletal: She exhibits no edema or tenderness.   Lymphadenopathy:     She has no cervical adenopathy (slight nontender submandibular LAD bilaterally).   Neurological: She is alert and oriented to person, place, and time. She has normal strength. No cranial nerve deficit or sensory deficit. Gait normal.   Skin: Skin is warm and dry.   Psychiatric: She has a normal mood and affect. Her behavior is normal.   Vitals reviewed.      Lab Results   Component Value Date    WBC 13.33 (H) 11/21/2018    HGB 11.6 (L) 11/21/2018    HCT 37.1 11/21/2018     11/21/2018    CHOL 219 (H) 09/10/2018    TRIG 64 09/10/2018    HDL 66 09/10/2018    ALT 7 (L) 01/31/2019    AST 17 01/31/2019     02/25/2019    K 4.0 02/25/2019     02/25/2019    CREATININE 0.8 02/25/2019    BUN 10 02/25/2019    CO2 30 (H) 02/25/2019    TSH 1.139 11/18/2018    INR 1.2 10/18/2016    HGBA1C 5.4 " 10/18/2016       Assessment:       1. Hypokalemia    2. Chronic bronchitis, unspecified chronic bronchitis type    3. Chronic respiratory failure with hypoxia, on home oxygen therapy    4. Essential hypertension    5. Chronic RUQ pain        Plan:   Ana was seen today for follow-up.    Diagnoses and all orders for this visit:    Hypokalemia  -     Cancel: Basic metabolic panel; Future    Chronic bronchitis, unspecified chronic bronchitis type    Chronic respiratory failure with hypoxia, on home oxygen therapy    Essential hypertension    Chronic RUQ pain        Health maintenance reviewed with patient.     Follow up in about 4 months (around 9/29/2019) for annual physical.    Erik Cool MD  Internal Medicine  [facility]  5/29/2019

## 2019-05-31 ENCOUNTER — OFFICE VISIT (OUTPATIENT)
Dept: INTERNAL MEDICINE | Facility: CLINIC | Age: 64
End: 2019-05-31
Payer: COMMERCIAL

## 2019-05-31 VITALS
OXYGEN SATURATION: 96 % | TEMPERATURE: 99 F | HEART RATE: 95 BPM | SYSTOLIC BLOOD PRESSURE: 104 MMHG | WEIGHT: 170.63 LBS | BODY MASS INDEX: 28.43 KG/M2 | DIASTOLIC BLOOD PRESSURE: 68 MMHG | HEIGHT: 65 IN

## 2019-05-31 DIAGNOSIS — Z99.81 CHRONIC RESPIRATORY FAILURE WITH HYPOXIA, ON HOME OXYGEN THERAPY: Primary | ICD-10-CM

## 2019-05-31 DIAGNOSIS — J96.11 CHRONIC RESPIRATORY FAILURE WITH HYPOXIA, ON HOME OXYGEN THERAPY: Primary | ICD-10-CM

## 2019-05-31 DIAGNOSIS — R14.0 ABDOMINAL DISTENSION: ICD-10-CM

## 2019-05-31 DIAGNOSIS — R10.84 GENERALIZED ABDOMINAL PAIN: Primary | ICD-10-CM

## 2019-05-31 DIAGNOSIS — E87.6 HYPOKALEMIA: ICD-10-CM

## 2019-05-31 DIAGNOSIS — R19.7 DIARRHEA, UNSPECIFIED TYPE: ICD-10-CM

## 2019-05-31 PROCEDURE — 99999 PR PBB SHADOW E&M-EST. PATIENT-LVL V: CPT | Mod: PBBFAC,,, | Performed by: NURSE PRACTITIONER

## 2019-05-31 PROCEDURE — 3074F PR MOST RECENT SYSTOLIC BLOOD PRESSURE < 130 MM HG: ICD-10-PCS | Mod: CPTII,S$GLB,, | Performed by: NURSE PRACTITIONER

## 2019-05-31 PROCEDURE — 99213 OFFICE O/P EST LOW 20 MIN: CPT | Mod: S$GLB,,, | Performed by: NURSE PRACTITIONER

## 2019-05-31 PROCEDURE — 3008F PR BODY MASS INDEX (BMI) DOCUMENTED: ICD-10-PCS | Mod: CPTII,S$GLB,, | Performed by: NURSE PRACTITIONER

## 2019-05-31 PROCEDURE — 3074F SYST BP LT 130 MM HG: CPT | Mod: CPTII,S$GLB,, | Performed by: NURSE PRACTITIONER

## 2019-05-31 PROCEDURE — 99999 PR PBB SHADOW E&M-EST. PATIENT-LVL V: ICD-10-PCS | Mod: PBBFAC,,, | Performed by: NURSE PRACTITIONER

## 2019-05-31 PROCEDURE — 99213 PR OFFICE/OUTPT VISIT, EST, LEVL III, 20-29 MIN: ICD-10-PCS | Mod: S$GLB,,, | Performed by: NURSE PRACTITIONER

## 2019-05-31 PROCEDURE — 3078F PR MOST RECENT DIASTOLIC BLOOD PRESSURE < 80 MM HG: ICD-10-PCS | Mod: CPTII,S$GLB,, | Performed by: NURSE PRACTITIONER

## 2019-05-31 PROCEDURE — 3008F BODY MASS INDEX DOCD: CPT | Mod: CPTII,S$GLB,, | Performed by: NURSE PRACTITIONER

## 2019-05-31 PROCEDURE — 3078F DIAST BP <80 MM HG: CPT | Mod: CPTII,S$GLB,, | Performed by: NURSE PRACTITIONER

## 2019-05-31 RX ORDER — DIPHENOXYLATE HYDROCHLORIDE AND ATROPINE SULFATE 2.5; .025 MG/1; MG/1
1 TABLET ORAL 4 TIMES DAILY PRN
Qty: 30 TABLET | Refills: 0 | Status: SHIPPED | OUTPATIENT
Start: 2019-05-31 | End: 2021-11-11

## 2019-05-31 RX ORDER — DICYCLOMINE HYDROCHLORIDE 10 MG/1
10 CAPSULE ORAL
Qty: 30 CAPSULE | Refills: 0 | Status: SHIPPED | OUTPATIENT
Start: 2019-05-31 | End: 2019-06-30

## 2019-05-31 RX ORDER — OMEPRAZOLE 40 MG/1
40 CAPSULE, DELAYED RELEASE ORAL DAILY
Qty: 30 CAPSULE | Refills: 0 | Status: SHIPPED | OUTPATIENT
Start: 2019-05-31 | End: 2019-06-18

## 2019-05-31 NOTE — PROGRESS NOTES
Subjective:       Patient ID: Ana Aguila is a 63 y.o. female.    Chief Complaint: Abdominal Pain (since 5/29/19)    Pt of Dr. Cool, here for complaints of abdominal pain 5/10 on prs with diarrhea.     Pt was seen on 5/29/19 for annual by Dr. Cool for HTN, HLD, severe COPD with home O2, h/o hemicolectomy 2009 due to colonoscopy complication, chronic RUQ discomfort, NAFLD here for follow up.    Abdominal Pain   This is a new problem. The current episode started in the past 7 days (Started Wednesday night). The onset quality is gradual. The problem occurs intermittently. The problem has been waxing and waning. The pain is located in the generalized abdominal region. The pain is at a severity of 7/10. The pain is moderate. The quality of the pain is cramping. The abdominal pain does not radiate. Associated symptoms include belching and diarrhea. Pertinent negatives include no anorexia, arthralgias, constipation, dysuria, fever, flatus, hematochezia, hematuria, melena, nausea or vomiting. The pain is aggravated by certain positions. The pain is relieved by sitting up. Treatments tried: peptobismal. The treatment provided no relief.     Review of Systems   Constitutional: Negative for appetite change, chills, diaphoresis, fatigue, fever and unexpected weight change.   HENT: Negative for sore throat and trouble swallowing.    Respiratory: Negative for chest tightness and shortness of breath.    Cardiovascular: Negative for chest pain, palpitations and leg swelling.   Gastrointestinal: Positive for abdominal distention, abdominal pain and diarrhea. Negative for anal bleeding, anorexia, blood in stool, constipation, flatus, hematochezia, melena, nausea, rectal pain and vomiting.        As documented in HPI     Genitourinary: Negative for dysuria, flank pain and hematuria.   Musculoskeletal: Negative for arthralgias and back pain.   Skin: Negative for color change, pallor and rash.   Allergic/Immunologic: Negative  for environmental allergies, food allergies and immunocompromised state.   Neurological: Negative for dizziness, syncope and weakness.         Review of patient's allergies indicates:   Allergen Reactions    Ace inhibitors      cough    Coreg [carvedilol]      Headache     Sudafed [pseudoephedrine hcl] Nausea And Vomiting and Other (See Comments)     JITTERY     Current Outpatient Medications:     ADVAIR DISKUS 250-50 mcg/dose diskus inhaler, INHALE 1 PUFF BY MOUTH EVERY 12 HOURS (Patient taking differently: INHALE 1 PUFF BY MOUTH EVERY 12 HOURS AS NEEDED), Disp: 1 each, Rfl: 12    albuterol-ipratropium (DUO-NEB) 2.5 mg-0.5 mg/3 mL nebulizer solution, TAKE 3 MLS BY NEBULIZER EVERY 6 HOURS AS NEEDED FOR WHEEZING OR SHORTNESS OF BREATH, Disp: 270 mL, Rfl: 11    candesartan (ATACAND) 4 MG tablet, Take 1 tablet (4 mg total) by mouth once daily., Disp: 90 tablet, Rfl: 3    dexamethasone (DECADRON) 4 MG Tab, Take 1 tablet (4 mg total) by mouth every 12 (twelve) hours. For three days, then reduce to daily for 3 days then stop, Disp: 10 tablet, Rfl: 0    ipratropium (ATROVENT HFA) 17 mcg/actuation inhaler, Inhale 2 puffs into the lungs every 6 (six) hours as needed for Wheezing. Rescue, Disp: 1 Inhaler, Rfl: 2    ipratropium (ATROVENT) 0.02 % nebulizer solution, UNWRAP AND INHALE THE CONTENTS OF 1 VIAL VIA NEBULIZER UP TO FOUR TIMES DAILY IF NEEDED (RESCUE), Disp: 62.5 mL, Rfl: 0    metoprolol succinate (TOPROL-XL) 25 MG 24 hr tablet, Take 1 tablet (25 mg total) by mouth once daily., Disp: 30 tablet, Rfl: 11    potassium chloride (KLOR-CON) 10 MEQ TbSR, Take 1 tablet (10 mEq total) by mouth once daily., Disp: 30 tablet, Rfl: 3    PROAIR HFA 90 mcg/actuation inhaler, INHALE 2 PUFFS PO QID PRN FOR COPD, Disp: , Rfl: 11    ergocalciferol (ERGOCALCIFEROL) 50,000 unit Cap, Take 1 capsule (50,000 Units total) by mouth every 7 days. (Patient taking differently: Take 50,000 Units by mouth every Monday. ), Disp: 12  capsule, Rfl: 1    Patient Active Problem List   Diagnosis    Essential hypertension    Chronic obstructive pulmonary disease    Hepatomegaly    Former smoker    Takotsubo cardiomyopathy    Microscopic hematuria    Chronic diarrhea    Fatty liver disease, nonalcoholic    S/P right hemicolectomy    Aortic calcification    Chronic respiratory failure with hypoxia, on home oxygen therapy    Situational depression    Adjustment disorder with depressed mood    Adjustment disorder with anxiety    Bilateral ankle pain    Umbilical hernia without obstruction and without gangrene    Chronic RUQ pain     Past Medical History:   Diagnosis Date    Addiction to drug     Anxiety     Aortic calcification 1/11/2018    Chronic diarrhea     Chronic obstructive pulmonary disease 8/27/2013    Chronic respiratory failure with hypoxia, on home O2 therapy 1/11/2018    COPD (chronic obstructive pulmonary disease)     Depression     Essential hypertension 8/12/2013    Former smoker 10/18/2016    Hepatomegaly 12/21/2015    Hyperlipidemia     Hypertension     MI (myocardial infarction)     Microscopic hematuria 1/13/2017    Panic disorder     Perianal abscess 6/1/2018    Reflux 2013    S/P right hemicolectomy 9/7/2017    Performed in 2011 after perforation during colonoscopy    Sleep difficulties     Takotsubo cardiomyopathy 10/18/2016    Noted on echo 10/2016, recovered on repeat echo 12/2016     Past Surgical History:   Procedure Laterality Date    ABDOMINAL SURGERY      APPENDECTOMY      CATHETERIZATION, HEART, LEFT N/A 9/6/2013    [facility] C[facility]    CHOLANGIOGRAM N/A 2/18/2013    Performed by Zhou [facility] Jr[facility] at [facility] OR 2ND FLR    CHOLECYSTECTOMY  2013    open    CHOLECYSTECTOMY N/A 2/18/2013    [facility] OR 2ND FLR    CHOLECYSTECTOMY, LAPAROSCOPIC N/A 2/18/2013    Performed by [facility] at [facility]  OR 2ND FLR    COLON SURGERY  2011    secondary to perforation after c-scope    COLONOSCOPY      FRACTURE SURGERY      HERNIA REPAIR      HIATAL HERNIA REPAIR  2013    HYSTERECTOMY  1986    Left leg surgery       Social History     Socioeconomic History    Marital status: Single     Spouse name: Not on file    Number of children: Not on file    Years of education: Not on file    Highest education level: Not on file   Occupational History    Not on file   Social Needs    Financial resource strain: Not on file    Food insecurity:     Worry: Not on file     Inability: Not on file    Transportation needs:     Medical: Not on file     Non-medical: Not on file   Tobacco Use    Smoking status: Former Smoker     Packs/day: 1.50     Years: 30.00     Pack years: 45.00     Types: Cigarettes     Last attempt to quit: 1997     Years since quittin.4    Smokeless tobacco: Never Used   Substance and Sexual Activity    Alcohol use: No     Frequency: Never     Comment: occaissionally    Drug use: No    Sexual activity: Yes     Partners: Male     Birth control/protection: Surgical   Lifestyle    Physical activity:     Days per week: Not on file     Minutes per session: Not on file    Stress: Not on file   Relationships    Social connections:     Talks on phone: Not on file     Gets together: Not on file     Attends [employer] service: Not on file     Active member of club or organization: Not on file     Attends meetings of clubs or organizations: Not on file     Relationship status: Not on file   Other Topics Concern    Patient feels they ought to cut down on drinking/drug use Not Asked    Patient annoyed by others criticizing their drinking/drug use Not Asked    Patient has felt bad or guilty about drinking/drug use Not Asked    Patient has had a drink/used drugs as an eye opener in the AM Not Asked   Social History Narrative    Lives alone, brother lives in [place]. Brothers and sisters also  "living elsewhere. Children and grandchildren live in [place].     Lives in 2nd-story apartment.    Works as a  with [employer].     Family History   Problem Relation Age of Onset    Cancer Mother         lung    Hypertension Mother     Coronary artery disease Father     Retinal detachment Father     Hypertension Sister     Hypertension Brother     Cataracts Maternal Grandmother     Breast cancer Maternal Grandmother         unk age of onset    Abnormal EKG Daughter     Breast cancer Daughter 43        negative genetic testing, unilateral breast ca    Breast cancer Other 44        thinks negative genetic testing, bilat ca    Amblyopia Neg Hx     Blindness Neg Hx     Diabetes Neg Hx     Glaucoma Neg Hx     Macular degeneration Neg Hx     Strabismus Neg Hx     Stroke Neg Hx     Thyroid disease Neg Hx     Colon cancer Neg Hx     Ovarian cancer Neg Hx        Objective:       Vitals:    05/31/19 1332   BP: 104/68   Pulse: 95   Temp: 98.7 °F (37.1 °C)   SpO2: 96%   Weight: 77.4 kg (170 lb 10.2 oz)   Height: 5' 5" (1.651 m)   PainSc:   7   PainLoc: Abdomen       Body mass index is 28.4 kg/m².    Physical Exam   Constitutional: She is oriented to person, place, and time. She appears well-developed and well-nourished.   HENT:   Head: Normocephalic.   Eyes: Pupils are equal, round, and reactive to light. Conjunctivae and EOM are normal.   Neck: Normal range of motion. Neck supple.   Cardiovascular: Normal rate, regular rhythm, normal heart sounds and intact distal pulses.   Pulmonary/Chest: Effort normal and breath sounds normal.   Abdominal: She exhibits distension. There is no hepatosplenomegaly. There is generalized tenderness and tenderness in the epigastric area. There is guarding. There is no rebound, no tenderness at McBurney's point and negative Neves's sign. No hernia.   Musculoskeletal: Normal range of motion.   Neurological: She is alert and oriented to person, place, and " time.   Skin: Skin is warm and dry. Capillary refill takes less than 2 seconds.   Psychiatric: She has a normal mood and affect. Her behavior is normal. Judgment and thought content normal.   Nursing note and vitals reviewed.      Assessment:       1. Generalized abdominal pain    2. Diarrhea, unspecified type    3. BMI 28.0-28.9,adult    4. Abdominal distension        Plan:       Ana was seen today for abdominal pain.    Diagnoses and all orders for this visit:    Generalized abdominal pain  -     omeprazole (PRILOSEC) 40 MG capsule; Take 1 capsule (40 mg total) by mouth once daily.  -     dicyclomine (BENTYL) 10 MG capsule; Take 1 capsule (10 mg total) by mouth before meals and at bedtime as needed (abdominal cramping).  -     CT Abdomen Pelvis W Wo Contrast    Diarrhea, unspecified type  -     diphenoxylate-atropine 2.5-0.025 mg (LOMOTIL) 2.5-0.025 mg per tablet; Take 1 tablet by mouth 4 (four) times daily as needed for Diarrhea.    BMI 28.0-28.9,adult  BMI reviewed    Abdominal distension  -     CT Abdomen Pelvis W Wo Contrast    Check CT scan today, will call with results    F/U with your GI doctor if no improvement    Bentyl before meals and at bedtime as needed for abdominal spasm/pain    Written script for lomotil take 4 times a day as needed for loose stools    Omeprazole daily for gas in stomach    Clear  Liquid diet, advance to soft BRAT diet as tolerated    Self care instructions provided in AVS    Follow up if symptoms worsen or fail to improve.

## 2019-05-31 NOTE — PATIENT INSTRUCTIONS
Check CT scan today, will call with results    F/U with your GI doctor if no improvement    Bentyl before meals and at bedtime as needed for abdominal spasm/pain    Written script for lomotil take 4 times a day as needed for loose stools    Omeprazole daily for gas in stomach    Clear  Liquid diet, advance to soft BRAT diet as tolerated      Clear Liquid Diet    Clear liquids are any liquid that you can see through. They are also very easy to digest. You may be put on a clear liquid diet if you are recovering from irritation or infection of the stomach or intestinal tract. This diet may also be used before surgery or special procedures such as a colonoscopy. You should not be on this diet for more than 3 days. Below are some clear liquids you can have on this diet.  Adults and children over 2 years old  Adults should drink a total of 2 to 3 quarts of liquid per day. It may be easier to drink small frequent servings rather than a few large ones. Liquids can include:  · Fruit juices. Strained orange juice or lemonade (no pulp); apple, grape, and cranberry juice; clear fruit drinks  · Beverages. Sport drinks, sodas, mineral water (plain or flavored), tea, black coffee, liquid gelatin (add twice the recommended amount of water)  · Soups. Clear broth  · Desserts. Plain gelatin, popsicles, fruit juice bars  Children under 2 years old  Oral rehydration fluids are available at drug stores and most grocery stores. You dont need a prescription.  Date Last Reviewed: 8/1/2016  © 1234-3937 [employer]. [address] [place] [address], [place] [address]. All rights reserved. This information is not intended as a substitute for professional medical care. Always follow your healthcare professional's instructions.          Caroline Diet  Your healthcare provider may recommend a bland diet if you have an upset stomach. It consists of foods that are mild and easy to digest. It is better to eat small frequent meals rather than 3  "large meals a day.    Beverages  OK: Fruit juices, non-caffeinated teas and coffee, non-carbonated olmos  Avoid: Carbonated beverage, caffeinated tea and coffee, all alcoholic beverages  Bread  OK: Refined white, wheat or rye bread, jamila or soda crackers, [place] toast, plain rolls, bagels  Avoid: Whole-grain bread  Cereal  OK: Refined cereals: cooked or ready to eat  Avoid: Whole-grain cereals and granola, or those containing bran, seeds or nuts  Desserts  OK: Peanut butter and all others except those to "avoid"  Avoid: Chocolate, cocoa, coconut, popcorn, nuts, seeds, jam, marmalade  Fruits  OK: Canned, cooked, frozen or fresh fruits without seeds or tough skin  Avoid: Olives, skin and seeds of fruit  Meats  OK: All fresh or preserved meat, fish and fowl  Avoid: Any that are prepared with those spices to "avoid"  Cheese and eggs  OK: Eggs, cottage cheese, cream cheese, other cheeses  Avoid: All cheeses made with those spices to "avoid"  Potatoes and pasta  OK: Potato, rice, macaroni, noodles, spaghetti  Avoid: None  Soups  OK: All soups without heavy seasoning  Avoid: Soups made with those spices to "avoid"  Vegetables  OK: Canned, cooked, fresh or frozen mildly flavored vegetables without seeds, skins or coarse fiber  Avoid: Vegetables prepared with those spices to "avoid"; skin and seeds of vegetables and those with coarse fiber  Spices  OK: Salt, lemon and lime juice, vinegar, all extracts, amada, cinnamon, thyme, mace, allspice, paprika  Avoid: Chili powder, cloves, pepper, seed spices, garlic, gravy pickles, highly seasoned salad dressings  Date Last Reviewed: 11/20/2015  © 0261-6137 [employer]. [address] [place] [address], [place] [address]. All rights reserved. This information is not intended as a substitute for professional medical care. Always follow your healthcare professional's instructions.        "

## 2019-06-01 ENCOUNTER — TELEPHONE (OUTPATIENT)
Dept: INTERNAL MEDICINE | Facility: CLINIC | Age: 64
End: 2019-06-01

## 2019-06-01 ENCOUNTER — PATIENT MESSAGE (OUTPATIENT)
Dept: INTERNAL MEDICINE | Facility: CLINIC | Age: 64
End: 2019-06-01

## 2019-06-01 NOTE — TELEPHONE ENCOUNTER
Message sent via portal:    Patient needs to take extra potassium (2 tablets for 5 days, then back to one daily) and then recheck levels in 2 weeks. Please contact her to help set up.

## 2019-06-03 ENCOUNTER — TELEPHONE (OUTPATIENT)
Dept: INTERNAL MEDICINE | Facility: CLINIC | Age: 64
End: 2019-06-03

## 2019-06-03 ENCOUNTER — HOSPITAL ENCOUNTER (OUTPATIENT)
Dept: RADIOLOGY | Facility: HOSPITAL | Age: 64
Discharge: HOME | End: 2019-06-03
Attending: NURSE PRACTITIONER
Payer: COMMERCIAL

## 2019-06-03 DIAGNOSIS — R10.84 GENERALIZED ABDOMINAL PAIN: Primary | ICD-10-CM

## 2019-06-03 DIAGNOSIS — R93.5 ABNORMAL ABDOMINAL CT SCAN: ICD-10-CM

## 2019-06-03 DIAGNOSIS — R14.0 ABDOMINAL DISTENSION: ICD-10-CM

## 2019-06-03 PROCEDURE — 25500020 PHARM REV CODE 255: Performed by: NURSE PRACTITIONER

## 2019-06-03 PROCEDURE — 74177 CT ABD & PELVIS W/CONTRAST: CPT | Mod: TC

## 2019-06-03 PROCEDURE — 74177 CT ABD & PELVIS W/CONTRAST: CPT | Mod: 26,,, | Performed by: RADIOLOGY

## 2019-06-03 PROCEDURE — 74177 CT ABDOMEN PELVIS WITH CONTRAST: ICD-10-PCS | Mod: 26,,, | Performed by: RADIOLOGY

## 2019-06-03 RX ADMIN — IOHEXOL 15 ML: 350 INJECTION, SOLUTION INTRAVENOUS at 01:06

## 2019-06-03 RX ADMIN — IOHEXOL 75 ML: 350 INJECTION, SOLUTION INTRAVENOUS at 02:06

## 2019-06-03 NOTE — TELEPHONE ENCOUNTER
----- Message from Shelia Palm sent at 6/3/2019 10:42 AM CDT -----  Contact: Self [phone]  Pt is requesting a call back from the nurse regarding some medical documentation that she needs.    Pt may be reached at [phone].    Thank you.  LC

## 2019-06-04 ENCOUNTER — TELEPHONE (OUTPATIENT)
Dept: INTERNAL MEDICINE | Facility: CLINIC | Age: 64
End: 2019-06-04

## 2019-06-04 NOTE — PROGRESS NOTES
Call pt and let her know that her CT scan showed a large amount of stool in her colon and around the partial colectomy site. There is no obstruction, however to flow of bowel is slow which is probably why she is having pain. There is also a hernia present, but no obstruction is present. There is also some biliary duct dilatation present. I think this warrant a visit to a general surgeon for review. Will have MA schedule appt in AM. Referral placed.

## 2019-06-04 NOTE — TELEPHONE ENCOUNTER
----- Message from Jennifer Redd sent at 6/4/2019 11:39 AM CDT -----  Contact: Patient [phone]  Telephone consult    Her job is trying to fax a fit for duty form to your office and is getting a message stating it's not going thru. Please call her to let her know whenever you've receive it or to give her a better fax number.    Thank German

## 2019-06-04 NOTE — TELEPHONE ENCOUNTER
----- Message from Lesly Stephens DNP sent at 6/3/2019 10:31 PM CDT -----  Call pt and let her know that her CT scan showed a large amount of stool in her colon and around the partial colectomy site. There is no obstruction, however to flow of bowel is slow which is probably why she is having pain. There is also a hernia present, but no obstruction is present. There is also some biliary duct dilatation present. I think this warrant a visit to a general surgeon for review. Will have MA schedule appt in AM. Referral placed.

## 2019-06-05 ENCOUNTER — TELEPHONE (OUTPATIENT)
Dept: INTERNAL MEDICINE | Facility: CLINIC | Age: 64
End: 2019-06-05

## 2019-06-05 NOTE — TELEPHONE ENCOUNTER
----- Message from Charlie Richmond sent at 6/5/2019  3:50 PM CDT -----  Contact: Patient [phone]  Calling to check the status of the forms checking if office has faxed back to the patient job? Would like a call back with update.    Please call an advise  Thank you

## 2019-06-05 NOTE — TELEPHONE ENCOUNTER
"Called and spoke with patient.  Patient does not have any injury or recent illness.  She reports that she asked about making sure she could use the handicap parking spots at her work (she has a handicap placard), and mentioned it being a hot enough day that she "just about felt like [she] might pass out" at which point she was apparently sent home until she had this form completed.  She reports no trouble doing her regular job duties.  Form completed with only restriction to avoid prolonged exposure to cleaning chemicals.    Please fax this in to # requested on form, let patient know once this is sent.    "

## 2019-06-06 ENCOUNTER — PATIENT MESSAGE (OUTPATIENT)
Dept: INTERNAL MEDICINE | Facility: CLINIC | Age: 64
End: 2019-06-06

## 2019-06-06 ENCOUNTER — TELEPHONE (OUTPATIENT)
Dept: INTERNAL MEDICINE | Facility: CLINIC | Age: 64
End: 2019-06-06

## 2019-06-06 NOTE — TELEPHONE ENCOUNTER
----- Message from Caryl Huang sent at 6/6/2019  9:09 AM CDT -----  Contact: Ratna tena Papirus  [phone]  She requested information this patient, however, she received the wrong persons information, It was JR Isabella Carpenter sent them a print out with an  MRN  [id number].  Please call her .

## 2019-06-11 ENCOUNTER — OFFICE VISIT (OUTPATIENT)
Dept: SURGERY | Facility: CLINIC | Age: 64
End: 2019-06-11
Payer: COMMERCIAL

## 2019-06-11 VITALS
TEMPERATURE: 98 F | DIASTOLIC BLOOD PRESSURE: 85 MMHG | SYSTOLIC BLOOD PRESSURE: 185 MMHG | HEIGHT: 65 IN | WEIGHT: 170 LBS | HEART RATE: 91 BPM | BODY MASS INDEX: 28.32 KG/M2

## 2019-06-11 DIAGNOSIS — K43.2 INCISIONAL HERNIA, WITHOUT OBSTRUCTION OR GANGRENE: Primary | ICD-10-CM

## 2019-06-11 PROCEDURE — 99999 PR PBB SHADOW E&M-EST. PATIENT-LVL III: CPT | Mod: PBBFAC,,, | Performed by: SURGERY

## 2019-06-11 PROCEDURE — 99999 PR PBB SHADOW E&M-EST. PATIENT-LVL III: ICD-10-PCS | Mod: PBBFAC,,, | Performed by: SURGERY

## 2019-06-11 PROCEDURE — 99214 OFFICE O/P EST MOD 30 MIN: CPT | Mod: S$GLB,,, | Performed by: SURGERY

## 2019-06-11 PROCEDURE — 3008F PR BODY MASS INDEX (BMI) DOCUMENTED: ICD-10-PCS | Mod: CPTII,S$GLB,, | Performed by: SURGERY

## 2019-06-11 PROCEDURE — 3077F PR MOST RECENT SYSTOLIC BLOOD PRESSURE >= 140 MM HG: ICD-10-PCS | Mod: CPTII,S$GLB,, | Performed by: SURGERY

## 2019-06-11 PROCEDURE — 99214 PR OFFICE/OUTPT VISIT, EST, LEVL IV, 30-39 MIN: ICD-10-PCS | Mod: S$GLB,,, | Performed by: SURGERY

## 2019-06-11 PROCEDURE — 3079F DIAST BP 80-89 MM HG: CPT | Mod: CPTII,S$GLB,, | Performed by: SURGERY

## 2019-06-11 PROCEDURE — 3077F SYST BP >= 140 MM HG: CPT | Mod: CPTII,S$GLB,, | Performed by: SURGERY

## 2019-06-11 PROCEDURE — 3079F PR MOST RECENT DIASTOLIC BLOOD PRESSURE 80-89 MM HG: ICD-10-PCS | Mod: CPTII,S$GLB,, | Performed by: SURGERY

## 2019-06-11 PROCEDURE — 3008F BODY MASS INDEX DOCD: CPT | Mod: CPTII,S$GLB,, | Performed by: SURGERY

## 2019-06-11 NOTE — PROGRESS NOTES
[facility] - [facility]  General Surgery  History & Physical    Patient Name: Ana Aguila  MRN: [id number]  Primary Care Provider: Erik Cool MD    Patient information was obtained from patient, relative(s) and ER records.     Subjective:     Chief Complaint/Reason for Admission: ventral hernia    History of Present Illness:  Patient is a 63 y.o. female with a history of COPD on 2L of home oxygen, CAD with history of MI, chronic diarrhea, HTN, and HLD who presents for evaluation of an incidentally noted ventral hernia. She states that about 1-2 weeks ago she started having cramping abdominal pain. She then started having diarrhea which then resolved. She was seen by her PCP who ordered a CT scan which revealed a ventral hernia containing loops of bowel but not with signs of obstruction. She states that since she's been seen by her PCP, her symptoms have completely resolved. She denies having any associated fevers, chills, nausea, emesis, or sick contacts. She is tolerating a regular diet and having regular bowel movements.     Current Outpatient Medications on File Prior to Visit   Medication Sig    ADVAIR DISKUS 250-50 mcg/dose diskus inhaler INHALE 1 PUFF BY MOUTH EVERY 12 HOURS (Patient taking differently: INHALE 1 PUFF BY MOUTH EVERY 12 HOURS AS NEEDED)    albuterol-ipratropium (DUO-NEB) 2.5 mg-0.5 mg/3 mL nebulizer solution TAKE 3 MLS BY NEBULIZER EVERY 6 HOURS AS NEEDED FOR WHEEZING OR SHORTNESS OF BREATH    candesartan (ATACAND) 4 MG tablet Take 1 tablet (4 mg total) by mouth once daily.    dexamethasone (DECADRON) 4 MG Tab Take 1 tablet (4 mg total) by mouth every 12 (twelve) hours. For three days, then reduce to daily for 3 days then stop    dicyclomine (BENTYL) 10 MG capsule Take 1 capsule (10 mg total) by mouth before meals and at bedtime as needed (abdominal cramping).    diphenoxylate-atropine 2.5-0.025 mg (LOMOTIL) 2.5-0.025 mg per tablet Take 1 tablet by mouth 4 (four) times daily as  needed for Diarrhea.    ergocalciferol (ERGOCALCIFEROL) 50,000 unit Cap Take 1 capsule (50,000 Units total) by mouth every 7 days. (Patient taking differently: Take 50,000 Units by mouth every Monday. )    ipratropium (ATROVENT HFA) 17 mcg/actuation inhaler Inhale 2 puffs into the lungs every 6 (six) hours as needed for Wheezing. Rescue    ipratropium (ATROVENT) 0.02 % nebulizer solution UNWRAP AND INHALE THE CONTENTS OF 1 VIAL VIA NEBULIZER UP TO FOUR TIMES DAILY IF NEEDED (RESCUE)    loperamide (IMODIUM) 2 mg capsule Take 2 mg by mouth 4 (four) times daily as needed for Diarrhea.    metoprolol succinate (TOPROL-XL) 25 MG 24 hr tablet Take 1 tablet (25 mg total) by mouth once daily.    omeprazole (PRILOSEC) 40 MG capsule Take 1 capsule (40 mg total) by mouth once daily.    potassium chloride (KLOR-CON) 10 MEQ TbSR Take 1 tablet (10 mEq total) by mouth once daily.    PROAIR HFA 90 mcg/actuation inhaler INHALE 2 PUFFS PO QID PRN FOR COPD     No current facility-administered medications on file prior to visit.        Review of patient's allergies indicates:   Allergen Reactions    Ace inhibitors      cough    Coreg [carvedilol]      Headache     Sudafed [pseudoephedrine hcl] Nausea And Vomiting and Other (See Comments)     JITTERY       Past Medical History:   Diagnosis Date    Addiction to drug     Anxiety     Aortic calcification 1/11/2018    Chronic diarrhea     Chronic obstructive pulmonary disease 8/27/2013    Chronic respiratory failure with hypoxia, on home O2 therapy 1/11/2018    COPD (chronic obstructive pulmonary disease)     Depression     Essential hypertension 8/12/2013    Former smoker 10/18/2016    Hepatomegaly 12/21/2015    Hyperlipidemia     Hypertension     MI (myocardial infarction)     Microscopic hematuria 1/13/2017    Panic disorder     Perianal abscess 6/1/2018    Reflux 2013    S/P right hemicolectomy 9/7/2017    Performed in 2011 after perforation during  colonoscopy    Sleep difficulties     Takotsubo cardiomyopathy 10/18/2016    Noted on echo 10/2016, recovered on repeat echo 2016     Past Surgical History:   Procedure Laterality Date    ABDOMINAL SURGERY      APPENDECTOMY      CATHETERIZATION, HEART, LEFT N/A 2013    Performed by [facility] at [facility] C[facility]    CHOLANGIOGRAM N/A 2013    Performed by Zhou Hay Jr., MD at [facility] OR 2ND FLR    CHOLECYSTECTOMY      open    CHOLECYSTECTOMY N/A 2013    Performed by Zhou [facility] Jr[facility] at [facility] OR 2ND FLR    CHOLECYSTECTOMY, LAPAROSCOPIC N/A 2013    Performed by Zhou [facility] Jr[facility] at [facility] OR 2ND FLR    COLON SURGERY      secondary to perforation after c-scope    COLONOSCOPY      FRACTURE SURGERY      HERNIA REPAIR      HIATAL HERNIA REPAIR      HYSTERECTOMY  1986    Left leg surgery       Family History     Problem Relation (Age of Onset)    Abnormal EKG Daughter    Breast cancer Maternal Grandmother, Daughter (43), Other (44)    Cancer Mother    Cataracts Maternal Grandmother    Coronary artery disease Father    Hypertension Mother, Sister, Brother    Retinal detachment Father        Tobacco Use    Smoking status: Former Smoker     Packs/day: 1.50     Years: 30.00     Pack years: 45.00     Types: Cigarettes     Last attempt to quit: 1997     Years since quittin.4    Smokeless tobacco: Never Used   Substance and Sexual Activity    Alcohol use: No     Frequency: Never     Comment: occaissionally    Drug use: No    Sexual activity: Yes     Partners: Male     Birth control/protection: Surgical     Review of Systems   Constitutional: Negative for activity change, appetite change, fatigue and unexpected weight change.   Eyes: Negative.  Negative for discharge and itching.   Respiratory: Negative.  Negative for apnea and chest tightness.    Cardiovascular: Negative.  Negative for chest pain and leg swelling.   Gastrointestinal:  Positive for abdominal pain and diarrhea. Negative for abdominal distention, anal bleeding, nausea, rectal pain and vomiting.   Musculoskeletal: Negative.    Skin: Negative.    Allergic/Immunologic: Negative.    Neurological: Negative.    Hematological: Negative.  Negative for adenopathy. Does not bruise/bleed easily.     Objective:     Vital Signs (Most Recent):  Temp: 98.4 °F (36.9 °C) (06/11/19 1002)  Pulse: 91 (06/11/19 1002)  BP: (!) 185/85 (06/11/19 1002) Vital Signs (24h Range):  [unfilled]     Weight: 77.1 kg (169 lb 15.6 oz)  Body mass index is 28.29 kg/m².    Physical Exam   Constitutional: She appears well-developed and well-nourished. No distress.   Neck: Normal range of motion. Neck supple.   Cardiovascular: Normal rate and regular rhythm.   Pulmonary/Chest: Effort normal and breath sounds normal. No stridor. No respiratory distress.   On 2L NC   Abdominal: Soft. Bowel sounds are normal. She exhibits no distension. There is no tenderness. A hernia is present. Hernia confirmed positive in the ventral area.       Well-healed midline incision. Incisional hernia defect palpated   Skin: Skin is warm and dry. She is not diaphoretic.   Psychiatric: She has a normal mood and affect. Her behavior is normal.       Significant Labs:  None    Significant Diagnostics:  CT abd/pelvis 6/3/19:  1. Surgical changes of partial right colectomy noting moderate to large amount of stool within the right colon.  There is slow flow through distal ileal loops, could reflect sequela of constipation or slow flow through the anastomosis without obstruction.  2. Anterior abdominal wall small bowel containing hernia without findings to suggest obstruction.  3. Intra and extrahepatic biliary dilation, mild, similar to the previous exam noting prior cholecystectomy.  4. Prominence of the bilateral renal collecting systems, unchanged without ureteral filling defect.  5. Additional findings above.    Assessment/Plan:   62 yo W with COPD  on home O2, CAD, HTN, HLD and several other co-morbidities presenting with incisional hernia    -Patient is undecided in re: if she wants to proceed with surgery  -Her recent pain and diarrhea were more likely related to some form of mild gastroenteritis and her abdominal pain has now completely resolved  -Should she decide tor proceed with surgery, she will need clearance from her pulmonologist and cardiologist  -Follow up as needed    Lia Richard MD  General Surgery  [facility] - General Surgery

## 2019-06-11 NOTE — LETTER
June 11, 2019      Lesly Stephens DNP  [address] [address]           [facility] - General Surgery  [address]  [place] [address]  Phone: [phone]          Patient: Ana Aguila   MR Number: [id number]   YOB: 1955   Date of Visit: 6/11/2019       Dear Lesly Stephens:    Thank you for referring Ana Aguila to me for evaluation. Attached you will find relevant portions of my assessment and plan of care.    If you have questions, please do not hesitate to call me. I look forward to following Ana Aguila along with you.    Sincerely,    Declan Sinlcair MD    Enclosure  CC:  No Recipients    If you would like to receive this communication electronically, please contact [email or web address] or [phone] to request more information on [email or web address] ([employer] Link access.    For providers and/or their staff who would like to refer a patient to [facility], please contact us through our one-stop-shop provider referral line, Vish Goins, at [phone].    If you feel you have received this communication in error or would no longer like to receive these types of communications, please e-mail [email or web address]

## 2019-06-13 ENCOUNTER — PATIENT OUTREACH (OUTPATIENT)
Dept: OTHER | Facility: OTHER | Age: 64
End: 2019-06-13

## 2019-06-13 NOTE — PROGRESS NOTES
Last 5 Patient Entered Readings                                      Current 30 Day Average: 127/87     Recent Readings 6/13/2019 6/13/2019 6/12/2019 6/12/2019 6/12/2019    SBP (mmHg) 122 122 130 130 145    DBP (mmHg) 87 87 80 80 87    Pulse 83 83 88 88 81        Digital Medicine: Health  Follow Up    Left voicemail to follow up with Ms. Ana Aguila.  Current BP average 127/87 mmHg is not at goal, <130/80 mmHg.  Will follow up in 4 weeks.

## 2019-06-17 NOTE — PROGRESS NOTES
Patient ID: Ana Aguila is a 63 y.o. female.    Chief Complaint: Follow-up (F/U CBE & MMG)        HPI:  Established patient presents today for follow-up.  Last evaluated in clinic by me on 2/12/19.    Breast History:     Personal history of breast cancer:  no  Personal history of breast biopsy:  no  Personal history of breast surgery:  no     Breast Imaging    6/26/18 bilat mmg report reviewed:  scattered areas of fibroglandular density  BI-RADS 1    2/19/19 L diag mmg & L breast limited US report reviewed:  History:  Patient is 63 y.o. and is seen for a diagnostic mammogram. Palpable abnormality felt by referring provider and patient in the left breast.   Films Compared:  Prior images (if available) were compared.   Findings:  This procedure was performed using tomosynthesis.  Computer-aided detection was utilized in the interpretation of this examination.   Left  Mammo Digital Diagnostic Left w/ Cyrus  The breast has scattered areas of fibroglandular density. There is no evidence of suspicious masses, calcifications, or other abnormal findings.   US Breast Left Limited  There is no evidence of suspicious masses or other abnormal findings. There is no mammographic or sonographic abnormality to correlate with the palpable abnormality felt by the referring provider and patient in the left breast, 2 o'clock, 15 cm from the nipple. There is no mammographic or sonographic abnormality to correlate with the palpable abnormality felt by the patient in the left breast, 1 o'clock or 4 o'clock, 6 cm from the nipple (these areas were brought to my attention by the patient during the ultrasound exam).    Impression:  There is no mammographic or sonographic evidence of malignancy.   BI-RADS Category:   Left: 1 - Negative  Overall: 1 - Negative   Recommendation:  Return to annual screening mammogram schedule is recommended (June 2019).   If the palpable area remains a concern, clinical evaluation is recommended. Please note  "that a negative mammogram and/or ultrasound does not preclude the presence of malignancy. The patient was instructed to follow up with her referring physician.    The patient's estimated lifetime risk of breast cancer (to age 85) based on Tyrer-Cuzick - 7 risk assessment model is: Tyrer-Cuzick: 11.03 %.     Interval Breast History     Patient reports bilateral breast pain to upper regions and lateral regions.  Only notices now with applied pressure.  First noticed about 6 months ago.  Outer regions associated with diffuse "lumpy-bumpiness" bilaterally, which patient states has been present "forever" and is unchanged.      Patient reports feeling dense breast tissue in 12oc regions of bilateral breasts.  First noticed within past few months.  Size not changing.    Patient reports bilateral axillary shooting pains.  Mild.  Onset a little less than 1 week ago.  Began on L first, and then R.  More intense on L.  These pains come and go.  Heating pad has helped.  Pt has been doing an exercise regimen for the past 2 weeks that she suspects could be the cause of the pains.     Patient denies palpable breast mass, breast-related skin changes, nipple discharge and nipple retraction.  No other breast-related concerns.    Pt's 3/2019 echo was normal per cardiologist's documentation.     GYN History:  Age of menarche:  11 y/o  , first live birth at 17 y/o  Uterus and ovaries:  s/p hysterectomy at 34 y/o, pt unsure whether ovaries remain intact  Menopause:  34 y/o  HRT usage:  never     Other Oncologic History:  Personal history of other cancer not abovementioned:  no  Personal history of genetic testing:  no     Social History:  Tobacco use:  quit smoking around over 20 years ago     Family Oncologic History:     Ashkenazi Christian ancestry:  no    Family History   Problem Relation Age of Onset    Cancer Mother         lung    Breast cancer Maternal Grandmother         unk age of onset    Breast cancer Daughter 43        " "negative genetic testing, unilateral breast ca    Breast cancer Other 44        thinks negative genetic testing, bilat ca    Ovarian cancer Neg Hx        Patient's Breast Cancer Risk Assessment Scores:  the patient's breast cancer risk assessment scores were calculated 2/12/19 as follows:  Tyrer-Cuzick lifetime risk:  9.4%  Valerie model 5-year risk:  2.6%      Review of Systems - See HPI.  Negative for chest pain, unexplained fatigue, recurrent infections, or unexplained weight loss.  Objective:   Physical Exam   Pulmonary/Chest: Effort normal. No respiratory distress. She exhibits no mass, no edema and no deformity. Right breast exhibits no inverted nipple, no mass, no nipple discharge, no skin change and no tenderness. Left breast exhibits no inverted nipple, no mass, no nipple discharge, no skin change and no tenderness. No breast swelling. Breasts are symmetrical.   Bilateral breasts and axillae diffusely TTP.  Bilateral breasts' 12oc regions and UOQs with dense tissue and with no discrete mass appreciated.  No mass either axilla or either region lateral to breasts.    Genitourinary: No breast swelling.   Lymphadenopathy:     She has no cervical adenopathy.     She has no axillary adenopathy.        Right: No supraclavicular adenopathy present.        Left: No supraclavicular adenopathy present.   Bilaterally, no infraclavicular LAD appreciated.  Bilateral axillae are normal-appearing.     Assessment:       1. Breast cancer screening    2. Family history of breast cancer          Plan:     Clinically CLOVIS today.  Bilateral screening mammogram due around 6/27/19.    Patient reports bilateral breast pain to upper regions and lateral regions.  Only notices now with applied pressure.  First noticed about 6 months ago.  Outer regions associated with diffuse "lumpy-bumpiness" bilaterally, which patient states has been present "forever" and is unchanged.    Not suggestive of pathology based on above and fact that pt is " clinically CLOVIS today.    Patient reports feeling dense breast tissue in 12oc regions of bilateral breasts.  First noticed within past few months.  Size not changing.  Not suggestive of pathology based on above and fact that pt is clinically CLOVIS today.    Patient reports bilateral axillary shooting pains.  Mild.  Onset a little less than 1 week ago.  Began on L first, and then R.  More intense on L.  These pains come and go.  Heating pad has helped.  Pt has been doing an exercise regimen for the past 2 weeks that she suspects could be the cause of the pains.  Not suggestive of pathology based on above and fact that pt is clinically CLOVIS today.  Suspect that these pains are secondary to pt's reported exercise regimen.  Recommended Orthopedics referral for further eval and mgmt, which pt declined.    RTC in 3 months for follow-up.    Pt needs Q6-month CBEs and annual mammograms.     Encouraged breast awareness, including monthly breast self-exams.  Advised patient to RTC with any interval changes or concerns.  Questions were encouraged and answered to patient's satisfaction, and patient verbalized understanding of information and agreement with the plan.

## 2019-06-18 ENCOUNTER — HOSPITAL ENCOUNTER (OUTPATIENT)
Dept: RADIOLOGY | Facility: HOSPITAL | Age: 64
Discharge: HOME | End: 2019-06-18
Attending: NURSE PRACTITIONER
Payer: COMMERCIAL

## 2019-06-18 ENCOUNTER — OFFICE VISIT (OUTPATIENT)
Dept: SURGERY | Facility: CLINIC | Age: 64
End: 2019-06-18
Payer: COMMERCIAL

## 2019-06-18 VITALS
WEIGHT: 169 LBS | DIASTOLIC BLOOD PRESSURE: 74 MMHG | BODY MASS INDEX: 28.16 KG/M2 | HEIGHT: 65 IN | HEART RATE: 82 BPM | TEMPERATURE: 98 F | SYSTOLIC BLOOD PRESSURE: 134 MMHG

## 2019-06-18 DIAGNOSIS — Z80.3 FAMILY HISTORY OF BREAST CANCER: ICD-10-CM

## 2019-06-18 DIAGNOSIS — Z12.31 VISIT FOR SCREENING MAMMOGRAM: ICD-10-CM

## 2019-06-18 DIAGNOSIS — Z12.39 BREAST CANCER SCREENING: Primary | ICD-10-CM

## 2019-06-18 PROCEDURE — 99999 PR PBB SHADOW E&M-EST. PATIENT-LVL III: CPT | Mod: PBBFAC,,, | Performed by: NURSE PRACTITIONER

## 2019-06-18 PROCEDURE — 3078F PR MOST RECENT DIASTOLIC BLOOD PRESSURE < 80 MM HG: ICD-10-PCS | Mod: CPTII,S$GLB,, | Performed by: NURSE PRACTITIONER

## 2019-06-18 PROCEDURE — 3075F SYST BP GE 130 - 139MM HG: CPT | Mod: CPTII,S$GLB,, | Performed by: NURSE PRACTITIONER

## 2019-06-18 PROCEDURE — 3075F PR MOST RECENT SYSTOLIC BLOOD PRESS GE 130-139MM HG: ICD-10-PCS | Mod: CPTII,S$GLB,, | Performed by: NURSE PRACTITIONER

## 2019-06-18 PROCEDURE — 99999 PR PBB SHADOW E&M-EST. PATIENT-LVL III: ICD-10-PCS | Mod: PBBFAC,,, | Performed by: NURSE PRACTITIONER

## 2019-06-18 PROCEDURE — 99213 PR OFFICE/OUTPT VISIT, EST, LEVL III, 20-29 MIN: ICD-10-PCS | Mod: S$GLB,,, | Performed by: NURSE PRACTITIONER

## 2019-06-18 PROCEDURE — 3008F PR BODY MASS INDEX (BMI) DOCUMENTED: ICD-10-PCS | Mod: CPTII,S$GLB,, | Performed by: NURSE PRACTITIONER

## 2019-06-18 PROCEDURE — 3008F BODY MASS INDEX DOCD: CPT | Mod: CPTII,S$GLB,, | Performed by: NURSE PRACTITIONER

## 2019-06-18 PROCEDURE — 3078F DIAST BP <80 MM HG: CPT | Mod: CPTII,S$GLB,, | Performed by: NURSE PRACTITIONER

## 2019-06-18 PROCEDURE — 99213 OFFICE O/P EST LOW 20 MIN: CPT | Mod: S$GLB,,, | Performed by: NURSE PRACTITIONER

## 2019-07-01 ENCOUNTER — PATIENT OUTREACH (OUTPATIENT)
Dept: OTHER | Facility: OTHER | Age: 64
End: 2019-07-01

## 2019-07-01 ENCOUNTER — HOSPITAL ENCOUNTER (OUTPATIENT)
Dept: RADIOLOGY | Facility: HOSPITAL | Age: 64
Discharge: HOME | End: 2019-07-01
Attending: NURSE PRACTITIONER
Payer: COMMERCIAL

## 2019-07-01 PROCEDURE — 77063 BREAST TOMOSYNTHESIS BI: CPT | Mod: 26,,, | Performed by: RADIOLOGY

## 2019-07-01 PROCEDURE — 77067 SCR MAMMO BI INCL CAD: CPT | Mod: TC,PO

## 2019-07-01 PROCEDURE — 77067 SCR MAMMO BI INCL CAD: CPT | Mod: 26,,, | Performed by: RADIOLOGY

## 2019-07-01 PROCEDURE — 77067 MAMMO DIGITAL SCREENING BILAT WITH TOMOSYNTHESIS_CAD: ICD-10-PCS | Mod: 26,,, | Performed by: RADIOLOGY

## 2019-07-01 PROCEDURE — 77063 MAMMO DIGITAL SCREENING BILAT WITH TOMOSYNTHESIS_CAD: ICD-10-PCS | Mod: 26,,, | Performed by: RADIOLOGY

## 2019-07-03 ENCOUNTER — NURSE TRIAGE (OUTPATIENT)
Dept: ADMINISTRATIVE | Facility: CLINIC | Age: 64
End: 2019-07-03

## 2019-07-03 DIAGNOSIS — J44.9 CHRONIC OBSTRUCTIVE PULMONARY DISEASE, UNSPECIFIED COPD TYPE: ICD-10-CM

## 2019-07-04 NOTE — TELEPHONE ENCOUNTER
"Has COPD and need advair refilled.     Reason for Disposition   [1] Request for URGENT new prescription or refill of "essential" medication (i.e., likelihood of harm to patient if not taken) AND [2] triager unable to fill per unit policy    Protocols used: MEDICATION QUESTION CALL-A-AH      "

## 2019-07-07 DIAGNOSIS — E55.9 VITAMIN D DEFICIENCY: ICD-10-CM

## 2019-07-07 RX ORDER — FLUTICASONE PROPIONATE AND SALMETEROL 250; 50 UG/1; UG/1
POWDER RESPIRATORY (INHALATION)
Qty: 1 EACH | Refills: 12 | Status: SHIPPED | OUTPATIENT
Start: 2019-07-07 | End: 2021-09-10 | Stop reason: SDUPTHER

## 2019-07-07 RX ORDER — ERGOCALCIFEROL 1.25 MG/1
50000 CAPSULE ORAL
Qty: 12 CAPSULE | Refills: 2 | Status: SHIPPED | OUTPATIENT
Start: 2019-07-07 | End: 2020-04-06

## 2019-07-17 NOTE — PROGRESS NOTES
Last 5 Patient Entered Readings                                      Current 30 Day Average: 136/89     Recent Readings 7/17/2019 7/17/2019 7/17/2019 7/17/2019 7/17/2019    SBP (mmHg) 145 145 141 141 148    DBP (mmHg) 96 96 95 95 90    Pulse 98 98 102 102 96        Digital Medicine: Health  Follow Up    Left voicemail to follow up with Ms. Ana Aguila.  Current BP average 136/89 mmHg is not at goal, <130/80 mmHg.  2nd attempt. Will follow up again in 2 weeks.

## 2019-08-08 NOTE — PROGRESS NOTES
Last 5 Patient Entered Readings                                      Current 30 Day Average: 141/91     Recent Readings 8/7/2019 8/7/2019 8/6/2019 8/6/2019 8/5/2019    SBP (mmHg) 132 132 135 135 153    DBP (mmHg) 86 86 92 92 93    Pulse 85 85 89 89 85        Digital Medicine: Health  Follow Up    Lifestyle Modifications:    1.Dietary Modifications (Sodium intake <2,000mg/day, food labels, dining out): Patient denies any dietary changes that could be attributing to spikes in BP.     2.Physical Activity: Ms. Aguila has been exercising in Pulmonary rehab during the week.     3.Medication Therapy: Patient has been compliant with the medication regimen.    4.Patient has the following medication side effects/concerns: None  (Frequency/Alleviating factors/Precipitating factors, etc.)     Follow up with Ms. Ana Aguila completed. Ms. Aguila is doing okay. She expressed some concerned about elevated BP readings from last week, but could not say that anything had changed to cause the spikes. She still has some anxiety when checking her BP, but is trying to be more relaxed prior to monitoring. Patient is pleased with recent improvement in readings. She will continue to monitor. No further questions or concerns today. Will continue to follow up to achieve health goals.

## 2019-09-24 ENCOUNTER — PATIENT OUTREACH (OUTPATIENT)
Dept: OTHER | Facility: OTHER | Age: 64
End: 2019-09-24

## 2019-09-24 NOTE — PROGRESS NOTES
"Digital Medicine: Health  Follow-Up        Follow Up  Follow-up reason(s): reading review and goal follow-up    Ms. Aguila is feeling okay. She comments on her BP being "up and down", but always repeats the measure (which improves). She continues to attend pulmonary rehab during the week.         Medication Adherence:   She misses doses: never      Patient reports that she has been taking metoprolol at night. She feels this helps her to relax, and she is not as shaky in the morning.    She will see her PCP tomorrow and let her know that she made this change. Will also route to BERRY Frost.     SDOH - deferred     INTERVENTION(S)  encouragement/support    There are no preventive care reminders to display for this patient.    Last 5 Patient Entered Readings                                      Current 30 Day Average: 136/88     Recent Readings 9/24/2019 9/24/2019 9/24/2019 9/24/2019 9/23/2019    SBP (mmHg) 137 137 141 141 141    DBP (mmHg) 87 87 82 82 88    Pulse 74 74 80 80 80                "

## 2019-09-25 ENCOUNTER — OFFICE VISIT (OUTPATIENT)
Dept: INTERNAL MEDICINE | Facility: CLINIC | Age: 64
End: 2019-09-25
Payer: COMMERCIAL

## 2019-09-25 VITALS
TEMPERATURE: 98 F | WEIGHT: 167.75 LBS | OXYGEN SATURATION: 96 % | DIASTOLIC BLOOD PRESSURE: 80 MMHG | SYSTOLIC BLOOD PRESSURE: 142 MMHG | HEIGHT: 65 IN | BODY MASS INDEX: 27.95 KG/M2 | HEART RATE: 82 BPM

## 2019-09-25 DIAGNOSIS — I10 ESSENTIAL HYPERTENSION: Primary | ICD-10-CM

## 2019-09-25 DIAGNOSIS — I70.0 AORTIC CALCIFICATION: ICD-10-CM

## 2019-09-25 DIAGNOSIS — R79.89 LOW VITAMIN D LEVEL: ICD-10-CM

## 2019-09-25 DIAGNOSIS — R16.0 HEPATOMEGALY: ICD-10-CM

## 2019-09-25 DIAGNOSIS — J42 CHRONIC BRONCHITIS, UNSPECIFIED CHRONIC BRONCHITIS TYPE: ICD-10-CM

## 2019-09-25 DIAGNOSIS — Z99.81 CHRONIC RESPIRATORY FAILURE WITH HYPOXIA, ON HOME OXYGEN THERAPY: ICD-10-CM

## 2019-09-25 DIAGNOSIS — D64.9 MILD ANEMIA: ICD-10-CM

## 2019-09-25 DIAGNOSIS — J96.11 CHRONIC RESPIRATORY FAILURE WITH HYPOXIA, ON HOME OXYGEN THERAPY: ICD-10-CM

## 2019-09-25 DIAGNOSIS — E87.6 HYPOKALEMIA: ICD-10-CM

## 2019-09-25 DIAGNOSIS — K92.1 BLOOD IN STOOL: ICD-10-CM

## 2019-09-25 PROCEDURE — 1101F PT FALLS ASSESS-DOCD LE1/YR: CPT | Mod: CPTII,S$GLB,, | Performed by: INTERNAL MEDICINE

## 2019-09-25 PROCEDURE — 3079F DIAST BP 80-89 MM HG: CPT | Mod: CPTII,S$GLB,, | Performed by: INTERNAL MEDICINE

## 2019-09-25 PROCEDURE — 3008F BODY MASS INDEX DOCD: CPT | Mod: CPTII,S$GLB,, | Performed by: INTERNAL MEDICINE

## 2019-09-25 PROCEDURE — 99214 PR OFFICE/OUTPT VISIT, EST, LEVL IV, 30-39 MIN: ICD-10-PCS | Mod: S$GLB,,, | Performed by: INTERNAL MEDICINE

## 2019-09-25 PROCEDURE — 3077F SYST BP >= 140 MM HG: CPT | Mod: CPTII,S$GLB,, | Performed by: INTERNAL MEDICINE

## 2019-09-25 PROCEDURE — 99999 PR PBB SHADOW E&M-EST. PATIENT-LVL IV: ICD-10-PCS | Mod: PBBFAC,,, | Performed by: INTERNAL MEDICINE

## 2019-09-25 PROCEDURE — 1101F PR PT FALLS ASSESS DOC 0-1 FALLS W/OUT INJ PAST YR: ICD-10-PCS | Mod: CPTII,S$GLB,, | Performed by: INTERNAL MEDICINE

## 2019-09-25 PROCEDURE — 99999 PR PBB SHADOW E&M-EST. PATIENT-LVL IV: CPT | Mod: PBBFAC,,, | Performed by: INTERNAL MEDICINE

## 2019-09-25 PROCEDURE — 99214 OFFICE O/P EST MOD 30 MIN: CPT | Mod: S$GLB,,, | Performed by: INTERNAL MEDICINE

## 2019-09-25 PROCEDURE — 3079F PR MOST RECENT DIASTOLIC BLOOD PRESSURE 80-89 MM HG: ICD-10-PCS | Mod: CPTII,S$GLB,, | Performed by: INTERNAL MEDICINE

## 2019-09-25 PROCEDURE — 3077F PR MOST RECENT SYSTOLIC BLOOD PRESSURE >= 140 MM HG: ICD-10-PCS | Mod: CPTII,S$GLB,, | Performed by: INTERNAL MEDICINE

## 2019-09-25 PROCEDURE — 3008F PR BODY MASS INDEX (BMI) DOCUMENTED: ICD-10-PCS | Mod: CPTII,S$GLB,, | Performed by: INTERNAL MEDICINE

## 2019-09-25 NOTE — PATIENT INSTRUCTIONS
Ok to continue taking your Toprol XL at night.    Track your sodium intake over the next 2 weeks and see if you notice any connection between high-sodium days (>2300mg) and higher blood pressure, or low-sodium days (<1800mg) and lower blood pressure.    If/when you get your tetanus shot, make sure to get the TDaP vaccine that also covers pertussis (whooping cough).    There is a new shingles vaccine available (Shingrix) that is both safer and more effective than the old shingles vaccine (Zostavax). I do recommend that you get this, as it can help prevent a shingles outbreak even if you have had one in the past.   For this vaccine, you will need a series of 2 shots, first one and then a second 2 to 6 months later.  Please check in with the [facility] or with your local pharmacy about getting this vaccine; they should be able to check on insurance coverage and whether there is any cost to you.       If you have any more bleeding / blood in the toilet - call to schedule a new appointment with Dr T[facility].    Hemorrhoids    Hemorrhoids are swollen and inflamed veins inside the rectum and near the anus. The rectum is the last several inches of the colon. The anus is the passage between the rectum and the outside of the body.  Causes  The veins can become swollen due to increased pressure in them. This is most often caused by:  · Chronic constipation or diarrhea  · Straining when having a bowel movement  · Sitting too long on the toilet  · A low-fiber diet  · Pregnancy  Symptoms  · Bleeding from the rectum (this may be noticeable after bowel movements)  · Lump near the anus  · Itching around the anus  · Pain around the anus  There are different types of hemorrhoids. Depending on the type you have and the severity, you may be able to treat yourself at home. In some cases, a procedure may be the best treatment option. Your healthcare provider can tell you more about this, if needed.  Home  care  General care  · To get relief from pain or itching, try:  ¨ Topical products. Your healthcare provider may prescribe or recommend creams, ointments, or pads that can be applied to the hemorrhoid. Use these exactly as directed.  ¨ Medicines. Your healthcare provider may recommend stool softeners, suppositories, or laxatives to help manage constipation. Use these exactly as directed.  ¨ Sitz baths. A sitz bath involves sitting in a few inches of warm bath water. Be careful not to make the water so hot that you burn yourself--test it before sitting in it. Soak for about 10 to 15 minutes a few times a day. This may help relieve pain.  Tips to help prevent hemorrhoids  · Eat more fiber. Fiber adds bulk to stool and absorbs water as it moves through your colon. This makes stool softer and easier to pass.  ¨ Increase the fiber in your diet with more fiber-rich foods. These include fresh fruit, vegetables, and whole grains.  ¨ Take a fiber supplement or bulking agent, if advised to by your provider. These include products such as psyllium or methylcellulose.  · Drink plenty of water, if directed to by your provider. This can help keep stool soft.  · Be more active. Frequent exercise aids digestion and helps prevent constipation. It may also help make bowel movements more regular.  · Dont strain during bowel movements. This can make hemorrhoids more likely. Also, dont sit on the toilet for long periods of time.  Follow-up care  Follow up with your healthcare provider, or as advised. If a culture or imaging tests were done, you will be notified of the results when they are ready. This may take a few days or longer.  When to seek medical advice  Call your healthcare provider right away if any of these occur:  · Increased bleeding from the rectum  · Increased pain around the rectum or anus  · Weakness or dizziness  Call 911  Call 911 or return to the emergency department right away if any of these occur:  · Trouble  breathing or swallowing  · Fainting or loss of consciousness  · Unusually fast heart rate  · Vomiting blood  · Large amounts of blood in stool  Date Last Reviewed: 6/22/2015 © 2000-2017 [employer]. [address] [place] [address], [place] [address]. All rights reserved. This information is not intended as a substitute for professional medical care. Always follow your healthcare professional's instructions.      Low-Salt Diet  This diet removes foods that are high in salt. It also limits the amount of salt you use when cooking. It is most often used for people with high blood pressure, edema (fluid retention), and kidney, liver, or heart disease.  Table salt contains the mineral sodium. Your body needs sodium to work normally. But too much sodium can make your health problems worse. Your healthcare provider is recommending a low-salt (also called low-sodium) diet for you. Your total daily allowance of salt is 1,500 to 2,300 milligrams (mg). It is less than 1 teaspoon of table salt. This means you can have only about 500 to 700 mg of sodium at each meal. People with certain health problems should limit salt intake to the lower end of the recommended range.    When you cook, dont add much salt. If you can cook without using salt, even better. Dont add salt to your food at the table.  When shopping, read food labels. Salt is often called sodium on the label. Choose foods that are salt-free, low salt, or very low salt. Note that foods with reduced salt may not lower your salt intake enough.    Beans, potatoes, and pasta  Ok: Dry beans, split peas, lentils, potatoes, rice, macaroni, pasta, spaghetti without added salt  Avoid: Potato chips, tortilla chips, and similar products  Breads and cereals  Ok: Low-sodium breads, rolls, cereals, and cakes; low-salt crackers, matzo crackers  Avoid: Salted crackers, pretzels, popcorn, Ethiopian toast, pancakes, muffins  Dairy  Ok: Milk, chocolate milk, hot chocolate mix,  low-salt cheeses, and yogurt  Avoid: Processed cheese and cheese spreads; Roquefort, Camembert, and cottage cheese; buttermilk, instant breakfast drink  Desserts  Ok: Ice cream, frozen yogurt, juice bars, gelatin, cookies and pies, sugar, honey, jelly, hard candy  Avoid: Most pies, cakes and cookies prepared or processed with salt; instant pudding  Drinks  Ok: Tea, coffee, fizzy (carbonated) drinks, juices  Avoid: Flavored coffees, electrolyte replacement drinks, sports drinks  Meats  Ok: All fresh meat, fish, poultry, low-salt tuna, eggs, egg substitute  Avoid: Smoked, pickled, brine-cured, or salted meats and fish. This includes sneed, chipped beef, corned beef, hot dogs, deli meats, ham, kosher meats, salt pork, sausage, canned tuna, salted codfish, smoked salmon, herring, sardines, or anchovies.  Seasonings and spices  Ok: Most seasonings are okay. Good substitutes for salt include: fresh herb blends, hot sauce, lemon, garlic, guevara, vinegar, dry mustard, parsley, cilantro, horseradish, tomato paste, regular margarine, mayonnaise, unsalted butter, cream cheese, vegetable oil, cream, low-salt salad dressing and gravy.  Avoid: Regular ketchup, relishes, pickles, soy sauce, teriyaki sauce, Worcestershire sauce, BBQ sauce, tartar sauce, meat tenderizer, chili sauce, regular gravy, regular salad dressing, salted butter  Soups  Ok: Low-salt soups and broths made with allowed foods  Avoid: Bouillon cubes, soups with smoked or salted meats, regular soup and broth  Vegetables  Ok: Most vegetables are okay; also low-salt tomato and vegetable juices  Avoid: Sauerkraut and other brine-soaked vegetables; pickles and other pickled vegetables; tomato juice, olives  Date Last Reviewed: 8/1/2016  © 1256-2202 [employer]. [address] [place] [address], [place] [address]. All rights reserved. This information is not intended as a substitute for professional medical care. Always follow your healthcare professional's  instructions.

## 2019-09-25 NOTE — LETTER
September 25, 2019      [facility]  [address]  [facility] [address]  Phone: [phone]  Fax: [phone]       Patient: Ana Aguila   YOB: 1955  Date of Visit: 09/25/2019    To Whom It May Concern:    Zan Aguila  was at [facility] on 09/25/2019. She may return to work on 9/25/19 with no restrictions. If you have any questions or concerns, or if I can be of further assistance, please do not hesitate to contact me.    Sincerely,        Erik Cool MD

## 2019-09-25 NOTE — PROGRESS NOTES
Subjective:       Patient ID: Ana Aguila is a 64 y.o. female.    Chief Complaint: Follow-up    HPI  63 y/o woman with HTN, HLD, severe COPD with home O2, h/o hemicolectomy 2009 due to colonoscopy complication, chronic RUQ discomfort, NAFLD here for follow up.    Hypokalemia - taking potassium supplement more regularly    RUQ abdominal pain, ventral hernia - No longer having abdominal pain regularly.   Has been having more regular BM recently  No longer having the pain in the same way.   Occasional bright red blood in toilet and - brings in photos of 2 episodes with significant bright red blood in toilet; also on toilet paper. Notes this sometimes if she is having to strain to have a BM. FIT done 2/5/19 negative.  Does have known internal hemorrhoids  Does not want to have a colonoscopy unless she can do the entire process (including the prep) while in the hospital.     Stress-induced cardiomyopathy (recent recurrence), HTN, HLD - following with cardiology; also recently following with Digital HTN program. On toprol XL, candesartan; not taking the candesartan for several months. On discussion, she says she has not taken the candesartan at all  Home BP recently generally 140/80s    COPD on home O2 - follows with Dr Dill. Goes to pulmonary rehab. Stable recently, no worsening dyspnea or     NAFLD - LFTs normal    Breast pain - saw Breast Surgery clinic 6/2019, recommended to return in 3 months. Per note, plan for q6 month CBE, annual mammograms.    H/o hysterectomy age 33    Review of Systems   Constitutional: Negative for activity change, fatigue and fever.   HENT: Negative.  Negative for congestion.    Eyes: Negative for visual disturbance.   Respiratory: Positive for shortness of breath (at baseline; exercise tolerance improved). Negative for cough and wheezing.    Cardiovascular: Negative for chest pain, palpitations and leg swelling.   Gastrointestinal: Negative for abdominal pain, blood in stool and nausea.  "  Endocrine: Negative.    Genitourinary: Negative.    Musculoskeletal: Negative for arthralgias, back pain (improved) and gait problem.   Skin: Negative.  Negative for rash.   Neurological: Negative for syncope, weakness and light-headedness.   Psychiatric/Behavioral: Negative for dysphoric mood. The patient is nervous/anxious (improved).          Past medical history, surgical history, and family medical history reviewed and updated as appropriate.    Medications and allergies reviewed.     Objective:          Vitals:    09/25/19 1437 09/25/19 1546   BP: (!) 154/90 (!) 142/80   BP Location: Left arm Left arm   Patient Position: Sitting Sitting   BP Method: Large (Manual) Medium (Manual)   Pulse: 82    Temp: 98.2 °F (36.8 °C)    SpO2: 96%    Weight: 76.1 kg (167 lb 12.3 oz)    Height: 5' 5" (1.651 m)      Body mass index is 27.92 kg/m².  Physical Exam   Constitutional: She is oriented to person, place, and time. She appears well-developed and well-nourished. No distress.   Pleasant, comfortable. On portable O2 during visit.   HENT:   Head: Normocephalic and atraumatic.   Mouth/Throat: Oropharynx is clear and moist.   Eyes: Pupils are equal, round, and reactive to light. Conjunctivae and EOM are normal.   Neck: Neck supple. No thyromegaly present.   Cardiovascular: Normal rate, regular rhythm and normal heart sounds.   No murmur heard.  Pulmonary/Chest: Effort normal and breath sounds normal. No respiratory distress. She has no wheezes. She has no rales.   Abdominal: Soft. Bowel sounds are normal. She exhibits no distension. There is no tenderness.   Musculoskeletal: She exhibits no edema or tenderness.   Lymphadenopathy:     She has no cervical adenopathy.   Neurological: She is alert and oriented to person, place, and time. She has normal strength. No cranial nerve deficit. Gait normal.   Skin: Skin is warm and dry.   Psychiatric: She has a normal mood and affect. Her behavior is normal.   Vitals reviewed.      Lab " Results   Component Value Date    WBC 13.33 (H) 11/21/2018    HGB 11.6 (L) 11/21/2018    HCT 37.1 11/21/2018     11/21/2018    CHOL 219 (H) 09/10/2018    TRIG 64 09/10/2018    HDL 66 09/10/2018    ALT 8 (L) 05/29/2019    AST 14 05/29/2019     05/29/2019    K 3.3 (L) 05/29/2019     05/29/2019    CREATININE 0.8 05/29/2019    BUN 13 05/29/2019    CO2 29 05/29/2019    TSH 1.139 11/18/2018    INR 1.2 10/18/2016    HGBA1C 5.4 10/18/2016       Assessment:       1. Essential hypertension    2. Hepatomegaly    3. Low vitamin D level    4. Hypokalemia    5. Mild anemia    6. Blood in stool    7. Aortic calcification    8. Chronic bronchitis, unspecified chronic bronchitis type    9. Chronic respiratory failure with hypoxia, on home oxygen therapy        Plan:   Ana was seen today for follow-up.    Diagnoses and all orders for this visit:    Essential hypertension - continue toprol XL at night. BP a little above goal today but not following consistently low-salt diet  -     Lipid panel; Future    Hepatomegaly - stable, monitor labs    Low vitamin D level  -     Vitamin D; Future    Hypokalemia  -     Comprehensive metabolic panel; Future    Mild anemia  -     CBC Without Differential; Future    Blood in stool - likely hemorrhoids; for now defers exam but is willing to see CRS for further evaluation if this recurs/persists  -     CBC Without Differential; Future    Aortic calcification  -     Lipid panel; Future    Health maintenance reviewed with patient.   Recommended look into shingrix vaccine  Should get TDaP if >5 years  Fasting labs in AM  Recommended patient call CRS clinic to schedule  Hardcopy given for flu and pneumovax    Follow up in about 3 months (around 12/25/2019), or if symptoms worsen or fail to improve.    Erik Cool MD  Internal Medicine  [facility]  9/25/2019

## 2019-09-26 ENCOUNTER — LAB VISIT (OUTPATIENT)
Dept: LAB | Facility: HOSPITAL | Age: 64
End: 2019-09-26
Attending: INTERNAL MEDICINE
Payer: COMMERCIAL

## 2019-09-26 DIAGNOSIS — I70.0 AORTIC CALCIFICATION: ICD-10-CM

## 2019-09-26 DIAGNOSIS — E87.6 HYPOKALEMIA: ICD-10-CM

## 2019-09-26 DIAGNOSIS — R79.89 LOW VITAMIN D LEVEL: ICD-10-CM

## 2019-09-26 DIAGNOSIS — K92.1 BLOOD IN STOOL: ICD-10-CM

## 2019-09-26 DIAGNOSIS — I10 ESSENTIAL HYPERTENSION: ICD-10-CM

## 2019-09-26 DIAGNOSIS — D64.9 MILD ANEMIA: ICD-10-CM

## 2019-09-26 LAB
25(OH)D3+25(OH)D2 SERPL-MCNC: 30 NG/ML (ref 30–96)
ALBUMIN SERPL BCP-MCNC: 3.8 G/DL (ref 3.5–5.2)
ALP SERPL-CCNC: 65 U/L (ref 55–135)
ALT SERPL W/O P-5'-P-CCNC: 8 U/L (ref 10–44)
ANION GAP SERPL CALC-SCNC: 8 MMOL/L (ref 8–16)
AST SERPL-CCNC: 18 U/L (ref 10–40)
BILIRUB SERPL-MCNC: 0.3 MG/DL (ref 0.1–1)
BUN SERPL-MCNC: 13 MG/DL (ref 8–23)
CALCIUM SERPL-MCNC: 9 MG/DL (ref 8.7–10.5)
CHLORIDE SERPL-SCNC: 107 MMOL/L (ref 95–110)
CHOLEST SERPL-MCNC: 207 MG/DL (ref 120–199)
CHOLEST/HDLC SERPL: 3.1 {RATIO} (ref 2–5)
CO2 SERPL-SCNC: 29 MMOL/L (ref 23–29)
CREAT SERPL-MCNC: 0.8 MG/DL (ref 0.5–1.4)
ERYTHROCYTE [DISTWIDTH] IN BLOOD BY AUTOMATED COUNT: 17.5 % (ref 11.5–14.5)
EST. GFR  (AFRICAN AMERICAN): >60 ML/MIN/1.73 M^2
EST. GFR  (NON AFRICAN AMERICAN): >60 ML/MIN/1.73 M^2
GLUCOSE SERPL-MCNC: 82 MG/DL (ref 70–110)
HCT VFR BLD AUTO: 35.6 % (ref 37–48.5)
HDLC SERPL-MCNC: 67 MG/DL (ref 40–75)
HDLC SERPL: 32.4 % (ref 20–50)
HGB BLD-MCNC: 11.2 G/DL (ref 12–16)
LDLC SERPL CALC-MCNC: 129.8 MG/DL (ref 63–159)
MCH RBC QN AUTO: 26.2 PG (ref 27–31)
MCHC RBC AUTO-ENTMCNC: 31.5 G/DL (ref 32–36)
MCV RBC AUTO: 83 FL (ref 82–98)
NONHDLC SERPL-MCNC: 140 MG/DL
PLATELET # BLD AUTO: 241 K/UL (ref 150–350)
PMV BLD AUTO: 9.5 FL (ref 9.2–12.9)
POTASSIUM SERPL-SCNC: 3.9 MMOL/L (ref 3.5–5.1)
PROT SERPL-MCNC: 6.8 G/DL (ref 6–8.4)
RBC # BLD AUTO: 4.28 M/UL (ref 4–5.4)
SODIUM SERPL-SCNC: 144 MMOL/L (ref 136–145)
TRIGL SERPL-MCNC: 51 MG/DL (ref 30–150)
WBC # BLD AUTO: 7.96 K/UL (ref 3.9–12.7)

## 2019-09-26 PROCEDURE — 85027 COMPLETE CBC AUTOMATED: CPT

## 2019-09-26 PROCEDURE — 82306 VITAMIN D 25 HYDROXY: CPT

## 2019-09-26 PROCEDURE — 36415 COLL VENOUS BLD VENIPUNCTURE: CPT

## 2019-09-26 PROCEDURE — 80061 LIPID PANEL: CPT

## 2019-09-26 PROCEDURE — 80053 COMPREHEN METABOLIC PANEL: CPT

## 2019-10-10 ENCOUNTER — PATIENT OUTREACH (OUTPATIENT)
Dept: OTHER | Facility: OTHER | Age: 64
End: 2019-10-10

## 2019-10-10 NOTE — PROGRESS NOTES
Digital Medicine: Clinician Follow-Up    Per 30 day average, 137/88 mmHg, patient's BP is not at goal.         Follow Up  Follow-up reason(s): reading review    Ms. Aguila saw Dr. Cool 2 weeks ago. She informed Dr. Cool that she was not taking candesartan. Dr. Cool recommended she work on reducing salt in her diet. She has been trying to eat less sneed and see what her BP is on days where she avoids it compared to days where she consumes it.        Assessment/Plan    INTERVENTION(S)  recommended diet modifications    PLAN  Health  follow up    Ms. Aguila remains reluctant to discuss changes to her HTN medications. Encouraged her to keep a food log and discuss it with her health , Lilly.     Will continue to monitor regularly.     Asked patient to call or message with questions or concerns.       Last 5 Patient Entered Readings                                      Current 30 Day Average: 137/88     Recent Readings 10/10/2019 10/10/2019 10/10/2019 10/10/2019 10/10/2019    SBP (mmHg) 147 147 144 144 153    DBP (mmHg) 90 90 88 88 88    Pulse 73 73 72 72 73             Hypertension Medications             metoprolol succinate (TOPROL-XL) 25 MG 24 hr tablet Take 1 tablet (25 mg total) by mouth once daily.

## 2019-10-10 NOTE — PROGRESS NOTES
LVM to discuss BP readings and medications.     Last 5 Patient Entered Readings                                      Current 30 Day Average: 137/88     Recent Readings 10/10/2019 10/10/2019 10/10/2019 10/10/2019 10/10/2019    SBP (mmHg) 147 147 144 144 153    DBP (mmHg) 90 90 88 88 88    Pulse 73 73 72 72 73

## 2019-11-06 ENCOUNTER — CLINICAL SUPPORT (OUTPATIENT)
Dept: OTHER | Facility: CLINIC | Age: 64
End: 2019-11-06
Payer: COMMERCIAL

## 2019-11-06 DIAGNOSIS — Z00.8 ENCOUNTER FOR OTHER GENERAL EXAMINATION: ICD-10-CM

## 2019-11-06 PROCEDURE — 80061 LIPID PANEL: CPT | Mod: QW,S$GLB,, | Performed by: INTERNAL MEDICINE

## 2019-11-06 PROCEDURE — 82947 ASSAY GLUCOSE BLOOD QUANT: CPT | Mod: QW,S$GLB,, | Performed by: INTERNAL MEDICINE

## 2019-11-06 PROCEDURE — 80061 PR  LIPID PANEL: ICD-10-PCS | Mod: QW,S$GLB,, | Performed by: INTERNAL MEDICINE

## 2019-11-06 PROCEDURE — 82947 PR  ASSAY QUANTITATIVE,BLOOD GLUCOSE: ICD-10-PCS | Mod: QW,S$GLB,, | Performed by: INTERNAL MEDICINE

## 2019-11-06 PROCEDURE — 99401 PR PREVENT COUNSEL,INDIV,15 MIN: ICD-10-PCS | Mod: S$GLB,,, | Performed by: INTERNAL MEDICINE

## 2019-11-06 PROCEDURE — 99401 PREV MED CNSL INDIV APPRX 15: CPT | Mod: S$GLB,,, | Performed by: INTERNAL MEDICINE

## 2019-11-07 VITALS — BODY MASS INDEX: 27.92 KG/M2 | HEIGHT: 65 IN

## 2019-11-07 LAB
HDLC SERPL-MCNC: 75 MG/DL
POC CHOLESTEROL, LDL (DOCK): 113 MG/DL
POC CHOLESTEROL, TOTAL: 210 MG/DL
POC GLUCOSE, FASTING: 80 MG/DL (ref 60–110)
TRIGL SERPL-MCNC: 108 MG/DL

## 2019-11-12 DIAGNOSIS — J44.1 COPD WITH ACUTE EXACERBATION: ICD-10-CM

## 2019-11-13 ENCOUNTER — TELEPHONE (OUTPATIENT)
Dept: INTERNAL MEDICINE | Facility: CLINIC | Age: 64
End: 2019-11-13

## 2019-11-13 RX ORDER — DEXAMETHASONE 4 MG/1
TABLET ORAL
Qty: 10 TABLET | Refills: 0 | OUTPATIENT
Start: 2019-11-13

## 2019-11-13 NOTE — TELEPHONE ENCOUNTER
----- Message from Jada Lund sent at 11/13/2019  5:00 PM CST -----  Contact: self  [phone]  Appointment Request From: Ana Aguila    With Provider: Erik Cool MD [[facility]]    Preferred Date Range: Any date 11/14/2019 or later    Preferred Times: Thursday Morning, Thursday Afternoon    Reason for visit: stomach pain coughing & sneezing    Comments:  stomach pain coughing & sneezing    Via pt portal    Thank you!

## 2019-11-14 ENCOUNTER — IMMUNIZATION (OUTPATIENT)
Dept: INTERNAL MEDICINE | Facility: CLINIC | Age: 64
End: 2019-11-14
Payer: COMMERCIAL

## 2019-11-14 ENCOUNTER — OFFICE VISIT (OUTPATIENT)
Dept: INTERNAL MEDICINE | Facility: CLINIC | Age: 64
End: 2019-11-14
Payer: COMMERCIAL

## 2019-11-14 VITALS
OXYGEN SATURATION: 89 % | WEIGHT: 170.44 LBS | SYSTOLIC BLOOD PRESSURE: 134 MMHG | BODY MASS INDEX: 28.4 KG/M2 | DIASTOLIC BLOOD PRESSURE: 84 MMHG | HEART RATE: 84 BPM | HEIGHT: 65 IN | TEMPERATURE: 99 F

## 2019-11-14 DIAGNOSIS — J44.1 COPD WITH ACUTE EXACERBATION: ICD-10-CM

## 2019-11-14 DIAGNOSIS — J06.9 VIRAL URI: Primary | ICD-10-CM

## 2019-11-14 DIAGNOSIS — Z99.81 CHRONIC RESPIRATORY FAILURE WITH HYPOXIA, ON HOME OXYGEN THERAPY: ICD-10-CM

## 2019-11-14 DIAGNOSIS — J96.11 CHRONIC RESPIRATORY FAILURE WITH HYPOXIA, ON HOME OXYGEN THERAPY: ICD-10-CM

## 2019-11-14 PROCEDURE — 99999 PR PBB SHADOW E&M-EST. PATIENT-LVL IV: CPT | Mod: PBBFAC,,, | Performed by: INTERNAL MEDICINE

## 2019-11-14 PROCEDURE — 1101F PT FALLS ASSESS-DOCD LE1/YR: CPT | Mod: CPTII,S$GLB,, | Performed by: INTERNAL MEDICINE

## 2019-11-14 PROCEDURE — 90662 IIV NO PRSV INCREASED AG IM: CPT | Mod: S$GLB,,, | Performed by: INTERNAL MEDICINE

## 2019-11-14 PROCEDURE — 3008F PR BODY MASS INDEX (BMI) DOCUMENTED: ICD-10-PCS | Mod: CPTII,S$GLB,, | Performed by: INTERNAL MEDICINE

## 2019-11-14 PROCEDURE — 90662 FLU VACCINE - HIGH DOSE (65+) PRESERVATIVE FREE IM: ICD-10-PCS | Mod: S$GLB,,, | Performed by: INTERNAL MEDICINE

## 2019-11-14 PROCEDURE — 3075F PR MOST RECENT SYSTOLIC BLOOD PRESS GE 130-139MM HG: ICD-10-PCS | Mod: CPTII,S$GLB,, | Performed by: INTERNAL MEDICINE

## 2019-11-14 PROCEDURE — 3079F DIAST BP 80-89 MM HG: CPT | Mod: CPTII,S$GLB,, | Performed by: INTERNAL MEDICINE

## 2019-11-14 PROCEDURE — 90471 IMMUNIZATION ADMIN: CPT | Mod: S$GLB,,, | Performed by: INTERNAL MEDICINE

## 2019-11-14 PROCEDURE — 99999 PR PBB SHADOW E&M-EST. PATIENT-LVL IV: ICD-10-PCS | Mod: PBBFAC,,, | Performed by: INTERNAL MEDICINE

## 2019-11-14 PROCEDURE — 99214 OFFICE O/P EST MOD 30 MIN: CPT | Mod: 25,S$GLB,, | Performed by: INTERNAL MEDICINE

## 2019-11-14 PROCEDURE — 90471 FLU VACCINE - HIGH DOSE (65+) PRESERVATIVE FREE IM: ICD-10-PCS | Mod: S$GLB,,, | Performed by: INTERNAL MEDICINE

## 2019-11-14 PROCEDURE — 3075F SYST BP GE 130 - 139MM HG: CPT | Mod: CPTII,S$GLB,, | Performed by: INTERNAL MEDICINE

## 2019-11-14 PROCEDURE — 3008F BODY MASS INDEX DOCD: CPT | Mod: CPTII,S$GLB,, | Performed by: INTERNAL MEDICINE

## 2019-11-14 PROCEDURE — 1101F PR PT FALLS ASSESS DOC 0-1 FALLS W/OUT INJ PAST YR: ICD-10-PCS | Mod: CPTII,S$GLB,, | Performed by: INTERNAL MEDICINE

## 2019-11-14 PROCEDURE — 99214 PR OFFICE/OUTPT VISIT, EST, LEVL IV, 30-39 MIN: ICD-10-PCS | Mod: 25,S$GLB,, | Performed by: INTERNAL MEDICINE

## 2019-11-14 PROCEDURE — 3079F PR MOST RECENT DIASTOLIC BLOOD PRESSURE 80-89 MM HG: ICD-10-PCS | Mod: CPTII,S$GLB,, | Performed by: INTERNAL MEDICINE

## 2019-11-14 RX ORDER — DEXAMETHASONE 4 MG/1
4 TABLET ORAL EVERY 12 HOURS
Qty: 10 TABLET | Refills: 0 | Status: SHIPPED | OUTPATIENT
Start: 2019-11-14 | End: 2020-01-30

## 2019-11-14 NOTE — PROGRESS NOTES
Subjective:       Patient ID: Ana Aguila is a 64 y.o. female.    Chief Complaint: Cough    HPI   Pt of Dr Cool, with copd, 02 dependent Sunday began irritated throat, sneezing and non prod cough.  Bones ache.  BLowing clear-green mucus form nose.  Temp 2 days ago- 99.  Wheezing is unchanged from usual.  Little more sob.  Pulse ox by her phone in office - 95.    She last took decadron months ago.  Humidifier has helped.  She hasn't gotten flu vax.  She believes anxiety is contributing to her symptoms.  She fears getting ill.    Some achi ness of neck, abdomen.      Review of Systems   Constitutional: Negative for fever and unexpected weight change.   HENT: Negative for congestion and postnasal drip.    Eyes: Negative for pain, discharge and visual disturbance.   Respiratory: Negative for cough, chest tightness, shortness of breath and wheezing.    Cardiovascular: Positive for leg swelling (occasionally). Negative for chest pain.   Gastrointestinal: Negative for abdominal pain, constipation, diarrhea and nausea.   Genitourinary: Negative for difficulty urinating, dysuria and hematuria.   Skin: Negative for rash.   Neurological: Negative for headaches.   Psychiatric/Behavioral: Negative for dysphoric mood and sleep disturbance. The patient is not nervous/anxious.        Objective:      Physical Exam   Constitutional: She is oriented to person, place, and time. She appears well-developed and well-nourished. No distress.   HENT:   Head: Normocephalic and atraumatic.   Mouth/Throat: Oropharynx is clear and moist. No oropharyngeal exudate.   Tm's clear   Neck: Neck supple.   Cardiovascular: Normal rate and regular rhythm.   Pulmonary/Chest: Effort normal and breath sounds normal. No respiratory distress. She has no wheezes. She has no rales.   Lymphadenopathy:     She has no cervical adenopathy.   Neurological: She is alert and oriented to person, place, and time.   Psychiatric: She has a normal mood and affect.  Her behavior is normal.       Assessment:       1. Viral URI    2. Chronic respiratory failure with hypoxia, on home oxygen therapy    3. COPD with acute exacerbation        Plan:       Ana was seen today for cough.    Diagnoses and all orders for this visit:    Viral URI    Chronic respiratory failure with hypoxia, on home oxygen therapy  -     Ambulatory consult to Pulmonology    COPD with acute exacerbation  -     dexAMETHasone (DECADRON) 4 MG Tab; Take 1 tablet (4 mg total) by mouth every 12 (twelve) hours. For three days, then reduce to daily for 3 days then stop       She declines meds for anxiety.       Reassured     She will hold off on decadron for now, as symptoms minimal.  Rx sent to pharmacy should pulm symptoms worsen.      Flu vax.

## 2019-12-04 DIAGNOSIS — J43.2 CENTRILOBULAR EMPHYSEMA: Primary | ICD-10-CM

## 2019-12-17 RX ORDER — METOPROLOL SUCCINATE 25 MG/1
TABLET, EXTENDED RELEASE ORAL
Qty: 90 TABLET | Refills: 3 | Status: SHIPPED | OUTPATIENT
Start: 2019-12-17 | End: 2020-10-09

## 2019-12-17 NOTE — PROGRESS NOTES
Refill Authorization Note     is requesting a refill authorization.    Brief assessment and rationale for refill: APPROVE: prr  Name and strength of medication: metoprolol succinate (TOPROL-XL) 25 MG 24 hr tablet       Medication Therapy Plan: approve 12 more months     Medication reconciliation completed: No              How patient will take medication: t1t po qd           Comments:   Blood Pressure Readings: <139/89mmHg 12 months Heart Rate: > 50bpm (BB/NDHP-CCBS)   BP Readings from Last 3 Encounters:   11/14/19 134/84   09/25/19 (!) 142/80   06/18/19 134/74    Pulse Readings from Last 3 Encounters:   11/14/19 84   09/25/19 82   06/18/19 82        Hypertension Digital Medicine Data Values will display if enrolled in Digital Medicine   Last 5 Patient Entered Readings                                      Current 30 Day Average: 139/88     Recent Readings 12/17/2019 12/17/2019 12/16/2019 12/16/2019 12/16/2019    SBP (mmHg) 128 128 139 139 142    DBP (mmHg) 83 83 88 88 86    Pulse 84 84 76 76 80           Appointments     Date Provider   Last Visit   9/25/2019 Erik Cool MD   Next Visit   Visit date not found Erik Cool MD

## 2019-12-18 ENCOUNTER — PATIENT OUTREACH (OUTPATIENT)
Dept: ADMINISTRATIVE | Facility: OTHER | Age: 64
End: 2019-12-18

## 2019-12-19 ENCOUNTER — HOSPITAL ENCOUNTER (OUTPATIENT)
Dept: PULMONOLOGY | Facility: CLINIC | Age: 64
Discharge: HOME | End: 2019-12-19
Payer: COMMERCIAL

## 2019-12-19 ENCOUNTER — OFFICE VISIT (OUTPATIENT)
Dept: PULMONOLOGY | Facility: CLINIC | Age: 64
End: 2019-12-19
Payer: COMMERCIAL

## 2019-12-19 VITALS
BODY MASS INDEX: 28.32 KG/M2 | WEIGHT: 170 LBS | DIASTOLIC BLOOD PRESSURE: 76 MMHG | SYSTOLIC BLOOD PRESSURE: 142 MMHG | HEIGHT: 65 IN | HEART RATE: 85 BPM

## 2019-12-19 DIAGNOSIS — J96.11 CHRONIC RESPIRATORY FAILURE WITH HYPOXIA: ICD-10-CM

## 2019-12-19 DIAGNOSIS — J43.2 CENTRILOBULAR EMPHYSEMA: ICD-10-CM

## 2019-12-19 DIAGNOSIS — J43.2 CENTRILOBULAR EMPHYSEMA: Primary | ICD-10-CM

## 2019-12-19 LAB
FEF 25 75 LLN: 0.75
FEF 25 75 PRE REF: 10.8 %
FEF 25 75 REF: 1.85
FEV05 LLN: 0.96
FEV05 REF: 1.82
FEV1 FVC LLN: 67
FEV1 FVC PRE REF: 46.2 %
FEV1 FVC REF: 79
FEV1 LLN: 1.49
FEV1 PRE REF: 22.5 %
FEV1 REF: 2.09
FVC LLN: 1.92
FVC PRE REF: 48.4 %
FVC REF: 2.66
PEF LLN: 3.34
PEF PRE REF: 27.8 %
PEF REF: 5.4
PHYSICIAN COMMENT: ABNORMAL
PRE FEF 25 75: 0.2 L/S (ref 0.75–2.95)
PRE FET 100: 7.27 SEC
PRE FEV05 REF: 16.8 %
PRE FEV1 FVC: 36.5 % (ref 66.73–91.3)
PRE FEV1: 0.47 L (ref 1.49–2.68)
PRE FEV5: 0.3 L (ref 0.96–2.67)
PRE FVC: 1.28 L (ref 1.92–3.39)
PRE PEF: 1.5 L/S (ref 3.34–7.47)

## 2019-12-19 PROCEDURE — 94010 BREATHING CAPACITY TEST: CPT | Mod: S$GLB,,, | Performed by: INTERNAL MEDICINE

## 2019-12-19 PROCEDURE — 99214 OFFICE O/P EST MOD 30 MIN: CPT | Mod: S$GLB,,, | Performed by: INTERNAL MEDICINE

## 2019-12-19 PROCEDURE — 99214 PR OFFICE/OUTPT VISIT, EST, LEVL IV, 30-39 MIN: ICD-10-PCS | Mod: S$GLB,,, | Performed by: INTERNAL MEDICINE

## 2019-12-19 PROCEDURE — 3077F PR MOST RECENT SYSTOLIC BLOOD PRESSURE >= 140 MM HG: ICD-10-PCS | Mod: CPTII,S$GLB,, | Performed by: INTERNAL MEDICINE

## 2019-12-19 PROCEDURE — 1101F PR PT FALLS ASSESS DOC 0-1 FALLS W/OUT INJ PAST YR: ICD-10-PCS | Mod: CPTII,S$GLB,, | Performed by: INTERNAL MEDICINE

## 2019-12-19 PROCEDURE — 3008F PR BODY MASS INDEX (BMI) DOCUMENTED: ICD-10-PCS | Mod: CPTII,S$GLB,, | Performed by: INTERNAL MEDICINE

## 2019-12-19 PROCEDURE — 3078F DIAST BP <80 MM HG: CPT | Mod: CPTII,S$GLB,, | Performed by: INTERNAL MEDICINE

## 2019-12-19 PROCEDURE — 3077F SYST BP >= 140 MM HG: CPT | Mod: CPTII,S$GLB,, | Performed by: INTERNAL MEDICINE

## 2019-12-19 PROCEDURE — 3078F PR MOST RECENT DIASTOLIC BLOOD PRESSURE < 80 MM HG: ICD-10-PCS | Mod: CPTII,S$GLB,, | Performed by: INTERNAL MEDICINE

## 2019-12-19 PROCEDURE — 99999 PR PBB SHADOW E&M-EST. PATIENT-LVL III: CPT | Mod: PBBFAC,,, | Performed by: INTERNAL MEDICINE

## 2019-12-19 PROCEDURE — 94010 BREATHING CAPACITY TEST: ICD-10-PCS | Mod: S$GLB,,, | Performed by: INTERNAL MEDICINE

## 2019-12-19 PROCEDURE — 3008F BODY MASS INDEX DOCD: CPT | Mod: CPTII,S$GLB,, | Performed by: INTERNAL MEDICINE

## 2019-12-19 PROCEDURE — 1101F PT FALLS ASSESS-DOCD LE1/YR: CPT | Mod: CPTII,S$GLB,, | Performed by: INTERNAL MEDICINE

## 2019-12-19 PROCEDURE — 99999 PR PBB SHADOW E&M-EST. PATIENT-LVL III: ICD-10-PCS | Mod: PBBFAC,,, | Performed by: INTERNAL MEDICINE

## 2019-12-26 DIAGNOSIS — J96.11 CHRONIC RESPIRATORY FAILURE WITH HYPOXIA: Primary | ICD-10-CM

## 2019-12-29 RX ORDER — IPRATROPIUM BROMIDE AND ALBUTEROL SULFATE 2.5; .5 MG/3ML; MG/3ML
SOLUTION RESPIRATORY (INHALATION)
Qty: 270 ML | Refills: 0 | OUTPATIENT
Start: 2019-12-29

## 2020-01-27 RX ORDER — POTASSIUM CHLORIDE 750 MG/1
TABLET, EXTENDED RELEASE ORAL
Qty: 90 TABLET | Refills: 0 | Status: SHIPPED | OUTPATIENT
Start: 2020-01-27 | End: 2020-04-29

## 2020-01-27 NOTE — TELEPHONE ENCOUNTER
Refill Authorization Note     is requesting a refill authorization.    Brief assessment and rationale for refill: APPROVE: prr                                         Comments:   Requested Prescriptions   Signed Prescriptions Disp Refills    potassium chloride (KLOR-CON) 10 MEQ TbSR 90 tablet 0     Sig: TAKE 1 TABLET(10 MEQ) BY MOUTH EVERY DAY       Endocrinology:  Minerals - Potassium Supplementation Passed - 1/23/2020  5:03 PM        Passed - Patient is at least 18 years old        Passed - Office visit in past 12 months or future 90 days     Recent Outpatient Visits            1 month ago Centrilobular emphysema    [facility] - Pulmonary Services Manpreet Dill MD    2 months ago Viral URI    [facility] - Internal Medicine Matilde Zamarripa MD    4 months ago Essential hypertension    [facility] - Internal Medicine Erik Cool MD    7 months ago Breast cancer screening    [facility] Breast Surgery Derrick Ramirez DNP    7 months ago Incisional hernia, without obstruction or gangrene    [facility] - General Surgery Declan Sinclair MD                    Passed - K in normal range and within 180 days     Potassium   Date Value Ref Range Status   09/26/2019 3.9 3.5 - 5.1 mmol/L Final   05/29/2019 3.3 (L) 3.5 - 5.1 mmol/L Final   02/25/2019 4.0 3.5 - 5.1 mmol/L Final              Passed - Cr is 1.4 or below and within 180 days     Creatinine   Date Value Ref Range Status   09/26/2019 0.8 0.5 - 1.4 mg/dL Final   05/29/2019 0.8 0.5 - 1.4 mg/dL Final   02/25/2019 0.8 0.5 - 1.4 mg/dL Final              Passed - eGFR within 180 days     eGFR if non    Date Value Ref Range Status   09/26/2019 >60 >60 mL/min/1.73 m^2 Final     Comment:     Calculation used to obtain the estimated glomerular filtration  rate (eGFR) is the CKD-EPI equation.      05/29/2019 >60 >60 mL/min/1.73 m^2 Final     Comment:     Calculation used to obtain the estimated glomerular filtration  rate (eGFR) is the  CKD-EPI equation.      02/25/2019 >60 >60 mL/min/1.73 m^2 Final     Comment:     Calculation used to obtain the estimated glomerular filtration  rate (eGFR) is the CKD-EPI equation.        eGFR if    Date Value Ref Range Status   09/26/2019 >60 >60 mL/min/1.73 m^2 Final   05/29/2019 >60 >60 mL/min/1.73 m^2 Final   02/25/2019 >60 >60 mL/min/1.73 m^2 Final               Appointments  past 12m or future 3m with PCP    Date Provider   Last Visit   9/25/2019 Erik Cool MD   Next Visit   Visit date not found Erik Cool MD   .

## 2020-01-28 ENCOUNTER — TELEPHONE (OUTPATIENT)
Dept: INTERNAL MEDICINE | Facility: CLINIC | Age: 65
End: 2020-01-28

## 2020-01-28 NOTE — TELEPHONE ENCOUNTER
----- Message from Pat Hicks sent at 1/28/2020  9:08 AM CST -----  Contact: self/ [phone]  Same Day Appointment Request    Was an appointment with another provider offered?   N/a with provider    Reason for FST appt.: shortness of breathe/pain under left side of arm      Communication Preference: self/ [phone]    Additional Information  Pt is requesting a call back to see if she can be seen today or tomorrow with her provider. She was offered an appt with another provider but decline.

## 2020-01-28 NOTE — TELEPHONE ENCOUNTER
Spoke with pt, pt stated she needed to see the provider as soon as possible. She is having pain under her left arm that's been going on for a couple of days now. I was able to schedule pt for tomorrow at 8:00 to see Dr. Cool about this issue.

## 2020-01-30 ENCOUNTER — OFFICE VISIT (OUTPATIENT)
Dept: INTERNAL MEDICINE | Facility: CLINIC | Age: 65
End: 2020-01-30
Payer: COMMERCIAL

## 2020-01-30 DIAGNOSIS — Z99.81 CHRONIC RESPIRATORY FAILURE WITH HYPOXIA, ON HOME OXYGEN THERAPY: ICD-10-CM

## 2020-01-30 DIAGNOSIS — J06.9 UPPER RESPIRATORY TRACT INFECTION, UNSPECIFIED TYPE: ICD-10-CM

## 2020-01-30 DIAGNOSIS — N64.4 BREAST PAIN, LEFT: Primary | ICD-10-CM

## 2020-01-30 DIAGNOSIS — M79.671 PAIN OF MIDFOOT, RIGHT: ICD-10-CM

## 2020-01-30 DIAGNOSIS — M25.512 LEFT SHOULDER PAIN, UNSPECIFIED CHRONICITY: ICD-10-CM

## 2020-01-30 DIAGNOSIS — J96.11 CHRONIC RESPIRATORY FAILURE WITH HYPOXIA, ON HOME OXYGEN THERAPY: ICD-10-CM

## 2020-01-30 DIAGNOSIS — R51.9 NOCTURNAL HEADACHES: ICD-10-CM

## 2020-01-30 DIAGNOSIS — J44.1 CHRONIC OBSTRUCTIVE PULMONARY DISEASE WITH ACUTE EXACERBATION: ICD-10-CM

## 2020-01-30 LAB
INFLUENZA A, MOLECULAR: NEGATIVE
INFLUENZA B, MOLECULAR: NEGATIVE
SPECIMEN SOURCE: NORMAL

## 2020-01-30 PROCEDURE — 99999 PR PBB SHADOW E&M-EST. PATIENT-LVL V: CPT | Mod: PBBFAC,,, | Performed by: INTERNAL MEDICINE

## 2020-01-30 PROCEDURE — 99215 OFFICE O/P EST HI 40 MIN: CPT | Mod: S$GLB,,, | Performed by: INTERNAL MEDICINE

## 2020-01-30 PROCEDURE — 3077F SYST BP >= 140 MM HG: CPT | Mod: CPTII,S$GLB,, | Performed by: INTERNAL MEDICINE

## 2020-01-30 PROCEDURE — 87502 INFLUENZA DNA AMP PROBE: CPT

## 2020-01-30 PROCEDURE — 3008F BODY MASS INDEX DOCD: CPT | Mod: CPTII,S$GLB,, | Performed by: INTERNAL MEDICINE

## 2020-01-30 PROCEDURE — 1101F PT FALLS ASSESS-DOCD LE1/YR: CPT | Mod: CPTII,S$GLB,, | Performed by: INTERNAL MEDICINE

## 2020-01-30 PROCEDURE — 3080F PR MOST RECENT DIASTOLIC BLOOD PRESSURE >= 90 MM HG: ICD-10-PCS | Mod: CPTII,S$GLB,, | Performed by: INTERNAL MEDICINE

## 2020-01-30 PROCEDURE — 1101F PR PT FALLS ASSESS DOC 0-1 FALLS W/OUT INJ PAST YR: ICD-10-PCS | Mod: CPTII,S$GLB,, | Performed by: INTERNAL MEDICINE

## 2020-01-30 PROCEDURE — 3077F PR MOST RECENT SYSTOLIC BLOOD PRESSURE >= 140 MM HG: ICD-10-PCS | Mod: CPTII,S$GLB,, | Performed by: INTERNAL MEDICINE

## 2020-01-30 PROCEDURE — 3080F DIAST BP >= 90 MM HG: CPT | Mod: CPTII,S$GLB,, | Performed by: INTERNAL MEDICINE

## 2020-01-30 PROCEDURE — 3008F PR BODY MASS INDEX (BMI) DOCUMENTED: ICD-10-PCS | Mod: CPTII,S$GLB,, | Performed by: INTERNAL MEDICINE

## 2020-01-30 PROCEDURE — 99999 PR PBB SHADOW E&M-EST. PATIENT-LVL V: ICD-10-PCS | Mod: PBBFAC,,, | Performed by: INTERNAL MEDICINE

## 2020-01-30 PROCEDURE — 99215 PR OFFICE/OUTPT VISIT, EST, LEVL V, 40-54 MIN: ICD-10-PCS | Mod: S$GLB,,, | Performed by: INTERNAL MEDICINE

## 2020-01-30 RX ORDER — DOXYCYCLINE HYCLATE 100 MG
100 TABLET ORAL 2 TIMES DAILY
Qty: 14 TABLET | Refills: 0 | Status: SHIPPED | OUTPATIENT
Start: 2020-01-30 | End: 2020-02-06

## 2020-01-30 RX ORDER — METHYLPREDNISOLONE 4 MG/1
TABLET ORAL
Qty: 1 PACKAGE | Refills: 0 | Status: SHIPPED | OUTPATIENT
Start: 2020-01-30 | End: 2020-06-23 | Stop reason: SDUPTHER

## 2020-01-30 NOTE — PROGRESS NOTES
"Subjective:       Patient ID: Ana Aguila is a 64 y.o. female.    Chief Complaint: Nasal Congestion and Shoulder Pain (under left arm)    HPI  63 y/o woman with HTN, HLD, severe COPD with home O2, h/o hemicolectomy 2009 due to colonoscopy complication, chronic RUQ discomfort, NAFLD here for visit for multiple concerns.    L shoulder pain for about the past month, gradually progressive, now having trouble using her arm (reaching up or reaching back to wash her back)    L lateral breast feels sore, swollen, continues to feel lumps on that side -- longstanding but feels this has also been worse in the past few weeks. Saw Breast Surg clinic 6/2019 and reported some of this pain, noted as occurring for about a week prior, but today says she has had breast soreness, lumpiness for decades. On chart review has discussed breast pain with that clinic since at least 2015.    URI symptoms - Started having postnasal drip 2 days ago, +sneezing, feeling weak, coughing (sneezing more than coughing, cough not productive), some body aches. No fevers. +wheezing, feeling short of breath  Using Ricola, Vicks, tylenol  Reports having chemical fumes at work in the past few weeks - carpets being changed  Last took steroids for possible COPD exacerbation / URI in 11/2019  Last took antibiotic (zpack) 5/2019, doxycycline 9/2018    R foot pain in upper mid-foot - present for months, +"cracking" when walking, worse when walking but sore sometimes even without weightbearing. Doesn't recall focal injury or trauma to foot    Waking up with headache at night only when she has to urinate; headache is severe but improves quickly after she gets up to urinate.  Has had similar symptoms in the past - see visit with Dr Ace 1/13/17. At that time she felt this was related to medication, and headaches resolved after stopping some of her BP medication (feels she has had headaches with coreg, losartan)    Review of Systems   Constitutional: Positive " "for fatigue. Negative for fever.   HENT: Positive for congestion, postnasal drip, rhinorrhea, sinus pressure, sneezing and sore throat (mild). Negative for ear pain, sinus pain and trouble swallowing.    Eyes: Negative for visual disturbance.   Respiratory: Positive for cough and chest tightness. Negative for shortness of breath and wheezing.    Cardiovascular: Negative for chest pain, palpitations and leg swelling.   Gastrointestinal: Negative for abdominal pain and blood in stool.   Endocrine: Negative.    Genitourinary: Negative for dysuria and flank pain. Urgency: at night.   Musculoskeletal: Positive for arthralgias. Negative for gait problem and joint swelling.   Skin: Negative for rash.   Neurological: Positive for headaches. Negative for weakness.   Psychiatric/Behavioral: Negative for dysphoric mood. The patient is nervous/anxious.          Past medical history, surgical history, and family medical history reviewed and updated as appropriate.    Medications and allergies reviewed.     Objective:          Vitals:    01/30/20 1231 01/30/20 1311   BP: (!) 152/94 (!) 142/90   BP Location: Left arm    Patient Position: Sitting    BP Method: Medium (Manual)    Pulse: 93    Temp: 98.3 °F (36.8 °C)    SpO2: 98%    Weight: 76 kg (167 lb 8.8 oz)    Height: 5' 5" (1.651 m)      Body mass index is 27.88 kg/m².  Physical Exam   Constitutional: She is oriented to person, place, and time. She appears well-developed and well-nourished.   Ill-appearing   HENT:   Head: Normocephalic and atraumatic.   Right Ear: Tympanic membrane, external ear and ear canal normal.   Left Ear: Tympanic membrane, external ear and ear canal normal.   Nose: Mucosal edema (and erythema of nasal turbinates) and rhinorrhea present. Right sinus exhibits no maxillary sinus tenderness and no frontal sinus tenderness. Left sinus exhibits no maxillary sinus tenderness and no frontal sinus tenderness.   Mouth/Throat: Uvula is midline and mucous membranes " are normal. Posterior oropharyngeal erythema (mild erythema) present. No oropharyngeal exudate.   Eyes: Conjunctivae and EOM are normal. No scleral icterus.   Neck: Neck supple.   Cardiovascular: Normal rate, regular rhythm and normal heart sounds.   No murmur heard.  Pulmonary/Chest: Effort normal and breath sounds normal. No respiratory distress. She has no wheezes. She has no rales.   Abdominal: Soft. There is no tenderness.   Musculoskeletal: She exhibits tenderness (TTP over dorsal R midfoot without erythema or significant swelling). She exhibits no edema.   Soreness / TTP at left lateral chest wall  Limited ROM of L shoulder - AROM to <90 deg, PROM only to around 90 deg due to pain. +mild pain in anterior shoulder with impingement test. Empty can test with pain, no weakness.    Lymphadenopathy:     She has no cervical adenopathy.   Neurological: She is alert and oriented to person, place, and time. No cranial nerve deficit or sensory deficit.   Skin: Skin is warm and dry. She is not diaphoretic. No erythema.   Psychiatric: She has a normal mood and affect. Her behavior is normal.   Vitals reviewed.      Lab Results   Component Value Date    WBC 7.96 09/26/2019    HGB 11.2 (L) 09/26/2019    HCT 35.6 (L) 09/26/2019     09/26/2019    CHOL 207 (H) 09/26/2019    TRIG 51 09/26/2019    HDL 67 09/26/2019    ALT 8 (L) 09/26/2019    AST 18 09/26/2019     09/26/2019    K 3.9 09/26/2019     09/26/2019    CREATININE 0.8 09/26/2019    BUN 13 09/26/2019    CO2 29 09/26/2019    TSH 1.139 11/18/2018    INR 1.2 10/18/2016    HGBA1C 5.4 10/18/2016       Assessment:       1. Breast pain, left    2. Left shoulder pain, unspecified chronicity    3. Upper respiratory tract infection, unspecified type    4. Chronic obstructive pulmonary disease with acute exacerbation    5. Chronic respiratory failure with hypoxia, on home oxygen therapy    6. Pain of midfoot, right    7. Nocturnal headaches        Plan:   Ana  was seen today for nasal congestion and shoulder pain.    Diagnoses and all orders for this visit:    Breast pain, left - chronic, but worse than usual; will check ultrasound  -     US Breast Left Complete; Future  Follow up in high-risk breast clinic    Left shoulder pain, unspecified chronicity - suspect rotator cuff strain / inflammation, also +impingement  -     Ambulatory referral/consult to Orthopedics; Future    Upper respiratory tract infection, unspecified type - viral sinusitis, will check flu swab though more likely non-flu illness. Does appear to have early / mild COPD exacerbation; given severity of COPD will go ahead and treat with steroid taper and doxycycline  -     Influenza A & B by Molecular  -     methylPREDNISolone (MEDROL DOSEPACK) 4 mg tablet; Use as directed on package  Chronic obstructive pulmonary disease with acute exacerbation  -     methylPREDNISolone (MEDROL DOSEPACK) 4 mg tablet; Use as directed on package  -     doxycycline (VIBRA-TABS) 100 MG tablet; Take 1 tablet (100 mg total) by mouth 2 (two) times daily. for 7 days  Chronic respiratory failure with hypoxia, on home oxygen therapy  Continue oxygen; if having worsening symptoms despite treatment should RTC for re-evaluation. Reviewed ER precautions    Pain of midfoot, right - etiology not clear, recommended get insole with more arch support, see her podiatrist    Nocturnal headaches - patient feels these are related to a medication; recommend checking in with Dr Gaytan with cardiology before discontinuing BP medications  Check BP when having headaches    Health maintenance reviewed with patient.     Follow up in about 3 months (around 4/30/2020).    Erik Cool MD  Internal Medicine  [facility]  1/30/2020    45 minutes spent with patient with >50% of visit spent counseling patient regarding conditions documented above and planning for care coordination. All questions answered.

## 2020-01-30 NOTE — PATIENT INSTRUCTIONS
For your foot pain -- see about replacing your insoles in your shoes with ones with better arch support. Also try [employer] cream with methyl salicylate on your foot to help with pain and inflammation.     Follow up with Dr Hartman about your foot pain!    Go ahead and start taking the steroid pack. You can ask the pharmacy to hold on to the antibiotics for you and use these if you are not improving, or if you develop a more productive cough, wheezing, or sharp pain in your sinuses or ear.    For your symptoms (sinusitis, congestion, cough):  1. Saline nasal spray ([employer]ceans) or nasal rinse (NeilMed, [employer] Simply Saline) at least 2-3 times/day  2. Flonase (fluticasone) or other steroid nasal spray - twice a day for 1 week, then once a day thereafter  3. Claritin (loratadine), Zyrtec (cetirizine), or Allegra (fexofenadine) once a day  4. Mucinex (guaifenesin) every 8-12 hours with plenty of water. If you are having a cough, you can use Mucinex-DM (guaifenesin-dextromethorphan).   5. Hot soup, hot tea with honey and lemon.  6. Plenty of sleep!    These medications are ok to take even if you have high blood pressure.     Check in with Dr Gaytan about the headaches you have been having    Check your blood pressure when you are having a headache

## 2020-01-31 VITALS
DIASTOLIC BLOOD PRESSURE: 90 MMHG | HEIGHT: 65 IN | BODY MASS INDEX: 27.92 KG/M2 | TEMPERATURE: 98 F | OXYGEN SATURATION: 98 % | SYSTOLIC BLOOD PRESSURE: 142 MMHG | WEIGHT: 167.56 LBS | HEART RATE: 93 BPM

## 2020-01-31 PROBLEM — M79.671: Status: ACTIVE | Noted: 2020-01-31

## 2020-01-31 PROBLEM — N64.4 BREAST PAIN, LEFT: Status: ACTIVE | Noted: 2020-01-31

## 2020-01-31 PROBLEM — M25.512 LEFT SHOULDER PAIN: Status: ACTIVE | Noted: 2020-01-31

## 2020-02-04 ENCOUNTER — PATIENT MESSAGE (OUTPATIENT)
Dept: INTERNAL MEDICINE | Facility: CLINIC | Age: 65
End: 2020-02-04

## 2020-02-05 ENCOUNTER — NURSE TRIAGE (OUTPATIENT)
Dept: ADMINISTRATIVE | Facility: CLINIC | Age: 65
End: 2020-02-05

## 2020-02-05 ENCOUNTER — OFFICE VISIT (OUTPATIENT)
Dept: INTERNAL MEDICINE | Facility: CLINIC | Age: 65
End: 2020-02-05
Payer: COMMERCIAL

## 2020-02-05 ENCOUNTER — TELEPHONE (OUTPATIENT)
Dept: ORTHOPEDICS | Facility: CLINIC | Age: 65
End: 2020-02-05

## 2020-02-05 VITALS
OXYGEN SATURATION: 92 % | HEIGHT: 65 IN | SYSTOLIC BLOOD PRESSURE: 142 MMHG | WEIGHT: 169 LBS | DIASTOLIC BLOOD PRESSURE: 80 MMHG | BODY MASS INDEX: 28.16 KG/M2 | HEART RATE: 91 BPM

## 2020-02-05 DIAGNOSIS — R07.89 CHEST WALL PAIN: Primary | ICD-10-CM

## 2020-02-05 DIAGNOSIS — J44.9 CHRONIC OBSTRUCTIVE PULMONARY DISEASE, UNSPECIFIED COPD TYPE: ICD-10-CM

## 2020-02-05 PROCEDURE — 99213 PR OFFICE/OUTPT VISIT, EST, LEVL III, 20-29 MIN: ICD-10-PCS | Mod: 25,S$GLB,, | Performed by: INTERNAL MEDICINE

## 2020-02-05 PROCEDURE — 99999 PR PBB SHADOW E&M-EST. PATIENT-LVL III: CPT | Mod: PBBFAC,,, | Performed by: INTERNAL MEDICINE

## 2020-02-05 PROCEDURE — 1101F PT FALLS ASSESS-DOCD LE1/YR: CPT | Mod: CPTII,S$GLB,, | Performed by: INTERNAL MEDICINE

## 2020-02-05 PROCEDURE — 3077F PR MOST RECENT SYSTOLIC BLOOD PRESSURE >= 140 MM HG: ICD-10-PCS | Mod: CPTII,S$GLB,, | Performed by: INTERNAL MEDICINE

## 2020-02-05 PROCEDURE — 99213 OFFICE O/P EST LOW 20 MIN: CPT | Mod: 25,S$GLB,, | Performed by: INTERNAL MEDICINE

## 2020-02-05 PROCEDURE — 3079F PR MOST RECENT DIASTOLIC BLOOD PRESSURE 80-89 MM HG: ICD-10-PCS | Mod: CPTII,S$GLB,, | Performed by: INTERNAL MEDICINE

## 2020-02-05 PROCEDURE — 3079F DIAST BP 80-89 MM HG: CPT | Mod: CPTII,S$GLB,, | Performed by: INTERNAL MEDICINE

## 2020-02-05 PROCEDURE — 1101F PR PT FALLS ASSESS DOC 0-1 FALLS W/OUT INJ PAST YR: ICD-10-PCS | Mod: CPTII,S$GLB,, | Performed by: INTERNAL MEDICINE

## 2020-02-05 PROCEDURE — 96372 THER/PROPH/DIAG INJ SC/IM: CPT | Mod: S$GLB,,, | Performed by: INTERNAL MEDICINE

## 2020-02-05 PROCEDURE — 99999 PR PBB SHADOW E&M-EST. PATIENT-LVL III: ICD-10-PCS | Mod: PBBFAC,,, | Performed by: INTERNAL MEDICINE

## 2020-02-05 PROCEDURE — 3008F BODY MASS INDEX DOCD: CPT | Mod: CPTII,S$GLB,, | Performed by: INTERNAL MEDICINE

## 2020-02-05 PROCEDURE — 3077F SYST BP >= 140 MM HG: CPT | Mod: CPTII,S$GLB,, | Performed by: INTERNAL MEDICINE

## 2020-02-05 PROCEDURE — 96372 PR INJECTION,THERAP/PROPH/DIAG2ST, IM OR SUBCUT: ICD-10-PCS | Mod: S$GLB,,, | Performed by: INTERNAL MEDICINE

## 2020-02-05 PROCEDURE — 3008F PR BODY MASS INDEX (BMI) DOCUMENTED: ICD-10-PCS | Mod: CPTII,S$GLB,, | Performed by: INTERNAL MEDICINE

## 2020-02-05 RX ORDER — KETOROLAC TROMETHAMINE 30 MG/ML
30 INJECTION, SOLUTION INTRAMUSCULAR; INTRAVENOUS
Status: DISCONTINUED | OUTPATIENT
Start: 2020-02-05 | End: 2020-02-05

## 2020-02-05 RX ORDER — KETOROLAC TROMETHAMINE 30 MG/ML
30 INJECTION, SOLUTION INTRAMUSCULAR; INTRAVENOUS
Status: COMPLETED | OUTPATIENT
Start: 2020-02-05 | End: 2020-02-05

## 2020-02-05 RX ORDER — TRAMADOL HYDROCHLORIDE 50 MG/1
50 TABLET ORAL EVERY 6 HOURS PRN
Qty: 10 TABLET | Refills: 0 | Status: SHIPPED | OUTPATIENT
Start: 2020-02-05 | End: 2020-07-08

## 2020-02-05 RX ORDER — IBUPROFEN 400 MG/1
400 TABLET ORAL EVERY 6 HOURS PRN
Qty: 30 TABLET | Refills: 1 | Status: SHIPPED | OUTPATIENT
Start: 2020-02-05 | End: 2020-02-17

## 2020-02-05 RX ADMIN — KETOROLAC TROMETHAMINE 30 MG: 30 INJECTION, SOLUTION INTRAMUSCULAR; INTRAVENOUS at 06:02

## 2020-02-05 NOTE — TELEPHONE ENCOUNTER
Spoke with patient regarding her appointment tomorrow 2-6-20 with Saundra Burt . I stated to patient felipe Alegria  out til Monday , I offered her appointment with some one else or reschedule her for another day . Patient stated she will call back to reschedule

## 2020-02-05 NOTE — TELEPHONE ENCOUNTER
Pt called with c/o RUQ pain since yesterday; pt states she was recently Dx'd with COPD exacerbation and URI; reports compliance with Rx Methylprednisolone; during call, pt arrived to her PCP's office which was still open; after pt placed triage nurse (myself) on a brief hold, pt then stated she would be unable to talk any longer due to pain; pt stated that she is walking into clinic to check in    Reason for Disposition   Patient already left for the hospital/clinic.    Additional Information   Negative: Caller is angry or rude (e.g., hangs up, verbally abusive, yelling)   Negative: Caller hangs up   Negative: Caller has already spoken with the PCP and has no further questions.   Negative: Caller has already spoken with another triager and has no further questions.   Negative: Caller has already spoken with another triager or PCP AND has further questions AND triager able to answer questions.   Negative: No answer.  First attempt to contact caller.  Follow-up call scheduled within 15 minutes.   Negative: Message left on identified voice mail   Negative: Busy signal.  First attempt to contact caller.  Follow-up call scheduled within 15 minutes.   Negative: Message left with person in household.   Negative: Message left on unidentified voice mail.  Phone number verified.   Negative: Wrong number reached.  Phone number verified.   Negative: Second attempt to contact family AND no contact made.  Phone number verified.   Negative: Cell phone out of range.  Phone number verified.   Negative: Pager number given.  Answering service notified.    Protocols used: NO CONTACT OR DUPLICATE CONTACT CALL-[facility]

## 2020-02-06 ENCOUNTER — TELEPHONE (OUTPATIENT)
Dept: INTERNAL MEDICINE | Facility: CLINIC | Age: 65
End: 2020-02-06

## 2020-02-06 NOTE — PROGRESS NOTES
Subjective:       Patient ID: Ana Aguila is a 64 y.o. female.    Chief Complaint: Hip Pain (right side)    64 year old lady with COPD complains of sudden onset of right chest pain after a coughing spell.  Is taking doxy and medrol dose pack but had a bad coughing spell yesterday and has bad pain.  Pain is better today but still present  Has not taken anything for pain.  No fever    Review of Systems   Constitutional: Negative for activity change, chills, fatigue and fever.   HENT: Negative for congestion, ear pain, nosebleeds, postnasal drip, sinus pressure and sore throat.    Eyes: Negative.  Negative for visual disturbance.   Respiratory: Negative for cough, chest tightness, shortness of breath and wheezing.    Cardiovascular: Negative for chest pain.   Gastrointestinal: Negative for abdominal pain, diarrhea, nausea and vomiting.   Genitourinary: Negative for difficulty urinating, dysuria, frequency and urgency.   Musculoskeletal: Negative for arthralgias and neck stiffness.   Skin: Negative for rash.   Neurological: Negative for dizziness, weakness and headaches.   Psychiatric/Behavioral: Negative for sleep disturbance. The patient is not nervous/anxious.        Objective:      Physical Exam   Constitutional: She is oriented to person, place, and time. She appears well-developed and well-nourished.  Non-toxic appearance. No distress.   HENT:   Head: Normocephalic and atraumatic.   Right Ear: Tympanic membrane, external ear and ear canal normal.   Left Ear: Tympanic membrane, external ear and ear canal normal.   Eyes: Pupils are equal, round, and reactive to light. EOM are normal. No scleral icterus.   Neck: Normal range of motion. Neck supple. No thyromegaly present.   Cardiovascular: Normal rate, regular rhythm and normal heart sounds.   Pulmonary/Chest: Effort normal. She has decreased breath sounds in the right upper field, the right middle field, the right lower field, the left upper field, the left  middle field and the left lower field. She has no wheezes. She has no rhonchi. She has no rales.   Abdominal: Soft. Bowel sounds are normal. She exhibits no mass. There is no tenderness. There is no rebound.   Musculoskeletal: Normal range of motion.   Lymphadenopathy:     She has no cervical adenopathy.   Neurological: She is alert and oriented to person, place, and time. She has normal reflexes. She displays normal reflexes. No cranial nerve deficit. She exhibits normal muscle tone. Coordination normal.   Skin: Skin is warm and dry.   Psychiatric: She has a normal mood and affect. Her behavior is normal.       Assessment:       1. Chest wall pain    2. Chronic obstructive pulmonary disease, unspecified COPD type        Plan:   Ana was seen today for hip pain.    Diagnoses and all orders for this visit:    Chest wall pain    Chronic obstructive pulmonary disease, unspecified COPD type    Other orders  -     traMADol (ULTRAM) 50 mg tablet; Take 1 tablet (50 mg total) by mouth every 6 (six) hours as needed for Pain.  -     ibuprofen (ADVIL,MOTRIN) 400 MG tablet; Take 1 tablet (400 mg total) by mouth every 6 (six) hours as needed for Other.  -     ketorolac injection 30 mg

## 2020-02-06 NOTE — PROGRESS NOTES
Two pt identifiers verified (name & ). Pt rated pain 9/10. Pt tolerated well.  Pt advised to remain in clinic for 15 minutes and report any difficulties. Pt advised to call in 45 minutes for pain reassessment.

## 2020-02-06 NOTE — TELEPHONE ENCOUNTER
----- Message from Jennifer Redd sent at 2/6/2020  1:48 PM CST -----  Contact: Pt [phone]  Caller is requesting an earlier appt than we can schedule.  Caller declined first available appointment listed below. Caller will not accept being placed on the wait list and is requesting a message be sent to the provider.  When is the next available appointment:  May 20, 20  Did you offer to schedule the next available appt and put the patient on the wait list?:   Yes  What visit type:   Symptoms:  Pain in right side  Patient preference of timeframe to be scheduled:  Any day and any time after 10:30  What is the reason the patient is requesting a sooner appointment? (insurance terminating, changing jobs):  She only wants to see you and she is worried that the symptoms she was seen for yesterday might need further evaluation

## 2020-02-07 ENCOUNTER — OFFICE VISIT (OUTPATIENT)
Dept: INTERNAL MEDICINE | Facility: CLINIC | Age: 65
End: 2020-02-07
Payer: COMMERCIAL

## 2020-02-07 ENCOUNTER — HOSPITAL ENCOUNTER (OUTPATIENT)
Dept: RADIOLOGY | Facility: HOSPITAL | Age: 65
Discharge: HOME | End: 2020-02-07
Attending: NURSE PRACTITIONER
Payer: COMMERCIAL

## 2020-02-07 ENCOUNTER — PATIENT MESSAGE (OUTPATIENT)
Dept: INTERNAL MEDICINE | Facility: CLINIC | Age: 65
End: 2020-02-07

## 2020-02-07 ENCOUNTER — TELEPHONE (OUTPATIENT)
Dept: INTERNAL MEDICINE | Facility: CLINIC | Age: 65
End: 2020-02-07

## 2020-02-07 VITALS
BODY MASS INDEX: 28.1 KG/M2 | DIASTOLIC BLOOD PRESSURE: 86 MMHG | HEIGHT: 65 IN | WEIGHT: 168.63 LBS | OXYGEN SATURATION: 91 % | SYSTOLIC BLOOD PRESSURE: 150 MMHG | HEART RATE: 92 BPM | TEMPERATURE: 98 F

## 2020-02-07 DIAGNOSIS — J90 PLEURAL EFFUSION ON RIGHT: ICD-10-CM

## 2020-02-07 DIAGNOSIS — E66.3 OVERWEIGHT (BMI 25.0-29.9): ICD-10-CM

## 2020-02-07 DIAGNOSIS — Z99.81 CHRONIC RESPIRATORY FAILURE WITH HYPOXIA, ON HOME OXYGEN THERAPY: ICD-10-CM

## 2020-02-07 DIAGNOSIS — J96.11 CHRONIC RESPIRATORY FAILURE WITH HYPOXIA, ON HOME OXYGEN THERAPY: ICD-10-CM

## 2020-02-07 DIAGNOSIS — R07.9 RIGHT-SIDED CHEST PAIN: ICD-10-CM

## 2020-02-07 DIAGNOSIS — R05.9 COUGHING: ICD-10-CM

## 2020-02-07 DIAGNOSIS — R07.9 RIGHT-SIDED CHEST PAIN: Primary | ICD-10-CM

## 2020-02-07 DIAGNOSIS — J44.9 CHRONIC OBSTRUCTIVE PULMONARY DISEASE, UNSPECIFIED COPD TYPE: ICD-10-CM

## 2020-02-07 DIAGNOSIS — R07.89 CHEST WALL PAIN: Primary | ICD-10-CM

## 2020-02-07 DIAGNOSIS — J90 RECURRENT RIGHT PLEURAL EFFUSION: ICD-10-CM

## 2020-02-07 PROCEDURE — 3079F PR MOST RECENT DIASTOLIC BLOOD PRESSURE 80-89 MM HG: ICD-10-PCS | Mod: CPTII,S$GLB,, | Performed by: NURSE PRACTITIONER

## 2020-02-07 PROCEDURE — 3079F DIAST BP 80-89 MM HG: CPT | Mod: CPTII,S$GLB,, | Performed by: NURSE PRACTITIONER

## 2020-02-07 PROCEDURE — 99999 PR PBB SHADOW E&M-EST. PATIENT-LVL IV: CPT | Mod: PBBFAC,,, | Performed by: NURSE PRACTITIONER

## 2020-02-07 PROCEDURE — 71046 X-RAY EXAM CHEST 2 VIEWS: CPT | Mod: 26,,, | Performed by: RADIOLOGY

## 2020-02-07 PROCEDURE — 1101F PR PT FALLS ASSESS DOC 0-1 FALLS W/OUT INJ PAST YR: ICD-10-PCS | Mod: CPTII,S$GLB,, | Performed by: NURSE PRACTITIONER

## 2020-02-07 PROCEDURE — 71046 X-RAY EXAM CHEST 2 VIEWS: CPT | Mod: TC

## 2020-02-07 PROCEDURE — 3077F PR MOST RECENT SYSTOLIC BLOOD PRESSURE >= 140 MM HG: ICD-10-PCS | Mod: CPTII,S$GLB,, | Performed by: NURSE PRACTITIONER

## 2020-02-07 PROCEDURE — 3008F PR BODY MASS INDEX (BMI) DOCUMENTED: ICD-10-PCS | Mod: CPTII,S$GLB,, | Performed by: NURSE PRACTITIONER

## 2020-02-07 PROCEDURE — 3008F BODY MASS INDEX DOCD: CPT | Mod: CPTII,S$GLB,, | Performed by: NURSE PRACTITIONER

## 2020-02-07 PROCEDURE — 99213 OFFICE O/P EST LOW 20 MIN: CPT | Mod: S$GLB,,, | Performed by: NURSE PRACTITIONER

## 2020-02-07 PROCEDURE — 1101F PT FALLS ASSESS-DOCD LE1/YR: CPT | Mod: CPTII,S$GLB,, | Performed by: NURSE PRACTITIONER

## 2020-02-07 PROCEDURE — 99999 PR PBB SHADOW E&M-EST. PATIENT-LVL IV: ICD-10-PCS | Mod: PBBFAC,,, | Performed by: NURSE PRACTITIONER

## 2020-02-07 PROCEDURE — 3077F SYST BP >= 140 MM HG: CPT | Mod: CPTII,S$GLB,, | Performed by: NURSE PRACTITIONER

## 2020-02-07 PROCEDURE — 99213 PR OFFICE/OUTPT VISIT, EST, LEVL III, 20-29 MIN: ICD-10-PCS | Mod: S$GLB,,, | Performed by: NURSE PRACTITIONER

## 2020-02-07 PROCEDURE — 71046 XR CHEST PA AND LATERAL: ICD-10-PCS | Mod: 26,,, | Performed by: RADIOLOGY

## 2020-02-07 RX ORDER — BENZONATATE 100 MG/1
100 CAPSULE ORAL 3 TIMES DAILY PRN
Qty: 30 CAPSULE | Refills: 0 | Status: SHIPPED | OUTPATIENT
Start: 2020-02-07 | End: 2020-03-08

## 2020-02-07 NOTE — PATIENT INSTRUCTIONS
Check Chest Xray today, will message with results and decide if you need to start the antibiotic Dr. Cool prescribed    Warm liquids to help with mucus    Tessalon perles capsule take 1 every 8 hrs as needed for cough    Continue ibuprofen as needed for pain, may take the tramadol already prescribed as needed for severe pain only      Pleurisy  You have pain in your chest. Your healthcare provider has told you that you have pleurisy, or pleuritis. Pleurisy is swelling (inflammation) of the pleura. The pleura are two layers of thin smooth tissue that surround the lungs and line the chest.  What are the symptoms of pleurisy?     Pleurisy is inflammation of the pleura. The pleura cover the lungs and line the chest.   Pleurisy usually causes sharp chest pain. It is usually worse when you take a deep breath, cough, or sneeze.  What causes pleurisy?  Many things can cause pleurisy. A common cause is a viral infection like the flu or pneumonia. Serious lung problems that can cause it include:  · A blood clot in the lung (pulmonary embolism)  · Air between the pleura (pneumothorax)  Serious heart problems that can cause pleurisy include:  · Heart attack  · Inflammation of the covering of the heart (pericarditis)  How is pleurisy diagnosed?  Your healthcare provider examines you and asks you about your symptoms and health history. He or she will first check you for the serious causes of chest pain. You may have:  · Lab tests  · Imaging tests such as chest X-ray, CT scan, or ultrasound  · EKG  How is pleurisy treated?  Treatment depends on what is causing the pleurisy. Serious conditions are treated in the hospital. You may need medicines to decrease the inflammation and pain.     Call 911  Call 911 if any of these occur:  · Trouble breathing  · Chest pain that gets worse  Call your healthcare provider  Call your healthcare provider right away if you have:  · A fever of 100.4°F (38°C) or higher, or as directed by your  healthcare provider   Date Last Reviewed: 11/1/2016  © 0418-0534 [employer]. [address] [place] [address], [place] [address]. All rights reserved. This information is not intended as a substitute for professional medical care. Always follow your healthcare professional's instructions.

## 2020-02-07 NOTE — TELEPHONE ENCOUNTER
I spoke to the patient. She did read Dr Cool's patient portal message and she is coming today to f/u with Mrs Angelo NG.

## 2020-02-07 NOTE — TELEPHONE ENCOUNTER
Patient appropriately went in for UC visit  Called in again today with concern  Attempted to call patient to check in on her symptoms, not able to reach, left   Msg sent through portal  Please call patient in AM, if she needs to be seen schedule with Lesly Stephens NP if possible as I have no appointments.

## 2020-02-07 NOTE — PROGRESS NOTES
No pneumonia on Chest Xray however there is a small pleural effusion in the right lung. I am recommending close f/u with your lung doctor, Dr. Solomon. I will have the staff try to get your scheduled.    I placed referral to Dr. Solomon who pt has seen before please have her scheduled.

## 2020-02-07 NOTE — TELEPHONE ENCOUNTER
----- Message from Lesly Stephens DNP sent at 2/7/2020  3:04 PM CST -----  No pneumonia on Chest Xray however there is a small pleural effusion in the right lung. I am recommending close f/u with your lung doctor, Dr. Solomon. I will have the staff try to get your scheduled.    I placed referral to Dr. Solomon who pt has seen before please have her scheduled.

## 2020-02-08 ENCOUNTER — NURSE TRIAGE (OUTPATIENT)
Dept: ADMINISTRATIVE | Facility: CLINIC | Age: 65
End: 2020-02-08

## 2020-02-08 NOTE — TELEPHONE ENCOUNTER
Reason for Disposition   Patient sounds very sick or weak to the triager    Additional Information   Negative: Severe difficulty breathing (e.g., struggling for each breath, speaks in single words)   Negative: Bluish (or gray) lips or face now   Negative: Difficult to awaken or acting confused (e.g., disoriented, slurred speech)   Negative: Hysterical or combative behavior   Negative: Sounds like a life-threatening emergency to the triager   Negative: [1] Difficulty breathing AND [2] persists > 10 minutes AND [3] not relieved by reassurance provided by triager   Negative: [1] Lightheadedness or dizziness AND [2] persists > 10 minutes AND [3] not relieved by reassurance provided by triager   Negative: [1] Known or suspected alcohol or drug abuse AND [2] feeling very shaky (i.e., visible tremors of hands)    Protocols used: ANXIETY AND PANIC ATTACK-A-    Patient called about worries about recent diagnosis of pleural effusions. She cannot sleep. In the past it was recommended she take medication. Patient refuses the disposition.

## 2020-02-10 ENCOUNTER — HOSPITAL ENCOUNTER (EMERGENCY)
Facility: HOSPITAL | Age: 65
Discharge: HOME | End: 2020-02-10
Attending: EMERGENCY MEDICINE
Payer: COMMERCIAL

## 2020-02-10 VITALS
HEART RATE: 77 BPM | SYSTOLIC BLOOD PRESSURE: 156 MMHG | TEMPERATURE: 99 F | RESPIRATION RATE: 16 BRPM | BODY MASS INDEX: 28.1 KG/M2 | OXYGEN SATURATION: 92 % | WEIGHT: 168.63 LBS | DIASTOLIC BLOOD PRESSURE: 88 MMHG | HEIGHT: 65 IN

## 2020-02-10 DIAGNOSIS — J40 BRONCHITIS: Primary | ICD-10-CM

## 2020-02-10 LAB
ALBUMIN SERPL BCP-MCNC: 3.4 G/DL (ref 3.5–5.2)
ALP SERPL-CCNC: 76 U/L (ref 55–135)
ALT SERPL W/O P-5'-P-CCNC: 7 U/L (ref 10–44)
ANION GAP SERPL CALC-SCNC: 10 MMOL/L (ref 8–16)
AST SERPL-CCNC: 17 U/L (ref 10–40)
BASOPHILS # BLD AUTO: 0.05 K/UL (ref 0–0.2)
BASOPHILS NFR BLD: 0.5 % (ref 0–1.9)
BILIRUB SERPL-MCNC: 0.3 MG/DL (ref 0.1–1)
BNP SERPL-MCNC: 51 PG/ML (ref 0–99)
BUN SERPL-MCNC: 15 MG/DL (ref 8–23)
CALCIUM SERPL-MCNC: 9.3 MG/DL (ref 8.7–10.5)
CHLORIDE SERPL-SCNC: 108 MMOL/L (ref 95–110)
CO2 SERPL-SCNC: 24 MMOL/L (ref 23–29)
CREAT SERPL-MCNC: 0.9 MG/DL (ref 0.5–1.4)
DIFFERENTIAL METHOD: ABNORMAL
EOSINOPHIL # BLD AUTO: 0.5 K/UL (ref 0–0.5)
EOSINOPHIL NFR BLD: 4.6 % (ref 0–8)
ERYTHROCYTE [DISTWIDTH] IN BLOOD BY AUTOMATED COUNT: 15.2 % (ref 11.5–14.5)
EST. GFR  (AFRICAN AMERICAN): >60 ML/MIN/1.73 M^2
EST. GFR  (NON AFRICAN AMERICAN): >60 ML/MIN/1.73 M^2
GLUCOSE SERPL-MCNC: 87 MG/DL (ref 70–110)
HCT VFR BLD AUTO: 39.4 % (ref 37–48.5)
HGB BLD-MCNC: 11.9 G/DL (ref 12–16)
IMM GRANULOCYTES # BLD AUTO: 0.05 K/UL (ref 0–0.04)
IMM GRANULOCYTES NFR BLD AUTO: 0.5 % (ref 0–0.5)
LYMPHOCYTES # BLD AUTO: 2.5 K/UL (ref 1–4.8)
LYMPHOCYTES NFR BLD: 22.4 % (ref 18–48)
MCH RBC QN AUTO: 27.7 PG (ref 27–31)
MCHC RBC AUTO-ENTMCNC: 30.2 G/DL (ref 32–36)
MCV RBC AUTO: 92 FL (ref 82–98)
MONOCYTES # BLD AUTO: 0.7 K/UL (ref 0.3–1)
MONOCYTES NFR BLD: 6.3 % (ref 4–15)
NEUTROPHILS # BLD AUTO: 7.3 K/UL (ref 1.8–7.7)
NEUTROPHILS NFR BLD: 65.7 % (ref 38–73)
NRBC BLD-RTO: 0 /100 WBC
PLATELET # BLD AUTO: 341 K/UL (ref 150–350)
PMV BLD AUTO: 10 FL (ref 9.2–12.9)
POTASSIUM SERPL-SCNC: 3.8 MMOL/L (ref 3.5–5.1)
PROT SERPL-MCNC: 7.1 G/DL (ref 6–8.4)
RBC # BLD AUTO: 4.3 M/UL (ref 4–5.4)
SODIUM SERPL-SCNC: 142 MMOL/L (ref 136–145)
TROPONIN I SERPL DL<=0.01 NG/ML-MCNC: 0.01 NG/ML (ref 0–0.03)
WBC # BLD AUTO: 11.08 K/UL (ref 3.9–12.7)

## 2020-02-10 PROCEDURE — 84484 ASSAY OF TROPONIN QUANT: CPT

## 2020-02-10 PROCEDURE — 85025 COMPLETE CBC W/AUTO DIFF WBC: CPT

## 2020-02-10 PROCEDURE — 99284 EMERGENCY DEPT VISIT MOD MDM: CPT | Mod: ,,, | Performed by: EMERGENCY MEDICINE

## 2020-02-10 PROCEDURE — 80053 COMPREHEN METABOLIC PANEL: CPT

## 2020-02-10 PROCEDURE — 83880 ASSAY OF NATRIURETIC PEPTIDE: CPT

## 2020-02-10 PROCEDURE — 25000003 PHARM REV CODE 250: Performed by: EMERGENCY MEDICINE

## 2020-02-10 PROCEDURE — 99284 PR EMERGENCY DEPT VISIT,LEVEL IV: ICD-10-PCS | Mod: ,,, | Performed by: EMERGENCY MEDICINE

## 2020-02-10 PROCEDURE — 99284 EMERGENCY DEPT VISIT MOD MDM: CPT | Mod: 25

## 2020-02-10 RX ORDER — ASPIRIN 325 MG
325 TABLET ORAL
Status: COMPLETED | OUTPATIENT
Start: 2020-02-10 | End: 2020-02-10

## 2020-02-10 RX ADMIN — ASPIRIN 325 MG ORAL TABLET 325 MG: 325 PILL ORAL at 12:02

## 2020-02-10 NOTE — ED PROVIDER NOTES
Encounter Date: 2/10/2020       History     Chief Complaint   Patient presents with    Chest Pain     Pt c/o fatigue. Pt initially c/o chronic chest paint since recent dx of pleural effusion.      64-year-old female, history of COPD, on home O2, complaining of chest pain, right-sided, pleuritic, times 4-5 days.  The symptoms have been present in the setting of a cough, nonproductive since Monday.  Patient was actually seen by her PCP 5 days ago for these symptoms and was prescribed antibiotics and at outpatient chest x-ray was done.  Chest x-ray showed a small right-sided pleural effusion.  Patient presented tonight because chest pain has worsened.  No abdominal pain. No fevers or chills. No nausea or vomiting. No recent leg pain or swelling.    The history is provided by the patient.     Review of patient's allergies indicates:   Allergen Reactions    Ace inhibitors      cough    Coreg [carvedilol]      Headache     Pseudoephedrine hcl Nausea And Vomiting, Other (See Comments) and Anxiety     JITTERY     Past Medical History:   Diagnosis Date    Addiction to drug     Anxiety     Aortic calcification 1/11/2018    Chronic diarrhea     Chronic obstructive pulmonary disease 8/27/2013    Chronic respiratory failure with hypoxia, on home O2 therapy 1/11/2018    COPD (chronic obstructive pulmonary disease)     Depression     Essential hypertension 8/12/2013    Former smoker 10/18/2016    Hepatomegaly 12/21/2015    Hyperlipidemia     Hypertension     MI (myocardial infarction)     Microscopic hematuria 1/13/2017    Panic disorder     Perianal abscess 6/1/2018    Reflux 2013    S/P right hemicolectomy 9/7/2017    Performed in 2011 after perforation during colonoscopy    Sleep difficulties     Takotsubo cardiomyopathy 10/18/2016    Noted on echo 10/2016, recovered on repeat echo 12/2016     Past Surgical History:   Procedure Laterality Date    ABDOMINAL SURGERY      APPENDECTOMY       CHOLECYSTECTOMY  2013    open    COLON SURGERY  2011    secondary to perforation after c-scope    COLONOSCOPY      FRACTURE SURGERY      HERNIA REPAIR      HIATAL HERNIA REPAIR  2013    HYSTERECTOMY  1986    Left leg surgery       Family History   Problem Relation Age of Onset    Cancer Mother         lung    Hypertension Mother     Coronary artery disease Father     Retinal detachment Father     Hypertension Sister     Hypertension Brother     Cataracts Maternal Grandmother     Breast cancer Maternal Grandmother         unk age of onset    Abnormal EKG Daughter     Breast cancer Daughter 43        negative genetic testing, unilateral breast ca    Breast cancer Other 44        thinks negative genetic testing, bilat ca    Amblyopia Neg Hx     Blindness Neg Hx     Diabetes Neg Hx     Glaucoma Neg Hx     Macular degeneration Neg Hx     Strabismus Neg Hx     Stroke Neg Hx     Thyroid disease Neg Hx     Colon cancer Neg Hx     Ovarian cancer Neg Hx      Social History     Tobacco Use    Smoking status: Former Smoker     Packs/day: 1.50     Years: 30.00     Pack years: 45.00     Types: Cigarettes     Last attempt to quit: 1997     Years since quittin.1    Smokeless tobacco: Never Used   Substance Use Topics    Alcohol use: No     Frequency: Never     Comment: occaissionally    Drug use: No     Review of Systems   Constitutional: Negative for chills, diaphoresis, fatigue and fever.   Respiratory: Positive for cough. Negative for choking, chest tightness and shortness of breath.    Cardiovascular: Positive for chest pain. Negative for palpitations and leg swelling.   All other systems reviewed and are negative.      Physical Exam     Initial Vitals   BP Pulse Resp Temp SpO2   02/10/20 0002 02/10/20 0002 02/10/20 0002 02/10/20 0004 02/10/20 0002   (!) 172/96 94 18 98.5 °F (36.9 °C) (!) 92 %      MAP       --                Physical Exam    Nursing note and vitals  reviewed.  Constitutional: She appears well-developed and well-nourished. She is not diaphoretic. No distress.   HENT:   Mouth/Throat: Oropharynx is clear and moist.   Eyes: Conjunctivae are normal.   Neck: Neck supple.   Cardiovascular: Normal rate, regular rhythm and normal heart sounds.   Pulmonary/Chest: Breath sounds normal. No respiratory distress. She has no wheezes. She has no rhonchi. She has no rales.   Abdominal: Soft. She exhibits no distension. There is no tenderness. There is no rebound and no guarding.   Musculoskeletal: Normal range of motion. She exhibits no edema.   Neurological: She is alert and oriented to person, place, and time.   Skin: Skin is warm and dry. No rash noted.         ED Course   Procedures  Labs Reviewed   CBC W/ AUTO DIFFERENTIAL - Abnormal; Notable for the following components:       Result Value    Hemoglobin 11.9 (*)     Mean Corpuscular Hemoglobin Conc 30.2 (*)     RDW 15.2 (*)     Immature Grans (Abs) 0.05 (*)     All other components within normal limits   COMPREHENSIVE METABOLIC PANEL - Abnormal; Notable for the following components:    Albumin 3.4 (*)     ALT 7 (*)     All other components within normal limits   TROPONIN I   B-TYPE NATRIURETIC PEPTIDE     EKG Readings: (Independently Interpreted)   Initial Reading: No STEMI. Rhythm: Normal Sinus Rhythm. Ectopy: No Ectopy. Conduction: Normal. ST Segments: Normal ST Segments. T Waves: Normal.       Imaging Results          X-Ray Chest PA And Lateral (Final result)  Result time 02/10/20 01:22:27    Final result by Josh Shaw MD (02/10/20 01:22:27)                 Impression:      Biapical pulmonary emphysema and small bilateral pleural effusions with adjacent passive atelectasis.  No detrimental change when compared with 02/07/2020.      Electronically signed by: Josh Shaw MD  Date:    02/10/2020  Time:    01:22             Narrative:    EXAMINATION:  XR CHEST PA AND LATERAL    CLINICAL HISTORY:  Provided  "history is "Chest Pain;  ".    TECHNIQUE:  Frontal and lateral views of the chest were performed.    COMPARISON:  02/07/2020.    FINDINGS:  Cardiac wires overlie the chest.  Cardiomediastinal silhouette is stable.  Atherosclerotic calcifications overlie the aortic arch.  Trace left and small right pleural effusions with adjacent passive atelectasis.  Suspect mild biapical pulmonary emphysema.  Upper lungs are relatively clear.  No large focal consolidation.  No pneumothorax.  No detrimental change when compared with the prior study.                                 Medical Decision Making:   History:   Old Medical Records: I decided to obtain old medical records.  Old Records Summarized: records from clinic visits and records from previous admission(s).       <> Summary of Records: Cardiac cath in 2016:  A. Indication/Pre-Operative Diagnosis     The patient is a 61 year old female that was referred for catheterization by Dr. Claudia Dupree for chest discomfort and CHF.     B. Summary/Post-Operative Diagnosis       Normal coronary arteries.    Basal segments move well but there is LV wall motion abnormality consistent with Tako tsubo cardiomyopathy.  Initial Assessment:   64-year-old female, complex recent past medical history as above    Pleuritic right-sided chest pain and URI symptoms.    VSS and EKG no ischemic changes  Differential Diagnosis:   Suspect that right-sided chest pain may be related to small pleural effusion that is present.  Possible underlying infiltrate or pneumonia with pleurisy secondary to effusion.  Symptoms are worse with breathing and movement so low suspicion for ACS.  PCP note remarks that CP strated with vigorous coughing episode so other possibility is rib fx vs contusion    No abd pain to suggest cholecystitis  Clinical Tests:   Lab Tests: Ordered and Reviewed  Radiological Study: Ordered and Reviewed  Medical Tests: Ordered and Reviewed  ED Management:  Labs  Repeat chest x-ray  Dispo " uncertain    1:42 AM  Labs stable.  CXR stable.    Pt had a cardiac cath in 2016 that showed normal coronary arteries which is reassuring and further reducses the change that this pain is cardiac in nature  Think pt safe for d/c home with continuation of antibiotics and close f/u with PCP this week                   ED Course as of Feb 10 0257   Mon Feb 10, 2020   0234 Do not think pt needs 2nd troponin as CP has been present for 1 week.  Long d/w pt and her daughter about plan for d/c home, need for close f/u with PCP.  Unclear etiology of pain but low suspicion for ACS.  Pt already on antibiotics and will continue.    [AS]      ED Course User Index  [AS] La Schaeffer MD                Clinical Impression:       ICD-10-CM ICD-9-CM   1. Bronchitis J40 490                             La Schaeffer MD  02/10/20 0257

## 2020-02-10 NOTE — ED NOTES
Patient placed on continuous cardiac monitor, automatic blood pressure cuff and continuous pulse oximeter. Pt placed in hospital gown.

## 2020-02-10 NOTE — ED NOTES
"Pt arrives EMS with c/o chronic chest pain 2/2 diagnosis of pleural effusion and muscle wall strain. Pt also c/o fatigue and states "Every now and then I get light headed and dizzy and then I'll get a headache and then when I pee my headache feels better" Pt also reports chronic productive cough, denies worsening of cough. Pt denies fever, new chest pain. Pt chronically on 2L NC 2/2 to COPD.   "

## 2020-02-10 NOTE — PROGRESS NOTES
Patient discharged from ED early this morning, seen for bronchitis  BP average 144/87 mmHg  Patient historically resistant to BP medication changes  Continue to monitor    Hypertension Medications             metoprolol succinate (TOPROL-XL) 25 MG 24 hr tablet TAKE 1 TABLET BY MOUTH EVERY DAY

## 2020-02-10 NOTE — TELEPHONE ENCOUNTER
Patient did go to ER as recommended by triage nurse. She has follow up scheduled with Dr Solomon on 2/19.  Will forward note to her pulmonary team to review.

## 2020-02-11 ENCOUNTER — TELEPHONE (OUTPATIENT)
Dept: PULMONOLOGY | Facility: CLINIC | Age: 65
End: 2020-02-11

## 2020-02-11 NOTE — TELEPHONE ENCOUNTER
Mrs Aguila states she was recently in ER for chest pain and was told she has a pleural effusion. She is upset and wants to see Dr Dill as soon as possible. Dr Dill reviewed all her chest xrays dating back to 3-19 and said she Does Not have a pleural effusion and to call back on Thursday. Mrs Aguila was very relieved. Anastacia Chavez Lpn

## 2020-02-13 ENCOUNTER — TELEPHONE (OUTPATIENT)
Dept: PULMONOLOGY | Facility: CLINIC | Age: 65
End: 2020-02-13

## 2020-02-16 ENCOUNTER — PATIENT OUTREACH (OUTPATIENT)
Dept: ADMINISTRATIVE | Facility: OTHER | Age: 65
End: 2020-02-16

## 2020-02-17 RX ORDER — IBUPROFEN 400 MG/1
TABLET ORAL
Qty: 30 TABLET | Refills: 1 | Status: SHIPPED | OUTPATIENT
Start: 2020-02-17 | End: 2020-11-03 | Stop reason: SDUPTHER

## 2020-02-18 ENCOUNTER — HOSPITAL ENCOUNTER (OUTPATIENT)
Dept: RADIOLOGY | Facility: HOSPITAL | Age: 65
Discharge: HOME | End: 2020-02-18
Attending: INTERNAL MEDICINE
Payer: COMMERCIAL

## 2020-02-18 ENCOUNTER — OFFICE VISIT (OUTPATIENT)
Dept: PULMONOLOGY | Facility: CLINIC | Age: 65
End: 2020-02-18
Payer: COMMERCIAL

## 2020-02-18 VITALS
HEIGHT: 65 IN | OXYGEN SATURATION: 83 % | DIASTOLIC BLOOD PRESSURE: 74 MMHG | WEIGHT: 168.63 LBS | HEART RATE: 64 BPM | SYSTOLIC BLOOD PRESSURE: 140 MMHG | BODY MASS INDEX: 28.1 KG/M2

## 2020-02-18 DIAGNOSIS — R07.89 CHEST WALL PAIN: ICD-10-CM

## 2020-02-18 DIAGNOSIS — J43.2 CENTRILOBULAR EMPHYSEMA: Primary | ICD-10-CM

## 2020-02-18 DIAGNOSIS — J43.2 CENTRILOBULAR EMPHYSEMA: ICD-10-CM

## 2020-02-18 DIAGNOSIS — J90 PLEURAL EFFUSION ON RIGHT: ICD-10-CM

## 2020-02-18 PROCEDURE — 71046 XR CHEST PA AND LATERAL: ICD-10-PCS | Mod: 26,,, | Performed by: RADIOLOGY

## 2020-02-18 PROCEDURE — 3078F DIAST BP <80 MM HG: CPT | Mod: CPTII,S$GLB,, | Performed by: INTERNAL MEDICINE

## 2020-02-18 PROCEDURE — 71046 X-RAY EXAM CHEST 2 VIEWS: CPT | Mod: 26,,, | Performed by: RADIOLOGY

## 2020-02-18 PROCEDURE — 3008F BODY MASS INDEX DOCD: CPT | Mod: CPTII,S$GLB,, | Performed by: INTERNAL MEDICINE

## 2020-02-18 PROCEDURE — 3077F SYST BP >= 140 MM HG: CPT | Mod: CPTII,S$GLB,, | Performed by: INTERNAL MEDICINE

## 2020-02-18 PROCEDURE — 99214 OFFICE O/P EST MOD 30 MIN: CPT | Mod: S$GLB,,, | Performed by: INTERNAL MEDICINE

## 2020-02-18 PROCEDURE — 3077F PR MOST RECENT SYSTOLIC BLOOD PRESSURE >= 140 MM HG: ICD-10-PCS | Mod: CPTII,S$GLB,, | Performed by: INTERNAL MEDICINE

## 2020-02-18 PROCEDURE — 1101F PR PT FALLS ASSESS DOC 0-1 FALLS W/OUT INJ PAST YR: ICD-10-PCS | Mod: CPTII,S$GLB,, | Performed by: INTERNAL MEDICINE

## 2020-02-18 PROCEDURE — 3008F PR BODY MASS INDEX (BMI) DOCUMENTED: ICD-10-PCS | Mod: CPTII,S$GLB,, | Performed by: INTERNAL MEDICINE

## 2020-02-18 PROCEDURE — 1101F PT FALLS ASSESS-DOCD LE1/YR: CPT | Mod: CPTII,S$GLB,, | Performed by: INTERNAL MEDICINE

## 2020-02-18 PROCEDURE — 99214 PR OFFICE/OUTPT VISIT, EST, LEVL IV, 30-39 MIN: ICD-10-PCS | Mod: S$GLB,,, | Performed by: INTERNAL MEDICINE

## 2020-02-18 PROCEDURE — 99999 PR PBB SHADOW E&M-EST. PATIENT-LVL III: ICD-10-PCS | Mod: PBBFAC,,, | Performed by: INTERNAL MEDICINE

## 2020-02-18 PROCEDURE — 71046 X-RAY EXAM CHEST 2 VIEWS: CPT | Mod: TC,FY

## 2020-02-18 PROCEDURE — 3078F PR MOST RECENT DIASTOLIC BLOOD PRESSURE < 80 MM HG: ICD-10-PCS | Mod: CPTII,S$GLB,, | Performed by: INTERNAL MEDICINE

## 2020-02-18 PROCEDURE — 99999 PR PBB SHADOW E&M-EST. PATIENT-LVL III: CPT | Mod: PBBFAC,,, | Performed by: INTERNAL MEDICINE

## 2020-02-18 NOTE — PROGRESS NOTES
Subjective:      Patient ID: Ana Aguila is a 64 y.o. female.    Chief Complaint: Abnormal Chest X-ray and Pleural Effusion    HPI  63 yo female with severe pulmonary emphysema comes for follow up chest x-ray She was seen in urgent care and was told that she had a pleural effusion . She had a triangular density in the right costo phrenic angle, Not fluid The chest x-ray done today is clear with hyperinflation and long term scarring in the right middle lobe. She had several bouts of mild self limited hemopytsiss at the time of her urgent care visit. That has gradually subsided after a course of antibiotics. Peak flow 100 l/min   Sa02: 94% on 2 liters.       No flowsheet data found.  Review of Systems   Constitutional: Negative.    HENT: Negative.    Eyes: Negative.    Respiratory: Positive for hemoptysis and chest tightness.    Cardiovascular: Positive for chest pain.        Right sided chest pain.    Hx of Takotsuko cardiomyopathy in the past.   Genitourinary: Negative.    Musculoskeletal: Negative.    Skin: Negative.    Gastrointestinal: Negative.         S/P Krunal colectomy after perforation at time of colonoscope.   Neurological: Negative.    Psychiatric/Behavioral: The patient is nervous/anxious.      Objective:     Physical Exam   Constitutional: She is oriented to person, place, and time. She appears well-developed and well-nourished. No distress.   HENT:   Head: Normocephalic and atraumatic.   Right Ear: External ear normal.   Left Ear: External ear normal.   Nose: Nose normal.   Mouth/Throat: Oropharynx is clear and moist.   Eyes: Pupils are equal, round, and reactive to light. Conjunctivae and EOM are normal.   Neck: Normal range of motion. Neck supple. No JVD present. No thyromegaly present.   Cardiovascular: Normal rate, regular rhythm, normal heart sounds and intact distal pulses. Exam reveals no gallop.   No murmur heard.  Pulmonary/Chest: Breath sounds normal. No stridor. No respiratory distress.  She has no wheezes. She has no rales. She exhibits no tenderness.   Peak flow 100 l/min    Markedly decreased air entry without wheezes or rales.       Abdominal: Soft. Bowel sounds are normal. She exhibits no distension and no mass. There is no tenderness. There is no rebound and no guarding.   Musculoskeletal: Normal range of motion. She exhibits no edema.   Lymphadenopathy:     She has no cervical adenopathy.   Neurological: She is alert and oriented to person, place, and time. She has normal reflexes. She displays normal reflexes. No cranial nerve deficit.   Skin: Skin is warm and dry. No rash noted.   Psychiatric: She has a normal mood and affect. Her behavior is normal. Judgment and thought content normal.   Nursing note and vitals reviewed.      Assessment:     1. Centrilobular emphysema    2. Chest wall pain    3. Pleural effusion on right      Outpatient Encounter Medications as of 2/18/2020   Medication Sig Dispense Refill    albuterol-ipratropium (DUO-NEB) 2.5 mg-0.5 mg/3 mL nebulizer solution TAKE 3 MLS BY NEBULIZER EVERY 6 HOURS AS NEEDED FOR WHEEZING OR SHORTNESS OF BREATH 270 mL 11    benzonatate (TESSALON) 100 MG capsule Take 1 capsule (100 mg total) by mouth 3 (three) times daily as needed for Cough. 30 capsule 0    diphenoxylate-atropine 2.5-0.025 mg (LOMOTIL) 2.5-0.025 mg per tablet Take 1 tablet by mouth 4 (four) times daily as needed for Diarrhea. 30 tablet 0    ergocalciferol (ERGOCALCIFEROL) 50,000 unit Cap Take 1 capsule (50,000 Units total) by mouth every 7 days. For vitamin D deficiency (high-dose supplement) 12 capsule 2    fluticasone-salmeterol diskus inhaler 250-50 mcg INHALE 1 PUFF BY MOUTH EVERY 12 HOURS AS NEEDED 1 each 12    ibuprofen (ADVIL,MOTRIN) 400 MG tablet TAKE 1 TABLET(400 MG) BY MOUTH EVERY 6 HOURS AS NEEDED 30 tablet 1    ipratropium (ATROVENT) 0.02 % nebulizer solution UNWRAP AND INHALE THE CONTENTS OF 1 VIAL VIA NEBULIZER UP TO FOUR TIMES DAILY IF NEEDED (RESCUE)  62.5 mL 0    methylPREDNISolone (MEDROL DOSEPACK) 4 mg tablet Use as directed on package 1 Package 0    metoprolol succinate (TOPROL-XL) 25 MG 24 hr tablet TAKE 1 TABLET BY MOUTH EVERY DAY 90 tablet 3    potassium chloride (KLOR-CON) 10 MEQ TbSR TAKE 1 TABLET(10 MEQ) BY MOUTH EVERY DAY 90 tablet 0    PROAIR HFA 90 mcg/actuation inhaler INHALE 2 PUFFS PO QID PRN FOR COPD  11    traMADol (ULTRAM) 50 mg tablet Take 1 tablet (50 mg total) by mouth every 6 (six) hours as needed for Pain. 10 tablet 0     No facility-administered encounter medications on file as of 2/18/2020.        Plan:   Today's chest is stable and unchanged from film done in 2017. Reassured her   Problem List Items Addressed This Visit     Chronic obstructive pulmonary disease - Primary      Other Visit Diagnoses     Chest wall pain        Pleural effusion on right

## 2020-02-24 ENCOUNTER — PATIENT OUTREACH (OUTPATIENT)
Dept: OTHER | Facility: OTHER | Age: 65
End: 2020-02-24

## 2020-02-24 NOTE — PROGRESS NOTES
Left voicemail requesting call back  BP elevated, average 140/85 mmHg  Patient on hctz and candesartan (cardiac myopathy) in past but resistant to medications    Hypertension Medications             metoprolol succinate (TOPROL-XL) 25 MG 24 hr tablet TAKE 1 TABLET BY MOUTH EVERY DAY

## 2020-02-24 NOTE — PROGRESS NOTES
Digital Medicine: Clinician Follow-Up    Patient states she is recovering from bronchitis  She was taking ibuprofen, steroids, and antibiotics  Reports changing batteries in her BP cuff about 2 weeks ago    The history is provided by the patient.     Follow Up  Follow-up reason(s): reading review          INTERVENTION(S)  reviewed appropriate dose schedule, encouragement/support and denied questions    PLAN  patient verbalizes understanding and continue monitoring    BP elevated  Reminded patient to rest 5 minutes prior to BP measurement  Consider starting ARB if BP does not improve as she continues to recover from bronchitis  Patient historically resistant to additional BP medication      There are no preventive care reminders to display for this patient.    Last 5 Patient Entered Readings                                      Current 30 Day Average: 140/85     Recent Readings 2/24/2020 2/24/2020 2/24/2020 2/24/2020 2/23/2020    SBP (mmHg) 147 147 148 148 129    DBP (mmHg) 81 81 81 81 79    Pulse 80 80 80 80 79             Hypertension Medications             metoprolol succinate (TOPROL-XL) 25 MG 24 hr tablet TAKE 1 TABLET BY MOUTH EVERY DAY                             Medication Adherence Screening   She did not miss a dose this month.  Patient knows purpose of medications.

## 2020-02-28 DIAGNOSIS — J44.9 CHRONIC OBSTRUCTIVE PULMONARY DISEASE, UNSPECIFIED COPD TYPE: ICD-10-CM

## 2020-02-29 RX ORDER — IBUPROFEN 400 MG/1
TABLET ORAL
Qty: 30 TABLET | Refills: 1 | OUTPATIENT
Start: 2020-02-29

## 2020-02-29 RX ORDER — IPRATROPIUM BROMIDE AND ALBUTEROL SULFATE 2.5; .5 MG/3ML; MG/3ML
SOLUTION RESPIRATORY (INHALATION)
Qty: 270 ML | Refills: 11 | Status: SHIPPED | OUTPATIENT
Start: 2020-02-29 | End: 2020-09-30

## 2020-02-29 NOTE — TELEPHONE ENCOUNTER
Pt is not requesting a refill, she states that the pharmacy is just refilling medication, she doesn't need it.

## 2020-02-29 NOTE — TELEPHONE ENCOUNTER
Automated refill request for PRN / short term medication which should already have a refill at the pharmacy - last sent 2/17/20.   Please contact patient to verify that she is requesting this

## 2020-03-03 ENCOUNTER — PATIENT OUTREACH (OUTPATIENT)
Dept: ADMINISTRATIVE | Facility: OTHER | Age: 65
End: 2020-03-03

## 2020-03-03 NOTE — PROGRESS NOTES
Care Everywhere: updated  Immunization: n/a  Health Maintenance: n/a  Media Review: reviewed for possible outside colon cancer report   Legacy Review: reviewed colonoscopy report from 2005 and 2010 (hx of polyps)  Order placed: n/a  Upcoming appts:n/a

## 2020-03-09 ENCOUNTER — PATIENT OUTREACH (OUTPATIENT)
Dept: ADMINISTRATIVE | Facility: OTHER | Age: 65
End: 2020-03-09

## 2020-03-09 NOTE — PROGRESS NOTES
"Patient ID: Ana Aguila is a 63 y.o. female.     Chief Complaint: Follow-up (F/U CBE & MMG). In the past has reported bilateral breast pain to 12oc regions.    HPI:  Established patient presents today for follow-up.  Last evaluated in clinic by me on 2/12/19.     Breast History:      Personal history of breast cancer:  no  Personal history of breast biopsy:  no  Personal history of breast surgery:  no     Breast Imaging  Bilateral Mammogram 7/1/19  Findings:  Computer-aided detection was utilized in the interpretation of this examination. This procedure was performed using tomosynthesis.       The breasts have scattered areas of fibroglandular density. There is no evidence of suspicious masses, microcalcifications or architectural distortion. Findings are stable.      Impression:  No mammographic evidence of malignancy.     BI-RADS Category 1: Negative     Recommendation:  Routine screening mammogram in 1 year is recommended.     The patient's estimated lifetime risk of breast cancer (to age 85) based on Tyrer-Cuzick - 7 risk assessment model is: Tyrer-Cuzick: 11.03 %. According to the American Cancer Society,  patients with a lifetime breast cancer risk of 20% or higher might benefit from supplemental screening tests.     Interval Breast History     Patient reports bilateral breast pain to upper regions and lateral regions.  Only notices now with applied pressure.  First noticed about 6 months ago.  Outer regions associated with diffuse "lumpy-bumpiness" bilaterally, which patient states has been present "forever" and is unchanged.       Patient reports feeling dense breast tissue in 12oc regions of bilateral breasts.  First noticed within past few months.  Size not changing.     Patient reports bilateral axillary shooting pains.  Mild.  Onset a little less than 1 week ago.  Began on L first, and then R.  More intense on L.  These pains come and go.  Heating pad has helped.  Pt has been doing an exercise " "regimen for the past 2 weeks that she suspects could be the cause of the pains.     Patient denies palpable breast mass, breast-related skin changes, nipple discharge and nipple retraction.  No other breast-related concerns.     Pt's 3/2019 echo was normal per cardiologist's documentation.     GYN History:  Age of menarche:  13 y/o  , first live birth at 15 y/o  Uterus and ovaries:  s/p hysterectomy at 32 y/o, pt unsure whether ovaries remain intact  Menopause:  32 y/o  HRT usage:  never     Other Oncologic History:  Personal history of other cancer not abovementioned:  no  Personal history of genetic testing:  no     Social History:  Tobacco use:  quit smoking around over 20 years ago     Family Oncologic History:     Ashkenazi Tenriism ancestry:  no           Family History   Problem Relation Age of Onset    Cancer Mother           lung    Breast cancer Maternal Grandmother           unk age of onset    Breast cancer Daughter 43         negative genetic testing, unilateral breast ca    Breast cancer Other 44         thinks negative genetic testing, bilat ca    Ovarian cancer Neg Hx           Patient's Breast Cancer Risk Assessment Scores:  the patient's breast cancer risk assessment scores were calculated 19 as follows:  Tyrer-Cuzick lifetime risk:  9.4%  Valerie model 5-year risk:  2.6%        Review of Systems - See HPI.  Negative for chest pain, unexplained fatigue, recurrent infections, or unexplained weight loss.  Objective:   Physical Exam     BP (!) 162/84 (BP Location: Left arm, Patient Position: Sitting, BP Method: Medium (Manual))   Ht 5' 5" (1.651 m)   Wt 76.5 kg (168 lb 10.4 oz)   LMP  (LMP Unknown)   BMI 28.07 kg/m²     NCAT  Pulmonary/Chest: Effort normal. No respiratory distress. She exhibits no mass, no edema and no deformity. Right breast exhibits no inverted nipple, no mass, no nipple discharge, no skin change and no tenderness. Left breast exhibits no inverted nipple, no mass, no " nipple discharge, no skin change and no tenderness. No breast swelling. Breasts are symmetrical.   Bilateral breasts and axillae diffusely TTP.  Bilateral breasts' 12oc regions and UOQs with dense tissue and with no discrete mass appreciated.  No mass either axilla or either region lateral to breasts.      Lymphadenopathy:     She has no cervical adenopathy.     She has no axillary adenopathy.        Right: No supraclavicular adenopathy present.        Left: No supraclavicular adenopathy present.   Bilaterally, no infraclavicular LAD appreciated.  Bilateral axillae are normal-appearing.      Assessment:       1. Breast cancer screening    2. Family history of breast cancer     3. Breast pain      Plan:   Follow up PRN with dr. Garcia, fu with PCP, bilateral mammogram due in July.  She reports the heating pad to be helpful and will continue to diminish her caffeine intake which she is already working on and has reported that it is helping.  I have also recommended evening Primrose oil and vitamin he has potential treatments in reducing breast pain for her.  I explained these for everyone but her worth a try as there are over the counter and have minimal if any side effects.  Her reassured her that her symptoms are not consistent with what I would typically think to be suggestive breast cancer.  All of her questions were answered.  She was happy with the plan and will let me know if anything changes or worsens.       Encouraged breast awareness, including monthly breast self-exams.  Advised patient to RTC with any interval changes or concerns.  Questions were encouraged and answered to patient's satisfaction, and patient verbalized understanding of information and agreement with the plan.

## 2020-03-10 ENCOUNTER — OFFICE VISIT (OUTPATIENT)
Dept: SURGERY | Facility: CLINIC | Age: 65
End: 2020-03-10
Payer: COMMERCIAL

## 2020-03-10 ENCOUNTER — HOSPITAL ENCOUNTER (OUTPATIENT)
Dept: RADIOLOGY | Facility: HOSPITAL | Age: 65
Discharge: HOME | End: 2020-03-10
Attending: INTERNAL MEDICINE
Payer: COMMERCIAL

## 2020-03-10 VITALS
SYSTOLIC BLOOD PRESSURE: 162 MMHG | HEIGHT: 65 IN | BODY MASS INDEX: 28.1 KG/M2 | DIASTOLIC BLOOD PRESSURE: 84 MMHG | WEIGHT: 168.63 LBS

## 2020-03-10 DIAGNOSIS — N64.4 MASTALGIA IN FEMALE: Primary | ICD-10-CM

## 2020-03-10 DIAGNOSIS — N64.4 BREAST PAIN, LEFT: ICD-10-CM

## 2020-03-10 PROCEDURE — 3077F SYST BP >= 140 MM HG: CPT | Mod: CPTII,S$GLB,, | Performed by: SURGERY

## 2020-03-10 PROCEDURE — 3079F PR MOST RECENT DIASTOLIC BLOOD PRESSURE 80-89 MM HG: ICD-10-PCS | Mod: CPTII,S$GLB,, | Performed by: SURGERY

## 2020-03-10 PROCEDURE — 77061 MAMMO DIGITAL DIAGNOSTIC LEFT WITH TOMOSYNTHESIS_CAD: ICD-10-PCS | Mod: 26,LT,, | Performed by: RADIOLOGY

## 2020-03-10 PROCEDURE — 76642 US BREAST LEFT LIMITED: ICD-10-PCS | Mod: 26,LT,, | Performed by: RADIOLOGY

## 2020-03-10 PROCEDURE — 77065 DX MAMMO INCL CAD UNI: CPT | Mod: TC,PO,LT

## 2020-03-10 PROCEDURE — 3079F DIAST BP 80-89 MM HG: CPT | Mod: CPTII,S$GLB,, | Performed by: SURGERY

## 2020-03-10 PROCEDURE — 77061 BREAST TOMOSYNTHESIS UNI: CPT | Mod: TC,PO,LT

## 2020-03-10 PROCEDURE — 99212 PR OFFICE/OUTPT VISIT, EST, LEVL II, 10-19 MIN: ICD-10-PCS | Mod: S$GLB,,, | Performed by: SURGERY

## 2020-03-10 PROCEDURE — 3077F PR MOST RECENT SYSTOLIC BLOOD PRESSURE >= 140 MM HG: ICD-10-PCS | Mod: CPTII,S$GLB,, | Performed by: SURGERY

## 2020-03-10 PROCEDURE — 99999 PR PBB SHADOW E&M-EST. PATIENT-LVL III: CPT | Mod: PBBFAC,,, | Performed by: SURGERY

## 2020-03-10 PROCEDURE — 1101F PR PT FALLS ASSESS DOC 0-1 FALLS W/OUT INJ PAST YR: ICD-10-PCS | Mod: CPTII,S$GLB,, | Performed by: SURGERY

## 2020-03-10 PROCEDURE — 1101F PT FALLS ASSESS-DOCD LE1/YR: CPT | Mod: CPTII,S$GLB,, | Performed by: SURGERY

## 2020-03-10 PROCEDURE — 77065 DX MAMMO INCL CAD UNI: CPT | Mod: 26,LT,, | Performed by: RADIOLOGY

## 2020-03-10 PROCEDURE — 76642 ULTRASOUND BREAST LIMITED: CPT | Mod: TC,PO,LT

## 2020-03-10 PROCEDURE — 99999 PR PBB SHADOW E&M-EST. PATIENT-LVL III: ICD-10-PCS | Mod: PBBFAC,,, | Performed by: SURGERY

## 2020-03-10 PROCEDURE — 77065 MAMMO DIGITAL DIAGNOSTIC LEFT WITH TOMOSYNTHESIS_CAD: ICD-10-PCS | Mod: 26,LT,, | Performed by: RADIOLOGY

## 2020-03-10 PROCEDURE — 3008F BODY MASS INDEX DOCD: CPT | Mod: CPTII,S$GLB,, | Performed by: SURGERY

## 2020-03-10 PROCEDURE — 76642 ULTRASOUND BREAST LIMITED: CPT | Mod: 26,LT,, | Performed by: RADIOLOGY

## 2020-03-10 PROCEDURE — 77061 BREAST TOMOSYNTHESIS UNI: CPT | Mod: 26,LT,, | Performed by: RADIOLOGY

## 2020-03-10 PROCEDURE — 99212 OFFICE O/P EST SF 10 MIN: CPT | Mod: S$GLB,,, | Performed by: SURGERY

## 2020-03-10 PROCEDURE — 3008F PR BODY MASS INDEX (BMI) DOCUMENTED: ICD-10-PCS | Mod: CPTII,S$GLB,, | Performed by: SURGERY

## 2020-03-16 ENCOUNTER — PATIENT OUTREACH (OUTPATIENT)
Dept: OTHER | Facility: OTHER | Age: 65
End: 2020-03-16

## 2020-03-16 NOTE — PROGRESS NOTES
Digital Medicine: Health  Follow-Up    The history is provided by the patient.     Follow Up  Follow-up reason(s): reading review      Readings are trending down Ms. Aguila is doing okay. Patient attributes improved BP readings to less stress - working from home. Today she is asking if it would be okay to take an OTC vitamin C supplement to help boost her immune system. Encouraged patient to message her PCP, but will also check with clinician per patient request. She thanked me for calling.       INTERVENTION(S)  denied further coaching and denied questions    PLAN  continue monitoring      There are no preventive care reminders to display for this patient.    Last 5 Patient Entered Readings                                      Current 30 Day Average: 139/83     Recent Readings 3/11/2020 3/11/2020 3/10/2020 3/10/2020 3/8/2020    SBP (mmHg) 127 127 130 130 132    DBP (mmHg) 76 76 85 85 82    Pulse 86 86 78 78 83               Diet Screening   No change to diet.      Physical Activity Screening   No change to exercise routine.

## 2020-03-18 ENCOUNTER — TELEPHONE (OUTPATIENT)
Dept: INTERNAL MEDICINE | Facility: CLINIC | Age: 65
End: 2020-03-18

## 2020-03-18 NOTE — TELEPHONE ENCOUNTER
----- Message from Renee Joseph sent at 3/18/2020 10:30 AM CDT -----  Contact: Self   Pt is requesting call regarding question on rx. Please call and advise.

## 2020-03-19 NOTE — TELEPHONE ENCOUNTER
Fine to take moderate doses of vitamin C, I do not recommend high doses of vitamin C as this can increase risk of kidney stones. There is not strong data supporting that vitamin C has significant effect on immune response, but in normal/moderate doses it is unlikely to cause harm so it's ok if she wants to take it.

## 2020-03-30 NOTE — PROGRESS NOTES
BP significantly improved  Average approaching goal, 130/81 mmHg  Continue current regimen and monitoring    Hypertension Medications             metoprolol succinate (TOPROL-XL) 25 MG 24 hr tablet TAKE 1 TABLET BY MOUTH EVERY DAY

## 2020-04-02 DIAGNOSIS — E55.9 VITAMIN D DEFICIENCY: ICD-10-CM

## 2020-04-06 RX ORDER — ERGOCALCIFEROL 1.25 MG/1
CAPSULE ORAL
Qty: 12 CAPSULE | Refills: 1 | Status: SHIPPED | OUTPATIENT
Start: 2020-04-06 | End: 2020-09-06

## 2020-04-07 NOTE — PROGRESS NOTES
Refill Authorization Note     is requesting a refill authorization.    Brief assessment and rationale for refill: APPROVE: prr                                         Comments:   Refill Center Care Gap Closure protocols temporarily suspended.   Requested Prescriptions   Pending Prescriptions Disp Refills    ergocalciferol (ERGOCALCIFEROL) 50,000 unit Cap [Pharmacy Med Name: VITAMIN D2 50,000IU (ERGO) CAP RX] 12 capsule 2     Sig: TAKE 1 CAPSULE BY MOUTH EVERY 7 DAYS       Endocrinology:  Vitamins - Vitamin D Supplementation Passed - 4/6/2020  4:33 PM        Passed - Patient is at least 18 years old        Passed - Hypercalcemia is not present on problem list        Passed - Office visit in past 12 months or future 90 days.     Recent Outpatient Visits            3 weeks ago Mastalgia in female    [facility] Janee Garcia MD    1 month ago Centrilobular emphysema    [facility] Manpreet Dill MD    1 month ago Right-sided chest pain    [facility] - Internal Medicine Lesly Stephens DNP    2 months ago Chest wall pain    [facility] Irene Busby MD    2 months ago Breast pain, left    [facility] Erik Cool MD          Future Appointments              In 1 month Erik Cool MD [facility], [facility]                Passed - Ca in normal range and within 360 days     Calcium   Date Value Ref Range Status   02/10/2020 9.3 8.7 - 10.5 mg/dL Final   09/26/2019 9.0 8.7 - 10.5 mg/dL Final   05/29/2019 8.5 (L) 8.7 - 10.5 mg/dL Final              Passed - Vitamin D is 20 or above and within 360 days     Vit D, 25-Hydroxy   Date Value Ref Range Status   09/26/2019 30 30 - 96 ng/mL Final     Comment:     Vitamin D deficiency.........<10 ng/mL                              Vitamin D insufficiency......10-29 ng/mL       Vitamin D sufficiency........> or equal to 30 ng/mL  Vitamin D  toxicity............>100 ng/mL     01/31/2019 31 30 - 96 ng/mL Final     Comment:     Vitamin D deficiency.........<10 ng/mL                              Vitamin D insufficiency......10-29 ng/mL       Vitamin D sufficiency........> or equal to 30 ng/mL  Vitamin D toxicity............>100 ng/mL     01/24/2017 32 30 - 96 ng/mL Final     Comment:     Vitamin D deficiency.........<10 ng/mL                              Vitamin D insufficiency......10-29 ng/mL       Vitamin D sufficiency........> or equal to 30 ng/mL  Vitamin D toxicity............>100 ng/mL                 Appointments  past 12m or future 3m with PCP    Date Provider   Last Visit   1/30/2020 Erik Cool MD   Next Visit   5/7/2020 Erik Cool MD   .  ED visits in past 90 days: 1       Note composed:7:54 PM 04/06/2020

## 2020-04-15 ENCOUNTER — TELEPHONE (OUTPATIENT)
Dept: INTERNAL MEDICINE | Facility: CLINIC | Age: 65
End: 2020-04-15

## 2020-04-15 NOTE — TELEPHONE ENCOUNTER
----- Message from Funmilayo Camp sent at 4/15/2020  4:11 PM CDT -----  Contact: [phone]  Patient would like to speak to the nurse in regards to her appointment schedule for May. Please advise.

## 2020-04-29 RX ORDER — POTASSIUM CHLORIDE 750 MG/1
TABLET, EXTENDED RELEASE ORAL
Qty: 90 TABLET | Refills: 0 | Status: SHIPPED | OUTPATIENT
Start: 2020-04-29 | End: 2020-07-08

## 2020-04-30 RX ORDER — POTASSIUM CHLORIDE 750 MG/1
TABLET, EXTENDED RELEASE ORAL
Qty: 90 TABLET | Refills: 0 | Status: CANCELLED | OUTPATIENT
Start: 2020-04-30

## 2020-04-30 NOTE — PROGRESS NOTES
Refill Authorization Note    is requesting a refill authorization.    Brief assessment and rationale for refill: APPROVE: prr                Medication reconciliation completed: No                         Comments:      Requested Prescriptions   Pending Prescriptions Disp Refills    potassium chloride (KLOR-CON) 10 MEQ TbSR [Pharmacy Med Name: POTASSIUM CL 10MEQ ER TABLETS] 90 tablet 0     Sig: TAKE 1 TABLET(10 MEQ) BY MOUTH EVERY DAY       Endocrinology:  Minerals - Potassium Supplementation Passed - 4/29/2020  8:33 PM        Passed - Patient is at least 18 years old        Passed - Office visit in past 12 months or future 90 days.     Recent Outpatient Visits            1 month ago Mastalgia in female    [facility] Breast Surgery Janee Garcia MD    2 months ago Centrilobular emphysema    [facility] - Pulmonary Services Manpreet Dill MD    2 months ago Right-sided chest pain    [facility] - Internal Medicine Lesly Stephens DNP    2 months ago Chest wall pain    [facility] Internal Medicine Irene Busby MD    3 months ago Breast pain, left    [facility] Internal Medicine Erik Cool MD          Future Appointments              In 1 week Erik Cool MD [facility] Internal Medicine, [facility]                Passed - K in normal range and within 180 days     Potassium   Date Value Ref Range Status   02/10/2020 3.8 3.5 - 5.1 mmol/L Final   09/26/2019 3.9 3.5 - 5.1 mmol/L Final   05/29/2019 3.3 (L) 3.5 - 5.1 mmol/L Final              Passed - Cr is 1.3 or below and within 180 days     Creatinine   Date Value Ref Range Status   02/10/2020 0.9 0.5 - 1.4 mg/dL Final   09/26/2019 0.8 0.5 - 1.4 mg/dL Final   05/29/2019 0.8 0.5 - 1.4 mg/dL Final              Passed - eGFR within 180 days     eGFR if non    Date Value Ref Range Status   02/10/2020 >60.0 >60 mL/min/1.73 m^2 Final     Comment:     Calculation used to obtain the estimated glomerular filtration  rate  (eGFR) is the CKD-EPI equation.      09/26/2019 >60 >60 mL/min/1.73 m^2 Final     Comment:     Calculation used to obtain the estimated glomerular filtration  rate (eGFR) is the CKD-EPI equation.      05/29/2019 >60 >60 mL/min/1.73 m^2 Final     Comment:     Calculation used to obtain the estimated glomerular filtration  rate (eGFR) is the CKD-EPI equation.        eGFR if    Date Value Ref Range Status   02/10/2020 >60.0 >60 mL/min/1.73 m^2 Final   09/26/2019 >60 >60 mL/min/1.73 m^2 Final   05/29/2019 >60 >60 mL/min/1.73 m^2 Final               Appointments  past 12m or future 3m with PCP    Date Provider   Last Visit   1/30/2020 Erik Cool MD   Next Visit   5/7/2020 Erik Cool MD   ED visits in past 90 days: 1     Note composed:8:34 PM 04/29/2020

## 2020-05-06 ENCOUNTER — TELEPHONE (OUTPATIENT)
Dept: INTERNAL MEDICINE | Facility: CLINIC | Age: 65
End: 2020-05-06

## 2020-05-07 ENCOUNTER — OFFICE VISIT (OUTPATIENT)
Dept: INTERNAL MEDICINE | Facility: CLINIC | Age: 65
End: 2020-05-07
Payer: COMMERCIAL

## 2020-05-07 DIAGNOSIS — R00.2 PALPITATIONS: Primary | ICD-10-CM

## 2020-05-07 DIAGNOSIS — R42 EPISODIC LIGHTHEADEDNESS: ICD-10-CM

## 2020-05-07 DIAGNOSIS — J43.9 PULMONARY EMPHYSEMA, UNSPECIFIED EMPHYSEMA TYPE: ICD-10-CM

## 2020-05-07 DIAGNOSIS — Z99.81 CHRONIC RESPIRATORY FAILURE WITH HYPOXIA, ON HOME OXYGEN THERAPY: ICD-10-CM

## 2020-05-07 DIAGNOSIS — I70.0 AORTIC CALCIFICATION: ICD-10-CM

## 2020-05-07 DIAGNOSIS — I10 ESSENTIAL HYPERTENSION: ICD-10-CM

## 2020-05-07 DIAGNOSIS — F43.22 ADJUSTMENT DISORDER WITH ANXIETY: ICD-10-CM

## 2020-05-07 DIAGNOSIS — Z12.11 SCREENING FOR MALIGNANT NEOPLASM OF COLON: ICD-10-CM

## 2020-05-07 DIAGNOSIS — Z71.89 ADVICE GIVEN ABOUT COVID-19 VIRUS INFECTION: ICD-10-CM

## 2020-05-07 DIAGNOSIS — I51.81 TAKOTSUBO CARDIOMYOPATHY: ICD-10-CM

## 2020-05-07 DIAGNOSIS — J96.11 CHRONIC RESPIRATORY FAILURE WITH HYPOXIA, ON HOME OXYGEN THERAPY: ICD-10-CM

## 2020-05-07 PROCEDURE — 3077F SYST BP >= 140 MM HG: CPT | Mod: CPTII,,, | Performed by: INTERNAL MEDICINE

## 2020-05-07 PROCEDURE — 3008F PR BODY MASS INDEX (BMI) DOCUMENTED: ICD-10-PCS | Mod: CPTII,,, | Performed by: INTERNAL MEDICINE

## 2020-05-07 PROCEDURE — 3008F BODY MASS INDEX DOCD: CPT | Mod: CPTII,,, | Performed by: INTERNAL MEDICINE

## 2020-05-07 PROCEDURE — 3079F PR MOST RECENT DIASTOLIC BLOOD PRESSURE 80-89 MM HG: ICD-10-PCS | Mod: CPTII,,, | Performed by: INTERNAL MEDICINE

## 2020-05-07 PROCEDURE — 3079F DIAST BP 80-89 MM HG: CPT | Mod: CPTII,,, | Performed by: INTERNAL MEDICINE

## 2020-05-07 PROCEDURE — 1101F PR PT FALLS ASSESS DOC 0-1 FALLS W/OUT INJ PAST YR: ICD-10-PCS | Mod: CPTII,,, | Performed by: INTERNAL MEDICINE

## 2020-05-07 PROCEDURE — 99214 PR OFFICE/OUTPT VISIT, EST, LEVL IV, 30-39 MIN: ICD-10-PCS | Mod: 95,,, | Performed by: INTERNAL MEDICINE

## 2020-05-07 PROCEDURE — 99214 OFFICE O/P EST MOD 30 MIN: CPT | Mod: 95,,, | Performed by: INTERNAL MEDICINE

## 2020-05-07 PROCEDURE — 1101F PT FALLS ASSESS-DOCD LE1/YR: CPT | Mod: CPTII,,, | Performed by: INTERNAL MEDICINE

## 2020-05-07 PROCEDURE — 3077F PR MOST RECENT SYSTOLIC BLOOD PRESSURE >= 140 MM HG: ICD-10-PCS | Mod: CPTII,,, | Performed by: INTERNAL MEDICINE

## 2020-05-07 NOTE — PROGRESS NOTES
Subjective:       Patient ID: Ana Aguila is a 64 y.o. female.    Chief Complaint: Follow-up; Hypertension; COPD; and COVID-19 Concerns    HPI  The patient location is: home ([place])  The chief complaint leading to consultation is: follow up, COPD, HTN, covid concerns  Visit type: audiovisual  Total time spent with patient (face-to-face): 40 min    52 minutes of total time spent on the encounter, which includes face to face time and non-face to face time preparing to see the patient (eg, review of tests), Obtaining and/or reviewing separately obtained history, Documenting clinical information in the electronic or other health record, Independently interpreting results (not separately reported) and communicating results to the patient/family/caregiver, or Care coordination (not separately reported).     Each patient to whom he or she provides medical services by telemedicine is:  (1) informed of the relationship between the physician and patient and the respective role of any other health care provider with respect to management of the patient; and (2) notified that he or she may decline to receive medical services by telemedicine and may withdraw from such care at any time.    Notes:   63 y/o woman with HTN, HLD, h/o takotsubo cardiomyopathy in the past, severe COPD with home O2, h/o hemicolectomy 2009 due to colonoscopy complication, chronic RUQ discomfort, NAFLD here for follow up.    Doing well overall. Following CDC guidelines/precaution to reduce infection risk. Able to work from home.   No viral symptoms recently  Had ER visit in 2/2020 for bronchitis, has followed up with Dr Dill 2/18/20. Has not had any consistent cough, dyspnea beyond her usual, or hemoptysis since then.   Had a little sore throat in March, this has cleared up  Asks about whether she should get repeat CXR (had one 2/10, showed b/l apical emphysema, small b/l pleural effusion with adjacent atelectasis; this was re-read by Dr Dill as  "a density in the R costophrenic angle (not effusion). Repeat done 2/18 with Dr Dill - read as overall stable compared to film from 2017, with hyperinflation and scarring in R middle lobe)  Asks about having a very mild sleep aid, having some trouble falling asleep  Asks about having letter supporting working from home    HTN - BP in 140-150s/80s today, feels this might be due to anxiety. Otherwise at home has been in goal range  Occasionally has "racing heartbeat" and feels a little lightheaded - was having a little diarrhea, thinks she may have been a little dehydrated. Has had episodes of this over the past few days - feeling lightheaded or a little + rapid heartbeat + slight shortness of breath beyond her baseline. Not associated with exertion, comes on randomly  Reports pulse up to 112 yesterday. Sometimes multiple episodes per day, has episodes at least once a day. No chest pain, diaphoresis, or nausea with these.  Tried drinking more water today and felt better, but still had an episode today.  Not having these symptoms right now.  Has had Holter in the remote past but not recently  Does wear cardiac monitor while in pulm rehab but no arrythmias noted then (not recently doing this)    Paresthesias in L lateral 3 toes - sometimes, not consistent.  R foot pain discussed at last visit has improved.     Review of Systems   Constitutional: Negative for activity change and unexpected weight change.   HENT: Negative for hearing loss, rhinorrhea and trouble swallowing.    Eyes: Negative for discharge and visual disturbance.   Respiratory: Negative for chest tightness and wheezing.    Cardiovascular: Positive for palpitations. Negative for chest pain.   Gastrointestinal: Positive for diarrhea. Negative for blood in stool, constipation and vomiting.   Endocrine: Negative for polydipsia and polyuria.   Genitourinary: Negative for difficulty urinating, dysuria, hematuria and menstrual problem.   Musculoskeletal: Positive " "for arthralgias and neck pain. Negative for joint swelling.   Neurological: Negative for weakness and headaches.   Psychiatric/Behavioral: Positive for dysphoric mood. Negative for confusion.         Past medical history, surgical history, and family medical history reviewed and updated as appropriate.    Medications and allergies reviewed.     Objective:          Vitals:    05/07/20 1200   BP: (!) 144/80   Pulse: 85   Resp: 18   Weight: 76.2 kg (168 lb)   Height: 5' 5" (1.651 m)     Body mass index is 27.96 kg/m².  Physical Exam   Constitutional: She appears well-developed and well-nourished. No distress.   Patient wearing home supplemental O2 nasal cannula   Eyes: EOM are normal.   Neck: Neck supple.   Pulmonary/Chest: Effort normal. No respiratory distress. Wheezes: no audible wheezes.   Speaking in full sentences without problem; slight hoarseness but this is at her baseline. No cough during visit.   Musculoskeletal: Normal range of motion.   Neurological: She is alert.   Alert, oriented, conversant. No focal neuro deficits noted on limited video exam   Psychiatric: She has a normal mood and affect. Her behavior is normal.   Some anxiety related to chronic medical conditions and pandemic   Vitals reviewed.      Lab Results   Component Value Date    WBC 11.08 02/10/2020    HGB 11.9 (L) 02/10/2020    HCT 39.4 02/10/2020     02/10/2020    CHOL 207 (H) 09/26/2019    TRIG 51 09/26/2019    HDL 67 09/26/2019    ALT 7 (L) 02/10/2020    AST 17 02/10/2020     02/10/2020    K 3.8 02/10/2020     02/10/2020    CREATININE 0.9 02/10/2020    BUN 15 02/10/2020    CO2 24 02/10/2020    TSH 1.139 11/18/2018    INR 1.2 10/18/2016    HGBA1C 5.4 10/18/2016       Assessment:       1. Palpitations    2. Episodic lightheadedness    3. Takotsubo cardiomyopathy    4. Aortic calcification    5. Essential hypertension    6. Pulmonary emphysema, unspecified emphysema type    7. Chronic respiratory failure with hypoxia, on " home oxygen therapy    8. Adjustment disorder with anxiety    9. Advice Given About Covid-19 Virus Infection        Plan:   Ana was seen today for follow-up, hypertension, copd and covid-19 concerns.    Diagnoses and all orders for this visit:    Palpitations  -     Holter monitor - 24 hour; Future  Episodic lightheadedness  -     Holter monitor - 24 hour; Future  Takotsubo cardiomyopathy  Aortic calcification  Discussed palpitations / episodes of lightheadedness. Last echocardiogram normal. Recommend checking in with cardiology about this; given daily symptoms will order Holter monitor.     Essential hypertension - above goal today but generally within goal range; no changes to meds today. Bring in home cuff to verify accuracy next in-person visit    Pulmonary emphysema, unspecified emphysema type  Chronic respiratory failure with hypoxia, on home oxygen therapy  Reviewed Pulmonology notes including readings on most recent 2 CXR  Does not need repeat CXR at this time - discussed with patient  Continue home O2, close follow up with Pulmonology, watch for any worsening symptoms and seek care quickly    Adjustment disorder with anxiety - stable currently    Advice Given About Covid-19 Virus Infection - discussed role of antibody testing, that this may be useful to some degree but given comorbidities and risk would not change any of my recommendations about actions to reduce risk of infection/transmission. She will consider and reach out later in the month if wanting to have this done.   I do recommend that she continue to work from home if at all possible; may not need letter at present but she will contact clinic if she does need this. If she is returning to a workplace, then that workplace needs to be compliant with all CDC guidelines.  Discussed home/self-care strategies for health-related anxiety    For paresthesias in toes - recommended careful examination of ergonomics and if she has any positions or  activities that may be compressing a nerve, since she is now working in a different environment (from home)  If not improving, return to clinic    Health maintenance reviewed with patient.   Due for pneumonia vaccine (pneumovax) - she will do this at pharmacy or next visit  Has been given information re: shingrix vaccine    Follow up in about 4 months (around 9/7/2020) for coordination of care.    Erik Cool MD  Internal Medicine  [facility]  5/7/2020    Msg sent on review of Holter to Dr Gaytan to help coordinate cardiology follow up.

## 2020-05-07 NOTE — Clinical Note
Dr Gaytan & team - I saw this patient earlier this month for follow up, noted that she's overdue for yearly cardiology visit. She is overall stable but is having occasional palpitations and lightheadedness; I went ahead and ordered a holter since she reported daily symptoms, but this was unremarkable -- she did not complete the symptom diary though.Could you reach out to help her schedule cardiology follow up? Thank you!Erik Cool MD

## 2020-05-13 ENCOUNTER — PATIENT MESSAGE (OUTPATIENT)
Dept: INTERNAL MEDICINE | Facility: CLINIC | Age: 65
End: 2020-05-13

## 2020-05-20 ENCOUNTER — CLINICAL SUPPORT (OUTPATIENT)
Dept: CARDIOLOGY | Facility: CLINIC | Age: 65
End: 2020-05-20
Attending: INTERNAL MEDICINE
Payer: COMMERCIAL

## 2020-05-20 DIAGNOSIS — R00.2 PALPITATIONS: ICD-10-CM

## 2020-05-20 DIAGNOSIS — R42 EPISODIC LIGHTHEADEDNESS: ICD-10-CM

## 2020-05-20 PROCEDURE — 93224 HOLTER MONITOR - 24 HOUR (CUPID ONLY): ICD-10-PCS | Mod: S$GLB,,, | Performed by: INTERNAL MEDICINE

## 2020-05-20 PROCEDURE — 93224 XTRNL ECG REC UP TO 48 HRS: CPT | Mod: S$GLB,,, | Performed by: INTERNAL MEDICINE

## 2020-05-22 LAB
OHS CV EVENT MONITOR DAY: 0
OHS CV HOLTER LENGTH DECIMAL HOURS: 24
OHS CV HOLTER LENGTH HOURS: 24
OHS CV HOLTER LENGTH MINUTES: 0

## 2020-05-26 VITALS
WEIGHT: 168 LBS | HEART RATE: 85 BPM | HEIGHT: 65 IN | BODY MASS INDEX: 27.99 KG/M2 | SYSTOLIC BLOOD PRESSURE: 144 MMHG | RESPIRATION RATE: 18 BRPM | DIASTOLIC BLOOD PRESSURE: 80 MMHG

## 2020-05-26 PROBLEM — M79.671: Status: RESOLVED | Noted: 2020-01-31 | Resolved: 2020-05-26

## 2020-06-19 DIAGNOSIS — J43.2 CENTRILOBULAR EMPHYSEMA: Primary | ICD-10-CM

## 2020-06-22 ENCOUNTER — NURSE TRIAGE (OUTPATIENT)
Dept: ADMINISTRATIVE | Facility: CLINIC | Age: 65
End: 2020-06-22

## 2020-06-22 ENCOUNTER — HOSPITAL ENCOUNTER (EMERGENCY)
Facility: HOSPITAL | Age: 65
Discharge: HOME | End: 2020-06-23
Attending: EMERGENCY MEDICINE
Payer: COMMERCIAL

## 2020-06-22 DIAGNOSIS — R00.2 PALPITATIONS: Primary | ICD-10-CM

## 2020-06-22 DIAGNOSIS — R06.02 SOB (SHORTNESS OF BREATH): ICD-10-CM

## 2020-06-22 DIAGNOSIS — J44.1 COPD EXACERBATION: ICD-10-CM

## 2020-06-22 LAB
ALBUMIN SERPL BCP-MCNC: 4.4 G/DL (ref 3.5–5.2)
ALLENS TEST: ABNORMAL
ALP SERPL-CCNC: 76 U/L (ref 55–135)
ALT SERPL W/O P-5'-P-CCNC: 9 U/L (ref 10–44)
ANION GAP SERPL CALC-SCNC: 11 MMOL/L (ref 8–16)
AST SERPL-CCNC: 20 U/L (ref 10–40)
BASOPHILS # BLD AUTO: 0.04 K/UL (ref 0–0.2)
BASOPHILS NFR BLD: 0.4 % (ref 0–1.9)
BILIRUB SERPL-MCNC: 0.4 MG/DL (ref 0.1–1)
BILIRUB UR QL STRIP: NEGATIVE
BNP SERPL-MCNC: 64 PG/ML (ref 0–99)
BNP SERPL-MCNC: 64 PG/ML (ref 0–99)
BUN SERPL-MCNC: 12 MG/DL (ref 8–23)
CALCIUM SERPL-MCNC: 9.8 MG/DL (ref 8.7–10.5)
CHLORIDE SERPL-SCNC: 108 MMOL/L (ref 95–110)
CLARITY UR REFRACT.AUTO: CLEAR
CO2 SERPL-SCNC: 24 MMOL/L (ref 23–29)
COLOR UR AUTO: NORMAL
CREAT SERPL-MCNC: 0.8 MG/DL (ref 0.5–1.4)
D DIMER PPP IA.FEU-MCNC: 0.53 MG/L FEU
DELSYS: ABNORMAL
DIFFERENTIAL METHOD: ABNORMAL
EOSINOPHIL # BLD AUTO: 0.1 K/UL (ref 0–0.5)
EOSINOPHIL NFR BLD: 1.1 % (ref 0–8)
ERYTHROCYTE [DISTWIDTH] IN BLOOD BY AUTOMATED COUNT: 15.8 % (ref 11.5–14.5)
EST. GFR  (AFRICAN AMERICAN): >60 ML/MIN/1.73 M^2
EST. GFR  (NON AFRICAN AMERICAN): >60 ML/MIN/1.73 M^2
GLUCOSE SERPL-MCNC: 103 MG/DL (ref 70–110)
GLUCOSE UR QL STRIP: NEGATIVE
HCO3 UR-SCNC: 28 MMOL/L (ref 24–28)
HCT VFR BLD AUTO: 42 % (ref 37–48.5)
HGB BLD-MCNC: 13 G/DL (ref 12–16)
HGB UR QL STRIP: NEGATIVE
IMM GRANULOCYTES # BLD AUTO: 0.04 K/UL (ref 0–0.04)
IMM GRANULOCYTES NFR BLD AUTO: 0.4 % (ref 0–0.5)
KETONES UR QL STRIP: NEGATIVE
LEUKOCYTE ESTERASE UR QL STRIP: NEGATIVE
LYMPHOCYTES # BLD AUTO: 1.2 K/UL (ref 1–4.8)
LYMPHOCYTES NFR BLD: 10.2 % (ref 18–48)
MAGNESIUM SERPL-MCNC: 2.1 MG/DL (ref 1.6–2.6)
MCH RBC QN AUTO: 27.4 PG (ref 27–31)
MCHC RBC AUTO-ENTMCNC: 31 G/DL (ref 32–36)
MCV RBC AUTO: 89 FL (ref 82–98)
MONOCYTES # BLD AUTO: 0.4 K/UL (ref 0.3–1)
MONOCYTES NFR BLD: 3.6 % (ref 4–15)
NEUTROPHILS # BLD AUTO: 9.6 K/UL (ref 1.8–7.7)
NEUTROPHILS NFR BLD: 84.3 % (ref 38–73)
NITRITE UR QL STRIP: NEGATIVE
NRBC BLD-RTO: 0 /100 WBC
PCO2 BLDA: 50.8 MMHG (ref 35–45)
PH SMN: 7.35 [PH] (ref 7.35–7.45)
PH UR STRIP: 7 [PH] (ref 5–8)
PHOSPHATE SERPL-MCNC: 3.9 MG/DL (ref 2.7–4.5)
PLATELET # BLD AUTO: 280 K/UL (ref 150–350)
PMV BLD AUTO: 9.9 FL (ref 9.2–12.9)
PO2 BLDA: 54 MMHG (ref 40–60)
POC BE: 2 MMOL/L
POC SATURATED O2: 85 % (ref 95–100)
POC TCO2: 30 MMOL/L (ref 24–29)
POTASSIUM SERPL-SCNC: 4.1 MMOL/L (ref 3.5–5.1)
PROT SERPL-MCNC: 7.9 G/DL (ref 6–8.4)
PROT UR QL STRIP: NEGATIVE
RBC # BLD AUTO: 4.74 M/UL (ref 4–5.4)
SAMPLE: ABNORMAL
SARS-COV-2 RDRP RESP QL NAA+PROBE: NEGATIVE
SITE: ABNORMAL
SODIUM SERPL-SCNC: 143 MMOL/L (ref 136–145)
SP GR UR STRIP: 1 (ref 1–1.03)
TROPONIN I SERPL DL<=0.01 NG/ML-MCNC: <0.006 NG/ML (ref 0–0.03)
TROPONIN I SERPL DL<=0.01 NG/ML-MCNC: <0.006 NG/ML (ref 0–0.03)
URN SPEC COLLECT METH UR: NORMAL
WBC # BLD AUTO: 11.42 K/UL (ref 3.9–12.7)

## 2020-06-22 PROCEDURE — 99285 EMERGENCY DEPT VISIT HI MDM: CPT | Mod: 25

## 2020-06-22 PROCEDURE — 82800 BLOOD PH: CPT

## 2020-06-22 PROCEDURE — 84100 ASSAY OF PHOSPHORUS: CPT

## 2020-06-22 PROCEDURE — 85025 COMPLETE CBC W/AUTO DIFF WBC: CPT

## 2020-06-22 PROCEDURE — 83880 ASSAY OF NATRIURETIC PEPTIDE: CPT

## 2020-06-22 PROCEDURE — 94761 N-INVAS EAR/PLS OXIMETRY MLT: CPT

## 2020-06-22 PROCEDURE — 93010 EKG 12-LEAD: ICD-10-PCS | Mod: ,,, | Performed by: INTERNAL MEDICINE

## 2020-06-22 PROCEDURE — 99284 PR EMERGENCY DEPT VISIT,LEVEL IV: ICD-10-PCS | Mod: ,,, | Performed by: EMERGENCY MEDICINE

## 2020-06-22 PROCEDURE — 25000242 PHARM REV CODE 250 ALT 637 W/ HCPCS: Performed by: EMERGENCY MEDICINE

## 2020-06-22 PROCEDURE — 80053 COMPREHEN METABOLIC PANEL: CPT

## 2020-06-22 PROCEDURE — 27000221 HC OXYGEN, UP TO 24 HOURS

## 2020-06-22 PROCEDURE — 99284 EMERGENCY DEPT VISIT MOD MDM: CPT | Mod: ,,, | Performed by: EMERGENCY MEDICINE

## 2020-06-22 PROCEDURE — 93005 ELECTROCARDIOGRAM TRACING: CPT

## 2020-06-22 PROCEDURE — 84484 ASSAY OF TROPONIN QUANT: CPT

## 2020-06-22 PROCEDURE — 94640 AIRWAY INHALATION TREATMENT: CPT

## 2020-06-22 PROCEDURE — 96360 HYDRATION IV INFUSION INIT: CPT

## 2020-06-22 PROCEDURE — 99900035 HC TECH TIME PER 15 MIN (STAT)

## 2020-06-22 PROCEDURE — 83735 ASSAY OF MAGNESIUM: CPT

## 2020-06-22 PROCEDURE — 25000003 PHARM REV CODE 250: Performed by: EMERGENCY MEDICINE

## 2020-06-22 PROCEDURE — 82803 BLOOD GASES ANY COMBINATION: CPT

## 2020-06-22 PROCEDURE — 27100098 HC SPACER

## 2020-06-22 PROCEDURE — U0002 COVID-19 LAB TEST NON-CDC: HCPCS

## 2020-06-22 PROCEDURE — 85379 FIBRIN DEGRADATION QUANT: CPT

## 2020-06-22 PROCEDURE — 93010 ELECTROCARDIOGRAM REPORT: CPT | Mod: ,,, | Performed by: INTERNAL MEDICINE

## 2020-06-22 PROCEDURE — 81003 URINALYSIS AUTO W/O SCOPE: CPT

## 2020-06-22 RX ORDER — PREDNISONE 20 MG/1
40 TABLET ORAL
Status: COMPLETED | OUTPATIENT
Start: 2020-06-23 | End: 2020-06-23

## 2020-06-22 RX ORDER — LORAZEPAM 0.5 MG/1
0.5 TABLET ORAL
Status: COMPLETED | OUTPATIENT
Start: 2020-06-23 | End: 2020-06-23

## 2020-06-22 RX ORDER — ALBUTEROL SULFATE 90 UG/1
2 AEROSOL, METERED RESPIRATORY (INHALATION)
Status: COMPLETED | OUTPATIENT
Start: 2020-06-22 | End: 2020-06-22

## 2020-06-22 RX ADMIN — SODIUM CHLORIDE 500 ML: 0.9 INJECTION, SOLUTION INTRAVENOUS at 09:06

## 2020-06-22 RX ADMIN — ALBUTEROL SULFATE 2 PUFF: 90 AEROSOL, METERED RESPIRATORY (INHALATION) at 07:06

## 2020-06-22 NOTE — TELEPHONE ENCOUNTER
"Sudden onset of weakness & palpitations shortly after taking potassium medication approx 1700. Reports chest pain initially but denies at this time. Intermittent sob, but states "could be from my COPD". Advised to call 911. Pt friend, Leilani present during oncall triage, both verbalize understanding.      Reason for Disposition   Sounds like a life-threatening emergency to the triager     Voice is fragile. Palpitations began approx 1700 with chest pain & sob. Denies active chest pain but c/o severe weakness & intermittent sob.    Additional Information   Negative: Passed out   Negative: Shock suspected (e.g., cold/pale/clammy skin, too weak to stand)   Negative: Difficult to awaken or acting confused  (e.g., disoriented, slurred speech)   Negative: Visible sweat on face or sweat dripping down face   Negative: Unable to walk, or can only walk with assistance (e.g., requires support)   Negative: [1] Received SHOCK from implantable cardiac defibrillator AND [2] persisting symptoms (i.e., palpitations, lightheadedness)    Answer Assessment - Initial Assessment Questions  1. DESCRIPTION: "Please describe your heart rate or heart beat that you are having" (e.g., fast/slow, regular/irregular, skipped or extra beats, "palpitations")     "I feel it beating hard" HR 99 on home device  2. ONSET: "When did it start?" (Minutes, hours or days)       approx 1700 after taking potassium pill  3. DURATION: "How long does it last" (e.g., seconds, minutes, hours)      still  4. PATTERN "Does it come and go, or has it been constant since it started?"  "Does it get worse with exertion?"   "Are you feeling it now?"      Mostly constant. Feeling weak  5. TAP: "Using your hand, can you tap out what you are feeling on a chair or table in front of you, so that I can hear?" (Note: not all patients can do this)        n/a  6. HEART RATE: "Can you tell me your heart rate?" "How many beats in 15 seconds?"  (Note: not all patients can do " "this)        99  7. RECURRENT SYMPTOM: "Have you ever had this before?" If so, ask: "When was the last time?" and "What happened that time?"       no  8. CAUSE: "What do you think is causing the palpitations?"      Maybe K+ pill  9. CARDIAC HISTORY: "Do you have any history of heart disease?" (e.g., heart attack, angina, bypass surgery, angioplasty, arrhythmia)       No  10. OTHER SYMPTOMS: "Do you have any other symptoms?" (e.g., dizziness, chest pain, sweating, difficulty breathing)      Intermittent sob, weakness  11. PREGNANCY: "Is there any chance you are pregnant?" "When was your last menstrual period?"      no    Protocols used: ST HEART RATE AND HEART BEAT [id number]      "

## 2020-06-23 ENCOUNTER — TELEPHONE (OUTPATIENT)
Dept: INTERNAL MEDICINE | Facility: CLINIC | Age: 65
End: 2020-06-23

## 2020-06-23 VITALS
SYSTOLIC BLOOD PRESSURE: 169 MMHG | DIASTOLIC BLOOD PRESSURE: 83 MMHG | TEMPERATURE: 99 F | OXYGEN SATURATION: 95 % | HEART RATE: 87 BPM | RESPIRATION RATE: 22 BRPM

## 2020-06-23 DIAGNOSIS — Z12.31 ENCOUNTER FOR SCREENING MAMMOGRAM FOR MALIGNANT NEOPLASM OF BREAST: Primary | ICD-10-CM

## 2020-06-23 PROCEDURE — 99900035 HC TECH TIME PER 15 MIN (STAT)

## 2020-06-23 PROCEDURE — 25000003 PHARM REV CODE 250: Performed by: EMERGENCY MEDICINE

## 2020-06-23 PROCEDURE — 63600175 PHARM REV CODE 636 W HCPCS: Performed by: EMERGENCY MEDICINE

## 2020-06-23 PROCEDURE — 94640 AIRWAY INHALATION TREATMENT: CPT

## 2020-06-23 RX ORDER — DEXAMETHASONE 4 MG/1
4 TABLET ORAL EVERY 12 HOURS
Qty: 6 TABLET | Refills: 0 | Status: SHIPPED | OUTPATIENT
Start: 2020-06-23 | End: 2020-06-26

## 2020-06-23 RX ORDER — PREDNISONE 20 MG/1
40 TABLET ORAL DAILY
Qty: 10 TABLET | Refills: 0 | Status: SHIPPED | OUTPATIENT
Start: 2020-06-23 | End: 2020-06-23 | Stop reason: SDUPTHER

## 2020-06-23 RX ORDER — IPRATROPIUM BROMIDE AND ALBUTEROL SULFATE 2.5; .5 MG/3ML; MG/3ML
3 SOLUTION RESPIRATORY (INHALATION)
Status: DISCONTINUED | OUTPATIENT
Start: 2020-06-23 | End: 2020-06-23 | Stop reason: HOSPADM

## 2020-06-23 RX ADMIN — PREDNISONE 40 MG: 20 TABLET ORAL at 12:06

## 2020-06-23 RX ADMIN — LORAZEPAM 0.5 MG: 0.5 TABLET ORAL at 12:06

## 2020-06-23 NOTE — ED TRIAGE NOTES
PT reports to ED via EMS  today w/ complaints of increasing SOB starting today. Pt reports hx of COPD. Pt on home O2 2L nasal cannula . Denies chest pain

## 2020-06-23 NOTE — PROVIDER PROGRESS NOTES - EMERGENCY DEPT.
Encounter Date: 6/22/2020    ED Physician Progress Notes        Physician Note:   S/o to me.    Pt here with palpitations, initial set of labs and CXR benign. Vitals benign, but pt was noted to have some tachypnea by previous provider. Pending Dimer, second trop. Pt does have a hx of COPD on home O2.     Dimer is 0.53, age adjusted this can be considered a negative dimer. Risk of CT scan and work-up for PE outweigh benefits at this point.   Second trop neg.  Pt re-evaluated. NAD, sats at baseline on her NC home dose O2. Pt does appear to be taking some deep breaths, but not obviously tachypneic, no in distress. Pt states she is also very anxious, possible anxiety component, vs COPD?   Given steroids and small dose of ativan. Given neb before discharge.   No palpitations during ED work-up.  At this time given benign vitals, negative work-up, not consistent with acutely dangerous pathology.   Will d/c with continued home albuterol use and steroid burst. Pt specifically asking for dexamethasone as she feels this works better for her.   PMD follow up  ED return precautions.

## 2020-06-23 NOTE — TELEPHONE ENCOUNTER
----- Message from Kirstin Cruz sent at 6/23/2020  2:53 PM CDT -----  Regarding: ER Discharge  Contact: Patient @ [phone]  Good Afternoon  Patient need talk about her RX and ER Discharge.   Caller is requesting to schedule their annual screening mammogram appointment. Order is not listed in Epic.  Please enter order and contact patient to schedule.  Where would they like the mammogram performed?:  [facility]  Would the patient rather receive a phone call back or a response via [facility]?:  CALL  Additional information:

## 2020-06-23 NOTE — TELEPHONE ENCOUNTER
Spoke with pt, pt would like to get a mammogram order put in. Pt stated that she went to the E.R. and Dr. Angelito Mohna prescribe the lorazepam tablet 0.5 mg a small amount. Pt wanting to know can Dr. Cool send in this medication for her.

## 2020-06-23 NOTE — DISCHARGE INSTRUCTIONS
Your labs, chest x-ray, EKG did not show any signs of any immediately dangerous medical conditions.      You were prescribed a short course of prednisone for possible COPD exacerbation.  Please continue to use your home nebulizer/albuterol machine as you normally do.    Be sure to follow up with Cardiology as scheduled.    Return to the emergency department for any chest pain, difficulty breathing, or other concerning symptoms.

## 2020-06-23 NOTE — TELEPHONE ENCOUNTER
Mammogram order in - she is due for this sometime after 7/1/20. Please help to schedule.    Would need appt to discuss lorazepam as this is a controlled-substance medication, and needs ER follow up visit. Please also help to schedule this in the next week or so, with NP/PA if I do not have an appointment that works for her.

## 2020-06-23 NOTE — ED PROVIDER NOTES
Encounter Date: 6/22/2020       History     Chief Complaint   Patient presents with    Shortness of Breath     Pt arrives c/o increased dypnea with minimal exertion. 95% on 2L O2 home oxygen.     65 yo W with pmhx COPD on 2L home O2, HTN, HLD, depression, takusubo's presents with a chief complaint of palpitations.  Palpitations began at approximately 4:15 p.m..  They lasted 60-90 minutes before resolving.  She reports the episode included rapid heart rate, diaphoresis, and generalized weakness.  No presyncope.  No chest pain, cough.  Patient reports a similar episode previously at which she came to the emergency department but no diagnosis was given.  No fevers or chills.  Palpitations seem to mostly resolved but she still feels somewhat weak generally.  Triage note clarified that this is not shortness of breath.  No immobilization recent travel.  No history of VTE.  Patient used an albuterol inhaler prior to arrival.  Patient was noted to be hypertensive and reports being compliant with all her blood pressure medications prior to arrival.  She reported some right flank pain earlier today that resolved spontaneously prior to this episode.  No dysuria or urinary frequency.        Review of patient's allergies indicates:   Allergen Reactions    Ace inhibitors      cough    Coreg [carvedilol]      Headache     Pseudoephedrine hcl Nausea And Vomiting, Other (See Comments) and Anxiety     JITTERY     Past Medical History:   Diagnosis Date    Addiction to drug     Anxiety     Aortic calcification 1/11/2018    Chronic diarrhea     Chronic obstructive pulmonary disease 8/27/2013    Chronic respiratory failure with hypoxia, on home O2 therapy 1/11/2018    Colon polyps 2010    COPD (chronic obstructive pulmonary disease)     Depression     Essential hypertension 8/12/2013    Former smoker 10/18/2016    Hepatomegaly 12/21/2015    Hyperlipidemia     Hypertension     MI (myocardial infarction)     Microscopic  hematuria 2017    Panic disorder     Perianal abscess 2018    Reflux 2013    S/P right hemicolectomy 2017    Performed in  after perforation during colonoscopy    Sleep difficulties     Takotsubo cardiomyopathy 10/18/2016    Noted on echo 10/2016, recovered on repeat echo 2016     Past Surgical History:   Procedure Laterality Date    ABDOMINAL SURGERY      APPENDECTOMY      CHOLECYSTECTOMY      open    COLON SURGERY      secondary to perforation after c-scope    COLONOSCOPY      FRACTURE SURGERY      HERNIA REPAIR      HIATAL HERNIA REPAIR  2013    HYSTERECTOMY  1986    Left leg surgery       Family History   Problem Relation Age of Onset    Cancer Mother             Hypertension Mother     Coronary artery disease Father     Retinal detachment Father     Hypertension Sister     Hypertension Brother     Cataracts Maternal Grandmother     Breast cancer Maternal Grandmother         unk age of onset    Abnormal EKG Daughter     Breast cancer Daughter 43        negative genetic testing, unilateral breast ca    Breast cancer Other 44        thinks negative genetic testing, bilat ca    Amblyopia Neg Hx     Blindness Neg Hx     Diabetes Neg Hx     Glaucoma Neg Hx     Macular degeneration Neg Hx     Strabismus Neg Hx     Stroke Neg Hx     Thyroid disease Neg Hx     Colon cancer Neg Hx     Ovarian cancer Neg Hx      Social History     Tobacco Use    Smoking status: Former Smoker     Packs/day: 1.50     Years: 30.00     Pack years: 45.00     Types: Cigarettes     Start date: 1970     Quit date: 1997     Years since quittin.4    Smokeless tobacco: Never Used   Substance Use Topics    Alcohol use: No     Frequency: Never     Drinks per session: Patient refused     Binge frequency: Patient refused     Comment: occaissionally    Drug use: No     Review of Systems   Constitutional: Positive for fatigue. Negative for fever.   HENT: Negative for  sore throat.    Respiratory: Negative for shortness of breath.    Cardiovascular: Negative for chest pain.   Gastrointestinal: Negative for nausea.   Genitourinary: Negative for dysuria.   Musculoskeletal: Negative for back pain.   Skin: Negative for rash.   Neurological: Positive for weakness.   Hematological: Does not bruise/bleed easily.       Physical Exam     Initial Vitals [06/22/20 1900]   BP Pulse Resp Temp SpO2   (!) 170/104 96 18 98.7 °F (37.1 °C) 99 %      MAP       --         Physical Exam    Nursing note and vitals reviewed.  Constitutional: She appears well-developed and well-nourished. She is not diaphoretic. No distress.   HENT:   Head: Normocephalic and atraumatic.   Mouth/Throat: Oropharynx is clear and moist.   Eyes: EOM are normal. Right eye exhibits no discharge. Left eye exhibits no discharge. No scleral icterus.   Neck: Normal range of motion. Neck supple. No thyromegaly present. No JVD present.   Cardiovascular: Normal rate, regular rhythm, normal heart sounds and intact distal pulses. Exam reveals no gallop and no friction rub.    No murmur heard.  Pulmonary/Chest: Breath sounds normal. No respiratory distress. She has no wheezes. She has no rhonchi. She has no rales. She exhibits no tenderness.   Minimally increased work of breathing on nasal cannula but speaking complete sentences in no acute distress, no wheezing   Abdominal: Soft. Bowel sounds are normal. She exhibits no distension and no mass. There is no abdominal tenderness. There is no rebound and no guarding.   Musculoskeletal: Normal range of motion. No tenderness or edema.   Neurological: She is alert and oriented to person, place, and time. She has normal strength. No cranial nerve deficit or sensory deficit.   Skin: Skin is warm and dry. Capillary refill takes 2 to 3 seconds.   Psychiatric:   Minimally anxious         ED Course   Procedures  Labs Reviewed   CBC W/ AUTO DIFFERENTIAL - Abnormal; Notable for the following  components:       Result Value    Mean Corpuscular Hemoglobin Conc 31.0 (*)     RDW 15.8 (*)     Gran # (ANC) 9.6 (*)     Gran% 84.3 (*)     Lymph% 10.2 (*)     Mono% 3.6 (*)     All other components within normal limits   COMPREHENSIVE METABOLIC PANEL - Abnormal; Notable for the following components:    ALT 9 (*)     All other components within normal limits   ISTAT PROCEDURE - Abnormal; Notable for the following components:    POC PH 7.349 (*)     POC PCO2 50.8 (*)     POC SATURATED O2 85 (*)     POC TCO2 30 (*)     All other components within normal limits   SARS-COV-2 RNA AMPLIFICATION, QUAL   B-TYPE NATRIURETIC PEPTIDE   TROPONIN I   MAGNESIUM   PHOSPHORUS   B-TYPE NATRIURETIC PEPTIDE   D DIMER, QUANTITATIVE   URINALYSIS, REFLEX TO URINE CULTURE   D DIMER, QUANTITATIVE     EKG Readings: (Independently Interpreted)   Initial Reading: No STEMI. Previous EKG: Compared with most recent EKG Rhythm: Normal Sinus Rhythm. Heart Rate: 97. ST Segments: Normal ST Segments. T Waves: Normal. Axis: Left Axis Deviation.       Imaging Results          X-Ray Chest AP Portable (Final result)  Result time 06/22/20 19:30:07    Final result by Jill Keith MD (06/22/20 19:30:07)                 Impression:      No detrimental change since February 18, 2020.      Electronically signed by: Jill Keith MD  Date:    06/22/2020  Time:    19:30             Narrative:    EXAMINATION:  XR CHEST AP PORTABLE    CLINICAL HISTORY:  Shortness of breath    TECHNIQUE:  Single frontal view of the chest was performed.    COMPARISON:  February 18, 2020    FINDINGS:  Cardiac silhouette is not enlarged.  Pulmonary vascularity is not increased.  The lungs are free of lobar consolidation alveolar edema and similar to the previous examination with some fibrotic scarring in the lower lungs, greater on the right.  There is no large pleural effusion or pneumothorax.  Visualized osseous structures are stable with degenerative changes of the  "spine.  There are atherosclerotic calcifications of the aorta, which is tortuous.                                 Medical Decision Making:   Initial Assessment:   63 yo W with pmhx COPD on 2L home O2, HTN, HLD, depression, takusubo's presents with a chief complaint of episode of palpitations associated with diaphoresis and weakness.  Differential Diagnosis:   This patient's differential diagnosis includes, but is not limited to:  Arrhythmia, ACS, electrolyte abnormalities, COVID, asthma exacerbation, hypercapnia, pneumothorax, pulmonary embolism, pleurisy, pericarditis, pneumonia, pyelonephritis      Clinical Tests:   Lab Tests: Ordered  Radiological Study: Ordered  Medical Tests: Ordered  ED Management:  Based the patient's well appearance, I doubt cardiac tamponade or aortic dissection.  Will administer IV fluids and albuterol inhaler.  Will obtain labs and chest x-ray.  Given time course of episode, patient will need a 2 troponin rule out.     Reassessment:  CBC without leukocytosis or anemia.  CMP within normal limits.  BNP normal at 63.  Troponin is normal.  COVID negative.  VBG with pH of 7.349, pCO2 50.8.  Chest x-ray with aortic atherosclerosis but no acute process.  On repeat assessment, patient remains resting comfortably.  She still has slightly increased work of breathing.  Patient states that she gets like this "when I have anxiety".  She reports feeling free of palpitations.  No arrhythmias on monitor.  Vital signs appear stable she is still somewhat hypertensive but does not appeal volume overloaded.  Will administer 500 cc bolus.  At this time, my shift is coming to a close.  We are awaiting urinalysis, D-dimer, repeat troponin.  Patient will require reassessment for disposition.  If workup negative, she will require outpatient follow-up with Cardiology for further evaluation of palpitations.  She reports she has an appointment on July 10th.    Additional MDM:     Well's Criteria Score:  -Clinical " symptoms of DVT (leg swelling, pain with palpation) = 0.0  -Other diagnosis less likely than pulmonary embolism =            0.0  -Heart Rate >100 =   0.0  -Immobilization (= or > than 3 days) or surgery in the previous 4 weeks = 0.0  -Previous DVT/PE = 0.0  -Hemoptysis =          0.0  -Malignancy =           0.0  Well's Probability Score =    0      Heart Score:    History:          Slightly suspicious.  ECG:             Normal  Age:               45-65 years  Risk factors: 1-2 risk factors  Troponin:       Less than or equal to normal limit  Final Score: 2                                  Clinical Impression:       ICD-10-CM ICD-9-CM   1. Palpitations  R00.2 785.1   2. SOB (shortness of breath)  R06.02 786.05                                See Vizcarra MD  06/22/20 2039

## 2020-06-30 ENCOUNTER — PATIENT OUTREACH (OUTPATIENT)
Dept: ADMINISTRATIVE | Facility: OTHER | Age: 65
End: 2020-06-30

## 2020-06-30 NOTE — PROGRESS NOTES
Requested updates within Care Everywhere.  Patient's chart was reviewed for overdue LISSET topics.  Immunizations reconciled.

## 2020-07-01 ENCOUNTER — HOSPITAL ENCOUNTER (OUTPATIENT)
Dept: RADIOLOGY | Facility: HOSPITAL | Age: 65
Discharge: HOME | End: 2020-07-01
Attending: INTERNAL MEDICINE
Payer: COMMERCIAL

## 2020-07-01 ENCOUNTER — OFFICE VISIT (OUTPATIENT)
Dept: PULMONOLOGY | Facility: CLINIC | Age: 65
End: 2020-07-01
Payer: COMMERCIAL

## 2020-07-01 VITALS
BODY MASS INDEX: 28.66 KG/M2 | DIASTOLIC BLOOD PRESSURE: 76 MMHG | HEART RATE: 94 BPM | SYSTOLIC BLOOD PRESSURE: 152 MMHG | OXYGEN SATURATION: 95 % | HEIGHT: 65 IN | WEIGHT: 172 LBS

## 2020-07-01 DIAGNOSIS — J96.11 CHRONIC RESPIRATORY FAILURE WITH HYPOXIA, ON HOME OXYGEN THERAPY: ICD-10-CM

## 2020-07-01 DIAGNOSIS — J43.2 CENTRILOBULAR EMPHYSEMA: ICD-10-CM

## 2020-07-01 DIAGNOSIS — Z99.81 CHRONIC RESPIRATORY FAILURE WITH HYPOXIA, ON HOME OXYGEN THERAPY: ICD-10-CM

## 2020-07-01 DIAGNOSIS — J43.2 CENTRILOBULAR EMPHYSEMA: Primary | ICD-10-CM

## 2020-07-01 PROCEDURE — 99999 PR PBB SHADOW E&M-EST. PATIENT-LVL III: ICD-10-PCS | Mod: PBBFAC,,, | Performed by: INTERNAL MEDICINE

## 2020-07-01 PROCEDURE — 99214 OFFICE O/P EST MOD 30 MIN: CPT | Mod: S$GLB,,, | Performed by: INTERNAL MEDICINE

## 2020-07-01 PROCEDURE — 99214 PR OFFICE/OUTPT VISIT, EST, LEVL IV, 30-39 MIN: ICD-10-PCS | Mod: S$GLB,,, | Performed by: INTERNAL MEDICINE

## 2020-07-01 PROCEDURE — 3008F PR BODY MASS INDEX (BMI) DOCUMENTED: ICD-10-PCS | Mod: CPTII,S$GLB,, | Performed by: INTERNAL MEDICINE

## 2020-07-01 PROCEDURE — 71046 X-RAY EXAM CHEST 2 VIEWS: CPT | Mod: 26,,, | Performed by: RADIOLOGY

## 2020-07-01 PROCEDURE — 71046 X-RAY EXAM CHEST 2 VIEWS: CPT | Mod: TC,FY

## 2020-07-01 PROCEDURE — 3077F SYST BP >= 140 MM HG: CPT | Mod: CPTII,S$GLB,, | Performed by: INTERNAL MEDICINE

## 2020-07-01 PROCEDURE — 99999 PR PBB SHADOW E&M-EST. PATIENT-LVL III: CPT | Mod: PBBFAC,,, | Performed by: INTERNAL MEDICINE

## 2020-07-01 PROCEDURE — 1101F PR PT FALLS ASSESS DOC 0-1 FALLS W/OUT INJ PAST YR: ICD-10-PCS | Mod: CPTII,S$GLB,, | Performed by: INTERNAL MEDICINE

## 2020-07-01 PROCEDURE — 1101F PT FALLS ASSESS-DOCD LE1/YR: CPT | Mod: CPTII,S$GLB,, | Performed by: INTERNAL MEDICINE

## 2020-07-01 PROCEDURE — 3078F DIAST BP <80 MM HG: CPT | Mod: CPTII,S$GLB,, | Performed by: INTERNAL MEDICINE

## 2020-07-01 PROCEDURE — 3078F PR MOST RECENT DIASTOLIC BLOOD PRESSURE < 80 MM HG: ICD-10-PCS | Mod: CPTII,S$GLB,, | Performed by: INTERNAL MEDICINE

## 2020-07-01 PROCEDURE — 3077F PR MOST RECENT SYSTOLIC BLOOD PRESSURE >= 140 MM HG: ICD-10-PCS | Mod: CPTII,S$GLB,, | Performed by: INTERNAL MEDICINE

## 2020-07-01 PROCEDURE — 3008F BODY MASS INDEX DOCD: CPT | Mod: CPTII,S$GLB,, | Performed by: INTERNAL MEDICINE

## 2020-07-01 PROCEDURE — 71046 XR CHEST PA AND LATERAL: ICD-10-PCS | Mod: 26,,, | Performed by: RADIOLOGY

## 2020-07-01 NOTE — PROGRESS NOTES
Subjective:      Patient ID: Ana Aguila is a 64 y.o. female.    Chief Complaint: COPD and Shortness of Breath    HPI Patient has been home since Mid April. She was sent home and now [employer] wants her to apply for family leave. She states that she wants to work and has worked for the past three years with supplemental oxygen and attends pulmonary rehab twice a week and does well. She  Was recently seen in the ER because of anxiety when she learned that her daughter who lives in [place] has has a re occurrence of her breast cancer two years  Out.  Not an exacerbation of COPD    She asked me to help her fill out form from  Work which I did greatly      No flowsheet data found.  Review of Systems   Constitutional: Negative.    HENT: Negative.    Eyes: Negative.    Respiratory: Negative.         Compensated COPD with respiratory failure on supplemental oxygen Doing well    Was sent home from her employment at [employer] at the height of the Covid pandemic  In April. She has done well and has had No hint of infection.   Cardiovascular: Negative.    Genitourinary: Negative.    Musculoskeletal: Negative.    Skin: Negative.    Gastrointestinal: Negative.         S/P perla colectomy after perforation from colonoscope procedure.   Neurological: Negative.    Psychiatric/Behavioral: The patient is nervous/anxious.         Daughter recently learned that after two years her breast cancer has re occurred and she is starting medical treatment.     Objective:     Physical Exam  Vitals signs and nursing note reviewed.   Constitutional:       General: She is not in acute distress.     Appearance: She is well-developed.   HENT:      Head: Normocephalic and atraumatic.      Right Ear: External ear normal.      Left Ear: External ear normal.      Nose: Nose normal.   Eyes:      Conjunctiva/sclera: Conjunctivae normal.      Pupils: Pupils are equal, round, and reactive to light.   Neck:      Musculoskeletal: Normal range of motion and  neck supple.      Thyroid: No thyromegaly.      Vascular: No JVD.   Cardiovascular:      Rate and Rhythm: Normal rate and regular rhythm.      Heart sounds: Normal heart sounds. No murmur. No gallop.    Pulmonary:      Effort: No respiratory distress.      Breath sounds: Normal breath sounds. No stridor. No wheezing or rales.      Comments: Sa02: 93% of supplemental oxygen 2L/min  Chest:      Chest wall: No tenderness.   Abdominal:      General: Bowel sounds are normal. There is no distension.      Palpations: Abdomen is soft. There is no mass.      Tenderness: There is no abdominal tenderness. There is no guarding or rebound.   Musculoskeletal: Normal range of motion.   Lymphadenopathy:      Cervical: No cervical adenopathy.   Skin:     General: Skin is warm and dry.      Findings: No rash.   Neurological:      Mental Status: She is alert and oriented to person, place, and time.      Cranial Nerves: No cranial nerve deficit.      Deep Tendon Reflexes: Reflexes are normal and symmetric. Reflexes normal.   Psychiatric:         Behavior: Behavior normal.         Thought Content: Thought content normal.         Judgment: Judgment normal.         Assessment:     1. Centrilobular emphysema    2. Chronic respiratory failure with hypoxia, on home oxygen therapy      Outpatient Encounter Medications as of 7/1/2020   Medication Sig Dispense Refill    albuterol-ipratropium (DUO-NEB) 2.5 mg-0.5 mg/3 mL nebulizer solution TAKE 3 MLS BY NEBULIZER EVERY 6 HOURS AS NEEDED FOR WHEEZING OR SHORTNESS OF BREATH 270 mL 11    diphenoxylate-atropine 2.5-0.025 mg (LOMOTIL) 2.5-0.025 mg per tablet Take 1 tablet by mouth 4 (four) times daily as needed for Diarrhea. 30 tablet 0    ergocalciferol (ERGOCALCIFEROL) 50,000 unit Cap TAKE 1 CAPSULE BY MOUTH EVERY 7 DAYS 12 capsule 1    fluticasone-salmeterol diskus inhaler 250-50 mcg INHALE 1 PUFF BY MOUTH EVERY 12 HOURS AS NEEDED 1 each 12    ibuprofen (ADVIL,MOTRIN) 400 MG tablet TAKE 1  TABLET(400 MG) BY MOUTH EVERY 6 HOURS AS NEEDED 30 tablet 1    ipratropium (ATROVENT) 0.02 % nebulizer solution UNWRAP AND INHALE THE CONTENTS OF 1 VIAL VIA NEBULIZER UP TO FOUR TIMES DAILY IF NEEDED (RESCUE) 62.5 mL 0    metoprolol succinate (TOPROL-XL) 25 MG 24 hr tablet TAKE 1 TABLET BY MOUTH EVERY DAY 90 tablet 3    potassium chloride (KLOR-CON) 10 MEQ TbSR TAKE 1 TABLET(10 MEQ) BY MOUTH EVERY DAY 90 tablet 0    PROAIR HFA 90 mcg/actuation inhaler INHALE 2 PUFFS PO QID PRN FOR COPD  11    traMADol (ULTRAM) 50 mg tablet Take 1 tablet (50 mg total) by mouth every 6 (six) hours as needed for Pain. 10 tablet 0     No facility-administered encounter medications on file as of 7/1/2020.        Plan:   IMP: Stable COPD on supplemental oxygen  Problem List Items Addressed This Visit     Chronic obstructive pulmonary disease - Primary    Chronic respiratory failure with hypoxia, on home oxygen therapy

## 2020-07-07 ENCOUNTER — TELEPHONE (OUTPATIENT)
Dept: INTERNAL MEDICINE | Facility: CLINIC | Age: 65
End: 2020-07-07

## 2020-07-07 NOTE — TELEPHONE ENCOUNTER
----- Message from Gaby Boyd sent at 7/7/2020 12:21 PM CDT -----  Contact: self [phone]  Pt states she received a notification for her to  kit for a test that she has to have done tomorrow. Pt states she would like to talk to the nurse with further information regarding this test. Please call and advise

## 2020-07-08 ENCOUNTER — OFFICE VISIT (OUTPATIENT)
Dept: INTERNAL MEDICINE | Facility: CLINIC | Age: 65
End: 2020-07-08
Payer: COMMERCIAL

## 2020-07-08 DIAGNOSIS — J43.9 PULMONARY EMPHYSEMA, UNSPECIFIED EMPHYSEMA TYPE: ICD-10-CM

## 2020-07-08 DIAGNOSIS — F43.9 SITUATIONAL STRESS: Primary | ICD-10-CM

## 2020-07-08 DIAGNOSIS — I10 ESSENTIAL HYPERTENSION: ICD-10-CM

## 2020-07-08 DIAGNOSIS — Z23 NEED FOR PNEUMOCOCCAL VACCINE: ICD-10-CM

## 2020-07-08 DIAGNOSIS — F41.9 ANXIETY: ICD-10-CM

## 2020-07-08 DIAGNOSIS — R00.2 PALPITATIONS: ICD-10-CM

## 2020-07-08 DIAGNOSIS — J96.11 CHRONIC RESPIRATORY FAILURE WITH HYPOXIA, ON HOME OXYGEN THERAPY: ICD-10-CM

## 2020-07-08 DIAGNOSIS — Z99.81 CHRONIC RESPIRATORY FAILURE WITH HYPOXIA, ON HOME OXYGEN THERAPY: ICD-10-CM

## 2020-07-08 PROCEDURE — 90471 IMMUNIZATION ADMIN: CPT | Mod: S$GLB,,, | Performed by: INTERNAL MEDICINE

## 2020-07-08 PROCEDURE — 99999 PR PBB SHADOW E&M-EST. PATIENT-LVL V: ICD-10-PCS | Mod: PBBFAC,,, | Performed by: INTERNAL MEDICINE

## 2020-07-08 PROCEDURE — 99999 PR PBB SHADOW E&M-EST. PATIENT-LVL V: CPT | Mod: PBBFAC,,, | Performed by: INTERNAL MEDICINE

## 2020-07-08 PROCEDURE — 3008F PR BODY MASS INDEX (BMI) DOCUMENTED: ICD-10-PCS | Mod: CPTII,S$GLB,, | Performed by: INTERNAL MEDICINE

## 2020-07-08 PROCEDURE — 1101F PR PT FALLS ASSESS DOC 0-1 FALLS W/OUT INJ PAST YR: ICD-10-PCS | Mod: CPTII,S$GLB,, | Performed by: INTERNAL MEDICINE

## 2020-07-08 PROCEDURE — 3080F DIAST BP >= 90 MM HG: CPT | Mod: CPTII,S$GLB,, | Performed by: INTERNAL MEDICINE

## 2020-07-08 PROCEDURE — 3077F PR MOST RECENT SYSTOLIC BLOOD PRESSURE >= 140 MM HG: ICD-10-PCS | Mod: CPTII,S$GLB,, | Performed by: INTERNAL MEDICINE

## 2020-07-08 PROCEDURE — 90732 PPSV23 VACC 2 YRS+ SUBQ/IM: CPT | Mod: S$GLB,,, | Performed by: INTERNAL MEDICINE

## 2020-07-08 PROCEDURE — 90732 PNEUMOCOCCAL POLYSACCHARIDE VACCINE 23-VALENT =>2YO SQ IM: ICD-10-PCS | Mod: S$GLB,,, | Performed by: INTERNAL MEDICINE

## 2020-07-08 PROCEDURE — 99215 OFFICE O/P EST HI 40 MIN: CPT | Mod: 25,S$GLB,, | Performed by: INTERNAL MEDICINE

## 2020-07-08 PROCEDURE — 99215 PR OFFICE/OUTPT VISIT, EST, LEVL V, 40-54 MIN: ICD-10-PCS | Mod: 25,S$GLB,, | Performed by: INTERNAL MEDICINE

## 2020-07-08 PROCEDURE — 3080F PR MOST RECENT DIASTOLIC BLOOD PRESSURE >= 90 MM HG: ICD-10-PCS | Mod: CPTII,S$GLB,, | Performed by: INTERNAL MEDICINE

## 2020-07-08 PROCEDURE — 90471 PNEUMOCOCCAL POLYSACCHARIDE VACCINE 23-VALENT =>2YO SQ IM: ICD-10-PCS | Mod: S$GLB,,, | Performed by: INTERNAL MEDICINE

## 2020-07-08 PROCEDURE — 3008F BODY MASS INDEX DOCD: CPT | Mod: CPTII,S$GLB,, | Performed by: INTERNAL MEDICINE

## 2020-07-08 PROCEDURE — 3077F SYST BP >= 140 MM HG: CPT | Mod: CPTII,S$GLB,, | Performed by: INTERNAL MEDICINE

## 2020-07-08 PROCEDURE — 1101F PT FALLS ASSESS-DOCD LE1/YR: CPT | Mod: CPTII,S$GLB,, | Performed by: INTERNAL MEDICINE

## 2020-07-08 RX ORDER — HYDROXYZINE HYDROCHLORIDE 10 MG/1
10 TABLET, FILM COATED ORAL 3 TIMES DAILY PRN
Qty: 45 TABLET | Refills: 0 | Status: SHIPPED | OUTPATIENT
Start: 2020-07-08 | End: 2021-11-24 | Stop reason: SDUPTHER

## 2020-07-08 NOTE — LETTER
July 8, 2020    Ana Aguila  [address]  [place] [address]             [facility] - Internal Medicine  [address]  [facility] [address]  Phone: [phone]  Fax: [phone] To whom it may concern:     Ana Aguila is under my care at [facility]. It is my medical opinion, consistent with current guidelines regarding COVID-19 / Coronavirus, that they should avoid being in close contact (< 6 feet) with other people if at all possible, and should practice effective social distancing and reduction of infection risk as recommended by the CDC and [facility].      As per these guidelines, and to protect the health of Ana Aguila and our community at large, I strongly recommend that they and any other employee who can work from home do so. Ana Aguila has no medical restrictions limiting her ability to work from home.     Please feel free to contact me if there are any questions.     Sincerely,          Erik Cool MD             If you have any questions or concerns, please don't hesitate to call.    Sincerely,        Erik Cool MD

## 2020-07-08 NOTE — PROGRESS NOTES
"Subjective:       Patient ID: Ana Aguila is a 64 y.o. female.    Chief Complaint: Follow-up    HPI  63 y/o woman with HTN, HLD, h/o takotsubo cardiomyopathy in the past, severe COPD with home O2, h/o hemicolectomy 2009 due to colonoscopy complication, chronic RUQ discomfort, NAFLD here for ER follow up.    ER visit 6/22 for palpitations. Labs overall normal, BNP and troponins normal, COVID19 negative, CXR without acute changes.  Saw Dr Dill 7/1 - noted as likely anxiety-related rather than COPD exacerbation. Recently learned that her daughter has had recurrence of breast cancer.  She has had anxiety attacks in the past  Was sent home from work and told she could not work remotely, which is also stressful    Discussed occasional "racing heartbeat" at last visit 5/2020; Holter ordered at that visit, completed 5/20; no events noted in diary (wasn't returned), noted sinus rhythm, HR , occasional PVCs, rare PACs.  Was given steroids and lorazepam in ER, discharged with short course of dexamethasone.    HTN - high today but very anxious. Following with [facility]  140s/80s yesterday, otherwise 120s-130s/70s generally. HR in high 60s-70s generally.  Was previously on HCTZ - this was stopped. She has also stopped taking the potassium.  Had discussed ARB with [facility] program - would be interested in lower dose of metoprolol and maybe starting back on ARB.     Review of Systems   Constitutional: Negative for activity change, fever and unexpected weight change.   HENT: Negative.  Negative for hearing loss.    Eyes: Negative for visual disturbance.   Respiratory: Positive for shortness of breath (chronic). Negative for cough and wheezing.    Cardiovascular: Positive for palpitations. Negative for chest pain.   Gastrointestinal: Negative for abdominal pain, blood in stool and vomiting.   Endocrine: Negative.    Genitourinary: Negative.  Negative for dysuria.   Musculoskeletal: Positive for arthralgias and neck " "pain. Negative for joint swelling.   Skin: Negative.    Neurological: Negative for syncope, weakness and headaches.   Psychiatric/Behavioral: Positive for dysphoric mood. Negative for confusion. The patient is nervous/anxious.          Past medical history, surgical history, and family medical history reviewed and updated as appropriate.    Medications and allergies reviewed.     Objective:          Vitals:    07/08/20 0816 07/08/20 0910   BP: (!) 174/102 (!) 158/92   BP Location: Left arm    Patient Position: Sitting    BP Method: Medium (Manual)    Pulse: 96    Temp: 98.1 °F (36.7 °C)    SpO2: 97%    Weight: 75.1 kg (165 lb 9.1 oz)    Height: 5' 5" (1.651 m)      Body mass index is 27.55 kg/m².  Physical Exam  Vitals signs reviewed.   Constitutional:       General: She is not in acute distress.     Appearance: She is well-developed. She is not ill-appearing.      Comments: Patient wearing home supplemental O2 nasal cannula   HENT:      Head: Normocephalic and atraumatic.      Mouth/Throat:      Mouth: Mucous membranes are moist.   Eyes:      General: No scleral icterus.     Extraocular Movements: Extraocular movements intact.      Conjunctiva/sclera: Conjunctivae normal.   Neck:      Musculoskeletal: Neck supple.      Vascular: No carotid bruit.   Cardiovascular:      Rate and Rhythm: Normal rate and regular rhythm.      Heart sounds: Normal heart sounds.   Pulmonary:      Effort: Pulmonary effort is normal. No respiratory distress.      Breath sounds: Normal breath sounds. Wheezes: no audible wheezes.   Abdominal:      Palpations: Abdomen is soft.      Tenderness: There is no abdominal tenderness.   Musculoskeletal:         General: No tenderness.      Right lower leg: No edema.      Left lower leg: No edema.   Lymphadenopathy:      Cervical: No cervical adenopathy.   Skin:     General: Skin is warm and dry.   Neurological:      General: No focal deficit present.      Mental Status: She is alert and oriented to " person, place, and time. Mental status is at baseline.      Cranial Nerves: No cranial nerve deficit.      Comments: Alert, oriented, conversant. No focal neuro deficits noted on limited video exam   Psychiatric:         Mood and Affect: Mood is anxious.         Behavior: Behavior normal.         Lab Results   Component Value Date    WBC 11.42 06/22/2020    HGB 13.0 06/22/2020    HCT 42.0 06/22/2020     06/22/2020    CHOL 207 (H) 09/26/2019    TRIG 51 09/26/2019    HDL 67 09/26/2019    ALT 9 (L) 06/22/2020    AST 20 06/22/2020     06/22/2020    K 4.1 06/22/2020     06/22/2020    CREATININE 0.8 06/22/2020    BUN 12 06/22/2020    CO2 24 06/22/2020    TSH 1.139 11/18/2018    INR 1.2 10/18/2016    HGBA1C 5.4 10/18/2016       Assessment:       1. Situational stress    2. Anxiety    3. Palpitations    4. Need for pneumococcal vaccine    5. Pulmonary emphysema, unspecified emphysema type    6. Chronic respiratory failure with hypoxia, on home oxygen therapy    7. Essential hypertension        Plan:   Ana was seen today for follow-up.    Diagnoses and all orders for this visit:    Situational stress  -     hydrOXYzine HCL (ATARAX) 10 MG Tab; Take 1 tablet (10 mg total) by mouth 3 (three) times daily as needed (for anxiety or trouble sleeping).  -     Ambulatory referral/consult to [employer] ([employer]); Future  Anxiety  -     hydrOXYzine HCL (ATARAX) 10 MG Tab; Take 1 tablet (10 mg total) by mouth 3 (three) times daily as needed (for anxiety or trouble sleeping).  -     Ambulatory referral/consult to [employer] ([employer]); Future  Discussed options. She does not want to take a daily medication at present; interested in trial of hydroxyzine PRN anxiety   Discussed resources - she is interested in counseling through [facility] program. Referral placed.    Palpitations - reviewed Holter monitor; patient does feel that her symptoms are likely due to anxiety; agree with having her follow up with  cardiology as well    Need for pneumococcal vaccine  -     (In Office Administered) Pneumococcal Polysaccharide Vaccine (23 Valent) (SQ/IM)    Pulmonary emphysema, unspecified emphysema type  Chronic respiratory failure with hypoxia, on home oxygen therapy  Stable. Following careful precautions re: covid19. Agree with her working from home whenever this is possible.  Work letter given    Essential hypertension - elevated today in setting of anxiety. Continue current medications, discuss with cardiologist at upcoming visit    Health maintenance reviewed with patient.     Follow up in about 4 months (around 11/8/2020) for coordination of care.    Erik Cool MD  Internal Medicine  [facility]  7/8/2020    45 minutes spent with patient with >50% of visit spent counseling patient regarding conditions documented above and planning for care coordination. All questions answered.

## 2020-07-08 NOTE — PATIENT INSTRUCTIONS
Hydroxyzine capsules or tablets  What is this medicine?  HYDROXYZINE (aurea DROX i zeen) is an antihistamine. This medicine is used to treat allergy symptoms. It is also used to treat anxiety and tension. This medicine can be used with other medicines to induce sleep before surgery.  How should I use this medicine?  Take this medicine by mouth with a full glass of water. Follow the directions on the prescription label. You may take this medicine with food or on an empty stomach. Take your medicine at regular intervals. Do not take your medicine more often than directed.  Talk to your pediatrician regarding the use of this medicine in children. Special care may be needed. While this drug may be prescribed for children as young as 6 years of age for selected conditions, precautions do apply.  Patients over 65 years old may have a stronger reaction and need a smaller dose.  What side effects may I notice from receiving this medicine?  Side effects that you should report to your doctor or health care professional as soon as possible:  · fast or irregular heartbeat  · difficulty passing urine  · seizures  · slurred speech or confusion  · tremor  Side effects that usually do not require medical attention (report to your doctor or health care professional if they continue or are bothersome):  · constipation  · drowsiness  · fatigue  · headache  · stomach upset  What may interact with this medicine?  · alcohol  · barbiturate medicines for sleep or seizures  · medicines for colds, allergies  · medicines for depression, anxiety, or emotional disturbances  · medicines for pain  · medicines for sleep  · muscle relaxants  What if I miss a dose?  If you miss a dose, take it as soon as you can. If it is almost time for your next dose, take only that dose. Do not take double or extra doses.  Where should I keep my medicine?  Keep out of the reach of children.  Store at room temperature between 15 and 30 degrees C (59 and 86 degrees  F). Keep container tightly closed. Throw away any unused medicine after the expiration date.  What should I tell my health care provider before I take this medicine?  They need to know if you have any of these conditions:  · any chronic illness  · difficulty passing urine  · glaucoma  · heart disease  · kidney disease  · liver disease  · lung disease  · an unusual or allergic reaction to hydroxyzine, cetirizine, other medicines, foods, dyes, or preservatives  · pregnant or trying to get pregnant  · breast-feeding  What should I watch for while using this medicine?  Tell your doctor or health care professional if your symptoms do not improve.  You may get drowsy or dizzy. Do not drive, use machinery, or do anything that needs mental alertness until you know how this medicine affects you. Do not stand or sit up quickly, especially if you are an older patient. This reduces the risk of dizzy or fainting spells. Alcohol may interfere with the effect of this medicine. Avoid alcoholic drinks.  Your mouth may get dry. Chewing sugarless gum or sucking hard candy, and drinking plenty of water may help. Contact your doctor if the problem does not go away or is severe.  This medicine may cause dry eyes and blurred vision. If you wear contact lenses you may feel some discomfort. Lubricating drops may help. See your eye doctor if the problem does not go away or is severe.  If you are receiving skin tests for allergies, tell your doctor you are using this medicine.  NOTE:This sheet is a summary. It may not cover all possible information. If you have questions about this medicine, talk to your doctor, pharmacist, or health care provider. Copyright© 2017 [employer]

## 2020-07-10 ENCOUNTER — OFFICE VISIT (OUTPATIENT)
Dept: CARDIOLOGY | Facility: CLINIC | Age: 65
End: 2020-07-10
Payer: COMMERCIAL

## 2020-07-10 VITALS
BODY MASS INDEX: 27.55 KG/M2 | HEART RATE: 87 BPM | DIASTOLIC BLOOD PRESSURE: 84 MMHG | HEIGHT: 65 IN | SYSTOLIC BLOOD PRESSURE: 150 MMHG

## 2020-07-10 DIAGNOSIS — I10 ESSENTIAL HYPERTENSION: ICD-10-CM

## 2020-07-10 DIAGNOSIS — I51.81 TAKOTSUBO CARDIOMYOPATHY: ICD-10-CM

## 2020-07-10 DIAGNOSIS — J96.11 CHRONIC RESPIRATORY FAILURE WITH HYPOXIA, ON HOME OXYGEN THERAPY: ICD-10-CM

## 2020-07-10 DIAGNOSIS — R00.2 PALPITATIONS: Primary | ICD-10-CM

## 2020-07-10 DIAGNOSIS — Z99.81 CHRONIC RESPIRATORY FAILURE WITH HYPOXIA, ON HOME OXYGEN THERAPY: ICD-10-CM

## 2020-07-10 PROCEDURE — 99214 OFFICE O/P EST MOD 30 MIN: CPT | Mod: S$GLB,,, | Performed by: INTERNAL MEDICINE

## 2020-07-10 PROCEDURE — 1101F PT FALLS ASSESS-DOCD LE1/YR: CPT | Mod: CPTII,S$GLB,, | Performed by: INTERNAL MEDICINE

## 2020-07-10 PROCEDURE — 3079F PR MOST RECENT DIASTOLIC BLOOD PRESSURE 80-89 MM HG: ICD-10-PCS | Mod: CPTII,S$GLB,, | Performed by: INTERNAL MEDICINE

## 2020-07-10 PROCEDURE — 99999 PR PBB SHADOW E&M-EST. PATIENT-LVL IV: CPT | Mod: PBBFAC,,, | Performed by: INTERNAL MEDICINE

## 2020-07-10 PROCEDURE — 99214 PR OFFICE/OUTPT VISIT, EST, LEVL IV, 30-39 MIN: ICD-10-PCS | Mod: S$GLB,,, | Performed by: INTERNAL MEDICINE

## 2020-07-10 PROCEDURE — 1101F PR PT FALLS ASSESS DOC 0-1 FALLS W/OUT INJ PAST YR: ICD-10-PCS | Mod: CPTII,S$GLB,, | Performed by: INTERNAL MEDICINE

## 2020-07-10 PROCEDURE — 3077F PR MOST RECENT SYSTOLIC BLOOD PRESSURE >= 140 MM HG: ICD-10-PCS | Mod: CPTII,S$GLB,, | Performed by: INTERNAL MEDICINE

## 2020-07-10 PROCEDURE — 3077F SYST BP >= 140 MM HG: CPT | Mod: CPTII,S$GLB,, | Performed by: INTERNAL MEDICINE

## 2020-07-10 PROCEDURE — 3079F DIAST BP 80-89 MM HG: CPT | Mod: CPTII,S$GLB,, | Performed by: INTERNAL MEDICINE

## 2020-07-10 PROCEDURE — 99999 PR PBB SHADOW E&M-EST. PATIENT-LVL IV: ICD-10-PCS | Mod: PBBFAC,,, | Performed by: INTERNAL MEDICINE

## 2020-07-10 PROCEDURE — 3008F BODY MASS INDEX DOCD: CPT | Mod: CPTII,S$GLB,, | Performed by: INTERNAL MEDICINE

## 2020-07-10 PROCEDURE — 3008F PR BODY MASS INDEX (BMI) DOCUMENTED: ICD-10-PCS | Mod: CPTII,S$GLB,, | Performed by: INTERNAL MEDICINE

## 2020-07-10 NOTE — PROGRESS NOTES
Subjective:   Patient ID:  Ana Aguila is a 64 y.o. female who presents for follow-up of Takotsubo cardiomyopathy      HPI:   Patient is a 61 y/o F w/ PMHx Takatsubo cardiomyopathy, HTN, severe COPD- on home O2.  She did have Takatsubo CM several years ago and LV function recovered.     Has been having palps since May  ER visit 6/22 for palpitations. Labs overall normal, BNP and troponins normal, COVID19 negative, CXR without acute changes.  Holter completed 5/20; no events noted in diary (wasn't returned), noted sinus rhythm, HR , occasional PVCs, rare PACs.  However, when she wore the monitor, she didn't have any sxs.        Remote history  Patient was hospitalized at [facility] [place]18 - 11/21 with acute on chronic hypoxic respiratory failure secondary to COPD exacerbation. During her hospitalization she underwent a repeat TTE which demonstrated reduced EF 35%. She was seen by Cardiology in consultation who felt she had recurrence of stress induced cardiomyopathy based on evidence of left ventricle apical ballooning on her echocardiogram and normal coronaries by [facility] in 10/2016. Recommendations were made to optimize medical therapy and so she was started on metoprolol (which she tolerated) and valsartan. Patient was ultimately discharged home in stable condition.           She is in the Dig HTN management program  Recent Readings 7/7/2020 7/7/2020 7/7/2020 7/7/2020 7/5/2020   SBP (mmHg) 141 141 143 143 123   DBP (mmHg) 81 81 83 83 83   Pulse 71 71 69 69 77           Pt requesting to go back to work full duty as .    Echo 11/19/2018:  · This was a limited echo performed by fellow at patients bedside in the emergent setting.  · Moderately decreased left ventricular systolic function. The estimated ejection fraction is 35%, however incomplete endocardial visualization makes precise quantification unobtainable.  · Mild right ventricular enlargement.  · Normal right ventricular systolic  "function.  · Intermediate central venous pressure (8 mm Hg).  · Left ventricular diastolic dysfunction.  · Trivial Pericardial effusion.  · Multiple wall motion abnormalities in the LAD distribution, ddx stress induced vs. ischemic cardiomyopathy         [facility] 10/18/2016:  Diagnostic:          Patient has a right dominant coronary artery.        - Left Main Coronary Artery:             The LM is normal. There is TO 3 flow.     - Left Anterior Descending Artery:             The LAD is normal. There is TO 3 flow. Large wraparound LAD.     - Left Circumflex Artery:             The LCX is normal. There is TO 3 flow.     - Right Coronary Artery:             The RCA is normal. There is TO 3 flow.          Vitals:    07/10/20 0944   BP: (!) 150/84   BP Location: Left arm   Patient Position: Sitting   BP Method: Large (Automatic)   Pulse: 87   Height: 5' 5" (1.651 m)     Body mass index is 27.55 kg/m².  CrCl cannot be calculated (Patient's most recent lab result is older than the maximum 7 days allowed.).    Lab Results   Component Value Date     06/22/2020    K 4.1 06/22/2020     06/22/2020    CO2 24 06/22/2020    BUN 12 06/22/2020    CREATININE 0.8 06/22/2020     06/22/2020    HGBA1C 5.4 10/18/2016    MG 2.1 06/22/2020    AST 20 06/22/2020    ALT 9 (L) 06/22/2020    ALBUMIN 4.4 06/22/2020    PROT 7.9 06/22/2020    BILITOT 0.4 06/22/2020    WBC 11.42 06/22/2020    HGB 13.0 06/22/2020    HCT 42.0 06/22/2020    MCV 89 06/22/2020     06/22/2020    INR 1.2 10/18/2016    TSH 1.139 11/18/2018    CHOL 207 (H) 09/26/2019    HDL 67 09/26/2019    LDLCALC 129.8 09/26/2019    TRIG 51 09/26/2019       Current Outpatient Medications   Medication Sig    albuterol-ipratropium (DUO-NEB) 2.5 mg-0.5 mg/3 mL nebulizer solution TAKE 3 MLS BY NEBULIZER EVERY 6 HOURS AS NEEDED FOR WHEEZING OR SHORTNESS OF BREATH    diphenoxylate-atropine 2.5-0.025 mg (LOMOTIL) 2.5-0.025 mg per tablet Take 1 tablet by mouth 4 " (four) times daily as needed for Diarrhea.    ergocalciferol (ERGOCALCIFEROL) 50,000 unit Cap TAKE 1 CAPSULE BY MOUTH EVERY 7 DAYS    fluticasone-salmeterol diskus inhaler 250-50 mcg INHALE 1 PUFF BY MOUTH EVERY 12 HOURS AS NEEDED    hydrOXYzine HCL (ATARAX) 10 MG Tab Take 1 tablet (10 mg total) by mouth 3 (three) times daily as needed (for anxiety or trouble sleeping).    ibuprofen (ADVIL,MOTRIN) 400 MG tablet TAKE 1 TABLET(400 MG) BY MOUTH EVERY 6 HOURS AS NEEDED    ipratropium (ATROVENT) 0.02 % nebulizer solution UNWRAP AND INHALE THE CONTENTS OF 1 VIAL VIA NEBULIZER UP TO FOUR TIMES DAILY IF NEEDED (RESCUE)    metoprolol succinate (TOPROL-XL) 25 MG 24 hr tablet TAKE 1 TABLET BY MOUTH EVERY DAY    PROAIR HFA 90 mcg/actuation inhaler INHALE 2 PUFFS PO QID PRN FOR COPD     No current facility-administered medications for this visit.        Review of Systems   Constitution: Negative for malaise/fatigue.   Eyes: Negative for visual disturbance.   Cardiovascular: Positive for dyspnea on exertion and palpitations. Negative for chest pain, claudication, irregular heartbeat, near-syncope and orthopnea.   Respiratory: Negative for shortness of breath and sleep disturbances due to breathing.    Musculoskeletal: Negative for muscle cramps, muscle weakness and myalgias.   Gastrointestinal: Negative for abdominal pain and heartburn.   Genitourinary: Negative for nocturia.   Neurological: Negative for difficulty with concentration, excessive daytime sleepiness, light-headedness, loss of balance, paresthesias and vertigo.       Objective:   Physical Exam   Constitutional: She is oriented to person, place, and time. She appears well-developed and well-nourished.   HENT:   Head: Normocephalic and atraumatic.   Pulmonary/Chest: Effort normal. No accessory muscle usage. No respiratory distress.   Abdominal: Normal appearance.   Neurological: She is alert and oriented to person, place, and time.   Skin: Skin is dry.    Psychiatric: She has a normal mood and affect. Her speech is normal and behavior is normal. Judgment and thought content normal. Cognition and memory are normal.       Assessment:     1. Palpitations    2. Essential hypertension    3. Takotsubo cardiomyopathy - resolved    4. Chronic respiratory failure with hypoxia, on home oxygen therapy        Plan:   I suspect her palps are stress related (daughter with CA and furloughed from work).  Discussed option and will pursue an event monitor.      Orders Placed This Encounter   Procedures    Cardiac event monitor

## 2020-07-16 ENCOUNTER — CLINICAL SUPPORT (OUTPATIENT)
Dept: CARDIOLOGY | Facility: HOSPITAL | Age: 65
End: 2020-07-16
Attending: INTERNAL MEDICINE
Payer: COMMERCIAL

## 2020-07-16 ENCOUNTER — TELEPHONE (OUTPATIENT)
Dept: BEHAVIORAL HEALTH | Facility: CLINIC | Age: 65
End: 2020-07-16

## 2020-07-16 DIAGNOSIS — R00.2 PALPITATIONS: ICD-10-CM

## 2020-07-16 PROCEDURE — 93272 ECG/REVIEW INTERPRET ONLY: CPT | Mod: ,,, | Performed by: INTERNAL MEDICINE

## 2020-07-16 PROCEDURE — 93271 ECG/MONITORING AND ANALYSIS: CPT

## 2020-07-16 PROCEDURE — 93272 CARDIAC EVENT MONITOR (CUPID ONLY): ICD-10-PCS | Mod: ,,, | Performed by: INTERNAL MEDICINE

## 2020-07-16 NOTE — PROGRESS NOTES
Behavioral Health Community Health Worker  Initial Assessment  Completed by:  Casie Toth    Date:  7/16/2020    Patient Enrollment in Behavioral Health Program:  · Patient verbalized understanding of Behavioral Health Integration services to include:  · Patient understands that CHW, LCSW, PharmD and consulting Psychiatrist are members of the care team working collaboratively with his/her primary care provider: Yes  · Patient understands that activation of their [facility] patient portal account is required for accessing the full scope of team services: Yes  · Patient understands that some counseling sessions may occur via video: Yes  · Clinic visits with the psychiatrist may be subject to a co-pay based on your insurance: Yes  · Patient consents to enroll in BHI program: Yes    Assessments     Single Item Health Literacy Scale:  · How often do you need to have someone help you read instructions, pamphlets or other written material from your doctor or pharmacy?: Never    Promis 10:  · Promis 10 Responses  · In general, would you say your health is: Good  · In general, would you say your quality of life is: Good  · In general, how would you rate your physical health?: Good  · In general, how would you rate your mental health, including your mood and your ability to think?: Very good  · In general, how would you rate your satisfaction with your social activities and relationships?: Very good  · In general, please rate how well you carry out your usual social activities and roles. (This includes activities at home, at work and in your community, and responsibilities as a parent, child, spouse, employee, friend, etc.): Good  · To what extent are you able to carry out your everyday physical activities such as walking, climbing stairs, carrying groceries, or moving a chair? : Mostly  · In the past 7 days, how often have you been bothered by emotional problems such as feeling anxious, depressed or irritable?:  Sometimes  · In the past 7 days, how would you rate your fatigue on average?: Mild  · In the past 7 days, on a scale of 0 to 10 (where 0 is no pain and 10 is the worst pain imaginable) how would you rate your pain on average?: 3  · Global Physical Health: 12  · Global Mental health Score: 14    Depression PHQ:  PHQ9 2020   Total Score 6         Generalized Anxiety Disorder 7-Item Scale:  GAD7 2020   1. Feeling nervous, anxious, or on edge? 2   2. Not being able to stop or control worrying? 2   3. Worrying too much about different things? 2   4. Trouble relaxing? 2   5. Being so restless that it is hard to sit still? 0   6. Becoming easily annoyed or irritable? 1   7. Feeling afraid as if something awful might happen? 1   TERI-7 Score 10       History     Social History     Socioeconomic History    Marital status: Single     Spouse name: Not on file    Number of children: Not on file    Years of education: Not on file    Highest education level: Not on file   Occupational History    Not on file   Social Needs    Financial resource strain: Not very hard    Food insecurity     Worry: Never true     Inability: Never true    Transportation needs     Medical: No     Non-medical: No   Tobacco Use    Smoking status: Former Smoker     Packs/day: 1.50     Years: 30.00     Pack years: 45.00     Types: Cigarettes     Start date: 1970     Quit date: 1997     Years since quittin.5    Smokeless tobacco: Never Used   Substance and Sexual Activity    Alcohol use: No     Frequency: Never     Drinks per session: Patient refused     Binge frequency: Patient refused     Comment: occaissionally    Drug use: No    Sexual activity: Not Currently     Partners: Male     Birth control/protection: Abstinence   Lifestyle    Physical activity     Days per week: 2 days     Minutes per session: 20 min    Stress: To some extent   Relationships    Social connections     Talks on phone: More than three times a  "week     Gets together: Never     Attends [employer]ec[employer]ism service: Not on file     Active member of club or organization: Yes     Attends meetings of clubs or organizations: Patient refused     Relationship status: Patient refused   Other Topics Concern    Patient feels they ought to cut down on drinking/drug use Not Asked    Patient annoyed by others criticizing their drinking/drug use Not Asked    Patient has felt bad or guilty about drinking/drug use Not Asked    Patient has had a drink/used drugs as an eye opener in the AM Not Asked   Social History Narrative    Lives alone, brother lives in [place]. Brothers and sisters also living elsewhere. Children and grandchildren live in [place].     [facility]    Works as a  with [employer].       Call Summary     Patient was referred to the BHI (Non-opioid) program by Primary Care Provider, Dr. Cool CHW contacted Ana Aguila who reports anxiety  that limits [her] activities of daily living (ADLs).   Patient scored "6" on the PHQ9 and "10" on the TERI 7. Based on these scores patient is eligible for the Behavioral health Integration (Non-opioid) Program. VAISHNAVI completed the intake and scheduled an appointment for patient with Derrick Hsieh LCSW, on Monday July 27 at 3pm.         "

## 2020-07-27 ENCOUNTER — TELEPHONE (OUTPATIENT)
Dept: INTERNAL MEDICINE | Facility: CLINIC | Age: 65
End: 2020-07-27

## 2020-07-27 NOTE — TELEPHONE ENCOUNTER
----- Message from Caryl Huang sent at 7/27/2020  3:59 PM CDT -----  Contact: Ana @ [phone]  Patient calling to speak with nurse and will tell nurse what she want.

## 2020-07-30 ENCOUNTER — HOSPITAL ENCOUNTER (OUTPATIENT)
Dept: RADIOLOGY | Facility: HOSPITAL | Age: 65
Discharge: HOME | End: 2020-07-30
Attending: INTERNAL MEDICINE
Payer: COMMERCIAL

## 2020-07-30 DIAGNOSIS — Z12.31 ENCOUNTER FOR SCREENING MAMMOGRAM FOR MALIGNANT NEOPLASM OF BREAST: ICD-10-CM

## 2020-07-30 PROCEDURE — 77063 MAMMO DIGITAL SCREENING BILAT WITH TOMOSYNTHESIS_CAD: ICD-10-PCS | Mod: 26,,, | Performed by: RADIOLOGY

## 2020-07-30 PROCEDURE — 77067 SCR MAMMO BI INCL CAD: CPT | Mod: TC,PO

## 2020-07-30 PROCEDURE — 77063 BREAST TOMOSYNTHESIS BI: CPT | Mod: 26,,, | Performed by: RADIOLOGY

## 2020-07-30 PROCEDURE — 77067 SCR MAMMO BI INCL CAD: CPT | Mod: 26,,, | Performed by: RADIOLOGY

## 2020-07-30 PROCEDURE — 77067 MAMMO DIGITAL SCREENING BILAT WITH TOMOSYNTHESIS_CAD: ICD-10-PCS | Mod: 26,,, | Performed by: RADIOLOGY

## 2020-08-02 VITALS
HEIGHT: 65 IN | TEMPERATURE: 98 F | DIASTOLIC BLOOD PRESSURE: 92 MMHG | HEART RATE: 96 BPM | WEIGHT: 165.56 LBS | BODY MASS INDEX: 27.58 KG/M2 | SYSTOLIC BLOOD PRESSURE: 158 MMHG | OXYGEN SATURATION: 97 %

## 2020-08-02 PROBLEM — N64.4 BREAST PAIN, LEFT: Status: RESOLVED | Noted: 2020-01-31 | Resolved: 2020-08-02

## 2020-08-02 PROBLEM — F41.9 ANXIETY: Status: ACTIVE | Noted: 2020-08-02

## 2020-08-03 ENCOUNTER — TELEPHONE (OUTPATIENT)
Dept: BEHAVIORAL HEALTH | Facility: CLINIC | Age: 65
End: 2020-08-03

## 2020-08-06 ENCOUNTER — PATIENT OUTREACH (OUTPATIENT)
Dept: OTHER | Facility: OTHER | Age: 65
End: 2020-08-06

## 2020-08-06 NOTE — PROGRESS NOTES
Digital Medicine: Clinician Follow-Up    Patient informed her health  that she reduced dose of metoprolol due to side effects. Patient has started taking 12.5 mg QPM vs 25 mg. She states she was experiencing weakness and feeling like her heart was racing. She feels theses side effects have subsided since cutting her dose in half.    The history is provided by the patient.   Follow-up reason(s): addressing patient questions/concerns.     Hypertension    Readings are trending up due to because she has reduced metoprolol dose to 12.5 mg from 25 mg.  Patient is not experiencing signs/symptoms of hypertension.          Last 5 Patient Entered Readings                                      Current 30 Day Average: 140/84     Recent Readings 8/6/2020 8/6/2020 8/6/2020 8/6/2020 8/5/2020    SBP (mmHg) 137 137 156 156 152    DBP (mmHg) 83 83 94 94 81    Pulse 81 81 83 83 79               Depression Screening  Did not address depression screening.    Sleep Apnea Screening    Did not address sleep apnea screening.     Medication Affordability Screening  Did not address medication affordability screening.     Medication Adherence-Medication adherence was asssessed.    Patient identified the following reasons for non-adherence:  Side effects from medication. Patient states she feels her heart racing and gets weak when taking full dose of metoprolol, but does not feel this way when taking 12.5 mg QPM.            ASSESSMENT(S)  Patients BP average is 140/84 mmHg, which is above goal. Patient's BP goal is less than or equal to 130/80 per 2017 ACC/AHA Hypertension Guidelines.       Hypertension Plan  Additional monitoring needed.  Patient will start taking metoprolol 12.5 mg BID.           Hypertension Medications             metoprolol succinate (TOPROL-XL) 25 MG 24 hr tablet TAKE 1 TABLET BY MOUTH EVERY DAY

## 2020-08-06 NOTE — PROGRESS NOTES
"Digital Medicine: Health  Follow-Up    The history is provided by the patient.             Reason for review: Blood pressure not at goal        Topics Covered on Call: medications    Additional Follow-up details: Ms. Aguila has been doing okay and has been working from home for a few months now. She reports having a heart monitor placed and has questions about metoprolol today.    Patient reports feeling "weak and dizzy" and often feels like she is having palpitations after taking metoprolol. She has been cutting the medication in half for the past 3-4 nights, and states that symptoms has subsided.    She wonders if this is okay to do. Will defer to PharmD for follow up.        Diet-Not assessed          Physical Activity-Not assessed    Medication Adherence-Medication adherence was asssessed.  Patient continue taking medication as prescribed.          Patient cutting meds in half due to possible side effects. Defer to PharmD.  Substance, Sleep, Stress-Not assessed      Additional monitoring needed. Contact made to pharmD to follow up.       Addressed any questions or concerns and patient has my contact information if needed prior to next outreach.     There are no preventive care reminders to display for this patient.    Last 5 Patient Entered Readings                                      Current 30 Day Average: 140/84     Recent Readings 8/6/2020 8/6/2020 8/6/2020 8/6/2020 8/5/2020    SBP (mmHg) 137 137 156 156 152    DBP (mmHg) 83 83 94 94 81    Pulse 81 81 83 83 79               "

## 2020-08-20 ENCOUNTER — TELEPHONE (OUTPATIENT)
Dept: BEHAVIORAL HEALTH | Facility: CLINIC | Age: 65
End: 2020-08-20

## 2020-08-20 ENCOUNTER — TELEPHONE (OUTPATIENT)
Dept: INTERNAL MEDICINE | Facility: CLINIC | Age: 65
End: 2020-08-20

## 2020-08-20 NOTE — TELEPHONE ENCOUNTER
Ultrasound ordered by Dr Sampson 2016- stable findings.  Now following with Dr Cool and liver findings stable

## 2020-08-31 ENCOUNTER — TELEPHONE (OUTPATIENT)
Dept: BEHAVIORAL HEALTH | Facility: CLINIC | Age: 65
End: 2020-08-31

## 2020-08-31 NOTE — PROGRESS NOTES
Please call and inform patient that the event monitor results were completely normal.  At times when there were complaints, there were no noted significant arrhythmiasthat would explain the symptoms.

## 2020-09-05 DIAGNOSIS — E55.9 VITAMIN D DEFICIENCY: ICD-10-CM

## 2020-09-05 NOTE — TELEPHONE ENCOUNTER
No new care gaps identified.  Powered by [employer]. Reference number: [id number]. 9/05/2020 4:43:49 PM CDT

## 2020-09-06 RX ORDER — ERGOCALCIFEROL 1.25 MG/1
CAPSULE ORAL
Qty: 12 CAPSULE | Refills: 1 | Status: SHIPPED | OUTPATIENT
Start: 2020-09-06 | End: 2021-03-01

## 2020-09-06 NOTE — PROGRESS NOTES
Refill Routing Note   Medication(s) are not appropriate for processing by [facility]:       - Outside of protocol           Medication reconciliation completed: No      Automatic Epic Generated Protocol Data:        Requested Prescriptions   Pending Prescriptions Disp Refills    ergocalciferol (ERGOCALCIFEROL) 50,000 unit Cap [Pharmacy Med Name: VITAMIN D2 50,000IU (ERGO) CAP RX] 12 capsule 1     Sig: TAKE 1 CAPSULE BY MOUTH EVERY 7 DAYS       Endocrinology:  Vitamins - Vitamin D Supplementation Passed - 9/5/2020  4:43 PM        Passed - Patient is at least 18 years old        Passed - Hypercalcemia is not present on problem list        Passed - Office visit in past 12 months or future 90 days.     Recent Outpatient Visits            1 month ago Palpitations    [facility] 3[facility] Ferny Gaytan MD    1 month ago Situational stress    [facility] Erik Cool MD    2 months ago Centrilobular emphysema    [facility]reet Dill [facility]    4 months ago Palpitations    [facility] Erik Cool MD    5 months ago [facility]          Future Appointments              In 2 weeks Derrick Hsieh [facility] Behavioral Health Integration, [facility] PC[facility]    In 2 months MD [facility], [facility] PCW                Passed - Ca in normal range and within 360 days     Calcium   Date Value Ref Range Status   06/22/2020 9.8 8.7 - 10.5 mg/dL Final   02/10/2020 9.3 8.7 - 10.5 mg/dL Final   09/26/2019 9.0 8.7 - 10.5 mg/dL Final              Passed - Vitamin D is 20 or above and within 360 days     Vit D, 25-Hydroxy   Date Value Ref Range Status   09/26/2019 30 30 - 96 ng/mL Final     Comment:     Vitamin D deficiency.........<10 ng/mL                              Vitamin D insufficiency......10-29  ng/mL       Vitamin D sufficiency........> or equal to 30 ng/mL  Vitamin D toxicity............>100 ng/mL     01/31/2019 31 30 - 96 ng/mL Final     Comment:     Vitamin D deficiency.........<10 ng/mL                              Vitamin D insufficiency......10-29 ng/mL       Vitamin D sufficiency........> or equal to 30 ng/mL  Vitamin D toxicity............>100 ng/mL     01/24/2017 32 30 - 96 ng/mL Final     Comment:     Vitamin D deficiency.........<10 ng/mL                              Vitamin D insufficiency......10-29 ng/mL       Vitamin D sufficiency........> or equal to 30 ng/mL  Vitamin D toxicity............>100 ng/mL                      Appointments  past 12m or future 3m with PCP    Date Provider   Last Visit   7/8/2020 Erik Cool MD   Next Visit   11/11/2020 Erik Cool MD   ED visits in past 90 days: 1     Note composed:11:33 PM 09/05/2020

## 2020-09-24 ENCOUNTER — TELEPHONE (OUTPATIENT)
Dept: BEHAVIORAL HEALTH | Facility: CLINIC | Age: 65
End: 2020-09-24

## 2020-09-24 NOTE — PROGRESS NOTES
Pt reschedule appointment for (Friday October 2,2020 at 12pm).  Pt would like a reminder call for appointment at 11:30 the day off

## 2020-09-29 ENCOUNTER — TELEPHONE (OUTPATIENT)
Dept: PULMONOLOGY | Facility: CLINIC | Age: 65
End: 2020-09-29

## 2020-09-29 ENCOUNTER — PATIENT MESSAGE (OUTPATIENT)
Dept: INTERNAL MEDICINE | Facility: CLINIC | Age: 65
End: 2020-09-29

## 2020-09-29 NOTE — TELEPHONE ENCOUNTER
----- Message from Susy Silva sent at 9/29/2020  1:31 PM CDT -----  [employer] would like to receive a call back regarding an order for O2. Please contact [employer] to advise.    Contact info   Fax

## 2020-09-29 NOTE — TELEPHONE ENCOUNTER
[employer] is the new insurance company for Mrs Aguila. They are requesting a copy of her Oxygen order and last clinic note be faxed to [phone]. I told Angelina I could do that. Anastacia Chavez LPN.

## 2020-10-02 ENCOUNTER — TELEPHONE (OUTPATIENT)
Dept: BEHAVIORAL HEALTH | Facility: CLINIC | Age: 65
End: 2020-10-02

## 2020-10-02 ENCOUNTER — OFFICE VISIT (OUTPATIENT)
Dept: BEHAVIORAL HEALTH | Facility: CLINIC | Age: 65
End: 2020-10-02
Payer: COMMERCIAL

## 2020-10-02 DIAGNOSIS — F43.9 SITUATIONAL STRESS: ICD-10-CM

## 2020-10-02 DIAGNOSIS — F41.1 GAD (GENERALIZED ANXIETY DISORDER): Primary | ICD-10-CM

## 2020-10-02 DIAGNOSIS — F41.9 ANXIETY: ICD-10-CM

## 2020-10-02 DIAGNOSIS — F43.21 ADJUSTMENT DISORDER WITH DEPRESSED MOOD: ICD-10-CM

## 2020-10-02 PROCEDURE — 90791 PSYCH DIAGNOSTIC EVALUATION: CPT | Mod: 95,,, | Performed by: SOCIAL WORKER

## 2020-10-02 PROCEDURE — 99499 UNLISTED E&M SERVICE: CPT | Mod: 95,,, | Performed by: SOCIAL WORKER

## 2020-10-02 PROCEDURE — 90791 PR PSYCHIATRIC DIAGNOSTIC EVALUATION: ICD-10-PCS | Mod: 95,,, | Performed by: SOCIAL WORKER

## 2020-10-02 PROCEDURE — 99484 PR CARE MGMT SVCS, BEHAVIORAL HEALTH, >= 20 MIN: ICD-10-PCS | Mod: 95,,, | Performed by: SOCIAL WORKER

## 2020-10-02 PROCEDURE — 99484 CARE MGMT SVC BHVL HLTH COND: CPT | Mod: 95,,, | Performed by: SOCIAL WORKER

## 2020-10-02 PROCEDURE — 99499 RISK ADDL DX/OHS AUDIT: ICD-10-PCS | Mod: 95,,, | Performed by: SOCIAL WORKER

## 2020-10-02 NOTE — LETTER
October 5, 2020      Erik Cool MD  [address]  [facility] [address]           [facility]al [facility]  [address]  [facility] [address]  Phone: [phone]  Fax: [phone]          Patient: Ana Aguila   MR Number: [id number]   YOB: 1955   Date of Visit: 10/2/2020       Dear Dr. Erik Cool:    Thank you for referring Ana Aguila to me for evaluation. Attached you will find relevant portions of my assessment and plan of care.    If you have questions, please do not hesitate to call me. I look forward to following Ana Aguila along with you.    Sincerely,    Derrick Hsieh, [facility]    Enclosure  CC:  No Recipients    If you would like to receive this communication electronically, please contact [email or web address] or [phone] to request more information on [employer] Link access.    For providers and/or their staff who would like to refer a patient to [facility], please contact us through our one-stop-shop provider referral line, [facility], at [phone].    If you feel you have received this communication in error or would no longer like to receive these types of communications, please e-mail [email or web address]
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
## 2020-10-02 NOTE — PROGRESS NOTES
CHW called pt to remind her of her appoinment with Derrick Hsieh,LCSW virtual no answer left vm

## 2020-10-05 PROBLEM — F43.22 ADJUSTMENT DISORDER WITH ANXIETY: Status: RESOLVED | Noted: 2019-01-21 | Resolved: 2020-10-05

## 2020-10-05 PROBLEM — F41.1 GAD (GENERALIZED ANXIETY DISORDER): Status: ACTIVE | Noted: 2020-10-05

## 2020-10-05 PROBLEM — F41.9 ANXIETY: Status: RESOLVED | Noted: 2020-08-02 | Resolved: 2020-10-05

## 2020-10-08 NOTE — TELEPHONE ENCOUNTER
No new care gaps identified.  Powered by [employer]. Reference number: [id number]. 10/08/2020 7:04:54 AM   ZACHERYT

## 2020-10-09 RX ORDER — METOPROLOL SUCCINATE 25 MG/1
TABLET, EXTENDED RELEASE ORAL
Qty: 90 TABLET | Refills: 3 | Status: SHIPPED | OUTPATIENT
Start: 2020-10-09 | End: 2021-11-20 | Stop reason: SDUPTHER

## 2020-10-09 NOTE — PROGRESS NOTES
I, Luz Saleh MD, have reviewed the LCSW's history and exam, and I agree with the assessment and plan.  Agree with trying hydroxyzine 10mg PRN anxiety since patient is not interested in trying a daily medication.  She has not yet tried this medication due to fear she will fall.  She would certainly benefit from SSRI.  Will consider if patient agrees and is not improving with individual psychotherapy.    Time: 15 minutes

## 2020-10-09 NOTE — PROGRESS NOTES
Refill Routing Note   Medication(s) are not appropriate for processing by [facility] for the following reason(s):     - Required vitals are abnormal    [facility] actions taken in this encounter: Defer       Medication Therapy Plan: CDMR. bp elevated at LOV(7/20); please advise; defer   Medication reconciliation completed: No   Automatic Epic Generated Protocol Data:        Requested Prescriptions   Pending Prescriptions Disp Refills    metoprolol succinate (TOPROL-XL) 25 MG 24 hr tablet [Pharmacy Med Name: METOPROLOL ER SUCCINATE 25MG TABS] 90 tablet 3     Sig: TAKE 1 TABLET BY MOUTH EVERY DAY       Cardiovascular:  Beta Blockers Failed - 10/8/2020  7:43 PM        Failed - Last BP in normal range within 360 days     BP Readings from Last 3 Encounters:   07/10/20 (!) 150/84   07/08/20 (!) 158/92   07/01/20 (!) 152/76              Passed - Patient is at least 18 years old        Passed - Last Heart Rate in normal range within 360 days     Pulse Readings from Last 3 Encounters:   07/10/20 87   07/08/20 96   07/01/20 94             Passed - Office Visit within last 12 months or future 90 days.     Recent Outpatient Visits            6 days ago TERI (generalized anxiety disorder)    [facility] Derrick Hsieh LCSW    3 months ago Palpitations    [facility] 3rd Floor Ferny Gaytan MD    3 months ago Situational stress    [facility] Erik Cool MD    3 months ago Centrilobular emphysema    [facility] Manpreet Dill [facility]    5 months ago Palpitations    [facility] Erik Cool MD          Future Appointments              Tomorrow [facility] - Primary Care Behavioral Health Integration, Deshawn Castaneda PCW    In 2 weeks [facility] - Primary Care Behavioral Health Integration, Deshawn Castaneda PCW    In 1 month MD [facility] Sherman  [facility]wn Cosby PCW                      Appointments  past 12m or future 3m with PCP    Date Provider   Last Visit   7/8/2020 Erik Cool MD   Next Visit   11/11/2020 Erik Cool MD   ED visits in past 90 days: 0        Note composed:7:45 PM 10/08/2020

## 2020-10-14 ENCOUNTER — PATIENT MESSAGE (OUTPATIENT)
Dept: INTERNAL MEDICINE | Facility: CLINIC | Age: 65
End: 2020-10-14

## 2020-10-14 ENCOUNTER — PATIENT MESSAGE (OUTPATIENT)
Dept: BEHAVIORAL HEALTH | Facility: CLINIC | Age: 65
End: 2020-10-14

## 2020-10-14 ENCOUNTER — TELEPHONE (OUTPATIENT)
Dept: BEHAVIORAL HEALTH | Facility: CLINIC | Age: 65
End: 2020-10-14

## 2020-10-14 NOTE — PROGRESS NOTES
CHW spoke with Ana Aguila pt is okay with her appointment next week with Derrick. Pt stated she is unsure how she miss her appointment last week CHW will give pt a reminder call the day before her next virtual visit

## 2020-10-21 ENCOUNTER — PATIENT OUTREACH (OUTPATIENT)
Dept: OTHER | Facility: OTHER | Age: 65
End: 2020-10-21

## 2020-10-23 ENCOUNTER — TELEPHONE (OUTPATIENT)
Dept: BEHAVIORAL HEALTH | Facility: CLINIC | Age: 65
End: 2020-10-23

## 2020-11-02 ENCOUNTER — PATIENT MESSAGE (OUTPATIENT)
Dept: INTERNAL MEDICINE | Facility: CLINIC | Age: 65
End: 2020-11-02

## 2020-11-02 NOTE — TELEPHONE ENCOUNTER
Spoke with pt, she is having pain in left side, shoulder to waist area. o2 sat 95-96%, heart rate 80-82, no temp, pt has COPE, has slight headache.  Pt went to PT for pulled muscle, left side. Which this helped.     Pt denies shortness of breath  Has tried heat but no relief  Denies coughing. No wheezing.     Pain has been there since yesterday.     Pain is nagging, she will go to urgent care if pain gets worse.

## 2020-11-03 ENCOUNTER — OFFICE VISIT (OUTPATIENT)
Dept: INTERNAL MEDICINE | Facility: CLINIC | Age: 65
End: 2020-11-03
Payer: MEDICARE

## 2020-11-03 ENCOUNTER — IMMUNIZATION (OUTPATIENT)
Dept: INTERNAL MEDICINE | Facility: CLINIC | Age: 65
End: 2020-11-03
Payer: MEDICARE

## 2020-11-03 VITALS
HEART RATE: 45 BPM | BODY MASS INDEX: 27.55 KG/M2 | HEIGHT: 65 IN | SYSTOLIC BLOOD PRESSURE: 190 MMHG | OXYGEN SATURATION: 97 % | DIASTOLIC BLOOD PRESSURE: 108 MMHG

## 2020-11-03 DIAGNOSIS — R10.9 LEFT FLANK PAIN: Primary | ICD-10-CM

## 2020-11-03 DIAGNOSIS — Z23 NEEDS FLU SHOT: ICD-10-CM

## 2020-11-03 DIAGNOSIS — Z23 NEED FOR TETANUS, DIPHTHERIA, AND ACELLULAR PERTUSSIS (TDAP) VACCINE IN PATIENT OF ADOLESCENT AGE OR OLDER: ICD-10-CM

## 2020-11-03 PROCEDURE — 99999 PR PBB SHADOW E&M-EST. PATIENT-LVL II: ICD-10-PCS | Mod: PBBFAC,GC,, | Performed by: INTERNAL MEDICINE

## 2020-11-03 PROCEDURE — 1125F PR PAIN SEVERITY QUANTIFIED, PAIN PRESENT: ICD-10-PCS | Mod: GC,S$GLB,, | Performed by: INTERNAL MEDICINE

## 2020-11-03 PROCEDURE — 3008F PR BODY MASS INDEX (BMI) DOCUMENTED: ICD-10-PCS | Mod: CPTII,GC,S$GLB, | Performed by: INTERNAL MEDICINE

## 2020-11-03 PROCEDURE — 90694 FLU VACCINE - QUADRIVALENT - ADJUVANTED: ICD-10-PCS | Mod: GC,S$GLB,, | Performed by: INTERNAL MEDICINE

## 2020-11-03 PROCEDURE — G0008 FLU VACCINE - QUADRIVALENT - ADJUVANTED: ICD-10-PCS | Mod: GC,S$GLB,, | Performed by: INTERNAL MEDICINE

## 2020-11-03 PROCEDURE — 3077F PR MOST RECENT SYSTOLIC BLOOD PRESSURE >= 140 MM HG: ICD-10-PCS | Mod: CPTII,GC,S$GLB, | Performed by: INTERNAL MEDICINE

## 2020-11-03 PROCEDURE — G0008 ADMIN INFLUENZA VIRUS VAC: HCPCS | Mod: GC,S$GLB,, | Performed by: INTERNAL MEDICINE

## 2020-11-03 PROCEDURE — 3008F BODY MASS INDEX DOCD: CPT | Mod: CPTII,GC,S$GLB, | Performed by: INTERNAL MEDICINE

## 2020-11-03 PROCEDURE — 90694 VACC AIIV4 NO PRSRV 0.5ML IM: CPT | Mod: GC,S$GLB,, | Performed by: INTERNAL MEDICINE

## 2020-11-03 PROCEDURE — 1101F PR PT FALLS ASSESS DOC 0-1 FALLS W/OUT INJ PAST YR: ICD-10-PCS | Mod: CPTII,GC,S$GLB, | Performed by: INTERNAL MEDICINE

## 2020-11-03 PROCEDURE — 99213 PR OFFICE/OUTPT VISIT, EST, LEVL III, 20-29 MIN: ICD-10-PCS | Mod: 25,GC,S$GLB, | Performed by: INTERNAL MEDICINE

## 2020-11-03 PROCEDURE — 99999 PR PBB SHADOW E&M-EST. PATIENT-LVL II: CPT | Mod: PBBFAC,GC,, | Performed by: INTERNAL MEDICINE

## 2020-11-03 PROCEDURE — 1159F PR MEDICATION LIST DOCUMENTED IN MEDICAL RECORD: ICD-10-PCS | Mod: GC,S$GLB,, | Performed by: INTERNAL MEDICINE

## 2020-11-03 PROCEDURE — 3077F SYST BP >= 140 MM HG: CPT | Mod: CPTII,GC,S$GLB, | Performed by: INTERNAL MEDICINE

## 2020-11-03 PROCEDURE — 1159F MED LIST DOCD IN RCRD: CPT | Mod: GC,S$GLB,, | Performed by: INTERNAL MEDICINE

## 2020-11-03 PROCEDURE — 3080F DIAST BP >= 90 MM HG: CPT | Mod: CPTII,GC,S$GLB, | Performed by: INTERNAL MEDICINE

## 2020-11-03 PROCEDURE — 1125F AMNT PAIN NOTED PAIN PRSNT: CPT | Mod: GC,S$GLB,, | Performed by: INTERNAL MEDICINE

## 2020-11-03 PROCEDURE — 99213 OFFICE O/P EST LOW 20 MIN: CPT | Mod: 25,GC,S$GLB, | Performed by: INTERNAL MEDICINE

## 2020-11-03 PROCEDURE — 1101F PT FALLS ASSESS-DOCD LE1/YR: CPT | Mod: CPTII,GC,S$GLB, | Performed by: INTERNAL MEDICINE

## 2020-11-03 PROCEDURE — 3080F PR MOST RECENT DIASTOLIC BLOOD PRESSURE >= 90 MM HG: ICD-10-PCS | Mod: CPTII,GC,S$GLB, | Performed by: INTERNAL MEDICINE

## 2020-11-03 RX ORDER — IBUPROFEN 400 MG/1
400 TABLET ORAL 3 TIMES DAILY
Qty: 15 TABLET | Refills: 0 | Status: SHIPPED | OUTPATIENT
Start: 2020-11-03 | End: 2020-11-08

## 2020-11-03 NOTE — PROGRESS NOTES
INTERNAL MEDICINE RESIDENT CLINIC  CLINIC NOTE    Patient Name: Ana Aguila  YOB: 1955    PRESENTING HISTORY       History of Present Illness:  Ms. Ana Aguila is a 65 y.o. female w/ HTN, HLD, h/o takotsubo cardiomyopathy in the past, severe COPD with home O2 (2 liters), h/o hemicolectomy 2009 due to colonoscopy complication, chronic RUQ discomfort, NAFLD presenting to Urgent Care due to L flank pain.    Patient reports that the pain started last Sunday (2 days ago), it came all of the sudden. She is usually very sedentary as she is O2-dependent from her advanced COPD. Pain started while she was sitting, denies any trauma to the area, she describes the pain as sharp, intensity 7/10, worse when she moves around, it doesn't hurt if she stays still. Denies worsening with inspiration, Denies constipation or diarrhea. Denies history of kidneys stones. Denies urinary symptoms or hematuria.     Review of Systems   Constitutional: Negative for chills, fever and malaise/fatigue.   HENT: Negative for congestion, ear discharge, sinus pain and sore throat.    Eyes: Negative for blurred vision.   Respiratory: Negative for cough and shortness of breath.    Cardiovascular: Negative for chest pain, palpitations, orthopnea, leg swelling and PND.   Gastrointestinal: Negative for abdominal pain, constipation, diarrhea, heartburn, nausea and vomiting.   Genitourinary: Negative for dysuria, flank pain and hematuria.   Musculoskeletal: Negative for joint pain and myalgias.   Skin: Negative for rash.   Neurological: Negative for dizziness, weakness and headaches.   Psychiatric/Behavioral: Negative for depression. The patient is nervous/anxious.          PAST HISTORY:     Past Medical History:   Diagnosis Date    Addiction to drug     Anxiety     Aortic calcification 1/11/2018    Chronic diarrhea     Chronic obstructive pulmonary disease 8/27/2013    Chronic respiratory failure with hypoxia, on home O2 therapy  2018    Colon polyps     COPD (chronic obstructive pulmonary disease)     Depression     Essential hypertension 2013    Former smoker 10/18/2016    Hepatomegaly 2015    Hyperlipidemia     Hypertension     MI (myocardial infarction)     Microscopic hematuria 2017    Panic disorder     Perianal abscess 2018    Reflux 2013    S/P right hemicolectomy 2017    Performed in  after perforation during colonoscopy    Sleep difficulties     Takotsubo cardiomyopathy 10/18/2016    Noted on echo 10/2016, recovered on repeat echo 2016       Past Surgical History:   Procedure Laterality Date    ABDOMINAL SURGERY      APPENDECTOMY      CHOLECYSTECTOMY      open    COLON SURGERY      secondary to perforation after c-scope    COLONOSCOPY      FRACTURE SURGERY      HERNIA REPAIR      HIATAL HERNIA REPAIR      HYSTERECTOMY  1986    Left leg surgery         Family History   Problem Relation Age of Onset    Cancer Mother             Hypertension Mother     Coronary artery disease Father     Retinal detachment Father     Hypertension Sister     Hypertension Brother     Cataracts Maternal Grandmother     Breast cancer Maternal Grandmother         unk age of onset    Abnormal EKG Daughter     Breast cancer Daughter 43        negative genetic testing, unilateral breast ca    Breast cancer Other 44        thinks negative genetic testing, bilat ca    Amblyopia Neg Hx     Blindness Neg Hx     Diabetes Neg Hx     Glaucoma Neg Hx     Macular degeneration Neg Hx     Strabismus Neg Hx     Stroke Neg Hx     Thyroid disease Neg Hx     Colon cancer Neg Hx     Ovarian cancer Neg Hx        Social History     Socioeconomic History    Marital status: Single     Spouse name: Not on file    Number of children: Not on file    Years of education: Not on file    Highest education level: Not on file   Occupational History    Not on file   Social Needs     Financial resource strain: Not very hard    Food insecurity     Worry: Never true     Inability: Never true    Transportation needs     Medical: No     Non-medical: No   Tobacco Use    Smoking status: Former Smoker     Packs/day: 1.50     Years: 30.00     Pack years: 45.00     Types: Cigarettes     Start date: 1970     Quit date: 1997     Years since quittin.8    Smokeless tobacco: Never Used   Substance and Sexual Activity    Alcohol use: No     Frequency: Never     Drinks per session: Patient refused     Binge frequency: Patient refused     Comment: occaissionally    Drug use: No    Sexual activity: Not Currently     Partners: Male     Birth control/protection: Abstinence   Lifestyle    Physical activity     Days per week: 2 days     Minutes per session: 20 min    Stress: To some extent   Relationships    Social connections     Talks on phone: More than three times a week     Gets together: Never     Attends Gnosticist service: Not on file     Active member of club or organization: Yes     Attends meetings of clubs or organizations: Patient refused     Relationship status: Patient refused   Other Topics Concern    Patient feels they ought to cut down on drinking/drug use Not Asked    Patient annoyed by others criticizing their drinking/drug use Not Asked    Patient has felt bad or guilty about drinking/drug use Not Asked    Patient has had a drink/used drugs as an eye opener in the AM Not Asked   Social History Narrative    Lives alone, brother lives in [place]. Brothers and sisters also living elsewhere. Children and grandchildren live in [place].     Lives in 2nd-story apartment.    Works as a  with [employer].       MEDICATIONS & ALLERGIES:     Current Outpatient Medications on File Prior to Visit   Medication Sig    albuterol-ipratropium (DUO-NEB) 2.5 mg-0.5 mg/3 mL nebulizer solution TAKE 3 ML BY NEBULIZER EVERY 6 HOURS AS NEEDED FOR WHEEZING OR SHORTNESS  OF BREATH    diphenoxylate-atropine 2.5-0.025 mg (LOMOTIL) 2.5-0.025 mg per tablet Take 1 tablet by mouth 4 (four) times daily as needed for Diarrhea.    ergocalciferol (ERGOCALCIFEROL) 50,000 unit Cap TAKE 1 CAPSULE BY MOUTH EVERY 7 DAYS    fluticasone-salmeterol diskus inhaler 250-50 mcg INHALE 1 PUFF BY MOUTH EVERY 12 HOURS AS NEEDED    hydrOXYzine HCL (ATARAX) 10 MG Tab Take 1 tablet (10 mg total) by mouth 3 (three) times daily as needed (for anxiety or trouble sleeping).    ibuprofen (ADVIL,MOTRIN) 400 MG tablet TAKE 1 TABLET(400 MG) BY MOUTH EVERY 6 HOURS AS NEEDED    ipratropium (ATROVENT) 0.02 % nebulizer solution UNWRAP AND INHALE THE CONTENTS OF 1 VIAL VIA NEBULIZER UP TO FOUR TIMES DAILY IF NEEDED (RESCUE)    metoprolol succinate (TOPROL-XL) 25 MG 24 hr tablet TAKE 1 TABLET BY MOUTH EVERY DAY    PROAIR HFA 90 mcg/actuation inhaler INHALE 2 PUFFS FOUR TIMES DAILY AS NEEDED FOR COPD     No current facility-administered medications on file prior to visit.        Review of patient's allergies indicates:   Allergen Reactions    Ace inhibitors      cough    Coreg [carvedilol]      Headache     Pseudoephedrine hcl Nausea And Vomiting, Other (See Comments) and Anxiety     JITTERY       OBJECTIVE:   Vital Signs:  There were no vitals filed for this visit.    No results found for this or any previous visit (from the past 24 hour(s)).      Physical Exam  Vitals signs reviewed.   Constitutional:       General: She is not in acute distress.     Appearance: She is well-developed.   HENT:      Head: Normocephalic and atraumatic.   Eyes:      Pupils: Pupils are equal, round, and reactive to light.   Neck:      Musculoskeletal: Normal range of motion and neck supple.      Vascular: No JVD.   Cardiovascular:      Rate and Rhythm: Normal rate and regular rhythm.      Heart sounds: Normal heart sounds. No murmur.   Pulmonary:      Effort: Pulmonary effort is normal. No respiratory distress.      Breath sounds:  Normal breath sounds. No wheezing or rales.   Abdominal:      General: Bowel sounds are normal. There is no distension.      Palpations: Abdomen is soft.      Tenderness: There is abdominal tenderness (L flank on palpation).   Musculoskeletal: Normal range of motion.         General: No deformity.   Skin:     General: Skin is warm.   Neurological:      Mental Status: She is alert and oriented to person, place, and time.           ASSESSMENT & PLAN:      65 y.o. female w/ HTN, HLD, h/o takotsubo cardiomyopathy in the past, severe COPD with home O2 (2 liters), h/o hemicolectomy 2009 due to colonoscopy complication, chronic RUQ discomfort, NAFLD presenting to Urgent Care due to L flank pain.    Left flank pain  No pain with inspiration, no increaed O2 requirements, no cough or hemoptysis (other than baseline cough from her COPD), so unlikely pleuritic. Other differentials include kidney stone, hoewever pt denies hematuria, history of stones, dysuria. Most likely this is musculoskeletal pain from the fact that it is worse with movement and reproducible on light palpation. No obvious signs of cellulitis or any other abnormality.   -     ibuprofen (ADVIL,MOTRIN) 400 MG tablet; Take 1 tablet (400 mg total) by mouth 3 (three) times daily. for 5 days  -     OTC Lidocaine patch. Apply to area 12 h and rest for 12 hours.         -     Will order UA and if hematuria or pyuria will consider imaging of the kidney to rule out kidney stone.     Needs flu shot        -      Gave prescription for flu shot.     Need for tetanus, diphtheria, and acellular pertussis (Tdap) vaccine in patient of adolescent age or older        -      Gave prescription for Tdap- but states will get it once she feels better      Pt refused shingles vaccine at this time.   BP high- pt is in pain at this time. She is part of the [facility] program- she states that always when rechecking it is normal. She will continue to follow with them        Discussed  with Dr. Pollard    RTC as necessary-     Alan Tamayo MD  Internal Medicine PGY-2  [phone]

## 2020-11-03 NOTE — PATIENT INSTRUCTIONS
* Lidocaine patch (over the counter)- Apply to painful area 12 hours, then remove it for 12 hours.      * Ibuprofen 200 mg- Take 2 tablets (400 gm)  3 times daily for 5 days.

## 2020-11-09 ENCOUNTER — TELEPHONE (OUTPATIENT)
Dept: BEHAVIORAL HEALTH | Facility: CLINIC | Age: 65
End: 2020-11-09

## 2020-11-09 NOTE — PROGRESS NOTES
Behavioral Health Community Health Worker  Follow-Up  Completed by:  Casie Toth    Date:  11/9/2020    Patient Enrollment in Behavioral Health Program:  Ted Aguila was enrolled in the Behavioral Health Program on October 2,2020    Assessments     Promis 10:  PROMIS-10 7/16/2020   In general, would you say your health is 3   In general, would you say your quality of life is 3   In general, how would you rate your physical health? 3   In general, how would you rate your mental health, including your mood and your ability to think? 4   In general, how would you rate your satisfaction with your social activities and relationships? 4   In general, please rate how well you carry out your usual social activities and roles. 3   To what extent are you able to carry out your everyday physical activities such as walking, climbing stairs, carrying groceries, or moving a chair?  4   In the past 7 days, how often have you been bothered by emotional problems such as feeling anxious, depressed or irritable? 3   In the past 7 days, how would you rate your fatigue on average? 2   In the past 7 days, on a scale of 0 to 10 (where 0 is no pain and 10 is the worst pain imaginable) how would you rate your pain on average? 3   Global Physical Health 12   Global Mental health Score 14       Depression PHQ:  PHQ9 11/9/2020   Total Score 4       Generalized Anxiety Disorder 7-Item Scale:  GAD7 11/9/2020   1. Feeling nervous, anxious, or on edge? 2   2. Not being able to stop or control worrying? 2   3. Worrying too much about different things? 2   4. Trouble relaxing? 2   5. Being so restless that it is hard to sit still? 0   6. Becoming easily annoyed or irritable? 1   7. Feeling afraid as if something awful might happen? 1   8. If you checked off any problems, how difficult have these problems made it for you to do your work, take care of things at home, or get along with other people? 0   TERI-7 Score 10       Patients' Global  Impression of Change (PGIC) Scale:  Since beginning treatment at this clinic, how would you describe the change (if any) in ACTIVITY LIMITATIONS, SYMPTOMS, EMOTIONS, and OVERALL QUALITY OF LIFE, related to your painful condition?  No Value exists for the : [facility]#42482      In a similar way, please check the number below that matches your degree of change since beginning care at this clinic (Much better (0) - Much Worse (10)): No Value exists for the : [facility]#40311        Much Better                                     No Change                                    Much Worse                        -----------------------------------------------------------------------------                        0       1       2       3       4       5       6       7      8       9      10                     Call Summary     CHW reach out to Ana Aguila to complete her follow up assessment on November 9,2020. Ana Aguila scored a (4) on the PHQ-9 and a (10) on the TERI-7

## 2020-11-10 PROBLEM — M85.80 OSTEOPENIA DETERMINED BY X-RAY: Status: ACTIVE | Noted: 2020-11-10

## 2020-11-11 ENCOUNTER — OFFICE VISIT (OUTPATIENT)
Dept: INTERNAL MEDICINE | Facility: CLINIC | Age: 65
End: 2020-11-11
Payer: MEDICARE

## 2020-11-11 VITALS
HEIGHT: 65 IN | BODY MASS INDEX: 27.92 KG/M2 | OXYGEN SATURATION: 96 % | WEIGHT: 167.56 LBS | SYSTOLIC BLOOD PRESSURE: 160 MMHG | HEART RATE: 90 BPM | DIASTOLIC BLOOD PRESSURE: 90 MMHG

## 2020-11-11 DIAGNOSIS — Z13.220 ENCOUNTER FOR LIPID SCREENING FOR CARDIOVASCULAR DISEASE: ICD-10-CM

## 2020-11-11 DIAGNOSIS — Z00.00 ANNUAL PHYSICAL EXAM: Primary | ICD-10-CM

## 2020-11-11 DIAGNOSIS — K62.5 RECTAL BLEEDING: ICD-10-CM

## 2020-11-11 DIAGNOSIS — Z13.6 ENCOUNTER FOR LIPID SCREENING FOR CARDIOVASCULAR DISEASE: ICD-10-CM

## 2020-11-11 DIAGNOSIS — J43.9 PULMONARY EMPHYSEMA, UNSPECIFIED EMPHYSEMA TYPE: ICD-10-CM

## 2020-11-11 DIAGNOSIS — K52.9 CHRONIC DIARRHEA: ICD-10-CM

## 2020-11-11 DIAGNOSIS — M85.80 OSTEOPENIA DETERMINED BY X-RAY: ICD-10-CM

## 2020-11-11 DIAGNOSIS — R10.9 LEFT FLANK PAIN: ICD-10-CM

## 2020-11-11 DIAGNOSIS — Z78.0 POSTMENOPAUSAL: ICD-10-CM

## 2020-11-11 DIAGNOSIS — F43.21 SITUATIONAL DEPRESSION: ICD-10-CM

## 2020-11-11 DIAGNOSIS — I51.81 TAKOTSUBO CARDIOMYOPATHY: ICD-10-CM

## 2020-11-11 DIAGNOSIS — Z98.0 INTESTINAL BYPASS AND ANASTOMOSIS STATUS: ICD-10-CM

## 2020-11-11 DIAGNOSIS — Z90.49 S/P RIGHT HEMICOLECTOMY: ICD-10-CM

## 2020-11-11 DIAGNOSIS — J96.11 CHRONIC RESPIRATORY FAILURE WITH HYPOXIA, ON HOME OXYGEN THERAPY: ICD-10-CM

## 2020-11-11 DIAGNOSIS — I10 ESSENTIAL HYPERTENSION: ICD-10-CM

## 2020-11-11 DIAGNOSIS — Z99.81 CHRONIC RESPIRATORY FAILURE WITH HYPOXIA, ON HOME OXYGEN THERAPY: ICD-10-CM

## 2020-11-11 DIAGNOSIS — F41.1 GAD (GENERALIZED ANXIETY DISORDER): ICD-10-CM

## 2020-11-11 PROCEDURE — 99214 PR OFFICE/OUTPT VISIT, EST, LEVL IV, 30-39 MIN: ICD-10-PCS | Mod: S$GLB,,, | Performed by: INTERNAL MEDICINE

## 2020-11-11 PROCEDURE — 1126F PR PAIN SEVERITY QUANTIFIED, NO PAIN PRESENT: ICD-10-PCS | Mod: S$GLB,,, | Performed by: INTERNAL MEDICINE

## 2020-11-11 PROCEDURE — 99999 PR PBB SHADOW E&M-EST. PATIENT-LVL V: CPT | Mod: PBBFAC,,, | Performed by: INTERNAL MEDICINE

## 2020-11-11 PROCEDURE — 3080F PR MOST RECENT DIASTOLIC BLOOD PRESSURE >= 90 MM HG: ICD-10-PCS | Mod: CPTII,S$GLB,, | Performed by: INTERNAL MEDICINE

## 2020-11-11 PROCEDURE — 99499 RISK ADDL DX/OHS AUDIT: ICD-10-PCS | Mod: S$GLB,,, | Performed by: INTERNAL MEDICINE

## 2020-11-11 PROCEDURE — 3077F PR MOST RECENT SYSTOLIC BLOOD PRESSURE >= 140 MM HG: ICD-10-PCS | Mod: CPTII,S$GLB,, | Performed by: INTERNAL MEDICINE

## 2020-11-11 PROCEDURE — 3008F BODY MASS INDEX DOCD: CPT | Mod: CPTII,S$GLB,, | Performed by: INTERNAL MEDICINE

## 2020-11-11 PROCEDURE — 3288F FALL RISK ASSESSMENT DOCD: CPT | Mod: CPTII,S$GLB,, | Performed by: INTERNAL MEDICINE

## 2020-11-11 PROCEDURE — 99214 OFFICE O/P EST MOD 30 MIN: CPT | Mod: S$GLB,,, | Performed by: INTERNAL MEDICINE

## 2020-11-11 PROCEDURE — 1101F PR PT FALLS ASSESS DOC 0-1 FALLS W/OUT INJ PAST YR: ICD-10-PCS | Mod: CPTII,S$GLB,, | Performed by: INTERNAL MEDICINE

## 2020-11-11 PROCEDURE — 1126F AMNT PAIN NOTED NONE PRSNT: CPT | Mod: S$GLB,,, | Performed by: INTERNAL MEDICINE

## 2020-11-11 PROCEDURE — 99499 UNLISTED E&M SERVICE: CPT | Mod: S$GLB,,, | Performed by: INTERNAL MEDICINE

## 2020-11-11 PROCEDURE — 1101F PT FALLS ASSESS-DOCD LE1/YR: CPT | Mod: CPTII,S$GLB,, | Performed by: INTERNAL MEDICINE

## 2020-11-11 PROCEDURE — 3077F SYST BP >= 140 MM HG: CPT | Mod: CPTII,S$GLB,, | Performed by: INTERNAL MEDICINE

## 2020-11-11 PROCEDURE — 99999 PR PBB SHADOW E&M-EST. PATIENT-LVL V: ICD-10-PCS | Mod: PBBFAC,,, | Performed by: INTERNAL MEDICINE

## 2020-11-11 PROCEDURE — 3288F PR FALLS RISK ASSESSMENT DOCUMENTED: ICD-10-PCS | Mod: CPTII,S$GLB,, | Performed by: INTERNAL MEDICINE

## 2020-11-11 PROCEDURE — 3080F DIAST BP >= 90 MM HG: CPT | Mod: CPTII,S$GLB,, | Performed by: INTERNAL MEDICINE

## 2020-11-11 PROCEDURE — 3008F PR BODY MASS INDEX (BMI) DOCUMENTED: ICD-10-PCS | Mod: CPTII,S$GLB,, | Performed by: INTERNAL MEDICINE

## 2020-11-14 ENCOUNTER — PATIENT MESSAGE (OUTPATIENT)
Dept: INTERNAL MEDICINE | Facility: CLINIC | Age: 65
End: 2020-11-14

## 2020-11-16 ENCOUNTER — PATIENT MESSAGE (OUTPATIENT)
Dept: INTERNAL MEDICINE | Facility: CLINIC | Age: 65
End: 2020-11-16

## 2020-11-18 ENCOUNTER — OFFICE VISIT (OUTPATIENT)
Dept: INTERNAL MEDICINE | Facility: CLINIC | Age: 65
End: 2020-11-18
Payer: MEDICARE

## 2020-11-18 ENCOUNTER — PATIENT MESSAGE (OUTPATIENT)
Dept: INTERNAL MEDICINE | Facility: CLINIC | Age: 65
End: 2020-11-18

## 2020-11-18 VITALS — RESPIRATION RATE: 16 BRPM

## 2020-11-18 DIAGNOSIS — M85.80 OSTEOPENIA DETERMINED BY X-RAY: ICD-10-CM

## 2020-11-18 DIAGNOSIS — I10 ESSENTIAL HYPERTENSION: ICD-10-CM

## 2020-11-18 DIAGNOSIS — R91.1 SOLITARY PULMONARY NODULE: ICD-10-CM

## 2020-11-18 DIAGNOSIS — Z99.81 CHRONIC RESPIRATORY FAILURE WITH HYPOXIA, ON HOME OXYGEN THERAPY: ICD-10-CM

## 2020-11-18 DIAGNOSIS — Z90.49 S/P RIGHT HEMICOLECTOMY: ICD-10-CM

## 2020-11-18 DIAGNOSIS — J43.9 PULMONARY EMPHYSEMA, UNSPECIFIED EMPHYSEMA TYPE: ICD-10-CM

## 2020-11-18 DIAGNOSIS — E53.8 B12 DEFICIENCY: Primary | ICD-10-CM

## 2020-11-18 DIAGNOSIS — K76.0 FATTY LIVER DISEASE, NONALCOHOLIC: ICD-10-CM

## 2020-11-18 DIAGNOSIS — J96.11 CHRONIC RESPIRATORY FAILURE WITH HYPOXIA, ON HOME OXYGEN THERAPY: ICD-10-CM

## 2020-11-18 PROCEDURE — 99499 RISK ADDL DX/OHS AUDIT: ICD-10-PCS | Mod: 95,,, | Performed by: INTERNAL MEDICINE

## 2020-11-18 PROCEDURE — 1159F MED LIST DOCD IN RCRD: CPT | Mod: 95,,, | Performed by: INTERNAL MEDICINE

## 2020-11-18 PROCEDURE — 99214 PR OFFICE/OUTPT VISIT, EST, LEVL IV, 30-39 MIN: ICD-10-PCS | Mod: 95,,, | Performed by: INTERNAL MEDICINE

## 2020-11-18 PROCEDURE — 99214 OFFICE O/P EST MOD 30 MIN: CPT | Mod: 95,,, | Performed by: INTERNAL MEDICINE

## 2020-11-18 PROCEDURE — 99499 UNLISTED E&M SERVICE: CPT | Mod: 95,,, | Performed by: INTERNAL MEDICINE

## 2020-11-18 PROCEDURE — 1159F PR MEDICATION LIST DOCUMENTED IN MEDICAL RECORD: ICD-10-PCS | Mod: 95,,, | Performed by: INTERNAL MEDICINE

## 2020-11-18 NOTE — TELEPHONE ENCOUNTER
The pt would like to know if she can take  what type of B complex should she take.      Please advise,

## 2020-11-18 NOTE — TELEPHONE ENCOUNTER
"It's a multivitamin called a "B-complex" vitamin -- these generally contain vitamins B1, B2, B6 along with B12.  I have no specific recommendation as to brand.  "

## 2020-11-18 NOTE — PROGRESS NOTES
Subjective:       Patient ID: Ana Aguila is a 65 y.o. female.    Chief Complaint: No chief complaint on file.    The patient location is: home ([place])  The chief complaint leading to consultation is: lab review, low B12    Visit type: audiovisual    Face to Face time with patient: 30 min  45 minutes of total time spent on the encounter, which includes face to face time and non-face to face time preparing to see the patient (eg, review of tests), Obtaining and/or reviewing separately obtained history, Documenting clinical information in the electronic or other health record, Independently interpreting results (not separately reported) and communicating results to the patient/family/caregiver, or Care coordination (not separately reported).     Each patient to whom he or she provides medical services by telemedicine is:  (1) informed of the relationship between the physician and patient and the respective role of any other health care provider with respect to management of the patient; and (2) notified that he or she may decline to receive medical services by telemedicine and may withdraw from such care at any time.    Notes:     HPI  66 y/o woman with HTN, HLD, h/o takotsubo cardiomyopathy in the past, severe COPD with home O2, h/o hemicolectomy 2009 due to colonoscopy complication, chronic RUQ discomfort, NAFLD here for virtual visit to review recent lab results.    On labs 11/11/20:  CBC, CMP overall normal.  Lipid panel with HDL 82,   Vitamin D 31  Vitamin B12 163 (low) - checked due to h/o hemicolectomy.  Normal UA  Has not yet had DXA done.    HTN - following with Digital HTN program  Reports usually at or near goal range at home  Previously on HCTZ + potassium, also previously on an ARB  Currently only on toprol 25mg    Stress-induced cardiomyopathy (recent recurrence), HTN, HLD - following with cardiology as well as Digital HTN  Had event monitor for palpitations this summer - no significant  abnormality  Scheduled for follow up next month.  The 10-year ASCVD risk score (Malcom TENISHA Jr., et al., 2013) is: 18%    Values used to calculate the score:      Age: 65 years      Sex: Female      Is Non- : Yes      Diabetic: No      Tobacco smoker: No      Systolic Blood Pressure: 160 mmHg      Is BP treated: Yes      HDL Cholesterol: 82 mg/dL      Total Cholesterol: 251 mg/dL  [facility] with normal coronary arteries in 2016  Has not been able to tolerate statin in the past, does not want to start now but will discuss again with her cardiologist.    COPD on home O2 - follows with Dr Dill.  On advair, PRN albuterol, atrovent  No increase in cough, no decrease in oxygen levels    NAFLD - LFTs normal on last check    Osteopenia noted on DEXA previously, due for repeat this year  On high-dose vitamin D - level at low end of normal range with this.    H/o R hemicolectomy due to complication from routine colonoscopy in 2009, +chronic diarrhea with this, has been prescribed lomotil PRN  Chronic diarrhea improved with stopping HCTZ and starting toprol -- now tends to have much less frequent BM    Review of Systems   Constitutional: Negative for activity change and unexpected weight change.   HENT: Negative for hearing loss, rhinorrhea and trouble swallowing.    Eyes: Negative for discharge and visual disturbance.   Respiratory: Negative for chest tightness and wheezing. Cough: no acute change. Shortness of breath: chronic, no change.    Cardiovascular: Negative for chest pain and palpitations.   Gastrointestinal: Positive for constipation (sometimes). Negative for abdominal pain, blood in stool, diarrhea and vomiting.   Endocrine: Negative for polydipsia and polyuria.   Genitourinary: Negative for difficulty urinating, dysuria, hematuria and menstrual problem.   Musculoskeletal: Positive for arthralgias. Negative for joint swelling and neck pain.   Skin: Negative.    Neurological: Positive for headaches.  Negative for weakness. Numbness: does report numbness/paresthesias sometimes (L foot / toes especially)   Psychiatric/Behavioral: Negative for confusion and dysphoric mood.         Past medical history, surgical history, and family medical history reviewed and updated as appropriate.    Medications and allergies reviewed.     Objective:          Vitals:    11/18/20 1102   Resp: 16     There is no height or weight on file to calculate BMI.  Physical Exam  Vitals signs reviewed.   Constitutional:       General: She is not in acute distress.     Appearance: She is well-developed.   Neck:      Musculoskeletal: Neck supple.   Pulmonary:      Effort: Pulmonary effort is normal. No respiratory distress.      Comments: Wearing NC for O2  Neurological:      Mental Status: She is alert.      Comments: Alert, oriented, conversant. No focal neuro deficits noted on limited video exam   Psychiatric:         Behavior: Behavior normal.       Lab Results   Component Value Date    WBC 8.97 11/11/2020    HGB 12.7 11/11/2020    HCT 41.2 11/11/2020     11/11/2020    CHOL 251 (H) 11/11/2020    TRIG 76 11/11/2020    HDL 82 (H) 11/11/2020    ALT 9 (L) 11/11/2020    AST 19 11/11/2020     11/11/2020    K 4.1 11/11/2020     11/11/2020    CREATININE 0.7 11/11/2020    BUN 13 11/11/2020    CO2 27 11/11/2020    TSH 1.139 11/18/2018    INR 1.2 10/18/2016    HGBA1C 5.4 10/18/2016       Assessment:       1. B12 deficiency    2. S/P right hemicolectomy    3. Solitary pulmonary nodule    4. Pulmonary emphysema, unspecified emphysema type    5. Chronic respiratory failure with hypoxia, on home oxygen therapy    6. Essential hypertension    7. Fatty liver disease, nonalcoholic    8. Osteopenia determined by x-ray        Plan:   Diagnoses and all orders for this visit:    B12 deficiency  -     Vitamin B12; Future  -     Methylmalonic Acid, Serum; Future  Recommended start B12 + B-complex supplement daily, recheck in 1 month  Discussed  - had R hemicolectomy and per her report may have had part of terminal ileum removed as well. If levels do not increase with oral supplement will need B12 injections  S/P right hemicolectomy    Solitary pulmonary nodule  -     CT Chest Without Contrast; Future  On review, noted that CT ordered for surveillance by Dr Dill last year was cancelled. Reviewed and re-ordered    Pulmonary emphysema, unspecified emphysema type  Chronic respiratory failure with hypoxia, on home oxygen therapy  Stable    Essential hypertension  Stable, reviewed lab results    Fatty liver disease, nonalcoholic  LFTs normal    Osteopenia determined by x-ray  Does need to schedule for DEXA    Health maintenance reviewed with patient.     Will schedule virtual visit to review lab results after these are done in 1 month.  Follow up in about 6 months (around 5/18/2021) for coordination of care.    Erik Cool MD  Internal Medicine  [facility]  11/18/2020

## 2020-11-20 ENCOUNTER — PATIENT MESSAGE (OUTPATIENT)
Dept: INTERNAL MEDICINE | Facility: CLINIC | Age: 65
End: 2020-11-20

## 2020-11-28 ENCOUNTER — HOSPITAL ENCOUNTER (OUTPATIENT)
Dept: RADIOLOGY | Facility: HOSPITAL | Age: 65
Discharge: HOME | End: 2020-11-28
Attending: INTERNAL MEDICINE
Payer: MEDICARE

## 2020-11-28 DIAGNOSIS — R91.1 SOLITARY PULMONARY NODULE: ICD-10-CM

## 2020-11-28 PROCEDURE — 71250 CT THORAX DX C-: CPT | Mod: 26,,, | Performed by: RADIOLOGY

## 2020-11-28 PROCEDURE — 71250 CT THORAX DX C-: CPT | Mod: TC

## 2020-11-28 PROCEDURE — 71250 CT CHEST WITHOUT CONTRAST: ICD-10-PCS | Mod: 26,,, | Performed by: RADIOLOGY

## 2020-12-02 ENCOUNTER — PATIENT OUTREACH (OUTPATIENT)
Dept: ADMINISTRATIVE | Facility: OTHER | Age: 65
End: 2020-12-02

## 2020-12-04 ENCOUNTER — OFFICE VISIT (OUTPATIENT)
Dept: CARDIOLOGY | Facility: CLINIC | Age: 65
End: 2020-12-04
Payer: MEDICARE

## 2020-12-04 DIAGNOSIS — I70.0 AORTIC CALCIFICATION: ICD-10-CM

## 2020-12-04 DIAGNOSIS — I51.81 TAKOTSUBO CARDIOMYOPATHY: ICD-10-CM

## 2020-12-04 DIAGNOSIS — I25.10 CORONARY ARTERY CALCIFICATION SEEN ON CT SCAN: ICD-10-CM

## 2020-12-04 DIAGNOSIS — I10 ESSENTIAL HYPERTENSION: Primary | ICD-10-CM

## 2020-12-04 PROCEDURE — 99499 UNLISTED E&M SERVICE: CPT | Mod: 95,,, | Performed by: INTERNAL MEDICINE

## 2020-12-04 PROCEDURE — 99214 OFFICE O/P EST MOD 30 MIN: CPT | Mod: 95,,, | Performed by: INTERNAL MEDICINE

## 2020-12-04 PROCEDURE — 1159F PR MEDICATION LIST DOCUMENTED IN MEDICAL RECORD: ICD-10-PCS | Mod: 95,,, | Performed by: INTERNAL MEDICINE

## 2020-12-04 PROCEDURE — 99214 PR OFFICE/OUTPT VISIT, EST, LEVL IV, 30-39 MIN: ICD-10-PCS | Mod: 95,,, | Performed by: INTERNAL MEDICINE

## 2020-12-04 PROCEDURE — 99499 RISK ADDL DX/OHS AUDIT: ICD-10-PCS | Mod: 95,,, | Performed by: INTERNAL MEDICINE

## 2020-12-04 PROCEDURE — 1159F MED LIST DOCD IN RCRD: CPT | Mod: 95,,, | Performed by: INTERNAL MEDICINE

## 2020-12-04 RX ORDER — ROSUVASTATIN CALCIUM 10 MG/1
10 TABLET, COATED ORAL DAILY
Qty: 90 TABLET | Refills: 3 | Status: SHIPPED | OUTPATIENT
Start: 2020-12-04 | End: 2021-07-27 | Stop reason: SDUPTHER

## 2020-12-04 NOTE — PROGRESS NOTES
Subjective:   Patient ID:  Ana Aguila is a 65 y.o. female who presents for follow-up of No chief complaint on file.      Assessment:     1. Essential hypertension    2. Takotsubo cardiomyopathy - resolved    3. Aortic calcification    4. Coronary artery calcification seen on CT scan        Plan:   From a cardiac standpoint, pt is doing well and is clinically stable. Pts BP's are at goal. Pt does not require any cardiac testing at this time  Has Ca of coronaries on CT and recent LDL~150.  Will start rosuva 10 mg and check labs in ~ 6-12 wks.      Orders Placed This Encounter   Procedures    Comprehensive Metabolic Panel    Lipid Panel     The patient location is: home  The chief complaint leading to consultation is: HTN    Visit type: audiovisual    Face to Face time with patient: 20 mintues  30 minutes of total time spent on the encounter, which includes face to face time and non-face to face time preparing to see the patient (eg, review of tests), Obtaining and/or reviewing separately obtained history, Documenting clinical information in the electronic or other health record, Independently interpreting results (not separately reported) and communicating results to the patient/family/caregiver, or Care coordination (not separately reported).         Each patient to whom he or she provides medical services by telemedicine is:  (1) informed of the relationship between the physician and patient and the respective role of any other health care provider with respect to management of the patient; and (2) notified that he or she may decline to receive medical services by telemedicine and may withdraw from such care at any time.    Notes:       ___________________________________________________________________________________________    HPI:   Patient is a 66 y/o F w/ PMHx Takatsubo cardiomyopathy, HTN, severe COPD- on home O2.  She did have Takatsubo CM several years ago and LV function recovered.     Patient was  "[place] [facility] [place] with acute on chronic hypoxic respiratory failure secondary to COPD exacerbation. During her hospitalization she underwent a repeat TTE which demonstrated reduced EF 35%. She was seen by Cardiology in consultation who felt she had recurrence of stress induced cardiomyopathy based on evidence of left ventricle apical ballooning on her echocardiogram and normal coronaries by [facility] in 10/2016. Recommendations were made to optimize medical therapy and so she was started on metoprolol (which she tolerated) and ARB Patient was ultimately discharged home in stable condition. Repeat echo  demonstrated normal LV function.    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  The aorta maintains normal caliber, contour and course. Mild coronary artery calcification, patchy aortic atherosclerotic calcifications.    [facility] with "normal arteries" in 2016       Results for orders placed during the hospital encounter of 03/08/19   Transthoracic echo (TTE) complete (Cupid Only)    Narrative · Normal left ventricular systolic function. The estimated ejection   fraction is 60%  · Normal LV diastolic function.  · Normal right ventricular systolic function.  · The estimated PA systolic pressure is 30 mm Hg  · Normal central venous pressure (3 mm Hg).         No results found for this or any previous visit.     Last 5 Patient Entered Readings                                      Current 30 Day Average: 137/83     Recent Readings 11/30/2020 11/30/2020 11/29/2020 11/29/2020 11/29/2020    SBP (mmHg) 138 138 132 132 145    DBP (mmHg) 83 83 73 73 76    Pulse 81 81 81 81 81           There were no vitals filed for this visit.  There is no height or weight on file to calculate BMI.  CrCl cannot be calculated " (Patient's most recent lab result is older than the maximum 7 days allowed.).    Lab Results   Component Value Date     11/11/2020    K 4.1 11/11/2020     11/11/2020    CO2 27 11/11/2020    BUN 13 11/11/2020    CREATININE 0.7 11/11/2020    GLU 88 11/11/2020    HGBA1C 5.4 10/18/2016    MG 2.1 06/22/2020    AST 19 11/11/2020    ALT 9 (L) 11/11/2020    ALBUMIN 4.0 11/11/2020    PROT 7.1 11/11/2020    BILITOT 0.5 11/11/2020    WBC 8.97 11/11/2020    HGB 12.7 11/11/2020    HCT 41.2 11/11/2020    MCV 93 11/11/2020     11/11/2020    INR 1.2 10/18/2016    TSH 1.139 11/18/2018    CHOL 251 (H) 11/11/2020    HDL 82 (H) 11/11/2020    LDLCALC 153.8 11/11/2020    TRIG 76 11/11/2020       Current Outpatient Medications   Medication Sig    albuterol-ipratropium (DUO-NEB) 2.5 mg-0.5 mg/3 mL nebulizer solution TAKE 3 ML BY NEBULIZER EVERY 6 HOURS AS NEEDED FOR WHEEZING OR SHORTNESS OF BREATH    diphenoxylate-atropine 2.5-0.025 mg (LOMOTIL) 2.5-0.025 mg per tablet Take 1 tablet by mouth 4 (four) times daily as needed for Diarrhea.    ergocalciferol (ERGOCALCIFEROL) 50,000 unit Cap TAKE 1 CAPSULE BY MOUTH EVERY 7 DAYS    fluticasone-salmeterol diskus inhaler 250-50 mcg INHALE 1 PUFF BY MOUTH EVERY 12 HOURS AS NEEDED    hydrOXYzine HCL (ATARAX) 10 MG Tab Take 1 tablet (10 mg total) by mouth 3 (three) times daily as needed (for anxiety or trouble sleeping).    ipratropium (ATROVENT) 0.02 % nebulizer solution UNWRAP AND INHALE THE CONTENTS OF 1 VIAL VIA NEBULIZER UP TO FOUR TIMES DAILY IF NEEDED (RESCUE)    metoprolol succinate (TOPROL-XL) 25 MG 24 hr tablet TAKE 1 TABLET BY MOUTH EVERY DAY    PROAIR HFA 90 mcg/actuation inhaler INHALE 2 PUFFS FOUR TIMES DAILY AS NEEDED FOR COPD    rosuvastatin (CRESTOR) 10 MG tablet Take 1 tablet (10 mg total) by mouth once daily.     No current facility-administered medications for this visit.        Review of Systems   Constitution: Negative for decreased appetite,  malaise/fatigue, weight gain and weight loss.   Eyes: Negative for visual disturbance.   Cardiovascular: Positive for dyspnea on exertion. Negative for chest pain, claudication, irregular heartbeat, orthopnea, palpitations, paroxysmal nocturnal dyspnea and syncope.   Respiratory: Positive for shortness of breath. Negative for cough and snoring.    Skin: Negative for rash.   Musculoskeletal: Negative for arthritis, muscle cramps, muscle weakness and myalgias.   Gastrointestinal: Negative for abdominal pain, anorexia, change in bowel habit and nausea.   Genitourinary: Negative for dysuria and frequency.   Neurological: Negative for excessive daytime sleepiness, dizziness, headaches, loss of balance, numbness and weakness.   Psychiatric/Behavioral: Negative for depression.       Objective:   Physical Exam   Constitutional: She is oriented to person, place, and time. She appears well-developed and well-nourished.   HENT:   Head: Normocephalic and atraumatic.   Pulmonary/Chest: Effort normal. No accessory muscle usage. No respiratory distress.   Abdominal: Normal appearance.   Neurological: She is alert and oriented to person, place, and time.   Skin: Skin is dry.   Psychiatric: She has a normal mood and affect. Her speech is normal and behavior is normal. Judgment and thought content normal. Cognition and memory are normal.

## 2020-12-19 ENCOUNTER — LAB VISIT (OUTPATIENT)
Dept: LAB | Facility: HOSPITAL | Age: 65
End: 2020-12-19
Attending: INTERNAL MEDICINE
Payer: MEDICARE

## 2020-12-19 DIAGNOSIS — E53.8 B12 DEFICIENCY: ICD-10-CM

## 2020-12-19 LAB — VIT B12 SERPL-MCNC: 508 PG/ML (ref 210–950)

## 2020-12-19 PROCEDURE — 82607 VITAMIN B-12: CPT

## 2020-12-19 PROCEDURE — 83921 ORGANIC ACID SINGLE QUANT: CPT

## 2020-12-19 PROCEDURE — 36415 COLL VENOUS BLD VENIPUNCTURE: CPT

## 2020-12-24 LAB — METHYLMALONATE SERPL-SCNC: 0.18 UMOL/L

## 2021-01-02 ENCOUNTER — PATIENT MESSAGE (OUTPATIENT)
Dept: INTERNAL MEDICINE | Facility: CLINIC | Age: 66
End: 2021-01-02

## 2021-01-07 ENCOUNTER — OFFICE VISIT (OUTPATIENT)
Dept: INTERNAL MEDICINE | Facility: CLINIC | Age: 66
End: 2021-01-07
Payer: MEDICARE

## 2021-01-07 DIAGNOSIS — I70.0 AORTIC CALCIFICATION: ICD-10-CM

## 2021-01-07 DIAGNOSIS — Z99.81 CHRONIC RESPIRATORY FAILURE WITH HYPOXIA, ON HOME OXYGEN THERAPY: ICD-10-CM

## 2021-01-07 DIAGNOSIS — Z90.49 S/P RIGHT HEMICOLECTOMY: ICD-10-CM

## 2021-01-07 DIAGNOSIS — J43.9 PULMONARY EMPHYSEMA, UNSPECIFIED EMPHYSEMA TYPE: ICD-10-CM

## 2021-01-07 DIAGNOSIS — E53.8 VITAMIN B12 DEFICIENCY DUE TO INTESTINAL MALABSORPTION: Primary | ICD-10-CM

## 2021-01-07 DIAGNOSIS — I10 ESSENTIAL HYPERTENSION: ICD-10-CM

## 2021-01-07 DIAGNOSIS — K90.9 VITAMIN B12 DEFICIENCY DUE TO INTESTINAL MALABSORPTION: Primary | ICD-10-CM

## 2021-01-07 DIAGNOSIS — I51.81 TAKOTSUBO CARDIOMYOPATHY: ICD-10-CM

## 2021-01-07 DIAGNOSIS — R10.11 RUQ DISCOMFORT: ICD-10-CM

## 2021-01-07 DIAGNOSIS — J96.11 CHRONIC RESPIRATORY FAILURE WITH HYPOXIA, ON HOME OXYGEN THERAPY: ICD-10-CM

## 2021-01-07 PROCEDURE — 99214 PR OFFICE/OUTPT VISIT, EST, LEVL IV, 30-39 MIN: ICD-10-PCS | Mod: 95,,, | Performed by: INTERNAL MEDICINE

## 2021-01-07 PROCEDURE — 1159F PR MEDICATION LIST DOCUMENTED IN MEDICAL RECORD: ICD-10-PCS | Mod: 95,,, | Performed by: INTERNAL MEDICINE

## 2021-01-07 PROCEDURE — 99499 UNLISTED E&M SERVICE: CPT | Mod: 95,,, | Performed by: INTERNAL MEDICINE

## 2021-01-07 PROCEDURE — 1159F MED LIST DOCD IN RCRD: CPT | Mod: 95,,, | Performed by: INTERNAL MEDICINE

## 2021-01-07 PROCEDURE — 99499 RISK ADDL DX/OHS AUDIT: ICD-10-PCS | Mod: 95,,, | Performed by: INTERNAL MEDICINE

## 2021-01-07 PROCEDURE — 99214 OFFICE O/P EST MOD 30 MIN: CPT | Mod: 95,,, | Performed by: INTERNAL MEDICINE

## 2021-01-11 ENCOUNTER — PATIENT OUTREACH (OUTPATIENT)
Dept: ADMINISTRATIVE | Facility: OTHER | Age: 66
End: 2021-01-11

## 2021-01-15 ENCOUNTER — PATIENT MESSAGE (OUTPATIENT)
Dept: INTERNAL MEDICINE | Facility: CLINIC | Age: 66
End: 2021-01-15

## 2021-01-19 ENCOUNTER — LAB VISIT (OUTPATIENT)
Dept: LAB | Facility: HOSPITAL | Age: 66
End: 2021-01-19
Attending: INTERNAL MEDICINE
Payer: MEDICARE

## 2021-01-19 DIAGNOSIS — I70.0 AORTIC CALCIFICATION: ICD-10-CM

## 2021-01-19 LAB
ALBUMIN SERPL BCP-MCNC: 4.1 G/DL (ref 3.5–5.2)
ALP SERPL-CCNC: 60 U/L (ref 55–135)
ALT SERPL W/O P-5'-P-CCNC: 14 U/L (ref 10–44)
ANION GAP SERPL CALC-SCNC: 14 MMOL/L (ref 8–16)
AST SERPL-CCNC: 26 U/L (ref 10–40)
BILIRUB SERPL-MCNC: 0.4 MG/DL (ref 0.1–1)
BUN SERPL-MCNC: 11 MG/DL (ref 8–23)
CALCIUM SERPL-MCNC: 9.1 MG/DL (ref 8.7–10.5)
CHLORIDE SERPL-SCNC: 106 MMOL/L (ref 95–110)
CHOLEST SERPL-MCNC: 149 MG/DL (ref 120–199)
CHOLEST/HDLC SERPL: 2.1 {RATIO} (ref 2–5)
CO2 SERPL-SCNC: 25 MMOL/L (ref 23–29)
CREAT SERPL-MCNC: 0.8 MG/DL (ref 0.5–1.4)
EST. GFR  (AFRICAN AMERICAN): >60 ML/MIN/1.73 M^2
EST. GFR  (NON AFRICAN AMERICAN): >60 ML/MIN/1.73 M^2
GLUCOSE SERPL-MCNC: 89 MG/DL (ref 70–110)
HDLC SERPL-MCNC: 72 MG/DL (ref 40–75)
HDLC SERPL: 48.3 % (ref 20–50)
LDLC SERPL CALC-MCNC: 65.4 MG/DL (ref 63–159)
NONHDLC SERPL-MCNC: 77 MG/DL
POTASSIUM SERPL-SCNC: 3.8 MMOL/L (ref 3.5–5.1)
PROT SERPL-MCNC: 7.2 G/DL (ref 6–8.4)
SODIUM SERPL-SCNC: 145 MMOL/L (ref 136–145)
TRIGL SERPL-MCNC: 58 MG/DL (ref 30–150)

## 2021-01-19 PROCEDURE — 36415 COLL VENOUS BLD VENIPUNCTURE: CPT

## 2021-01-19 PROCEDURE — 80053 COMPREHEN METABOLIC PANEL: CPT

## 2021-01-19 PROCEDURE — 80061 LIPID PANEL: CPT

## 2021-01-20 ENCOUNTER — PATIENT MESSAGE (OUTPATIENT)
Dept: CARDIOLOGY | Facility: CLINIC | Age: 66
End: 2021-01-20

## 2021-02-03 ENCOUNTER — PATIENT MESSAGE (OUTPATIENT)
Dept: INTERNAL MEDICINE | Facility: CLINIC | Age: 66
End: 2021-02-03

## 2021-02-03 PROBLEM — K90.9 VITAMIN B12 DEFICIENCY DUE TO INTESTINAL MALABSORPTION: Status: ACTIVE | Noted: 2020-11-18

## 2021-02-03 RX ORDER — ASPIRIN 81 MG/1
81 TABLET ORAL DAILY
COMMUNITY
End: 2023-01-09 | Stop reason: ALTCHOICE

## 2021-02-04 ENCOUNTER — PATIENT MESSAGE (OUTPATIENT)
Dept: INTERNAL MEDICINE | Facility: CLINIC | Age: 66
End: 2021-02-04

## 2021-02-05 ENCOUNTER — TELEPHONE (OUTPATIENT)
Dept: INTERNAL MEDICINE | Facility: CLINIC | Age: 66
End: 2021-02-05

## 2021-02-05 ENCOUNTER — OFFICE VISIT (OUTPATIENT)
Dept: INTERNAL MEDICINE | Facility: CLINIC | Age: 66
End: 2021-02-05
Payer: MEDICARE

## 2021-02-05 DIAGNOSIS — I10 ESSENTIAL HYPERTENSION: ICD-10-CM

## 2021-02-05 DIAGNOSIS — J96.11 CHRONIC RESPIRATORY FAILURE WITH HYPOXIA, ON HOME OXYGEN THERAPY: Primary | ICD-10-CM

## 2021-02-05 DIAGNOSIS — Z99.81 CHRONIC RESPIRATORY FAILURE WITH HYPOXIA, ON HOME OXYGEN THERAPY: Primary | ICD-10-CM

## 2021-02-05 DIAGNOSIS — Z71.85 VACCINE COUNSELING: ICD-10-CM

## 2021-02-05 PROCEDURE — 99213 PR OFFICE/OUTPT VISIT, EST, LEVL III, 20-29 MIN: ICD-10-PCS | Mod: 95,,, | Performed by: INTERNAL MEDICINE

## 2021-02-05 PROCEDURE — 1159F MED LIST DOCD IN RCRD: CPT | Mod: 95,,, | Performed by: INTERNAL MEDICINE

## 2021-02-05 PROCEDURE — 99213 OFFICE O/P EST LOW 20 MIN: CPT | Mod: 95,,, | Performed by: INTERNAL MEDICINE

## 2021-02-05 PROCEDURE — 1159F PR MEDICATION LIST DOCUMENTED IN MEDICAL RECORD: ICD-10-PCS | Mod: 95,,, | Performed by: INTERNAL MEDICINE

## 2021-02-18 ENCOUNTER — IMMUNIZATION (OUTPATIENT)
Dept: INTERNAL MEDICINE | Facility: CLINIC | Age: 66
End: 2021-02-18
Payer: MEDICARE

## 2021-02-18 DIAGNOSIS — Z23 NEED FOR VACCINATION: Primary | ICD-10-CM

## 2021-02-18 PROCEDURE — 91300 COVID-19, MRNA, LNP-S, PF, 30 MCG/0.3 ML DOSE VACCINE: CPT | Mod: PBBFAC | Performed by: INTERNAL MEDICINE

## 2021-03-01 DIAGNOSIS — E55.9 VITAMIN D DEFICIENCY: ICD-10-CM

## 2021-03-01 RX ORDER — ERGOCALCIFEROL 1.25 MG/1
CAPSULE ORAL
Qty: 12 CAPSULE | Refills: 1 | Status: SHIPPED | OUTPATIENT
Start: 2021-03-01 | End: 2021-07-27 | Stop reason: SDUPTHER

## 2021-03-11 ENCOUNTER — IMMUNIZATION (OUTPATIENT)
Dept: INTERNAL MEDICINE | Facility: CLINIC | Age: 66
End: 2021-03-11
Payer: MEDICARE

## 2021-03-11 DIAGNOSIS — Z23 NEED FOR VACCINATION: Primary | ICD-10-CM

## 2021-03-11 PROCEDURE — 0002A COVID-19, MRNA, LNP-S, PF, 30 MCG/0.3 ML DOSE VACCINE: CPT | Mod: PBBFAC | Performed by: INTERNAL MEDICINE

## 2021-03-11 PROCEDURE — 91300 COVID-19, MRNA, LNP-S, PF, 30 MCG/0.3 ML DOSE VACCINE: CPT | Mod: PBBFAC | Performed by: INTERNAL MEDICINE

## 2021-05-03 ENCOUNTER — PATIENT MESSAGE (OUTPATIENT)
Dept: INTERNAL MEDICINE | Facility: CLINIC | Age: 66
End: 2021-05-03

## 2021-05-04 ENCOUNTER — PATIENT OUTREACH (OUTPATIENT)
Dept: ADMINISTRATIVE | Facility: OTHER | Age: 66
End: 2021-05-04

## 2021-05-04 DIAGNOSIS — J43.2 CENTRILOBULAR EMPHYSEMA: Primary | ICD-10-CM

## 2021-05-05 ENCOUNTER — HOSPITAL ENCOUNTER (OUTPATIENT)
Dept: RADIOLOGY | Facility: HOSPITAL | Age: 66
Discharge: HOME | End: 2021-05-05
Attending: INTERNAL MEDICINE
Payer: MEDICARE

## 2021-05-05 ENCOUNTER — OFFICE VISIT (OUTPATIENT)
Dept: PULMONOLOGY | Facility: CLINIC | Age: 66
End: 2021-05-05
Payer: MEDICARE

## 2021-05-05 VITALS
HEIGHT: 65 IN | SYSTOLIC BLOOD PRESSURE: 158 MMHG | BODY MASS INDEX: 27.88 KG/M2 | DIASTOLIC BLOOD PRESSURE: 82 MMHG | WEIGHT: 167.31 LBS

## 2021-05-05 DIAGNOSIS — J44.1 COPD EXACERBATION: Primary | ICD-10-CM

## 2021-05-05 DIAGNOSIS — J43.2 CENTRILOBULAR EMPHYSEMA: ICD-10-CM

## 2021-05-05 PROCEDURE — 99999 PR PBB SHADOW E&M-EST. PATIENT-LVL III: CPT | Mod: PBBFAC,,, | Performed by: INTERNAL MEDICINE

## 2021-05-05 PROCEDURE — 3288F FALL RISK ASSESSMENT DOCD: CPT | Mod: CPTII,S$GLB,, | Performed by: INTERNAL MEDICINE

## 2021-05-05 PROCEDURE — 1159F MED LIST DOCD IN RCRD: CPT | Mod: S$GLB,,, | Performed by: INTERNAL MEDICINE

## 2021-05-05 PROCEDURE — 3008F PR BODY MASS INDEX (BMI) DOCUMENTED: ICD-10-PCS | Mod: CPTII,S$GLB,, | Performed by: INTERNAL MEDICINE

## 2021-05-05 PROCEDURE — 1125F AMNT PAIN NOTED PAIN PRSNT: CPT | Mod: S$GLB,,, | Performed by: INTERNAL MEDICINE

## 2021-05-05 PROCEDURE — 99214 OFFICE O/P EST MOD 30 MIN: CPT | Mod: S$GLB,,, | Performed by: INTERNAL MEDICINE

## 2021-05-05 PROCEDURE — 71046 XR CHEST PA AND LATERAL: ICD-10-PCS | Mod: 26,,, | Performed by: RADIOLOGY

## 2021-05-05 PROCEDURE — 3008F BODY MASS INDEX DOCD: CPT | Mod: CPTII,S$GLB,, | Performed by: INTERNAL MEDICINE

## 2021-05-05 PROCEDURE — 99214 PR OFFICE/OUTPT VISIT, EST, LEVL IV, 30-39 MIN: ICD-10-PCS | Mod: S$GLB,,, | Performed by: INTERNAL MEDICINE

## 2021-05-05 PROCEDURE — 3288F PR FALLS RISK ASSESSMENT DOCUMENTED: ICD-10-PCS | Mod: CPTII,S$GLB,, | Performed by: INTERNAL MEDICINE

## 2021-05-05 PROCEDURE — 1101F PR PT FALLS ASSESS DOC 0-1 FALLS W/OUT INJ PAST YR: ICD-10-PCS | Mod: CPTII,S$GLB,, | Performed by: INTERNAL MEDICINE

## 2021-05-05 PROCEDURE — 1125F PR PAIN SEVERITY QUANTIFIED, PAIN PRESENT: ICD-10-PCS | Mod: S$GLB,,, | Performed by: INTERNAL MEDICINE

## 2021-05-05 PROCEDURE — 1101F PT FALLS ASSESS-DOCD LE1/YR: CPT | Mod: CPTII,S$GLB,, | Performed by: INTERNAL MEDICINE

## 2021-05-05 PROCEDURE — 71046 X-RAY EXAM CHEST 2 VIEWS: CPT | Mod: 26,,, | Performed by: RADIOLOGY

## 2021-05-05 PROCEDURE — 99499 UNLISTED E&M SERVICE: CPT | Mod: S$GLB,,, | Performed by: INTERNAL MEDICINE

## 2021-05-05 PROCEDURE — 1159F PR MEDICATION LIST DOCUMENTED IN MEDICAL RECORD: ICD-10-PCS | Mod: S$GLB,,, | Performed by: INTERNAL MEDICINE

## 2021-05-05 PROCEDURE — 99999 PR PBB SHADOW E&M-EST. PATIENT-LVL III: ICD-10-PCS | Mod: PBBFAC,,, | Performed by: INTERNAL MEDICINE

## 2021-05-05 PROCEDURE — 99499 RISK ADDL DX/OHS AUDIT: ICD-10-PCS | Mod: S$GLB,,, | Performed by: INTERNAL MEDICINE

## 2021-05-05 PROCEDURE — 71046 X-RAY EXAM CHEST 2 VIEWS: CPT | Mod: TC,FY

## 2021-05-05 RX ORDER — PREDNISONE 10 MG/1
TABLET ORAL
Qty: 24 TABLET | Refills: 0 | Status: SHIPPED | OUTPATIENT
Start: 2021-05-05 | End: 2021-07-27

## 2021-06-03 ENCOUNTER — TELEPHONE (OUTPATIENT)
Dept: INTERNAL MEDICINE | Facility: CLINIC | Age: 66
End: 2021-06-03

## 2021-07-15 ENCOUNTER — PATIENT MESSAGE (OUTPATIENT)
Dept: INTERNAL MEDICINE | Facility: CLINIC | Age: 66
End: 2021-07-15

## 2021-07-23 DIAGNOSIS — J44.1 COPD EXACERBATION: Primary | ICD-10-CM

## 2021-07-27 ENCOUNTER — LAB VISIT (OUTPATIENT)
Dept: LAB | Facility: HOSPITAL | Age: 66
End: 2021-07-27
Payer: MEDICARE

## 2021-07-27 ENCOUNTER — OFFICE VISIT (OUTPATIENT)
Dept: INTERNAL MEDICINE | Facility: CLINIC | Age: 66
End: 2021-07-27
Payer: MEDICARE

## 2021-07-27 VITALS
HEART RATE: 87 BPM | BODY MASS INDEX: 27.82 KG/M2 | HEIGHT: 65 IN | WEIGHT: 167 LBS | SYSTOLIC BLOOD PRESSURE: 166 MMHG | OXYGEN SATURATION: 96 % | DIASTOLIC BLOOD PRESSURE: 94 MMHG

## 2021-07-27 DIAGNOSIS — E53.8 VITAMIN B12 DEFICIENCY DUE TO INTESTINAL MALABSORPTION: ICD-10-CM

## 2021-07-27 DIAGNOSIS — E78.2 MIXED HYPERLIPIDEMIA: ICD-10-CM

## 2021-07-27 DIAGNOSIS — K90.9 VITAMIN B12 DEFICIENCY DUE TO INTESTINAL MALABSORPTION: ICD-10-CM

## 2021-07-27 DIAGNOSIS — Z12.31 ENCOUNTER FOR SCREENING MAMMOGRAM FOR MALIGNANT NEOPLASM OF BREAST: ICD-10-CM

## 2021-07-27 DIAGNOSIS — I10 ESSENTIAL HYPERTENSION: ICD-10-CM

## 2021-07-27 DIAGNOSIS — Z12.11 COLON CANCER SCREENING: ICD-10-CM

## 2021-07-27 DIAGNOSIS — I70.0 AORTIC CALCIFICATION: ICD-10-CM

## 2021-07-27 DIAGNOSIS — E66.3 OVERWEIGHT (BMI 25.0-29.9): ICD-10-CM

## 2021-07-27 DIAGNOSIS — E55.9 VITAMIN D DEFICIENCY: ICD-10-CM

## 2021-07-27 DIAGNOSIS — J96.11 CHRONIC RESPIRATORY FAILURE WITH HYPOXIA, ON HOME OXYGEN THERAPY: Primary | ICD-10-CM

## 2021-07-27 DIAGNOSIS — J44.9 CHRONIC OBSTRUCTIVE PULMONARY DISEASE, UNSPECIFIED COPD TYPE: ICD-10-CM

## 2021-07-27 DIAGNOSIS — I25.10 CORONARY ARTERY CALCIFICATION SEEN ON CT SCAN: ICD-10-CM

## 2021-07-27 DIAGNOSIS — Z78.0 POSTMENOPAUSAL ESTROGEN DEFICIENCY: ICD-10-CM

## 2021-07-27 DIAGNOSIS — Z99.81 CHRONIC RESPIRATORY FAILURE WITH HYPOXIA, ON HOME OXYGEN THERAPY: Primary | ICD-10-CM

## 2021-07-27 LAB
25(OH)D3+25(OH)D2 SERPL-MCNC: 37 NG/ML (ref 30–96)
ALBUMIN SERPL BCP-MCNC: 3.9 G/DL (ref 3.5–5.2)
ALP SERPL-CCNC: 58 U/L (ref 55–135)
ALT SERPL W/O P-5'-P-CCNC: 8 U/L (ref 10–44)
ANION GAP SERPL CALC-SCNC: 10 MMOL/L (ref 8–16)
AST SERPL-CCNC: 18 U/L (ref 10–40)
BASOPHILS # BLD AUTO: 0.03 K/UL (ref 0–0.2)
BASOPHILS NFR BLD: 0.4 % (ref 0–1.9)
BILIRUB SERPL-MCNC: 0.5 MG/DL (ref 0.1–1)
BUN SERPL-MCNC: 15 MG/DL (ref 8–23)
CALCIUM SERPL-MCNC: 9.5 MG/DL (ref 8.7–10.5)
CHLORIDE SERPL-SCNC: 108 MMOL/L (ref 95–110)
CHOLEST SERPL-MCNC: 154 MG/DL (ref 120–199)
CHOLEST/HDLC SERPL: 2.2 {RATIO} (ref 2–5)
CO2 SERPL-SCNC: 27 MMOL/L (ref 23–29)
CREAT SERPL-MCNC: 0.8 MG/DL (ref 0.5–1.4)
DIFFERENTIAL METHOD: ABNORMAL
EOSINOPHIL # BLD AUTO: 0.1 K/UL (ref 0–0.5)
EOSINOPHIL NFR BLD: 1.7 % (ref 0–8)
ERYTHROCYTE [DISTWIDTH] IN BLOOD BY AUTOMATED COUNT: 14.9 % (ref 11.5–14.5)
EST. GFR  (AFRICAN AMERICAN): >60 ML/MIN/1.73 M^2
EST. GFR  (NON AFRICAN AMERICAN): >60 ML/MIN/1.73 M^2
GLUCOSE SERPL-MCNC: 88 MG/DL (ref 70–110)
HCT VFR BLD AUTO: 40.3 % (ref 37–48.5)
HDLC SERPL-MCNC: 70 MG/DL (ref 40–75)
HDLC SERPL: 45.5 % (ref 20–50)
HGB BLD-MCNC: 12.7 G/DL (ref 12–16)
IMM GRANULOCYTES # BLD AUTO: 0.02 K/UL (ref 0–0.04)
IMM GRANULOCYTES NFR BLD AUTO: 0.2 % (ref 0–0.5)
LDLC SERPL CALC-MCNC: 72 MG/DL (ref 63–159)
LYMPHOCYTES # BLD AUTO: 1.3 K/UL (ref 1–4.8)
LYMPHOCYTES NFR BLD: 15.7 % (ref 18–48)
MCH RBC QN AUTO: 28.9 PG (ref 27–31)
MCHC RBC AUTO-ENTMCNC: 31.5 G/DL (ref 32–36)
MCV RBC AUTO: 92 FL (ref 82–98)
MONOCYTES # BLD AUTO: 0.4 K/UL (ref 0.3–1)
MONOCYTES NFR BLD: 4.6 % (ref 4–15)
NEUTROPHILS # BLD AUTO: 6.2 K/UL (ref 1.8–7.7)
NEUTROPHILS NFR BLD: 77.4 % (ref 38–73)
NONHDLC SERPL-MCNC: 84 MG/DL
NRBC BLD-RTO: 0 /100 WBC
PLATELET # BLD AUTO: 261 K/UL (ref 150–450)
PMV BLD AUTO: 10.6 FL (ref 9.2–12.9)
POTASSIUM SERPL-SCNC: 3.9 MMOL/L (ref 3.5–5.1)
PROT SERPL-MCNC: 7.1 G/DL (ref 6–8.4)
RBC # BLD AUTO: 4.39 M/UL (ref 4–5.4)
SODIUM SERPL-SCNC: 145 MMOL/L (ref 136–145)
TRIGL SERPL-MCNC: 60 MG/DL (ref 30–150)
TSH SERPL DL<=0.005 MIU/L-ACNC: 2.26 UIU/ML (ref 0.4–4)
WBC # BLD AUTO: 8.02 K/UL (ref 3.9–12.7)

## 2021-07-27 PROCEDURE — 99214 OFFICE O/P EST MOD 30 MIN: CPT | Mod: S$GLB,,, | Performed by: NURSE PRACTITIONER

## 2021-07-27 PROCEDURE — 1126F PR PAIN SEVERITY QUANTIFIED, NO PAIN PRESENT: ICD-10-PCS | Mod: CPTII,S$GLB,, | Performed by: NURSE PRACTITIONER

## 2021-07-27 PROCEDURE — 1126F AMNT PAIN NOTED NONE PRSNT: CPT | Mod: CPTII,S$GLB,, | Performed by: NURSE PRACTITIONER

## 2021-07-27 PROCEDURE — 3080F PR MOST RECENT DIASTOLIC BLOOD PRESSURE >= 90 MM HG: ICD-10-PCS | Mod: CPTII,S$GLB,, | Performed by: NURSE PRACTITIONER

## 2021-07-27 PROCEDURE — 1101F PR PT FALLS ASSESS DOC 0-1 FALLS W/OUT INJ PAST YR: ICD-10-PCS | Mod: CPTII,S$GLB,, | Performed by: NURSE PRACTITIONER

## 2021-07-27 PROCEDURE — 36415 COLL VENOUS BLD VENIPUNCTURE: CPT | Performed by: NURSE PRACTITIONER

## 2021-07-27 PROCEDURE — 82607 VITAMIN B-12: CPT | Performed by: NURSE PRACTITIONER

## 2021-07-27 PROCEDURE — 99999 PR PBB SHADOW E&M-EST. PATIENT-LVL IV: CPT | Mod: PBBFAC,,, | Performed by: NURSE PRACTITIONER

## 2021-07-27 PROCEDURE — 3077F SYST BP >= 140 MM HG: CPT | Mod: CPTII,S$GLB,, | Performed by: NURSE PRACTITIONER

## 2021-07-27 PROCEDURE — 3288F FALL RISK ASSESSMENT DOCD: CPT | Mod: CPTII,S$GLB,, | Performed by: NURSE PRACTITIONER

## 2021-07-27 PROCEDURE — 1101F PT FALLS ASSESS-DOCD LE1/YR: CPT | Mod: CPTII,S$GLB,, | Performed by: NURSE PRACTITIONER

## 2021-07-27 PROCEDURE — 1159F PR MEDICATION LIST DOCUMENTED IN MEDICAL RECORD: ICD-10-PCS | Mod: CPTII,S$GLB,, | Performed by: NURSE PRACTITIONER

## 2021-07-27 PROCEDURE — 3008F PR BODY MASS INDEX (BMI) DOCUMENTED: ICD-10-PCS | Mod: CPTII,S$GLB,, | Performed by: NURSE PRACTITIONER

## 2021-07-27 PROCEDURE — 82306 VITAMIN D 25 HYDROXY: CPT | Performed by: NURSE PRACTITIONER

## 2021-07-27 PROCEDURE — 99214 PR OFFICE/OUTPT VISIT, EST, LEVL IV, 30-39 MIN: ICD-10-PCS | Mod: S$GLB,,, | Performed by: NURSE PRACTITIONER

## 2021-07-27 PROCEDURE — 3080F DIAST BP >= 90 MM HG: CPT | Mod: CPTII,S$GLB,, | Performed by: NURSE PRACTITIONER

## 2021-07-27 PROCEDURE — 1160F RVW MEDS BY RX/DR IN RCRD: CPT | Mod: CPTII,S$GLB,, | Performed by: NURSE PRACTITIONER

## 2021-07-27 PROCEDURE — 3008F BODY MASS INDEX DOCD: CPT | Mod: CPTII,S$GLB,, | Performed by: NURSE PRACTITIONER

## 2021-07-27 PROCEDURE — 1159F MED LIST DOCD IN RCRD: CPT | Mod: CPTII,S$GLB,, | Performed by: NURSE PRACTITIONER

## 2021-07-27 PROCEDURE — 1160F PR REVIEW ALL MEDS BY PRESCRIBER/CLIN PHARMACIST DOCUMENTED: ICD-10-PCS | Mod: CPTII,S$GLB,, | Performed by: NURSE PRACTITIONER

## 2021-07-27 PROCEDURE — 99999 PR PBB SHADOW E&M-EST. PATIENT-LVL IV: ICD-10-PCS | Mod: PBBFAC,,, | Performed by: NURSE PRACTITIONER

## 2021-07-27 PROCEDURE — 80053 COMPREHEN METABOLIC PANEL: CPT | Performed by: NURSE PRACTITIONER

## 2021-07-27 PROCEDURE — 3288F PR FALLS RISK ASSESSMENT DOCUMENTED: ICD-10-PCS | Mod: CPTII,S$GLB,, | Performed by: NURSE PRACTITIONER

## 2021-07-27 PROCEDURE — 3077F PR MOST RECENT SYSTOLIC BLOOD PRESSURE >= 140 MM HG: ICD-10-PCS | Mod: CPTII,S$GLB,, | Performed by: NURSE PRACTITIONER

## 2021-07-27 PROCEDURE — 84443 ASSAY THYROID STIM HORMONE: CPT | Performed by: NURSE PRACTITIONER

## 2021-07-27 PROCEDURE — 80061 LIPID PANEL: CPT | Performed by: NURSE PRACTITIONER

## 2021-07-27 PROCEDURE — 85025 COMPLETE CBC W/AUTO DIFF WBC: CPT | Performed by: NURSE PRACTITIONER

## 2021-07-27 RX ORDER — IPRATROPIUM BROMIDE 0.5 MG/2.5ML
SOLUTION RESPIRATORY (INHALATION)
Qty: 62.5 ML | Refills: 0 | Status: SHIPPED | OUTPATIENT
Start: 2021-07-27 | End: 2021-09-02

## 2021-07-27 RX ORDER — ERGOCALCIFEROL 1.25 MG/1
50000 CAPSULE ORAL
Qty: 12 CAPSULE | Refills: 1 | Status: SHIPPED | OUTPATIENT
Start: 2021-07-27 | End: 2022-04-11

## 2021-07-27 RX ORDER — ROSUVASTATIN CALCIUM 10 MG/1
10 TABLET, COATED ORAL DAILY
Qty: 90 TABLET | Refills: 3 | Status: SHIPPED | OUTPATIENT
Start: 2021-07-27 | End: 2022-05-20 | Stop reason: SDUPTHER

## 2021-07-27 RX ORDER — ALBUTEROL SULFATE 90 UG/1
AEROSOL, METERED RESPIRATORY (INHALATION)
Qty: 8.5 G | Refills: 12 | Status: SHIPPED | OUTPATIENT
Start: 2021-07-27 | End: 2021-12-22 | Stop reason: SDUPTHER

## 2021-07-28 ENCOUNTER — PATIENT MESSAGE (OUTPATIENT)
Dept: INTERNAL MEDICINE | Facility: CLINIC | Age: 66
End: 2021-07-28

## 2021-07-28 LAB — VIT B12 SERPL-MCNC: 585 PG/ML (ref 210–950)

## 2021-08-01 ENCOUNTER — HOSPITAL ENCOUNTER (OUTPATIENT)
Dept: RADIOLOGY | Facility: HOSPITAL | Age: 66
Discharge: HOME | End: 2021-08-01
Attending: NURSE PRACTITIONER
Payer: MEDICARE

## 2021-08-01 DIAGNOSIS — Z12.31 ENCOUNTER FOR SCREENING MAMMOGRAM FOR MALIGNANT NEOPLASM OF BREAST: ICD-10-CM

## 2021-08-01 PROCEDURE — 77063 MAMMO DIGITAL SCREENING BILAT WITH TOMO: ICD-10-PCS | Mod: 26,,, | Performed by: RADIOLOGY

## 2021-08-01 PROCEDURE — 77067 MAMMO DIGITAL SCREENING BILAT WITH TOMO: ICD-10-PCS | Mod: 26,,, | Performed by: RADIOLOGY

## 2021-08-01 PROCEDURE — 77067 SCR MAMMO BI INCL CAD: CPT | Mod: TC

## 2021-08-01 PROCEDURE — 77067 SCR MAMMO BI INCL CAD: CPT | Mod: 26,,, | Performed by: RADIOLOGY

## 2021-08-01 PROCEDURE — 77063 BREAST TOMOSYNTHESIS BI: CPT | Mod: 26,,, | Performed by: RADIOLOGY

## 2021-08-11 ENCOUNTER — OFFICE VISIT (OUTPATIENT)
Dept: PULMONOLOGY | Facility: CLINIC | Age: 66
End: 2021-08-11
Payer: MEDICARE

## 2021-08-11 ENCOUNTER — HOSPITAL ENCOUNTER (OUTPATIENT)
Dept: PULMONOLOGY | Facility: CLINIC | Age: 66
Discharge: HOME | End: 2021-08-11
Payer: MEDICARE

## 2021-08-11 VITALS
WEIGHT: 167 LBS | HEIGHT: 65 IN | WEIGHT: 169 LBS | HEART RATE: 88 BPM | BODY MASS INDEX: 28.16 KG/M2 | BODY MASS INDEX: 27.82 KG/M2 | DIASTOLIC BLOOD PRESSURE: 80 MMHG | HEIGHT: 65 IN | OXYGEN SATURATION: 98 % | SYSTOLIC BLOOD PRESSURE: 132 MMHG

## 2021-08-11 DIAGNOSIS — J43.2 CENTRILOBULAR EMPHYSEMA: ICD-10-CM

## 2021-08-11 DIAGNOSIS — Z13.9 SCREENING PROCEDURE: Primary | ICD-10-CM

## 2021-08-11 DIAGNOSIS — Z99.81 CHRONIC RESPIRATORY FAILURE WITH HYPOXIA, ON HOME OXYGEN THERAPY: Primary | ICD-10-CM

## 2021-08-11 DIAGNOSIS — J96.11 CHRONIC RESPIRATORY FAILURE WITH HYPOXIA, ON HOME OXYGEN THERAPY: Primary | ICD-10-CM

## 2021-08-11 DIAGNOSIS — J44.1 COPD EXACERBATION: ICD-10-CM

## 2021-08-11 PROCEDURE — 3079F DIAST BP 80-89 MM HG: CPT | Mod: CPTII,S$GLB,, | Performed by: INTERNAL MEDICINE

## 2021-08-11 PROCEDURE — 1126F PR PAIN SEVERITY QUANTIFIED, NO PAIN PRESENT: ICD-10-PCS | Mod: CPTII,S$GLB,, | Performed by: INTERNAL MEDICINE

## 2021-08-11 PROCEDURE — 99999 PR PBB SHADOW E&M-EST. PATIENT-LVL III: CPT | Mod: PBBFAC,,, | Performed by: INTERNAL MEDICINE

## 2021-08-11 PROCEDURE — 3075F SYST BP GE 130 - 139MM HG: CPT | Mod: CPTII,S$GLB,, | Performed by: INTERNAL MEDICINE

## 2021-08-11 PROCEDURE — 1159F MED LIST DOCD IN RCRD: CPT | Mod: CPTII,S$GLB,, | Performed by: INTERNAL MEDICINE

## 2021-08-11 PROCEDURE — 3008F PR BODY MASS INDEX (BMI) DOCUMENTED: ICD-10-PCS | Mod: CPTII,S$GLB,, | Performed by: INTERNAL MEDICINE

## 2021-08-11 PROCEDURE — 99214 OFFICE O/P EST MOD 30 MIN: CPT | Mod: S$GLB,,, | Performed by: INTERNAL MEDICINE

## 2021-08-11 PROCEDURE — 99999 PR PBB SHADOW E&M-EST. PATIENT-LVL III: ICD-10-PCS | Mod: PBBFAC,,, | Performed by: INTERNAL MEDICINE

## 2021-08-11 PROCEDURE — 94618 PULMONARY STRESS TESTING: ICD-10-PCS | Mod: S$GLB,,, | Performed by: INTERNAL MEDICINE

## 2021-08-11 PROCEDURE — 3075F PR MOST RECENT SYSTOLIC BLOOD PRESS GE 130-139MM HG: ICD-10-PCS | Mod: CPTII,S$GLB,, | Performed by: INTERNAL MEDICINE

## 2021-08-11 PROCEDURE — 3079F PR MOST RECENT DIASTOLIC BLOOD PRESSURE 80-89 MM HG: ICD-10-PCS | Mod: CPTII,S$GLB,, | Performed by: INTERNAL MEDICINE

## 2021-08-11 PROCEDURE — 1101F PT FALLS ASSESS-DOCD LE1/YR: CPT | Mod: CPTII,S$GLB,, | Performed by: INTERNAL MEDICINE

## 2021-08-11 PROCEDURE — 3288F PR FALLS RISK ASSESSMENT DOCUMENTED: ICD-10-PCS | Mod: CPTII,S$GLB,, | Performed by: INTERNAL MEDICINE

## 2021-08-11 PROCEDURE — 99214 PR OFFICE/OUTPT VISIT, EST, LEVL IV, 30-39 MIN: ICD-10-PCS | Mod: S$GLB,,, | Performed by: INTERNAL MEDICINE

## 2021-08-11 PROCEDURE — 1159F PR MEDICATION LIST DOCUMENTED IN MEDICAL RECORD: ICD-10-PCS | Mod: CPTII,S$GLB,, | Performed by: INTERNAL MEDICINE

## 2021-08-11 PROCEDURE — 94618 PULMONARY STRESS TESTING: CPT | Mod: S$GLB,,, | Performed by: INTERNAL MEDICINE

## 2021-08-11 PROCEDURE — 1126F AMNT PAIN NOTED NONE PRSNT: CPT | Mod: CPTII,S$GLB,, | Performed by: INTERNAL MEDICINE

## 2021-08-11 PROCEDURE — 3008F BODY MASS INDEX DOCD: CPT | Mod: CPTII,S$GLB,, | Performed by: INTERNAL MEDICINE

## 2021-08-11 PROCEDURE — 1101F PR PT FALLS ASSESS DOC 0-1 FALLS W/OUT INJ PAST YR: ICD-10-PCS | Mod: CPTII,S$GLB,, | Performed by: INTERNAL MEDICINE

## 2021-08-11 PROCEDURE — 3288F FALL RISK ASSESSMENT DOCD: CPT | Mod: CPTII,S$GLB,, | Performed by: INTERNAL MEDICINE

## 2021-08-13 ENCOUNTER — TELEPHONE (OUTPATIENT)
Dept: PULMONOLOGY | Facility: CLINIC | Age: 66
End: 2021-08-13

## 2021-08-20 DIAGNOSIS — J43.2 CENTRILOBULAR EMPHYSEMA: Primary | ICD-10-CM

## 2021-09-10 DIAGNOSIS — J44.9 CHRONIC OBSTRUCTIVE PULMONARY DISEASE, UNSPECIFIED COPD TYPE: ICD-10-CM

## 2021-09-10 RX ORDER — FLUTICASONE PROPIONATE AND SALMETEROL 250; 50 UG/1; UG/1
POWDER RESPIRATORY (INHALATION)
Qty: 1 EACH | Refills: 12 | Status: SHIPPED | OUTPATIENT
Start: 2021-09-10 | End: 2021-09-21 | Stop reason: SDUPTHER

## 2021-09-21 DIAGNOSIS — J44.9 CHRONIC OBSTRUCTIVE PULMONARY DISEASE, UNSPECIFIED COPD TYPE: ICD-10-CM

## 2021-09-21 RX ORDER — FLUTICASONE PROPIONATE AND SALMETEROL 250; 50 UG/1; UG/1
POWDER RESPIRATORY (INHALATION)
Qty: 1 EACH | Refills: 12 | Status: SHIPPED | OUTPATIENT
Start: 2021-09-21 | End: 2022-05-20 | Stop reason: SDUPTHER

## 2021-09-25 NOTE — ASSESSMENT & PLAN NOTE
2010:  Colonoscopy with polypectomy ([facility]).  Pathology was tubular adenoma   - complicated by post polypectomy bleed   - repeat CSY done with old blood seen.  Perforation of transverse colon   -  Right colectomy ([facility]) for perforation.       Patient unable to complete

## 2021-09-27 ENCOUNTER — TELEPHONE (OUTPATIENT)
Dept: PULMONOLOGY | Facility: CLINIC | Age: 66
End: 2021-09-27

## 2021-10-02 ENCOUNTER — IMMUNIZATION (OUTPATIENT)
Dept: INTERNAL MEDICINE | Facility: CLINIC | Age: 66
End: 2021-10-02
Payer: MEDICARE

## 2021-10-02 DIAGNOSIS — Z23 NEED FOR VACCINATION: Primary | ICD-10-CM

## 2021-10-02 PROCEDURE — 91300 COVID-19, MRNA, LNP-S, PF, 30 MCG/0.3 ML DOSE VACCINE: CPT | Mod: PBBFAC | Performed by: INTERNAL MEDICINE

## 2021-10-02 PROCEDURE — 0003A COVID-19, MRNA, LNP-S, PF, 30 MCG/0.3 ML DOSE VACCINE: CPT | Mod: CV19,PBBFAC | Performed by: INTERNAL MEDICINE

## 2021-11-04 ENCOUNTER — PATIENT MESSAGE (OUTPATIENT)
Dept: INTERNAL MEDICINE | Facility: CLINIC | Age: 66
End: 2021-11-04
Payer: MEDICARE

## 2021-11-05 ENCOUNTER — PATIENT MESSAGE (OUTPATIENT)
Dept: INTERNAL MEDICINE | Facility: CLINIC | Age: 66
End: 2021-11-05
Payer: MEDICARE

## 2021-11-09 ENCOUNTER — TELEPHONE (OUTPATIENT)
Dept: INTERNAL MEDICINE | Facility: CLINIC | Age: 66
End: 2021-11-09
Payer: MEDICARE

## 2021-11-11 ENCOUNTER — HOSPITAL ENCOUNTER (OUTPATIENT)
Dept: RADIOLOGY | Facility: HOSPITAL | Age: 66
Discharge: HOME | End: 2021-11-11
Attending: INTERNAL MEDICINE
Payer: MEDICARE

## 2021-11-11 ENCOUNTER — OFFICE VISIT (OUTPATIENT)
Dept: INTERNAL MEDICINE | Facility: CLINIC | Age: 66
End: 2021-11-11
Payer: MEDICARE

## 2021-11-11 ENCOUNTER — IMMUNIZATION (OUTPATIENT)
Dept: INTERNAL MEDICINE | Facility: CLINIC | Age: 66
End: 2021-11-11
Payer: MEDICARE

## 2021-11-11 VITALS
BODY MASS INDEX: 27.49 KG/M2 | HEART RATE: 99 BPM | OXYGEN SATURATION: 98 % | DIASTOLIC BLOOD PRESSURE: 98 MMHG | HEIGHT: 65 IN | SYSTOLIC BLOOD PRESSURE: 176 MMHG | WEIGHT: 165 LBS

## 2021-11-11 DIAGNOSIS — R10.84 GENERALIZED ABDOMINAL PAIN: Primary | ICD-10-CM

## 2021-11-11 DIAGNOSIS — Z23 NEED FOR INFLUENZA VACCINATION: ICD-10-CM

## 2021-11-11 DIAGNOSIS — Z76.89 ENCOUNTER TO ESTABLISH CARE WITH NEW DOCTOR: ICD-10-CM

## 2021-11-11 DIAGNOSIS — J44.1 COPD EXACERBATION: ICD-10-CM

## 2021-11-11 DIAGNOSIS — K42.9 UMBILICAL HERNIA WITHOUT OBSTRUCTION AND WITHOUT GANGRENE: ICD-10-CM

## 2021-11-11 DIAGNOSIS — I10 ESSENTIAL HYPERTENSION: ICD-10-CM

## 2021-11-11 DIAGNOSIS — Z90.49 S/P RIGHT HEMICOLECTOMY: ICD-10-CM

## 2021-11-11 DIAGNOSIS — E66.3 OVERWEIGHT (BMI 25.0-29.9): ICD-10-CM

## 2021-11-11 PROCEDURE — 3080F DIAST BP >= 90 MM HG: CPT | Mod: CPTII,S$GLB,, | Performed by: NURSE PRACTITIONER

## 2021-11-11 PROCEDURE — 99999 PR PBB SHADOW E&M-EST. PATIENT-LVL IV: ICD-10-PCS | Mod: PBBFAC,,, | Performed by: NURSE PRACTITIONER

## 2021-11-11 PROCEDURE — 3288F PR FALLS RISK ASSESSMENT DOCUMENTED: ICD-10-PCS | Mod: CPTII,S$GLB,, | Performed by: NURSE PRACTITIONER

## 2021-11-11 PROCEDURE — 3008F PR BODY MASS INDEX (BMI) DOCUMENTED: ICD-10-PCS | Mod: CPTII,S$GLB,, | Performed by: NURSE PRACTITIONER

## 2021-11-11 PROCEDURE — 90694 VACC AIIV4 NO PRSRV 0.5ML IM: CPT | Mod: S$GLB,,, | Performed by: NURSE PRACTITIONER

## 2021-11-11 PROCEDURE — 71046 X-RAY EXAM CHEST 2 VIEWS: CPT | Mod: 26,,, | Performed by: RADIOLOGY

## 2021-11-11 PROCEDURE — G0008 FLU VACCINE - QUADRIVALENT - ADJUVANTED: ICD-10-PCS | Mod: S$GLB,,, | Performed by: NURSE PRACTITIONER

## 2021-11-11 PROCEDURE — 3008F BODY MASS INDEX DOCD: CPT | Mod: CPTII,S$GLB,, | Performed by: NURSE PRACTITIONER

## 2021-11-11 PROCEDURE — 1159F MED LIST DOCD IN RCRD: CPT | Mod: CPTII,S$GLB,, | Performed by: NURSE PRACTITIONER

## 2021-11-11 PROCEDURE — 99214 PR OFFICE/OUTPT VISIT, EST, LEVL IV, 30-39 MIN: ICD-10-PCS | Mod: 25,S$GLB,, | Performed by: NURSE PRACTITIONER

## 2021-11-11 PROCEDURE — 1101F PR PT FALLS ASSESS DOC 0-1 FALLS W/OUT INJ PAST YR: ICD-10-PCS | Mod: CPTII,S$GLB,, | Performed by: NURSE PRACTITIONER

## 2021-11-11 PROCEDURE — 1159F PR MEDICATION LIST DOCUMENTED IN MEDICAL RECORD: ICD-10-PCS | Mod: CPTII,S$GLB,, | Performed by: NURSE PRACTITIONER

## 2021-11-11 PROCEDURE — 99999 PR PBB SHADOW E&M-EST. PATIENT-LVL IV: CPT | Mod: PBBFAC,,, | Performed by: NURSE PRACTITIONER

## 2021-11-11 PROCEDURE — G0008 ADMIN INFLUENZA VIRUS VAC: HCPCS | Mod: S$GLB,,, | Performed by: NURSE PRACTITIONER

## 2021-11-11 PROCEDURE — 3288F FALL RISK ASSESSMENT DOCD: CPT | Mod: CPTII,S$GLB,, | Performed by: NURSE PRACTITIONER

## 2021-11-11 PROCEDURE — 3080F PR MOST RECENT DIASTOLIC BLOOD PRESSURE >= 90 MM HG: ICD-10-PCS | Mod: CPTII,S$GLB,, | Performed by: NURSE PRACTITIONER

## 2021-11-11 PROCEDURE — 99214 OFFICE O/P EST MOD 30 MIN: CPT | Mod: 25,S$GLB,, | Performed by: NURSE PRACTITIONER

## 2021-11-11 PROCEDURE — 90694 FLU VACCINE - QUADRIVALENT - ADJUVANTED: ICD-10-PCS | Mod: S$GLB,,, | Performed by: NURSE PRACTITIONER

## 2021-11-11 PROCEDURE — 3077F PR MOST RECENT SYSTOLIC BLOOD PRESSURE >= 140 MM HG: ICD-10-PCS | Mod: CPTII,S$GLB,, | Performed by: NURSE PRACTITIONER

## 2021-11-11 PROCEDURE — 71046 X-RAY EXAM CHEST 2 VIEWS: CPT | Mod: TC

## 2021-11-11 PROCEDURE — 3077F SYST BP >= 140 MM HG: CPT | Mod: CPTII,S$GLB,, | Performed by: NURSE PRACTITIONER

## 2021-11-11 PROCEDURE — 71046 XR CHEST PA AND LATERAL: ICD-10-PCS | Mod: 26,,, | Performed by: RADIOLOGY

## 2021-11-11 PROCEDURE — 1125F AMNT PAIN NOTED PAIN PRSNT: CPT | Mod: CPTII,S$GLB,, | Performed by: NURSE PRACTITIONER

## 2021-11-11 PROCEDURE — 1101F PT FALLS ASSESS-DOCD LE1/YR: CPT | Mod: CPTII,S$GLB,, | Performed by: NURSE PRACTITIONER

## 2021-11-11 PROCEDURE — 1125F PR PAIN SEVERITY QUANTIFIED, PAIN PRESENT: ICD-10-PCS | Mod: CPTII,S$GLB,, | Performed by: NURSE PRACTITIONER

## 2021-11-16 ENCOUNTER — OFFICE VISIT (OUTPATIENT)
Dept: INTERNAL MEDICINE | Facility: CLINIC | Age: 66
End: 2021-11-16
Payer: MEDICARE

## 2021-11-16 VITALS
OXYGEN SATURATION: 99 % | SYSTOLIC BLOOD PRESSURE: 164 MMHG | HEART RATE: 91 BPM | HEIGHT: 65 IN | BODY MASS INDEX: 27.46 KG/M2 | DIASTOLIC BLOOD PRESSURE: 96 MMHG

## 2021-11-16 DIAGNOSIS — R10.31 RIGHT LOWER QUADRANT ABDOMINAL PAIN: Primary | ICD-10-CM

## 2021-11-16 DIAGNOSIS — I10 ESSENTIAL HYPERTENSION: ICD-10-CM

## 2021-11-16 DIAGNOSIS — I25.10 CORONARY ARTERY CALCIFICATION SEEN ON CT SCAN: ICD-10-CM

## 2021-11-16 DIAGNOSIS — J44.9 CHRONIC OBSTRUCTIVE PULMONARY DISEASE, UNSPECIFIED COPD TYPE: ICD-10-CM

## 2021-11-16 DIAGNOSIS — Z90.49 S/P RIGHT HEMICOLECTOMY: ICD-10-CM

## 2021-11-16 PROCEDURE — 3077F SYST BP >= 140 MM HG: CPT | Mod: CPTII,S$GLB,, | Performed by: INTERNAL MEDICINE

## 2021-11-16 PROCEDURE — 99215 PR OFFICE/OUTPT VISIT, EST, LEVL V, 40-54 MIN: ICD-10-PCS | Mod: S$GLB,,, | Performed by: INTERNAL MEDICINE

## 2021-11-16 PROCEDURE — 1160F RVW MEDS BY RX/DR IN RCRD: CPT | Mod: CPTII,S$GLB,, | Performed by: INTERNAL MEDICINE

## 2021-11-16 PROCEDURE — 99499 RISK ADDL DX/OHS AUDIT: ICD-10-PCS | Mod: S$GLB,,, | Performed by: INTERNAL MEDICINE

## 2021-11-16 PROCEDURE — 1159F PR MEDICATION LIST DOCUMENTED IN MEDICAL RECORD: ICD-10-PCS | Mod: CPTII,S$GLB,, | Performed by: INTERNAL MEDICINE

## 2021-11-16 PROCEDURE — 3288F PR FALLS RISK ASSESSMENT DOCUMENTED: ICD-10-PCS | Mod: CPTII,S$GLB,, | Performed by: INTERNAL MEDICINE

## 2021-11-16 PROCEDURE — 1101F PR PT FALLS ASSESS DOC 0-1 FALLS W/OUT INJ PAST YR: ICD-10-PCS | Mod: CPTII,S$GLB,, | Performed by: INTERNAL MEDICINE

## 2021-11-16 PROCEDURE — 1159F MED LIST DOCD IN RCRD: CPT | Mod: CPTII,S$GLB,, | Performed by: INTERNAL MEDICINE

## 2021-11-16 PROCEDURE — 3288F FALL RISK ASSESSMENT DOCD: CPT | Mod: CPTII,S$GLB,, | Performed by: INTERNAL MEDICINE

## 2021-11-16 PROCEDURE — 3080F PR MOST RECENT DIASTOLIC BLOOD PRESSURE >= 90 MM HG: ICD-10-PCS | Mod: CPTII,S$GLB,, | Performed by: INTERNAL MEDICINE

## 2021-11-16 PROCEDURE — 99499 UNLISTED E&M SERVICE: CPT | Mod: S$GLB,,, | Performed by: INTERNAL MEDICINE

## 2021-11-16 PROCEDURE — 1160F PR REVIEW ALL MEDS BY PRESCRIBER/CLIN PHARMACIST DOCUMENTED: ICD-10-PCS | Mod: CPTII,S$GLB,, | Performed by: INTERNAL MEDICINE

## 2021-11-16 PROCEDURE — 1126F AMNT PAIN NOTED NONE PRSNT: CPT | Mod: CPTII,S$GLB,, | Performed by: INTERNAL MEDICINE

## 2021-11-16 PROCEDURE — 99999 PR PBB SHADOW E&M-EST. PATIENT-LVL III: ICD-10-PCS | Mod: PBBFAC,,, | Performed by: INTERNAL MEDICINE

## 2021-11-16 PROCEDURE — 1126F PR PAIN SEVERITY QUANTIFIED, NO PAIN PRESENT: ICD-10-PCS | Mod: CPTII,S$GLB,, | Performed by: INTERNAL MEDICINE

## 2021-11-16 PROCEDURE — 3008F BODY MASS INDEX DOCD: CPT | Mod: CPTII,S$GLB,, | Performed by: INTERNAL MEDICINE

## 2021-11-16 PROCEDURE — 99999 PR PBB SHADOW E&M-EST. PATIENT-LVL III: CPT | Mod: PBBFAC,,, | Performed by: INTERNAL MEDICINE

## 2021-11-16 PROCEDURE — 3077F PR MOST RECENT SYSTOLIC BLOOD PRESSURE >= 140 MM HG: ICD-10-PCS | Mod: CPTII,S$GLB,, | Performed by: INTERNAL MEDICINE

## 2021-11-16 PROCEDURE — 3008F PR BODY MASS INDEX (BMI) DOCUMENTED: ICD-10-PCS | Mod: CPTII,S$GLB,, | Performed by: INTERNAL MEDICINE

## 2021-11-16 PROCEDURE — 99215 OFFICE O/P EST HI 40 MIN: CPT | Mod: S$GLB,,, | Performed by: INTERNAL MEDICINE

## 2021-11-16 PROCEDURE — 1101F PT FALLS ASSESS-DOCD LE1/YR: CPT | Mod: CPTII,S$GLB,, | Performed by: INTERNAL MEDICINE

## 2021-11-16 PROCEDURE — 3080F DIAST BP >= 90 MM HG: CPT | Mod: CPTII,S$GLB,, | Performed by: INTERNAL MEDICINE

## 2021-11-16 RX ORDER — AMLODIPINE BESYLATE 2.5 MG/1
2.5 TABLET ORAL DAILY
Qty: 30 TABLET | Refills: 11 | Status: SHIPPED | OUTPATIENT
Start: 2021-11-16 | End: 2022-02-24

## 2021-11-18 NOTE — TELEPHONE ENCOUNTER
No new care gaps identified.  Powered by [employer]. Reference number: [id number].   11/18/2021 2:21:13 PM CST

## 2021-11-20 ENCOUNTER — PATIENT MESSAGE (OUTPATIENT)
Dept: INTERNAL MEDICINE | Facility: CLINIC | Age: 66
End: 2021-11-20
Payer: MEDICARE

## 2021-11-23 DIAGNOSIS — J44.9 CHRONIC OBSTRUCTIVE PULMONARY DISEASE, UNSPECIFIED COPD TYPE: ICD-10-CM

## 2021-11-24 ENCOUNTER — OFFICE VISIT (OUTPATIENT)
Dept: PULMONOLOGY | Facility: CLINIC | Age: 66
End: 2021-11-24
Payer: MEDICARE

## 2021-11-24 VITALS
DIASTOLIC BLOOD PRESSURE: 82 MMHG | SYSTOLIC BLOOD PRESSURE: 140 MMHG | OXYGEN SATURATION: 96 % | HEART RATE: 86 BPM | HEIGHT: 65 IN | BODY MASS INDEX: 26.49 KG/M2 | WEIGHT: 159 LBS

## 2021-11-24 DIAGNOSIS — J43.2 CENTRILOBULAR EMPHYSEMA: ICD-10-CM

## 2021-11-24 DIAGNOSIS — J96.11 CHRONIC RESPIRATORY FAILURE WITH HYPOXIA: Primary | ICD-10-CM

## 2021-11-24 PROCEDURE — 99214 PR OFFICE/OUTPT VISIT, EST, LEVL IV, 30-39 MIN: ICD-10-PCS | Mod: S$GLB,,, | Performed by: INTERNAL MEDICINE

## 2021-11-24 PROCEDURE — 99999 PR PBB SHADOW E&M-EST. PATIENT-LVL III: CPT | Mod: PBBFAC,,, | Performed by: INTERNAL MEDICINE

## 2021-11-24 PROCEDURE — 99214 OFFICE O/P EST MOD 30 MIN: CPT | Mod: S$GLB,,, | Performed by: INTERNAL MEDICINE

## 2021-11-24 PROCEDURE — 99999 PR PBB SHADOW E&M-EST. PATIENT-LVL III: ICD-10-PCS | Mod: PBBFAC,,, | Performed by: INTERNAL MEDICINE

## 2021-11-24 RX ORDER — IPRATROPIUM BROMIDE AND ALBUTEROL SULFATE 2.5; .5 MG/3ML; MG/3ML
SOLUTION RESPIRATORY (INHALATION)
Qty: 990 ML | Refills: 6 | Status: SHIPPED | OUTPATIENT
Start: 2021-11-24 | End: 2022-03-14 | Stop reason: SDUPTHER

## 2021-11-24 RX ORDER — METOPROLOL SUCCINATE 25 MG/1
25 TABLET, EXTENDED RELEASE ORAL DAILY
Qty: 90 TABLET | Refills: 3 | Status: SHIPPED | OUTPATIENT
Start: 2021-11-24 | End: 2022-05-20 | Stop reason: SDUPTHER

## 2021-12-14 ENCOUNTER — PATIENT MESSAGE (OUTPATIENT)
Dept: INTERNAL MEDICINE | Facility: CLINIC | Age: 66
End: 2021-12-14
Payer: MEDICARE

## 2021-12-22 DIAGNOSIS — J44.9 CHRONIC OBSTRUCTIVE PULMONARY DISEASE, UNSPECIFIED COPD TYPE: ICD-10-CM

## 2021-12-22 RX ORDER — ALBUTEROL SULFATE 90 UG/1
AEROSOL, METERED RESPIRATORY (INHALATION)
Qty: 8.5 G | Refills: 12 | Status: SHIPPED | OUTPATIENT
Start: 2021-12-22 | End: 2022-05-20 | Stop reason: SDUPTHER

## 2022-01-13 RX ORDER — METOPROLOL SUCCINATE 25 MG/1
TABLET, EXTENDED RELEASE ORAL
Qty: 90 TABLET | Refills: 3 | OUTPATIENT
Start: 2022-01-13

## 2022-01-24 ENCOUNTER — PES CALL (OUTPATIENT)
Dept: ADMINISTRATIVE | Facility: CLINIC | Age: 67
End: 2022-01-24
Payer: MEDICARE

## 2022-03-14 ENCOUNTER — OFFICE VISIT (OUTPATIENT)
Dept: INTERNAL MEDICINE | Facility: CLINIC | Age: 67
End: 2022-03-14
Payer: MEDICARE

## 2022-03-14 VITALS
SYSTOLIC BLOOD PRESSURE: 124 MMHG | OXYGEN SATURATION: 97 % | HEIGHT: 65 IN | HEART RATE: 98 BPM | WEIGHT: 165.38 LBS | DIASTOLIC BLOOD PRESSURE: 86 MMHG | BODY MASS INDEX: 27.56 KG/M2

## 2022-03-14 DIAGNOSIS — Z78.0 POST-MENOPAUSAL: ICD-10-CM

## 2022-03-14 DIAGNOSIS — I25.10 CORONARY ARTERY CALCIFICATION SEEN ON CT SCAN: ICD-10-CM

## 2022-03-14 DIAGNOSIS — J84.10 CALCIFIED GRANULOMA OF LUNG: ICD-10-CM

## 2022-03-14 DIAGNOSIS — I10 ESSENTIAL HYPERTENSION: ICD-10-CM

## 2022-03-14 DIAGNOSIS — R26.9 ABNORMALITY OF GAIT AND MOBILITY: ICD-10-CM

## 2022-03-14 DIAGNOSIS — J44.9 CHRONIC OBSTRUCTIVE PULMONARY DISEASE, UNSPECIFIED COPD TYPE: ICD-10-CM

## 2022-03-14 DIAGNOSIS — F41.1 GAD (GENERALIZED ANXIETY DISORDER): ICD-10-CM

## 2022-03-14 DIAGNOSIS — F43.21 ADJUSTMENT DISORDER WITH DEPRESSED MOOD: ICD-10-CM

## 2022-03-14 DIAGNOSIS — I70.0 AORTIC ATHEROSCLEROSIS: ICD-10-CM

## 2022-03-14 DIAGNOSIS — Z00.00 ENCOUNTER FOR PREVENTIVE HEALTH EXAMINATION: Primary | ICD-10-CM

## 2022-03-14 DIAGNOSIS — R91.1 SOLITARY PULMONARY NODULE: ICD-10-CM

## 2022-03-14 DIAGNOSIS — E78.5 HYPERLIPIDEMIA, UNSPECIFIED HYPERLIPIDEMIA TYPE: ICD-10-CM

## 2022-03-14 DIAGNOSIS — F43.21 SITUATIONAL DEPRESSION: ICD-10-CM

## 2022-03-14 DIAGNOSIS — J96.11 CHRONIC RESPIRATORY FAILURE WITH HYPOXIA, ON HOME OXYGEN THERAPY: ICD-10-CM

## 2022-03-14 DIAGNOSIS — K90.9 VITAMIN B12 DEFICIENCY DUE TO INTESTINAL MALABSORPTION: ICD-10-CM

## 2022-03-14 DIAGNOSIS — R16.0 HEPATOMEGALY: ICD-10-CM

## 2022-03-14 DIAGNOSIS — Z99.81 DEPENDENCE ON SUPPLEMENTAL OXYGEN: ICD-10-CM

## 2022-03-14 DIAGNOSIS — E53.8 VITAMIN B12 DEFICIENCY DUE TO INTESTINAL MALABSORPTION: ICD-10-CM

## 2022-03-14 DIAGNOSIS — I51.81 TAKOTSUBO CARDIOMYOPATHY: ICD-10-CM

## 2022-03-14 DIAGNOSIS — M85.80 OSTEOPENIA DETERMINED BY X-RAY: ICD-10-CM

## 2022-03-14 DIAGNOSIS — Z99.81 CHRONIC RESPIRATORY FAILURE WITH HYPOXIA, ON HOME OXYGEN THERAPY: ICD-10-CM

## 2022-03-14 DIAGNOSIS — K76.0 FATTY LIVER DISEASE, NONALCOHOLIC: ICD-10-CM

## 2022-03-14 DIAGNOSIS — F39 MOOD DISORDER: ICD-10-CM

## 2022-03-14 PROBLEM — M25.572 BILATERAL ANKLE PAIN: Status: RESOLVED | Noted: 2019-01-31 | Resolved: 2022-03-14

## 2022-03-14 PROBLEM — M25.512 LEFT SHOULDER PAIN: Status: RESOLVED | Noted: 2020-01-31 | Resolved: 2022-03-14

## 2022-03-14 PROBLEM — M25.571 BILATERAL ANKLE PAIN: Status: RESOLVED | Noted: 2019-01-31 | Resolved: 2022-03-14

## 2022-03-14 PROBLEM — J98.4 CALCIFIED GRANULOMA OF LUNG: Status: ACTIVE | Noted: 2022-03-14

## 2022-03-14 PROCEDURE — 99499 RISK ADDL DX/OHS AUDIT: ICD-10-PCS | Mod: S$GLB,,, | Performed by: NURSE PRACTITIONER

## 2022-03-14 PROCEDURE — G0439 PPPS, SUBSEQ VISIT: HCPCS | Mod: S$GLB,,, | Performed by: NURSE PRACTITIONER

## 2022-03-14 PROCEDURE — 99499 UNLISTED E&M SERVICE: CPT | Mod: S$GLB,,, | Performed by: NURSE PRACTITIONER

## 2022-03-14 PROCEDURE — 3079F DIAST BP 80-89 MM HG: CPT | Mod: CPTII,S$GLB,, | Performed by: NURSE PRACTITIONER

## 2022-03-14 PROCEDURE — 1170F FXNL STATUS ASSESSED: CPT | Mod: CPTII,S$GLB,, | Performed by: NURSE PRACTITIONER

## 2022-03-14 PROCEDURE — 3008F BODY MASS INDEX DOCD: CPT | Mod: CPTII,S$GLB,, | Performed by: NURSE PRACTITIONER

## 2022-03-14 PROCEDURE — 3288F PR FALLS RISK ASSESSMENT DOCUMENTED: ICD-10-PCS | Mod: CPTII,S$GLB,, | Performed by: NURSE PRACTITIONER

## 2022-03-14 PROCEDURE — 3008F PR BODY MASS INDEX (BMI) DOCUMENTED: ICD-10-PCS | Mod: CPTII,S$GLB,, | Performed by: NURSE PRACTITIONER

## 2022-03-14 PROCEDURE — G0439 PR MEDICARE ANNUAL WELLNESS SUBSEQUENT VISIT: ICD-10-PCS | Mod: S$GLB,,, | Performed by: NURSE PRACTITIONER

## 2022-03-14 PROCEDURE — 99999 PR PBB SHADOW E&M-EST. PATIENT-LVL IV: ICD-10-PCS | Mod: PBBFAC,,, | Performed by: NURSE PRACTITIONER

## 2022-03-14 PROCEDURE — 3079F PR MOST RECENT DIASTOLIC BLOOD PRESSURE 80-89 MM HG: ICD-10-PCS | Mod: CPTII,S$GLB,, | Performed by: NURSE PRACTITIONER

## 2022-03-14 PROCEDURE — 1170F PR FUNCTIONAL STATUS ASSESSED: ICD-10-PCS | Mod: CPTII,S$GLB,, | Performed by: NURSE PRACTITIONER

## 2022-03-14 PROCEDURE — 1101F PR PT FALLS ASSESS DOC 0-1 FALLS W/OUT INJ PAST YR: ICD-10-PCS | Mod: CPTII,S$GLB,, | Performed by: NURSE PRACTITIONER

## 2022-03-14 PROCEDURE — 99999 PR PBB SHADOW E&M-EST. PATIENT-LVL IV: CPT | Mod: PBBFAC,,, | Performed by: NURSE PRACTITIONER

## 2022-03-14 PROCEDURE — 1126F PR PAIN SEVERITY QUANTIFIED, NO PAIN PRESENT: ICD-10-PCS | Mod: CPTII,S$GLB,, | Performed by: NURSE PRACTITIONER

## 2022-03-14 PROCEDURE — 3074F SYST BP LT 130 MM HG: CPT | Mod: CPTII,S$GLB,, | Performed by: NURSE PRACTITIONER

## 2022-03-14 PROCEDURE — 3288F FALL RISK ASSESSMENT DOCD: CPT | Mod: CPTII,S$GLB,, | Performed by: NURSE PRACTITIONER

## 2022-03-14 PROCEDURE — 1126F AMNT PAIN NOTED NONE PRSNT: CPT | Mod: CPTII,S$GLB,, | Performed by: NURSE PRACTITIONER

## 2022-03-14 PROCEDURE — 1101F PT FALLS ASSESS-DOCD LE1/YR: CPT | Mod: CPTII,S$GLB,, | Performed by: NURSE PRACTITIONER

## 2022-03-14 PROCEDURE — 3074F PR MOST RECENT SYSTOLIC BLOOD PRESSURE < 130 MM HG: ICD-10-PCS | Mod: CPTII,S$GLB,, | Performed by: NURSE PRACTITIONER

## 2022-03-14 RX ORDER — PNV NO.95/FERROUS FUM/FOLIC AC 28MG-0.8MG
100 TABLET ORAL DAILY
COMMUNITY

## 2022-03-14 RX ORDER — ACETAMINOPHEN 500 MG
250 TABLET ORAL EVERY 6 HOURS PRN
Status: ON HOLD | COMMUNITY
End: 2023-01-30 | Stop reason: HOSPADM

## 2022-03-14 NOTE — PATIENT INSTRUCTIONS
Counseling and Referral of Other Preventative  (Italic type indicates deductible and co-insurance are waived)    Patient Name: Ana Aguila  Today's Date: 3/14/2022    Health Maintenance       Date Due Completion Date    DEXA Scan 10/11/2020 10/11/2018    Colorectal Cancer Screening 11/17/2021 11/16/2021    COVID-19 Vaccine (4 - Booster for Pfizer series) 03/02/2022 10/2/2021    Shingles Vaccine (1 of 2) 06/15/2022 (Originally 8/15/2004) ---    TETANUS VACCINE 07/12/2023 (Originally [birth year]/15/1972) ---    Mammogram 08/01/2022 8/1/2021    Aspirin/Antiplatelet Therapy 11/24/2022 11/24/2021    Lipid Panel 07/27/2026 7/27/2021        Orders Placed This Encounter   Procedures    DXA Bone Density Spine And Hip     The following information is provided to all patients.  This information is to help you find resources for any of the problems found today that may be affecting your health:                Living healthy guide: www.[facility].gov      Understanding Diabetes: www.diabetes.org      Eating healthy: www.cdc.gov/healthyweight      [facility] home safety checklist: www.cdc.gov/steadi/patient.html      Agency on Aging: www.goea.[place].gov      Alcoholics anonymous (AA): www.aa.org      Physical Activity: www.lucia.nih.gov/ni3jxuv      Tobacco use: www.quitwithusla.org

## 2022-03-14 NOTE — PROGRESS NOTES
"  Ana Aguila presented for a  Medicare AWV and comprehensive Health Risk Assessment today. The following components were reviewed and updated:    · Medical history  · Family History  · Social history  · Allergies and Current Medications  · Health Risk Assessment  · Health Maintenance  · Care Team         ** See Completed Assessments for Annual Wellness Visit within the encounter summary.**         The following assessments were completed:  · Living Situation  · CAGE  · Depression Screening  · Timed Get Up and Go  · Whisper Test  · Cognitive Function Screening      ·   · Nutrition Screening  · ADL Screening  · PAQ Screening        Vitals:    03/14/22 0956 03/14/22 1005 03/14/22 1035   BP:  (!) 132/98 124/86   BP Location:  Left arm Left arm   Patient Position:  Sitting    Pulse:  98    SpO2:  97%    Weight: 75 kg (165 lb 5.5 oz)     Height: 5' 5" (1.651 m)       Body mass index is 27.51 kg/m².  Physical Exam  Vitals and nursing note reviewed.   Constitutional:       Appearance: She is well-developed.   HENT:      Head: Normocephalic.   Cardiovascular:      Rate and Rhythm: Normal rate and regular rhythm.   Pulmonary:      Effort: Pulmonary effort is normal.      Breath sounds: Normal breath sounds.      Comments: Oxygen per nasal cannula  Abdominal:      General: Bowel sounds are normal.      Palpations: Abdomen is soft.   Musculoskeletal:      Comments: In wheelchair   Neurological:      Mental Status: She is alert and oriented to person, place, and time.      Motor: No abnormal muscle tone.   Psychiatric:         Mood and Affect: Mood normal.               Diagnoses and health risks identified today and associated recommendations/orders:    1. Encounter for preventive health examination  Here for Health Risk Assessment/Annual Wellness Visit.  Health maintenance reviewed and updated. Follow up in one year.  Prescription given for Shingrix    2. Essential hypertension  Chronic, stable on current medications. " Followed by PCP.    3. Aortic atherosclerosis  Chronic, stable on current medications. Reports she is not taking ASA. Noted CT Abdomen/Pelvis 11/15/21. Followed by PCP, Cardiology.    4. Coronary artery calcification seen on CT scan  Chronic, stable on current medications. Reports she is not taking ASA.  Followed by PCP, Cardiology.    5. Takotsubo cardiomyopathy - resolved  Stable. Followed by PCP, Cardiology.    6. Hyperlipidemia, unspecified hyperlipidemia type  Chronic, stable on current medication. Reports she is not taking ASA. Followed by PCP, Cardiology.    7. Chronic obstructive pulmonary disease, unspecified COPD type  Chronic, stable on current medications. Followed by PCP, Pulmonology.    8. Chronic respiratory failure with hypoxia, on home oxygen therapy  Chronic, stable on current medications. Followed by PCP, Pulmonology.    9. Dependence on supplemental oxygen  Chronic. Followed by PCP, Pulmonology.    10. Solitary pulmonary nodule  Chronic, stable. Followed by PCP, Pulmonology.    11. Calcified granuloma of lung  Chronic, stable. Noted RLL on CT Chest 11/28/20. . Followed by PCP, Pulmonology.    12. Vitamin B12 deficiency due to intestinal malabsorption  Chronic, stable on current medication. Followed by PCP.    13. Hepatomegaly  Chronic, stable. Followed by PCP.    14. Fatty liver disease, nonalcoholic  Chronic, stable. Followed by PCP.    15. Osteopenia determined by x-ray  Chronic, stable on current medications. Followed by PCP.    16. Post-menopausal  - DXA Bone Density Spine And Hip; Future    18. TERI (generalized anxiety disorder)  Chronic, stable on current medications. PHQ-2 score 1. Denies SI/HI. Followed by PCP, previously followed by Behavioral Health.    19. Adjustment disorder with depressed mood  Chronic, stable on current medications. PHQ-2 score 1. Followed by PCP, previously followed by Behavioral Health.    20. Situational depression  Chronic, stable on current medications. PHQ-2  score 1. Followed by PCP, previously followed by Behavioral Health.    21. Mood disorder  Chronic, stable on current medications. PHQ-2 score 1. Followed by PCP, previously followed by Behavioral Health.      Provided Ana with a 5-10 year written screening schedule and personal prevention plan. Recommendations were developed using the USPSTF age appropriate recommendations. Education, counseling, and referrals were provided as needed. After Visit Summary printed and given to patient which includes a list of additional screenings\tests needed.    Follow up in about 8 months (around 11/16/2022).with PCP.    Lilly Lira NP

## 2022-05-10 NOTE — Clinical Note
[facility] Visit (Post Discharge Follow-up) Today: - Our clinic physicians and nurses plan to follow the patient up for any medical issues in the [facility] for 30 days post discharge.Future Appointments:Future [id number]/14/2018 10:00 AM   Ferny Gaytan MD        [facility] CARDIO    Deshawn y1/21/2019  10:00 AM   Erik Duarte, PhD, L* [facility] SOCL WK   [facility] Paramedian Forehead Flap Text: A decision was made to reconstruct the defect utilizing an interpolation axial flap and a staged reconstruction.  A telfa template was made of the defect.  This telfa template was then used to outline the paramedian forehead pedicle flap.  The donor area for the pedicle flap was then injected with anesthesia.  The flap was excised through the skin and subcutaneous tissue down to the layer of the underlying musculature.  The pedicle flap was carefully excised within this deep plane to maintain its blood supply.  The edges of the donor site were undermined.   The donor site was closed in a primary fashion.  The pedicle was then rotated into position and sutured.  Once the tube was sutured into place, adequate blood supply was confirmed with blanching and refill.  The pedicle was then wrapped with xeroform gauze and dressed appropriately with a telfa and gauze bandage to ensure continued blood supply and protect the attached pedicle.

## 2022-05-19 ENCOUNTER — HOSPITAL ENCOUNTER (OUTPATIENT)
Dept: RADIOLOGY | Facility: HOSPITAL | Age: 67
Discharge: HOME | End: 2022-05-19
Attending: INTERNAL MEDICINE
Payer: MEDICARE

## 2022-05-19 ENCOUNTER — IMMUNIZATION (OUTPATIENT)
Dept: INTERNAL MEDICINE | Facility: CLINIC | Age: 67
End: 2022-05-19
Payer: MEDICARE

## 2022-05-19 ENCOUNTER — OFFICE VISIT (OUTPATIENT)
Dept: INTERNAL MEDICINE | Facility: CLINIC | Age: 67
End: 2022-05-19
Payer: MEDICARE

## 2022-05-19 VITALS
HEART RATE: 82 BPM | BODY MASS INDEX: 27.49 KG/M2 | OXYGEN SATURATION: 92 % | SYSTOLIC BLOOD PRESSURE: 146 MMHG | DIASTOLIC BLOOD PRESSURE: 88 MMHG | HEIGHT: 65 IN | WEIGHT: 165 LBS

## 2022-05-19 DIAGNOSIS — R07.89 CHEST WALL PAIN: ICD-10-CM

## 2022-05-19 DIAGNOSIS — J44.9 CHRONIC OBSTRUCTIVE PULMONARY DISEASE, UNSPECIFIED COPD TYPE: ICD-10-CM

## 2022-05-19 DIAGNOSIS — M54.2 NECK PAIN ON LEFT SIDE: Primary | ICD-10-CM

## 2022-05-19 DIAGNOSIS — Z23 NEED FOR VACCINATION: Primary | ICD-10-CM

## 2022-05-19 DIAGNOSIS — I10 ESSENTIAL HYPERTENSION: ICD-10-CM

## 2022-05-19 DIAGNOSIS — M54.2 NECK PAIN ON LEFT SIDE: ICD-10-CM

## 2022-05-19 DIAGNOSIS — E78.2 MIXED HYPERLIPIDEMIA: ICD-10-CM

## 2022-05-19 PROCEDURE — 99999 PR PBB SHADOW E&M-EST. PATIENT-LVL IV: ICD-10-PCS | Mod: PBBFAC,,, | Performed by: INTERNAL MEDICINE

## 2022-05-19 PROCEDURE — 91305 COVID-19, MRNA, LNP-S, PF, 30 MCG/0.3 ML DOSE VACCINE (PFIZER): CPT | Mod: PBBFAC | Performed by: INTERNAL MEDICINE

## 2022-05-19 PROCEDURE — 99499 UNLISTED E&M SERVICE: CPT | Mod: S$GLB,,, | Performed by: INTERNAL MEDICINE

## 2022-05-19 PROCEDURE — 99999 PR PBB SHADOW E&M-EST. PATIENT-LVL IV: CPT | Mod: PBBFAC,,, | Performed by: INTERNAL MEDICINE

## 2022-05-19 PROCEDURE — 99214 OFFICE O/P EST MOD 30 MIN: CPT | Mod: S$GLB,,, | Performed by: INTERNAL MEDICINE

## 2022-05-19 PROCEDURE — 1159F MED LIST DOCD IN RCRD: CPT | Mod: CPTII,S$GLB,, | Performed by: INTERNAL MEDICINE

## 2022-05-19 PROCEDURE — 72040 X-RAY EXAM NECK SPINE 2-3 VW: CPT | Mod: TC

## 2022-05-19 PROCEDURE — 1101F PR PT FALLS ASSESS DOC 0-1 FALLS W/OUT INJ PAST YR: ICD-10-PCS | Mod: CPTII,S$GLB,, | Performed by: INTERNAL MEDICINE

## 2022-05-19 PROCEDURE — 3079F DIAST BP 80-89 MM HG: CPT | Mod: CPTII,S$GLB,, | Performed by: INTERNAL MEDICINE

## 2022-05-19 PROCEDURE — 71046 X-RAY EXAM CHEST 2 VIEWS: CPT | Mod: TC

## 2022-05-19 PROCEDURE — 72040 XR CERVICAL SPINE AP LATERAL: ICD-10-PCS | Mod: 26,,, | Performed by: RADIOLOGY

## 2022-05-19 PROCEDURE — 1159F PR MEDICATION LIST DOCUMENTED IN MEDICAL RECORD: ICD-10-PCS | Mod: CPTII,S$GLB,, | Performed by: INTERNAL MEDICINE

## 2022-05-19 PROCEDURE — 3008F BODY MASS INDEX DOCD: CPT | Mod: CPTII,S$GLB,, | Performed by: INTERNAL MEDICINE

## 2022-05-19 PROCEDURE — 1160F PR REVIEW ALL MEDS BY PRESCRIBER/CLIN PHARMACIST DOCUMENTED: ICD-10-PCS | Mod: CPTII,S$GLB,, | Performed by: INTERNAL MEDICINE

## 2022-05-19 PROCEDURE — 3077F SYST BP >= 140 MM HG: CPT | Mod: CPTII,S$GLB,, | Performed by: INTERNAL MEDICINE

## 2022-05-19 PROCEDURE — 72040 X-RAY EXAM NECK SPINE 2-3 VW: CPT | Mod: 26,,, | Performed by: RADIOLOGY

## 2022-05-19 PROCEDURE — 99499 RISK ADDL DX/OHS AUDIT: ICD-10-PCS | Mod: S$GLB,,, | Performed by: INTERNAL MEDICINE

## 2022-05-19 PROCEDURE — 1101F PT FALLS ASSESS-DOCD LE1/YR: CPT | Mod: CPTII,S$GLB,, | Performed by: INTERNAL MEDICINE

## 2022-05-19 PROCEDURE — 71046 X-RAY EXAM CHEST 2 VIEWS: CPT | Mod: 26,,, | Performed by: RADIOLOGY

## 2022-05-19 PROCEDURE — 3288F PR FALLS RISK ASSESSMENT DOCUMENTED: ICD-10-PCS | Mod: CPTII,S$GLB,, | Performed by: INTERNAL MEDICINE

## 2022-05-19 PROCEDURE — 99214 PR OFFICE/OUTPT VISIT, EST, LEVL IV, 30-39 MIN: ICD-10-PCS | Mod: S$GLB,,, | Performed by: INTERNAL MEDICINE

## 2022-05-19 PROCEDURE — 1126F PR PAIN SEVERITY QUANTIFIED, NO PAIN PRESENT: ICD-10-PCS | Mod: CPTII,S$GLB,, | Performed by: INTERNAL MEDICINE

## 2022-05-19 PROCEDURE — 3079F PR MOST RECENT DIASTOLIC BLOOD PRESSURE 80-89 MM HG: ICD-10-PCS | Mod: CPTII,S$GLB,, | Performed by: INTERNAL MEDICINE

## 2022-05-19 PROCEDURE — 1160F RVW MEDS BY RX/DR IN RCRD: CPT | Mod: CPTII,S$GLB,, | Performed by: INTERNAL MEDICINE

## 2022-05-19 PROCEDURE — 3077F PR MOST RECENT SYSTOLIC BLOOD PRESSURE >= 140 MM HG: ICD-10-PCS | Mod: CPTII,S$GLB,, | Performed by: INTERNAL MEDICINE

## 2022-05-19 PROCEDURE — 71046 XR CHEST PA AND LATERAL: ICD-10-PCS | Mod: 26,,, | Performed by: RADIOLOGY

## 2022-05-19 PROCEDURE — 1126F AMNT PAIN NOTED NONE PRSNT: CPT | Mod: CPTII,S$GLB,, | Performed by: INTERNAL MEDICINE

## 2022-05-19 PROCEDURE — 3288F FALL RISK ASSESSMENT DOCD: CPT | Mod: CPTII,S$GLB,, | Performed by: INTERNAL MEDICINE

## 2022-05-19 PROCEDURE — 3008F PR BODY MASS INDEX (BMI) DOCUMENTED: ICD-10-PCS | Mod: CPTII,S$GLB,, | Performed by: INTERNAL MEDICINE

## 2022-05-19 NOTE — PROGRESS NOTES
CC:  Follow-up    HPI:  The patient is a 66-year-old female with COPD, hypertension, hyperlipidemia, reflux, history of right hemicolectomy status post GI bleed for polypectomy who presents today for follow-up.  On her last visit, amlodipine 2.5 mg was started.  She reports she has not been taking the amlodipine; but, has been taking the metoprolol.  She does check her blood pressure 1 to 2 times a day.  Sometimes it is running high.  Patient also reports having a pain on the left side of her head in the left occipital region on Monday this past week.  Symptoms last approximate 24 hours.  The sharp pain.  It was made worse with certain positions.  She has had similar episodes in the past.  Patient also reports that her right eye will jump.  Last 5 minutes and goes away.    ROS:  Patient reports weight is staying stable.  Does report some occasional chest wall pain.  She does report that her breathing is a little labored at times.  She is using her oxygen.  No nausea vomiting.  No abdominal pain.  She has 1 loose bowel movement a week sometimes 2.    Physical exam:  Repeat blood pressure taken by myself was 150/90  General appearance:  No acute distress  HEENT:  Conjunctiva is clear.  Pupils equal.  She has bilateral cataracts.  TMs were clear.  Nasal septum is midline without discharge.  Oropharynx without erythema.  Trachea is midline without JVD or thyromegaly.  Pulmonary:  Good inspiratory, expiratory breath sounds are heard.  Lungs clear auscultation.  GI:  Patient normal bowel sounds.  Cardiovascular:  S1-S2, rhythm appear to be normal.  Extremities without edema.    Assessment:  1. Left neck/head pain  2. Chest wall pain  3. COPD  4. Hypertension currently not taking amlodipine  5. Hyperlipidemia    Plan:  1. Will schedule a chest x-ray and C-spine film today  2. Patient is already scheduled for her COVID vaccine booster today.  3. The patient is to restart amlodipine.  4.  The patient to follow-up in 3  months for physical exam.

## 2022-05-20 DIAGNOSIS — E78.2 MIXED HYPERLIPIDEMIA: ICD-10-CM

## 2022-05-20 DIAGNOSIS — J44.9 CHRONIC OBSTRUCTIVE PULMONARY DISEASE, UNSPECIFIED COPD TYPE: ICD-10-CM

## 2022-05-20 RX ORDER — AMLODIPINE BESYLATE 2.5 MG/1
2.5 TABLET ORAL DAILY
Qty: 30 TABLET | Refills: 11 | Status: SHIPPED | OUTPATIENT
Start: 2022-05-20 | End: 2022-07-27

## 2022-05-20 RX ORDER — FLUTICASONE PROPIONATE AND SALMETEROL 250; 50 UG/1; UG/1
POWDER RESPIRATORY (INHALATION)
Qty: 1 EACH | Refills: 12 | Status: ON HOLD | OUTPATIENT
Start: 2022-05-20 | End: 2023-01-30 | Stop reason: HOSPADM

## 2022-05-20 RX ORDER — METOPROLOL SUCCINATE 25 MG/1
25 TABLET, EXTENDED RELEASE ORAL DAILY
Qty: 90 TABLET | Refills: 3 | Status: CANCELLED | OUTPATIENT
Start: 2022-05-20

## 2022-05-20 RX ORDER — ALBUTEROL SULFATE 90 UG/1
AEROSOL, METERED RESPIRATORY (INHALATION)
Qty: 8.5 G | Refills: 12 | Status: SHIPPED | OUTPATIENT
Start: 2022-05-20 | End: 2023-10-07 | Stop reason: SDUPTHER

## 2022-05-20 RX ORDER — ROSUVASTATIN CALCIUM 10 MG/1
10 TABLET, COATED ORAL DAILY
Qty: 90 TABLET | Refills: 3 | Status: SHIPPED | OUTPATIENT
Start: 2022-05-20 | End: 2023-05-22

## 2022-05-20 RX ORDER — FLUTICASONE PROPIONATE AND SALMETEROL 250; 50 UG/1; UG/1
POWDER RESPIRATORY (INHALATION)
Qty: 1 EACH | Refills: 12 | Status: CANCELLED | OUTPATIENT
Start: 2022-05-20

## 2022-05-20 RX ORDER — IPRATROPIUM BROMIDE AND ALBUTEROL SULFATE 2.5; .5 MG/3ML; MG/3ML
SOLUTION RESPIRATORY (INHALATION)
Qty: 270 ML | Refills: 6 | Status: SHIPPED | OUTPATIENT
Start: 2022-05-20 | End: 2023-04-13 | Stop reason: SDUPTHER

## 2022-05-20 RX ORDER — ALBUTEROL SULFATE 90 UG/1
AEROSOL, METERED RESPIRATORY (INHALATION)
Qty: 8.5 G | Refills: 12 | Status: CANCELLED | OUTPATIENT
Start: 2022-05-20

## 2022-05-20 RX ORDER — METOPROLOL SUCCINATE 25 MG/1
25 TABLET, EXTENDED RELEASE ORAL DAILY
Qty: 90 TABLET | Refills: 3 | Status: ON HOLD | OUTPATIENT
Start: 2022-05-20 | End: 2022-07-27 | Stop reason: HOSPADM

## 2022-05-20 RX ORDER — ROSUVASTATIN CALCIUM 10 MG/1
10 TABLET, COATED ORAL DAILY
Qty: 90 TABLET | Refills: 3 | Status: CANCELLED | OUTPATIENT
Start: 2022-05-20

## 2022-05-20 RX ORDER — IPRATROPIUM BROMIDE AND ALBUTEROL SULFATE 2.5; .5 MG/3ML; MG/3ML
SOLUTION RESPIRATORY (INHALATION)
Qty: 270 ML | Refills: 12 | Status: CANCELLED | OUTPATIENT
Start: 2022-05-20

## 2022-05-20 NOTE — TELEPHONE ENCOUNTER
No new care gaps identified.  [facility] Embedded Care Gaps. Reference number: [id number]. 5/20/2022   1:08:55 PM CDT

## 2022-05-27 NOTE — PROGRESS NOTES
Last 5 Patient Entered Readings                                                               Current 30 Day Average: 126/82     Recent Readings 5/5/2017 5/5/2017 5/4/2017 5/3/2017 5/2/2017    Systolic BP (mmHg) 127 141 130 119 121    Diastolic BP (mmHg) 87 86 85 79 87    Pulse 84 82 73 81 99        LVM to follow up with Ms. Ana Aguila. Inquired about how her low sodium diet and exercise is going. Advised pt to call or message back if she has any questions or concerns.          
complains of pain/discomfort

## 2022-06-08 ENCOUNTER — OFFICE VISIT (OUTPATIENT)
Dept: PULMONOLOGY | Facility: CLINIC | Age: 67
End: 2022-06-08
Payer: MEDICARE

## 2022-06-08 ENCOUNTER — HOSPITAL ENCOUNTER (OUTPATIENT)
Dept: PULMONOLOGY | Facility: CLINIC | Age: 67
Discharge: HOME | End: 2022-06-08
Payer: MEDICARE

## 2022-06-08 VITALS
HEART RATE: 81 BPM | OXYGEN SATURATION: 92 % | HEIGHT: 65 IN | DIASTOLIC BLOOD PRESSURE: 78 MMHG | WEIGHT: 165.38 LBS | SYSTOLIC BLOOD PRESSURE: 140 MMHG | BODY MASS INDEX: 27.56 KG/M2

## 2022-06-08 DIAGNOSIS — J44.9 CHRONIC OBSTRUCTIVE PULMONARY DISEASE, UNSPECIFIED COPD TYPE: Primary | ICD-10-CM

## 2022-06-08 DIAGNOSIS — J43.2 CENTRILOBULAR EMPHYSEMA: ICD-10-CM

## 2022-06-08 DIAGNOSIS — J96.11 CHRONIC RESPIRATORY FAILURE WITH HYPOXIA: ICD-10-CM

## 2022-06-08 PROCEDURE — 3078F DIAST BP <80 MM HG: CPT | Mod: CPTII,S$GLB,, | Performed by: INTERNAL MEDICINE

## 2022-06-08 PROCEDURE — 1159F PR MEDICATION LIST DOCUMENTED IN MEDICAL RECORD: ICD-10-PCS | Mod: CPTII,S$GLB,, | Performed by: INTERNAL MEDICINE

## 2022-06-08 PROCEDURE — 99214 PR OFFICE/OUTPT VISIT, EST, LEVL IV, 30-39 MIN: ICD-10-PCS | Mod: S$GLB,,, | Performed by: INTERNAL MEDICINE

## 2022-06-08 PROCEDURE — 94010 BREATHING CAPACITY TEST: CPT | Mod: S$GLB,,, | Performed by: INTERNAL MEDICINE

## 2022-06-08 PROCEDURE — 94010 BREATHING CAPACITY TEST: ICD-10-PCS | Mod: S$GLB,,, | Performed by: INTERNAL MEDICINE

## 2022-06-08 PROCEDURE — 1101F PR PT FALLS ASSESS DOC 0-1 FALLS W/OUT INJ PAST YR: ICD-10-PCS | Mod: CPTII,S$GLB,, | Performed by: INTERNAL MEDICINE

## 2022-06-08 PROCEDURE — 99999 PR PBB SHADOW E&M-EST. PATIENT-LVL III: ICD-10-PCS | Mod: PBBFAC,,, | Performed by: INTERNAL MEDICINE

## 2022-06-08 PROCEDURE — 1125F PR PAIN SEVERITY QUANTIFIED, PAIN PRESENT: ICD-10-PCS | Mod: CPTII,S$GLB,, | Performed by: INTERNAL MEDICINE

## 2022-06-08 PROCEDURE — 3078F PR MOST RECENT DIASTOLIC BLOOD PRESSURE < 80 MM HG: ICD-10-PCS | Mod: CPTII,S$GLB,, | Performed by: INTERNAL MEDICINE

## 2022-06-08 PROCEDURE — 3077F PR MOST RECENT SYSTOLIC BLOOD PRESSURE >= 140 MM HG: ICD-10-PCS | Mod: CPTII,S$GLB,, | Performed by: INTERNAL MEDICINE

## 2022-06-08 PROCEDURE — 1101F PT FALLS ASSESS-DOCD LE1/YR: CPT | Mod: CPTII,S$GLB,, | Performed by: INTERNAL MEDICINE

## 2022-06-08 PROCEDURE — 1159F MED LIST DOCD IN RCRD: CPT | Mod: CPTII,S$GLB,, | Performed by: INTERNAL MEDICINE

## 2022-06-08 PROCEDURE — 3008F PR BODY MASS INDEX (BMI) DOCUMENTED: ICD-10-PCS | Mod: CPTII,S$GLB,, | Performed by: INTERNAL MEDICINE

## 2022-06-08 PROCEDURE — 99214 OFFICE O/P EST MOD 30 MIN: CPT | Mod: S$GLB,,, | Performed by: INTERNAL MEDICINE

## 2022-06-08 PROCEDURE — 3077F SYST BP >= 140 MM HG: CPT | Mod: CPTII,S$GLB,, | Performed by: INTERNAL MEDICINE

## 2022-06-08 PROCEDURE — 3008F BODY MASS INDEX DOCD: CPT | Mod: CPTII,S$GLB,, | Performed by: INTERNAL MEDICINE

## 2022-06-08 PROCEDURE — 3288F PR FALLS RISK ASSESSMENT DOCUMENTED: ICD-10-PCS | Mod: CPTII,S$GLB,, | Performed by: INTERNAL MEDICINE

## 2022-06-08 PROCEDURE — 99999 PR PBB SHADOW E&M-EST. PATIENT-LVL III: CPT | Mod: PBBFAC,,, | Performed by: INTERNAL MEDICINE

## 2022-06-08 PROCEDURE — 1125F AMNT PAIN NOTED PAIN PRSNT: CPT | Mod: CPTII,S$GLB,, | Performed by: INTERNAL MEDICINE

## 2022-06-08 PROCEDURE — 3288F FALL RISK ASSESSMENT DOCD: CPT | Mod: CPTII,S$GLB,, | Performed by: INTERNAL MEDICINE
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
## 2022-06-13 LAB
FEF 25 75 LLN: 0.69
FEF 25 75 PRE REF: 9.9 %
FEF 25 75 REF: 1.76
FEV05 LLN: 0.94
FEV05 REF: 1.79
FEV1 FVC LLN: 66
FEV1 FVC PRE REF: 51 %
FEV1 FVC REF: 79
FEV1 LLN: 1.43
FEV1 PRE REF: 21.1 %
FEV1 REF: 2.02
FVC LLN: 1.85
FVC PRE REF: 41 %
FVC REF: 2.59
PEF LLN: 3.19
PEF PRE REF: 30.7 %
PEF REF: 5.26
PHYSICIAN COMMENT: ABNORMAL
PRE FEF 25 75: 0.17 L/S (ref 0.69–3.38)
PRE FET 100: 5.94 SEC
PRE FEV05 REF: 15.2 %
PRE FEV1 FVC: 40.16 % (ref 66.1–89.57)
PRE FEV1: 0.43 L (ref 1.43–2.59)
PRE FEV5: 0.27 L (ref 0.94–2.65)
PRE FVC: 1.06 L (ref 1.85–3.37)
PRE PEF: 1.62 L/S (ref 3.19–7.32)

## 2022-07-23 ENCOUNTER — HOSPITAL ENCOUNTER (INPATIENT)
Facility: HOSPITAL | Age: 67
LOS: 5 days | Discharge: HOME HEALTH | DRG: 208 | End: 2022-07-28
Attending: EMERGENCY MEDICINE | Admitting: INTERNAL MEDICINE
Payer: MEDICARE

## 2022-07-23 DIAGNOSIS — J96.21 ACUTE ON CHRONIC RESPIRATORY FAILURE WITH HYPOXIA AND HYPERCAPNIA: Primary | ICD-10-CM

## 2022-07-23 DIAGNOSIS — Z78.9 INTUBATION OF AIRWAY PERFORMED WITHOUT DIFFICULTY: ICD-10-CM

## 2022-07-23 DIAGNOSIS — E78.2 MIXED HYPERLIPIDEMIA: ICD-10-CM

## 2022-07-23 DIAGNOSIS — I16.1 HYPERTENSIVE EMERGENCY: ICD-10-CM

## 2022-07-23 DIAGNOSIS — I21.4 NSTEMI (NON-ST ELEVATED MYOCARDIAL INFARCTION): ICD-10-CM

## 2022-07-23 DIAGNOSIS — R04.2 HEMOPTYSIS: ICD-10-CM

## 2022-07-23 DIAGNOSIS — J96.22 ACUTE ON CHRONIC RESPIRATORY FAILURE WITH HYPOXIA AND HYPERCAPNIA: Primary | ICD-10-CM

## 2022-07-23 DIAGNOSIS — J44.1 COPD WITH ACUTE EXACERBATION: Primary | ICD-10-CM

## 2022-07-23 DIAGNOSIS — R06.02 SHORTNESS OF BREATH: ICD-10-CM

## 2022-07-23 DIAGNOSIS — J81.0 FLASH PULMONARY EDEMA: ICD-10-CM

## 2022-07-23 DIAGNOSIS — D72.823 LEUKEMOID REACTION: ICD-10-CM

## 2022-07-23 DIAGNOSIS — I50.43 ACUTE ON CHRONIC COMBINED SYSTOLIC AND DIASTOLIC HEART FAILURE: ICD-10-CM

## 2022-07-23 DIAGNOSIS — J44.1 COPD EXACERBATION: ICD-10-CM

## 2022-07-23 DIAGNOSIS — R73.03 PREDIABETES: ICD-10-CM

## 2022-07-23 DIAGNOSIS — R94.31 PROLONGED QT INTERVAL: ICD-10-CM

## 2022-07-23 DIAGNOSIS — J96.21 ACUTE ON CHRONIC RESPIRATORY FAILURE WITH HYPOXIA AND HYPERCAPNIA: ICD-10-CM

## 2022-07-23 DIAGNOSIS — J96.22 ACUTE ON CHRONIC RESPIRATORY FAILURE WITH HYPOXIA AND HYPERCAPNIA: ICD-10-CM

## 2022-07-23 DIAGNOSIS — Z00.00 HEALTH CARE MAINTENANCE: ICD-10-CM

## 2022-07-23 LAB
ALBUMIN SERPL BCP-MCNC: 3.8 G/DL (ref 3.5–5.2)
ALLENS TEST: ABNORMAL
ALP SERPL-CCNC: 66 U/L (ref 55–135)
ALT SERPL W/O P-5'-P-CCNC: 21 U/L (ref 10–44)
ANION GAP SERPL CALC-SCNC: 11 MMOL/L (ref 8–16)
APTT BLDCRRT: 32.7 SEC (ref 21–32)
AST SERPL-CCNC: 28 U/L (ref 10–40)
BACTERIA #/AREA URNS HPF: ABNORMAL /HPF
BASOPHILS # BLD AUTO: 0.1 K/UL (ref 0–0.2)
BASOPHILS NFR BLD: 0.6 % (ref 0–1.9)
BILIRUB SERPL-MCNC: 0.4 MG/DL (ref 0.1–1)
BILIRUB UR QL STRIP: NEGATIVE
BNP SERPL-MCNC: 70 PG/ML (ref 0–99)
BUN SERPL-MCNC: 15 MG/DL (ref 8–23)
CALCIUM SERPL-MCNC: 8.8 MG/DL (ref 8.7–10.5)
CHLORIDE SERPL-SCNC: 106 MMOL/L (ref 95–110)
CK SERPL-CCNC: 238 U/L (ref 20–180)
CLARITY UR: ABNORMAL
CO2 SERPL-SCNC: 26 MMOL/L (ref 23–29)
COLOR UR: YELLOW
CREAT SERPL-MCNC: 1 MG/DL (ref 0.5–1.4)
CTP QC/QA: YES
DELSYS: ABNORMAL
DIFFERENTIAL METHOD: ABNORMAL
EOSINOPHIL # BLD AUTO: 0.5 K/UL (ref 0–0.5)
EOSINOPHIL NFR BLD: 2.8 % (ref 0–8)
EP: 6
ERYTHROCYTE [DISTWIDTH] IN BLOOD BY AUTOMATED COUNT: 13.9 % (ref 11.5–14.5)
ERYTHROCYTE [SEDIMENTATION RATE] IN BLOOD BY WESTERGREN METHOD: 18 MM/H
ERYTHROCYTE [SEDIMENTATION RATE] IN BLOOD BY WESTERGREN METHOD: 24 MM/H
EST. GFR  (AFRICAN AMERICAN): >60 ML/MIN/1.73 M^2
EST. GFR  (NON AFRICAN AMERICAN): 58 ML/MIN/1.73 M^2
ESTIMATED AVG GLUCOSE: 120 MG/DL (ref 68–131)
FIO2: 35
FIO2: 60
GLUCOSE SERPL-MCNC: 239 MG/DL (ref 70–110)
GLUCOSE UR QL STRIP: ABNORMAL
HBA1C MFR BLD: 5.8 % (ref 4–5.6)
HCO3 UR-SCNC: 25.5 MMOL/L (ref 24–28)
HCO3 UR-SCNC: 26.2 MMOL/L (ref 24–28)
HCO3 UR-SCNC: 28 MMOL/L (ref 24–28)
HCO3 UR-SCNC: 30.7 MMOL/L (ref 24–28)
HCT VFR BLD AUTO: 42.9 % (ref 37–48.5)
HCT VFR BLD CALC: 41 %PCV (ref 36–54)
HCT VFR BLD CALC: 41 %PCV (ref 36–54)
HGB BLD-MCNC: 13.6 G/DL (ref 12–16)
HGB BLD-MCNC: 14 G/DL
HGB BLD-MCNC: 14 G/DL
HGB UR QL STRIP: ABNORMAL
HYALINE CASTS #/AREA URNS LPF: 0 /LPF
IMM GRANULOCYTES # BLD AUTO: 0.11 K/UL (ref 0–0.04)
IMM GRANULOCYTES NFR BLD AUTO: 0.6 % (ref 0–0.5)
INR PPP: 1 (ref 0.8–1.2)
IP: 14
KETONES UR QL STRIP: NEGATIVE
LACTATE SERPL-SCNC: 2.9 MMOL/L (ref 0.5–2.2)
LEUKOCYTE ESTERASE UR QL STRIP: NEGATIVE
LYMPHOCYTES # BLD AUTO: 7 K/UL (ref 1–4.8)
LYMPHOCYTES NFR BLD: 39.6 % (ref 18–48)
MCH RBC QN AUTO: 28.9 PG (ref 27–31)
MCHC RBC AUTO-ENTMCNC: 31.7 G/DL (ref 32–36)
MCV RBC AUTO: 91 FL (ref 82–98)
MICROSCOPIC COMMENT: ABNORMAL
MODE: ABNORMAL
MODE: ABNORMAL
MONOCYTES # BLD AUTO: 0.9 K/UL (ref 0.3–1)
MONOCYTES NFR BLD: 5.1 % (ref 4–15)
NEUTROPHILS # BLD AUTO: 9 K/UL (ref 1.8–7.7)
NEUTROPHILS NFR BLD: 51.3 % (ref 38–73)
NITRITE UR QL STRIP: NEGATIVE
NRBC BLD-RTO: 0 /100 WBC
PCO2 BLDA: 46.4 MMHG (ref 35–45)
PCO2 BLDA: 47.9 MMHG (ref 35–45)
PCO2 BLDA: 49.2 MMHG (ref 35–45)
PCO2 BLDA: 85.1 MMHG (ref 35–45)
PEEP: 5
PH SMN: 7.17 [PH] (ref 7.35–7.45)
PH SMN: 7.33 [PH] (ref 7.35–7.45)
PH SMN: 7.33 [PH] (ref 7.35–7.45)
PH SMN: 7.39 [PH] (ref 7.35–7.45)
PH UR STRIP: 6 [PH] (ref 5–8)
PLATELET # BLD AUTO: 273 K/UL (ref 150–450)
PMV BLD AUTO: 11 FL (ref 9.2–12.9)
PO2 BLDA: 123 MMHG (ref 80–100)
PO2 BLDA: 36 MMHG (ref 80–100)
PO2 BLDA: 76 MMHG (ref 80–100)
PO2 BLDA: 88 MMHG (ref 80–100)
POC BE: 0 MMOL/L
POC BE: 0 MMOL/L
POC BE: 2 MMOL/L
POC BE: 3 MMOL/L
POC SATURATED O2: 65 % (ref 95–100)
POC SATURATED O2: 95 % (ref 95–100)
POC SATURATED O2: 96 % (ref 95–100)
POC SATURATED O2: 97 % (ref 95–100)
POC TCO2: 27 MMOL/L (ref 23–27)
POC TCO2: 28 MMOL/L (ref 23–27)
POC TCO2: 29 MMOL/L (ref 23–27)
POC TCO2: 33 MMOL/L (ref 23–27)
POTASSIUM BLD-SCNC: 3.8 MMOL/L (ref 3.5–5.1)
POTASSIUM BLD-SCNC: 4.4 MMOL/L (ref 3.5–5.1)
POTASSIUM SERPL-SCNC: 4.1 MMOL/L (ref 3.5–5.1)
PROT SERPL-MCNC: 7.1 G/DL (ref 6–8.4)
PROT UR QL STRIP: ABNORMAL
PROTHROMBIN TIME: 10.4 SEC (ref 9–12.5)
RBC # BLD AUTO: 4.7 M/UL (ref 4–5.4)
RBC #/AREA URNS HPF: 47 /HPF (ref 0–4)
SAMPLE: ABNORMAL
SARS-COV-2 RDRP RESP QL NAA+PROBE: NEGATIVE
SITE: ABNORMAL
SODIUM BLD-SCNC: 140 MMOL/L (ref 136–145)
SODIUM BLD-SCNC: 142 MMOL/L (ref 136–145)
SODIUM SERPL-SCNC: 143 MMOL/L (ref 136–145)
SP GR UR STRIP: 1.01 (ref 1–1.03)
SQUAMOUS #/AREA URNS HPF: 2 /HPF
TROPONIN I SERPL DL<=0.01 NG/ML-MCNC: 0.03 NG/ML (ref 0–0.03)
TROPONIN I SERPL DL<=0.01 NG/ML-MCNC: 1.52 NG/ML (ref 0–0.03)
URN SPEC COLLECT METH UR: ABNORMAL
UROBILINOGEN UR STRIP-ACNC: NEGATIVE EU/DL
VT: 350
WBC # BLD AUTO: 17.6 K/UL (ref 3.9–12.7)
WBC #/AREA URNS HPF: 12 /HPF (ref 0–5)

## 2022-07-23 PROCEDURE — 82803 BLOOD GASES ANY COMBINATION: CPT

## 2022-07-23 PROCEDURE — 99291 CRITICAL CARE FIRST HOUR: CPT | Mod: 25

## 2022-07-23 PROCEDURE — 84484 ASSAY OF TROPONIN QUANT: CPT | Mod: 91 | Performed by: INTERNAL MEDICINE

## 2022-07-23 PROCEDURE — 93010 ELECTROCARDIOGRAM REPORT: CPT | Mod: 76,,, | Performed by: INTERNAL MEDICINE

## 2022-07-23 PROCEDURE — 93005 ELECTROCARDIOGRAM TRACING: CPT

## 2022-07-23 PROCEDURE — 25000003 PHARM REV CODE 250: Performed by: INTERNAL MEDICINE

## 2022-07-23 PROCEDURE — 27000190 HC CPAP FULL FACE MASK W/VALVE

## 2022-07-23 PROCEDURE — 63600175 PHARM REV CODE 636 W HCPCS: Performed by: STUDENT IN AN ORGANIZED HEALTH CARE EDUCATION/TRAINING PROGRAM

## 2022-07-23 PROCEDURE — 82550 ASSAY OF CK (CPK): CPT | Performed by: EMERGENCY MEDICINE

## 2022-07-23 PROCEDURE — 94640 AIRWAY INHALATION TREATMENT: CPT

## 2022-07-23 PROCEDURE — 94660 CPAP INITIATION&MGMT: CPT

## 2022-07-23 PROCEDURE — 63600175 PHARM REV CODE 636 W HCPCS: Performed by: EMERGENCY MEDICINE

## 2022-07-23 PROCEDURE — 84484 ASSAY OF TROPONIN QUANT: CPT | Performed by: EMERGENCY MEDICINE

## 2022-07-23 PROCEDURE — 25000242 PHARM REV CODE 250 ALT 637 W/ HCPCS: Performed by: EMERGENCY MEDICINE

## 2022-07-23 PROCEDURE — 20000000 HC ICU ROOM

## 2022-07-23 PROCEDURE — 80053 COMPREHEN METABOLIC PANEL: CPT | Performed by: EMERGENCY MEDICINE

## 2022-07-23 PROCEDURE — 25000003 PHARM REV CODE 250: Performed by: STUDENT IN AN ORGANIZED HEALTH CARE EDUCATION/TRAINING PROGRAM

## 2022-07-23 PROCEDURE — 85025 COMPLETE CBC W/AUTO DIFF WBC: CPT | Performed by: EMERGENCY MEDICINE

## 2022-07-23 PROCEDURE — 93010 EKG 12-LEAD: ICD-10-PCS | Mod: 76,,, | Performed by: INTERNAL MEDICINE

## 2022-07-23 PROCEDURE — 25000003 PHARM REV CODE 250

## 2022-07-23 PROCEDURE — 36415 COLL VENOUS BLD VENIPUNCTURE: CPT | Performed by: STUDENT IN AN ORGANIZED HEALTH CARE EDUCATION/TRAINING PROGRAM

## 2022-07-23 PROCEDURE — 25000242 PHARM REV CODE 250 ALT 637 W/ HCPCS: Performed by: STUDENT IN AN ORGANIZED HEALTH CARE EDUCATION/TRAINING PROGRAM

## 2022-07-23 PROCEDURE — U0002 COVID-19 LAB TEST NON-CDC: HCPCS | Performed by: EMERGENCY MEDICINE

## 2022-07-23 PROCEDURE — 99900035 HC TECH TIME PER 15 MIN (STAT)

## 2022-07-23 PROCEDURE — 31500 INSERT EMERGENCY AIRWAY: CPT

## 2022-07-23 PROCEDURE — 87040 BLOOD CULTURE FOR BACTERIA: CPT | Performed by: EMERGENCY MEDICINE

## 2022-07-23 PROCEDURE — 63600175 PHARM REV CODE 636 W HCPCS

## 2022-07-23 PROCEDURE — 85610 PROTHROMBIN TIME: CPT | Performed by: STUDENT IN AN ORGANIZED HEALTH CARE EDUCATION/TRAINING PROGRAM

## 2022-07-23 PROCEDURE — 36415 COLL VENOUS BLD VENIPUNCTURE: CPT | Performed by: INTERNAL MEDICINE

## 2022-07-23 PROCEDURE — 94644 CONT INHLJ TX 1ST HOUR: CPT

## 2022-07-23 PROCEDURE — 99900026 HC AIRWAY MAINTENANCE (STAT)

## 2022-07-23 PROCEDURE — 83880 ASSAY OF NATRIURETIC PEPTIDE: CPT | Performed by: EMERGENCY MEDICINE

## 2022-07-23 PROCEDURE — 85730 THROMBOPLASTIN TIME PARTIAL: CPT | Performed by: STUDENT IN AN ORGANIZED HEALTH CARE EDUCATION/TRAINING PROGRAM

## 2022-07-23 PROCEDURE — 81000 URINALYSIS NONAUTO W/SCOPE: CPT | Performed by: EMERGENCY MEDICINE

## 2022-07-23 PROCEDURE — 87086 URINE CULTURE/COLONY COUNT: CPT | Performed by: EMERGENCY MEDICINE

## 2022-07-23 PROCEDURE — 99292 CRITICAL CARE ADDL 30 MIN: CPT

## 2022-07-23 PROCEDURE — 36600 WITHDRAWAL OF ARTERIAL BLOOD: CPT

## 2022-07-23 PROCEDURE — 94002 VENT MGMT INPAT INIT DAY: CPT

## 2022-07-23 PROCEDURE — 83036 HEMOGLOBIN GLYCOSYLATED A1C: CPT

## 2022-07-23 PROCEDURE — 94761 N-INVAS EAR/PLS OXIMETRY MLT: CPT

## 2022-07-23 PROCEDURE — 93010 ELECTROCARDIOGRAM REPORT: CPT | Mod: ,,, | Performed by: INTERNAL MEDICINE

## 2022-07-23 PROCEDURE — 83605 ASSAY OF LACTIC ACID: CPT | Performed by: EMERGENCY MEDICINE

## 2022-07-23 PROCEDURE — 96374 THER/PROPH/DIAG INJ IV PUSH: CPT | Mod: 59

## 2022-07-23 PROCEDURE — 27100171 HC OXYGEN HIGH FLOW UP TO 24 HOURS

## 2022-07-23 RX ORDER — FUROSEMIDE 10 MG/ML
40 INJECTION INTRAMUSCULAR; INTRAVENOUS
Status: COMPLETED | OUTPATIENT
Start: 2022-07-23 | End: 2022-07-23

## 2022-07-23 RX ORDER — KETOROLAC TROMETHAMINE 30 MG/ML
10 INJECTION, SOLUTION INTRAMUSCULAR; INTRAVENOUS ONCE
Status: COMPLETED | OUTPATIENT
Start: 2022-07-23 | End: 2022-07-23

## 2022-07-23 RX ORDER — ETOMIDATE 2 MG/ML
INJECTION INTRAVENOUS
Status: COMPLETED
Start: 2022-07-23 | End: 2022-07-23

## 2022-07-23 RX ORDER — METHYLPREDNISOLONE SOD SUCC 125 MG
125 VIAL (EA) INJECTION ONCE
Status: COMPLETED | OUTPATIENT
Start: 2022-07-23 | End: 2022-07-23

## 2022-07-23 RX ORDER — ASPIRIN 325 MG
325 TABLET ORAL ONCE
Status: COMPLETED | OUTPATIENT
Start: 2022-07-24 | End: 2022-07-24

## 2022-07-23 RX ORDER — SUCCINYLCHOLINE CHLORIDE 20 MG/ML
INJECTION INTRAMUSCULAR; INTRAVENOUS
Status: COMPLETED
Start: 2022-07-23 | End: 2022-07-23

## 2022-07-23 RX ORDER — HEPARIN SODIUM,PORCINE/D5W 25000/250
0-40 INTRAVENOUS SOLUTION INTRAVENOUS CONTINUOUS
Status: DISCONTINUED | OUTPATIENT
Start: 2022-07-23 | End: 2022-07-23

## 2022-07-23 RX ORDER — FENTANYL CITRATE-0.9 % NACL/PF 10 MCG/ML
0-250 PLASTIC BAG, INJECTION (ML) INTRAVENOUS CONTINUOUS
Status: DISCONTINUED | OUTPATIENT
Start: 2022-07-23 | End: 2022-07-23

## 2022-07-23 RX ORDER — PROPOFOL 10 MG/ML
INJECTION, EMULSION INTRAVENOUS
Status: COMPLETED
Start: 2022-07-23 | End: 2022-07-23

## 2022-07-23 RX ORDER — DEXMEDETOMIDINE HYDROCHLORIDE 4 UG/ML
INJECTION, SOLUTION INTRAVENOUS
Status: COMPLETED
Start: 2022-07-23 | End: 2022-07-23

## 2022-07-23 RX ORDER — MUPIROCIN 20 MG/G
OINTMENT TOPICAL 2 TIMES DAILY
Status: COMPLETED | OUTPATIENT
Start: 2022-07-23 | End: 2022-07-28

## 2022-07-23 RX ORDER — ENOXAPARIN SODIUM 100 MG/ML
40 INJECTION SUBCUTANEOUS EVERY 24 HOURS
Status: DISCONTINUED | OUTPATIENT
Start: 2022-07-23 | End: 2022-07-23 | Stop reason: ALTCHOICE

## 2022-07-23 RX ORDER — PROPOFOL 10 MG/ML
40 VIAL (ML) INTRAVENOUS ONCE
Status: COMPLETED | OUTPATIENT
Start: 2022-07-23 | End: 2022-07-23

## 2022-07-23 RX ORDER — ACETAMINOPHEN 500 MG
1000 TABLET ORAL ONCE
Status: DISCONTINUED | OUTPATIENT
Start: 2022-07-23 | End: 2022-07-24

## 2022-07-23 RX ORDER — DEXMEDETOMIDINE HYDROCHLORIDE 4 UG/ML
0-1.4 INJECTION, SOLUTION INTRAVENOUS CONTINUOUS
Status: DISCONTINUED | OUTPATIENT
Start: 2022-07-23 | End: 2022-07-24

## 2022-07-23 RX ORDER — ALBUTEROL SULFATE 2.5 MG/.5ML
15 SOLUTION RESPIRATORY (INHALATION)
Status: COMPLETED | OUTPATIENT
Start: 2022-07-23 | End: 2022-07-23

## 2022-07-23 RX ORDER — ROCURONIUM BROMIDE 10 MG/ML
INJECTION, SOLUTION INTRAVENOUS
Status: DISCONTINUED
Start: 2022-07-23 | End: 2022-07-23 | Stop reason: WASHOUT

## 2022-07-23 RX ORDER — FENTANYL CITRATE 50 UG/ML
100 INJECTION, SOLUTION INTRAMUSCULAR; INTRAVENOUS ONCE
Status: COMPLETED | OUTPATIENT
Start: 2022-07-23 | End: 2022-07-23

## 2022-07-23 RX ORDER — MAGNESIUM SULFATE HEPTAHYDRATE 40 MG/ML
2 INJECTION, SOLUTION INTRAVENOUS ONCE
Status: COMPLETED | OUTPATIENT
Start: 2022-07-23 | End: 2022-07-23

## 2022-07-23 RX ORDER — IPRATROPIUM BROMIDE 0.5 MG/2.5ML
500 SOLUTION RESPIRATORY (INHALATION) 4 TIMES DAILY
Status: ON HOLD | COMMUNITY
End: 2023-01-30 | Stop reason: HOSPADM

## 2022-07-23 RX ORDER — FENTANYL CITRATE 50 UG/ML
INJECTION, SOLUTION INTRAMUSCULAR; INTRAVENOUS
Status: DISPENSED
Start: 2022-07-23 | End: 2022-07-24

## 2022-07-23 RX ORDER — CLOPIDOGREL BISULFATE 75 MG/1
600 TABLET ORAL ONCE
Status: DISCONTINUED | OUTPATIENT
Start: 2022-07-24 | End: 2022-07-24

## 2022-07-23 RX ORDER — SODIUM CHLORIDE 0.9 % (FLUSH) 0.9 %
10 SYRINGE (ML) INJECTION
Status: DISCONTINUED | OUTPATIENT
Start: 2022-07-23 | End: 2022-07-28 | Stop reason: HOSPADM

## 2022-07-23 RX ORDER — PROPOFOL 10 MG/ML
0-50 INJECTION, EMULSION INTRAVENOUS CONTINUOUS
Status: DISCONTINUED | OUTPATIENT
Start: 2022-07-23 | End: 2022-07-23

## 2022-07-23 RX ORDER — SUCCINYLCHOLINE CHLORIDE 20 MG/ML
100 INJECTION INTRAMUSCULAR; INTRAVENOUS
Status: COMPLETED | OUTPATIENT
Start: 2022-07-23 | End: 2022-07-23

## 2022-07-23 RX ORDER — HEPARIN SODIUM,PORCINE/D5W 25000/250
0-40 INTRAVENOUS SOLUTION INTRAVENOUS CONTINUOUS
Status: DISCONTINUED | OUTPATIENT
Start: 2022-07-23 | End: 2022-07-26

## 2022-07-23 RX ORDER — ONDANSETRON 2 MG/ML
4 INJECTION INTRAMUSCULAR; INTRAVENOUS ONCE
Status: COMPLETED | OUTPATIENT
Start: 2022-07-23 | End: 2022-07-23

## 2022-07-23 RX ORDER — PREDNISONE 20 MG/1
40 TABLET ORAL DAILY
Status: DISCONTINUED | OUTPATIENT
Start: 2022-07-23 | End: 2022-07-28 | Stop reason: HOSPADM

## 2022-07-23 RX ORDER — IPRATROPIUM BROMIDE AND ALBUTEROL SULFATE 2.5; .5 MG/3ML; MG/3ML
3 SOLUTION RESPIRATORY (INHALATION) EVERY 4 HOURS
Status: DISCONTINUED | OUTPATIENT
Start: 2022-07-23 | End: 2022-07-28 | Stop reason: HOSPADM

## 2022-07-23 RX ORDER — HYDRALAZINE HYDROCHLORIDE 20 MG/ML
10 INJECTION INTRAMUSCULAR; INTRAVENOUS
Status: COMPLETED | OUTPATIENT
Start: 2022-07-23 | End: 2022-07-23

## 2022-07-23 RX ORDER — ETOMIDATE 2 MG/ML
20 INJECTION INTRAVENOUS
Status: COMPLETED | OUTPATIENT
Start: 2022-07-23 | End: 2022-07-23

## 2022-07-23 RX ORDER — METOPROLOL SUCCINATE 25 MG/1
25 TABLET, EXTENDED RELEASE ORAL DAILY
Status: DISCONTINUED | OUTPATIENT
Start: 2022-07-24 | End: 2022-07-24

## 2022-07-23 RX ADMIN — ALBUTEROL SULFATE 15 MG: 2.5 SOLUTION RESPIRATORY (INHALATION) at 10:07

## 2022-07-23 RX ADMIN — IPRATROPIUM BROMIDE AND ALBUTEROL SULFATE 3 ML: 2.5; .5 SOLUTION RESPIRATORY (INHALATION) at 07:07

## 2022-07-23 RX ADMIN — SUCCINYLCHOLINE CHLORIDE 100 MG: 20 INJECTION INTRAMUSCULAR; INTRAVENOUS at 11:07

## 2022-07-23 RX ADMIN — HYDRALAZINE HYDROCHLORIDE 10 MG: 20 INJECTION, SOLUTION INTRAMUSCULAR; INTRAVENOUS at 10:07

## 2022-07-23 RX ADMIN — DEXMEDETOMIDINE HYDROCHLORIDE 0.2 MCG/KG/HR: 4 INJECTION, SOLUTION INTRAVENOUS at 03:07

## 2022-07-23 RX ADMIN — ENOXAPARIN SODIUM 40 MG: 100 INJECTION SUBCUTANEOUS at 05:07

## 2022-07-23 RX ADMIN — MUPIROCIN: 20 OINTMENT TOPICAL at 08:07

## 2022-07-23 RX ADMIN — PROPOFOL 10 MCG/KG/MIN: 10 INJECTION, EMULSION INTRAVENOUS at 12:07

## 2022-07-23 RX ADMIN — SUCCINYLCHOLINE CHLORIDE 100 MG: 20 INJECTION, SOLUTION INTRAMUSCULAR; INTRAVENOUS at 11:07

## 2022-07-23 RX ADMIN — IPRATROPIUM BROMIDE AND ALBUTEROL SULFATE 3 ML: 2.5; .5 SOLUTION RESPIRATORY (INHALATION) at 01:07

## 2022-07-23 RX ADMIN — IPRATROPIUM BROMIDE AND ALBUTEROL SULFATE 3 ML: 2.5; .5 SOLUTION RESPIRATORY (INHALATION) at 11:07

## 2022-07-23 RX ADMIN — DOXYCYCLINE 100 MG: 100 INJECTION, POWDER, LYOPHILIZED, FOR SOLUTION INTRAVENOUS at 03:07

## 2022-07-23 RX ADMIN — ONDANSETRON HYDROCHLORIDE 4 MG: 2 INJECTION, SOLUTION INTRAMUSCULAR; INTRAVENOUS at 06:07

## 2022-07-23 RX ADMIN — FUROSEMIDE 40 MG: 10 INJECTION, SOLUTION INTRAMUSCULAR; INTRAVENOUS at 12:07

## 2022-07-23 RX ADMIN — METHYLPREDNISOLONE SODIUM SUCCINATE 125 MG: 125 INJECTION, POWDER, FOR SOLUTION INTRAMUSCULAR; INTRAVENOUS at 05:07

## 2022-07-23 RX ADMIN — ETOMIDATE 20 MG: 2 INJECTION INTRAVENOUS at 11:07

## 2022-07-23 RX ADMIN — ETOMIDATE 20 MG: 40 INJECTION, SOLUTION INTRAVENOUS at 11:07

## 2022-07-23 RX ADMIN — Medication 25 MCG/HR: at 02:07

## 2022-07-23 RX ADMIN — KETOROLAC TROMETHAMINE 10 MG: 30 INJECTION, SOLUTION INTRAMUSCULAR; INTRAVENOUS at 06:07

## 2022-07-23 RX ADMIN — PROPOFOL 40 MG: 10 INJECTION, EMULSION INTRAVENOUS at 01:07

## 2022-07-23 RX ADMIN — MAGNESIUM SULFATE 2 G: 2 INJECTION INTRAVENOUS at 01:07

## 2022-07-23 RX ADMIN — CEFTRIAXONE 1 G: 1 INJECTION, SOLUTION INTRAVENOUS at 02:07

## 2022-07-23 RX ADMIN — FENTANYL CITRATE 100 MCG: 50 INJECTION INTRAMUSCULAR; INTRAVENOUS at 01:07

## 2022-07-23 NOTE — NURSING
Patient extubated to Bipap, no sign and symptoms of complications at this time, will continue to monitor.

## 2022-07-23 NOTE — RESPIRATORY THERAPY
Pt d/frank from BIPAP and placed on ventilator Patient on vent with documented settings.  Alarms are set and functioning with adequate volumes.  AMBU bag and mask at bedside.  Will continue to monitor.

## 2022-07-23 NOTE — PHARMACY MED REC
"Admission Medication History     The home medication history was taken by Roxana Mukherjee CPhT.    Medication history obtained fromMari    You may go to "Admission" then "Reconcile Home Medications" tabs to review and/or act upon these items.      The home medication list has been updated by the Pharmacy department.    Please read ALL comments highlighted in yellow.    Please address this information as you see fit.     Feel free to contact us if you have any questions or require assistance.      The medications listed below were removed from the home medication list.  Please reorder if appropriate:  Patient reports no longer taking the following medication(s):   Hydroxyzine 10 mg      Roxana Mukherjee CPhT.  Ext [phone]                .          "

## 2022-07-23 NOTE — Clinical Note
The catheter was inserted into the left ventricle. Hemodynamics were performed.  and Pullback was recorded.   detailed exam

## 2022-07-23 NOTE — PLAN OF CARE
Pt on BiPAP with documented settings, respiratory distress noted. Alarms are set and functioning with adequate volumes. Will continue to monitor.

## 2022-07-23 NOTE — H&P
[facility] Medicine H&P Note     Admitting Team: [facility] Hospitalist Team A  Attending Physician: Keren  Resident: Alan  Intern: Constantine    Date of Admit: 7/23/2022    Chief Complaint     SOB x 1 day     Subjective:      History of Present Illness:  Ana Aguila is a 66 y.o. female with a PMHx of Addiction, Anxiety, Aortic calcification, Chronic diarrhea, Chronic obstructive pulmonary disease, Chronic respiratory failure with hypoxia on home O2 therapy, Colon polyps, COPD, CAD, Depression, HTN, HLD, Hepatomegaly, Hyperlipidemia, Hypertension, MI, Abdominal Perforation after scope S/P right hemicolectomy, sleep difficulties, and Takotsubo cardiomyopathy (10/18/2016)    According to ED chart check the patient presented on 7/23/2022 for evaluation of shortness of breath for one hour. Patient experienced increasing SOB this AM at which time EMS was called. During transport the patient received duonebs, solumedrol, and was started on CPAP.  While in the emergency department patient found to have increased work of breathing and was transition to BiPAP with continuous duonebs for 1x. At the completion of the hour long therapy the patient had decreased responsiveness, increased work of breathing, with diaphoresis and it was at that time the ED elected to intubate the patient. [facility] IM was consulted for the evaluation and treatment.     On our exam the patient was at a RASS of -1, able to shake her head yes and no to questions, and was following commands.  Patient was intubated and on the ventilator with CXR demonstrating proper placement of the ETT and NG tube. Gould catheter was being placed.     History obtained by patient interview and supplemented by nursing documentation and chart review.     Past Medical History:  Past Medical History:   Diagnosis Date    Addiction to drug     Anxiety     Aortic calcification 1/11/2018    Chronic diarrhea     Chronic obstructive pulmonary disease 8/27/2013    Chronic  respiratory failure with hypoxia, on home O2 therapy 1/11/2018    Colon polyps 2010    COPD (chronic obstructive pulmonary disease)     Coronary artery calcification seen on CT scan 12/4/2020    Depression     Essential hypertension 8/12/2013    Former smoker 10/18/2016    Hepatomegaly 12/21/2015    Hyperlipidemia     Hypertension     MI (myocardial infarction)     Microscopic hematuria 1/13/2017    Panic disorder     Perianal abscess 6/1/2018    Reflux 2013    S/P right hemicolectomy 9/7/2017    Performed in 2011 after perforation during colonoscopy    Sleep difficulties     Takotsubo cardiomyopathy 10/18/2016    Noted on echo 10/2016, recovered on repeat echo 12/2016     Past Surgical History:  Past Surgical History:   Procedure Laterality Date    ABDOMINAL SURGERY      APPENDECTOMY      CHOLECYSTECTOMY  2013    open    COLON SURGERY  2011    secondary to perforation after c-scope    COLONOSCOPY      FRACTURE SURGERY Left 1999    HERNIA REPAIR      HIATAL HERNIA REPAIR  2013    HYSTERECTOMY  1986    Left leg surgery       Allergies:  Review of patient's allergies indicates:   Allergen Reactions    Ace inhibitors      cough    Coreg [carvedilol]      Headache     Pseudoephedrine hcl Nausea And Vomiting, Other (See Comments) and Anxiety     JITTERY     Home Medications:  Current Outpatient Medications   Medication Instructions    acetaminophen (TYLENOL) 250 mg, Oral, Every 6 hours PRN    albuterol-ipratropium (DUO-NEB) 2.5 mg-0.5 mg/3 mL nebulizer solution TAKE 3 ML BY NEBULIZER EVERY 6 HOURS AS NEEDED FOR WHEEZING OR SHORTNESS OF BREATH    amLODIPine (NORVASC) 2.5 mg, Oral, Daily    aspirin (ECOTRIN) 81 mg, Oral, Daily    cyanocobalamin (VITAMIN B-12) 100 mcg, Oral, Daily    ergocalciferol (ERGOCALCIFEROL) 50,000 unit Cap TAKE 1 CAPSULE BY MOUTH EVERY 7 DAYS    fluticasone-salmeterol diskus inhaler 250-50 mcg INHALE 1 PUFF BY MOUTH EVERY 12 HOURS AS NEEDED    hydrOXYzine HCL  (ATARAX) 10 mg, Oral, 3 times daily PRN    ipratropium (ATROVENT) 0.02 % nebulizer solution ONE VIAL VIA NEBULIZER UP TO FOUR TIMES DAILY AS NEEDED    metoprolol succinate (TOPROL-XL) 25 mg, Oral, Daily    PROAIR HFA 90 mcg/actuation inhaler INHALE 2 PUFFS FOUR TIMES DAILY AS NEEDED FOR COPD    rosuvastatin (CRESTOR) 10 mg, Oral, Daily     Family History:  Family History   Problem Relation Age of Onset    Cancer Mother             Hypertension Mother     Coronary artery disease Father     Retinal detachment Father     Hypertension Sister     Hypertension Brother     Cancer Daughter         breast cancer - double mastectomy    Abnormal EKG Daughter     Breast cancer Daughter 43        negative genetic testing, unilateral breast ca    Cataracts Maternal Grandmother     Breast cancer Maternal Grandmother         unk age of onset    Breast cancer Other 44        thinks negative genetic testing, bilat ca    Amblyopia Neg Hx     Blindness Neg Hx     Diabetes Neg Hx     Glaucoma Neg Hx     Macular degeneration Neg Hx     Strabismus Neg Hx     Stroke Neg Hx     Thyroid disease Neg Hx     Colon cancer Neg Hx     Ovarian cancer Neg Hx      Social History:  Social History     Tobacco Use    Smoking status: Former Smoker     Packs/day: 1.50     Years: 30.00     Pack years: 45.00     Types: Cigarettes     Start date: 1970     Quit date: 1997     Years since quittin.5    Smokeless tobacco: Never Used   Substance Use Topics    Alcohol use: No    Drug use: No     Review of Systems:  Review of Systems   Unable to perform ROS: Intubated     Health Care Maintenance :   Primary Care Physician: Zhou Gallardo MD   Immunizations:   Immunization History   Administered Date(s) Administered    COVID-19, MRNA, LN-S, PF (Pfizer) (Gray Cap) 2022    COVID-19, MRNA, LN-S, PF (Pfizer) (Purple Cap) 2021, 2021, 10/02/2021    Influenza (FLUAD) - Quadrivalent - Adjuvanted - PF  *Preferred* (65+) 2020, 2021    Influenza - High Dose - PF (65 years and older) 2019    Influenza - Quadrivalent - PF *Preferred* (6 months and older) 2015    Pneumococcal Conjugate - 13 Valent 2015    Pneumococcal Polysaccharide - 23 Valent 2020      Cancer Screening:  PAP: unknown   MMG: unknown  Colonoscopy: unknown    Objective:   Last 24 Hour Vital Signs:  BP  Min: 154/94  Max: 225/133  Pulse  Av.6  Min: 125  Max: 141  Resp  Av.4  Min: 34  Max: 59  SpO2  Av.8 %  Min: 93 %  Max: 100 %  Weight  Av.6 kg (160 lb)  Min: 72.6 kg (160 lb)  Max: 72.6 kg (160 lb)  Body mass index is 26.63 kg/m².  No intake/output data recorded.  Physical Examination:  Physical Exam   Constitutional: She is sedated and intubated.   Cardiovascular: Normal rate, regular rhythm, normal heart sounds and normal pulses.   Pulmonary/Chest: Breath sounds normal. No stridor. She is intubated. She has no decreased breath sounds. She has no wheezes. She has no rhonchi. She has no rales.   Abdominal: Soft. Bowel sounds are normal. She exhibits no distension. There is no guarding.   Musculoskeletal:      Right lower leg: No edema.      Left lower leg: No edema.   Neurological:   Intubated and sedated, arouses easily and shakes head to questions   Skin: Skin is cool and dry.   Vitals reviewed.     Laboratory:  Recent Labs   Lab 22  1046 22  1130   WBC 17.60*  --    HGB 13.6  --    HCT 42.9 41     --    MCV 91  --    RDW 13.9  --      --    K 4.1  --      --    CO2 26  --    BUN 15  --    CREATININE 1.0  --    *  --    PROT 7.1  --    ALBUMIN 3.8  --    BILITOT 0.4  --    AST 28  --    ALKPHOS 66  --    ALT 21  --      Cardiac Data:  Recent Labs   Lab 22  1046   TROPONINI 0.032*   BNP 70     Microbiology:  Microbiology Results (last 7 days)     Procedure Component Value Units Date/Time    Blood Culture #2 **CANNOT BE ORDERED STAT** [[id number]]      Order Status: Sent Specimen: Blood     Blood Culture #1 **CANNOT BE ORDERED STAT** [[id number]]     Order Status: Sent Specimen: Blood            No results for input(s): COLORU, CLARITYU, SPECGRAV, PHUR, PROTEINUA, GLUCOSEU, BILIRUBINCON, BLOODU, WBCU, RBCU, BACTERIA, MUCUS, NITRITE, LEUKOCYTESUR, UROBILINOGEN, HYALINECASTS in the last 24 hours.    Other Results:  EKG (my interpretation): Sinus tach 136BPM with prolonged QTC of 550    Radiology:  X-Ray Chest AP Portable   Final Result      As above         Electronically signed by: Eliot Aquino MD   Date:    07/23/2022   Time:    12:56      X-Ray Chest 1 View   Final Result      1. Increased interstitial attenuation suggest interval edema.  Correlation with any history of underlying emphysema.  No large focal consolidation.         Electronically signed by: Eliot Aquino MD   Date:    07/23/2022   Time:    11:26      XR Non-Rad Performed NG/Gastric Tube Check    (Results Pending)       Assessment:     Ana Aguila is a 66 y.o. female with a PMHx of COPD, Chronic Hypoxic Respiratory Failure, HTN, HLD, CAD presenting with acute on chronic hypoxic, hypercapnic respiratory failure in the setting of likely COPD exacerbation. Pt intubated in ED for severe hypercapnia and increased WOB on BiPAP. Originally sedated with propofol however, discontinued in the setting of hypotension. Fentanyl was initiated for pain. Admit CXR with edema concerning for volume overload from hypertension vs infection. CBC with WBC 17, likely stress response vs infection. Admitted to ICU with [facility] medicine for continued management. Duonebs, prednisone started. Doxy and Rocephin started for CAP coverage.  Awaiting transfer to ICU.     Plan:     Acute on Chronic Hypoxic Respiratory Failure likely in the Setting of COPD Exacerbation (on home oxygen)  - Patient with SOB at home and brought [place] [facility] [place]S while in transport given Solumedrol, duonebs, and CPAP and subsequently intubated while  in the ED.   - ED Blood gas pH 7.16, pCO2 85.1   - Intubated while in the ED with 7.0 ETT confirmed placement on CXR and placed on ventilator.   - CXR with increased interstitial attenuation suggesting interval edema.    - Given Dose of IV Lasix 40 in ED  - Duonebs q4  - Prednisone 40mg daily  - Rocephin and Doxycycline initiated for empiric coverage of CAP    HFrEF  - TTE 11/2018 with ER 35% with multiple wall motion abnormalities in the LAD distribution ddx stress induced vs ischemic cardiomyopathy   - Repeat TTE 2019 with new EF 60%    Troponemia   - 0.032 on admission likely due to hypertension combined with hypoxia and hypercapnia.  - EKG Sinus Tach with poor baseline without obvious signs of  ST elevation or depression  - Will repeat EKG   - Will trend while inpatient.     Prolonged QTc  - Prolonged QTc 550 on admit EKG   - Try to Avoid medications that prolong QTc  - Will repeat EKG due to poor baseline on initial and continue to monitor.     Leukocytosis  - Admit WBC 17  - Likely stress response from steroids vs infection   - Patient given dose of of rocephin and doxycycline  - Will continue to monitor     Essential Hypertension  - On home amlodopine 2.5, metoprolol succinate 25   - hold amlodipine in the setting on hypotension and will continue metoprolol in AM    CAD  -On home rosuvastatin  - Holding while intubated will continue when able to tolerate PO     HLD  -On home rosuvastatin  - Holding while intubated will continue when able to tolerate PO    Chronic Hepatomegaly  - Currently no complaints   - Will continue to monitor while inpatient     HCM  - Lipid panel, TSH, and Vit D up to date  - Will ask about pneumovax when extubated  - Will ask about tetanus when extubated   - A1c pending      DVT Ppx: Enoxaparin 40mg daily   Diet: NPO   Code: Full     Disposition: Pending stepdown from ICU and improvement in respiratory status.     Casper Phillips MD   [facility] IN/EM PGY 4  [facility] Internal Medicine     [facility]  Medicine Hospitalist Pager numbers:   [facility] Hospitalist Medicine Team A (Susanna/Keren): [phone]  [facility] Hospitalist Medicine Team B (Camilla/Lavelle):  [phone]

## 2022-07-23 NOTE — CONSULTS
[facility] Pulmonary and Critical Care Medicine  Initial Consult Note      Primary Attending:  Annamaria Veliz MD   Consultant Attending: Dr. Clark   Consultant Fellow: Lakisha Soto MD       Chief Complaint/Reason for Consult      Intubated for COPD exacerbation     History of Present Illness      66yoF h/o COPD with chronic home O2, HTN, MI, CAD who presented to the ED vis EMS for sob. The patient was found to be wheezing with EMS and was given solumedrol and placed on CPAP. The patient presented in severe distress and was placed on bipap on arrival to the ED. The patient received a trial of bipap however, she became more diaphoretic and was intubated around noon today. RSI with 7 ETT.      ICU consulted for hypercapnic and hypoxic respiratory failure   Past Medical History      Medical:  has a past medical history of Addiction to drug, Anxiety, Aortic calcification, Chronic diarrhea, Chronic obstructive pulmonary disease, Chronic respiratory failure with hypoxia, on home O2 therapy, Colon polyps, COPD (chronic obstructive pulmonary disease), Coronary artery calcification seen on CT scan, Depression, Essential hypertension, Former smoker, Hepatomegaly, Hyperlipidemia, Hypertension, MI (myocardial infarction), Microscopic hematuria, Panic disorder, Perianal abscess, Reflux, S/P right hemicolectomy, Sleep difficulties, and Takotsubo cardiomyopathy.    Surgical:  has a past surgical history that includes Hysterectomy (1986); Left leg surgery; Colon surgery (2011); Cholecystectomy (2013); Hiatal hernia repair (2013); Abdominal surgery; Hernia repair; Appendectomy; Fracture surgery (Left, 1999); and Colonoscopy.    Family: family history includes Abnormal EKG in her daughter; Breast cancer in her maternal grandmother; Breast cancer (age of onset: 43) in her daughter; Breast cancer (age of onset: 44) in her other; Cancer in her daughter and mother; Cataracts in her maternal grandmother; Coronary artery disease in her  father; Hypertension in her brother, mother, and sister; Retinal detachment in her father.    Social:  reports that she quit smoking about 25 years ago. Her smoking use included cigarettes. She started smoking about 52 years ago. She has a 45.00 pack-year smoking history. She has never used smokeless tobacco. She reports that she does not drink alcohol and does not use drugs.    Allergies and Medications reviewed     Review of Systems      · Other than those symptoms mentioned above, an extensive review of systems was unremarkable.  · A complete review of systems was not obtainable due to the current medical condition of the patient.       On Examination     Vital Signs   Pulse:  [101-141]   Resp:  [2-59]   BP: ()/()   SpO2:  [93 %-100 %]    Physical Exam   GENERAL: intubated, sedated     HEENT: Eyes open to voice. Pupils are equal, round and symmetric.     NECK: The patient has no noted JVD. No adenopathy is appreciated.    CHEST/LUNGS:her lungs are clear without wheezing. Good air movement noted blt. RR20    HEART: tachycardic, regular. No murmurs appreciated.     ABDOMEN: Soft and non-tender.     EXTREMITIES: The patient has no peripheral edema. There is no focal long bone tenderness or deformity.    SKIN: The patients skin is warm and dry, without rashes or lesions.    NEUROLOGIC: Moving all extremities and following commands.        All recent labs and imaging studies have been reviewed    Pertinent findings include:  WBC 17.6, Trop 0.032/BNP 70; pH 7.165/85/123 on bipap; repeat 7.34/58      I have personally and independently interpretted the following tests:    CXR: hyperinflated lungs bilaterally with increased interstitial markings. No focal opacification appreciated. Normal cardiac silhouette. ETT and OG appear in correct place.      EKG: sinus tachycardia, no signs of ACS. QTc prolonged at 550.     Assessment and Plan / Recommendations     Ana Aguila    · COPD exacerbation    · Ventilator: 365/24/50/5, re-evaluate ABG or VBG.  · Pre-intubation ABG showed hypercapnic RF with pH 7.15/ 80; now improved to 7.34/58. The patient's lungs are clear with good air movement. The patient is awake and following commands.   · On SBT currently PS 8 PEEP 5. TV 400s with RR 22. The patient appears comfortable. Continue holding sedation. Will start low dose precedex for anxiety. Recheck VBG in 1 hour. If stable, will extubate to bipap 12/5.   · Continue nebs q4 hours, prednisone 50mg daily x 5d   · doxcycline for COPD, holding azithromycin due to QTc prolongation (550)  · NSTEMI   · Likely 2/2 to tachycardia and hypertension on arrival.   · Continue to trend troponin. Repeat EKG   · Echo pending      Critical Care Daily Checklist:    A: Awake: RASS Goal/Actual Goal:    Actual: Leyva Agitation Sedation Scale (RASS): Deep sedation   B: Spontaneous Breathing Trial Performed? Currently on SBT   C: SAT & SBT Coordinated?  Yes                     D: Delirium: CAM-ICU Overall CAM-ICU: Positive   E: Early Mobility Performed? No   F: Feeding Goal: Goals: Pt to recieve nutrition by RD follow up  Status: Nutrition Goal Status: new   Current Diet Order   Procedures    Diet NPO      AS: Analgesia/Sedation precedex ggt ordered. Holding propofol and fentanyl.    T: Thromboembolic Prophylaxis enoxaparin   H: HOB > 300 Yes   U: Stress Ulcer Prophylaxis (if needed) None needed   G: Glucose Control none   B: Bowel Function Stool Occurrence: 1   I: Indwelling Catheter (Lines & Gould) Necessity PIV   D: De-escalation of Antimicrobials/Pharmacotherapies doxycycline    Plan for the day/ETD Nebs, steroids, re-evaluate ABG on SBT. Extubate to bipap if continuing to improve.     Code Status:  Family/Goals of Care: Full Code         Critical secondary to Patient has a condition that poses threat to life and bodily function: Severe Respiratory Distress     Critical care was time spent personally by me on the following  activities: development of treatment plan with patient or surrogate and bedside caregivers, discussions with consultants, evaluation of patient's response to treatment, examination of patient, ordering and performing treatments and interventions, ordering and review of laboratory studies, ordering and review of radiographic studies, pulse oximetry, re-evaluation of patient's condition. This critical care time did not overlap with that of any other provider or involve time for any procedures.      This plan was discussed with staff pulmonologist Dr. Clark    We will continue to follow with you, thank you for the opportunity to be involved in Ms. Aguila's care.    Please call or message directly through EPIC with any additional questions or concerns.    Lakisha Soto MD  Emergency Medicine Staff   Critical Care Fellow  2:25 PM        07/23/2022  2:25 PM

## 2022-07-23 NOTE — ED PROVIDER NOTES
Encounter Date: 7/23/2022    SCRIBE #1 NOTE: I, Davin Velásquez , am scribing for, and in the presence of, Emilio Muñoz MD.       History     Chief Complaint   Patient presents with    COPD     Brought to ED by [facility] from home for COPD exacerbation. Pt was given 125mg solumedrol en route and placed on CPAP.      Ana Aguila is a 66 y.o. female who  has a past medical history of Addiction to drug, Anxiety, Aortic calcification (1/11/2018), Chronic diarrhea, Chronic obstructive pulmonary disease (8/27/2013), Chronic respiratory failure with hypoxia, on home O2 therapy (1/11/2018), Colon polyps (2010), COPD (chronic obstructive pulmonary disease), Coronary artery calcification seen on CT scan (12/4/2020), Depression, Essential hypertension (8/12/2013), Former smoker (10/18/2016), Hepatomegaly (12/21/2015), Hyperlipidemia, Hypertension, MI (myocardial infarction), Microscopic hematuria (1/13/2017), Panic disorder, Perianal abscess (6/1/2018), Reflux (2013), S/P right hemicolectomy (9/7/2017), Sleep difficulties, and Takotsubo cardiomyopathy (10/18/2016).    The patient presents to the ED due to COPD.  Patient was brought to ED by [facility] from home for COPD exacerbation. Patient was given 125 mg solumedrol en route and placed on CPAP.  She presents in severe respiratory distress unable to speak.    The history is provided by the nursing home.     Review of patient's allergies indicates:   Allergen Reactions    Ace inhibitors      cough    Coreg [carvedilol]      Headache     Pseudoephedrine hcl Nausea And Vomiting, Other (See Comments) and Anxiety     JITTERY     Past Medical History:   Diagnosis Date    Addiction to drug     Anxiety     Aortic calcification 1/11/2018    Chronic diarrhea     Chronic obstructive pulmonary disease 8/27/2013    Chronic respiratory failure with hypoxia, on home O2 therapy 1/11/2018    Colon polyps 2010    COPD (chronic obstructive pulmonary disease)     Coronary artery  calcification seen on CT scan 2020    Depression     Essential hypertension 2013    Former smoker 10/18/2016    Hepatomegaly 2015    Hyperlipidemia     Hypertension     MI (myocardial infarction)     Microscopic hematuria 2017    Panic disorder     Perianal abscess 2018    Reflux 2013    S/P right hemicolectomy 2017    Performed in  after perforation during colonoscopy    Sleep difficulties     Takotsubo cardiomyopathy 10/18/2016    Noted on echo 10/2016, recovered on repeat echo 2016     Past Surgical History:   Procedure Laterality Date    ABDOMINAL SURGERY      APPENDECTOMY      CHOLECYSTECTOMY      open    COLON SURGERY      secondary to perforation after c-scope    COLONOSCOPY      FRACTURE SURGERY Left 1999    HERNIA REPAIR      HIATAL HERNIA REPAIR  2013    HYSTERECTOMY  1986    Left leg surgery       Family History   Problem Relation Age of Onset    Cancer Mother             Hypertension Mother     Coronary artery disease Father     Retinal detachment Father     Hypertension Sister     Hypertension Brother     Cancer Daughter         breast cancer - double mastectomy    Abnormal EKG Daughter     Breast cancer Daughter 43        negative genetic testing, unilateral breast ca    Cataracts Maternal Grandmother     Breast cancer Maternal Grandmother         unk age of onset    Breast cancer Other 44        thinks negative genetic testing, bilat ca    Amblyopia Neg Hx     Blindness Neg Hx     Diabetes Neg Hx     Glaucoma Neg Hx     Macular degeneration Neg Hx     Strabismus Neg Hx     Stroke Neg Hx     Thyroid disease Neg Hx     Colon cancer Neg Hx     Ovarian cancer Neg Hx      Social History     Tobacco Use    Smoking status: Former Smoker     Packs/day: 1.50     Years: 30.00     Pack years: 45.00     Types: Cigarettes     Start date: 1970     Quit date: 1997     Years since quittin.5    Smokeless  tobacco: Never Used   Substance Use Topics    Alcohol use: No    Drug use: No     Review of Systems   Unable to perform ROS: Severe respiratory distress       Physical Exam     Initial Vitals [07/23/22 1015]   BP Pulse Resp Temp SpO2   (!) 225/133 (!) 136 (!) 40 -- 96 %      MAP       --         Physical Exam    Nursing note and vitals reviewed.  Constitutional: She appears distressed.   HENT:   Head: Normocephalic.   Neck: Neck supple.   Cardiovascular: Tachycardia present.    Pulmonary/Chest: She has decreased breath sounds.   Greatly diminished breath sounds bilaterally   Abdominal: Abdomen is soft. She exhibits no distension.   Musculoskeletal:      Cervical back: Neck supple.      Comments: No lower extremity edema      Skin: Skin is warm and dry.         ED Course   Intubation    Date/Time: 7/23/2022 11:56 AM  Location procedure was performed: [facility] EMERGENCY DEPARTMENT  Performed by: Emilio Muñoz MD  Authorized by: Emilio Muñoz MD   Consent Done: Emergent Situation  Indications: hypercapnia and  respiratory distress  Intubation method: direct  Patient status: paralyzed (RSI)  Preoxygenation: BVM  Sedatives: etomidate  Paralytic: succinylcholine  Laryngoscope size: Mac 4  Tube size: 7.0 mm  Tube type: cuffed  Number of attempts: 2  Ventilation between attempts: BVM  Cricoid pressure: no  Cords visualized: yes  Post-procedure assessment: CO2 detector  Breath sounds: diminished  Cuff inflated: yes  Complications: No    Critical Care    Date/Time: 7/23/2022 12:33 PM  Performed by: Emilio Muñoz MD  Authorized by: Emilio Muñoz MD   Direct patient critical care time: 90 minutes  Additional history critical care time: 5 minutes  Ordering / reviewing critical care time: 15 minutes  Documentation critical care time: 15 minutes  Consulting other physicians critical care time: 5 minutes  Total critical care time (exclusive of procedural time) : 130 minutes  Critical care was  time spent personally by me on the following activities: examination of patient, discussions with primary provider, ordering and performing treatments and interventions, ordering and review of radiographic studies, pulse oximetry, review of old charts, ordering and review of laboratory studies, evaluation of patient's response to treatment and obtaining history from patient or surrogate.        Labs Reviewed   CBC W/ AUTO DIFFERENTIAL - Abnormal; Notable for the following components:       Result Value    WBC 17.60 (*)     MCHC 31.7 (*)     Immature Granulocytes 0.6 (*)     Gran # (ANC) 9.0 (*)     Immature Grans (Abs) 0.11 (*)     Lymph # 7.0 (*)     All other components within normal limits   COMPREHENSIVE METABOLIC PANEL - Abnormal; Notable for the following components:    Glucose 239 (*)     eGFR if non  58 (*)     All other components within normal limits   CK - Abnormal; Notable for the following components:     (*)     All other components within normal limits   TROPONIN I - Abnormal; Notable for the following components:    Troponin I 0.032 (*)     All other components within normal limits   ISTAT PROCEDURE - Abnormal; Notable for the following components:    POC PH 7.165 (*)     POC PCO2 85.1 (*)     POC PO2 123 (*)     POC HCO3 30.7 (*)     POC TCO2 33 (*)     All other components within normal limits   CULTURE, BLOOD   CULTURE, BLOOD   B-TYPE NATRIURETIC PEPTIDE   LACTIC ACID, PLASMA   SARS-COV-2 RDRP GENE          Imaging Results          XR Non-Rad Performed NG/Gastric Tube Check (In process)                X-Ray Chest AP Portable (In process)                X-Ray Chest 1 View (Final result)  Result time 07/23/22 11:26:20    Final result by Eliot Aquino MD (07/23/22 11:26:20)                 Impression:      1. Increased interstitial attenuation suggest interval edema.  Correlation with any history of underlying emphysema.  No large focal consolidation.      Electronically  signed by: Eliot Aquino MD  Date:    07/23/2022  Time:    11:26             Narrative:    EXAMINATION:  XR CHEST 1 VIEW    CLINICAL HISTORY:  COPD exacerbation;    TECHNIQUE:  Single frontal view of the chest was performed.    COMPARISON:  05/19/2022    FINDINGS:  The cardiomediastinal silhouette is not enlarged noting calcification of the aorta..  There is no pleural effusion.  The trachea is midline.  The lungs are symmetrically expanded bilaterally with prominent interstitial attenuation bilaterally.  There is bilateral basilar subsegmental atelectasis..  No large focal consolidation seen.  There is no pneumothorax.  The osseous structures are remarkable for degenerative changes noting osteopenia..                                 Medications   furosemide injection 40 mg (has no administration in time range)   etomidate injection 20 mg (has no administration in time range)   succinylcholine injection 100 mg (has no administration in time range)   etomidate (AMIDATE) 2 mg/mL injection (has no administration in time range)   succinylcholine (ANECTINE) 20 mg/mL injection (has no administration in time range)   rocuronium 10 mg/mL injection (has no administration in time range)   propofol (DIPRIVAN) 10 mg/mL infusion (10 mcg/kg/min × 72.6 kg (Order-Specific) Intravenous New Bag 7/23/22 1224)   propofol (DIPRIVAN) 10 mg/mL IVP (has no administration in time range)   albuterol sulfate nebulizer solution 15 mg (15 mg Nebulization Given 7/23/22 1029)   hydrALAZINE injection 10 mg (10 mg Intravenous Given 7/23/22 1054)     Medical Decision Making:   Initial Assessment:   Ana Aguila is a 66 y.o. female who  has a past medical history of Addiction to drug, Anxiety, Aortic calcification (1/11/2018), Chronic diarrhea, Chronic obstructive pulmonary disease (8/27/2013), Chronic respiratory failure with hypoxia, on home O2 therapy (1/11/2018), Colon polyps (2010), COPD (chronic obstructive pulmonary disease), Coronary  artery calcification seen on CT scan (12/4/2020), Depression, Essential hypertension (8/12/2013), Former smoker (10/18/2016), Hepatomegaly (12/21/2015), Hyperlipidemia, Hypertension, MI (myocardial infarction), Microscopic hematuria (1/13/2017), Panic disorder, Perianal abscess (6/1/2018), Reflux (2013), S/P right hemicolectomy (9/7/2017), Sleep difficulties, and Takotsubo cardiomyopathy (10/18/2016).  Differential Diagnosis:   Differential Diagnosis includes, but is not limited to:  PE, MI/ACS, pneumothorax, pericardial effusion/tamonade, pneumonia, lung abscess, pericarditis/myocarditis, pleural effusion, lung mass, CHF exacerbation, asthma exacerbation, COPD exacerbation, aspirated/ingested foreign body, airway obstruction, CO poisoning, anemia, metabolic derangement, allergy/atopy, influenza, viral URI, viral syndrome.  Clinical Tests:   Lab Tests: Ordered and Reviewed  Radiological Study: Ordered and Reviewed  Medical Tests: Reviewed and Ordered  ED Management:  Patient initially attempted to be maintained on BiPAP with an hour long breathing treatment.  She eventually became fatigued with a blood gas showing a CO2 of 85. Patient was then intubated.  She will be admitted by [facility].            Scribe Attestation:   Scribe #1: I performed the above scribed service and the documentation accurately describes the services I performed. I attest to the accuracy of the note.                 Clinical Impression:   Final diagnoses:  [R06.02] Shortness of breath  [Z78.9] Intubation of airway performed without difficulty  [J44.1] COPD with acute exacerbation (Primary)          ED Disposition Condition    Admit        I, Dr. Emilio Badillo, personally performed the services described in this documentation. All medical record entries made by the scribe were at my direction and in my presence. I have reviewed the chart and agree that the record reflects my personal performance and is accurate and complete.  Emilio Muñoz MD.  12:31 PM 07/23/2022          Emilio Muñoz MD  07/23/22 1239

## 2022-07-24 DIAGNOSIS — J96.21 ACUTE ON CHRONIC RESPIRATORY FAILURE WITH HYPOXIA AND HYPERCAPNIA: Primary | ICD-10-CM

## 2022-07-24 DIAGNOSIS — J96.22 ACUTE ON CHRONIC RESPIRATORY FAILURE WITH HYPOXIA AND HYPERCAPNIA: Primary | ICD-10-CM

## 2022-07-24 PROBLEM — D72.823 LEUKEMOID REACTION: Status: ACTIVE | Noted: 2022-07-24

## 2022-07-24 PROBLEM — J81.0 FLASH PULMONARY EDEMA: Status: ACTIVE | Noted: 2022-07-24

## 2022-07-24 PROBLEM — I16.1 HYPERTENSIVE EMERGENCY: Status: ACTIVE | Noted: 2022-07-24

## 2022-07-24 LAB
ANION GAP SERPL CALC-SCNC: 10 MMOL/L (ref 8–16)
APTT BLDCRRT: 29.9 SEC (ref 21–32)
APTT BLDCRRT: 47 SEC (ref 21–32)
APTT BLDCRRT: 72.6 SEC (ref 21–32)
BASOPHILS # BLD AUTO: 0 K/UL (ref 0–0.2)
BASOPHILS NFR BLD: 0 % (ref 0–1.9)
BUN SERPL-MCNC: 20 MG/DL (ref 8–23)
CALCIUM SERPL-MCNC: 8.5 MG/DL (ref 8.7–10.5)
CHLORIDE SERPL-SCNC: 103 MMOL/L (ref 95–110)
CO2 SERPL-SCNC: 28 MMOL/L (ref 23–29)
CREAT SERPL-MCNC: 1 MG/DL (ref 0.5–1.4)
DIFFERENTIAL METHOD: ABNORMAL
EOSINOPHIL # BLD AUTO: 0 K/UL (ref 0–0.5)
EOSINOPHIL NFR BLD: 0 % (ref 0–8)
ERYTHROCYTE [DISTWIDTH] IN BLOOD BY AUTOMATED COUNT: 13.6 % (ref 11.5–14.5)
EST. GFR  (AFRICAN AMERICAN): >60 ML/MIN/1.73 M^2
EST. GFR  (NON AFRICAN AMERICAN): 58 ML/MIN/1.73 M^2
GLUCOSE SERPL-MCNC: 132 MG/DL (ref 70–110)
HCT VFR BLD AUTO: 38.8 % (ref 37–48.5)
HGB BLD-MCNC: 12.7 G/DL (ref 12–16)
IMM GRANULOCYTES # BLD AUTO: 0.03 K/UL (ref 0–0.04)
IMM GRANULOCYTES NFR BLD AUTO: 0.3 % (ref 0–0.5)
LYMPHOCYTES # BLD AUTO: 1.1 K/UL (ref 1–4.8)
LYMPHOCYTES NFR BLD: 9.7 % (ref 18–48)
MAGNESIUM SERPL-MCNC: 2.1 MG/DL (ref 1.6–2.6)
MCH RBC QN AUTO: 28.9 PG (ref 27–31)
MCHC RBC AUTO-ENTMCNC: 32.7 G/DL (ref 32–36)
MCV RBC AUTO: 88 FL (ref 82–98)
MONOCYTES # BLD AUTO: 0.5 K/UL (ref 0.3–1)
MONOCYTES NFR BLD: 4.4 % (ref 4–15)
NEUTROPHILS # BLD AUTO: 9.6 K/UL (ref 1.8–7.7)
NEUTROPHILS NFR BLD: 85.6 % (ref 38–73)
NRBC BLD-RTO: 0 /100 WBC
PHOSPHATE SERPL-MCNC: 3.5 MG/DL (ref 2.7–4.5)
PLATELET # BLD AUTO: 211 K/UL (ref 150–450)
PMV BLD AUTO: 10.4 FL (ref 9.2–12.9)
POCT GLUCOSE: 134 MG/DL (ref 70–110)
POTASSIUM SERPL-SCNC: 3.9 MMOL/L (ref 3.5–5.1)
RBC # BLD AUTO: 4.39 M/UL (ref 4–5.4)
SODIUM SERPL-SCNC: 141 MMOL/L (ref 136–145)
TROPONIN I SERPL DL<=0.01 NG/ML-MCNC: 0.69 NG/ML (ref 0–0.03)
TROPONIN I SERPL DL<=0.01 NG/ML-MCNC: 1.17 NG/ML (ref 0–0.03)
WBC # BLD AUTO: 11.22 K/UL (ref 3.9–12.7)

## 2022-07-24 PROCEDURE — 99291 CRITICAL CARE FIRST HOUR: CPT | Mod: GC,,, | Performed by: INTERNAL MEDICINE

## 2022-07-24 PROCEDURE — 25000003 PHARM REV CODE 250: Performed by: STUDENT IN AN ORGANIZED HEALTH CARE EDUCATION/TRAINING PROGRAM

## 2022-07-24 PROCEDURE — 99900035 HC TECH TIME PER 15 MIN (STAT)

## 2022-07-24 PROCEDURE — 27000221 HC OXYGEN, UP TO 24 HOURS

## 2022-07-24 PROCEDURE — 99291 CRITICAL CARE FIRST HOUR: CPT | Mod: ,,, | Performed by: INTERNAL MEDICINE

## 2022-07-24 PROCEDURE — 93005 ELECTROCARDIOGRAM TRACING: CPT

## 2022-07-24 PROCEDURE — 93010 EKG 12-LEAD: ICD-10-PCS | Mod: ,,, | Performed by: INTERNAL MEDICINE

## 2022-07-24 PROCEDURE — 25000242 PHARM REV CODE 250 ALT 637 W/ HCPCS: Performed by: STUDENT IN AN ORGANIZED HEALTH CARE EDUCATION/TRAINING PROGRAM

## 2022-07-24 PROCEDURE — 25000003 PHARM REV CODE 250: Performed by: INTERNAL MEDICINE

## 2022-07-24 PROCEDURE — 93010 EKG 12-LEAD: ICD-10-PCS | Mod: 76,,, | Performed by: INTERNAL MEDICINE

## 2022-07-24 PROCEDURE — 93010 ELECTROCARDIOGRAM REPORT: CPT | Mod: 76,,, | Performed by: INTERNAL MEDICINE

## 2022-07-24 PROCEDURE — 63600175 PHARM REV CODE 636 W HCPCS: Performed by: STUDENT IN AN ORGANIZED HEALTH CARE EDUCATION/TRAINING PROGRAM

## 2022-07-24 PROCEDURE — 94761 N-INVAS EAR/PLS OXIMETRY MLT: CPT

## 2022-07-24 PROCEDURE — 85025 COMPLETE CBC W/AUTO DIFF WBC: CPT | Performed by: STUDENT IN AN ORGANIZED HEALTH CARE EDUCATION/TRAINING PROGRAM

## 2022-07-24 PROCEDURE — 99291 PR CRITICAL CARE, E/M 30-74 MINUTES: ICD-10-PCS | Mod: ,,, | Performed by: INTERNAL MEDICINE

## 2022-07-24 PROCEDURE — 99291 PR CRITICAL CARE, E/M 30-74 MINUTES: ICD-10-PCS | Mod: GC,,, | Performed by: INTERNAL MEDICINE

## 2022-07-24 PROCEDURE — 85730 THROMBOPLASTIN TIME PARTIAL: CPT | Mod: 91 | Performed by: INTERNAL MEDICINE

## 2022-07-24 PROCEDURE — 84100 ASSAY OF PHOSPHORUS: CPT | Performed by: STUDENT IN AN ORGANIZED HEALTH CARE EDUCATION/TRAINING PROGRAM

## 2022-07-24 PROCEDURE — 93010 ELECTROCARDIOGRAM REPORT: CPT | Mod: ,,, | Performed by: INTERNAL MEDICINE

## 2022-07-24 PROCEDURE — 84484 ASSAY OF TROPONIN QUANT: CPT | Performed by: STUDENT IN AN ORGANIZED HEALTH CARE EDUCATION/TRAINING PROGRAM

## 2022-07-24 PROCEDURE — 83735 ASSAY OF MAGNESIUM: CPT | Performed by: STUDENT IN AN ORGANIZED HEALTH CARE EDUCATION/TRAINING PROGRAM

## 2022-07-24 PROCEDURE — 20000000 HC ICU ROOM

## 2022-07-24 PROCEDURE — 36415 COLL VENOUS BLD VENIPUNCTURE: CPT | Performed by: INTERNAL MEDICINE

## 2022-07-24 PROCEDURE — 94640 AIRWAY INHALATION TREATMENT: CPT

## 2022-07-24 PROCEDURE — 80048 BASIC METABOLIC PNL TOTAL CA: CPT | Performed by: STUDENT IN AN ORGANIZED HEALTH CARE EDUCATION/TRAINING PROGRAM

## 2022-07-24 RX ORDER — ATORVASTATIN CALCIUM 40 MG/1
80 TABLET, FILM COATED ORAL DAILY
Status: DISCONTINUED | OUTPATIENT
Start: 2022-07-24 | End: 2022-07-28 | Stop reason: HOSPADM

## 2022-07-24 RX ORDER — ACETAMINOPHEN 500 MG
1000 TABLET ORAL ONCE
Status: COMPLETED | OUTPATIENT
Start: 2022-07-24 | End: 2022-07-24

## 2022-07-24 RX ORDER — ACETAMINOPHEN 325 MG/1
650 TABLET ORAL ONCE
Status: COMPLETED | OUTPATIENT
Start: 2022-07-24 | End: 2022-07-24

## 2022-07-24 RX ORDER — NITROGLYCERIN 20 MG/100ML
0-400 INJECTION INTRAVENOUS CONTINUOUS
Status: DISCONTINUED | OUTPATIENT
Start: 2022-07-24 | End: 2022-07-24

## 2022-07-24 RX ORDER — SODIUM CHLORIDE 0.9 % (FLUSH) 0.9 %
10 SYRINGE (ML) INJECTION
Status: DISCONTINUED | OUTPATIENT
Start: 2022-07-24 | End: 2022-07-28 | Stop reason: HOSPADM

## 2022-07-24 RX ORDER — CLOPIDOGREL BISULFATE 75 MG/1
300 TABLET ORAL ONCE
Status: COMPLETED | OUTPATIENT
Start: 2022-07-24 | End: 2022-07-24

## 2022-07-24 RX ORDER — SODIUM CHLORIDE 9 MG/ML
INJECTION, SOLUTION INTRAVENOUS
Status: DISCONTINUED | OUTPATIENT
Start: 2022-07-24 | End: 2022-07-28 | Stop reason: HOSPADM

## 2022-07-24 RX ORDER — METOPROLOL SUCCINATE 50 MG/1
50 TABLET, EXTENDED RELEASE ORAL DAILY
Status: DISCONTINUED | OUTPATIENT
Start: 2022-07-25 | End: 2022-07-27

## 2022-07-24 RX ORDER — AMLODIPINE BESYLATE 2.5 MG/1
2.5 TABLET ORAL DAILY
Status: DISCONTINUED | OUTPATIENT
Start: 2022-07-24 | End: 2022-07-28 | Stop reason: HOSPADM

## 2022-07-24 RX ADMIN — CEFTRIAXONE 1 G: 1 INJECTION, SOLUTION INTRAVENOUS at 01:07

## 2022-07-24 RX ADMIN — HEPARIN SODIUM 12 UNITS/KG/HR: 10000 INJECTION, SOLUTION INTRAVENOUS at 12:07

## 2022-07-24 RX ADMIN — MUPIROCIN: 20 OINTMENT TOPICAL at 09:07

## 2022-07-24 RX ADMIN — AMLODIPINE BESYLATE 2.5 MG: 2.5 TABLET ORAL at 10:07

## 2022-07-24 RX ADMIN — ACETAMINOPHEN 650 MG: 325 TABLET ORAL at 09:07

## 2022-07-24 RX ADMIN — MUPIROCIN: 20 OINTMENT TOPICAL at 08:07

## 2022-07-24 RX ADMIN — IPRATROPIUM BROMIDE AND ALBUTEROL SULFATE 3 ML: 2.5; .5 SOLUTION RESPIRATORY (INHALATION) at 11:07

## 2022-07-24 RX ADMIN — IPRATROPIUM BROMIDE AND ALBUTEROL SULFATE 3 ML: 2.5; .5 SOLUTION RESPIRATORY (INHALATION) at 12:07

## 2022-07-24 RX ADMIN — IPRATROPIUM BROMIDE AND ALBUTEROL SULFATE 3 ML: 2.5; .5 SOLUTION RESPIRATORY (INHALATION) at 04:07

## 2022-07-24 RX ADMIN — IPRATROPIUM BROMIDE AND ALBUTEROL SULFATE 3 ML: 2.5; .5 SOLUTION RESPIRATORY (INHALATION) at 08:07

## 2022-07-24 RX ADMIN — SODIUM CHLORIDE 500 ML: 0.9 INJECTION, SOLUTION INTRAVENOUS at 01:07

## 2022-07-24 RX ADMIN — METOPROLOL SUCCINATE 25 MG: 25 TABLET, EXTENDED RELEASE ORAL at 08:07

## 2022-07-24 RX ADMIN — ASPIRIN 325 MG ORAL TABLET 325 MG: 325 PILL ORAL at 12:07

## 2022-07-24 RX ADMIN — DOXYCYCLINE 100 MG: 100 INJECTION, POWDER, LYOPHILIZED, FOR SOLUTION INTRAVENOUS at 02:07

## 2022-07-24 RX ADMIN — ACETAMINOPHEN 1000 MG: 500 TABLET ORAL at 08:07

## 2022-07-24 RX ADMIN — PREDNISONE 40 MG: 20 TABLET ORAL at 08:07

## 2022-07-24 RX ADMIN — ATORVASTATIN CALCIUM 80 MG: 40 TABLET, FILM COATED ORAL at 08:07

## 2022-07-24 RX ADMIN — CLOPIDOGREL 300 MG: 75 TABLET, FILM COATED ORAL at 12:07

## 2022-07-24 RX ADMIN — HEPARIN SODIUM 13 UNITS/KG/HR: 10000 INJECTION, SOLUTION INTRAVENOUS at 09:07

## 2022-07-24 RX ADMIN — DOXYCYCLINE 100 MG: 100 INJECTION, POWDER, LYOPHILIZED, FOR SOLUTION INTRAVENOUS at 01:07

## 2022-07-24 RX ADMIN — ACETAMINOPHEN 500 MG: 500 TABLET ORAL at 01:07

## 2022-07-24 NOTE — PLAN OF CARE
[facility] Hospital Medicine Plan of Care    Interval Hx: Repeat troponin at 2300 elevated from previous, 0.032 > 1.524. Repeat EKGs from 3pm and 7pm with apparent new TWI in II, III, aVF, V4-V6; repeat at 2340 with persistent TWI in same leads. Resident evaluated pt ~2300 following repeat troponin result. Pt currently without chest pain, and stable on NC. On further questioning, pt did report substernal chest pain on admission.    Plan:  - will ASA/Plavix load  - start Heparin gtt  - trend trop/EKG q6h  - NPO  - consult cardiology

## 2022-07-24 NOTE — PROGRESS NOTES
"[facility] Resident Progress Note    Subjective:      Ana Aguila is a 66 y.o.  female who is being followed by the [facility] service at [facility] after admission for acute on chronic hypoxic hypercapnic respiratory failure.     Patient was extubated later afternoon on 2022 without incident. Patient states she was able to eat chicken broth last night without incident. States overall she is breathing much better but still on occassionally will have some Shortness of breath. Patient is currently on oxygen at 3lpm with spo2 of around 95%.      At this time the patient is stable enough to be stepped down from the ICU for continued monitoring on the floor.      Objective:   Last 24 Hour Vital Signs:  BP  Min: 77/53  Max: 225/133  Temp  Av.4 °F (36.9 °C)  Min: 97.7 °F (36.5 °C)  Max: 99 °F (37.2 °C)  Pulse  Av.2  Min: 86  Max: 141  Resp  Av.8  Min: 2  Max: 59  SpO2  Av %  Min: 92 %  Max: 100 %  Height  Av' 5" (165.1 cm)  Min: 5' 5" (165.1 cm)  Max: 5' 5" (165.1 cm)  Weight  Av.5 kg (164 lb 5.2 oz)  Min: 72.6 kg (160 lb)  Max: 76.5 kg (168 lb 10.4 oz)  I/O last 3 completed shifts:  In: 1215 [P.O.:500; I.V.:131.3; IV Piggyback:583.7]  Out: 2785 [Urine:2785]    Physical Examination:  Physical Exam  Vitals and nursing note reviewed. Exam conducted with a chaperone present.   Constitutional:       General: She is not in acute distress.     Appearance: She is not ill-appearing, toxic-appearing or diaphoretic.   HENT:      Right Ear: External ear normal.      Left Ear: External ear normal.      Nose: Nose normal.      Mouth/Throat:      Mouth: Mucous membranes are moist.      Pharynx: Oropharynx is clear.   Eyes:      Extraocular Movements: Extraocular movements intact.      Pupils: Pupils are equal, round, and reactive to light.   Cardiovascular:      Rate and Rhythm: Normal rate.      Pulses: Normal pulses.   Pulmonary:      Effort: Pulmonary effort is normal. "   Abdominal:      General: There is no distension.      Palpations: Abdomen is soft.      Tenderness: There is no abdominal tenderness.   Musculoskeletal:         General: Normal range of motion.      Cervical back: Normal range of motion.   Skin:     General: Skin is warm.      Capillary Refill: Capillary refill takes less than 2 seconds.   Neurological:      General: No focal deficit present.      Mental Status: She is alert. Mental status is at baseline.   Psychiatric:         Mood and Affect: Mood normal.         Laboratory:  Most Recent Data:  CBC:   Lab Results   Component Value Date    WBC 11.22 07/24/2022    HGB 12.7 07/24/2022    HCT 38.8 07/24/2022     07/24/2022    MCV 88 07/24/2022    RDW 13.6 07/24/2022     BMP:   Lab Results   Component Value Date     07/24/2022    K 3.9 07/24/2022     07/24/2022    CO2 28 07/24/2022    BUN 20 07/24/2022    CREATININE 1.0 07/24/2022     (H) 07/24/2022    CALCIUM 8.5 (L) 07/24/2022    MG 2.1 07/24/2022    PHOS 3.5 07/24/2022     LFTs:   Lab Results   Component Value Date    PROT 7.1 07/23/2022    ALBUMIN 3.8 07/23/2022    BILITOT 0.4 07/23/2022    AST 28 07/23/2022    ALKPHOS 66 07/23/2022    ALT 21 07/23/2022     Coags:   Lab Results   Component Value Date    INR 1.0 07/23/2022     FLP:   Lab Results   Component Value Date    CHOL 154 07/27/2021    HDL 70 07/27/2021    LDLCALC 72.0 07/27/2021    TRIG 60 07/27/2021    CHOLHDL 45.5 07/27/2021     DM:   Lab Results   Component Value Date    HGBA1C 5.8 (H) 07/23/2022    HGBA1C 5.4 10/18/2016    LDLCALC 72.0 07/27/2021    CREATININE 1.0 07/24/2022     Thyroid:   Lab Results   Component Value Date    TSH 2.258 07/27/2021     Anemia:   Lab Results   Component Value Date    IRON 44 01/16/2017    TIBC 337 01/16/2017    FERRITIN 98 01/16/2017    [id number] 585 07/27/2021     Cardiac:   Lab Results   Component Value Date    TROPONINI 1.168 (H) 07/24/2022    BNP 70 07/23/2022     Urinalysis:   Lab Results    Component Value Date    LABURIN No growth 11/18/2018    COLORU Yellow 07/23/2022    SPECGRAV 1.010 07/23/2022    NITRITE Negative 07/23/2022    KETONESU Negative 07/23/2022    UROBILINOGEN Negative 07/23/2022    WBCUA 12 (H) 07/23/2022       Trended Lab Data:  Recent Labs   Lab 07/23/22  1046 07/23/22  1130 07/23/22  1505 07/24/22  0511   WBC 17.60*  --   --  11.22   HGB 13.6  --   --  12.7   HCT 42.9 41 41 38.8     --   --  211   MCV 91  --   --  88   RDW 13.9  --   --  13.6     --   --  141   K 4.1  --   --  3.9     --   --  103   CO2 26  --   --  28   BUN 15  --   --  20   CREATININE 1.0  --   --  1.0   *  --   --  132*   PROT 7.1  --   --   --    ALBUMIN 3.8  --   --   --    BILITOT 0.4  --   --   --    AST 28  --   --   --    ALKPHOS 66  --   --   --    ALT 21  --   --   --        Trended Cardiac Data:  Recent Labs   Lab 07/23/22  1046 07/23/22  2235 07/24/22  0511   TROPONINI 0.032* 1.524* 1.168*   BNP 70  --   --        Microbiology Data Reviewed: yes  Pertinent Findings:  7/23 - Blood Culture -- NGTD   7/23 - Urine culture -- NGTD    Other Results:  EKG (my interpretation): Sinus Rhythm with ST and T wave inversion noted in inferior lateral leads.     Radiology Data Reviewed: yes  Pertinent Findings:      Current Medications:     Infusions:   dexmedetomidine (PRECEDEX) infusion Stopped (07/23/22 1644)    heparin (porcine) in D5W 12 Units/kg/hr (07/24/22 0644)        Scheduled:   acetaminophen  1,000 mg Oral Once    albuterol-ipratropium  3 mL Nebulization Q4H    atorvastatin  80 mg Oral Daily    cefTRIAXone (ROCEPHIN) IVPB  1 g Intravenous Q24H    doxycycline (VIBRAMYCIN) IVPB  100 mg Intravenous Q12H    metoprolol succinate  25 mg Oral Daily    mupirocin   Nasal BID    predniSONE  40 mg Oral Daily        PRN:  sodium chloride 0.9%, heparin (PORCINE), heparin (PORCINE), sodium chloride 0.9%    Antibiotics and Day Number of Therapy:  Rocephen and Doxy day 2     Lines and  Day Number of Therapy:  PIV     Assessment:     Ana Aguila is a 66 y.o.female with  Patient Active Problem List    Diagnosis Date Noted    Acute on chronic respiratory failure with hypoxia and hypercapnia 07/23/2022    Prolonged QT interval 07/23/2022    Calcified granuloma of lung 03/14/2022    Mood disorder 03/14/2022    Hyperlipidemia     Coronary artery calcification seen on CT scan 12/04/2020    Solitary pulmonary nodule 11/18/2020    Vitamin B12 deficiency due to intestinal malabsorption 11/18/2020    Osteopenia determined by x-ray 11/10/2020    TERI (generalized anxiety disorder) 10/05/2020    Umbilical hernia without obstruction and without gangrene 01/31/2019    Chronic RUQ pain 01/31/2019    Adjustment disorder with depressed mood 11/22/2018    Chronic respiratory failure with hypoxia, on home oxygen therapy 11/18/2018    Situational depression 11/18/2018    Aortic atherosclerosis 01/11/2018    Fatty liver disease, nonalcoholic 09/07/2017    S/P right hemicolectomy 09/07/2017    Chronic diarrhea 02/03/2017    Microscopic hematuria 01/13/2017    Former smoker 10/18/2016    Takotsubo cardiomyopathy - resolved 10/18/2016    Hepatomegaly 12/21/2015    Chronic obstructive pulmonary disease 08/27/2013    Essential hypertension 08/12/2013        Plan:     Acute on Chronic Hypoxic Hypercarbic Respiratory Failure likely in the Setting of COPD Exacerbation (on home oxygen)  - Patient with SOB at home and brought to [facility] by  EMS while in transport given Solumedrol, duonebs, and CPAP and subsequently intubated while in the ED.   - Patient extubated yesterday to bipap and now on NC   - Continue Duonebs q4 and Prednisone 40mg daily  - Rocephin and Doxycycline initiated for empiric coverage of CAP     Hypertensive Emergency with Flash pulmonary edema in sett of Chronic HFrEF  - TTE 11/2018 with ER 35% with multiple wall motion abnormalities in the LAD distribution ddx stress induced vs  ischemic cardiomyopathy   - Repeat TTE 12/2018 with new EF 60%  - Repeat echo ordered pending performance   - continue amlodopine 2.5 mg daily     NSTEMI   - Patient presented with elevation of trop on presentation initially concerning for demand ischemia.   - Overnight patients troponin continued to increased and EKG now with ST depressions and T wave inversions.  - Troponin  0.032 > 1.524 > 1.168   - Patient placed on Heparin drip, given asa, and loaded with Plavix  - Cardiology contacted and appreciate recs      Prolonged QTc  - Prolonged QTc 550 on admit EKG now 555   - Try to Avoid medications that prolong QTc  - Mag 2.1 and other electrolytes are unremarkable         CAD  - On home rosuvastatin and will continue while impatient     HLD  -On home rosuvastatin  - Holding while intubated will continue when able to tolerate PO     Chronic Hepatomegaly  - Currently no complaints   - Will continue to monitor while inpatient     Leukocytosis (Resolved)  - Admit WBC 17 now 11.22  - Likely stress response in the setting of acute respiratory distress  - Will continue to monitor      HCM  - Lipid panel, TSH, and Vit D up to date  - Will ask about pneumovax when extubated  - Will ask about tetanus when extubated   - A1c pending        DVT Ppx: Heparin   Diet: NPO - Pending Cardiology recommendation   Code: Full   Consults: Pulmonary and Cardiology      Disposition: When cleared by cardiology and with continued improvement     Casper Phillips MD   [facility] IN/EM PGY 4  [facility] Internal Medicine     [facility] Medicine Hospitalist Pager numbers:   [facility] Hospitalist Medicine Team A (Susanna/Keren): [phone]  [facility] Hospitalist Medicine Team B (Camilla/Lavelle):  [phone]

## 2022-07-24 NOTE — CONSULTS
[facility] Consult Note  Reason for Consult: NSTEMI    HPI:   Ana Aguila is a 66 y.o. female with COPD on home oxygen, HTN, Former smoking, stress induced cardiomyopathy with recovered EF, HLD, and CAD/Coronary calcification by CT scan who presented to [facility] with shortness of breath. Shortness of breath began yesterday morning. She tried to use her home medications as rescue therapy which normally works well for her. They did not work well for her yesterday however, and her shortness of breath persisted. She also noted a racing heart beat. She then developed substernal chest discomfort and presented to the ED for evaluation. She was intubated for a short time yesterday for acute respiratory failure/distress. She was extubated yesterday afternoon. She has not had fever at home. Her chest discomfort did not radiate to her arm, back, neck, jaw, or shoulder. She is still having some mild chest discomfort. She notes that her active discomfort is much less severe compared to yesterday. She has no loss of consciousness.     Past Medical History:   Diagnosis Date    Addiction to drug     Anxiety     Aortic calcification 1/11/2018    Chronic diarrhea     Chronic obstructive pulmonary disease 8/27/2013    Chronic respiratory failure with hypoxia, on home O2 therapy 1/11/2018    Colon polyps 2010    COPD (chronic obstructive pulmonary disease)     Coronary artery calcification seen on CT scan 12/4/2020    Depression     Essential hypertension 8/12/2013    Former smoker 10/18/2016    Hepatomegaly 12/21/2015    Hyperlipidemia     Hypertension     MI (myocardial infarction)     Microscopic hematuria 1/13/2017    Panic disorder     Perianal abscess 6/1/2018    Reflux 2013    S/P right hemicolectomy 9/7/2017    Performed in 2011 after perforation during colonoscopy    Sleep difficulties     Takotsubo cardiomyopathy 10/18/2016    Noted on echo 10/2016, recovered on repeat echo 12/2016       Social History     Socioeconomic History    Marital status: Single   Tobacco Use    Smoking status: Former Smoker     Packs/day: 1.50     Years: 30.00     Pack years: 45.00     Types: Cigarettes     Start date: 1970     Quit date: 1997     Years since quittin.5    Smokeless tobacco: Never Used   Substance and Sexual Activity    Alcohol use: No    Drug use: No    Sexual activity: Not Currently     Partners: Male     Birth control/protection: Abstinence   Social History Narrative    Lives alone, brother lives in [place]. Brothers and sisters also living elsewhere. Children and grandchildren live in [place].     Lives in [facility] apartment.    Works as a  with [email or web address].     Social Determinants of Health     Financial Resource Strain: Low Risk     Difficulty of Paying Living Expenses: Not hard at all   Food Insecurity: No Food Insecurity    Worried About Running Out of Food in the Last Year: Never true    Ran Out of Food in the Last Year: Never true   Transportation Needs: No Transportation Needs    Lack of Transportation (Medical): No    Lack of Transportation (Non-Medical): No   Physical Activity: Insufficiently Active    Days of Exercise per Week: 3 days    Minutes of Exercise per Session: 10 min   Stress: No Stress Concern Present    Feeling of Stress : Only a little   Social Connections: Moderately Isolated    Frequency of Communication with Friends and Family: More than three times a week    Frequency of Social Gatherings with Friends and Family: Never    Attends Taoism Services: 1 to 4 times per year    Active Member of Clubs or Organizations: No    Attends Club or Organization Meetings: Never    Marital Status:    Housing Stability: Low Risk     Unable to Pay for Housing in the Last Year: No    Number of Places Lived in the Last Year: 1    Unstable Housing in the Last Year: No      Family History   Problem Relation Age of Onset    Cancer  Mother             Hypertension Mother     Coronary artery disease Father     Retinal detachment Father     Hypertension Sister     Hypertension Brother     Cancer Daughter         breast cancer - double mastectomy    Abnormal EKG Daughter     Breast cancer Daughter 43        negative genetic testing, unilateral breast ca    Cataracts Maternal Grandmother     Breast cancer Maternal Grandmother         unk age of onset    Breast cancer Other 44        thinks negative genetic testing, bilat ca    Amblyopia Neg Hx     Blindness Neg Hx     Diabetes Neg Hx     Glaucoma Neg Hx     Macular degeneration Neg Hx     Strabismus Neg Hx     Stroke Neg Hx     Thyroid disease Neg Hx     Colon cancer Neg Hx     Ovarian cancer Neg Hx       Current Facility-Administered Medications   Medication Dose Route Frequency Provider Last Rate Last Admin    0.9%  NaCl infusion   Intravenous PRN Shelia Rajan MD   Stopped at 22 0300    albuterol-ipratropium 2.5 mg-0.5 mg/3 mL nebulizer solution 3 mL  3 mL Nebulization Q4H Casper Phillips MD   3 mL at 22 0805    atorvastatin tablet 80 mg  80 mg Oral Daily Shelia Rajan MD   80 mg at 22 0818    cefTRIAXone (ROCEPHIN) 1 g/50 mL D5W IVPB  1 g Intravenous Q24H Casper Phillips MD        dexmedetomidine (PRECEDEX) 400mcg/100mL 0.9% NaCL infusion  0-1.4 mcg/kg/hr Intravenous Continuous Lakisha Soto MD   Stopped at 22 1644    doxycycline (VIBRAMYCIN) 100mg in dextrose 5% 250 mL IVPB (ready to mix)  100 mg Intravenous Q12H Casper Phillips MD   Stopped at 22 0258    heparin 25,000 units in dextrose 5% (100 units/ml) IV bolus from bag - ADDITIONAL PRN BOLUS - 30 units/kg (max bolus 4000 units)  30 Units/kg (Adjusted) Intravenous PRN Shelia Rajan MD        heparin 25,000 units in dextrose 5% (100 units/ml) IV bolus from bag - ADDITIONAL PRN BOLUS - 60 units/kg (max bolus 4000 units)  60 Units/kg  (Adjusted) Intravenous PRN Shelia Rajan MD   3,890 Units at 07/24/22 0808    heparin 25,000 units in dextrose 5% 250 mL (100 units/mL) infusion LOW INTENSITY nomogram - [facility]  0-40 Units/kg/hr (Adjusted) Intravenous Continuous Shelia Rajan MD 9.7 mL/hr at 07/24/22 0806 15 Units/kg/hr at 07/24/22 0806    metoprolol succinate (TOPROL-XL) 24 hr tablet 25 mg  25 mg Oral Daily Casper Phillips MD   25 mg at 07/24/22 0818    mupirocin 2 % ointment   Nasal BID Annamaria Veliz MD   Given at 07/24/22 0818    predniSONE tablet 40 mg  40 mg Oral Daily Casper Phillips MD   40 mg at 07/24/22 0818    sodium chloride 0.9% flush 10 mL  10 mL Intravenous PRN Casper Phillips MD          ROS:  Constitutional: (-)fevers, (-)chills, (-)night sweats  HEENT: (+) headaches (-) lightheadedness (-) blurry vision  Cardiovascular: (+) mild chest discomfort  Respiratory: shortness of breath improving  Gastrointestinal: (-)abdominal pain (-)nausea (-)vomiting (-)tarry stools  Musculoskeletal: (-)arthralgias (-)limited range of motion, pain at left ac IV site  Neurologic: (-)parasthesias (-)mood disorder (-)anxiety  Endo: (-)polyuria (-)polydipsia (-)heat/cold intolerance  Skin: (-)rash     Vitals:    07/24/22 0630 07/24/22 0631 07/24/22 0700 07/24/22 0805   BP:   139/75    Pulse: 95  96 94   Resp: 19 17 (!) 22   Temp:       TempSrc:       SpO2: 96% 96% 95% 96%   Weight:       Height:         Physical Exam:   Gen: no acute distress, pleasant patient answering questions appropriately  HEENT: extra-ocular muscles intact, normocephalic-atraumatic  Neck: no jugular venous distension, no lymphadenopathy, no thyromegaly, no carotid bruits  CVS: regular rate and rhythm, S1S2 normal, no murmurs/rubs/gallops  CHEST: non labored, supplemental oxygen in place  ABD: Soft, non-tender, nondistended, bowel sounds present  EXT: No Edema, 2+ pulses bilaterally (radial, femoral, dorsales pedis, posterior tibial)  NEURO: awake,  alert, and oriented   Skin: No rash     Review of Labs:   Lab Results   Component Value Date    INR 1.0 07/23/2022    INR 1.2 10/18/2016    INR 1.0 10/18/2016     Lab Results   Component Value Date    WBC 11.22 07/24/2022    HGB 12.7 07/24/2022    HCT 38.8 07/24/2022    MCV 88 07/24/2022     07/24/2022     BMP  Lab Results   Component Value Date     07/24/2022    K 3.9 07/24/2022     07/24/2022    CO2 28 07/24/2022    BUN 20 07/24/2022    CREATININE 1.0 07/24/2022    CALCIUM 8.5 (L) 07/24/2022    ANIONGAP 10 07/24/2022    ESTGFRAFRICA >60 07/24/2022    EGFRNONAA 58 (A) 07/24/2022     Recent Labs   Lab 07/24/22  0511   TROPONINI 1.168*     Most recent ECG  SR, T wave inversions inferiorly and in anterior-anterolateral precordial leads    Assessment/Plan:  Patient Active Problem List   Diagnosis    Essential hypertension    Chronic obstructive pulmonary disease    Hepatomegaly    Former smoker    Takotsubo cardiomyopathy - resolved    Microscopic hematuria    Chronic diarrhea    Fatty liver disease, nonalcoholic    S/P right hemicolectomy    Aortic atherosclerosis    Chronic respiratory failure with hypoxia, on home oxygen therapy    Situational depression    Adjustment disorder with depressed mood    Umbilical hernia without obstruction and without gangrene    Chronic RUQ pain    TERI (generalized anxiety disorder)    Osteopenia determined by x-ray    Solitary pulmonary nodule    Vitamin B12 deficiency due to intestinal malabsorption    Coronary artery calcification seen on CT scan    Hyperlipidemia    Calcified granuloma of lung    Mood disorder    Acute on chronic respiratory failure with hypoxia and hypercapnia    Prolonged QT interval     Anasebastián Aguila is a 66 y.o. female with COPD on home oxygen, HTN, Former smoking, stress induced cardiomyopathy with recovered EF, HLD, and CAD/Coronary calcification by CT scan who presented to [facility] with shortness of breath.      1. NSTEMI  - possibly precipitated by acute on chronic respiratory failure  - troponin 0.032 on presentation. Trended up and peaked at 1.524. Most recent draw 1.168.  - continue heparin infusion for 48 hours total  - begin high intensity statin therapy. She is on rosuvastatin 20 mg at home. Recommend increasing to 40 mg.   - continue home metoprolol.   - received Aspirin load with 325 mg po over night. Recommend starting 81 mg po daily.   - received clopidogrel 300 mg po x1 over night. Recommend starting on daily maintenance dosing of 75 mg po daily.   - update echocardiogram (ordered already)  - ECGs with inferior ST T wave changes and anterior-anterolateral ST T changes. She has had poor R wave progression/lack of anterior forces on prior ECGs so this does not appear new.   - given ongoing chest discomfort (although mild and improved) as well as ECG changes, I will discuss with interventional cardiology on call about possible coronary angiography today. I have called and discussed the case with the interventional cardiologist on call, Dr Carreon. A consult has been placed to Dr Carreon. Dr Carreon has recommended a nitroglycerin infusion for ongoing chest discomfort. Per our discussion, if chest discomfort continues despite nitroglycerin infusion, Dr Carreon would then likely take Ms Aguila to the cath lab.   - remain NPO until evaluated by Dr Carreon.  - another potential etiology for biomarker elevation is recurrence of stress induced cardiomyopathy triggered by acute exacerbation of respiratory failure. Will wait for echo results.    [facility] will continue to follow. Please call with questions.     Eduin George MD  [facility]/Electrophysiology

## 2022-07-24 NOTE — NURSING
Pt stable overnight. Afebrile. Oriented x4. Following commands. NSR on monitor, T wave inversion present. Serial EKGs performed per order. No CP reported/shift. Heparin gtt initiated at 12 units/kg/hr. Repeat aPTT being drawn now. BP stable. Consult to [facility] this AM. O2 sats stable on 3L NC. BiPAP worn x1 hour overnight secondary to anxiety. UO adequate, see I&O. Pt encouraged to reposition frequently for pulmonary hygiene as well as to prevent skin breakdown. Labs reviewed. BG monitored. Safety maintained. POC reviewed with patient. Will report off to oncoming nurse.     Pt requests to take home rosuvastatin this AM. Will notify team.

## 2022-07-24 NOTE — PROGRESS NOTES
[facility]  Progress note      Primary Attending:  Annamaria Veliz MD   Consultant Attending: Dr. Clark   Consultant Fellow: Lakisha Soto MD       History of Present Illness      66yoF h/o COPD with chronic home O2, HTN, MI, CAD who presented to the ED vis EMS for sob. The patient was found to be wheezing with EMS and was given solumedrol and placed on CPAP. The patient presented in severe distress and was placed on bipap on arrival to the ED. The patient received a trial of bipap however, she became more diaphoretic and was intubated around noon today. RSI with 7 ETT.      ICU consulted for hypercapnic and hypoxic respiratory failure     Interval history: patient extubated yesterday afternoon to bipap. On NC this am. Patient feels significantly improved. States her breathing is not yet at her baseline but is much better. No respiratory distress. Comfortable at this time.       On Examination     Vital Signs   Temp:  [97.7 °F (36.5 °C)-99 °F (37.2 °C)]   Pulse:  []   Resp:  [2-59]   BP: ()/()   SpO2:  [92 %-100 %]    Physical Exam   GENERAL: sitting in bed, playing on phone. Appears comfortable. NC in place.    HEENT: eyes open, PEERL.     NECK: The patient has no noted JVD. No adenopathy is appreciated.    CHEST/LUNGS:her lungs are clear without wheezing. Good air movement noted blt. RR 14    HEART:regular. No murmurs appreciated.     ABDOMEN: Soft and non-tender.     EXTREMITIES: The patient has no peripheral edema. There is no focal long bone tenderness or deformity.    SKIN: The patients skin is warm and dry, without rashes or lesions.    NEUROLOGIC: Moving all extremities and following commands.        All recent labs and imaging studies have been reviewed    Pertinent findings include:  WBC 17.6, Trop 0.032/BNP 70; pH 7.165/85/123 on bipap; repeat 7.34/58      I have personally and independently interpretted the following tests:    CXR: hyperinflated lungs  bilaterally with increased interstitial markings. No focal opacification appreciated. Normal cardiac silhouette. ETT and OG appear in correct place.      EKG: sinus tachycardia, no signs of ACS. QTc prolonged at 550.     Assessment and Plan / Recommendations     Ana Aguila   66yoF presenting with respiratory failure, elevated troponin with some EKG changes consistent with strain. The patient was intubated in the ED and subsequently extubated 4 hours later after BP and HR were improved. The patient did well on SBT and did not have evidence of bronchospasm. Extubated to bipap overnight and liberated to NC this am. The patient's lungs are clear this am without wheezing. Given rapid recovery it seems unlikely that this event represents a severe COPD exacerbation. Given EKG and troponin elevation along with presenting hypertensive emergency, this event was likely cardiac in nature. Cardiology is on board for further evaluation.     · COPD exacerbation   · Extubated on 7/23, unlikely cause of the patients respiratory distress given the patient was able to be extubated promptly on arrival after control of hypertensive episode. Patients symptoms more concerning for acute cardiac event.   · Can discontinue prednisone at this time. Nebs q6hr as needed.   · Discontinue doxcycline and ceftriaxone.    · NSTEMI   · Likely 2/2 to tachycardia and hypertension on arrival.   · Troponin now downtrending. QTC is still prolonged. Avoid QT prolonging medications.   · K>4, Mg>2  · Echo pending   · Cardiology evaluating. Plan for angiogram tomorrow.   · Asa 325, clopidogrel, statin, BB   · NPO at MN.     This plan was discussed with staff pulmonologist Dr. Clark    The patient is stable for step down to the floor.     Thank you for allowing us to participate in the care of Ms. Aguila.    Please call or message directly through EPIC with any additional questions or concerns.    Lakisha Soto MD  Emergency Medicine Staff   Critical  Care Fellow  2:25 PM        07/24/2022  2:25 PM

## 2022-07-24 NOTE — CONSULTS
[facility] - Intensive Care  Cardiology  Consult Note    Patient Name: Ana Aguila  MRN: [id number]  Admission Date: 7/23/2022  Hospital Length of Stay: 1 days  Code Status: Full Code   Attending Provider: Annamaria Veliz MD   Consulting Provider: Kylee Carreon MD  Primary Care Physician: Zhou Gallardo MD  Principal Problem:Acute on chronic respiratory failure with hypoxia and hypercapnia    Patient information was obtained from patient, past medical records and primary team.     [facility]  Consult performed by: Kylee Carreon MD  Consult ordered by: Eduin George MD        Subjective:     Chief Complaint:  CP     HPI:  65 yo female h/o Takotsubo cardiomyopathy, coronary artery calcification on CT scan, HTN, HLD  COPD    Adm for SOB  She reports she had been having having worsening orthopnea, PND, leg swelling for the last few days. On the day of her presentation she had acute onset of SOB. She usually takes her home rescue medications which usually work. However she did not have any relief this time which prompted her to come to the ED. She also reported palpitations and chest discomfort with this episode. She was briefly intubated but was extubated yesterday.    Troponin elevated peak 1.524. I was called by Dr George due to concerns for NSTEMI. Our plan was to start NTG gtt and follow clinically to determine if she needs emergent angiogram today. However, she was CP free prior to starting the NTG gtt. At the time of my evaluation she is chest pain free.    H/o stress induced cardiomyopathy 2016 (LVEF 25-30% - subsequently recovered).      Past Medical History:   Diagnosis Date    Addiction to drug     Anxiety     Aortic calcification 1/11/2018    Chronic diarrhea     Chronic obstructive pulmonary disease 8/27/2013    Chronic respiratory failure with hypoxia, on home O2 therapy 1/11/2018    Colon polyps 2010    COPD (chronic obstructive pulmonary disease)      Coronary artery calcification seen on CT scan 12/4/2020    Depression     Essential hypertension 8/12/2013    Former smoker 10/18/2016    Hepatomegaly 12/21/2015    Hyperlipidemia     Hypertension     MI (myocardial infarction)     Microscopic hematuria 1/13/2017    Panic disorder     Perianal abscess 6/1/2018    Reflux 2013    S/P right hemicolectomy 9/7/2017    Performed in 2011 after perforation during colonoscopy    Sleep difficulties     Takotsubo cardiomyopathy 10/18/2016    Noted on echo 10/2016, recovered on repeat echo 12/2016       Past Surgical History:   Procedure Laterality Date    ABDOMINAL SURGERY      APPENDECTOMY      CHOLECYSTECTOMY  2013    open    COLON SURGERY  2011    secondary to perforation after c-scope    COLONOSCOPY      FRACTURE SURGERY Left 1999    HERNIA REPAIR      HIATAL HERNIA REPAIR  2013    HYSTERECTOMY  1986    Left leg surgery         Review of patient's allergies indicates:   Allergen Reactions    Ace inhibitors      cough    Coreg [carvedilol]      Headache     Pseudoephedrine hcl Nausea And Vomiting, Other (See Comments) and Anxiety     JITTERY       No current facility-administered medications on file prior to encounter.     Current Outpatient Medications on File Prior to Encounter   Medication Sig    albuterol-ipratropium (DUO-NEB) 2.5 mg-0.5 mg/3 mL nebulizer solution TAKE 3 ML BY NEBULIZER EVERY 6 HOURS AS NEEDED FOR WHEEZING OR SHORTNESS OF BREATH    amLODIPine (NORVASC) 2.5 MG tablet Take 1 tablet (2.5 mg total) by mouth once daily.    ergocalciferol (ERGOCALCIFEROL) 50,000 unit Cap TAKE 1 CAPSULE BY MOUTH EVERY 7 DAYS    fluticasone-salmeterol diskus inhaler 250-50 mcg INHALE 1 PUFF BY MOUTH EVERY 12 HOURS AS NEEDED    ipratropium (ATROVENT) 0.02 % nebulizer solution Take 500 mcg by nebulization 4 (four) times daily. Rescue    metoprolol succinate (TOPROL-XL) 25 MG 24 hr tablet Take 1 tablet (25 mg total) by mouth once daily.    PROAIR  HFA 90 mcg/actuation inhaler INHALE 2 PUFFS FOUR TIMES DAILY AS NEEDED FOR COPD    rosuvastatin (CRESTOR) 10 MG tablet Take 1 tablet (10 mg total) by mouth once daily.    acetaminophen (TYLENOL) 500 MG tablet Take 250 mg by mouth every 6 (six) hours as needed for Pain.    aspirin (ECOTRIN) 81 MG EC tablet Take 81 mg by mouth once daily.    cyanocobalamin (VITAMIN B-12) 100 MCG tablet Take 100 mcg by mouth once daily.     Family History     Problem Relation (Age of Onset)    Abnormal EKG Daughter    Breast cancer Daughter (43), Maternal Grandmother, Other (44)    Cancer Mother, Daughter    Cataracts Maternal Grandmother    Coronary artery disease Father    Hypertension Mother, Sister, Brother    Retinal detachment Father        Tobacco Use    Smoking status: Former Smoker     Packs/day: 1.50     Years: 30.00     Pack years: 45.00     Types: Cigarettes     Start date: 1970     Quit date: 1997     Years since quittin.5    Smokeless tobacco: Never Used   Substance and Sexual Activity    Alcohol use: No    Drug use: No    Sexual activity: Not Currently     Partners: Male     Birth control/protection: Abstinence     Review of Systems   Constitutional: Negative for fever.   HENT: Negative for nosebleeds.    Cardiovascular: Negative for near-syncope and syncope.        As above   Respiratory: Negative for hemoptysis.    Hematologic/Lymphatic: Negative for bleeding problem.   Skin: Negative for poor wound healing.   Gastrointestinal: Negative for hematochezia.   Genitourinary: Negative for hematuria.   Neurological: Negative for light-headedness.   Allergic/Immunologic: Negative for persistent infections.     Objective:     Vital Signs (Most Recent):  Temp: 98.7 °F (37.1 °C) (22 0815)  Pulse: 96 (22 0900)  Resp: 16 (22 0900)  BP: (!) 149/75 (22 0900)  SpO2: 96 % (22 09) Vital Signs (24h Range):  Temp:  [97.7 °F (36.5 °C)-99 °F (37.2 °C)] 98.7 °F (37.1 °C)  Pulse:   [] 96  Resp:  [2-59] 16  SpO2:  [92 %-100 %] 96 %  BP: ()/() 149/75     Weight: 76.5 kg (168 lb 10.4 oz)  Body mass index is 28.07 kg/m².    SpO2: 96 %  O2 Device (Oxygen Therapy): nasal cannula      Intake/Output Summary (Last 24 hours) at 7/24/2022 1022  Last data filed at 7/24/2022 0644  Gross per 24 hour   Intake 1215.01 ml   Output 2785 ml   Net -1569.99 ml       Lines/Drains/Airways     Drain  Duration                Urethral Catheter 07/23/22 1300 Straight-tip 14 Fr. <1 day          Peripheral Intravenous Line  Duration                Peripheral IV - Single Lumen 07/23/22 0955 20 G Anterior;Right Wrist 1 day         Peripheral IV - Single Lumen 07/23/22 1716 18 G Left Antecubital <1 day                Physical Exam  Constitutional:       General: She is not in acute distress.     Appearance: She is well-developed. She is not diaphoretic.   HENT:      Head: Normocephalic.   Neck:      Vascular: No JVD.   Cardiovascular:      Rate and Rhythm: Normal rate and regular rhythm.      Heart sounds: No murmur heard.    No friction rub. No gallop.   Pulmonary:      Effort: Pulmonary effort is normal. No respiratory distress.      Breath sounds: Normal breath sounds.   Abdominal:      Palpations: Abdomen is soft.      Tenderness: There is no abdominal tenderness.   Musculoskeletal:         General: No swelling.      Cervical back: Normal range of motion.   Skin:     General: Skin is warm.   Neurological:      Mental Status: She is alert.   Psychiatric:         Mood and Affect: Mood normal.         Significant Labs:   CMP   Recent Labs   Lab 07/23/22  1046 07/24/22  0511    141   K 4.1 3.9    103   CO2 26 28   * 132*   BUN 15 20   CREATININE 1.0 1.0   CALCIUM 8.8 8.5*   PROT 7.1  --    ALBUMIN 3.8  --    BILITOT 0.4  --    ALKPHOS 66  --    AST 28  --    ALT 21  --    ANIONGAP 11 10   ESTGFRAFRICA >60 >60   EGFRNONAA 58* 58*   , CBC   Recent Labs   Lab 07/23/22  1046 07/23/22  1130  07/24/22  0511   WBC 17.60*  --  11.22   HGB 13.6  --  12.7   HCT 42.9   < > 38.8     --  211    < > = values in this interval not displayed.    and Troponin   Recent Labs   Lab 07/23/22  1046 07/23/22  2235 07/24/22  0511   TROPONINI 0.032* 1.524* 1.168*     EKG personally reviewed. My interpretation:  7/24/22: STac 100s, normal axis. IL TWI. QTc 555    Assessment and Plan:     NSTEMI  Trop peak 1.524  Hep gtt  Aspirin, clopidogrel  NPO after midnight for coronary angiogram in AM  If recurrent CP not resolved with NTG gtt, will proceed with emergent coronary angiogram today  Echo (Takotsubo CMP?)  High intensity statin    HTN  Amlodipine, metoprolol succinate    HLD  Statin as above    Discussed with patient and her grand-daughter Patience over the phone      Active Diagnoses:    Diagnosis Date Noted POA    PRINCIPAL PROBLEM:  Acute on chronic respiratory failure with hypoxia and hypercapnia [J96.21, J96.22] 07/23/2022 Yes    Prolonged QT interval [R94.31] 07/23/2022 Yes    NSTEMI (non-ST elevated myocardial infarction) [I21.4] 10/18/2016 Yes      Problems Resolved During this Admission:       VTE Risk Mitigation (From admission, onward)         Ordered     heparin 25,000 units in dextrose 5% (100 units/ml) IV bolus from bag - ADDITIONAL PRN BOLUS - 60 units/kg (max bolus 4000 units)  As needed (PRN)        Question:  Heparin Infusion Adjustment (DO NOT MODIFY ANSWER)  Answer:  \\[email or web address]\epic\Images\Pharmacy\HeparinInfusions\heparin LOW INTENSITY nomogram for [facility] [id number].pdf    07/23/22 2331     heparin 25,000 units in dextrose 5% (100 units/ml) IV bolus from bag - ADDITIONAL PRN BOLUS - 30 units/kg (max bolus 4000 units)  As needed (PRN)        Question:  Heparin Infusion Adjustment (DO NOT MODIFY ANSWER)  Answer:  \\[email or web address]\epic\Images\Pharmacy\HeparinInfusions\heparin LOW INTENSITY nomogram for [facility] [id number].pdf    07/23/22 2331     heparin 25,000 units in dextrose 5% 250 mL (100 units/mL) infusion  LOW INTENSITY nomogram - [facility]  Continuous        Question Answer Comment   Heparin Infusion Adjustment (DO NOT MODIFY ANSWER) \\[email or web address]\epic\Images\Pharmacy\HeparinInfusions\heparin LOW INTENSITY nomogram for [facility] [id number]    Begin at (in units/kg/hr) 12        07/23/22 2331     IP VTE HIGH RISK PATIENT  Once         07/23/22 1320     Place sequential compression device  Until discontinued         07/23/22 1320              45 minutes of critical care time was utilized in the care of the patient including evaluating the patient, reviewing records, medical decision making, discussing with family over the phone and discussing with ICU and [facility] cardiology team.    Thank you for your consult. I will follow-up with patient. Please contact us if you have any additional questions.    Kylee Carreon MD  Cardiology   [facility] - Intensive Care

## 2022-07-25 LAB
ANION GAP SERPL CALC-SCNC: 9 MMOL/L (ref 8–16)
APTT BLDCRRT: 41.9 SEC (ref 21–32)
APTT BLDCRRT: 65.1 SEC (ref 21–32)
APTT BLDCRRT: 80 SEC (ref 21–32)
BACTERIA UR CULT: NORMAL
BASOPHILS # BLD AUTO: 0.02 K/UL (ref 0–0.2)
BASOPHILS NFR BLD: 0.1 % (ref 0–1.9)
BUN SERPL-MCNC: 23 MG/DL (ref 8–23)
CALCIUM SERPL-MCNC: 8.6 MG/DL (ref 8.7–10.5)
CHLORIDE SERPL-SCNC: 103 MMOL/L (ref 95–110)
CO2 SERPL-SCNC: 30 MMOL/L (ref 23–29)
CREAT SERPL-MCNC: 0.9 MG/DL (ref 0.5–1.4)
DIFFERENTIAL METHOD: ABNORMAL
EOSINOPHIL # BLD AUTO: 0 K/UL (ref 0–0.5)
EOSINOPHIL NFR BLD: 0 % (ref 0–8)
ERYTHROCYTE [DISTWIDTH] IN BLOOD BY AUTOMATED COUNT: 14.5 % (ref 11.5–14.5)
EST. GFR  (AFRICAN AMERICAN): >60 ML/MIN/1.73 M^2
EST. GFR  (NON AFRICAN AMERICAN): >60 ML/MIN/1.73 M^2
GLUCOSE SERPL-MCNC: 153 MG/DL (ref 70–110)
HCT VFR BLD AUTO: 36.4 % (ref 37–48.5)
HGB BLD-MCNC: 12.2 G/DL (ref 12–16)
IMM GRANULOCYTES # BLD AUTO: 0.07 K/UL (ref 0–0.04)
IMM GRANULOCYTES NFR BLD AUTO: 0.4 % (ref 0–0.5)
LYMPHOCYTES # BLD AUTO: 2.4 K/UL (ref 1–4.8)
LYMPHOCYTES NFR BLD: 14.3 % (ref 18–48)
MAGNESIUM SERPL-MCNC: 2.1 MG/DL (ref 1.6–2.6)
MCH RBC QN AUTO: 29.5 PG (ref 27–31)
MCHC RBC AUTO-ENTMCNC: 33.5 G/DL (ref 32–36)
MCV RBC AUTO: 88 FL (ref 82–98)
MONOCYTES # BLD AUTO: 1.1 K/UL (ref 0.3–1)
MONOCYTES NFR BLD: 6.7 % (ref 4–15)
NEUTROPHILS # BLD AUTO: 13.3 K/UL (ref 1.8–7.7)
NEUTROPHILS NFR BLD: 78.5 % (ref 38–73)
NRBC BLD-RTO: 0 /100 WBC
PHOSPHATE SERPL-MCNC: 2.8 MG/DL (ref 2.7–4.5)
PLATELET # BLD AUTO: 234 K/UL (ref 150–450)
PMV BLD AUTO: 10.5 FL (ref 9.2–12.9)
POTASSIUM SERPL-SCNC: 3.4 MMOL/L (ref 3.5–5.1)
RBC # BLD AUTO: 4.14 M/UL (ref 4–5.4)
SODIUM SERPL-SCNC: 142 MMOL/L (ref 136–145)
WBC # BLD AUTO: 16.91 K/UL (ref 3.9–12.7)

## 2022-07-25 PROCEDURE — 27000221 HC OXYGEN, UP TO 24 HOURS

## 2022-07-25 PROCEDURE — 25000003 PHARM REV CODE 250: Performed by: STUDENT IN AN ORGANIZED HEALTH CARE EDUCATION/TRAINING PROGRAM

## 2022-07-25 PROCEDURE — 80048 BASIC METABOLIC PNL TOTAL CA: CPT | Performed by: INTERNAL MEDICINE

## 2022-07-25 PROCEDURE — 25000242 PHARM REV CODE 250 ALT 637 W/ HCPCS: Performed by: STUDENT IN AN ORGANIZED HEALTH CARE EDUCATION/TRAINING PROGRAM

## 2022-07-25 PROCEDURE — 63600175 PHARM REV CODE 636 W HCPCS: Performed by: STUDENT IN AN ORGANIZED HEALTH CARE EDUCATION/TRAINING PROGRAM

## 2022-07-25 PROCEDURE — 36415 COLL VENOUS BLD VENIPUNCTURE: CPT | Performed by: INTERNAL MEDICINE

## 2022-07-25 PROCEDURE — 11000001 HC ACUTE MED/SURG PRIVATE ROOM

## 2022-07-25 PROCEDURE — 85730 THROMBOPLASTIN TIME PARTIAL: CPT | Performed by: INTERNAL MEDICINE

## 2022-07-25 PROCEDURE — 25000003 PHARM REV CODE 250: Performed by: INTERNAL MEDICINE

## 2022-07-25 PROCEDURE — 99231 SBSQ HOSP IP/OBS SF/LOW 25: CPT | Mod: 25,GC,, | Performed by: INTERNAL MEDICINE

## 2022-07-25 PROCEDURE — 94761 N-INVAS EAR/PLS OXIMETRY MLT: CPT

## 2022-07-25 PROCEDURE — 99900035 HC TECH TIME PER 15 MIN (STAT)

## 2022-07-25 PROCEDURE — 84100 ASSAY OF PHOSPHORUS: CPT | Performed by: INTERNAL MEDICINE

## 2022-07-25 PROCEDURE — 85025 COMPLETE CBC W/AUTO DIFF WBC: CPT | Performed by: STUDENT IN AN ORGANIZED HEALTH CARE EDUCATION/TRAINING PROGRAM

## 2022-07-25 PROCEDURE — 99231 PR SUBSEQUENT HOSPITAL CARE,LEVL I: ICD-10-PCS | Mod: 25,GC,, | Performed by: INTERNAL MEDICINE

## 2022-07-25 PROCEDURE — 83735 ASSAY OF MAGNESIUM: CPT | Performed by: INTERNAL MEDICINE

## 2022-07-25 PROCEDURE — 94640 AIRWAY INHALATION TREATMENT: CPT

## 2022-07-25 PROCEDURE — 63600175 PHARM REV CODE 636 W HCPCS: Performed by: INTERNAL MEDICINE

## 2022-07-25 RX ORDER — DOXYCYCLINE HYCLATE 100 MG
100 TABLET ORAL EVERY 12 HOURS
Status: DISCONTINUED | OUTPATIENT
Start: 2022-07-25 | End: 2022-07-28 | Stop reason: HOSPADM

## 2022-07-25 RX ORDER — MAGNESIUM SULFATE HEPTAHYDRATE 40 MG/ML
2 INJECTION, SOLUTION INTRAVENOUS ONCE
Status: COMPLETED | OUTPATIENT
Start: 2022-07-25 | End: 2022-07-25

## 2022-07-25 RX ORDER — POTASSIUM CHLORIDE 20 MEQ/1
40 TABLET, EXTENDED RELEASE ORAL ONCE
Status: COMPLETED | OUTPATIENT
Start: 2022-07-25 | End: 2022-07-25

## 2022-07-25 RX ORDER — ASPIRIN 81 MG/1
81 TABLET ORAL DAILY
Status: DISCONTINUED | OUTPATIENT
Start: 2022-07-25 | End: 2022-07-28 | Stop reason: HOSPADM

## 2022-07-25 RX ORDER — ACETAMINOPHEN 325 MG/1
650 TABLET ORAL EVERY 6 HOURS PRN
Status: DISCONTINUED | OUTPATIENT
Start: 2022-07-25 | End: 2022-07-28 | Stop reason: HOSPADM

## 2022-07-25 RX ORDER — CLOPIDOGREL BISULFATE 75 MG/1
75 TABLET ORAL DAILY
Status: DISCONTINUED | OUTPATIENT
Start: 2022-07-25 | End: 2022-07-27

## 2022-07-25 RX ORDER — FUROSEMIDE 10 MG/ML
20 INJECTION INTRAMUSCULAR; INTRAVENOUS ONCE
Status: COMPLETED | OUTPATIENT
Start: 2022-07-25 | End: 2022-07-25

## 2022-07-25 RX ADMIN — POTASSIUM CHLORIDE 40 MEQ: 1500 TABLET, EXTENDED RELEASE ORAL at 09:07

## 2022-07-25 RX ADMIN — MUPIROCIN: 20 OINTMENT TOPICAL at 09:07

## 2022-07-25 RX ADMIN — METOPROLOL SUCCINATE 50 MG: 50 TABLET, EXTENDED RELEASE ORAL at 09:07

## 2022-07-25 RX ADMIN — ATORVASTATIN CALCIUM 80 MG: 40 TABLET, FILM COATED ORAL at 09:07

## 2022-07-25 RX ADMIN — AMLODIPINE BESYLATE 2.5 MG: 2.5 TABLET ORAL at 09:07

## 2022-07-25 RX ADMIN — MAGNESIUM SULFATE 2 G: 2 INJECTION INTRAVENOUS at 09:07

## 2022-07-25 RX ADMIN — FUROSEMIDE 20 MG: 10 INJECTION, SOLUTION INTRAMUSCULAR; INTRAVENOUS at 01:07

## 2022-07-25 RX ADMIN — IPRATROPIUM BROMIDE AND ALBUTEROL SULFATE 3 ML: 2.5; .5 SOLUTION RESPIRATORY (INHALATION) at 11:07

## 2022-07-25 RX ADMIN — PREDNISONE 40 MG: 20 TABLET ORAL at 09:07

## 2022-07-25 RX ADMIN — DOXYCYCLINE 100 MG: 100 INJECTION, POWDER, LYOPHILIZED, FOR SOLUTION INTRAVENOUS at 04:07

## 2022-07-25 RX ADMIN — IPRATROPIUM BROMIDE AND ALBUTEROL SULFATE 3 ML: 2.5; .5 SOLUTION RESPIRATORY (INHALATION) at 08:07

## 2022-07-25 RX ADMIN — CEFTRIAXONE 1 G: 1 INJECTION, SOLUTION INTRAVENOUS at 03:07

## 2022-07-25 RX ADMIN — ASPIRIN 81 MG: 81 TABLET, COATED ORAL at 09:07

## 2022-07-25 RX ADMIN — ACETAMINOPHEN 650 MG: 325 TABLET ORAL at 05:07

## 2022-07-25 RX ADMIN — DOXYCYCLINE HYCLATE 100 MG: 100 TABLET, COATED ORAL at 04:07

## 2022-07-25 RX ADMIN — CLOPIDOGREL 75 MG: 75 TABLET, FILM COATED ORAL at 09:07

## 2022-07-25 RX ADMIN — POTASSIUM BICARBONATE 40 MEQ: 391 TABLET, EFFERVESCENT ORAL at 11:07

## 2022-07-25 RX ADMIN — IPRATROPIUM BROMIDE AND ALBUTEROL SULFATE 3 ML: 2.5; .5 SOLUTION RESPIRATORY (INHALATION) at 03:07

## 2022-07-25 RX ADMIN — IPRATROPIUM BROMIDE AND ALBUTEROL SULFATE 3 ML: 2.5; .5 SOLUTION RESPIRATORY (INHALATION) at 04:07

## 2022-07-25 NOTE — PROGRESS NOTES
"Pt arrived to unit. Introduced self as VN for this shift. Admission questions completed by VN. Educated pt on VTE risk, safety precautions, and VN's role in pt care. Opportunity given for pt's questions. All questions answered. Pt report SOB but states better than before. NC noted but unable to visualize lmp via Vidyo. + dyspnea noted after speaking in complete sentence. Denies chest pain. Reports ongoing headache and bilateral calf pain. States calves "really tender" when palpated. Denies pain unless touched. NAD noted. Bedside nurse Magdalene PINEDA made aware of pt complaints.     07/25/22 0922   Nurse Notification   Bedside Nurse Notified? Yes   Name of Bedside Nurse Magdalene PINEDA   Nurse Notfication Method Secure Chat   Nurse Notified Of Patient Request   Admission   Initial VN Admission Questions Complete   Communication Issues? None   Shift   Virtual Nurse - Patient Verbalized Approval Of Camera Use;VN Rounding   Type of Frequent Check   Type Other (see comments)  (Admission follow up questions)   Safety/Activity   Patient Rounds bed in low position;placement of personal items at bedside;visualized patient;call light in patient/parent reach   Safety Promotion/Fall Prevention assistive device/personal item within reach;Fall Risk reviewed with patient/family;instructed to call staff for mobility;side rails raised x 2   Positioning   Body Position supine   Head of Bed (HOB) Positioning HOB at 30-45 degrees   Pain/Comfort/Sleep   Preferred Pain Scale number (Numeric Rating Pain Scale)   Pain Rating (0-10): Rest 0       "

## 2022-07-25 NOTE — PROGRESS NOTES
[facility] IM Resident Progress Note    Subjective:      Ana Aguila is a 66 y.o.  female who is being followed by the [facility] service at [facility] after admission for acute on chronic hypoxic hypercapnic respiratory failure.     Patient was extubated later afternoon on 2022 without incident.       Overnight patient was experiencing mid calf pain bilaterally and states she is still experiencing the patient worse with palpation. States its a pressure pain non radiating worse with palpation nothing seems to make the pain better. Denies any other discomfort this AM. Patient has been NPO overnight for cath today.      Objective:   Last 24 Hour Vital Signs:  BP  Min: 125/67  Max: 162/78  Temp  Av.7 °F (37.1 °C)  Min: 98.2 °F (36.8 °C)  Max: 98.9 °F (37.2 °C)  Pulse  Av.1  Min: 89  Max: 108  Resp  Av.9  Min: 15  Max: 37  SpO2  Av.7 %  Min: 92 %  Max: 99 %  I/O last 3 completed shifts:  In: 2122.4 [P.O.:925; I.V.:326.3; IV Piggyback:871.1]  Out: 2845 [Urine:2845]    Physical Examination:  Physical Exam  Vitals and nursing note reviewed. Exam conducted with a chaperone present.   Constitutional:       General: She is not in acute distress.     Appearance: She is not ill-appearing, toxic-appearing or diaphoretic.   HENT:      Right Ear: External ear normal.      Left Ear: External ear normal.      Nose: Nose normal.      Mouth/Throat:      Mouth: Mucous membranes are moist.      Pharynx: Oropharynx is clear.   Eyes:      Extraocular Movements: Extraocular movements intact.      Pupils: Pupils are equal, round, and reactive to light.   Cardiovascular:      Rate and Rhythm: Normal rate.      Pulses: Normal pulses.   Pulmonary:      Effort: Pulmonary effort is normal.   Abdominal:      General: There is no distension.      Palpations: Abdomen is soft.      Tenderness: There is no abdominal tenderness.   Musculoskeletal:         General: Tenderness (bilateral mid calf)  present. Normal range of motion.      Cervical back: Normal range of motion.   Skin:     General: Skin is warm.      Capillary Refill: Capillary refill takes less than 2 seconds.   Neurological:      General: No focal deficit present.      Mental Status: She is alert. Mental status is at baseline.   Psychiatric:         Mood and Affect: Mood normal.       Laboratory:  Most Recent Data:  CBC:   Lab Results   Component Value Date    WBC 16.91 (H) 07/25/2022    HGB 12.2 07/25/2022    HCT 36.4 (L) 07/25/2022     07/25/2022    MCV 88 07/25/2022    RDW 14.5 07/25/2022     BMP:   Lab Results   Component Value Date     07/25/2022    K 3.4 (L) 07/25/2022     07/25/2022    CO2 30 (H) 07/25/2022    BUN 23 07/25/2022    CREATININE 0.9 07/25/2022     (H) 07/25/2022    CALCIUM 8.6 (L) 07/25/2022    MG 2.1 07/25/2022    PHOS 2.8 07/25/2022     LFTs:   Lab Results   Component Value Date    PROT 7.1 07/23/2022    ALBUMIN 3.8 07/23/2022    BILITOT 0.4 07/23/2022    AST 28 07/23/2022    ALKPHOS 66 07/23/2022    ALT 21 07/23/2022     Coags:   Lab Results   Component Value Date    INR 1.0 07/23/2022     FLP:   Lab Results   Component Value Date    CHOL 154 07/27/2021    HDL 70 07/27/2021    LDLCALC 72.0 07/27/2021    TRIG 60 07/27/2021    CHOLHDL 45.5 07/27/2021     DM:   Lab Results   Component Value Date    HGBA1C 5.8 (H) 07/23/2022    HGBA1C 5.4 10/18/2016    LDLCALC 72.0 07/27/2021    CREATININE 0.9 07/25/2022     Thyroid:   Lab Results   Component Value Date    TSH 2.258 07/27/2021     Anemia:   Lab Results   Component Value Date    IRON 44 01/16/2017    TIBC 337 01/16/2017    FERRITIN 98 01/16/2017    JWBTFNGV90 585 07/27/2021     Cardiac:   Lab Results   Component Value Date    TROPONINI 0.686 (H) 07/24/2022    BNP 70 07/23/2022     Urinalysis:   Lab Results   Component Value Date    LABURIN No significant growth 07/23/2022    COLORU Yellow 07/23/2022    SPECGRAV 1.010 07/23/2022    NITRITE Negative  07/23/2022    KETONESU Negative 07/23/2022    UROBILINOGEN Negative 07/23/2022    WBCUA 12 (H) 07/23/2022       Trended Lab Data:  Recent Labs   Lab 07/23/22  1046 07/23/22  1130 07/23/22  1505 07/24/22  0511 07/25/22  0404 07/25/22  0511   WBC 17.60*  --   --  11.22 16.91*  --    HGB 13.6  --   --  12.7 12.2  --    HCT 42.9   < > 41 38.8 36.4*  --      --   --  211 234  --    MCV 91  --   --  88 88  --    RDW 13.9  --   --  13.6 14.5  --      --   --  141  --  142   K 4.1  --   --  3.9  --  3.4*     --   --  103  --  103   CO2 26  --   --  28  --  30*   BUN 15  --   --  20  --  23   CREATININE 1.0  --   --  1.0  --  0.9   *  --   --  132*  --  153*   PROT 7.1  --   --   --   --   --    ALBUMIN 3.8  --   --   --   --   --    BILITOT 0.4  --   --   --   --   --    AST 28  --   --   --   --   --    ALKPHOS 66  --   --   --   --   --    ALT 21  --   --   --   --   --     < > = values in this interval not displayed.       Trended Cardiac Data:  Recent Labs   Lab 07/23/22  1046 07/23/22  2235 07/24/22  0511 07/24/22  1356   TROPONINI 0.032* 1.524* 1.168* 0.686*   BNP 70  --   --   --        Microbiology Data Reviewed: yes  Pertinent Findings:  7/23 - Blood Culture -- NGTD   7/23 - Urine culture -- NGTD    Other Results:  EKG (my interpretation): Sinus Rhythm with ST and T wave inversion noted in inferior lateral leads.     Radiology Data Reviewed: yes  Pertinent Findings:      Current Medications:     Infusions:   heparin (porcine) in D5W 10 Units/kg/hr (07/25/22 0700)        Scheduled:   albuterol-ipratropium  3 mL Nebulization Q4H    amLODIPine  2.5 mg Oral Daily    aspirin  81 mg Oral Daily    atorvastatin  80 mg Oral Daily    cefTRIAXone (ROCEPHIN) IVPB  1 g Intravenous Q24H    clopidogreL  75 mg Oral Daily    doxycycline (VIBRAMYCIN) IVPB  100 mg Intravenous Q12H    magnesium sulfate IVPB  2 g Intravenous Once    metoprolol succinate  50 mg Oral Daily    mupirocin   Nasal BID     potassium chloride  40 mEq Oral Once    predniSONE  40 mg Oral Daily        PRN:  sodium chloride 0.9%, acetaminophen, heparin (PORCINE), heparin (PORCINE), sodium chloride 0.9%, sodium chloride 0.9%    Antibiotics and Day Number of Therapy:  Rocephen and Doxy day 3    Lines and Day Number of Therapy:  PIV     Assessment:     Ana Aguila is a 66 y.o.female with  Patient Active Problem List    Diagnosis Date Noted    Hypertensive emergency 07/24/2022    Flash pulmonary edema 07/24/2022    Leukemoid reaction 07/24/2022    Acute on chronic respiratory failure with hypoxia and hypercapnia 07/23/2022    Prolonged QT interval 07/23/2022    Calcified granuloma of lung 03/14/2022    Mood disorder 03/14/2022    Hyperlipidemia     Coronary artery calcification seen on CT scan 12/04/2020    Solitary pulmonary nodule 11/18/2020    Vitamin B12 deficiency due to intestinal malabsorption 11/18/2020    Osteopenia determined by x-ray 11/10/2020    TERI (generalized anxiety disorder) 10/05/2020    Umbilical hernia without obstruction and without gangrene 01/31/2019    Chronic RUQ pain 01/31/2019    Adjustment disorder with depressed mood 11/22/2018    COPD exacerbation 11/18/2018    Chronic respiratory failure with hypoxia, on home oxygen therapy 11/18/2018    Situational depression 11/18/2018    Aortic atherosclerosis 01/11/2018    Fatty liver disease, nonalcoholic 09/07/2017    S/P right hemicolectomy 09/07/2017    Chronic diarrhea 02/03/2017    Microscopic hematuria 01/13/2017    NSTEMI (non-ST elevated myocardial infarction) 10/18/2016    Former smoker 10/18/2016    Takotsubo cardiomyopathy - resolved 10/18/2016    Hepatomegaly 12/21/2015    Chronic obstructive pulmonary disease 08/27/2013    Essential hypertension 08/12/2013        Plan:     Acute on Chronic Hypoxic Hypercarbic Respiratory Failure likely in the Setting of COPD Exacerbation (on home oxygen)   - Patient with SOB at home brought to  the ED via EMS and was subsequently intubated now extubated on nasal cannula at her home oxygen setting 2lpm.   - Continue Duonebs q4 and Prednisone 40mg daily  - Rocephin and Doxycycline initiated for empiric coverage of CAP will continue.   - Will continue to monitor     NSTEMI   - Patient presented with elevation of trop on presentation initially concerning for demand ischemia.   - Overnight patients troponin continued to increased and EKG now with ST depressions and T wave inversions.  - Troponin  0.032 > 1.524 > 1.168 >  0.686   - Patient placed on Heparin drip, given asa, and loaded with Plavix  - Cardiology plans on Angio today   - TTE ordered and pending   - Patient has been NPO over night      Prolonged QTc  - Prolonged QTc 550 > 555 > 619  - Will give dose of mag this am   - Try to Avoid medications that prolong QTc  - Continue to monitor      Hypertension   - Patient with history of hypertension on amlodipine 2.5mg daily  - Will continue while in patient      CAD  - On home rosuvastatin and will continue while impatient     HLD  -On home rosuvastatin  - Will continue while in patient      Chronic Hepatomegaly  - Currently no complaints   - Will continue to monitor while inpatient     Leukocytosis (Resolved)  - Admit WBC 17 now 11.22  - Likely stress response in the setting of acute respiratory distress  - Will continue to monitor     Hypertensive Emergency with Flash pulmonary edema in sett of Chronic HFrEF (Resolved)   - TTE 11/2018 with ER 35% with multiple wall motion abnormalities in the LAD distribution ddx stress induced vs ischemic cardiomyopathy   - Repeat TTE 12/2018 with new EF 60%  - Repeat echo ordered pending performance   - continue amlodipine 2.5mg while inpatient   - Will continue to monitor      HCM  - Lipid panel, TSH, and Vit D up to date  - Will ask about pneumovax when extubated  - Will ask about tetanus when extubated   - A1c pending     DVT Ppx: Heparin   Diet: NPO - Pending  Cardiology recommendation   Code: Full   Consults: Pulmonary and Cardiology      Disposition: When cleared by cardiology and with continued improvement     Casper Phillips MD   [facility] IN/EM PGY 4  [facility] Internal Medicine     [facility] Hospitalist Pager numbers:   [facility] Hospitalist Medicine Team A (Susanna/Keren): [phone]  [facility] Hospitalist Medicine Team B (Camilla/Lavelle):  [phone]

## 2022-07-25 NOTE — NURSING
APTT 80.0   MD notified and inquired about holding heparin gtt before cath lab. Stated to follow heparin nomogram.

## 2022-07-25 NOTE — NURSING
Pt off the floor to cath lab for procedure with cath RN on monitor and oxygen.     Bear Lucia MSN, RN

## 2022-07-25 NOTE — NURSING TRANSFER
Nursing Transfer Note      7/25/2022     Reason patient is being transferred: to lower level of care    Transfer To: room 467 from ICU    Transfer via bed    Transfer with 2L nc O2, cardiac monitoring    Transported by escort and RN    Medicines sent: PO doxycycline     Any special needs or follow-up needed: NA    Chart send with patient: Yes    Notified: granddaughter    Report given to Carole PINEDA    Upon arrival to floor: patient oriented to room, call bell in reach and bed in lowest position and handoff of heparin performed with bedside RN.     Bear Lucia MSN, RN

## 2022-07-25 NOTE — PLAN OF CARE
The sw met with the pt to complete the assessment. The pt lives alone in [place]. The pt's independent with her adl's but states it's been getting a bit tough for her lately. The pt's requesting hh at d/c. The pt uses a nebulizer,o2 and a shwr chair at home. The pt hasn't been driving lately, so her family and friends transport her to dr mohan's and errands. The pt's granddtr Patience Chacko [phone] will transport her home at d/c. The sw completed the white board in the pt's room and gave her a d/c brochure with her name and contact info on it. The sw encouraged her to call if she has any further questions or concerns. The sw will continue to follow the pt throughout her transitions of care and will assist with any d/c needs.        07/25/22 1234   Discharge Assessment   Assessment Type Discharge Planning Assessment   Confirmed/corrected address, phone number and insurance Yes   Confirmed Demographics Correct on Facesheet   Source of Information patient   When was your last doctors appointment?   (1 month ago)   Communicated JACKIE with patient/caregiver Date not available/Unable to determine   Reason For Admission Acute respiratory failure with hypoxia and hypercapnia   Lives With alone   Do you expect to return to your current living situation? Yes   Do you have help at home or someone to help you manage your care at home? Yes   Who are your caregiver(s) and their phone number(s)? Patience Chacko(dtr)[phone]   Prior to hospitilization cognitive status: Alert/Oriented   Current cognitive status: Alert/Oriented   Walking or Climbing Stairs Difficulty none   Dressing/Bathing Difficulty bathing difficulty, assistance 1 person   Home Accessibility wheelchair accessible   Home Layout Able to live on 1st floor   Equipment Currently Used at Home nebulizer;oxygen;shower chair   Readmission within 30 days? No   Patient currently being followed by outpatient case management? No   Do you currently have service(s) that  help you manage your care at home? No   Do you take prescription medications? Yes   Do you have prescription coverage? Yes   Coverage PHN   Do you have any problems affording any of your prescribed medications? No  (the pt receives her meds affordably at [facility] in [place])   Is the patient taking medications as prescribed? yes   Who is going to help you get home at discharge? Patience Chacko([facility])[phone]   How do you get to doctors appointments? family or friend will provide   Are you on dialysis? No   Do you take coumadin? No   Discharge Plan A Home Health   Discharge Plan B Other  (TBD)   DME Needed Upon Discharge  other (see comments)  (TBD)   Discharge Plan discussed with: Patient   Discharge Barriers Identified None

## 2022-07-25 NOTE — PROGRESS NOTES
"  [facility] Cardiology Progress Note       Cardiology Attending: Dr. Fisher  Cardiology Fellow: Dr Weaver    Date of Admit: 7/23/2022  Date of Note: 7/25/2022    Subjective:      Pt seen and examined at bedside this AM. Feeling well this morning with no SOB or chest pain, is complaining of mild BL calf tenderness. Discussed cath procedure and underlying pathophys of coronary disease in detail.     Objective:      24-hour Vitals:   Temp:  [98.2 °F (36.8 °C)-98.9 °F (37.2 °C)] 98.9 °F (37.2 °C)  Pulse:  [] 91  Resp:  [15-37] 20  SpO2:  [92 %-99 %] 95 %  BP: (125-162)/(67-84) 162/78    Vitals: BP (!) 162/78   Pulse 91   Temp 98.9 °F (37.2 °C) (Oral)   Resp 20   Ht 5' 5" (1.651 m)   Wt 76.5 kg (168 lb 10.4 oz)   LMP  (LMP Unknown)   SpO2 95%   BMI 28.07 kg/m²     Intake/Output Summary (Last 24 hours) at 7/25/2022 0753  Last data filed at 7/25/2022 0700  Gross per 24 hour   Intake 1252.07 ml   Output 1710 ml   Net -457.93 ml       Physical Exam     GEN: NAD, resting comfortably  HEENT: NC/AT  Cards: RRR, no murmurs heard  Resp: NWOB on NC, no cough or wheezing at present  Abd: soft and nondistended  Extrem: No edema, mild TTP present bilaterally to calves  Neuro: oriented to situation, no focal deficits  Psych: appropriate mood and affect    Current Medications:      Infusions:   heparin (porcine) in D5W 10 Units/kg/hr (07/25/22 0700)     Scheduled:   albuterol-ipratropium  3 mL Nebulization Q4H    amLODIPine  2.5 mg Oral Daily    aspirin  81 mg Oral Daily    atorvastatin  80 mg Oral Daily    cefTRIAXone (ROCEPHIN) IVPB  1 g Intravenous Q24H    clopidogreL  75 mg Oral Daily    doxycycline (VIBRAMYCIN) IVPB  100 mg Intravenous Q12H    magnesium sulfate IVPB  2 g Intravenous Once    metoprolol succinate  50 mg Oral Daily    mupirocin   Nasal BID    predniSONE  40 mg Oral Daily     PRN:  sodium chloride 0.9%, acetaminophen, heparin (PORCINE), heparin (PORCINE), sodium chloride 0.9%, sodium chloride " 0.9%    Labs:   I have reviewed the following labs below:    Recent Labs   Lab 07/23/22  1046 07/23/22  1130 07/23/22  1505 07/23/22  2235 07/23/22  2342 07/24/22  0511 07/24/22  1356 07/25/22  0404 07/25/22  0511   WBC 17.60*  --   --   --   --  11.22  --  16.91*  --    HGB 13.6  --   --   --   --  12.7  --  12.2  --    HCT 42.9   < > 41  --   --  38.8  --  36.4*  --      --   --   --   --  211  --  234  --      --   --   --   --  141  --   --  142   K 4.1  --   --   --   --  3.9  --   --  3.4*     --   --   --   --  103  --   --  103   CO2 26  --   --   --   --  28  --   --  30*   BUN 15  --   --   --   --  20  --   --  23   CREATININE 1.0  --   --   --   --  1.0  --   --  0.9   *  --   --   --   --  132*  --   --  153*   CALCIUM 8.8  --   --   --   --  8.5*  --   --  8.6*   MG  --   --   --   --   --  2.1  --   --  2.1   PHOS  --   --   --   --   --  3.5  --   --  2.8   PROT 7.1  --   --   --   --   --   --   --   --    ALBUMIN 3.8  --   --   --   --   --   --   --   --    BILITOT 0.4  --   --   --   --   --   --   --   --    AST 28  --   --   --   --   --   --   --   --    ALT 21  --   --   --   --   --   --   --   --    ALKPHOS 66  --   --   --   --   --   --   --   --    INR  --   --   --   --  1.0  --   --   --   --    TROPONINI 0.032*  --   --  1.524*  --  1.168* 0.686*  --   --    BNP 70  --   --   --   --   --   --   --   --     < > = values in this interval not displayed.     Cardiovascular Studies:     TTE (12/14/2018):  · Normal left ventricular systolic function. The estimated ejection fraction is 60%  · Normal LV diastolic function.  · Normal right ventricular systolic function.  · The estimated PA systolic pressure is 30 mm Hg  · Normal central venous pressure (3 mm Hg).    LHC/Cors 2016  Normal coronary arteries.   Basal segments move well but there is LV wall motion abnormality consistent with Tako tsubo cardiomyopathy.     Assessment:     Ana Aguila is a 66F hx  stress-induced cardiomyopathy with recovered EF, CAC on CT s/p clean catheterization 2016, HLD, HTN, COPD presented 7/23/22 with SOB + chest pain consistent with COPD exacerbation w/ NSTEMI.    Plan/Recommendations:       - Plan for cath this afternoon with Dr Carreon, will stop heparin in procedure suite  - continue aspirin 81/plavix 75 for now  - continue toprol 50, amlo 2.5 for BP  - continue lipitor 80    Will MD Mike  [facility] Cardiology Service, PGY-III  07/25/2022 7:53 AM  [facility]

## 2022-07-25 NOTE — NURSING
Pt's heparin has been infusing since this AM at 0700 until pt went off the unit to cath lab at 1223.  Restarted heparin at her previous rate of 10units/kg/hr at 1336.  For some reason during pump verify it did not calculate correctly.     Bear Lucia MSN, RN

## 2022-07-25 NOTE — PROGRESS NOTES
Pt C/O headache and bilateral calf pain w/ worse pain in left calf. Left calf slightly more swollen than right, all pulses palpable, no redness or warmth noted.   MD notified of pt's complaints. PRN tylenol ordered for HA.

## 2022-07-25 NOTE — PLAN OF CARE
Problem: Skin Injury Risk Increased  Goal: Skin Health and Integrity  Outcome: Ongoing, Progressing     Problem: Fall Injury Risk  Goal: Absence of Fall and Fall-Related Injury  Outcome: Ongoing, Progressing     Problem: Infection  Goal: Absence of Infection Signs and Symptoms  Outcome: Ongoing, Not Progressing

## 2022-07-25 NOTE — PROGRESS NOTES
[facility] Pulmonary/Critical Care Progress Note      Patient: Ana Aguila  Age:66 y.o.   MRN:[id number]  Admit date:7/23/2022    LOS:2 day(s)     SUBJECTIVE:         Interval Hx:  Bilateral calf pain overnight, troponin downtrended. Plan for angiogram this AM.    Pt states she feels well today, not currently experiencing chest pain or SOB. Endorsing cramping bilateral calf pain.    OBJECTIVE DATA:      Vital Signs:  Vitals:    07/25/22 0700   BP: (!) 162/78   Pulse: 91   Resp: 20   Temp:        Intake/Output:    Intake/Output Summary (Last 24 hours) at 7/25/2022 0738  Last data filed at 7/25/2022 0700  Gross per 24 hour   Intake 1252.07 ml   Output 1710 ml   Net -457.93 ml        Physical Exam:  Constitutional: cooperative, calm, appears stated age.  HEENT: NCAT, MMM.  Neck: Supple, no masses, trachea not deviated.  Resp: Symmetrical, clear to auscultation bilaterally, No wheezes, rhonchi, or crackles.   Cardio: RRR, S1 and S2 normal, no murmurs or added sounds.  GI: Soft, non-tender, bowel sounds active.  Extremities: No edema, peripheral pulses 2+ and symmetric, extremities warm. Modeate TTP on bilateral posterior calves, no appreciable swelling noticed  Skin: No rashes, bruises, or lesions.    Neurologic: Alert and oriented x3, follows commands, moves extremities spontaneously.     Laboratory/Imaging:  Recent Labs   Lab 07/23/22  1046 07/23/22  1130 07/23/22  1505 07/24/22  0511 07/25/22  0404 07/25/22  0511   WBC 17.60*  --   --  11.22 16.91*  --    HGB 13.6  --   --  12.7 12.2  --    HCT 42.9   < > 41 38.8 36.4*  --      --   --  211 234  --      --   --  141  --  142   K 4.1  --   --  3.9  --  3.4*     --   --  103  --  103   CREATININE 1.0  --   --  1.0  --  0.9   BUN 15  --   --  20  --  23   CO2 26  --   --  28  --  30*   ALT 21  --   --   --   --   --    AST 28  --   --   --   --   --     < > = values in this interval not displayed.     Microbiology:  7/25 BCx: pending    Medications:    albuterol-ipratropium  3 mL Nebulization Q4H    amLODIPine  2.5 mg Oral Daily    atorvastatin  80 mg Oral Daily    cefTRIAXone (ROCEPHIN) IVPB  1 g Intravenous Q24H    doxycycline (VIBRAMYCIN) IVPB  100 mg Intravenous Q12H    magnesium sulfate IVPB  2 g Intravenous Once    metoprolol succinate  50 mg Oral Daily    mupirocin   Nasal BID    predniSONE  40 mg Oral Daily         heparin (porcine) in D5W 10 Units/kg/hr (07/25/22 0534)        sodium chloride 0.9%, acetaminophen, heparin (PORCINE), heparin (PORCINE), sodium chloride 0.9%, sodium chloride 0.9%    Problem List:  Patient Active Problem List   Diagnosis    Essential hypertension    Chronic obstructive pulmonary disease    Hepatomegaly    NSTEMI (non-ST elevated myocardial infarction)    Former smoker    Takotsubo cardiomyopathy - resolved    Microscopic hematuria    Chronic diarrhea    Fatty liver disease, nonalcoholic    S/P right hemicolectomy    Aortic atherosclerosis    COPD exacerbation    Chronic respiratory failure with hypoxia, on home oxygen therapy    Situational depression    Adjustment disorder with depressed mood    Umbilical hernia without obstruction and without gangrene    Chronic RUQ pain    TERI (generalized anxiety disorder)    Osteopenia determined by x-ray    Solitary pulmonary nodule    Vitamin B12 deficiency due to intestinal malabsorption    Coronary artery calcification seen on CT scan    Hyperlipidemia    Calcified granuloma of lung    Mood disorder    Acute on chronic respiratory failure with hypoxia and hypercapnia    Prolonged QT interval    Hypertensive emergency    Flash pulmonary edema    Leukemoid reaction        Assessment/Plan:     Ana Aguila is a 66 y.o. female with a PMHx of COPD, HTN, CAD who presented with Acute Hypoxic Respiratory Failure and NSTEMI. Etiology of respiratory failure likely cardiac due to rapid improvement and short course of mechanical ventilation     Neuro  APOLINAR,  mentating appropriately    Cardiovascular  #NSTEMI  -Troponin downtrended overnight  -Cardiology following: heparin gtt, ASA, plavix, statin, and plan for coronary angiogram this morning.  -Echo pending     #Calf Pain  -Possibly 2/2 to muscular cramps. Legs grossly appear same size.  -Already on heparin gtt, can consider DVT U/S    Respiratory  #Acute Hypoxic Respiratory failure  #COPD  -extubated on 7/23 after control of hypertensive episode  -Likely cardiac Etiology, no signs of pneumonia. Can discontinue doxycycline, ceftriaxone, and prednisone.  -Oxygenating well on home 2L NC     GI/FEN  NPO for angiogram today      Renal   APOLINAR      Heme  APOLINAR    Infectious Disease  APOLINAR     Code: Full  Dispo: Pending angiogram today, pt currently stepped down.     Thank you for allowing us to participate in the care of this patient. We will continue to follow.      Marquise Rangel MD  [facility] IM PGY-2  [facility]

## 2022-07-26 ENCOUNTER — PATIENT OUTREACH (OUTPATIENT)
Dept: ADMINISTRATIVE | Facility: OTHER | Age: 67
End: 2022-07-26
Payer: MEDICARE

## 2022-07-26 LAB
ANION GAP SERPL CALC-SCNC: 12 MMOL/L (ref 8–16)
AORTIC ROOT ANNULUS: 2.85 CM
APTT BLDCRRT: 43.9 SEC (ref 21–32)
ASCENDING AORTA: 2.69 CM
AV INDEX (PROSTH): 0.75
AV MEAN GRADIENT: 4 MMHG
AV PEAK GRADIENT: 6 MMHG
AV VALVE AREA: 1.85 CM2
AV VELOCITY RATIO: 0.75
BASOPHILS # BLD AUTO: 0.02 K/UL (ref 0–0.2)
BASOPHILS NFR BLD: 0.1 % (ref 0–1.9)
BSA FOR ECHO PROCEDURE: 1.87 M2
BUN SERPL-MCNC: 24 MG/DL (ref 8–23)
CALCIUM SERPL-MCNC: 9.1 MG/DL (ref 8.7–10.5)
CHLORIDE SERPL-SCNC: 103 MMOL/L (ref 95–110)
CO2 SERPL-SCNC: 31 MMOL/L (ref 23–29)
CREAT SERPL-MCNC: 0.8 MG/DL (ref 0.5–1.4)
CV ECHO LV RWT: 0.5 CM
DIFFERENTIAL METHOD: ABNORMAL
DOP CALC AO PEAK VEL: 1.21 M/S
DOP CALC AO VTI: 18.06 CM
DOP CALC LVOT AREA: 2.5 CM2
DOP CALC LVOT DIAMETER: 1.77 CM
DOP CALC LVOT PEAK VEL: 0.91 M/S
DOP CALC LVOT STROKE VOLUME: 33.45 CM3
DOP CALCLVOT PEAK VEL VTI: 13.6 CM
ECHO LV POSTERIOR WALL: 1 CM (ref 0.6–1.1)
EJECTION FRACTION: 40 %
EOSINOPHIL # BLD AUTO: 0 K/UL (ref 0–0.5)
EOSINOPHIL NFR BLD: 0 % (ref 0–8)
ERYTHROCYTE [DISTWIDTH] IN BLOOD BY AUTOMATED COUNT: 14.2 % (ref 11.5–14.5)
EST. GFR  (AFRICAN AMERICAN): >60 ML/MIN/1.73 M^2
EST. GFR  (NON AFRICAN AMERICAN): >60 ML/MIN/1.73 M^2
FRACTIONAL SHORTENING: 18 % (ref 28–44)
GLUCOSE SERPL-MCNC: 91 MG/DL (ref 70–110)
HCT VFR BLD AUTO: 38.2 % (ref 37–48.5)
HGB BLD-MCNC: 12.4 G/DL (ref 12–16)
IMM GRANULOCYTES # BLD AUTO: 0.06 K/UL (ref 0–0.04)
IMM GRANULOCYTES NFR BLD AUTO: 0.4 % (ref 0–0.5)
INTERVENTRICULAR SEPTUM: 0.73 CM (ref 0.6–1.1)
IVRT: 137.01 MSEC
LA MAJOR: 3.94 CM
LA MINOR: 4.28 CM
LA WIDTH: 3.55 CM
LEFT ATRIUM SIZE: 3.27 CM
LEFT ATRIUM VOLUME INDEX MOD: 14.3 ML/M2
LEFT ATRIUM VOLUME INDEX: 22 ML/M2
LEFT ATRIUM VOLUME MOD: 26.28 CM3
LEFT ATRIUM VOLUME: 40.48 CM3
LEFT INTERNAL DIMENSION IN SYSTOLE: 3.27 CM (ref 2.1–4)
LEFT VENTRICLE DIASTOLIC VOLUME INDEX: 38.36 ML/M2
LEFT VENTRICLE DIASTOLIC VOLUME: 70.59 ML
LEFT VENTRICLE MASS INDEX: 57 G/M2
LEFT VENTRICLE SYSTOLIC VOLUME INDEX: 23.5 ML/M2
LEFT VENTRICLE SYSTOLIC VOLUME: 43.2 ML
LEFT VENTRICULAR INTERNAL DIMENSION IN DIASTOLE: 4.01 CM (ref 3.5–6)
LEFT VENTRICULAR MASS: 104.3 G
LYMPHOCYTES # BLD AUTO: 3.2 K/UL (ref 1–4.8)
LYMPHOCYTES NFR BLD: 21.7 % (ref 18–48)
MAGNESIUM SERPL-MCNC: 2.4 MG/DL (ref 1.6–2.6)
MCH RBC QN AUTO: 29.2 PG (ref 27–31)
MCHC RBC AUTO-ENTMCNC: 32.5 G/DL (ref 32–36)
MCV RBC AUTO: 90 FL (ref 82–98)
MONOCYTES # BLD AUTO: 1.3 K/UL (ref 0.3–1)
MONOCYTES NFR BLD: 8.6 % (ref 4–15)
MV A" WAVE DURATION": 11.42 MSEC
NEUTROPHILS # BLD AUTO: 10.4 K/UL (ref 1.8–7.7)
NEUTROPHILS NFR BLD: 69.2 % (ref 38–73)
NRBC BLD-RTO: 0 /100 WBC
PHOSPHATE SERPL-MCNC: 3.1 MG/DL (ref 2.7–4.5)
PLATELET # BLD AUTO: 266 K/UL (ref 150–450)
PMV BLD AUTO: 10.5 FL (ref 9.2–12.9)
POC ACTIVATED CLOTTING TIME K: 150 SEC (ref 74–137)
POTASSIUM SERPL-SCNC: 3.7 MMOL/L (ref 3.5–5.1)
PULM VEIN S/D RATIO: 1.36
PV PEAK D VEL: 0.22 M/S
PV PEAK S VEL: 0.3 M/S
RA MAJOR: 3.71 CM
RA PRESSURE: 3 MMHG
RA WIDTH: 3.2 CM
RBC # BLD AUTO: 4.24 M/UL (ref 4–5.4)
RIGHT VENTRICULAR END-DIASTOLIC DIMENSION: 2.74 CM
SAMPLE: ABNORMAL
SODIUM SERPL-SCNC: 146 MMOL/L (ref 136–145)
STJ: 2.77 CM
TDI LATERAL: 0.04 M/S
WBC # BLD AUTO: 14.96 K/UL (ref 3.9–12.7)

## 2022-07-26 PROCEDURE — 85347 COAGULATION TIME ACTIVATED: CPT | Performed by: INTERNAL MEDICINE

## 2022-07-26 PROCEDURE — C1887 CATHETER, GUIDING: HCPCS | Performed by: INTERNAL MEDICINE

## 2022-07-26 PROCEDURE — 85025 COMPLETE CBC W/AUTO DIFF WBC: CPT | Performed by: STUDENT IN AN ORGANIZED HEALTH CARE EDUCATION/TRAINING PROGRAM

## 2022-07-26 PROCEDURE — 25000003 PHARM REV CODE 250: Performed by: INTERNAL MEDICINE

## 2022-07-26 PROCEDURE — 63600175 PHARM REV CODE 636 W HCPCS: Performed by: INTERNAL MEDICINE

## 2022-07-26 PROCEDURE — 25000242 PHARM REV CODE 250 ALT 637 W/ HCPCS: Performed by: STUDENT IN AN ORGANIZED HEALTH CARE EDUCATION/TRAINING PROGRAM

## 2022-07-26 PROCEDURE — 99152 MOD SED SAME PHYS/QHP 5/>YRS: CPT | Performed by: INTERNAL MEDICINE

## 2022-07-26 PROCEDURE — 25500020 PHARM REV CODE 255: Performed by: INTERNAL MEDICINE

## 2022-07-26 PROCEDURE — 97162 PT EVAL MOD COMPLEX 30 MIN: CPT

## 2022-07-26 PROCEDURE — 63600175 PHARM REV CODE 636 W HCPCS: Performed by: STUDENT IN AN ORGANIZED HEALTH CARE EDUCATION/TRAINING PROGRAM

## 2022-07-26 PROCEDURE — 94640 AIRWAY INHALATION TREATMENT: CPT

## 2022-07-26 PROCEDURE — 93458 L HRT ARTERY/VENTRICLE ANGIO: CPT | Mod: 26,,, | Performed by: INTERNAL MEDICINE

## 2022-07-26 PROCEDURE — 27000221 HC OXYGEN, UP TO 24 HOURS

## 2022-07-26 PROCEDURE — 80048 BASIC METABOLIC PNL TOTAL CA: CPT | Performed by: STUDENT IN AN ORGANIZED HEALTH CARE EDUCATION/TRAINING PROGRAM

## 2022-07-26 PROCEDURE — 25000003 PHARM REV CODE 250: Performed by: STUDENT IN AN ORGANIZED HEALTH CARE EDUCATION/TRAINING PROGRAM

## 2022-07-26 PROCEDURE — 36415 COLL VENOUS BLD VENIPUNCTURE: CPT | Performed by: INTERNAL MEDICINE

## 2022-07-26 PROCEDURE — 99152 PR MOD CONSCIOUS SEDATION, SAME PHYS, 5+ YRS, FIRST 15 MIN: ICD-10-PCS | Mod: ,,, | Performed by: INTERNAL MEDICINE

## 2022-07-26 PROCEDURE — 93458 L HRT ARTERY/VENTRICLE ANGIO: CPT | Performed by: INTERNAL MEDICINE

## 2022-07-26 PROCEDURE — 84100 ASSAY OF PHOSPHORUS: CPT | Performed by: STUDENT IN AN ORGANIZED HEALTH CARE EDUCATION/TRAINING PROGRAM

## 2022-07-26 PROCEDURE — 94761 N-INVAS EAR/PLS OXIMETRY MLT: CPT

## 2022-07-26 PROCEDURE — 97116 GAIT TRAINING THERAPY: CPT

## 2022-07-26 PROCEDURE — 93458 PR CATH PLACE/CORON ANGIO, IMG SUPER/INTERP,W LEFT HEART VENTRICULOGRAPHY: ICD-10-PCS | Mod: 26,,, | Performed by: INTERNAL MEDICINE

## 2022-07-26 PROCEDURE — 85730 THROMBOPLASTIN TIME PARTIAL: CPT | Performed by: INTERNAL MEDICINE

## 2022-07-26 PROCEDURE — C1769 GUIDE WIRE: HCPCS | Performed by: INTERNAL MEDICINE

## 2022-07-26 PROCEDURE — C1894 INTRO/SHEATH, NON-LASER: HCPCS | Performed by: INTERNAL MEDICINE

## 2022-07-26 PROCEDURE — 99152 MOD SED SAME PHYS/QHP 5/>YRS: CPT | Mod: ,,, | Performed by: INTERNAL MEDICINE

## 2022-07-26 PROCEDURE — 99900035 HC TECH TIME PER 15 MIN (STAT)

## 2022-07-26 PROCEDURE — 97530 THERAPEUTIC ACTIVITIES: CPT

## 2022-07-26 PROCEDURE — 11000001 HC ACUTE MED/SURG PRIVATE ROOM

## 2022-07-26 PROCEDURE — 83735 ASSAY OF MAGNESIUM: CPT | Performed by: STUDENT IN AN ORGANIZED HEALTH CARE EDUCATION/TRAINING PROGRAM

## 2022-07-26 RX ORDER — HEPARIN SODIUM 1000 [USP'U]/ML
INJECTION, SOLUTION INTRAVENOUS; SUBCUTANEOUS
Status: DISCONTINUED | OUTPATIENT
Start: 2022-07-26 | End: 2022-07-26 | Stop reason: HOSPADM

## 2022-07-26 RX ORDER — ONDANSETRON 8 MG/1
8 TABLET, ORALLY DISINTEGRATING ORAL EVERY 8 HOURS PRN
Status: DISCONTINUED | OUTPATIENT
Start: 2022-07-26 | End: 2022-07-28 | Stop reason: HOSPADM

## 2022-07-26 RX ORDER — MIDAZOLAM HYDROCHLORIDE 1 MG/ML
INJECTION, SOLUTION INTRAMUSCULAR; INTRAVENOUS
Status: DISCONTINUED | OUTPATIENT
Start: 2022-07-26 | End: 2022-07-26 | Stop reason: HOSPADM

## 2022-07-26 RX ORDER — ACETAMINOPHEN 325 MG/1
650 TABLET ORAL EVERY 4 HOURS PRN
Status: DISCONTINUED | OUTPATIENT
Start: 2022-07-26 | End: 2022-07-28 | Stop reason: HOSPADM

## 2022-07-26 RX ORDER — IODIXANOL 320 MG/ML
INJECTION, SOLUTION INTRAVASCULAR
Status: DISCONTINUED | OUTPATIENT
Start: 2022-07-26 | End: 2022-07-26 | Stop reason: HOSPADM

## 2022-07-26 RX ORDER — HEPARIN SODIUM 200 [USP'U]/100ML
INJECTION, SOLUTION INTRAVENOUS
Status: DISCONTINUED | OUTPATIENT
Start: 2022-07-26 | End: 2022-07-27

## 2022-07-26 RX ORDER — LIDOCAINE HYDROCHLORIDE 10 MG/ML
INJECTION INFILTRATION; PERINEURAL
Status: DISCONTINUED | OUTPATIENT
Start: 2022-07-26 | End: 2022-07-26 | Stop reason: HOSPADM

## 2022-07-26 RX ADMIN — METOPROLOL SUCCINATE 50 MG: 50 TABLET, EXTENDED RELEASE ORAL at 09:07

## 2022-07-26 RX ADMIN — CLOPIDOGREL 75 MG: 75 TABLET, FILM COATED ORAL at 09:07

## 2022-07-26 RX ADMIN — MUPIROCIN: 20 OINTMENT TOPICAL at 09:07

## 2022-07-26 RX ADMIN — IPRATROPIUM BROMIDE AND ALBUTEROL SULFATE 3 ML: 2.5; .5 SOLUTION RESPIRATORY (INHALATION) at 04:07

## 2022-07-26 RX ADMIN — IPRATROPIUM BROMIDE AND ALBUTEROL SULFATE 3 ML: 2.5; .5 SOLUTION RESPIRATORY (INHALATION) at 12:07

## 2022-07-26 RX ADMIN — PREDNISONE 40 MG: 20 TABLET ORAL at 09:07

## 2022-07-26 RX ADMIN — ASPIRIN 81 MG: 81 TABLET, COATED ORAL at 09:07

## 2022-07-26 RX ADMIN — IPRATROPIUM BROMIDE AND ALBUTEROL SULFATE 3 ML: 2.5; .5 SOLUTION RESPIRATORY (INHALATION) at 07:07

## 2022-07-26 RX ADMIN — CEFTRIAXONE 1 G: 1 INJECTION, SOLUTION INTRAVENOUS at 04:07

## 2022-07-26 RX ADMIN — ATORVASTATIN CALCIUM 80 MG: 40 TABLET, FILM COATED ORAL at 09:07

## 2022-07-26 RX ADMIN — DOXYCYCLINE HYCLATE 100 MG: 100 TABLET, COATED ORAL at 09:07

## 2022-07-26 RX ADMIN — AMLODIPINE BESYLATE 2.5 MG: 2.5 TABLET ORAL at 09:07

## 2022-07-26 RX ADMIN — IPRATROPIUM BROMIDE AND ALBUTEROL SULFATE 3 ML: 2.5; .5 SOLUTION RESPIRATORY (INHALATION) at 11:07

## 2022-07-26 NOTE — PLAN OF CARE
Problem: Physical Therapy  Goal: Physical Therapy Goal  Description: Goals to be met by: 22     Patient will increase functional independence with mobility by performin. Supine to sit with Modified [place]  2. Sit to stand transfer with Modified [place]  3. Bed to chair transfer with Modified [place] using Rolling Walker  4. Gait  x 100 feet with Modified [place] using Rolling Walker.   5. Ascend/descend 1 flight of stairs with bilateral Handrails Stand-by Assistance using No Assistive Device.     Outcome: Ongoing, Progressing     PT Eval completed, note to follow. Pt on 2 L O2. Pt ambulated ~8 ft from bed to toilet with RW, CGA-Erick, and PT managing IV pole and portable O2. SpO2 decreased to 83% on 2 L after toilet t/f. O2 increased to 4 L and pt improved quickly to 93%. Pt ambulated with RW and CGA to chair. Pt weaned back to 2 L by end of session and pt maintaining SpO2 90-91%. Nsg notified. Increased WOB and decreased activity tolerance noted. D/c recs TBD pending pt's progress (HH vs post acute placement).

## 2022-07-26 NOTE — PROGRESS NOTES
"U IM Resident Progress Note    Subjective:      Ana Aguila is a 66 y.o.  female who is being followed by the [facility] Internal Medicine service at [facility] after admission for acute on chronic hypoxic hypercapnic respiratory failure.     Patient was extubated later afternoon on 2022 without incident.     Patient unable to have cath preformed yesterday. State prior to her cath procedure she was feeling rushed and became anxious. State she was not feeling supported at that time and her anxiety took over. States she was able to eventually become more calm however, on arrival to the cath lab she was noted to have increased fluid retention  by physical exam at which time she was given a dose of lasix and the procedure was held for today.  Patient states she was given medication in the past to aid with anxiety and that she never took it because she was uncomfortable using these meds while alone. Discussed adding a medication onto her list onetime prn if she felt she needed prior to the procedure which she agreed to. There were no other issues over night.      Objective:   Last 24 Hour Vital Signs:  BP  Min: 118/77  Max: 166/88  Temp  Av.5 °F (36.9 °C)  Min: 97.7 °F (36.5 °C)  Max: 99.2 °F (37.3 °C)  Pulse  Av.5  Min: 89  Max: 106  Resp  Av.6  Min: 18  Max: 27  SpO2  Av.7 %  Min: 90 %  Max: 95 %  Height  Av' 5" (165.1 cm)  Min: 5' 5" (165.1 cm)  Max: 5' 5" (165.1 cm)  Weight  Av.2 kg (168 lb)  Min: 76.2 kg (168 lb)  Max: 76.2 kg (168 lb)  I/O last 3 completed shifts:  In: 424.9 [I.V.:176.2; IV Piggyback:248.6]  Out:  [Urine:1960]    Physical Examination:  Physical Exam  Vitals and nursing note reviewed. Exam conducted with a chaperone present.   Constitutional:       General: She is not in acute distress.     Appearance: She is not ill-appearing, toxic-appearing or diaphoretic.   HENT:      Right Ear: External ear normal.      Left Ear: External ear normal.      " Nose: Nose normal.      Mouth/Throat:      Mouth: Mucous membranes are moist.      Pharynx: Oropharynx is clear.   Eyes:      Extraocular Movements: Extraocular movements intact.      Pupils: Pupils are equal, round, and reactive to light.   Cardiovascular:      Rate and Rhythm: Normal rate.      Pulses: Normal pulses.   Pulmonary:      Effort: Pulmonary effort is normal.   Abdominal:      General: There is no distension.      Palpations: Abdomen is soft.      Tenderness: There is no abdominal tenderness.   Musculoskeletal:         General: Tenderness (bilateral mid calf sharp in nature and only present with palpation) present. Normal range of motion.      Cervical back: Normal range of motion.   Skin:     General: Skin is warm.      Capillary Refill: Capillary refill takes less than 2 seconds.   Neurological:      General: No focal deficit present.      Mental Status: She is alert. Mental status is at baseline.   Psychiatric:         Mood and Affect: Mood normal.       Laboratory:  Most Recent Data:  CBC:   Lab Results   Component Value Date    WBC 14.96 (H) 07/26/2022    HGB 12.4 07/26/2022    HCT 38.2 07/26/2022     07/26/2022    MCV 90 07/26/2022    RDW 14.2 07/26/2022     BMP:   Lab Results   Component Value Date     (H) 07/26/2022    K 3.7 07/26/2022     07/26/2022    CO2 31 (H) 07/26/2022    BUN 24 (H) 07/26/2022    CREATININE 0.8 07/26/2022    GLU 91 07/26/2022    CALCIUM 9.1 07/26/2022    MG 2.4 07/26/2022    PHOS 3.1 07/26/2022     LFTs:   Lab Results   Component Value Date    PROT 7.1 07/23/2022    ALBUMIN 3.8 07/23/2022    BILITOT 0.4 07/23/2022    AST 28 07/23/2022    ALKPHOS 66 07/23/2022    ALT 21 07/23/2022     Coags:   Lab Results   Component Value Date    INR 1.0 07/23/2022     FLP:   Lab Results   Component Value Date    CHOL 154 07/27/2021    HDL 70 07/27/2021    LDLCALC 72.0 07/27/2021    TRIG 60 07/27/2021    CHOLHDL 45.5 07/27/2021     DM:   Lab Results   Component Value  Date    HGBA1C 5.8 (H) 07/23/2022    HGBA1C 5.4 10/18/2016    LDLCALC 72.0 07/27/2021    CREATININE 0.8 07/26/2022     Thyroid:   Lab Results   Component Value Date    TSH 2.258 07/27/2021     Anemia:   Lab Results   Component Value Date    IRON 44 01/16/2017    TIBC 337 01/16/2017    FERRITIN 98 01/16/2017    XZZIMWMC01 585 07/27/2021     Cardiac:   Lab Results   Component Value Date    TROPONINI 0.686 (H) 07/24/2022    BNP 70 07/23/2022     Urinalysis:   Lab Results   Component Value Date    LABURIN No significant growth 07/23/2022    COLORU Yellow 07/23/2022    SPECGRAV 1.010 07/23/2022    NITRITE Negative 07/23/2022    KETONESU Negative 07/23/2022    UROBILINOGEN Negative 07/23/2022    WBCUA 12 (H) 07/23/2022       Trended Lab Data:  Recent Labs   Lab 07/23/22  1046 07/23/22  1130 07/24/22  0511 07/25/22  0404 07/25/22  0511 07/26/22  0354   WBC 17.60*  --  11.22 16.91*  --  14.96*   HGB 13.6  --  12.7 12.2  --  12.4   HCT 42.9   < > 38.8 36.4*  --  38.2     --  211 234  --  266   MCV 91  --  88 88  --  90   RDW 13.9  --  13.6 14.5  --  14.2     --  141  --  142 146*   K 4.1  --  3.9  --  3.4* 3.7     --  103  --  103 103   CO2 26  --  28  --  30* 31*   BUN 15  --  20  --  23 24*   CREATININE 1.0  --  1.0  --  0.9 0.8   *  --  132*  --  153* 91   PROT 7.1  --   --   --   --   --    ALBUMIN 3.8  --   --   --   --   --    BILITOT 0.4  --   --   --   --   --    AST 28  --   --   --   --   --    ALKPHOS 66  --   --   --   --   --    ALT 21  --   --   --   --   --     < > = values in this interval not displayed.       Trended Cardiac Data:  Recent Labs   Lab 07/23/22  1046 07/23/22  2235 07/24/22  0511 07/24/22  1356   TROPONINI 0.032* 1.524* 1.168* 0.686*   BNP 70  --   --   --        Microbiology Data Reviewed: yes  Pertinent Findings:  7/23 - Blood Culture -- NGTD   7/23 - Urine culture -- No significant growth     Other Results:  EKG (my interpretation): Sinus Rhythm with ST and T wave  inversion noted in inferior lateral leads.     Radiology Data Reviewed: yes  Pertinent Findings:      Current Medications:     Infusions:   heparin (porcine) in D5W 10 Units/kg/hr (07/25/22 1340)        Scheduled:   albuterol-ipratropium  3 mL Nebulization Q4H    amLODIPine  2.5 mg Oral Daily    aspirin  81 mg Oral Daily    atorvastatin  80 mg Oral Daily    cefTRIAXone (ROCEPHIN) IVPB  1 g Intravenous Q24H    clopidogreL  75 mg Oral Daily    doxycycline  100 mg Oral Q12H    metoprolol succinate  50 mg Oral Daily    mupirocin   Nasal BID    predniSONE  40 mg Oral Daily        PRN:  sodium chloride 0.9%, acetaminophen, heparin (PORCINE), heparin (PORCINE), sodium chloride 0.9%, sodium chloride 0.9%    Antibiotics and Day Number of Therapy:  Rocephen and Doxy day 4    Lines and Day Number of Therapy:  PIV     Assessment:     Ana Aguila is a 66 y.o.female with  Patient Active Problem List    Diagnosis Date Noted    Prediabetes     Hypertensive emergency 07/24/2022    Flash pulmonary edema 07/24/2022    Leukemoid reaction 07/24/2022    Acute on chronic respiratory failure with hypoxia and hypercapnia 07/23/2022    Prolonged QT interval 07/23/2022    Calcified granuloma of lung 03/14/2022    Mood disorder 03/14/2022    Hyperlipidemia     Coronary artery calcification seen on CT scan 12/04/2020    Solitary pulmonary nodule 11/18/2020    Vitamin B12 deficiency due to intestinal malabsorption 11/18/2020    Osteopenia determined by x-ray 11/10/2020    TERI (generalized anxiety disorder) 10/05/2020    Umbilical hernia without obstruction and without gangrene 01/31/2019    Chronic RUQ pain 01/31/2019    Adjustment disorder with depressed mood 11/22/2018    COPD exacerbation 11/18/2018    Chronic respiratory failure with hypoxia, on home oxygen therapy 11/18/2018    Situational depression 11/18/2018    Aortic atherosclerosis 01/11/2018    Fatty liver disease, nonalcoholic 09/07/2017    S/P  right hemicolectomy 09/07/2017    Chronic diarrhea 02/03/2017    Microscopic hematuria 01/13/2017    NSTEMI (non-ST elevated myocardial infarction) 10/18/2016    Former smoker 10/18/2016    Takotsubo cardiomyopathy - resolved 10/18/2016    Hepatomegaly 12/21/2015    Chronic obstructive pulmonary disease 08/27/2013    Essential hypertension 08/12/2013        Plan:     Acute on Chronic Hypoxic Hypercarbic Respiratory Failure likely in the Setting of COPD Exacerbation (on home oxygen)   - Patient with SOB at home brought to the ED via EMS and was subsequently intubated now extubated on nasal cannula at her home oxygen setting 2lpm.   - Continue Duonebs q4 and Prednisone 40mg daily  - Rocephin and Doxycycline initiated for empiric coverage of CAP will continue currently day 4   - Will continue to monitor     NSTEMI   - Patient presented with elevation of trop on presentation initially concerning for demand ischemia.   - Overnight patients troponin continued to increased and EKG now with ST depressions and T wave inversions.  - Troponin  0.032 > 1.524 > 1.168 >  0.686   - Patient placed on Heparin drip, given asa, and loaded with Plavix  - Continue ASA and Plavix   - Cardiology plans on Angio today   - TTE pending official read   - Patient has been NPO over night      Prolonged QTc  - Prolonged QTc 550 > 555 > 619 on 7/24  - Try to Avoid medications that prolong QTc  - Continue to monitor      Prediabetes   - A1c 5.8   - discussed life style changes   - Recommend continued monitoring as outpatient     Hypertension   - Patient with history of hypertension on amlodipine 2.5mg daily  - Will continue while in patient      CAD  - On home rosuvastatin and will continue while impatient     HLD  -On home rosuvastatin  - Will continue while in patient      Chronic Hepatomegaly  - Currently no complaints   - Will continue to monitor while inpatient     Leukocytosis (Resolved)  - Admit WBC 17 now 11.22  - Likely stress  response in the setting of acute respiratory distress  - Will continue to monitor     Hypertensive Emergency with Flash pulmonary edema in sett of Chronic HFrEF (Resolved)   - TTE 11/2018 with ER 35% with multiple wall motion abnormalities in the LAD distribution ddx stress induced vs ischemic cardiomyopathy   - Repeat TTE 12/2018 with new EF 60%  - Repeat echo ordered pending performance   - continue amlodipine 2.5mg while inpatient   - Will continue to monitor      HCM  - Lipid panel, TSH, and Vit D up to date  - Will ask about pneumovax when extubated  - Will ask about tetanus when extubated   - A1c pending     DVT Ppx: Heparin   Diet: NPO - Pending Cardiology recommendation   Code: Full   Consults: Pulmonary and Cardiology      Disposition: When cleared by cardiology and with continued improvement     Casper Phillips MD   [employer] IN/EM PGY 4  [facility] Internal Medicine     [facility] Medicine Hospitalist Pager numbers:   [facility] Hospitalist Medicine Team A (Susanna/Keren): [phone]  [facility] Hospitalist Medicine Team B (Camilla/Lavelle):  [phone]

## 2022-07-26 NOTE — PLAN OF CARE
2 mL of air removed from radial vasc band.  No hematoma or bleeding noted.  +2 ji radial pulses palpated. Skin normal in color, warm to touch, < 3 sec cap refill.   Will continue to monitor pt.

## 2022-07-26 NOTE — BRIEF OP NOTE
Brief Operative Note:    : Kylee Carreon MD     Referring Physician: Self,Aaareferral     All Operators: Surgeon(s):  Kylee Carreon MD     Preoperative Diagnosis: NSTEMI (non-ST elevated myocardial infarction) [I21.4]     Postop Diagnosis: NSTEMI (non-ST elevated myocardial infarction) [I21.4]    Treatments/Procedures: Procedure(s) (LRB):  Left heart cath (N/A)    Findings:No significant coronary disease is present. See catheterization report for full details.    Access: Rt radial/Vascband    Estimated Blood loss: 20 cc    Specimens removed: No    Mignon Leyva

## 2022-07-26 NOTE — PLAN OF CARE
Patient transferred to recovery cath lab slot 4 via stretcher with side rails up x2 .  Pt AAO X3 and able to follow commands. Pt is stable when connecting to cardiac monitors.  VSS. Right radial vasc band in place with 13ml of air in band c.d.i. no bleeding or hematoma noted. +2 ji radial pulses palpated. +2 ji pedal and post tib pulses.  Skin normal in color and warm to touch, <3 sec cap refill.  Fall risk precautions given and patient acknowledges.  AIDET completed to pt.  Will continue to monitor patient.

## 2022-07-26 NOTE — PLAN OF CARE
Remaining air removed from right radial vasc band. Gauze and tegaderm dressing applied c.d.i.   No drainage or shadowing noted. No bleeding or hematoma noted around site. +2 ji radial pulses palpated. Skin normal in color, warm to touch, < 3 sec cap refill.   Pt tolerated well.  Will continue to monitor pt.

## 2022-07-26 NOTE — PT/OT/SLP EVAL
Physical Therapy Evaluation and Treatment    Patient Name:  Ana Aguila   MRN:  [id number]    Recommendations:     Discharge Recommendations:   (TBD)   Discharge Equipment Recommendations: walker, rolling   Barriers to discharge: Inaccessible home (2 flights of stairs), Decreased caregiver support and decreased activity tolerance    Assessment:     Ana Aguila is a 66 y.o. female admitted with a medical diagnosis of Acute on chronic respiratory failure with hypoxia and hypercapnia.  She presents with the following impairments/functional limitations:  weakness, gait instability, decreased lower extremity function, impaired balance, impaired endurance, impaired self care skills, impaired functional mobility, pain, impaired sensation, decreased ROM, impaired cardiopulmonary response to activity, edema .Pt on 2 L O2. Pt ambulated ~8 ft from bed to toilet with RW, CGA-Erick, and PT managing IV pole and portable O2. SpO2 decreased to 83% on 2 L after toilet t/f. O2 increased to 4 L and pt improved quickly to 93%. Pt ambulated with RW and CGA to chair. Pt weaned back to 2 L by end of session and pt maintaining SpO2 90-91%. Nsg notified. Increased WOB and decreased activity tolerance noted. D/c recs TBD pending pt's progress (HH vs post acute placement).     Rehab Prognosis: Good; patient would benefit from acute skilled PT services to address these deficits and reach maximum level of function.    Recent Surgery: Procedure(s) (LRB):  Left heart cath (N/A) Day of Surgery    Plan:     During this hospitalization, patient to be seen 5 x/week to address the identified rehab impairments via gait training, therapeutic activities, therapeutic exercises, neuromuscular re-education and progress toward the following goals:    · Plan of Care Expires:  08/26/22    Subjective     Chief Complaint: SOB  Patient/Family Comments/goals: Pt awaiting upcoming procedure   Pain/Comfort:  · Pain Rating 1: 0/10 (8-9/10 B lower leg pain with  palpation)  · Location - Side 1: Bilateral  · Location - Orientation 1: lower  · Location 1: leg  · Pain Addressed 1: Reposition, Distraction, Nurse notified, Cessation of Activity  · Pain Rating Post-Intervention 1: 0/10    Patients cultural, spiritual, Mandaeism conflicts given the current situation: no    Living Environment:  Pt lives alone in a 2nd floor apt with 2 full flights of stairs with B HR (within reach), and T/S with SC and GB  Prior to admission, patients level of function was Mod I with no AD needed for mobility, however, requires increased time and rest breaks. Pt ambulates short distances and takes breaks when negotiating stairs. Pt endorses furniture walking. Pt denies any falls. Pt uses 2 L O2 at home. Pt has not been driving. Family helps get groceries or pt has them delivered. Pt reports difficulty with bathing.  Equipment used at home: oxygen, shower chair, grab bar.  DME owned (not currently used): none.  Upon discharge, patient will have assistance from family/friends but limited.    Objective:     Communicated with nsg prior to session.  Patient found sitting edge of bed with peripheral IV, oxygen, telemetry, PureWick  upon PT entry to room.    General Precautions: Standard, fall, respiratory   Orthopedic Precautions:N/A   Braces: N/A  Respiratory Status: Nasal cannula, flow 2 L/min    Exams:  · Cognitive Exam:  Patient is oriented to Person, Place, Time and Situation  · Postural Exam:  Patient presented with the following abnormalities:    · -       Rounded shoulders  · Sensation: pt reporting mild numbness B feet; pt reporting pain in lower legs with palpation, no pain at rest - MDs aware  · Mild BLE  · RLE ROM: WFL  · RLE Strength: WFL upon observing functional mobility   · LLE ROM: WFL  · LLE Strength: WFL upon observing functional mobility     Functional Mobility:  · Bed Mobility:     · Scooting: stand by assistance  · Transfers:     · Sit to Stand:  minimum assistance with rolling  walker  · Bed to Chair: contact guard assistance with  rolling walker  using  Step Transfer  · Toilet Transfer: minimum assistance with  rolling walker  using  Step Transfer  · Gait: Pt ambulated ~8 ft from bed to toilet with RW, CGA-Erick, and PT managing IV pole and portable O2. Pt ambulated ~6 ft from toilet to chair with RW and CGA. VC's for safe sequencing with RW. Pt demonstrating decreased speed/kamla and B step length.     Therapeutic Activities and Exercises:   Pt educated on role of PT/POC, use of DME for safe mobility, PLB, and overall safety.  Pt sitting EOB with PCT present upon arrival.  Pt on 2 L O2.   Pt ambulated ~8 ft from bed to toilet with RW, CGA-Erick, and PT managing IV pole and portable O2.   SpO2 decreased to 83% on 2 L after toilet t/f.   O2 increased to 4 L and pt improved quickly to 93%.   Pt ambulated with RW and CGA to chair.   Pt weaned back to 2 L by end of session and pt maintaining SpO2 90-91%.   Nsg notified.   Increased WOB and decreased activity tolerance noted.    AM-PAC 6 CLICK MOBILITY  Total Score:16     Patient left up in chair with all lines intact, call button in reach, chair alarm on and nsg notified.    GOALS:   Multidisciplinary Problems     Physical Therapy Goals        Problem: Physical Therapy    Goal Priority Disciplines Outcome Goal Variances Interventions   Physical Therapy Goal     PT, PT/OT Ongoing, Progressing     Description: Goals to be met by: 22     Patient will increase functional independence with mobility by performin. Supine to sit with Modified [place]  2. Sit to stand transfer with Modified [place]  3. Bed to chair transfer with Modified [place] using Rolling Walker  4. Gait  x 100 feet with Modified [place] using Rolling Walker.   5. Ascend/descend 1 flight of stairs with bilateral Handrails Stand-by Assistance using No Assistive Device.                      History:     Past Medical History:   Diagnosis Date     Addiction to drug     Anxiety     Aortic calcification 1/11/2018    Chronic diarrhea     Chronic obstructive pulmonary disease 8/27/2013    Chronic respiratory failure with hypoxia, on home O2 therapy 1/11/2018    Colon polyps 2010    COPD (chronic obstructive pulmonary disease)     Coronary artery calcification seen on CT scan 12/4/2020    Depression     Essential hypertension 8/12/2013    Former smoker 10/18/2016    Hepatomegaly 12/21/2015    Hyperlipidemia     Hypertension     MI (myocardial infarction)     Microscopic hematuria 1/13/2017    Panic disorder     Perianal abscess 6/1/2018    Reflux 2013    S/P right hemicolectomy 9/7/2017    Performed in 2011 after perforation during colonoscopy    Sleep difficulties     Takotsubo cardiomyopathy 10/18/2016    Noted on echo 10/2016, recovered on repeat echo 12/2016       Past Surgical History:   Procedure Laterality Date    ABDOMINAL SURGERY      APPENDECTOMY      CHOLECYSTECTOMY  2013    open    COLON SURGERY  2011    secondary to perforation after c-scope    COLONOSCOPY      FRACTURE SURGERY Left 1999    HERNIA REPAIR      HIATAL HERNIA REPAIR  2013    HYSTERECTOMY  1986    Left leg surgery         Time Tracking:     PT Received On: 07/26/22  PT Start Time: 1126     PT Stop Time: 1204  PT Total Time (min): 38 min     Billable Minutes: Evaluation 8, Gait Training 15 and Therapeutic Activity 15       07/26/2022

## 2022-07-26 NOTE — PT/OT/SLP PROGRESS
Occupational Therapy  Visit Attempt     Patient Name:  Ana Aguila   MRN:  [id number]    Patient not seen today secondary to  (pt currently SHYANNE in cath lab). Will follow-up as available.    7/26/2022

## 2022-07-26 NOTE — PLAN OF CARE
1220  Patient awake & alert sitting in recliner when CM rounded. No family present. Patient stepdown from ICU on 7/25/2022, was admitted with acute on chronic respiratory failure with hypoxia & hypercapnia. Patient scheduled to have a left heart cath done today. Pt requesting HH services following discharge. CM informed Dr Phillips of above request.     Patient lives alone, uses 2L O2 via NC at all times (provided by [facility]), & has a nebulizer. Patient stated that her granddaughter, Patience Chacko ([phone]), will provide transportation at time of discharge & will bring the patient's portable O2 tank.     Previously scheduled appointment with Dr Zhou Gallardo (PCP) on 8/19/2022 at 1000 noted. Message sent to the schedulers requesting a hospfu appt with Dr. Carreon (cards). Awaiting response.     1340  CM was informed by scheduled Pat of hospfu appts scheduled for the patient with. Dr. Zhou Gallardo (PCP) on 8/3/2022 at 1000 & Dr. Carreon (cards) on 8/4/2022 at 0900. Information added to the patient's discharge paperwork.       Will continue to follow.

## 2022-07-26 NOTE — PROGRESS NOTES
07/26/2022 @ 11:29AM- RSW attempted to meet with patient to complete SDOH and liaison assessment, RSW unable to complete visit due to medical staff present in patient room. RSW will follow up with patient at a later time.    07/26/2022 @ 1:55PM - RSW attempted to meet with patient for initial visit. RSW unable to meet with pt due to pt SHYANNE at cath lab. RSW will follow up with patient at a later time.    07/27/2022 @ 11:30AM- RSW attempted to meet with patient to complete SDOH and liaison assessment, RSW unable to complete visit due to medical staff present in patient room. RSW will follow up with patient at a later time.      IP Liaison - Initial Visit Note    Patient: Ana Aguila  MRN:  [id number]  Date of Service:  7/27/2022  Completed by:  ANT Mckeon    Reason for Visit   Patient presents with    IP Liaison Initial Visit       RSW met with patient at bedside in order to complete SDOH questionnaire and liaison assessment.  Pt has identified barriers to care of stress. Pt is trying to move to [place] to be closer to family, pt is having trouble securing housing in [place]. RSW provided emotional support to pt. Pt also stated that she orders food to her home because she does not always feel like cooking and she is unable to  food. RSW added [facility] on Aging to pt AVS to address identified barriers. RSW will continue to follow up with patient.    The following were addressed during this visit:  - Review SDOH Questions   - Complete patient assessment   - Complete initial visit with patient        Patient Summary     IP Liaison Patient Assessment    General  Level of Caregiver support: Member independent and does not need caregiver assistance  Have you had to make a decision between paying for any of the following in the last 2 months?: None  Transportation means: Family, Friend  Employment status: Part time  Assessments  Was the PHQ Depression Screening completed this visit?: No  Was the  TERI-7 Screening completed this visit?: No       ANT Mckeon

## 2022-07-26 NOTE — PLAN OF CARE
Patient transferred to floor on tele monitor and portable O2 tank.    VSS. No c/o pain. NAD noted.  Patient attached to tele monitor upon arrival in room 467.  Right radial with gauze/tegaderm CDI. No bleeding or hematoma noted.   Pt connected to nasal canula in room and assisted into bedside chair.  Site reviewed with EDWIN Rivas at bedside.  All questions answered.

## 2022-07-27 PROBLEM — I50.43 ACUTE ON CHRONIC COMBINED SYSTOLIC AND DIASTOLIC HEART FAILURE: Status: ACTIVE | Noted: 2018-11-19

## 2022-07-27 PROBLEM — I50.41 ACUTE COMBINED SYSTOLIC AND DIASTOLIC CONGESTIVE HEART FAILURE: Status: ACTIVE | Noted: 2018-11-19

## 2022-07-27 LAB
ANION GAP SERPL CALC-SCNC: 12 MMOL/L (ref 8–16)
APTT BLDCRRT: 26.3 SEC (ref 21–32)
BASOPHILS # BLD AUTO: 0.01 K/UL (ref 0–0.2)
BASOPHILS NFR BLD: 0.1 % (ref 0–1.9)
BUN SERPL-MCNC: 30 MG/DL (ref 8–23)
CALCIUM SERPL-MCNC: 8.8 MG/DL (ref 8.7–10.5)
CHLORIDE SERPL-SCNC: 104 MMOL/L (ref 95–110)
CO2 SERPL-SCNC: 26 MMOL/L (ref 23–29)
CREAT SERPL-MCNC: 0.8 MG/DL (ref 0.5–1.4)
DIFFERENTIAL METHOD: ABNORMAL
EOSINOPHIL # BLD AUTO: 0 K/UL (ref 0–0.5)
EOSINOPHIL NFR BLD: 0 % (ref 0–8)
ERYTHROCYTE [DISTWIDTH] IN BLOOD BY AUTOMATED COUNT: 14.1 % (ref 11.5–14.5)
EST. GFR  (AFRICAN AMERICAN): >60 ML/MIN/1.73 M^2
EST. GFR  (NON AFRICAN AMERICAN): >60 ML/MIN/1.73 M^2
GLUCOSE SERPL-MCNC: 83 MG/DL (ref 70–110)
HCT VFR BLD AUTO: 36 % (ref 37–48.5)
HGB BLD-MCNC: 11.6 G/DL (ref 12–16)
IMM GRANULOCYTES # BLD AUTO: 0.07 K/UL (ref 0–0.04)
IMM GRANULOCYTES NFR BLD AUTO: 0.5 % (ref 0–0.5)
LYMPHOCYTES # BLD AUTO: 2.9 K/UL (ref 1–4.8)
LYMPHOCYTES NFR BLD: 21.9 % (ref 18–48)
MAGNESIUM SERPL-MCNC: 2.3 MG/DL (ref 1.6–2.6)
MCH RBC QN AUTO: 29.2 PG (ref 27–31)
MCHC RBC AUTO-ENTMCNC: 32.2 G/DL (ref 32–36)
MCV RBC AUTO: 91 FL (ref 82–98)
MONOCYTES # BLD AUTO: 1.1 K/UL (ref 0.3–1)
MONOCYTES NFR BLD: 8.1 % (ref 4–15)
NEUTROPHILS # BLD AUTO: 9.1 K/UL (ref 1.8–7.7)
NEUTROPHILS NFR BLD: 69.4 % (ref 38–73)
NRBC BLD-RTO: 0 /100 WBC
PHOSPHATE SERPL-MCNC: 4.4 MG/DL (ref 2.7–4.5)
PLATELET # BLD AUTO: 242 K/UL (ref 150–450)
PMV BLD AUTO: 10.7 FL (ref 9.2–12.9)
POCT GLUCOSE: 107 MG/DL (ref 70–110)
POCT GLUCOSE: 129 MG/DL (ref 70–110)
POCT GLUCOSE: 138 MG/DL (ref 70–110)
POTASSIUM SERPL-SCNC: 4 MMOL/L (ref 3.5–5.1)
RBC # BLD AUTO: 3.97 M/UL (ref 4–5.4)
SODIUM SERPL-SCNC: 142 MMOL/L (ref 136–145)
WBC # BLD AUTO: 13.15 K/UL (ref 3.9–12.7)

## 2022-07-27 PROCEDURE — 25000242 PHARM REV CODE 250 ALT 637 W/ HCPCS: Performed by: STUDENT IN AN ORGANIZED HEALTH CARE EDUCATION/TRAINING PROGRAM

## 2022-07-27 PROCEDURE — 36415 COLL VENOUS BLD VENIPUNCTURE: CPT | Performed by: INTERNAL MEDICINE

## 2022-07-27 PROCEDURE — 94799 UNLISTED PULMONARY SVC/PX: CPT

## 2022-07-27 PROCEDURE — 84100 ASSAY OF PHOSPHORUS: CPT | Performed by: STUDENT IN AN ORGANIZED HEALTH CARE EDUCATION/TRAINING PROGRAM

## 2022-07-27 PROCEDURE — 94761 N-INVAS EAR/PLS OXIMETRY MLT: CPT

## 2022-07-27 PROCEDURE — 97116 GAIT TRAINING THERAPY: CPT

## 2022-07-27 PROCEDURE — 80048 BASIC METABOLIC PNL TOTAL CA: CPT | Performed by: STUDENT IN AN ORGANIZED HEALTH CARE EDUCATION/TRAINING PROGRAM

## 2022-07-27 PROCEDURE — 11000001 HC ACUTE MED/SURG PRIVATE ROOM

## 2022-07-27 PROCEDURE — 97165 OT EVAL LOW COMPLEX 30 MIN: CPT

## 2022-07-27 PROCEDURE — 99900035 HC TECH TIME PER 15 MIN (STAT)

## 2022-07-27 PROCEDURE — 99233 SBSQ HOSP IP/OBS HIGH 50: CPT | Mod: ,,, | Performed by: NURSE PRACTITIONER

## 2022-07-27 PROCEDURE — 85730 THROMBOPLASTIN TIME PARTIAL: CPT | Performed by: INTERNAL MEDICINE

## 2022-07-27 PROCEDURE — 63600175 PHARM REV CODE 636 W HCPCS: Performed by: STUDENT IN AN ORGANIZED HEALTH CARE EDUCATION/TRAINING PROGRAM

## 2022-07-27 PROCEDURE — 97530 THERAPEUTIC ACTIVITIES: CPT

## 2022-07-27 PROCEDURE — 85025 COMPLETE CBC W/AUTO DIFF WBC: CPT | Performed by: STUDENT IN AN ORGANIZED HEALTH CARE EDUCATION/TRAINING PROGRAM

## 2022-07-27 PROCEDURE — 83735 ASSAY OF MAGNESIUM: CPT | Performed by: STUDENT IN AN ORGANIZED HEALTH CARE EDUCATION/TRAINING PROGRAM

## 2022-07-27 PROCEDURE — 25000003 PHARM REV CODE 250: Performed by: STUDENT IN AN ORGANIZED HEALTH CARE EDUCATION/TRAINING PROGRAM

## 2022-07-27 PROCEDURE — 94640 AIRWAY INHALATION TREATMENT: CPT

## 2022-07-27 PROCEDURE — 25000003 PHARM REV CODE 250: Performed by: NURSE PRACTITIONER

## 2022-07-27 PROCEDURE — 94660 CPAP INITIATION&MGMT: CPT

## 2022-07-27 PROCEDURE — 27000221 HC OXYGEN, UP TO 24 HOURS

## 2022-07-27 PROCEDURE — 99233 PR SUBSEQUENT HOSPITAL CARE,LEVL III: ICD-10-PCS | Mod: ,,, | Performed by: NURSE PRACTITIONER

## 2022-07-27 RX ORDER — METOPROLOL SUCCINATE 100 MG/1
100 TABLET, EXTENDED RELEASE ORAL DAILY
Qty: 30 TABLET | Refills: 11 | Status: SHIPPED | OUTPATIENT
Start: 2022-07-28 | End: 2023-08-28

## 2022-07-27 RX ORDER — FUROSEMIDE 10 MG/ML
40 INJECTION INTRAMUSCULAR; INTRAVENOUS ONCE
Status: COMPLETED | OUTPATIENT
Start: 2022-07-27 | End: 2022-07-27

## 2022-07-27 RX ORDER — FUROSEMIDE 40 MG/1
40 TABLET ORAL DAILY
Qty: 30 TABLET | Refills: 11 | Status: SHIPPED | OUTPATIENT
Start: 2022-07-27 | End: 2022-09-12

## 2022-07-27 RX ORDER — LOSARTAN POTASSIUM 25 MG/1
25 TABLET ORAL DAILY
Qty: 90 TABLET | Refills: 3 | Status: ON HOLD | OUTPATIENT
Start: 2022-07-28 | End: 2023-01-20

## 2022-07-27 RX ORDER — METOPROLOL SUCCINATE 50 MG/1
100 TABLET, EXTENDED RELEASE ORAL DAILY
Status: DISCONTINUED | OUTPATIENT
Start: 2022-07-27 | End: 2022-07-28 | Stop reason: HOSPADM

## 2022-07-27 RX ADMIN — ASPIRIN 81 MG: 81 TABLET, COATED ORAL at 09:07

## 2022-07-27 RX ADMIN — ATORVASTATIN CALCIUM 80 MG: 40 TABLET, FILM COATED ORAL at 09:07

## 2022-07-27 RX ADMIN — DOXYCYCLINE HYCLATE 100 MG: 100 TABLET, COATED ORAL at 08:07

## 2022-07-27 RX ADMIN — IPRATROPIUM BROMIDE AND ALBUTEROL SULFATE 3 ML: 2.5; .5 SOLUTION RESPIRATORY (INHALATION) at 12:07

## 2022-07-27 RX ADMIN — MUPIROCIN: 20 OINTMENT TOPICAL at 08:07

## 2022-07-27 RX ADMIN — METOPROLOL SUCCINATE 100 MG: 50 TABLET, EXTENDED RELEASE ORAL at 09:07

## 2022-07-27 RX ADMIN — PREDNISONE 40 MG: 20 TABLET ORAL at 09:07

## 2022-07-27 RX ADMIN — MUPIROCIN: 20 OINTMENT TOPICAL at 09:07

## 2022-07-27 RX ADMIN — FUROSEMIDE 40 MG: 10 INJECTION, SOLUTION INTRAMUSCULAR; INTRAVENOUS at 11:07

## 2022-07-27 RX ADMIN — DOXYCYCLINE HYCLATE 100 MG: 100 TABLET, COATED ORAL at 09:07

## 2022-07-27 RX ADMIN — AMLODIPINE BESYLATE 2.5 MG: 2.5 TABLET ORAL at 09:07

## 2022-07-27 RX ADMIN — CEFTRIAXONE 1 G: 1 INJECTION, SOLUTION INTRAVENOUS at 02:07

## 2022-07-27 RX ADMIN — LOSARTAN POTASSIUM 12.5 MG: 25 TABLET, FILM COATED ORAL at 09:07

## 2022-07-27 RX ADMIN — IPRATROPIUM BROMIDE AND ALBUTEROL SULFATE 3 ML: 2.5; .5 SOLUTION RESPIRATORY (INHALATION) at 08:07

## 2022-07-27 RX ADMIN — IPRATROPIUM BROMIDE AND ALBUTEROL SULFATE 3 ML: 2.5; .5 SOLUTION RESPIRATORY (INHALATION) at 04:07

## 2022-07-27 RX ADMIN — IPRATROPIUM BROMIDE AND ALBUTEROL SULFATE 3 ML: 2.5; .5 SOLUTION RESPIRATORY (INHALATION) at 03:07

## 2022-07-27 NOTE — PLAN OF CARE
9946  TALHA was informed by Dr. Phillips that the patient is concerned she will not be able to climb steps to get into her house. MD requested assistance with transportation.     Patient awake & alert eating breakfast in bed when CM rounded. No family present. CM informed the patient that her insurance will not pay for ambulance transportation & questioned if she had family, friends, or neighbors that could assist her. Patient previously stated that her granddaughter, Patience Chacko ([phone]), would provide transportation at time of discharge & would bring the patient's portable O2 tank to the hospital. Patient stated that her granddaughter will help.     TALHA informed Dr. Phillips of above & requested that he order the RW for home use recommended by PT.     Patient's face sheet,  consult, H&P, and HH orders faxed to [facility] (f [phone]) for HH assignment. Awaiting response.     1018  Message sent to Inga George w/[facility] informing of rollator ordered for home use.     1440  CM was informed by [facility] that the patient has been accepted by [facility] & that services will start tomorrow. Information added to the patient's discharge paperwork.     1540  CM was informed by Dr Phillips that the patient will not discharge today due to episode of hemoptysis. CM informed Sohail ([phone]) w/[employer]  of above.       Will continue to follow.

## 2022-07-27 NOTE — PT/OT/SLP EVAL
Occupational Therapy   Evaluation/Treatment     Name: Ana Aguila  MRN: [id number]  Admitting Diagnosis:  Acute on chronic respiratory failure with hypoxia and hypercapnia  Recent Surgery: Procedure(s) (LRB):  Left heart cath (N/A)  ANGIOGRAM, CORONARY ARTERY (N/A) 1 Day Post-Op    Recommendations:     Discharge Recommendations: home health PT, home health OT  Discharge Equipment Recommendations:  walker, rolling  Barriers to discharge:  Decreased caregiver support, Inaccessible home environment    Assessment:     Ana Aguila is a 66 y.o. female with a medical diagnosis of Acute on chronic respiratory failure with hypoxia and hypercapnia.  She presents with The primary encounter diagnosis was COPD with acute exacerbation. Diagnoses of Shortness of breath, Intubation of airway performed without difficulty, Health care maintenance, Prolonged QT interval, Acute on chronic respiratory failure with hypoxia and hypercapnia, NSTEMI (non-ST elevated myocardial infarction), COPD exacerbation, Hypertensive emergency, Flash pulmonary edema, Leukemoid reaction, Mixed hyperlipidemia, Prediabetes, and Acute on chronic combined systolic and diastolic heart failure were also pertinent to this visit.   Performance deficits affecting function: weakness, impaired endurance, impaired cardiopulmonary response to activity, gait instability, impaired self care skills, impaired functional mobility.      Pt would benefit from cont OT services in order to maximize functional independence. MDs present in room at time of assessment- report pt to d/c home with HH. Extensive education provided regarding energy conservation and home safety, use of DME during session. Recommending family/friend support at home initially as well as HHOT/PT.     Rehab Prognosis: Good; patient would benefit from acute skilled OT services to address these deficits and reach maximum level of function.       Plan:     Patient to be seen 3 x/week to address the  above listed problems via self-care/home management, therapeutic activities, therapeutic exercises  · Plan of Care Expires: 08/27/22  · Plan of Care Reviewed with: patient    Subjective     Chief Complaint: pt reports she is anxious and concerned about going up the stairs when returning home   Patient/Family Comments/goals: decrease SOB     Occupational Profile:  Living Environment: alone, 2nd floor apartment, 2 full flights of stairs, B rails, t/s combo  Previous level of function: independent/mod I; family/friends assist with IADLs; requires increased time for ADLs (sponge bathes at time 2/2 impaired endurance); and increased difficulty with performance of axs 2/2 SOB   Equipment Used at Home:  shower chair, oxygen  Assistance upon Discharge: from family/friends at times     Pain/Comfort:  · Pain Rating 1: 0/10    Patients cultural, spiritual, Sikhism conflicts given the current situation:      Objective:     Communicated with: nsromero prior to session.   General Precautions: Standard, fall   Orthopedic Precautions: (pt reports she was told limit/no weight bearing through R wrist 2/2 recent angio)   Braces: N/A   On 3.5 L during session     Occupational Performance:    Bed Mobility:    · UIC     Functional Mobility/Transfers:  · Patient completed Sit <> Stand Transfer with contact guard assistance  with  no assistive device   · Functional Mobility: CGA without AD in room     Activities of Daily Living:  · Lower Body Dressing: supervision simulated fig 4     Cognitive/Visual Perceptual:  WFL   Reports issues with anxiety 2/2 SOB     Physical Exam:  Balance:    -       WFL seated; fair/fair + standing   Postural examination/scapula alignment:    -       Rounded shoulders  Upper Extremity Range of Motion:   BUE WFL based on observed function  Upper Extremity Strength:  BUE WFL based on observed function     AMPAC 6 Click ADL:  AMPAC Total Score: 19    Treatment & Education:  Pt seen seated in b/s chair   Extensive  education and increased time spent on energy conservation for ADLs/IADLs   Increased time spent answering pt's questions regarding exercises, diet, energy conservation, ways to decrease SOB and anxiety   Functional mobility performed in room without AD   Requires cues for PLB   Given printed handout for PLB   Education:    Patient left up in chair with all lines intact, call button in reach, chair alarm on and nsg notified    GOALS:   Multidisciplinary Problems     Occupational Therapy Goals        Problem: Occupational Therapy    Goal Priority Disciplines Outcome Interventions   Occupational Therapy Goal     OT, PT/OT Ongoing, Progressing    Description: Goals to be met by: 8/27/22     Patient will increase functional independence with ADLs by performing:    LE Dressing with Modified [place].  Grooming while standing with Modified [place].  Toileting from toilet with Modified [place] for hygiene and clothing management.   Supine to sit with Modified [place].  Step transfer with Modified [place]  Toilet transfer to toilet with Modified [place].  Upper extremity exercise program x10 reps per handout, with independence.                     History:     Past Medical History:   Diagnosis Date    Addiction to drug     Anxiety     Aortic calcification 1/11/2018    Chronic diarrhea     Chronic obstructive pulmonary disease 8/27/2013    Chronic respiratory failure with hypoxia, on home O2 therapy 1/11/2018    Colon polyps 2010    COPD (chronic obstructive pulmonary disease)     Coronary artery calcification seen on CT scan 12/4/2020    Depression     Essential hypertension 8/12/2013    Former smoker 10/18/2016    Hepatomegaly 12/21/2015    Hyperlipidemia     Hypertension     MI (myocardial infarction)     Microscopic hematuria 1/13/2017    Panic disorder     Perianal abscess 6/1/2018    Reflux 2013    S/P right hemicolectomy 9/7/2017    Performed in 2011 after perforation  during colonoscopy    Sleep difficulties     Takotsubo cardiomyopathy 10/18/2016    Noted on echo 10/2016, recovered on repeat echo 12/2016       Past Surgical History:   Procedure Laterality Date    ABDOMINAL SURGERY      APPENDECTOMY      CHOLECYSTECTOMY  2013    open    COLON SURGERY  2011    secondary to perforation after c-scope    COLONOSCOPY      FRACTURE SURGERY Left 1999    HERNIA REPAIR      HIATAL HERNIA REPAIR  2013    HYSTERECTOMY  1986    Left leg surgery         Time Tracking:     OT Date of Treatment: 07/27/22  OT Start Time: 1040  OT Stop Time: 1133  OT Total Time (min): 53 min    Billable Minutes:Evaluation 10  Therapeutic Activity 43    7/27/2022

## 2022-07-27 NOTE — ASSESSMENT & PLAN NOTE
SBP 100s-140s  Continue Norvasc, BB - up titrated, low dose ARB added. If hypotension occurs, discontinue Norvasc

## 2022-07-27 NOTE — PLAN OF CARE
Short Note     Called by the patients nurse who indicated the patient had coughed up blood. Patient state she had a couple episodes yesterday but today she had a larger amount that she wanted to be evaluated. Patients had about 5ml in the tissue of dark blood. We spoke about the likely cause of bleeding to do with recent intubation as well as being on anticoagulation. State she feels nervous about heading home today. Ordered chest x ray and spirometry. Patients H/H were stable this AM.  Advised would continue to monitor overnight and likely discharge in the AM following morning evaluation.      Casper Phillips MD  [facility] IM/EM PGY 4  [facility]

## 2022-07-27 NOTE — PLAN OF CARE
[facility] - Telemetry      HOME HEALTH ORDERS  FACE TO FACE ENCOUNTER    Patient Name: Ana Aguila  YOB: 1955    PCP: Zhou Gallardo MD   PCP Address: [address] / [place] [address]  PCP Phone Number: [phone]  PCP Fax: [phone]    Encounter Date: 7/23/22    Admit to Home Health    Diagnoses:  Active Hospital Problems    Diagnosis  POA    *Acute on chronic respiratory failure with hypoxia and hypercapnia [J96.21, J96.22]  Yes    Prediabetes [R73.03]  Yes    Hypertensive emergency [I16.1]  Yes    Flash pulmonary edema [J81.0]  Yes    Leukemoid reaction [D72.823]  Yes    Prolonged QT interval [R94.31]  Yes    Hyperlipidemia [E78.5]  Yes    COPD exacerbation [J44.1]  Yes    NSTEMI (non-ST elevated myocardial infarction) [I21.4]  Yes    Essential hypertension [I10]  Yes      Resolved Hospital Problems   No resolved problems to display.       Follow Up Appointments:  Future Appointments   Date Time Provider Department Center   8/3/2022 10:00 AM Zhou Gallardo MD [facility]   8/4/2022  9:00 AM Kylee Carreon MD [facility]   8/19/2022 10:00 AM Zhou Gallardo MD [facility]       Allergies:  Review of patient's allergies indicates:   Allergen Reactions    Ace inhibitors      cough    Coreg [carvedilol]      Headache     Pseudoephedrine hcl Nausea And Vomiting, Other (See Comments) and Anxiety     JITTERY       Medications: Review discharge medications with patient and family and provide education.    Current Facility-Administered Medications   Medication Dose Route Frequency Provider Last Rate Last Admin    0.9%  NaCl infusion   Intravenous PRN Shelia Rajan MD   Stopped at 07/25/22 1223    acetaminophen tablet 650 mg  650 mg Oral Q6H PRN Shelia Rajan MD   650 mg at 07/25/22 0538    acetaminophen tablet 650 mg  650 mg Oral Q4H PRN Mignon Leyva MD        albuterol-ipratropium 2.5 mg-0.5 mg/3 mL nebulizer solution 3  mL  3 mL Nebulization Q4H Casper Phillips MD   3 mL at 07/27/22 0301    amLODIPine tablet 2.5 mg  2.5 mg Oral Daily Casper Phillips MD   2.5 mg at 07/26/22 0906    aspirin EC tablet 81 mg  81 mg Oral Daily Eduin Apple DO   81 mg at 07/26/22 0906    atorvastatin tablet 80 mg  80 mg Oral Daily Shelia Rajan MD   80 mg at 07/26/22 0905    cefTRIAXone (ROCEPHIN) 1 g/50 mL D5W IVPB  1 g Intravenous Q24H Casper Phillips MD   Stopped at 07/26/22 1636    clopidogreL tablet 75 mg  75 mg Oral Daily Eduin Apple DO   75 mg at 07/26/22 0906    doxycycline tablet 100 mg  100 mg Oral Q12H Casper Phillips MD   100 mg at 07/26/22 2129    lorazepam (ATIVAN) injection 0.5 mg  0.5 mg Intravenous Once PRN Casper Phillips MD        metoprolol succinate (TOPROL-XL) 24 hr tablet 50 mg  50 mg Oral Daily Casper Phillips MD   50 mg at 07/26/22 0906    mupirocin 2 % ointment   Nasal BID Annamaria Veliz MD   Given at 07/26/22 2129    ondansetron disintegrating tablet 8 mg  8 mg Oral Q8H PRN Mignon Leyva MD        predniSONE tablet 40 mg  40 mg Oral Daily Casper Phillips MD   40 mg at 07/26/22 0906    sodium chloride 0.9% flush 10 mL  10 mL Intravenous PRN Casper Phillips MD        sodium chloride 0.9% flush 10 mL  10 mL Intravenous PRN Kylee Carreon MD         Current Discharge Medication List      CONTINUE these medications which have NOT CHANGED    Details   albuterol-ipratropium (DUO-NEB) 2.5 mg-0.5 mg/3 mL nebulizer solution TAKE 3 ML BY NEBULIZER EVERY 6 HOURS AS NEEDED FOR WHEEZING OR SHORTNESS OF BREATH  Qty: 270 mL, Refills: 6    Associated Diagnoses: Chronic obstructive pulmonary disease, unspecified COPD type      amLODIPine (NORVASC) 2.5 MG tablet Take 1 tablet (2.5 mg total) by mouth once daily.  Qty: 30 tablet, Refills: 11    Comments: .      ergocalciferol (ERGOCALCIFEROL) 50,000 unit Cap TAKE 1 CAPSULE BY MOUTH EVERY 7 DAYS  Qty: 12 capsule, Refills: 1     Associated Diagnoses: Vitamin D deficiency      fluticasone-salmeterol diskus inhaler 250-50 mcg INHALE 1 PUFF BY MOUTH EVERY 12 HOURS AS NEEDED  Qty: 1 each, Refills: 12    Associated Diagnoses: Chronic obstructive pulmonary disease, unspecified COPD type      ipratropium (ATROVENT) 0.02 % nebulizer solution Take 500 mcg by nebulization 4 (four) times daily. Rescue      metoprolol succinate (TOPROL-XL) 25 MG 24 hr tablet Take 1 tablet (25 mg total) by mouth once daily.  Qty: 90 tablet, Refills: 3    Comments: **Patient requests 90 days supply**      PROAIR HFA 90 mcg/actuation inhaler INHALE 2 PUFFS FOUR TIMES DAILY AS NEEDED FOR COPD  Qty: 8.5 g, Refills: 12    Associated Diagnoses: Chronic obstructive pulmonary disease, unspecified COPD type      rosuvastatin (CRESTOR) 10 MG tablet Take 1 tablet (10 mg total) by mouth once daily.  Qty: 90 tablet, Refills: 3    Associated Diagnoses: Mixed hyperlipidemia      acetaminophen (TYLENOL) 500 MG tablet Take 250 mg by mouth every 6 (six) hours as needed for Pain.      aspirin (ECOTRIN) 81 MG EC tablet Take 81 mg by mouth once daily.      cyanocobalamin (VITAMIN B-12) 100 MCG tablet Take 100 mcg by mouth once daily.               I have seen and examined this patient within the last 30 days. My clinical findings that support the need for the home health skilled services and home bound status are the following:no   Requiring assistive device to leave home due to unsteady gait caused by  Weakness/Debility.     Diet:   cardiac diet    Labs:  N/A     Referrals/ Consults  Physical Therapy to evaluate and treat. Evaluate for home safety and equipment needs; Establish/upgrade home exercise program. Perform / instruct on therapeutic exercises, gait training, transfer training, and Range of Motion.  Occupational Therapy to evaluate and treat. Evaluate home environment for safety and equipment needs. Perform/Instruct on transfers, ADL training, ROM, and therapeutic  exercises.    Activities:   activity as tolerated    Nursing:   Agency to admit patient within 24 hours of hospital discharge unless specified on physician order or at patient request    SN to complete comprehensive assessment including routine vital signs. Instruct on disease process and s/s of complications to report to MD. Review/verify medication list sent home with the patient at time of discharge  and instruct patient/caregiver as needed. Frequency may be adjusted depending on start of care date.     Skilled nurse to perform up to 3 visits PRN for symptoms related to diagnosis    Notify MD if SBP > 160 or < 90; DBP > 90 or < 50; HR > 120 or < 50; Temp > 101; O2 < 88%; Other:       Ok to schedule additional visits based on staff availability and patient request on consecutive days within the home health episode.    When multiple disciplines ordered:    Start of Care occurs on Sunday - Wednesday schedule remaining discipline evaluations as ordered on separate consecutive days following the start of care.    Thursday SOC -schedule subsequent evaluations Friday and Monday the following week.     Friday - Saturday SOC - schedule subsequent discipline evaluations on consecutive days starting Monday of the following week.    For all post-discharge communication and subsequent orders please contact patient's primary care physician. If unable to reach primary care physician or do not receive response within 30 minutes, please contact clinical staff for order clarification    Miscellaneous   Routine Skin for Bedridden Patients: Instruct patient/caregiver to apply moisture barrier cream to all skin folds and wet areas in perineal area daily and after baths and all bowel movements.    Home Health Aide:  Physical Therapy Other: Per assessment and recommendations of physical therapy  and Occupational Therapy Other: per assessment and recommendations of OT    Wound Care Orders  no    I certify that this patient is confined  to her home and needs physical therapy.    Casper Phillips MD  [facility] [facility] PGY 4  [facility]

## 2022-07-27 NOTE — PLAN OF CARE
"  Problem: Physical Therapy  Goal: Physical Therapy Goal  Description: Goals to be met by: 22     Patient will increase functional independence with mobility by performin. Supine to sit with Modified [place]  2. Sit to stand transfer with Modified [place]  3. Bed to chair transfer with Modified [place] using Rolling Walker  4. Gait  x 100 feet with Modified [place] using Rolling Walker.   5. Ascend/descend 1 flight of stairs with bilateral Handrails Stand-by Assistance using No Assistive Device.     Outcome: Ongoing, Progressing     Pt's SpO2 100% on 3 L at rest. Pt ascended/descended an 8" step with use of bed rail and CGA. Pt reporting light-headedness and needing seated rest break. SpO2 decreased to 88%. Pt recovered and performed step up x 2 trials and ambulated ~8 ft x 2 trials with HHA / CGA. Pt returned seated and SpO2 decreased to 81%. Nsg notified, O2 increased to 4 L due to prolonged recovery. Pt improved to 95% and was able to maintain once weaned to 3 L. Discussed post acute placement 2ith pt due to concern for safely accessing home environment upon d/c, however, pt politely declined. Educated pt on use of gait belt and need for standing / seated rest breaks in order to tolerate transition home. Pt will require  PT/OT and family assistance upon d/c.   "

## 2022-07-27 NOTE — ASSESSMENT & PLAN NOTE
TTE 07/23/22    Echo    Interpretation Summary  · Concentric remodeling and  · The estimated ejection fraction is 40%.  · Left ventricular diastolic dysfunction.  · There are segmental left ventricular wall motion abnormalities.  · Normal right ventricular size with normal right ventricular systolic function.  · Normal central venous pressure (3 mmHg).  · Prominent pericardial fat over the RV      Recent Labs   Lab 07/23/22  1046   BNP 70   .  Continue BB, ARB added  Lasix 40 mg p.o. PRN weight gain/edema upon DC  Follow up with cardiology in 2-3 weeks  Will repeat TTE in 90 days

## 2022-07-27 NOTE — HOSPITAL COURSE
07/27/2022 [facility] with notation of clean coronaries yesterday. Plavix discontinued. LVEF 40% per TTE. HR 90s-110s- BB up titrated and ARB added. Hemodynamically stable.

## 2022-07-27 NOTE — SUBJECTIVE & OBJECTIVE
Review of Systems   Constitutional: Negative. Negative for diaphoresis.   HENT: Negative.     Eyes: Negative.    Cardiovascular:  Positive for dyspnea on exertion (chronic) and palpitations. Negative for chest pain, leg swelling, near-syncope, orthopnea, paroxysmal nocturnal dyspnea and syncope.   Endocrine: Negative.    Hematologic/Lymphatic: Negative.    Skin: Negative.    Musculoskeletal: Negative.    Gastrointestinal: Negative.    Genitourinary: Negative.    Neurological: Negative.    Psychiatric/Behavioral:  The patient is nervous/anxious.    Allergic/Immunologic: Negative.    Objective:     Vital Signs (Most Recent):  Temp: 98.1 °F (36.7 °C) (07/27/22 0756)  Pulse: 73 (07/27/22 0825)  Resp: 20 (07/27/22 0825)  BP: (!) 141/75 (07/27/22 0756)  SpO2: (!) 92 % (07/27/22 0825)   Vital Signs (24h Range):  Temp:  [97.4 °F (36.3 °C)-98.4 °F (36.9 °C)] 98.1 °F (36.7 °C)  Pulse:  [] 73  Resp:  [14-26] 20  SpO2:  [89 %-98 %] 92 %  BP: (109-149)/(67-83) 141/75     Weight: 76.2 kg (168 lb)  Body mass index is 27.96 kg/m².     SpO2: (!) 92 %  O2 Device (Oxygen Therapy): nasal cannula w/ humidification      Intake/Output Summary (Last 24 hours) at 7/27/2022 0938  Last data filed at 7/26/2022 1901  Gross per 24 hour   Intake --   Output 0 ml   Net 0 ml       Lines/Drains/Airways       Drain  Duration             Female External Urinary Catheter 07/25/22 1500 1 day              Peripheral Intravenous Line  Duration                  Peripheral IV - Single Lumen 07/24/22 1800 20 G Anterior;Right Forearm 2 days                    Physical Exam  Constitutional:       General: She is not in acute distress.     Appearance: She is not diaphoretic.   HENT:      Head: Atraumatic.   Eyes:      General:         Right eye: No discharge.         Left eye: No discharge.   Cardiovascular:      Rate and Rhythm: Regular rhythm. Tachycardia present.      Heart sounds: No murmur heard.    No friction rub. No gallop.   Pulmonary:       Effort: Pulmonary effort is normal.      Breath sounds: No rales.   Abdominal:      General: Bowel sounds are normal.      Palpations: Abdomen is soft.   Musculoskeletal:      Right lower leg: No edema.      Left lower leg: No edema.   Skin:     General: Skin is warm and dry.      Capillary Refill: Capillary refill takes 2 to 3 seconds.   Neurological:      Mental Status: She is alert and oriented to person, place, and time. Mental status is at baseline.   Psychiatric:         Mood and Affect: Mood normal.         Behavior: Behavior normal.         Thought Content: Thought content normal.         Judgment: Judgment normal.       Significant Labs: BMP:   Recent Labs   Lab 07/26/22  0354 07/27/22  0303   GLU 91 83   * 142   K 3.7 4.0    104   CO2 31* 26   BUN 24* 30*   CREATININE 0.8 0.8   CALCIUM 9.1 8.8   MG 2.4 2.3   , CMP   Recent Labs   Lab 07/26/22  0354 07/27/22  0303   * 142   K 3.7 4.0    104   CO2 31* 26   GLU 91 83   BUN 24* 30*   CREATININE 0.8 0.8   CALCIUM 9.1 8.8   ANIONGAP 12 12   ESTGFRAFRICA >60 >60   EGFRNONAA >60 >60   , CBC   Recent Labs   Lab 07/26/22  0354 07/27/22  0303   WBC 14.96* 13.15*   HGB 12.4 11.6*   HCT 38.2 36.0*    242   , INR No results for input(s): INR, PROTIME in the last 48 hours., Lipid Panel No results for input(s): CHOL, HDL, LDLCALC, TRIG, CHOLHDL in the last 48 hours., Troponin No results for input(s): TROPONINI in the last 48 hours., and All pertinent lab results from the last 24 hours have been reviewed.    Significant Imaging: Echocardiogram: Transthoracic echo (TTE) complete (Cupid Only):   Results for orders placed or performed during the hospital encounter of 07/23/22   Echo   Result Value Ref Range    Ascending aorta 2.69 cm    STJ 2.77 cm    AV mean gradient 4 mmHg    Ao peak thee 1.21 m/s    Ao VTI 18.06 cm    IVRT 137.01 msec    IVS 0.73 0.6 - 1.1 cm    LA size 3.27 cm    Left Atrium Major Axis 3.94 cm    Left Atrium Minor Axis 4.28  "cm    LVIDd 4.01 3.5 - 6.0 cm    LVIDs 3.27 2.1 - 4.0 cm    LVOT diameter 1.77 cm    LVOT peak VTI 13.60 cm    Posterior Wall 1.00 0.6 - 1.1 cm    PV Peak D Dewayne 0.22 m/s    PV Peak S Dewayne 0.30 m/s    RA Major Axis 3.71 cm    RA Width 3.20 cm    RVDD 2.74 cm    LA WIDTH 3.55 cm    Ao root annulus 2.85 cm    LV Diastolic Volume 70.59 mL    LV Systolic Volume 43.20 mL    LVOT peak dewayne 0.91 m/s    TDI LATERAL 0.04 m/s    LA volume (mod) 26.28 cm3    MV "A" wave duration 11.42 msec    FS 18 %    LA volume 40.48 cm3    LV mass 104.30 g    Left Ventricle Relative Wall Thickness 0.50 cm    AV valve area 1.85 cm2    AV Velocity Ratio 0.75     AV index (prosthetic) 0.75     Pulm vein S/D ratio 1.36     LVOT area 2.5 cm2    LVOT stroke volume 33.45 cm3    AV peak gradient 6 mmHg    LV Systolic Volume Index 23.5 mL/m2    LV Diastolic Volume Index 38.36 mL/m2    LA Volume Index 22.0 mL/m2    LV Mass Index 57 g/m2    LA Volume Index (Mod) 14.3 mL/m2    BSA 1.87 m2    Right Atrial Pressure (from IVC) 3 mmHg    EF 40 %    Narrative    · Concentric remodeling and  · The estimated ejection fraction is 40%.  · Left ventricular diastolic dysfunction.  · There are segmental left ventricular wall motion abnormalities.  · Normal right ventricular size with normal right ventricular systolic   function.  · Normal central venous pressure (3 mmHg).        "

## 2022-07-27 NOTE — PLAN OF CARE
Problem: Occupational Therapy  Goal: Occupational Therapy Goal  Description: Goals to be met by: 8/27/22     Patient will increase functional independence with ADLs by performing:    LE Dressing with Modified Hall.  Grooming while standing with Modified [place].  Toileting from toilet with Modified [place] for hygiene and clothing management.   Supine to sit with Modified [place].  Step transfer with Modified [place]  Toilet transfer to toilet with Modified [place].  Upper extremity exercise program x10 reps per handout, with independence.    Outcome: Ongoing, Progressing     Pt would benefit from cont OT services in order to maximize functional independence. MDs present in room at time of assessment- report pt to d/c home with HH. Extensive education provided regarding energy conservation and home safety, use of DME during session. Recommending family/friend support at home initially as well as HHOT/PT.

## 2022-07-27 NOTE — PT/OT/SLP PROGRESS
"Physical Therapy Treatment    Patient Name:  Ana Aguila   MRN:  [id number]    Recommendations:     Discharge Recommendations:  home health PT, home health OT (family assistance (pt reports family may be able to stay first few nights))   Discharge Equipment Recommendations: walker, rolling   Barriers to discharge: Inaccessible home    Assessment:     Ana Aguila is a 66 y.o. female admitted with a medical diagnosis of Acute on chronic respiratory failure with hypoxia and hypercapnia.  She presents with the following impairments/functional limitations:  weakness, impaired balance, impaired endurance, impaired self care skills, impaired functional mobility, impaired cardiopulmonary response to activity, orthopedic precautions .Pt's SpO2 100% on 3 L at rest. Pt ascended/descended an 8" step with use of bed rail and CGA. Pt reporting light-headedness and needing seated rest break. SpO2 decreased to 88%. Pt recovered and performed step up x 2 trials and ambulated ~8 ft x 2 trials with HHA / CGA. Pt returned seated and SpO2 decreased to 81%. Nsg notified, O2 increased to 4 L due to prolonged recovery. Pt improved to 95% and was able to maintain once weaned to 3 L. Discussed post acute placement with pt due to concern for safely accessing home environment upon d/c, however, pt politely declined. Educated pt on use of gait belt and need for standing / seated rest breaks in order to tolerate transition home. Pt will require  PT/OT and family assistance upon d/c.     Rehab Prognosis: Good; patient would benefit from acute skilled PT services to address these deficits and reach maximum level of function.    Recent Surgery: Procedure(s) (LRB):  Left heart cath (N/A)  ANGIOGRAM, CORONARY ARTERY (N/A) 1 Day Post-Op    Plan:     During this hospitalization, patient to be seen 5 x/week to address the identified rehab impairments via gait training, therapeutic activities, therapeutic exercises, neuromuscular re-education and " "progress toward the following goals:    · Plan of Care Expires:  08/26/22    Subjective     Chief Complaint: SOB, light-headedness   Patient/Family Comments/goals: Return home  Pain/Comfort:  · Pain Rating 1: 0/10      Objective:     Communicated with nsg prior to session.  Patient found HOB elevated with oxygen, telemetry, PureWick upon PT entry to room.     General Precautions: Standard, fall, respiratory   Orthopedic Precautions: (NWB RUE x 3 days per pt/nsg post procedure)   Braces: N/A  Respiratory Status: Nasal cannula, flow 3 L/min     Functional Mobility:  · Bed Mobility:     · Scooting: supervision  · Supine to Sit: supervision  · Transfers:     · Sit to Stand:  contact guard assistance and minimum assistance with L hand-held assist  · Bed to Chair: contact guard assistance and minimum assistance with  L hand-held assist  using  Step Transfer  · Gait:  Pt ambulated ~8 ft x 2 trials with L HHA / CGA. Pt with decreased speed/kamla.   · Stairs:  Pt ascended/descended 8"  X 3 trials with left handrail with Contact Guard Assistance.       AM-PAC 6 CLICK MOBILITY  Turning over in bed (including adjusting bedclothes, sheets and blankets)?: 3  Sitting down on and standing up from a chair with arms (e.g., wheelchair, bedside commode, etc.): 3  Moving from lying on back to sitting on the side of the bed?: 3  Moving to and from a bed to a chair (including a wheelchair)?: 3  Need to walk in hospital room?: 3  Climbing 3-5 steps with a railing?: 3  Basic Mobility Total Score: 18       Therapeutic Activities and Exercises:  Pt's SpO2 100% on 3 L at rest.   Educated pt on proper fitting for use of RW. Performed HHA during session 2/2 pt NWB RUE x 3 days post procedure   Pt transitioned sup>sit EOB then bed>chair.  Pt ascended/descended an 8" step with use of L bed rail and CGA.   Pt reporting light-headedness and needing seated rest break. SpO2 decreased to 88%.   Pt recovered and performed step up x 2 trials and " ambulated ~8 ft x 2 trials with L HHA / CGA.   Pt returned seated and SpO2 decreased to 81%. Pt with increased anxiety and WOB during session.  Practiced PLB.   Nsg notified, O2 increased to 4 L due to prolonged recovery.   Pt improved to 95% and was able to maintain once weaned to 3 L.   Nsg reports notifying MD's regarding pt's current respiratory status and impaired endurance with activity.   Discussed post acute placement with pt due to concern for safely accessing home environment upon d/c, however, pt politely declined.   Educated pt on use of gait belt and need for standing / seated rest breaks in order to tolerate transition home.  Pt reports family may be able to stay first few nights to assist pt and family will assist with stair negotiation.    Patient left up in chair with all lines intact, call button in reach, chair alarm on, nsg and MD notified.    GOALS:   Multidisciplinary Problems     Physical Therapy Goals        Problem: Physical Therapy    Goal Priority Disciplines Outcome Goal Variances Interventions   Physical Therapy Goal     PT, PT/OT Ongoing, Progressing     Description: Goals to be met by: 22     Patient will increase functional independence with mobility by performin. Supine to sit with Modified [place]  2. Sit to stand transfer with Modified [place]  3. Bed to chair transfer with Modified [place] using Rolling Walker  4. Gait  x 100 feet with Modified [place] using Rolling Walker.   5. Ascend/descend 1 flight of stairs with bilateral Handrails Stand-by Assistance using No Assistive Device.                      Time Tracking:     PT Received On: 22  PT Start Time: 09     PT Stop Time: 1020  PT Total Time (min): 46 min     Billable Minutes: Gait Training 16 and Therapeutic Activity 30    Treatment Type: Treatment  PT/PTA: PT     PTA Visit Number: 0     2022

## 2022-07-27 NOTE — PROGRESS NOTES
[facility] - Telemetry  Cardiology  Progress Note    Patient Name: Ana Aguila  MRN: [id number]  Admission Date: 7/23/2022  Hospital Length of Stay: 4 days  Code Status: Full Code   Attending Physician: Annamaria Veliz MD   Primary Care Physician: Zhou Gallardo MD  Expected Discharge Date: 7/27/2022  Principal Problem:Acute on chronic respiratory failure with hypoxia and hypercapnia    Subjective:     Hospital Course:   07/27/2022 [facility] with notation of clean coronaries yesterday. Plavix discontinued. LVEF 40% per TTE. HR 90s-110s- BB up titrated and ARB added. Hemodynamically stable.           Review of Systems   Constitutional: Negative. Negative for diaphoresis.   HENT: Negative.     Eyes: Negative.    Cardiovascular:  Positive for dyspnea on exertion (chronic) and palpitations. Negative for chest pain, leg swelling, near-syncope, orthopnea, paroxysmal nocturnal dyspnea and syncope.   Endocrine: Negative.    Hematologic/Lymphatic: Negative.    Skin: Negative.    Musculoskeletal: Negative.    Gastrointestinal: Negative.    Genitourinary: Negative.    Neurological: Negative.    Psychiatric/Behavioral:  The patient is nervous/anxious.    Allergic/Immunologic: Negative.    Objective:     Vital Signs (Most Recent):  Temp: 98.1 °F (36.7 °C) (07/27/22 0756)  Pulse: 73 (07/27/22 0825)  Resp: 20 (07/27/22 0825)  BP: (!) 141/75 (07/27/22 0756)  SpO2: (!) 92 % (07/27/22 0825)   Vital Signs (24h Range):  Temp:  [97.4 °F (36.3 °C)-98.4 °F (36.9 °C)] 98.1 °F (36.7 °C)  Pulse:  [] 73  Resp:  [14-26] 20  SpO2:  [89 %-98 %] 92 %  BP: (109-149)/(67-83) 141/75     Weight: 76.2 kg (168 lb)  Body mass index is 27.96 kg/m².     SpO2: (!) 92 %  O2 Device (Oxygen Therapy): nasal cannula w/ humidification      Intake/Output Summary (Last 24 hours) at 7/27/2022 0969  Last data filed at 7/26/2022 1901  Gross per 24 hour   Intake --   Output 0 ml   Net 0 ml       Lines/Drains/Airways       Drain  Duration             Female External  Urinary Catheter 07/25/22 1500 1 day              Peripheral Intravenous Line  Duration                  Peripheral IV - Single Lumen 07/24/22 1800 20 G Anterior;Right Forearm 2 days                    Physical Exam  Constitutional:       General: She is not in acute distress.     Appearance: She is not diaphoretic.   HENT:      Head: Atraumatic.   Eyes:      General:         Right eye: No discharge.         Left eye: No discharge.   Cardiovascular:      Rate and Rhythm: Regular rhythm. Tachycardia present.      Heart sounds: No murmur heard.    No friction rub. No gallop.   Pulmonary:      Effort: Pulmonary effort is normal.      Breath sounds: No rales.   Abdominal:      General: Bowel sounds are normal.      Palpations: Abdomen is soft.   Musculoskeletal:      Right lower leg: No edema.      Left lower leg: No edema.   Skin:     General: Skin is warm and dry.      Capillary Refill: Capillary refill takes 2 to 3 seconds.   Neurological:      Mental Status: She is alert and oriented to person, place, and time. Mental status is at baseline.   Psychiatric:         Mood and Affect: Mood normal.         Behavior: Behavior normal.         Thought Content: Thought content normal.         Judgment: Judgment normal.       Significant Labs: BMP:   Recent Labs   Lab 07/26/22  0354 07/27/22  0303   GLU 91 83   * 142   K 3.7 4.0    104   CO2 31* 26   BUN 24* 30*   CREATININE 0.8 0.8   CALCIUM 9.1 8.8   MG 2.4 2.3   , CMP   Recent Labs   Lab 07/26/22  0354 07/27/22  0303   * 142   K 3.7 4.0    104   CO2 31* 26   GLU 91 83   BUN 24* 30*   CREATININE 0.8 0.8   CALCIUM 9.1 8.8   ANIONGAP 12 12   ESTGFRAFRICA >60 >60   EGFRNONAA >60 >60   , CBC   Recent Labs   Lab 07/26/22  0354 07/27/22  0303   WBC 14.96* 13.15*   HGB 12.4 11.6*   HCT 38.2 36.0*    242   , INR No results for input(s): INR, PROTIME in the last 48 hours., Lipid Panel No results for input(s): CHOL, HDL, LDLCALC, TRIG, CHOLHDL in the  "last 48 hours., Troponin No results for input(s): TROPONINI in the last 48 hours., and All pertinent lab results from the last 24 hours have been reviewed.    Significant Imaging: Echocardiogram: Transthoracic echo (TTE) complete (Cupid Only):   Results for orders placed or performed during the hospital encounter of 07/23/22   Echo   Result Value Ref Range    Ascending aorta 2.69 cm    STJ 2.77 cm    AV mean gradient 4 mmHg    Ao peak dewayne 1.21 m/s    Ao VTI 18.06 cm    IVRT 137.01 msec    IVS 0.73 0.6 - 1.1 cm    LA size 3.27 cm    Left Atrium Major Axis 3.94 cm    Left Atrium Minor Axis 4.28 cm    LVIDd 4.01 3.5 - 6.0 cm    LVIDs 3.27 2.1 - 4.0 cm    LVOT diameter 1.77 cm    LVOT peak VTI 13.60 cm    Posterior Wall 1.00 0.6 - 1.1 cm    PV Peak D Dewayne 0.22 m/s    PV Peak S Dewayne 0.30 m/s    RA Major Axis 3.71 cm    RA Width 3.20 cm    RVDD 2.74 cm    LA WIDTH 3.55 cm    Ao root annulus 2.85 cm    LV Diastolic Volume 70.59 mL    LV Systolic Volume 43.20 mL    LVOT peak dewayne 0.91 m/s    TDI LATERAL 0.04 m/s    LA volume (mod) 26.28 cm3    MV "A" wave duration 11.42 msec    FS 18 %    LA volume 40.48 cm3    LV mass 104.30 g    Left Ventricle Relative Wall Thickness 0.50 cm    AV valve area 1.85 cm2    AV Velocity Ratio 0.75     AV index (prosthetic) 0.75     Pulm vein S/D ratio 1.36     LVOT area 2.5 cm2    LVOT stroke volume 33.45 cm3    AV peak gradient 6 mmHg    LV Systolic Volume Index 23.5 mL/m2    LV Diastolic Volume Index 38.36 mL/m2    LA Volume Index 22.0 mL/m2    LV Mass Index 57 g/m2    LA Volume Index (Mod) 14.3 mL/m2    BSA 1.87 m2    Right Atrial Pressure (from IVC) 3 mmHg    EF 40 %    Narrative    · Concentric remodeling and  · The estimated ejection fraction is 40%.  · Left ventricular diastolic dysfunction.  · There are segmental left ventricular wall motion abnormalities.  · Normal right ventricular size with normal right ventricular systolic   function.  · Normal central venous pressure (3 mmHg).    "     Assessment and Plan:     Brief HPI: Patient seen this morning on rounds without cardiac complaint. POC discussed including LHC and medication changes. All questions answered.     Hyperlipidemia  Continue statin     Acute combined systolic and diastolic congestive heart failure  TTE 07/23/22    Echo    Interpretation Summary  · Concentric remodeling and  · The estimated ejection fraction is 40%.  · Left ventricular diastolic dysfunction.  · There are segmental left ventricular wall motion abnormalities.  · Normal right ventricular size with normal right ventricular systolic function.  · Normal central venous pressure (3 mmHg).  · Prominent pericardial fat over the RV      Recent Labs   Lab 07/23/22  1046   BNP 70   .  Continue BB, ARB added  Lasix 40 mg p.o. PRN weight gain/edema upon DC  Follow up with cardiology in 2-3 weeks  Will repeat TTE in 90 days     NSTEMI (non-ST elevated myocardial infarction)  Type II   Clean coronaries per [facility]  Asa, statin           Essential hypertension  SBP 100s-140s  Continue Norvasc, BB - up titrated, low dose ARB added. If hypotension occurs, discontinue Norvasc         VTE Risk Mitigation (From admission, onward)         Ordered     IP VTE HIGH RISK PATIENT  Once         07/23/22 1320     Place sequential compression device  Until discontinued         07/23/22 1320                Charlie Horn NP  Cardiology  [facility] - Telemetry

## 2022-07-27 NOTE — PROGRESS NOTES
[facility] Resident Progress Note    Subjective:      Ana Aguila is a 66 y.o.  female who is being followed by the [facility] service at [facility] after admission for acute on chronic hypoxic hypercapnic respiratory failure.     Patient was extubated later afternoon on 2022 without incident.     No issues over night states she has no concerns this morning. States she is feeling well following her procedure and ready for discharge.      Objective:   Last 24 Hour Vital Signs:  BP  Min: 109/67  Max: 149/80  Temp  Av.8 °F (36.6 °C)  Min: 97.4 °F (36.3 °C)  Max: 98.4 °F (36.9 °C)  Pulse  Av.2  Min: 94  Max: 113  Resp  Av.1  Min: 14  Max: 26  SpO2  Av.8 %  Min: 89 %  Max: 98 %  No intake/output data recorded.    Physical Examination:  Physical Exam  Vitals and nursing note reviewed. Exam conducted with a chaperone present.   Constitutional:       General: She is not in acute distress.     Appearance: She is not ill-appearing, toxic-appearing or diaphoretic.   HENT:      Right Ear: External ear normal.      Left Ear: External ear normal.      Nose: Nose normal.      Mouth/Throat:      Mouth: Mucous membranes are moist.      Pharynx: Oropharynx is clear.   Eyes:      Extraocular Movements: Extraocular movements intact.      Pupils: Pupils are equal, round, and reactive to light.   Cardiovascular:      Rate and Rhythm: Normal rate.      Pulses: Normal pulses.   Pulmonary:      Effort: Pulmonary effort is normal.   Abdominal:      General: There is no distension.      Palpations: Abdomen is soft.      Tenderness: There is no abdominal tenderness.   Musculoskeletal:         General: Tenderness (bilateral mid calf sharp in nature and only present with palpation) present. Normal range of motion.      Cervical back: Normal range of motion.   Skin:     General: Skin is warm.      Capillary Refill: Capillary refill takes less than 2 seconds.   Neurological:      General: No  focal deficit present.      Mental Status: She is alert. Mental status is at baseline.   Psychiatric:         Mood and Affect: Mood normal.       Laboratory:  Most Recent Data:  CBC:   Lab Results   Component Value Date    WBC 13.15 (H) 07/27/2022    HGB 11.6 (L) 07/27/2022    HCT 36.0 (L) 07/27/2022     07/27/2022    MCV 91 07/27/2022    RDW 14.1 07/27/2022     BMP:   Lab Results   Component Value Date     07/27/2022    K 4.0 07/27/2022     07/27/2022    CO2 26 07/27/2022    BUN 30 (H) 07/27/2022    CREATININE 0.8 07/27/2022    GLU 83 07/27/2022    CALCIUM 8.8 07/27/2022    MG 2.3 07/27/2022    PHOS 4.4 07/27/2022     LFTs:   Lab Results   Component Value Date    PROT 7.1 07/23/2022    ALBUMIN 3.8 07/23/2022    BILITOT 0.4 07/23/2022    AST 28 07/23/2022    ALKPHOS 66 07/23/2022    ALT 21 07/23/2022     Coags:   Lab Results   Component Value Date    INR 1.0 07/23/2022     FLP:   Lab Results   Component Value Date    CHOL 154 07/27/2021    HDL 70 07/27/2021    LDLCALC 72.0 07/27/2021    TRIG 60 07/27/2021    CHOLHDL 45.5 07/27/2021     DM:   Lab Results   Component Value Date    HGBA1C 5.8 (H) 07/23/2022    HGBA1C 5.4 10/18/2016    LDLCALC 72.0 07/27/2021    CREATININE 0.8 07/27/2022     Thyroid:   Lab Results   Component Value Date    TSH 2.258 07/27/2021     Anemia:   Lab Results   Component Value Date    IRON 44 01/16/2017    TIBC 337 01/16/2017    FERRITIN 98 01/16/2017    [id number] 585 07/27/2021     Cardiac:   Lab Results   Component Value Date    TROPONINI 0.686 (H) 07/24/2022    BNP 70 07/23/2022     Urinalysis:   Lab Results   Component Value Date    LABURIN No significant growth 07/23/2022    COLORU Yellow 07/23/2022    SPECGRAV 1.010 07/23/2022    NITRITE Negative 07/23/2022    KETONESU Negative 07/23/2022    UROBILINOGEN Negative 07/23/2022    WBCUA 12 (H) 07/23/2022       Trended Lab Data:  Recent Labs   Lab 07/23/22  1046 07/23/22  1130 07/25/22  0404 07/25/22  0511 07/26/22  0354  07/27/22  0303   WBC 17.60*   < > 16.91*  --  14.96* 13.15*   HGB 13.6   < > 12.2  --  12.4 11.6*   HCT 42.9   < > 36.4*  --  38.2 36.0*      < > 234  --  266 242   MCV 91   < > 88  --  90 91   RDW 13.9   < > 14.5  --  14.2 14.1      < >  --  142 146* 142   K 4.1   < >  --  3.4* 3.7 4.0      < >  --  103 103 104   CO2 26   < >  --  30* 31* 26   BUN 15   < >  --  23 24* 30*   CREATININE 1.0   < >  --  0.9 0.8 0.8   *   < >  --  153* 91 83   PROT 7.1  --   --   --   --   --    ALBUMIN 3.8  --   --   --   --   --    BILITOT 0.4  --   --   --   --   --    AST 28  --   --   --   --   --    ALKPHOS 66  --   --   --   --   --    ALT 21  --   --   --   --   --     < > = values in this interval not displayed.       Trended Cardiac Data:  Recent Labs   Lab 07/23/22  1046 07/23/22  2235 07/24/22  0511 07/24/22  1356   TROPONINI 0.032* 1.524* 1.168* 0.686*   BNP 70  --   --   --        Microbiology Data Reviewed: yes  Pertinent Findings:  7/23 - Blood Culture -- NGTD   7/23 - Urine culture -- No significant growth     Other Results:  EKG (my interpretation): Sinus Rhythm with ST and T wave inversion noted in inferior lateral leads.     Radiology Data Reviewed: yes  Pertinent Findings:      Current Medications:     Infusions:       Scheduled:   albuterol-ipratropium  3 mL Nebulization Q4H    amLODIPine  2.5 mg Oral Daily    aspirin  81 mg Oral Daily    atorvastatin  80 mg Oral Daily    cefTRIAXone (ROCEPHIN) IVPB  1 g Intravenous Q24H    clopidogreL  75 mg Oral Daily    doxycycline  100 mg Oral Q12H    metoprolol succinate  100 mg Oral Daily    mupirocin   Nasal BID    predniSONE  40 mg Oral Daily        PRN:  sodium chloride 0.9%, acetaminophen, acetaminophen, lorazepam, ondansetron, sodium chloride 0.9%, sodium chloride 0.9%    Antibiotics and Day Number of Therapy:  Rocephen and Doxy day 5    Lines and Day Number of Therapy:  PIV     Assessment:     Ana Aguila is a 66 y.o.female  with  Patient Active Problem List    Diagnosis Date Noted    Prediabetes     Hypertensive emergency 07/24/2022    Flash pulmonary edema 07/24/2022    Leukemoid reaction 07/24/2022    Acute on chronic respiratory failure with hypoxia and hypercapnia 07/23/2022    Prolonged QT interval 07/23/2022    Calcified granuloma of lung 03/14/2022    Mood disorder 03/14/2022    Hyperlipidemia     Coronary artery calcification seen on CT scan 12/04/2020    Solitary pulmonary nodule 11/18/2020    Vitamin B12 deficiency due to intestinal malabsorption 11/18/2020    Osteopenia determined by x-ray 11/10/2020    TERI (generalized anxiety disorder) 10/05/2020    Umbilical hernia without obstruction and without gangrene 01/31/2019    Chronic RUQ pain 01/31/2019    Adjustment disorder with depressed mood 11/22/2018    COPD exacerbation 11/18/2018    Chronic respiratory failure with hypoxia, on home oxygen therapy 11/18/2018    Situational depression 11/18/2018    Aortic atherosclerosis 01/11/2018    Fatty liver disease, nonalcoholic 09/07/2017    S/P right hemicolectomy 09/07/2017    Chronic diarrhea 02/03/2017    Microscopic hematuria 01/13/2017    NSTEMI (non-ST elevated myocardial infarction) 10/18/2016    Former smoker 10/18/2016    Takotsubo cardiomyopathy - resolved 10/18/2016    Hepatomegaly 12/21/2015    Chronic obstructive pulmonary disease 08/27/2013    Essential hypertension 08/12/2013        Plan:     Acute on Chronic Hypoxic Hypercarbic Respiratory Failure likely in the Setting of COPD Exacerbation (on home oxygen)   - Patient with SOB at home brought to the ED via EMS and was subsequently intubated now extubated on nasal cannula at her home oxygen setting 2lpm.   - Continue Duonebs q4 and Prednisone 40mg daily  - Rocephin and Doxycycline initiated for empiric coverage of CAP will continue currently day 5  - Will continue to monitor     NSTEMI Type 2   - Patient presented with elevation of trop  on presentation initially concerning for demand ischemia.   - Overnight patients troponin continued to increased and EKG now with ST depressions and T wave inversions.  - Troponin  0.032 > 1.524 > 1.168 >  0.686   - TTE EF 40% with left ventricular dysfunction and segmental left ventricular wall motion abnormalities   - Patient will need to be discharged on heart failure medications will follow up to cardiology for continued monitoring and evlauation.       Prolonged QTc  - Prolonged QTc 550 > 555 > 619 on 7/24  - Try to Avoid medications that prolong QTc  - Continue to monitor      Prediabetes   - A1c 5.8   - discussed life style changes   - Recommend continued monitoring as outpatient     Hypertension   - Patient with history of hypertension on amlodipine 2.5mg daily  - Will continue while in patient      CAD  - On home rosuvastatin and will continue while impatient     HLD  -On home rosuvastatin  - Will continue while in patient      Chronic Hepatomegaly  - Currently no complaints   - Will continue to monitor while inpatient     Leukocytosis (Resolved)  - Admit WBC 17 now 11.22  - Likely stress response in the setting of acute respiratory distress  - Will continue to monitor     Hypertensive Emergency with Flash pulmonary edema in setting of Acute on chronic combined heart failure (Resolved)   - TTE 11/2018 with ER 35% with multiple wall motion abnormalities in the LAD distribution ddx stress induced vs ischemic cardiomyopathy   - Repeat TTE 12/2018 with new EF 60%  - Repeat echo ordered pending performance   - continue amlodipine 2.5mg while inpatient   - Will continue to monitor      HCM  - Lipid panel, TSH, and Vit D up to date  - Pneumovax UTD  - Tetanus UTD  - A1c 5.8 - discussed life style modifications     DVT Ppx: None at this time   Diet: Cardiac    Code: Full   Consults: None      Disposition: Patient should be stable for discharge today     Casper Phillips MD   [facility] IN/EM PGY 4  [facility]      [facility] Hospitalist Pager numbers:   [facility] Hospitalist Medicine Team A (Susanna/Keren): [phone]  [facility] Hospitalist Medicine Team B (Camilla/Lavelle):  [phone]

## 2022-07-28 ENCOUNTER — PATIENT OUTREACH (OUTPATIENT)
Dept: ADMINISTRATIVE | Facility: OTHER | Age: 67
End: 2022-07-28
Payer: MEDICARE

## 2022-07-28 VITALS
SYSTOLIC BLOOD PRESSURE: 100 MMHG | BODY MASS INDEX: 27.99 KG/M2 | DIASTOLIC BLOOD PRESSURE: 56 MMHG | OXYGEN SATURATION: 94 % | TEMPERATURE: 99 F | HEART RATE: 79 BPM | RESPIRATION RATE: 18 BRPM | WEIGHT: 168 LBS | HEIGHT: 65 IN

## 2022-07-28 LAB
ANION GAP SERPL CALC-SCNC: 13 MMOL/L (ref 8–16)
APTT BLDCRRT: 27.1 SEC (ref 21–32)
BASOPHILS # BLD AUTO: 0.01 K/UL (ref 0–0.2)
BASOPHILS NFR BLD: 0.1 % (ref 0–1.9)
BUN SERPL-MCNC: 37 MG/DL (ref 8–23)
CALCIUM SERPL-MCNC: 8.7 MG/DL (ref 8.7–10.5)
CHLORIDE SERPL-SCNC: 102 MMOL/L (ref 95–110)
CO2 SERPL-SCNC: 27 MMOL/L (ref 23–29)
CREAT SERPL-MCNC: 0.8 MG/DL (ref 0.5–1.4)
DIFFERENTIAL METHOD: ABNORMAL
EOSINOPHIL # BLD AUTO: 0 K/UL (ref 0–0.5)
EOSINOPHIL NFR BLD: 0 % (ref 0–8)
ERYTHROCYTE [DISTWIDTH] IN BLOOD BY AUTOMATED COUNT: 14.2 % (ref 11.5–14.5)
EST. GFR  (AFRICAN AMERICAN): >60 ML/MIN/1.73 M^2
EST. GFR  (NON AFRICAN AMERICAN): >60 ML/MIN/1.73 M^2
GLUCOSE SERPL-MCNC: 90 MG/DL (ref 70–110)
HCT VFR BLD AUTO: 33.1 % (ref 37–48.5)
HGB BLD-MCNC: 10.9 G/DL (ref 12–16)
IMM GRANULOCYTES # BLD AUTO: 0.06 K/UL (ref 0–0.04)
IMM GRANULOCYTES NFR BLD AUTO: 0.5 % (ref 0–0.5)
LYMPHOCYTES # BLD AUTO: 2.2 K/UL (ref 1–4.8)
LYMPHOCYTES NFR BLD: 17.3 % (ref 18–48)
MCH RBC QN AUTO: 29.3 PG (ref 27–31)
MCHC RBC AUTO-ENTMCNC: 32.9 G/DL (ref 32–36)
MCV RBC AUTO: 89 FL (ref 82–98)
MONOCYTES # BLD AUTO: 1 K/UL (ref 0.3–1)
MONOCYTES NFR BLD: 7.5 % (ref 4–15)
NEUTROPHILS # BLD AUTO: 9.5 K/UL (ref 1.8–7.7)
NEUTROPHILS NFR BLD: 74.6 % (ref 38–73)
NRBC BLD-RTO: 0 /100 WBC
PLATELET # BLD AUTO: 233 K/UL (ref 150–450)
PMV BLD AUTO: 10.5 FL (ref 9.2–12.9)
POCT GLUCOSE: 109 MG/DL (ref 70–110)
POTASSIUM SERPL-SCNC: 3.8 MMOL/L (ref 3.5–5.1)
RBC # BLD AUTO: 3.72 M/UL (ref 4–5.4)
SODIUM SERPL-SCNC: 142 MMOL/L (ref 136–145)
WBC # BLD AUTO: 12.66 K/UL (ref 3.9–12.7)

## 2022-07-28 PROCEDURE — 80048 BASIC METABOLIC PNL TOTAL CA: CPT | Performed by: STUDENT IN AN ORGANIZED HEALTH CARE EDUCATION/TRAINING PROGRAM

## 2022-07-28 PROCEDURE — 25000003 PHARM REV CODE 250: Performed by: STUDENT IN AN ORGANIZED HEALTH CARE EDUCATION/TRAINING PROGRAM

## 2022-07-28 PROCEDURE — 99900035 HC TECH TIME PER 15 MIN (STAT)

## 2022-07-28 PROCEDURE — 94799 UNLISTED PULMONARY SVC/PX: CPT

## 2022-07-28 PROCEDURE — 94761 N-INVAS EAR/PLS OXIMETRY MLT: CPT

## 2022-07-28 PROCEDURE — 94640 AIRWAY INHALATION TREATMENT: CPT

## 2022-07-28 PROCEDURE — 25000003 PHARM REV CODE 250: Performed by: NURSE PRACTITIONER

## 2022-07-28 PROCEDURE — 85025 COMPLETE CBC W/AUTO DIFF WBC: CPT | Performed by: STUDENT IN AN ORGANIZED HEALTH CARE EDUCATION/TRAINING PROGRAM

## 2022-07-28 PROCEDURE — 36415 COLL VENOUS BLD VENIPUNCTURE: CPT | Performed by: INTERNAL MEDICINE

## 2022-07-28 PROCEDURE — 63600175 PHARM REV CODE 636 W HCPCS: Performed by: STUDENT IN AN ORGANIZED HEALTH CARE EDUCATION/TRAINING PROGRAM

## 2022-07-28 PROCEDURE — 27000221 HC OXYGEN, UP TO 24 HOURS

## 2022-07-28 PROCEDURE — 85730 THROMBOPLASTIN TIME PARTIAL: CPT | Performed by: INTERNAL MEDICINE

## 2022-07-28 PROCEDURE — 25000242 PHARM REV CODE 250 ALT 637 W/ HCPCS: Performed by: STUDENT IN AN ORGANIZED HEALTH CARE EDUCATION/TRAINING PROGRAM

## 2022-07-28 RX ADMIN — METOPROLOL SUCCINATE 100 MG: 50 TABLET, EXTENDED RELEASE ORAL at 08:07

## 2022-07-28 RX ADMIN — IPRATROPIUM BROMIDE AND ALBUTEROL SULFATE 3 ML: 2.5; .5 SOLUTION RESPIRATORY (INHALATION) at 12:07

## 2022-07-28 RX ADMIN — PREDNISONE 40 MG: 20 TABLET ORAL at 08:07

## 2022-07-28 RX ADMIN — ATORVASTATIN CALCIUM 80 MG: 40 TABLET, FILM COATED ORAL at 08:07

## 2022-07-28 RX ADMIN — LOSARTAN POTASSIUM 12.5 MG: 25 TABLET, FILM COATED ORAL at 08:07

## 2022-07-28 RX ADMIN — MUPIROCIN: 20 OINTMENT TOPICAL at 08:07

## 2022-07-28 RX ADMIN — IPRATROPIUM BROMIDE AND ALBUTEROL SULFATE 3 ML: 2.5; .5 SOLUTION RESPIRATORY (INHALATION) at 04:07

## 2022-07-28 RX ADMIN — IPRATROPIUM BROMIDE AND ALBUTEROL SULFATE 3 ML: 2.5; .5 SOLUTION RESPIRATORY (INHALATION) at 07:07

## 2022-07-28 RX ADMIN — AMLODIPINE BESYLATE 2.5 MG: 2.5 TABLET ORAL at 08:07

## 2022-07-28 RX ADMIN — DOXYCYCLINE HYCLATE 100 MG: 100 TABLET, COATED ORAL at 08:07

## 2022-07-28 RX ADMIN — ASPIRIN 81 MG: 81 TABLET, COATED ORAL at 08:07

## 2022-07-28 RX ADMIN — IPRATROPIUM BROMIDE AND ALBUTEROL SULFATE 3 ML: 2.5; .5 SOLUTION RESPIRATORY (INHALATION) at 11:07

## 2022-07-28 NOTE — PROGRESS NOTES
[facility] Resident Progress Note    Subjective:      Ana Aguila is a 66 y.o.  female who is being followed by the [facility] service at [facility] after admission for acute on chronic hypoxic hypercapnic respiratory failure.     Patient was extubated later afternoon on 2022 without incident.     No issues overnight following the coughing of blood. Patients states she feels really good and is ready for discharge.       Objective:   Last 24 Hour Vital Signs:  BP  Min: 95/58  Max: 127/65  Temp  Av.8 °F (36.6 °C)  Min: 96.3 °F (35.7 °C)  Max: 98.5 °F (36.9 °C)  Pulse  Av.2  Min: 73  Max: 95  Resp  Av.4  Min: 15  Max: 20  SpO2  Av.4 %  Min: 90 %  Max: 97 %  I/O last 3 completed shifts:  In: 240 [P.O.:240]  Out: 900 [Urine:900]    Physical Examination:  Physical Exam  Vitals and nursing note reviewed. Exam conducted with a chaperone present.   Constitutional:       General: She is not in acute distress.     Appearance: She is not ill-appearing, toxic-appearing or diaphoretic.   HENT:      Right Ear: External ear normal.      Left Ear: External ear normal.      Nose: Nose normal.      Mouth/Throat:      Mouth: Mucous membranes are moist.      Pharynx: Oropharynx is clear.   Eyes:      Extraocular Movements: Extraocular movements intact.      Pupils: Pupils are equal, round, and reactive to light.   Cardiovascular:      Rate and Rhythm: Normal rate.      Pulses: Normal pulses.   Pulmonary:      Effort: Pulmonary effort is normal.   Abdominal:      General: There is no distension.      Palpations: Abdomen is soft.      Tenderness: There is no abdominal tenderness.   Musculoskeletal:         General: Tenderness (bilateral mid calf sharp in nature and only present with palpation) present. Normal range of motion.      Cervical back: Normal range of motion.   Skin:     General: Skin is warm.      Capillary Refill: Capillary refill takes less than 2 seconds.   Neurological:       General: No focal deficit present.      Mental Status: She is alert. Mental status is at baseline.   Psychiatric:         Mood and Affect: Mood normal.       Laboratory:  Most Recent Data:  CBC:   Lab Results   Component Value Date    WBC 12.66 07/28/2022    HGB 10.9 (L) 07/28/2022    HCT 33.1 (L) 07/28/2022     07/28/2022    MCV 89 07/28/2022    RDW 14.2 07/28/2022     BMP:   Lab Results   Component Value Date     07/28/2022    K 3.8 07/28/2022     07/28/2022    CO2 27 07/28/2022    BUN 37 (H) 07/28/2022    CREATININE 0.8 07/28/2022    GLU 90 07/28/2022    CALCIUM 8.7 07/28/2022    MG 2.3 07/27/2022    PHOS 4.4 07/27/2022     LFTs:   Lab Results   Component Value Date    PROT 7.1 07/23/2022    ALBUMIN 3.8 07/23/2022    BILITOT 0.4 07/23/2022    AST 28 07/23/2022    ALKPHOS 66 07/23/2022    ALT 21 07/23/2022     Coags:   Lab Results   Component Value Date    INR 1.0 07/23/2022     FLP:   Lab Results   Component Value Date    CHOL 154 07/27/2021    HDL 70 07/27/2021    LDLCALC 72.0 07/27/2021    TRIG 60 07/27/2021    CHOLHDL 45.5 07/27/2021     DM:   Lab Results   Component Value Date    HGBA1C 5.8 (H) 07/23/2022    HGBA1C 5.4 10/18/2016    LDLCALC 72.0 07/27/2021    CREATININE 0.8 07/28/2022     Thyroid:   Lab Results   Component Value Date    TSH 2.258 07/27/2021     Anemia:   Lab Results   Component Value Date    IRON 44 01/16/2017    TIBC 337 01/16/2017    FERRITIN 98 01/16/2017    [id number] 585 07/27/2021     Cardiac:   Lab Results   Component Value Date    TROPONINI 0.686 (H) 07/24/2022    BNP 70 07/23/2022     Urinalysis:   Lab Results   Component Value Date    LABURIN No significant growth 07/23/2022    COLORU Yellow 07/23/2022    SPECGRAV 1.010 07/23/2022    NITRITE Negative 07/23/2022    KETONESU Negative 07/23/2022    UROBILINOGEN Negative 07/23/2022    WBCUA 12 (H) 07/23/2022       Trended Lab Data:  Recent Labs   Lab 07/23/22  1046 07/23/22  1130 07/26/22  0354 07/27/22  0303  07/28/22  0304   WBC 17.60*   < > 14.96* 13.15* 12.66   HGB 13.6   < > 12.4 11.6* 10.9*   HCT 42.9   < > 38.2 36.0* 33.1*      < > 266 242 233   MCV 91   < > 90 91 89   RDW 13.9   < > 14.2 14.1 14.2      < > 146* 142 142   K 4.1   < > 3.7 4.0 3.8      < > 103 104 102   CO2 26   < > 31* 26 27   BUN 15   < > 24* 30* 37*   CREATININE 1.0   < > 0.8 0.8 0.8   *   < > 91 83 90   PROT 7.1  --   --   --   --    ALBUMIN 3.8  --   --   --   --    BILITOT 0.4  --   --   --   --    AST 28  --   --   --   --    ALKPHOS 66  --   --   --   --    ALT 21  --   --   --   --     < > = values in this interval not displayed.       Trended Cardiac Data:  Recent Labs   Lab 07/23/22  1046 07/23/22  2235 07/24/22  0511 07/24/22  1356   TROPONINI 0.032* 1.524* 1.168* 0.686*   BNP 70  --   --   --        Microbiology Data Reviewed: yes  Pertinent Findings:  7/23 - Blood Culture -- NGTD   7/23 - Urine culture -- No significant growth     Other Results:  EKG (my interpretation): Sinus Rhythm with ST and T wave inversion noted in inferior lateral leads.     Radiology Data Reviewed: yes  Pertinent Findings:      Current Medications:     Infusions:       Scheduled:   albuterol-ipratropium  3 mL Nebulization Q4H    amLODIPine  2.5 mg Oral Daily    aspirin  81 mg Oral Daily    atorvastatin  80 mg Oral Daily    cefTRIAXone (ROCEPHIN) IVPB  1 g Intravenous Q24H    doxycycline  100 mg Oral Q12H    losartan  12.5 mg Oral Daily    metoprolol succinate  100 mg Oral Daily    mupirocin   Nasal BID    predniSONE  40 mg Oral Daily        PRN:  sodium chloride 0.9%, acetaminophen, acetaminophen, lorazepam, ondansetron, sodium chloride 0.9%, sodium chloride 0.9%    Antibiotics and Day Number of Therapy:  Rocephen and Doxy day 5    Lines and Day Number of Therapy:  PIV     Assessment:     Ana Aguila is a 66 y.o.female with  Patient Active Problem List    Diagnosis Date Noted    Hemoptysis     Prediabetes      Hypertensive emergency 07/24/2022    Flash pulmonary edema 07/24/2022    Leukemoid reaction 07/24/2022    Acute on chronic respiratory failure with hypoxia and hypercapnia 07/23/2022    Prolonged QT interval 07/23/2022    Calcified granuloma of lung 03/14/2022    Mood disorder 03/14/2022    Hyperlipidemia     Coronary artery calcification seen on CT scan 12/04/2020    Solitary pulmonary nodule 11/18/2020    Vitamin B12 deficiency due to intestinal malabsorption 11/18/2020    Osteopenia determined by x-ray 11/10/2020    TERI (generalized anxiety disorder) 10/05/2020    Umbilical hernia without obstruction and without gangrene 01/31/2019    Chronic RUQ pain 01/31/2019    Adjustment disorder with depressed mood 11/22/2018    Acute on chronic combined systolic and diastolic heart failure 11/19/2018    COPD exacerbation 11/18/2018    Chronic respiratory failure with hypoxia, on home oxygen therapy 11/18/2018    Situational depression 11/18/2018    Aortic atherosclerosis 01/11/2018    Fatty liver disease, nonalcoholic 09/07/2017    S/P right hemicolectomy 09/07/2017    Chronic diarrhea 02/03/2017    Microscopic hematuria 01/13/2017    NSTEMI (non-ST elevated myocardial infarction) 10/18/2016    Former smoker 10/18/2016    Takotsubo cardiomyopathy - resolved 10/18/2016    Hepatomegaly 12/21/2015    Chronic obstructive pulmonary disease 08/27/2013    Essential hypertension 08/12/2013        Plan:     Acute on Chronic Hypoxic Hypercarbic Respiratory Failure likely in the Setting of COPD Exacerbation (on home oxygen)   - Patient with SOB at home brought to the ED via EMS and was subsequently intubated now extubated on nasal cannula at her home oxygen setting 2lpm.   - Continue Duonebs q4 and Prednisone 40mg daily  - Rocephin and Doxycycline initiated for empiric coverage of CAP will continue currently day 5  - Will continue to monitor     Hematosis   - Patient experienced episode of hemoptysis  yesterday evening prior to discharge   - Patient likely bleeding post intubation and was on heparin.   - No bleeding over night     NSTEMI Type 2   - Patient presented with elevation of trop on presentation initially concerning for demand ischemia.   - Overnight patients troponin continued to increased and EKG now with ST depressions and T wave inversions.  - Troponin  0.032 > 1.524 > 1.168 >  0.686   - TTE EF 40% with left ventricular dysfunction and segmental left ventricular wall motion abnormalities   - Patient will need to be discharged on heart failure medications will follow up to cardiology for continued monitoring and evlauation.       Prolonged QTc  - Prolonged QTc 550 > 555 > 619 on 7/24  - Try to Avoid medications that prolong QTc  - Continue to monitor      Prediabetes   - A1c 5.8   - discussed life style changes   - Recommend continued monitoring as outpatient     Hypertension   - Patient with history of hypertension on amlodipine 2.5mg daily  - Will continue while in patient      CAD  - On home rosuvastatin and will continue while impatient     HLD  -On home rosuvastatin  - Will continue while in patient      Chronic Hepatomegaly  - Currently no complaints   - Will continue to monitor while inpatient     Leukocytosis (Resolved)  - Admit WBC 17 now 11.22  - Likely stress response in the setting of acute respiratory distress  - Will continue to monitor     Hypertensive Emergency with Flash pulmonary edema in setting of Acute on chronic combined heart failure (Resolved)   - TTE 11/2018 with ER 35% with multiple wall motion abnormalities in the LAD distribution ddx stress induced vs ischemic cardiomyopathy   - Repeat TTE 12/2018 with new EF 60%  - Repeat echo ordered pending performance   - continue amlodipine 2.5mg while inpatient   - Will continue to monitor      HCM  - Lipid panel, TSH, and Vit D up to date  - Pneumovax UTD  - Tetanus UTD  - A1c 5.8 - discussed life style modifications     DVT Ppx:  None at this time   Diet: Cardiac    Code: Full   Consults: None      Disposition: Patient should be stable for discharge today     Casper Phillips MD   [facility] IN/EM PGY 4  [facility]     [facility] Hospitalist Pager numbers:   \[facility] [place] [facility]\"" Hospitalist Medicine Team A (Susanna/Keren): [phone]  \[facility] [place] [facility]\"" Hospitalist Medicine Team B (Camilla/Lavelle):  [phone]

## 2022-07-28 NOTE — PLAN OF CARE
VN Note: Labs, notes, orders, care plan review will be available as needed.   Problem: Infection  Goal: Absence of Infection Signs and Symptoms  Outcome: Ongoing, Progressing

## 2022-07-28 NOTE — PROGRESS NOTES
ANT met with patient to discuss discharge and additional patient barriers to care. ANT provided pt with [facility] for meals-on-wheels assistance. Pt identified no additional social barriers to care.    The following were addressed during this visit:  -Complete follow-up with patient    ANT Mckeon

## 2022-07-28 NOTE — PLAN OF CARE
[id number]  Patient resting quietly in recliner when CM rounded. No family present. Patient in agreement with plan to discharge home today, denied the need for assistance with transportation at time of discharge, & verbalized understanding regarding the hospital follow up appointment.      Will continue to follow.

## 2022-07-28 NOTE — PLAN OF CARE
Discharge orders noted. Additional clinical references attached. Patient's discharge instructions given by bedside RN and reviewed by this VN with patient. Education provided on home care instructions, new and previous medications, diagnosis, when to seek medical attention, and follow-up appointments. New medications delivered by pharmacy. Patient verbalized understanding and teach back method was used. Patient's family to provide ride/transportation home. All questions answered. Transport to [facility] requested. Floor nurse notified.

## 2022-07-29 ENCOUNTER — PATIENT OUTREACH (OUTPATIENT)
Dept: ADMINISTRATIVE | Facility: OTHER | Age: 67
End: 2022-07-29
Payer: MEDICARE

## 2022-07-29 LAB
BACTERIA BLD CULT: NORMAL
BACTERIA BLD CULT: NORMAL

## 2022-07-29 PROCEDURE — G0180 PR HOME HEALTH MD CERTIFICATION: ICD-10-PCS | Mod: ,,, | Performed by: INTERNAL MEDICINE

## 2022-07-29 PROCEDURE — G0180 MD CERTIFICATION HHA PATIENT: HCPCS | Mod: ,,, | Performed by: INTERNAL MEDICINE

## 2022-07-29 NOTE — PROGRESS NOTES
IP Liaison - Final Visit Note    Patient: Ana Aguila  MRN:  [id number]  Date of Service:  7/29/2022  Completed by:  ANT Mckeon    Reason for Visit   Patient presents with    IP Liaison Chart Review        Patient Summary     Discharge Date: 7/28/2022  Discharge telephone number/address: [phone] / [address] [place] [address] [place] [address]  Follow up provider: Zhou Gallardo MD / Kylee Carreon MD  Follow up appointments: 8/3/2022 @ 10:00am / 8/4/2022 @ 9:00am  Home Health agency & telephone number: [facility]  DME ordered &  name: n/a  Assigned OPCM RN/SW: n/a  Report sent to follow up team (PCP/OPCM) via in basket message: n/a  Community Resources provided including agency name & contact info: [facility]      ANT Mckeon

## 2022-07-29 NOTE — PLAN OF CARE
[facility] - Telemetry  Discharge Final Note    Primary Care Provider: Zhou Gallardo MD    Expected Discharge Date: 7/28/2022    Final Discharge Note (most recent)       Final Note - 07/29/22 0735          Final Note    Assessment Type Final Discharge Note (P)      Anticipated Discharge Disposition Home-Health Care Svc (P)    [facility] Resources/Appts/Education Provided Appointments scheduled and added to AVS (P)         Post-Acute Status    Post-Acute Authorization Home Health (P)      Home Health Status Set-up Complete/Auth obtained (P)                      Important Message from Medicare             Contact Info       Zhou Gallardo MD   Specialty: Internal Medicine   Relationship: PCP - General    [address]  [place] [address]   Phone: [phone]       Next Steps: Follow up on 8/3/2022    Instructions: at 10:00 AM; PCP hospital follow up appointment    Kylee Carreon MD   Specialty: Interventional Cardiology    [address]  [address]  [facility] [address]   Phone: [phone]       Next Steps: Follow up on 8/4/2022    Instructions: at 9:00 AM; cardiology hospital follow up appointment    [facility]    [address]  [place] [address]   Phone: [phone]       Next Steps: Follow up    Instructions: [facility] will assign a home health company to provide services.    [facility]   Specialty: Home Health Services    [address]  [address]  [place] [address]   Phone: [phone]       Next Steps: Follow up on 7/29/2022    Instructions: will provide home health services    [facility]    Phone: [phone]       Next Steps: Call    Instructions: to apply for meals-on-wheels assistance.

## 2022-07-31 NOTE — DISCHARGE SUMMARY
[facility] Hospital Medicine Discharge Summary    Primary Team: [facility] Hospitalist Team A  Attending Physician: Keren    Resident: Alan RYAN   Intern: Constantine     Date of Admit: 7/23/2022  Date of Discharge: 7/28/2022    Discharge to: Home   Condition: Stable     Discharge Diagnoses     Patient Active Problem List   Diagnosis    Essential hypertension    Chronic obstructive pulmonary disease    Hepatomegaly    NSTEMI (non-ST elevated myocardial infarction)    Former smoker    Takotsubo cardiomyopathy - resolved    Microscopic hematuria    Chronic diarrhea    Fatty liver disease, nonalcoholic    S/P right hemicolectomy    Aortic atherosclerosis    COPD exacerbation    Chronic respiratory failure with hypoxia, on home oxygen therapy    Situational depression    Acute on chronic combined systolic and diastolic heart failure    Adjustment disorder with depressed mood    Umbilical hernia without obstruction and without gangrene    Chronic RUQ pain    TERI (generalized anxiety disorder)    Osteopenia determined by x-ray    Solitary pulmonary nodule    Vitamin B12 deficiency due to intestinal malabsorption    Coronary artery calcification seen on CT scan    Hyperlipidemia    Calcified granuloma of lung    Mood disorder    Acute on chronic respiratory failure with hypoxia and hypercapnia    Prolonged QT interval    Hypertensive emergency    Flash pulmonary edema    Leukemoid reaction    Prediabetes    Hemoptysis       Consultants and Procedures     Consultants:  Pulmonology     Procedures:   Patient was intubated while in the ED    Imaging:  Multiple chest x rays with most recent 7/27 Small patchy airspace opacity in the right mid lung suspicious for aspiration or pneumonia with Bibasilar atelectasis or scarring.    Brief History of Present Illness        Ana Aguila is a 66 year old female who presented on 7/23/2022 for evaluation of shortness of breath for one hour. Patient experienced  increasing SOB this AM at which time EMS was called. During transport the patient received duonebs, solumedrol, and was started on CPAP.  While in the emergency department patient found to have increased work of breathing and was transition to BiPAP with continuous duonebs for 1 hour.. At the completion of the hour long therapy the patient had decreased responsiveness, increased work of breathing, with diaphoresis and it was at that time the ED elected to intubate the patient. [facility] was consulted for the evaluation and treatment.     7/23 - Patient extubated in the ICU without complication   7/26 - Left Heart Cath with normal findings in all coronary arteries  7/27 - Patient with some blood following coughing x 1 was concerned and help over night. Likely from intubation and being on heparin.     For the full HPI please refer to the History & Physical from this admission.    Hospital Course By Problem with Pertinent Findings     # Hypertensive Emergency with Flash pulmonary edema in setting of Acute on chronic combined heart failure  # Acute on Chronic Hypoxic Hypercarbic Respiratory Failure likely in the Setting of COPD Exacerbation (on home oxygen)  # NSTEMI type 2   # Hypertension   - Patient with SOB at home brought to the ED via EMS and was subsequently intubated and extubated the same day to bipap and then transitioned to nasal canula.   - Patient given 5 days therapy for CAP  - TTE with EF 40% with left ventricular dysfunction and segmental left ventricular wall motion abnormalities.   - L heart cath negative for obstruction  - Patient now on HF therapy to include Metoprolol, Losartan, Lasix, and Jardiance  - Patient amlodipine was discontinued as she was transitioned to losartan.   - At discharge on nasal cannula at her home oxygen setting 2lpm.   - Should follow up with Cardiology and PCP for continued monitoring and evaluation of therapy.     Hematosis   - Patient experienced episode of hemoptysis the  evening of 7/27 prior to discharge   - Patient likely bleeding post intubation and was on heparin.   - No bleeding noted the morning of 7/28   - Should follow up with PCP for continued monitoring and evaluation.      Prolonged QTc  - Last EKG 7/24 was 619   - Patient should be followed by her cardiologist and PCP for continued monitoring and work up outpatient.        Prediabetes   - A1c 5.8   - Discussed life style changes   - Recommend continued monitoring as outpatient       CAD  - Continue rosuvastatin as outpatient      HLD  - Continued rosuvastatin while outpatient      Chronic Hepatomegaly  - No complaints on admission   - Recommend continued monitoring as outpatient      Leukocytosis on admission with resolution during hospitalization   - Admit WBC 17 and on discharge 12.66   - Likely stress response in the setting of acute respiratory distress on admission   - Should be evaluated by PCP as outpatient.      HCM  - Lipid panel, TSH, and Vit D up to date  - Pneumovax UTD  - Tetanus UTD      Discharge Medications        Medication List      START taking these medications    furosemide 40 MG tablet  Commonly known as: LASIX  Take 1 tablet (40 mg total) by mouth once daily.     JARDIANCE 10 mg tablet  Generic drug: empagliflozin  Take 1 tablet (10 mg total) by mouth once daily.     losartan 25 MG tablet  Commonly known as: COZAAR  Take 1 tablet (25 mg total) by mouth once daily.        CHANGE how you take these medications    metoprolol succinate 100 MG 24 hr tablet  Commonly known as: TOPROL-XL  Take 1 tablet (100 mg total) by mouth once daily.  What changed:   · medication strength  · how much to take        CONTINUE taking these medications    acetaminophen 500 MG tablet  Commonly known as: TYLENOL     albuterol-ipratropium 2.5 mg-0.5 mg/3 mL nebulizer solution  Commonly known as: DUO-NEB  TAKE 3 ML BY NEBULIZER EVERY 6 HOURS AS NEEDED FOR WHEEZING OR SHORTNESS OF BREATH     aspirin 81 MG EC tablet  Commonly  known as: ECOTRIN     cyanocobalamin 100 MCG tablet  Commonly known as: VITAMIN B-12     ergocalciferol 50,000 unit Cap  Commonly known as: ERGOCALCIFEROL  TAKE 1 CAPSULE BY MOUTH EVERY 7 DAYS     fluticasone-salmeterol 250-50 mcg/dose 250-50 mcg/dose diskus inhaler  Commonly known as: WIXELA INHUB  INHALE 1 PUFF BY MOUTH EVERY 12 HOURS AS NEEDED     ipratropium 0.02 % nebulizer solution  Commonly known as: ATROVENT     PROAIR HFA 90 mcg/actuation inhaler  Generic drug: albuterol  INHALE 2 PUFFS FOUR TIMES DAILY AS NEEDED FOR COPD     rosuvastatin 10 MG tablet  Commonly known as: CRESTOR  Take 1 tablet (10 mg total) by mouth once daily.        STOP taking these medications    amLODIPine 2.5 MG tablet  Commonly known as: NORVASC           Where to Get Your Medications      These medications were sent to [facility]  [address], [place] [address]    Hours: Mon-Fri, 8a-5:30p Phone: [phone]   · furosemide 40 MG tablet  · JARDIANCE 10 mg tablet  · losartan 25 MG tablet  · metoprolol succinate 100 MG 24 hr tablet         Discharge Information:   Diet:  Cardiac     Physical Activity:  As tolerated              Instructions:  1. Take all medications as prescribed  2. Keep all follow-up appointments  3. Return to the hospital or call your primary care physicians if any worsening symptoms such as fever, chest pain, shortness of breath, return of symptoms, or any other concerns.    Follow-Up Appointments:  Should follow with PCP and Cardiology     Casper Phillips MD  [facility] PGY 4  [facility]

## 2022-08-03 ENCOUNTER — OFFICE VISIT (OUTPATIENT)
Dept: INTERNAL MEDICINE | Facility: CLINIC | Age: 67
End: 2022-08-03
Payer: MEDICARE

## 2022-08-03 VITALS
DIASTOLIC BLOOD PRESSURE: 68 MMHG | BODY MASS INDEX: 27.96 KG/M2 | OXYGEN SATURATION: 97 % | HEART RATE: 79 BPM | SYSTOLIC BLOOD PRESSURE: 126 MMHG | HEIGHT: 65 IN

## 2022-08-03 DIAGNOSIS — I10 ESSENTIAL HYPERTENSION: ICD-10-CM

## 2022-08-03 DIAGNOSIS — R73.03 PREDIABETES: ICD-10-CM

## 2022-08-03 DIAGNOSIS — Z12.31 VISIT FOR SCREENING MAMMOGRAM: ICD-10-CM

## 2022-08-03 DIAGNOSIS — I21.4 NSTEMI (NON-ST ELEVATED MYOCARDIAL INFARCTION): Primary | ICD-10-CM

## 2022-08-03 DIAGNOSIS — J96.22 ACUTE ON CHRONIC RESPIRATORY FAILURE WITH HYPERCAPNIA: ICD-10-CM

## 2022-08-03 DIAGNOSIS — Z01.00 EYE EXAM, ROUTINE: ICD-10-CM

## 2022-08-03 PROCEDURE — 1111F PR DISCHARGE MEDS RECONCILED W/ CURRENT OUTPATIENT MED LIST: ICD-10-PCS | Mod: CPTII,S$GLB,, | Performed by: INTERNAL MEDICINE

## 2022-08-03 PROCEDURE — 1101F PR PT FALLS ASSESS DOC 0-1 FALLS W/OUT INJ PAST YR: ICD-10-PCS | Mod: CPTII,S$GLB,, | Performed by: INTERNAL MEDICINE

## 2022-08-03 PROCEDURE — 3288F PR FALLS RISK ASSESSMENT DOCUMENTED: ICD-10-PCS | Mod: CPTII,S$GLB,, | Performed by: INTERNAL MEDICINE

## 2022-08-03 PROCEDURE — 1159F PR MEDICATION LIST DOCUMENTED IN MEDICAL RECORD: ICD-10-PCS | Mod: CPTII,S$GLB,, | Performed by: INTERNAL MEDICINE

## 2022-08-03 PROCEDURE — 3288F FALL RISK ASSESSMENT DOCD: CPT | Mod: CPTII,S$GLB,, | Performed by: INTERNAL MEDICINE

## 2022-08-03 PROCEDURE — 1111F DSCHRG MED/CURRENT MED MERGE: CPT | Mod: CPTII,S$GLB,, | Performed by: INTERNAL MEDICINE

## 2022-08-03 PROCEDURE — 99999 PR PBB SHADOW E&M-EST. PATIENT-LVL V: ICD-10-PCS | Mod: PBBFAC,,, | Performed by: INTERNAL MEDICINE

## 2022-08-03 PROCEDURE — 3074F SYST BP LT 130 MM HG: CPT | Mod: CPTII,S$GLB,, | Performed by: INTERNAL MEDICINE

## 2022-08-03 PROCEDURE — 3044F PR MOST RECENT HEMOGLOBIN A1C LEVEL <7.0%: ICD-10-PCS | Mod: CPTII,S$GLB,, | Performed by: INTERNAL MEDICINE

## 2022-08-03 PROCEDURE — 3074F PR MOST RECENT SYSTOLIC BLOOD PRESSURE < 130 MM HG: ICD-10-PCS | Mod: CPTII,S$GLB,, | Performed by: INTERNAL MEDICINE

## 2022-08-03 PROCEDURE — 99215 OFFICE O/P EST HI 40 MIN: CPT | Mod: S$GLB,,, | Performed by: INTERNAL MEDICINE

## 2022-08-03 PROCEDURE — 1160F PR REVIEW ALL MEDS BY PRESCRIBER/CLIN PHARMACIST DOCUMENTED: ICD-10-PCS | Mod: CPTII,S$GLB,, | Performed by: INTERNAL MEDICINE

## 2022-08-03 PROCEDURE — 3044F HG A1C LEVEL LT 7.0%: CPT | Mod: CPTII,S$GLB,, | Performed by: INTERNAL MEDICINE

## 2022-08-03 PROCEDURE — 1126F AMNT PAIN NOTED NONE PRSNT: CPT | Mod: CPTII,S$GLB,, | Performed by: INTERNAL MEDICINE

## 2022-08-03 PROCEDURE — 3078F PR MOST RECENT DIASTOLIC BLOOD PRESSURE < 80 MM HG: ICD-10-PCS | Mod: CPTII,S$GLB,, | Performed by: INTERNAL MEDICINE

## 2022-08-03 PROCEDURE — 4010F ACE/ARB THERAPY RXD/TAKEN: CPT | Mod: CPTII,S$GLB,, | Performed by: INTERNAL MEDICINE

## 2022-08-03 PROCEDURE — 1101F PT FALLS ASSESS-DOCD LE1/YR: CPT | Mod: CPTII,S$GLB,, | Performed by: INTERNAL MEDICINE

## 2022-08-03 PROCEDURE — 1126F PR PAIN SEVERITY QUANTIFIED, NO PAIN PRESENT: ICD-10-PCS | Mod: CPTII,S$GLB,, | Performed by: INTERNAL MEDICINE

## 2022-08-03 PROCEDURE — 99215 PR OFFICE/OUTPT VISIT, EST, LEVL V, 40-54 MIN: ICD-10-PCS | Mod: S$GLB,,, | Performed by: INTERNAL MEDICINE

## 2022-08-03 PROCEDURE — 3008F PR BODY MASS INDEX (BMI) DOCUMENTED: ICD-10-PCS | Mod: CPTII,S$GLB,, | Performed by: INTERNAL MEDICINE

## 2022-08-03 PROCEDURE — 99999 PR PBB SHADOW E&M-EST. PATIENT-LVL V: CPT | Mod: PBBFAC,,, | Performed by: INTERNAL MEDICINE

## 2022-08-03 PROCEDURE — 4010F PR ACE/ARB THEARPY RXD/TAKEN: ICD-10-PCS | Mod: CPTII,S$GLB,, | Performed by: INTERNAL MEDICINE

## 2022-08-03 PROCEDURE — 3078F DIAST BP <80 MM HG: CPT | Mod: CPTII,S$GLB,, | Performed by: INTERNAL MEDICINE

## 2022-08-03 PROCEDURE — 3008F BODY MASS INDEX DOCD: CPT | Mod: CPTII,S$GLB,, | Performed by: INTERNAL MEDICINE

## 2022-08-03 PROCEDURE — 1159F MED LIST DOCD IN RCRD: CPT | Mod: CPTII,S$GLB,, | Performed by: INTERNAL MEDICINE

## 2022-08-03 PROCEDURE — 1160F RVW MEDS BY RX/DR IN RCRD: CPT | Mod: CPTII,S$GLB,, | Performed by: INTERNAL MEDICINE

## 2022-08-03 NOTE — PROGRESS NOTES
CC:  Hospital follow-up    HPI:  The patient is a 66-year-old female with COPD, hypertension, hyperlipidemia, reflux, history of right hemicolectomy status post GI bleed for polypectomy who presents today as a hospital follow-up for acute hypoxic respiratory failure and hypertensive emergency with flash pulmonary edema.  The patient was admitted and briefly intubated.  She was transitioned to BiPAP.  Her blood test showed positive troponins.  She did undergo left heart catheterization which showed normal coronaries.  Her medications were changed.  Her metoprolol was increased from 25 mg to 100 mg. Losartan and Lasix were added.  She was also started on Jardiance at time of discharge due to prediabetes.  Patient reports that she did fill the prescription for Jardiance but did not start the medication yet.    ROS:  Patient reports blood pressure is doing much better.  Overall she feels a lot better.  She is breathing much better.  She states she was able to walk from car to whether wheelchairs are.  Normally, she has to have some eye bring a wheelchair to her.  No nausea vomiting.  No abdominal pain.  The furosemide does make her urinate quite a bit.  She did not take it this morning.    Physical exam:  General appearance:  No acute distress  HEENT:  Conjunctiva is clear.  She has bilateral cataracts.  TMs are clear.  Nasal is midline without discharge.  Oropharynx is without erythema.  Trachea is midline without JVD.  Pulmonary:  Good inspiratory, expiratory breath sounds are heard.  Lungs are clear to auscultation.  Cardiovascular:  S1-S2, rhythm is normal.  Extremities without edema.  GI:  Abdomen was nontender, nondistended.  She had normal bowel sounds.  Comments:  Did discuss with her that Jardiance is good her heart as well as blood sugars.  Patient is going to see Cardiology tomorrow she will further discuss it with them as well.    Assessment:  1. Hypertensive emergency with flash pulmonary edema  2. Episode  of hypoxic respiratory failure  3. Non ST-elevation MI  4. Hypertension  5. Prediabetes  6. Hypoxia currently on 2 L of oxygen.    Plan:  1. Will put the patient for screening mammogram  2. The patient for routine eye exam  3. The patient to continue meds at current dose  4. She has follow-up with us in 1 month for re-evaluation.

## 2022-08-04 ENCOUNTER — OFFICE VISIT (OUTPATIENT)
Dept: CARDIOLOGY | Facility: CLINIC | Age: 67
End: 2022-08-04
Payer: MEDICARE

## 2022-08-04 VITALS
SYSTOLIC BLOOD PRESSURE: 106 MMHG | OXYGEN SATURATION: 94 % | DIASTOLIC BLOOD PRESSURE: 74 MMHG | BODY MASS INDEX: 27.88 KG/M2 | HEART RATE: 81 BPM | WEIGHT: 167.56 LBS

## 2022-08-04 DIAGNOSIS — E78.5 HYPERLIPIDEMIA, UNSPECIFIED HYPERLIPIDEMIA TYPE: ICD-10-CM

## 2022-08-04 DIAGNOSIS — I50.20 HEART FAILURE WITH REDUCED EJECTION FRACTION: Primary | ICD-10-CM

## 2022-08-04 DIAGNOSIS — R60.9 SWELLING: ICD-10-CM

## 2022-08-04 DIAGNOSIS — I51.81 TAKOTSUBO CARDIOMYOPATHY: ICD-10-CM

## 2022-08-04 DIAGNOSIS — I10 ESSENTIAL HYPERTENSION: ICD-10-CM

## 2022-08-04 PROCEDURE — 99999 PR PBB SHADOW E&M-EST. PATIENT-LVL III: CPT | Mod: PBBFAC,,, | Performed by: INTERNAL MEDICINE

## 2022-08-04 PROCEDURE — 3044F HG A1C LEVEL LT 7.0%: CPT | Mod: CPTII,S$GLB,, | Performed by: INTERNAL MEDICINE

## 2022-08-04 PROCEDURE — 4010F PR ACE/ARB THEARPY RXD/TAKEN: ICD-10-PCS | Mod: CPTII,S$GLB,, | Performed by: INTERNAL MEDICINE

## 2022-08-04 PROCEDURE — 1159F PR MEDICATION LIST DOCUMENTED IN MEDICAL RECORD: ICD-10-PCS | Mod: CPTII,S$GLB,, | Performed by: INTERNAL MEDICINE

## 2022-08-04 PROCEDURE — 1126F AMNT PAIN NOTED NONE PRSNT: CPT | Mod: CPTII,S$GLB,, | Performed by: INTERNAL MEDICINE

## 2022-08-04 PROCEDURE — 99499 RISK ADDL DX/OHS AUDIT: ICD-10-PCS | Mod: S$GLB,,, | Performed by: INTERNAL MEDICINE

## 2022-08-04 PROCEDURE — 3074F SYST BP LT 130 MM HG: CPT | Mod: CPTII,S$GLB,, | Performed by: INTERNAL MEDICINE

## 2022-08-04 PROCEDURE — 3008F PR BODY MASS INDEX (BMI) DOCUMENTED: ICD-10-PCS | Mod: CPTII,S$GLB,, | Performed by: INTERNAL MEDICINE

## 2022-08-04 PROCEDURE — 99999 PR PBB SHADOW E&M-EST. PATIENT-LVL III: ICD-10-PCS | Mod: PBBFAC,,, | Performed by: INTERNAL MEDICINE

## 2022-08-04 PROCEDURE — 99214 PR OFFICE/OUTPT VISIT, EST, LEVL IV, 30-39 MIN: ICD-10-PCS | Mod: 25,S$GLB,, | Performed by: INTERNAL MEDICINE

## 2022-08-04 PROCEDURE — 93000 EKG 12-LEAD: ICD-10-PCS | Mod: S$GLB,,, | Performed by: INTERNAL MEDICINE

## 2022-08-04 PROCEDURE — 3078F DIAST BP <80 MM HG: CPT | Mod: CPTII,S$GLB,, | Performed by: INTERNAL MEDICINE

## 2022-08-04 PROCEDURE — 3008F BODY MASS INDEX DOCD: CPT | Mod: CPTII,S$GLB,, | Performed by: INTERNAL MEDICINE

## 2022-08-04 PROCEDURE — 1111F DSCHRG MED/CURRENT MED MERGE: CPT | Mod: CPTII,S$GLB,, | Performed by: INTERNAL MEDICINE

## 2022-08-04 PROCEDURE — 1159F MED LIST DOCD IN RCRD: CPT | Mod: CPTII,S$GLB,, | Performed by: INTERNAL MEDICINE

## 2022-08-04 PROCEDURE — 3078F PR MOST RECENT DIASTOLIC BLOOD PRESSURE < 80 MM HG: ICD-10-PCS | Mod: CPTII,S$GLB,, | Performed by: INTERNAL MEDICINE

## 2022-08-04 PROCEDURE — 4010F ACE/ARB THERAPY RXD/TAKEN: CPT | Mod: CPTII,S$GLB,, | Performed by: INTERNAL MEDICINE

## 2022-08-04 PROCEDURE — 3044F PR MOST RECENT HEMOGLOBIN A1C LEVEL <7.0%: ICD-10-PCS | Mod: CPTII,S$GLB,, | Performed by: INTERNAL MEDICINE

## 2022-08-04 PROCEDURE — 1111F PR DISCHARGE MEDS RECONCILED W/ CURRENT OUTPATIENT MED LIST: ICD-10-PCS | Mod: CPTII,S$GLB,, | Performed by: INTERNAL MEDICINE

## 2022-08-04 PROCEDURE — 99214 OFFICE O/P EST MOD 30 MIN: CPT | Mod: 25,S$GLB,, | Performed by: INTERNAL MEDICINE

## 2022-08-04 PROCEDURE — 3074F PR MOST RECENT SYSTOLIC BLOOD PRESSURE < 130 MM HG: ICD-10-PCS | Mod: CPTII,S$GLB,, | Performed by: INTERNAL MEDICINE

## 2022-08-04 PROCEDURE — 1160F RVW MEDS BY RX/DR IN RCRD: CPT | Mod: CPTII,S$GLB,, | Performed by: INTERNAL MEDICINE

## 2022-08-04 PROCEDURE — 99499 UNLISTED E&M SERVICE: CPT | Mod: S$GLB,,, | Performed by: INTERNAL MEDICINE

## 2022-08-04 PROCEDURE — 1160F PR REVIEW ALL MEDS BY PRESCRIBER/CLIN PHARMACIST DOCUMENTED: ICD-10-PCS | Mod: CPTII,S$GLB,, | Performed by: INTERNAL MEDICINE

## 2022-08-04 PROCEDURE — 1126F PR PAIN SEVERITY QUANTIFIED, NO PAIN PRESENT: ICD-10-PCS | Mod: CPTII,S$GLB,, | Performed by: INTERNAL MEDICINE

## 2022-08-04 PROCEDURE — 93000 ELECTROCARDIOGRAM COMPLETE: CPT | Mod: S$GLB,,, | Performed by: INTERNAL MEDICINE

## 2022-08-04 NOTE — PROGRESS NOTES
Cardiology Clinic note    Subjective:   Patient ID:  Ana Aguila is a 66 y.o. female who presents for HFrEF FUP    HPI:   HFrEF: Reports POWERS has improved but still present. Some orthopnea (improved). No PND. LE swelling- non-pitting. Weight 167 lbs reports stable but does not have a scale at home. Not following salt restriction    HTN: On medications    HLD: Tolerating statin    SH Tobacco Quit 1993  FH No premature CAD    Patient Active Problem List    Diagnosis Date Noted    Hemoptysis     Prediabetes     Hypertensive emergency 07/24/2022    Flash pulmonary edema 07/24/2022    Leukemoid reaction 07/24/2022    Acute on chronic respiratory failure with hypoxia and hypercapnia 07/23/2022    Prolonged QT interval 07/23/2022    Calcified granuloma of lung 03/14/2022    Mood disorder 03/14/2022    Hyperlipidemia     Coronary artery calcification seen on CT scan 12/04/2020    Solitary pulmonary nodule 11/18/2020    Vitamin B12 deficiency due to intestinal malabsorption 11/18/2020    Osteopenia determined by x-ray 11/10/2020    TERI (generalized anxiety disorder) 10/05/2020    Umbilical hernia without obstruction and without gangrene 01/31/2019    Chronic RUQ pain 01/31/2019    Adjustment disorder with depressed mood 11/22/2018    Acute on chronic combined systolic and diastolic heart failure 11/19/2018    COPD exacerbation 11/18/2018    Chronic respiratory failure with hypoxia, on home oxygen therapy 11/18/2018    Situational depression 11/18/2018    Aortic atherosclerosis 01/11/2018    Fatty liver disease, nonalcoholic 09/07/2017    S/P right hemicolectomy 09/07/2017     Performed in 2011 after perforation during colonoscopy      Chronic diarrhea 02/03/2017    Microscopic hematuria 01/13/2017    NSTEMI (non-ST elevated myocardial infarction) 10/18/2016    Former smoker 10/18/2016    Takotsubo cardiomyopathy - resolved 10/18/2016     Noted on echo 10/2016, recovered on repeat echo  12/2016      Hepatomegaly 12/21/2015    Chronic obstructive pulmonary disease 08/27/2013    Essential hypertension 08/12/2013       Patient's Medications   New Prescriptions    No medications on file   Previous Medications    ACETAMINOPHEN (TYLENOL) 500 MG TABLET    Take 250 mg by mouth every 6 (six) hours as needed for Pain.    ALBUTEROL-IPRATROPIUM (DUO-NEB) 2.5 MG-0.5 MG/3 ML NEBULIZER SOLUTION    TAKE 3 ML BY NEBULIZER EVERY 6 HOURS AS NEEDED FOR WHEEZING OR SHORTNESS OF BREATH    ASPIRIN (ECOTRIN) 81 MG EC TABLET    Take 81 mg by mouth once daily.    CYANOCOBALAMIN (VITAMIN B-12) 100 MCG TABLET    Take 100 mcg by mouth once daily.    EMPAGLIFLOZIN (JARDIANCE) 10 MG TABLET    Take 1 tablet (10 mg total) by mouth once daily.    ERGOCALCIFEROL (ERGOCALCIFEROL) 50,000 UNIT CAP    TAKE 1 CAPSULE BY MOUTH EVERY 7 DAYS    FLUTICASONE-SALMETEROL DISKUS INHALER 250-50 MCG    INHALE 1 PUFF BY MOUTH EVERY 12 HOURS AS NEEDED    FUROSEMIDE (LASIX) 40 MG TABLET    Take 1 tablet (40 mg total) by mouth once daily.    IPRATROPIUM (ATROVENT) 0.02 % NEBULIZER SOLUTION    Take 500 mcg by nebulization 4 (four) times daily. Rescue    LOSARTAN (COZAAR) 25 MG TABLET    Take 1 tablet (25 mg total) by mouth once daily.    METOPROLOL SUCCINATE (TOPROL-XL) 100 MG 24 HR TABLET    Take 1 tablet (100 mg total) by mouth once daily.    PROAIR HFA 90 MCG/ACTUATION INHALER    INHALE 2 PUFFS FOUR TIMES DAILY AS NEEDED FOR COPD    ROSUVASTATIN (CRESTOR) 10 MG TABLET    Take 1 tablet (10 mg total) by mouth once daily.   Modified Medications    No medications on file   Discontinued Medications    No medications on file        Review of Systems   Constitutional: Negative for fever.   HENT: Negative for nosebleeds.    Cardiovascular: Negative for chest pain, dyspnea on exertion, irregular heartbeat, leg swelling, near-syncope, orthopnea, palpitations, paroxysmal nocturnal dyspnea and syncope.        As above   Respiratory: Negative for hemoptysis.     Hematologic/Lymphatic: Negative for bleeding problem.   Skin: Negative for poor wound healing.   Gastrointestinal: Negative for hematochezia.   Genitourinary: Negative for hematuria.   Neurological: Negative for light-headedness.   Allergic/Immunologic: Negative for persistent infections.         Objective:   Vitals  Vitals:    08/04/22 0907   BP: 106/74   Pulse: 81   SpO2: (!) 94%   Weight: 76 kg (167 lb 8.8 oz)          Physical Exam  Constitutional:       General: She is not in acute distress.     Appearance: She is well-developed. She is not diaphoretic.   HENT:      Head: Normocephalic.   Neck:      Vascular: No JVD.   Cardiovascular:      Rate and Rhythm: Normal rate and regular rhythm.      Heart sounds: No murmur heard.    No friction rub. No gallop.   Pulmonary:      Effort: Pulmonary effort is normal. No respiratory distress.      Breath sounds: Normal breath sounds.   Abdominal:      Palpations: Abdomen is soft.      Tenderness: There is no abdominal tenderness.   Musculoskeletal:         General: Swelling: Puffiness in feet b/l; no pitting edema.      Cervical back: Normal range of motion.   Skin:     General: Skin is warm.   Neurological:      Mental Status: She is alert.   Psychiatric:         Mood and Affect: Mood normal.             Assessment:     1. Heart failure with reduced ejection fraction    2. Takotsubo cardiomyopathy    3. Essential hypertension    4. Hyperlipidemia, unspecified hyperlipidemia type    5. Swelling        Plan:   Ana Aguila is a 66 y.o. female h/o Takotsubo CMP 2016, HFrEF, HTN, HLD  Chronic respiratory failure on home O2    Adm July 2022 for SOB noted flash pulmonary edema in the setting of ADHF. EKG with anterior TWI. Cath with no obstructive CAD. ?Takotsubo CMP. Has known prior history.    - EKG personally reviewed. My interpretation:  8/4/22: SR 70s, normal axis. Diffuse TWI. QTc 479  - Event monitor 2020  · Negative event monitor with no clinical  arrhythmias.  · Symptoms correspond with sinus rhythm/sinus tachycardia  · Rare PACs/PVCs observed.    HFrEF  - Echo July 2022  · Concentric remodeling and  · The estimated ejection fraction is 40%.  · Left ventricular diastolic dysfunction.  · There are segmental left ventricular wall motion abnormalities.  · Normal right ventricular size with normal right ventricular systolic function.  · Normal central venous pressure (3 mmHg).  · Prominent pericardial fat over the RV  - Cath July 2022  Procedure: Coronary angiogram, LVEDP  Access: Right radial artery  Indication: Elevated troponin     Findings  Right dominant  LM: Normal  LAD: Normal  RI: Normal  LCx: Normal  RCA: Normal     LVEDP 9  - Cath 2016    Normal coronary arteries.     Basal segments move well but there is LV wall motion abnormality consistent with Tako tsubo cardiomyopathy.  - Cath 2013  1. Normal coronary arteries.   2. Normal LVEF.   3. Normal regional wall motion.   - Losartan, metoprolol succinate  Lab Results   Component Value Date    CREATININE 0.8 07/28/2022    K 3.8 07/28/2022   - Furosemide  - Daily weights  - First thing in the morning: Pee, take off clothes, step on the scale.  - Write down the date, and the weight. Maintain this chart everyday  - If weight increases by > 3 lbs in a day, or > 5 lbs in a week, take an extra pill of furosemide. Call the office.  - If worsening symptoms, go to the ED  - Salt restriction  - Repeat echo 3 months. She would like to follow with Dr Gaytan as it is more convenient for her  - She is not in favor of taking empagliflozin (has not started). Discussed benefits and risks. She defers for now. Agreed to medication changes as needed on 3 month FUP pending LVEF recovery and to consider CHF referral at the time as well    HTN  BP well controlled  Losartan, metoprolol succinate    HLD  Lab Results   Component Value Date    LDLCALC 72.0 07/27/2021   Statin as above      Continue with current medical plan and  lifestyle changes.    Orders Placed This Encounter   Procedures    CV US Lower Extremity Veins Bilateral Insufficiency     Standing Status:   Future     Standing Expiration Date:   8/4/2023     Order Specific Question:   Release to patient     Answer:   Immediate    EKG 12-lead    Echo     Standing Status:   Future     Standing Expiration Date:   8/4/2023     Order Specific Question:   Release to patient     Answer:   Immediate       Follow up as scheduled  Return sooner for concerns or questions. If symptoms persist go to the ED    She expressed verbal understanding and agreed with the plan      Kylee Carreon MD  Interventional Cardiology  [facility]  Phone: [phone]

## 2022-08-09 ENCOUNTER — TELEPHONE (OUTPATIENT)
Dept: CARDIOLOGY | Facility: CLINIC | Age: 67
End: 2022-08-09
Payer: MEDICARE

## 2022-08-09 NOTE — TELEPHONE ENCOUNTER
Called pt to follow up in regards to US was originally scheduled incorrectly    Pt agreed to reschedule appointment to tomorrow 08-10-22 for 3 pm      Will call back if needs to reschedule     ND

## 2022-08-10 ENCOUNTER — TELEPHONE (OUTPATIENT)
Dept: CARDIOLOGY | Facility: CLINIC | Age: 67
End: 2022-08-10
Payer: MEDICARE

## 2022-08-10 NOTE — TELEPHONE ENCOUNTER
----- Message from Yara Macias sent at 8/10/2022 12:53 PM CDT -----  Type:  Needs Medical Advice    Who Called: self  Reason:regarding her appointment   Would the patient rather a call back or a response via [facility]? call  Best Call Back Number: [phone]  Additional Information: none

## 2022-08-11 ENCOUNTER — HOSPITAL ENCOUNTER (OUTPATIENT)
Dept: CARDIOLOGY | Facility: HOSPITAL | Age: 67
Discharge: HOME | End: 2022-08-11
Attending: INTERNAL MEDICINE
Payer: MEDICARE

## 2022-08-11 DIAGNOSIS — R60.9 SWELLING: ICD-10-CM

## 2022-08-11 PROCEDURE — 93970 EXTREMITY STUDY: CPT | Mod: 26,,, | Performed by: INTERNAL MEDICINE

## 2022-08-11 PROCEDURE — 93970 EXTREMITY STUDY: CPT | Mod: TC

## 2022-08-11 PROCEDURE — 93970 CV US LOWER VENOUS INSUFFICIENCY BILATERAL (CUPID ONLY): ICD-10-PCS | Mod: 26,,, | Performed by: INTERNAL MEDICINE

## 2022-08-16 ENCOUNTER — TELEPHONE (OUTPATIENT)
Dept: CARDIOLOGY | Facility: CLINIC | Age: 67
End: 2022-08-16

## 2022-08-17 NOTE — TELEPHONE ENCOUNTER
Called pt back in regards to this message.       Pt expressed frustration  Very disappointed the interpretation of results is not finalized  Offered pt advocate number  Informed pt will send a message to Dr. Carreon     apologized for inconvenience       ND

## 2022-08-17 NOTE — TELEPHONE ENCOUNTER
----- Message from Gloria Luna sent at 8/17/2022 10:19 AM CDT -----  Type: Requesting to speak with nurse         Who Called: PT  Regarding: Patient is upset she hasnt heard anything about her test yet please advise   Would the patient rather a call back or a response via [facility]? Call back  Best Call Back Number: [phone]  Additional Information: n/a

## 2022-08-19 LAB
LEFT GREAT SAPHENOUS DISTAL THIGH DIA: 0.23 CM
LEFT GREAT SAPHENOUS JUNCTION DIA: 0.62 CM
LEFT GREAT SAPHENOUS MIDDLE THIGH DIA: 0.21 CM
LEFT SMALL SAPHENOUS KNEE DIA: 0.13 CM
RIGHT GREAT SAPHENOUS DISTAL THIGH DIA: 0.25 CM
RIGHT GREAT SAPHENOUS JUNCTION DIA: 0.47 CM
RIGHT GREAT SAPHENOUS KNEE DIA: 0.23 CM
RIGHT GREAT SAPHENOUS MIDDLE THIGH DIA: 0.28 CM
RIGHT SMALL SAPHENOUS KNEE DIA: 0.25 CM

## 2022-08-26 ENCOUNTER — TELEPHONE (OUTPATIENT)
Dept: RADIOLOGY | Facility: HOSPITAL | Age: 67
End: 2022-08-26
Payer: MEDICARE

## 2022-08-26 ENCOUNTER — DOCUMENT SCAN (OUTPATIENT)
Dept: HOME HEALTH SERVICES | Facility: HOSPITAL | Age: 67
End: 2022-08-26
Payer: MEDICARE

## 2022-08-26 NOTE — TELEPHONE ENCOUNTER
----- Message from [place] sent at 8/26/2022  2:30 PM CDT -----  Contact: Ana    ----- Message -----  From: Pat Mazariegos MA  Sent: 8/26/2022   2:28 PM CDT  To: , #    Patient is scheduled for her mammogram on 9/28 at the imaging center. Patient stated she usually has her mammograms done at [facility] and would like to know if there is anything available at [facility]. Please contact patient to assist her with scheduling.      Confirmed Contact below:  Contact Name:Ana Aguila  Phone Number: [phone]

## 2022-08-31 ENCOUNTER — DOCUMENT SCAN (OUTPATIENT)
Dept: HOME HEALTH SERVICES | Facility: HOSPITAL | Age: 67
End: 2022-08-31
Payer: MEDICARE

## 2022-09-06 ENCOUNTER — EXTERNAL HOME HEALTH (OUTPATIENT)
Dept: HOME HEALTH SERVICES | Facility: HOSPITAL | Age: 67
End: 2022-09-06
Payer: MEDICARE

## 2022-09-07 ENCOUNTER — TELEPHONE (OUTPATIENT)
Dept: INTERNAL MEDICINE | Facility: CLINIC | Age: 67
End: 2022-09-07
Payer: MEDICARE

## 2022-09-07 DIAGNOSIS — Z12.31 VISIT FOR SCREENING MAMMOGRAM: Primary | ICD-10-CM

## 2022-09-07 NOTE — TELEPHONE ENCOUNTER
----- Message from Geena Akins MA sent at 9/7/2022  3:54 PM CDT -----  Regarding: Mammogram Order  Patient missed her mammogram appointment yesterday and needs a new mammogram order to reschedule missed appointment. Can you please assist? Patient would also like a call back when order is placed. She is also requesting for the new mammogram appointment on the same day that she see Dr. Gallardo because she has transportation issues.

## 2022-09-07 NOTE — TELEPHONE ENCOUNTER
----- Message from Partha Chaudhry sent at 9/7/2022  1:51 PM CDT -----  Contact: [phone]  Pt had an appt on yesterday that she missed cause she thought it was for tomorrow. Pt needs a call back to r/s soonest I have is Oct and pt said she needs something sooner.

## 2022-09-07 NOTE — TELEPHONE ENCOUNTER
Spoke with pt appt rescheduled, pt stated that her legs hurt and she think it is the Lasix 40 mg and it is probably causing dehydration.    Please advise,  Thank You.

## 2022-09-08 ENCOUNTER — TELEPHONE (OUTPATIENT)
Dept: INTERNAL MEDICINE | Facility: CLINIC | Age: 67
End: 2022-09-08
Payer: MEDICARE

## 2022-09-08 NOTE — TELEPHONE ENCOUNTER
----- Message from Kirstin Cruz sent at 9/8/2022  3:24 PM CDT -----  Contact: [phone] @ Patient  Good Afternoon  Patient is calling and need a call from office     Please call and advise

## 2022-09-09 ENCOUNTER — LAB VISIT (OUTPATIENT)
Dept: LAB | Facility: HOSPITAL | Age: 67
End: 2022-09-09
Attending: INTERNAL MEDICINE
Payer: MEDICARE

## 2022-09-09 ENCOUNTER — OFFICE VISIT (OUTPATIENT)
Dept: INTERNAL MEDICINE | Facility: CLINIC | Age: 67
End: 2022-09-09
Payer: MEDICARE

## 2022-09-09 VITALS
HEIGHT: 65 IN | BODY MASS INDEX: 28.5 KG/M2 | DIASTOLIC BLOOD PRESSURE: 84 MMHG | SYSTOLIC BLOOD PRESSURE: 132 MMHG | OXYGEN SATURATION: 95 % | WEIGHT: 171.06 LBS | HEART RATE: 68 BPM

## 2022-09-09 DIAGNOSIS — I10 ESSENTIAL HYPERTENSION: ICD-10-CM

## 2022-09-09 DIAGNOSIS — R73.03 PREDIABETES: ICD-10-CM

## 2022-09-09 DIAGNOSIS — M79.605 PAIN IN BOTH LOWER EXTREMITIES: ICD-10-CM

## 2022-09-09 DIAGNOSIS — J44.9 CHRONIC OBSTRUCTIVE PULMONARY DISEASE, UNSPECIFIED COPD TYPE: Primary | ICD-10-CM

## 2022-09-09 DIAGNOSIS — M79.604 PAIN IN BOTH LOWER EXTREMITIES: ICD-10-CM

## 2022-09-09 LAB
ALBUMIN SERPL BCP-MCNC: 3.9 G/DL (ref 3.5–5.2)
ALP SERPL-CCNC: 57 U/L (ref 55–135)
ALT SERPL W/O P-5'-P-CCNC: 8 U/L (ref 10–44)
ANION GAP SERPL CALC-SCNC: 4 MMOL/L (ref 8–16)
AST SERPL-CCNC: 19 U/L (ref 10–40)
BASOPHILS # BLD AUTO: 0.05 K/UL (ref 0–0.2)
BASOPHILS NFR BLD: 0.8 % (ref 0–1.9)
BILIRUB SERPL-MCNC: 0.5 MG/DL (ref 0.1–1)
BUN SERPL-MCNC: 11 MG/DL (ref 8–23)
CALCIUM SERPL-MCNC: 9.5 MG/DL (ref 8.7–10.5)
CHLORIDE SERPL-SCNC: 107 MMOL/L (ref 95–110)
CO2 SERPL-SCNC: 30 MMOL/L (ref 23–29)
CREAT SERPL-MCNC: 0.7 MG/DL (ref 0.5–1.4)
DIFFERENTIAL METHOD: NORMAL
EOSINOPHIL # BLD AUTO: 0.1 K/UL (ref 0–0.5)
EOSINOPHIL NFR BLD: 2.2 % (ref 0–8)
ERYTHROCYTE [DISTWIDTH] IN BLOOD BY AUTOMATED COUNT: 13.8 % (ref 11.5–14.5)
EST. GFR  (NO RACE VARIABLE): >60 ML/MIN/1.73 M^2
ESTIMATED AVG GLUCOSE: 103 MG/DL (ref 68–131)
GLUCOSE SERPL-MCNC: 76 MG/DL (ref 70–110)
HBA1C MFR BLD: 5.2 % (ref 4–5.6)
HCT VFR BLD AUTO: 37.5 % (ref 37–48.5)
HGB BLD-MCNC: 12 G/DL (ref 12–16)
IMM GRANULOCYTES # BLD AUTO: 0.02 K/UL (ref 0–0.04)
IMM GRANULOCYTES NFR BLD AUTO: 0.3 % (ref 0–0.5)
LYMPHOCYTES # BLD AUTO: 1.5 K/UL (ref 1–4.8)
LYMPHOCYTES NFR BLD: 24 % (ref 18–48)
MAGNESIUM SERPL-MCNC: 2.1 MG/DL (ref 1.6–2.6)
MCH RBC QN AUTO: 29.3 PG (ref 27–31)
MCHC RBC AUTO-ENTMCNC: 32 G/DL (ref 32–36)
MCV RBC AUTO: 92 FL (ref 82–98)
MONOCYTES # BLD AUTO: 0.4 K/UL (ref 0.3–1)
MONOCYTES NFR BLD: 5.8 % (ref 4–15)
NEUTROPHILS # BLD AUTO: 4.2 K/UL (ref 1.8–7.7)
NEUTROPHILS NFR BLD: 66.9 % (ref 38–73)
NRBC BLD-RTO: 0 /100 WBC
PLATELET # BLD AUTO: 241 K/UL (ref 150–450)
PMV BLD AUTO: 10.4 FL (ref 9.2–12.9)
POTASSIUM SERPL-SCNC: 4.2 MMOL/L (ref 3.5–5.1)
PROT SERPL-MCNC: 6.8 G/DL (ref 6–8.4)
RBC # BLD AUTO: 4.09 M/UL (ref 4–5.4)
SODIUM SERPL-SCNC: 141 MMOL/L (ref 136–145)
WBC # BLD AUTO: 6.26 K/UL (ref 3.9–12.7)

## 2022-09-09 PROCEDURE — 99999 PR PBB SHADOW E&M-EST. PATIENT-LVL IV: ICD-10-PCS | Mod: PBBFAC,,, | Performed by: INTERNAL MEDICINE

## 2022-09-09 PROCEDURE — 3008F BODY MASS INDEX DOCD: CPT | Mod: CPTII,S$GLB,, | Performed by: INTERNAL MEDICINE

## 2022-09-09 PROCEDURE — 1126F AMNT PAIN NOTED NONE PRSNT: CPT | Mod: CPTII,S$GLB,, | Performed by: INTERNAL MEDICINE

## 2022-09-09 PROCEDURE — 85025 COMPLETE CBC W/AUTO DIFF WBC: CPT | Performed by: INTERNAL MEDICINE

## 2022-09-09 PROCEDURE — 99214 OFFICE O/P EST MOD 30 MIN: CPT | Mod: S$GLB,,, | Performed by: INTERNAL MEDICINE

## 2022-09-09 PROCEDURE — 3079F PR MOST RECENT DIASTOLIC BLOOD PRESSURE 80-89 MM HG: ICD-10-PCS | Mod: CPTII,S$GLB,, | Performed by: INTERNAL MEDICINE

## 2022-09-09 PROCEDURE — 3288F FALL RISK ASSESSMENT DOCD: CPT | Mod: CPTII,S$GLB,, | Performed by: INTERNAL MEDICINE

## 2022-09-09 PROCEDURE — 3044F PR MOST RECENT HEMOGLOBIN A1C LEVEL <7.0%: ICD-10-PCS | Mod: CPTII,S$GLB,, | Performed by: INTERNAL MEDICINE

## 2022-09-09 PROCEDURE — 3079F DIAST BP 80-89 MM HG: CPT | Mod: CPTII,S$GLB,, | Performed by: INTERNAL MEDICINE

## 2022-09-09 PROCEDURE — 1101F PT FALLS ASSESS-DOCD LE1/YR: CPT | Mod: CPTII,S$GLB,, | Performed by: INTERNAL MEDICINE

## 2022-09-09 PROCEDURE — 80053 COMPREHEN METABOLIC PANEL: CPT | Performed by: INTERNAL MEDICINE

## 2022-09-09 PROCEDURE — 4010F PR ACE/ARB THEARPY RXD/TAKEN: ICD-10-PCS | Mod: CPTII,S$GLB,, | Performed by: INTERNAL MEDICINE

## 2022-09-09 PROCEDURE — 1126F PR PAIN SEVERITY QUANTIFIED, NO PAIN PRESENT: ICD-10-PCS | Mod: CPTII,S$GLB,, | Performed by: INTERNAL MEDICINE

## 2022-09-09 PROCEDURE — 1101F PR PT FALLS ASSESS DOC 0-1 FALLS W/OUT INJ PAST YR: ICD-10-PCS | Mod: CPTII,S$GLB,, | Performed by: INTERNAL MEDICINE

## 2022-09-09 PROCEDURE — 1160F RVW MEDS BY RX/DR IN RCRD: CPT | Mod: CPTII,S$GLB,, | Performed by: INTERNAL MEDICINE

## 2022-09-09 PROCEDURE — 99214 PR OFFICE/OUTPT VISIT, EST, LEVL IV, 30-39 MIN: ICD-10-PCS | Mod: S$GLB,,, | Performed by: INTERNAL MEDICINE

## 2022-09-09 PROCEDURE — 3075F PR MOST RECENT SYSTOLIC BLOOD PRESS GE 130-139MM HG: ICD-10-PCS | Mod: CPTII,S$GLB,, | Performed by: INTERNAL MEDICINE

## 2022-09-09 PROCEDURE — 1160F PR REVIEW ALL MEDS BY PRESCRIBER/CLIN PHARMACIST DOCUMENTED: ICD-10-PCS | Mod: CPTII,S$GLB,, | Performed by: INTERNAL MEDICINE

## 2022-09-09 PROCEDURE — 3044F HG A1C LEVEL LT 7.0%: CPT | Mod: CPTII,S$GLB,, | Performed by: INTERNAL MEDICINE

## 2022-09-09 PROCEDURE — 3288F PR FALLS RISK ASSESSMENT DOCUMENTED: ICD-10-PCS | Mod: CPTII,S$GLB,, | Performed by: INTERNAL MEDICINE

## 2022-09-09 PROCEDURE — 3008F PR BODY MASS INDEX (BMI) DOCUMENTED: ICD-10-PCS | Mod: CPTII,S$GLB,, | Performed by: INTERNAL MEDICINE

## 2022-09-09 PROCEDURE — 83036 HEMOGLOBIN GLYCOSYLATED A1C: CPT | Performed by: INTERNAL MEDICINE

## 2022-09-09 PROCEDURE — 4010F ACE/ARB THERAPY RXD/TAKEN: CPT | Mod: CPTII,S$GLB,, | Performed by: INTERNAL MEDICINE

## 2022-09-09 PROCEDURE — 1159F PR MEDICATION LIST DOCUMENTED IN MEDICAL RECORD: ICD-10-PCS | Mod: CPTII,S$GLB,, | Performed by: INTERNAL MEDICINE

## 2022-09-09 PROCEDURE — 1159F MED LIST DOCD IN RCRD: CPT | Mod: CPTII,S$GLB,, | Performed by: INTERNAL MEDICINE

## 2022-09-09 PROCEDURE — 99999 PR PBB SHADOW E&M-EST. PATIENT-LVL IV: CPT | Mod: PBBFAC,,, | Performed by: INTERNAL MEDICINE

## 2022-09-09 PROCEDURE — 83735 ASSAY OF MAGNESIUM: CPT | Performed by: INTERNAL MEDICINE

## 2022-09-09 PROCEDURE — 36415 COLL VENOUS BLD VENIPUNCTURE: CPT | Performed by: INTERNAL MEDICINE

## 2022-09-09 PROCEDURE — 3075F SYST BP GE 130 - 139MM HG: CPT | Mod: CPTII,S$GLB,, | Performed by: INTERNAL MEDICINE

## 2022-09-09 RX ORDER — HYDROXYZINE HYDROCHLORIDE 10 MG/1
10 TABLET, FILM COATED ORAL 3 TIMES DAILY PRN
Qty: 90 TABLET | Refills: 3 | Status: ON HOLD | OUTPATIENT
Start: 2022-09-09 | End: 2023-01-20

## 2022-09-10 NOTE — PROGRESS NOTES
CC:  Leg pain     HPI: The patient is a 67-year-old female with COPD, hypertension, hyperlipidemia, reflux, history of right hemicolectomy secondary to GI bleed from a polypectomy and reflux who presents today with complaints of bilateral leg pain.  She felt may be related to dehydration.  She was placed on Lasix 40 mg once a day on July 28th.  She stopped this medication 3 days ago in her legs feel better.    ROS:  No chest pain.  No shortness of breath.  She does get out of breath when she does physical therapy.  She does report an episode of tightness in her chest on Sunday which resolved with taking Pepto-Bismol.      Physical exam:   General appearance:  No acute distress   HEENT:  Trachea is midline without JVD   Pulmonary:  Good inspiratory, expiratory breath sounds are heard.  Lungs are clear to auscultation.    Cardiovascular:  S1-S2, rhythm is normal.  Extremities without edema.    GI: Abdomen was nontender, nondistended she had normal bowel sounds     Assessment:  1. Bilateral leg pain which appears to be related to diuretics  2.  Hypertension  3.  Reflux  4.  COPD    Plan:   1. Will schedule a CBC, CMP and magnesium level today  2.  Will decrease the dose of Lasix pending on today's blood test results.

## 2022-09-12 ENCOUNTER — TELEPHONE (OUTPATIENT)
Dept: INTERNAL MEDICINE | Facility: CLINIC | Age: 67
End: 2022-09-12
Payer: MEDICARE

## 2022-09-12 ENCOUNTER — PATIENT MESSAGE (OUTPATIENT)
Dept: INTERNAL MEDICINE | Facility: CLINIC | Age: 67
End: 2022-09-12
Payer: MEDICARE

## 2022-09-12 RX ORDER — POTASSIUM CHLORIDE 750 MG/1
10 TABLET, EXTENDED RELEASE ORAL DAILY
Qty: 90 TABLET | Refills: 1 | Status: SHIPPED | OUTPATIENT
Start: 2022-09-12 | End: 2022-09-29

## 2022-09-12 RX ORDER — FUROSEMIDE 20 MG/1
20 TABLET ORAL DAILY
Qty: 90 TABLET | Refills: 1 | Status: SHIPPED | OUTPATIENT
Start: 2022-09-12 | End: 2022-11-15

## 2022-09-12 NOTE — TELEPHONE ENCOUNTER
----- Message from Zhou Gallardo MD sent at 9/12/2022  1:15 PM CDT -----  Her blood tests looked good. I do want her to restart Lasix ( furosemide) ; but, instead of 40 mg, I would like to start at 20 mg. I also want her to start a potassium pill once a day. Prescriptions were sent to her pharmacy. I would like to see her in one month.

## 2022-09-14 ENCOUNTER — PATIENT MESSAGE (OUTPATIENT)
Dept: RESEARCH | Facility: HOSPITAL | Age: 67
End: 2022-09-14
Payer: MEDICARE

## 2022-09-14 ENCOUNTER — RESEARCH ENCOUNTER (OUTPATIENT)
Dept: RESEARCH | Facility: HOSPITAL | Age: 67
End: 2022-09-14
Payer: MEDICARE

## 2022-09-14 NOTE — PROGRESS NOTES
The Patient was called today regarding the patient's participation in (IRB #[id number] PI: Darshan).   The Verbal Informed Consent was read and discussed by the consenter. The following was discussed:  Types of specimens to be collected  All medical information released to researchers will be stripped of identifiers and no patient information will be given to anyone outside of this research project.   Participating in a research study is not the same as getting regular medical care and will not improve the patient's health. The purpose of a research study is to gather information.  Being in this study does not interfere with your regular medical care.  The patient does not have to participate in this study. If they do not join, their care at [facility] will not be affected.  The person granting permission was provided adequate time to ask questions regarding the scope and purpose of the study.  Permission was obtained by telephone.   The above statements were read by the person obtaining permission to the person granting permission and witnessed by Eusebia Motley, who also confirmed the patient's consent to the study. The witness information was documented on the verbal consent form as well.  This Verbal Informed Consent process was conducted prior to initiation of any study procedures.

## 2022-09-15 ENCOUNTER — HOSPITAL ENCOUNTER (OUTPATIENT)
Dept: RADIOLOGY | Facility: HOSPITAL | Age: 67
Discharge: HOME | End: 2022-09-15
Attending: INTERNAL MEDICINE
Payer: MEDICARE

## 2022-09-15 DIAGNOSIS — Z12.31 VISIT FOR SCREENING MAMMOGRAM: ICD-10-CM

## 2022-09-15 PROCEDURE — 77063 BREAST TOMOSYNTHESIS BI: CPT | Mod: 26,,, | Performed by: RADIOLOGY

## 2022-09-15 PROCEDURE — 77067 SCR MAMMO BI INCL CAD: CPT | Mod: 26,,, | Performed by: RADIOLOGY

## 2022-09-15 PROCEDURE — 77067 SCR MAMMO BI INCL CAD: CPT | Mod: TC

## 2022-09-15 PROCEDURE — 77067 MAMMO DIGITAL SCREENING BILAT WITH TOMO: ICD-10-PCS | Mod: 26,,, | Performed by: RADIOLOGY

## 2022-09-15 PROCEDURE — 77063 MAMMO DIGITAL SCREENING BILAT WITH TOMO: ICD-10-PCS | Mod: 26,,, | Performed by: RADIOLOGY

## 2022-09-15 PROCEDURE — 77063 BREAST TOMOSYNTHESIS BI: CPT | Mod: TC

## 2022-09-29 RX ORDER — POTASSIUM CHLORIDE 750 MG/1
10 TABLET, EXTENDED RELEASE ORAL ONCE
Qty: 30 TABLET | Refills: 1 | Status: SHIPPED | OUTPATIENT
Start: 2022-09-29 | End: 2022-09-29

## 2022-10-13 ENCOUNTER — IMMUNIZATION (OUTPATIENT)
Dept: INTERNAL MEDICINE | Facility: CLINIC | Age: 67
End: 2022-10-13
Payer: MEDICARE

## 2022-10-13 DIAGNOSIS — Z23 NEED FOR VACCINATION: Primary | ICD-10-CM

## 2022-10-13 PROCEDURE — 91312 COVID-19, MRNA, LNP-S, BIVALENT BOOSTER, PF, 30 MCG/0.3 ML DOSE: ICD-10-PCS | Mod: S$GLB,,, | Performed by: INTERNAL MEDICINE

## 2022-10-13 PROCEDURE — 91312 COVID-19, MRNA, LNP-S, BIVALENT BOOSTER, PF, 30 MCG/0.3 ML DOSE: CPT | Mod: S$GLB,,, | Performed by: INTERNAL MEDICINE

## 2022-10-13 PROCEDURE — 0124A COVID-19, MRNA, LNP-S, BIVALENT BOOSTER, PF, 30 MCG/0.3 ML DOSE: CPT | Mod: CV19,PBBFAC | Performed by: INTERNAL MEDICINE

## 2022-10-19 ENCOUNTER — TELEPHONE (OUTPATIENT)
Dept: INTERNAL MEDICINE | Facility: CLINIC | Age: 67
End: 2022-10-19
Payer: MEDICARE

## 2022-10-19 NOTE — TELEPHONE ENCOUNTER
----- Message from Randolph George sent at 10/19/2022  3:56 PM CDT -----  Contact: self [phone]  Pt requesting a call stated need to communicate some info from insurance company.    Please call and advise

## 2022-10-24 PROBLEM — J81.0 FLASH PULMONARY EDEMA: Status: RESOLVED | Noted: 2022-07-24 | Resolved: 2022-10-24

## 2022-10-25 ENCOUNTER — TELEPHONE (OUTPATIENT)
Dept: INTERNAL MEDICINE | Facility: CLINIC | Age: 67
End: 2022-10-25
Payer: MEDICARE

## 2022-10-25 NOTE — TELEPHONE ENCOUNTER
----- Message from Zhou Gallardo MD sent at 10/24/2022  6:53 PM CDT -----  Regarding: RE: order  Contact: patient  [phone]  If patient needs home health extended, she will need to be seen.  ----- Message -----  From: Virgilio Lund MA  Sent: 10/24/2022  10:42 AM CDT  To: Zhou Gallardo MD  Subject: FW: order                                          ----- Message -----  From: Mireya Street  Sent: 10/24/2022  10:38 AM CDT  To: Sami [facility]IX Staff  Subject: order                                            Letter is needed for reauthorization for home health care.  Please call and advise.

## 2022-10-25 NOTE — TELEPHONE ENCOUNTER
Pt is looking to have help with ADL's. She is asking to have home health back to continue to provide aid services. She was informed that Home Health does not provide half-way care long term.

## 2022-10-26 ENCOUNTER — TELEPHONE (OUTPATIENT)
Dept: INTERNAL MEDICINE | Facility: CLINIC | Age: 67
End: 2022-10-26
Payer: MEDICARE

## 2022-10-26 NOTE — TELEPHONE ENCOUNTER
----- Message from Mikhail Peterson sent at 10/26/2022  1:54 PM CDT -----  Contact: [phone]  Pt requesting a call from the doctor. Please call and advise, Thanks

## 2022-10-26 NOTE — TELEPHONE ENCOUNTER
Patient states she is calling regarding getting assistance. Advised patient of note from yesterday. Patient is requesting a call directly from the provider.

## 2022-10-31 DIAGNOSIS — I21.4 NSTEMI (NON-ST ELEVATED MYOCARDIAL INFARCTION): ICD-10-CM

## 2022-10-31 DIAGNOSIS — J44.9 CHRONIC OBSTRUCTIVE PULMONARY DISEASE, UNSPECIFIED COPD TYPE: Primary | ICD-10-CM

## 2022-10-31 DIAGNOSIS — R73.03 PREDIABETES: ICD-10-CM

## 2022-11-04 ENCOUNTER — PATIENT OUTREACH (OUTPATIENT)
Dept: ADMINISTRATIVE | Facility: OTHER | Age: 67
End: 2022-11-04
Payer: MEDICARE

## 2022-11-04 NOTE — PROGRESS NOTES
CHW - Initial Contact    This Community Health Worker updated the Social Determinant of Health questionnaire with patient via telephone today.    Pt identified barriers of most importance are: Pt requested a call back  Referrals to community agencies completed with patient/caregiver consent outside of [facility] include: No  Referrals were put through [facility] - None  Support and Services: none  Other information discussed the patient needs / wants help with: utilities, food, and home health  Follow up required: Yes, Pt stated she was interested in community resources   No future outreach task assigned

## 2022-11-08 ENCOUNTER — TELEPHONE (OUTPATIENT)
Dept: PULMONOLOGY | Facility: CLINIC | Age: 67
End: 2022-11-08
Payer: MEDICARE

## 2022-11-08 ENCOUNTER — PATIENT OUTREACH (OUTPATIENT)
Dept: ADMINISTRATIVE | Facility: OTHER | Age: 67
End: 2022-11-08
Payer: MEDICARE

## 2022-11-08 NOTE — TELEPHONE ENCOUNTER
I called and spoke to Mrs Aguila who tells me she was in [facility] in July and had to be intubated and almost didn't make it. She is home now and she had home health for awhile and she says [facility] might pay for a Aide to go twice a week to help with bathing etc if Dr Dill will make the recommendation. I told her I will have to speak to her [facility]  and [facility] on how to do this.I told her this may take awhile . Mrs Aguila is hoping to see Dr Dill on 11-15-22 when she sees Dr Gaytan. I told her I will speak to Dr Dill and call her back on Thursday 11-10-22. Anastacia Chavez LPN

## 2022-11-08 NOTE — TELEPHONE ENCOUNTER
----- Message from Marilee Landers sent at 11/8/2022  1:53 PM CST -----  Regarding: personal aid  Good evening, I'm Marilee the community health worker working with Pt ([phone]) she needs a referral for personal aid to assist her with daily activities like bathing. Her insurance company said her Pulmonary doctor may be able to assist with the request since her PCP did not.

## 2022-11-08 NOTE — PROGRESS NOTES
CHW - Follow Up    This Community Health Worker completed a follow up visit with patient via telephone today.  Community Health Worker provided: Pt was given Deshawn garcia [facility] phone number to see eligibility for light bill assistance. [employer] application for free box of food a month sent by CHW.  Pt reported: she needs personal care assistance  Follow up required: Yes, 11/9/22 to follow up on Deshawn garcia and personal aid referral  Follow up outreach:11/9/2022

## 2022-11-09 ENCOUNTER — PATIENT OUTREACH (OUTPATIENT)
Dept: ADMINISTRATIVE | Facility: OTHER | Age: 67
End: 2022-11-09
Payer: MEDICARE

## 2022-11-09 NOTE — PROGRESS NOTES
CHW - Follow Up    This Community Health Worker completed a follow up visit with patient via telephone today.  Pt/Caregiver reported: Pt stated she will call her insurance to obtain the correct verbiage for the personal aid referral. CHW mailed a food box application on 11/8/22.  Community Health Worker provided: Deshawn bagley food box application  Follow up required: Yes, Deshawn Kelley and Personal aide referral

## 2022-11-11 ENCOUNTER — TELEPHONE (OUTPATIENT)
Dept: INTERNAL MEDICINE | Facility: CLINIC | Age: 67
End: 2022-11-11
Payer: MEDICARE

## 2022-11-11 NOTE — TELEPHONE ENCOUNTER
----- Message from Zhou Gallardo MD sent at 11/10/2022  6:22 PM CST -----  Regarding: RE: home health referral  Home health will not provide an aide for acts of daily living. She would have to be receiving home health that requires nursing in order to get an aide.  ----- Message -----  From: Anastasia Mendes MA  Sent: 11/7/2022   3:04 PM CST  To: Zhou Gallardo MD  Subject: FW: home health referral                           ----- Message -----  From: Marilee Landers  Sent: 11/7/2022   3:04 PM CST  To: [facility]i [facility] Staff  Subject: home health referral                             Good evening, I'm the community health worker and I'm currently working with Ana Aguila she stated she needs home health for daily activities like bathing.

## 2022-11-14 ENCOUNTER — PATIENT OUTREACH (OUTPATIENT)
Dept: ADMINISTRATIVE | Facility: OTHER | Age: 67
End: 2022-11-14
Payer: MEDICARE

## 2022-11-14 ENCOUNTER — HOSPITAL ENCOUNTER (OUTPATIENT)
Dept: CARDIOLOGY | Facility: HOSPITAL | Age: 67
Discharge: HOME | End: 2022-11-14
Attending: INTERNAL MEDICINE
Payer: MEDICARE

## 2022-11-14 VITALS
WEIGHT: 171 LBS | BODY MASS INDEX: 28.49 KG/M2 | DIASTOLIC BLOOD PRESSURE: 90 MMHG | HEIGHT: 65 IN | SYSTOLIC BLOOD PRESSURE: 184 MMHG | HEART RATE: 70 BPM

## 2022-11-14 DIAGNOSIS — I50.20 HEART FAILURE WITH REDUCED EJECTION FRACTION: ICD-10-CM

## 2022-11-14 LAB
ASCENDING AORTA: 2.51 CM
AV INDEX (PROSTH): 0.52
AV MEAN GRADIENT: 6 MMHG
AV PEAK GRADIENT: 11 MMHG
AV VALVE AREA: 1.93 CM2
AV VELOCITY RATIO: 0.57
BSA FOR ECHO PROCEDURE: 1.89 M2
CV ECHO LV RWT: 0.43 CM
DOP CALC AO PEAK VEL: 1.68 M/S
DOP CALC AO VTI: 38.95 CM
DOP CALC LVOT AREA: 3.7 CM2
DOP CALC LVOT DIAMETER: 2.17 CM
DOP CALC LVOT PEAK VEL: 0.96 M/S
DOP CALC LVOT STROKE VOLUME: 75.3 CM3
DOP CALCLVOT PEAK VEL VTI: 20.37 CM
E WAVE DECELERATION TIME: 192.7 MSEC
E/A RATIO: 0.68
E/E' RATIO: 11.5 M/S
ECHO LV POSTERIOR WALL: 1.01 CM (ref 0.6–1.1)
EJECTION FRACTION: 65 %
FRACTIONAL SHORTENING: 41 % (ref 28–44)
INTERVENTRICULAR SEPTUM: 0.84 CM (ref 0.6–1.1)
IVRT: 119.89 MSEC
LA MAJOR: 3.76 CM
LA MINOR: 3.66 CM
LA WIDTH: 4.13 CM
LEFT ATRIUM SIZE: 3.24 CM
LEFT ATRIUM VOLUME INDEX MOD: 12.5 ML/M2
LEFT ATRIUM VOLUME INDEX: 22.8 ML/M2
LEFT ATRIUM VOLUME MOD: 23.21 CM3
LEFT ATRIUM VOLUME: 42.19 CM3
LEFT INTERNAL DIMENSION IN SYSTOLE: 2.76 CM (ref 2.1–4)
LEFT VENTRICLE DIASTOLIC VOLUME INDEX: 49.5 ML/M2
LEFT VENTRICLE DIASTOLIC VOLUME: 91.57 ML
LEFT VENTRICLE MASS INDEX: 80 G/M2
LEFT VENTRICLE SYSTOLIC VOLUME INDEX: 15.4 ML/M2
LEFT VENTRICLE SYSTOLIC VOLUME: 28.45 ML
LEFT VENTRICULAR INTERNAL DIMENSION IN DIASTOLE: 4.7 CM (ref 3.5–6)
LEFT VENTRICULAR MASS: 148.02 G
LV LATERAL E/E' RATIO: 9.86 M/S
LV SEPTAL E/E' RATIO: 13.8 M/S
MV A" WAVE DURATION": 12.84 MSEC
MV PEAK A VEL: 1.02 M/S
MV PEAK E VEL: 0.69 M/S
MV STENOSIS PRESSURE HALF TIME: 55.88 MS
MV VALVE AREA P 1/2 METHOD: 3.94 CM2
PISA TR MAX VEL: 2.83 M/S
PULM VEIN S/D RATIO: 1.16
PV PEAK D VEL: 0.43 M/S
PV PEAK S VEL: 0.5 M/S
RA MAJOR: 3.49 CM
RA PRESSURE: 3 MMHG
RA WIDTH: 3.11 CM
RIGHT VENTRICULAR END-DIASTOLIC DIMENSION: 2.5 CM
RV TISSUE DOPPLER FREE WALL SYSTOLIC VELOCITY 1 (APICAL 4 CHAMBER VIEW): 12.37 CM/S
SINUS: 2.39 CM
STJ: 2.66 CM
TDI LATERAL: 0.07 M/S
TDI SEPTAL: 0.05 M/S
TDI: 0.06 M/S
TR MAX PG: 32 MMHG
TRICUSPID ANNULAR PLANE SYSTOLIC EXCURSION: 2.11 CM
TV REST PULMONARY ARTERY PRESSURE: 35 MMHG

## 2022-11-14 PROCEDURE — 93306 TTE W/DOPPLER COMPLETE: CPT

## 2022-11-14 PROCEDURE — 93306 ECHO (CUPID ONLY): ICD-10-PCS | Mod: 26,,, | Performed by: INTERNAL MEDICINE

## 2022-11-14 PROCEDURE — 93306 TTE W/DOPPLER COMPLETE: CPT | Mod: 26,,, | Performed by: INTERNAL MEDICINE

## 2022-11-14 NOTE — PROGRESS NOTES
CHW - Follow Up    This Community Health Worker completed a follow up visit with patient via telephone today.  Pt/Caregiver reported: Pt stated she is doing well.  Community Health Worker provided: No Additional information/resources at this time.  Follow up required: Yes 11/18/22 to follow up on doctors appt and food box application and personal aide.  Follow-up Outreach - Due: 11/18/2022

## 2022-11-15 ENCOUNTER — OFFICE VISIT (OUTPATIENT)
Dept: CARDIOLOGY | Facility: CLINIC | Age: 67
End: 2022-11-15
Payer: MEDICARE

## 2022-11-15 VITALS
WEIGHT: 171.06 LBS | DIASTOLIC BLOOD PRESSURE: 75 MMHG | OXYGEN SATURATION: 96 % | SYSTOLIC BLOOD PRESSURE: 120 MMHG | HEART RATE: 70 BPM | BODY MASS INDEX: 28.5 KG/M2 | HEIGHT: 65 IN

## 2022-11-15 DIAGNOSIS — I70.0 AORTIC ATHEROSCLEROSIS: ICD-10-CM

## 2022-11-15 DIAGNOSIS — J96.11 CHRONIC RESPIRATORY FAILURE WITH HYPOXIA, ON HOME OXYGEN THERAPY: ICD-10-CM

## 2022-11-15 DIAGNOSIS — Z99.81 CHRONIC RESPIRATORY FAILURE WITH HYPOXIA, ON HOME OXYGEN THERAPY: ICD-10-CM

## 2022-11-15 DIAGNOSIS — I10 ESSENTIAL HYPERTENSION: Primary | ICD-10-CM

## 2022-11-15 DIAGNOSIS — I25.10 CORONARY ARTERY CALCIFICATION SEEN ON CT SCAN: ICD-10-CM

## 2022-11-15 DIAGNOSIS — E78.5 HYPERLIPIDEMIA, UNSPECIFIED HYPERLIPIDEMIA TYPE: ICD-10-CM

## 2022-11-15 PROBLEM — J96.21 ACUTE ON CHRONIC RESPIRATORY FAILURE WITH HYPOXIA AND HYPERCAPNIA: Status: RESOLVED | Noted: 2022-07-23 | Resolved: 2022-11-15

## 2022-11-15 PROBLEM — I16.1 HYPERTENSIVE EMERGENCY: Status: RESOLVED | Noted: 2022-07-24 | Resolved: 2022-11-15

## 2022-11-15 PROBLEM — J96.22 ACUTE ON CHRONIC RESPIRATORY FAILURE WITH HYPOXIA AND HYPERCAPNIA: Status: RESOLVED | Noted: 2022-07-23 | Resolved: 2022-11-15

## 2022-11-15 PROBLEM — J44.1 COPD EXACERBATION: Status: RESOLVED | Noted: 2018-11-18 | Resolved: 2022-11-15

## 2022-11-15 PROBLEM — I50.43 ACUTE ON CHRONIC COMBINED SYSTOLIC AND DIASTOLIC HEART FAILURE: Status: RESOLVED | Noted: 2018-11-19 | Resolved: 2022-11-15

## 2022-11-15 PROCEDURE — 1126F PR PAIN SEVERITY QUANTIFIED, NO PAIN PRESENT: ICD-10-PCS | Mod: CPTII,S$GLB,, | Performed by: INTERNAL MEDICINE

## 2022-11-15 PROCEDURE — 1159F MED LIST DOCD IN RCRD: CPT | Mod: CPTII,S$GLB,, | Performed by: INTERNAL MEDICINE

## 2022-11-15 PROCEDURE — 1160F PR REVIEW ALL MEDS BY PRESCRIBER/CLIN PHARMACIST DOCUMENTED: ICD-10-PCS | Mod: CPTII,S$GLB,, | Performed by: INTERNAL MEDICINE

## 2022-11-15 PROCEDURE — 3008F BODY MASS INDEX DOCD: CPT | Mod: CPTII,S$GLB,, | Performed by: INTERNAL MEDICINE

## 2022-11-15 PROCEDURE — 3008F PR BODY MASS INDEX (BMI) DOCUMENTED: ICD-10-PCS | Mod: CPTII,S$GLB,, | Performed by: INTERNAL MEDICINE

## 2022-11-15 PROCEDURE — 3288F FALL RISK ASSESSMENT DOCD: CPT | Mod: CPTII,S$GLB,, | Performed by: INTERNAL MEDICINE

## 2022-11-15 PROCEDURE — 1159F PR MEDICATION LIST DOCUMENTED IN MEDICAL RECORD: ICD-10-PCS | Mod: CPTII,S$GLB,, | Performed by: INTERNAL MEDICINE

## 2022-11-15 PROCEDURE — 99214 OFFICE O/P EST MOD 30 MIN: CPT | Mod: S$GLB,,, | Performed by: INTERNAL MEDICINE

## 2022-11-15 PROCEDURE — 4010F PR ACE/ARB THEARPY RXD/TAKEN: ICD-10-PCS | Mod: CPTII,S$GLB,, | Performed by: INTERNAL MEDICINE

## 2022-11-15 PROCEDURE — 99999 PR PBB SHADOW E&M-EST. PATIENT-LVL IV: CPT | Mod: PBBFAC,,, | Performed by: INTERNAL MEDICINE

## 2022-11-15 PROCEDURE — 3078F PR MOST RECENT DIASTOLIC BLOOD PRESSURE < 80 MM HG: ICD-10-PCS | Mod: CPTII,S$GLB,, | Performed by: INTERNAL MEDICINE

## 2022-11-15 PROCEDURE — 3044F PR MOST RECENT HEMOGLOBIN A1C LEVEL <7.0%: ICD-10-PCS | Mod: CPTII,S$GLB,, | Performed by: INTERNAL MEDICINE

## 2022-11-15 PROCEDURE — 4010F ACE/ARB THERAPY RXD/TAKEN: CPT | Mod: CPTII,S$GLB,, | Performed by: INTERNAL MEDICINE

## 2022-11-15 PROCEDURE — 3074F PR MOST RECENT SYSTOLIC BLOOD PRESSURE < 130 MM HG: ICD-10-PCS | Mod: CPTII,S$GLB,, | Performed by: INTERNAL MEDICINE

## 2022-11-15 PROCEDURE — 1126F AMNT PAIN NOTED NONE PRSNT: CPT | Mod: CPTII,S$GLB,, | Performed by: INTERNAL MEDICINE

## 2022-11-15 PROCEDURE — 1160F RVW MEDS BY RX/DR IN RCRD: CPT | Mod: CPTII,S$GLB,, | Performed by: INTERNAL MEDICINE

## 2022-11-15 PROCEDURE — 1101F PT FALLS ASSESS-DOCD LE1/YR: CPT | Mod: CPTII,S$GLB,, | Performed by: INTERNAL MEDICINE

## 2022-11-15 PROCEDURE — 99214 PR OFFICE/OUTPT VISIT, EST, LEVL IV, 30-39 MIN: ICD-10-PCS | Mod: S$GLB,,, | Performed by: INTERNAL MEDICINE

## 2022-11-15 PROCEDURE — 3044F HG A1C LEVEL LT 7.0%: CPT | Mod: CPTII,S$GLB,, | Performed by: INTERNAL MEDICINE

## 2022-11-15 PROCEDURE — 1101F PR PT FALLS ASSESS DOC 0-1 FALLS W/OUT INJ PAST YR: ICD-10-PCS | Mod: CPTII,S$GLB,, | Performed by: INTERNAL MEDICINE

## 2022-11-15 PROCEDURE — 3078F DIAST BP <80 MM HG: CPT | Mod: CPTII,S$GLB,, | Performed by: INTERNAL MEDICINE

## 2022-11-15 PROCEDURE — 99999 PR PBB SHADOW E&M-EST. PATIENT-LVL IV: ICD-10-PCS | Mod: PBBFAC,,, | Performed by: INTERNAL MEDICINE

## 2022-11-15 PROCEDURE — 3288F PR FALLS RISK ASSESSMENT DOCUMENTED: ICD-10-PCS | Mod: CPTII,S$GLB,, | Performed by: INTERNAL MEDICINE

## 2022-11-15 PROCEDURE — 3074F SYST BP LT 130 MM HG: CPT | Mod: CPTII,S$GLB,, | Performed by: INTERNAL MEDICINE

## 2022-11-15 RX ORDER — HYDROCHLOROTHIAZIDE 25 MG/1
25 TABLET ORAL DAILY
Qty: 90 TABLET | Refills: 3 | Status: SHIPPED | OUTPATIENT
Start: 2022-11-15 | End: 2023-12-09

## 2022-11-15 NOTE — PROGRESS NOTES
Subjective:   Patient ID:  Ana Aguila is a 67 y.o. female who presents for follow-up of Hospital Follow Up      Assessment/Plan:     1. Essential hypertension - uncontrolled and in Dig HTN  Eats out a lot.  Meds changed in hospital.  WIll add HCTZ and follow lytes.  Titrate other meds accordingly.     2. Chronic respiratory failure with hypoxia, on home oxygen therapy - stable    3. Hyperlipidemia, unspecified hyperlipidemia type - on statin and at goal.     4. Coronary artery calcification seen on CT scan - on statin.     5. Aortic atherosclerosis - on statin.       It seems that her hospitalization was a COPD exacerbation which triggered a small NSTEMI secondary to demand.  This event was not an ACS and no evidence of volume overload.  ECHO from today is normal.      Start HCTZ for BP control. Titrate in Dig Med.     Orders Placed This Encounter   Procedures    Basic Metabolic Panel             ___________________________________________________________________________________________    HPI:   Patient is a 66 y/o F w/ PMHx Takatsubo cardiomyopathy, HTN, severe COPD- on home O2.  She did have Takatsubo CM several years ago and LV function recovered.     Patient was hospitalized at [facility] from 11/18 - 11/21/2016 with acute on chronic hypoxic respiratory failure secondary to COPD exacerbation. During her hospitalization she underwent a repeat TTE which demonstrated reduced EF 35%. She was seen by Cardiology in consultation who felt she had recurrence of stress induced cardiomyopathy based on evidence of left ventricle apical ballooning on her echocardiogram and normal coronaries by [facility] in 10/2016. Recommendations were made to optimize medical therapy and so she was started on metoprolol (which she tolerated) and ARB Patient was ultimately discharged home in stable condition. Repeat echo  demonstrated normal LV function.    She is in the Dig HTN management program    She was hospitalized July 23- with  acute POWERS.  She was given the dx of Hypertensive Emergency with Flash pulmonary edema in setting of Acute on chronic combined heart failure however, I question this dx.  First her LVEF during the acute setting was 40%.  Second, her LVEDP was 9 on LHC (which also demonstrated normal coronary arteries).   Third - presenting BNP was 70.  4) According to notes, she presented with acute SOB and required nebs + intubation.  Pre-intubation ABG showed hypercapnic RF with pH 7.15/ 80.  She was treated with doxy and ceftriaxone 5) 7/27 Small patchy airspace opacity in the right mid lung suspicious for aspiration or pneumonia with Bibasilar atelectasis or scarring.    She was also very hypertensive as well but likely COPD exacerbation precipitated HTN.  Meds were modified and discharged on lasix 20 mg.  She has mostly stopped taking lasix bc of cramping/side effects.    Home BPs not controlled.  Checked in office manually and with 2 different BP cuffs ([employer] and [employer]) used for dig HTN.  Both cuffs working well.          Results for orders placed during the hospital encounter of 11/14/22    Echo    Interpretation Summary  · The estimated ejection fraction is 65%.  · The left ventricle is normal in size with concentric remodeling and normal systolic function.  · Indeterminate left ventricular diastolic function.  · Normal right ventricular size with normal right ventricular systolic function.  · The estimated PA systolic pressure is 35 mmHg.  · Normal central venous pressure (3 mmHg).     No results found for this or any previous visit.        Last 5 Patient Entered Readings                Current 30 Day Average: 157/87  Recent Readings 11/10/2022 11/10/2022 11/9/2022 11/9/2022 11/9/2022   SBP (mmHg) 168 165 192 184 190   DBP (mmHg) 93 91 95 97 96   Pulse 68 70 74 73 80                  Vitals:    11/15/22 1131   BP: 120/75   BP Location: Left arm   Patient Position: Sitting   BP Method: Large (Automatic)   Pulse: 70  "  SpO2: 96%   Weight: 77.6 kg (171 lb 1.2 oz)   Height: 5' 5" (1.651 m)     Body mass index is 28.47 kg/m².  CrCl cannot be calculated (Patient's most recent lab result is older than the maximum 7 days allowed.).    Lab Results   Component Value Date     09/09/2022    K 4.2 09/09/2022     09/09/2022    CO2 30 (H) 09/09/2022    BUN 11 09/09/2022    CREATININE 0.7 09/09/2022    GLU 76 09/09/2022    HGBA1C 5.2 09/09/2022    MG 2.1 09/09/2022    AST 19 09/09/2022    ALT 8 (L) 09/09/2022    ALBUMIN 3.9 09/09/2022    PROT 6.8 09/09/2022    BILITOT 0.5 09/09/2022    WBC 6.26 09/09/2022    HGB 12.0 09/09/2022    HCT 37.5 09/09/2022    HCT 41 07/23/2022    MCV 92 09/09/2022     09/09/2022    INR 1.0 07/23/2022    TSH 2.258 07/27/2021    CHOL 154 07/27/2021    HDL 70 07/27/2021    LDLCALC 72.0 07/27/2021    TRIG 60 07/27/2021       Current Outpatient Medications   Medication Sig    acetaminophen (TYLENOL) 500 MG tablet Take 250 mg by mouth every 6 (six) hours as needed for Pain.    albuterol-ipratropium (DUO-NEB) 2.5 mg-0.5 mg/3 mL nebulizer solution TAKE 3 ML BY NEBULIZER EVERY 6 HOURS AS NEEDED FOR WHEEZING OR SHORTNESS OF BREATH    aspirin (ECOTRIN) 81 MG EC tablet Take 81 mg by mouth once daily.    cyanocobalamin (VITAMIN B-12) 100 MCG tablet Take 100 mcg by mouth once daily.    ergocalciferol (ERGOCALCIFEROL) 50,000 unit Cap TAKE 1 CAPSULE BY MOUTH EVERY 7 DAYS    fluticasone-salmeterol diskus inhaler 250-50 mcg INHALE 1 PUFF BY MOUTH EVERY 12 HOURS AS NEEDED    hydrOXYzine HCL (ATARAX) 10 MG Tab Take 1 tablet (10 mg total) by mouth 3 (three) times daily as needed (anxiety).    ipratropium (ATROVENT) 0.02 % nebulizer solution Take 500 mcg by nebulization 4 (four) times daily. Rescue    losartan (COZAAR) 25 MG tablet Take 1 tablet (25 mg total) by mouth once daily.    metoprolol succinate (TOPROL-XL) 100 MG 24 hr tablet Take 1 tablet (100 mg total) by mouth once daily.    PROAIR HFA 90 mcg/actuation " inhaler INHALE 2 PUFFS FOUR TIMES DAILY AS NEEDED FOR COPD    rosuvastatin (CRESTOR) 10 MG tablet Take 1 tablet (10 mg total) by mouth once daily.    hydroCHLOROthiazide (HYDRODIURIL) 25 MG tablet Take 1 tablet (25 mg total) by mouth once daily.     No current facility-administered medications for this visit.       Review of Systems   Constitutional: Negative for malaise/fatigue.   Eyes:  Negative for visual disturbance.   Cardiovascular:  Positive for dyspnea on exertion. Negative for chest pain, claudication, irregular heartbeat, near-syncope, orthopnea and palpitations.   Respiratory:  Positive for shortness of breath. Negative for sleep disturbances due to breathing.    Musculoskeletal:  Negative for muscle cramps, muscle weakness and myalgias.   Gastrointestinal:  Negative for abdominal pain and heartburn.   Genitourinary:  Negative for nocturia.   Neurological:  Negative for difficulty with concentration, excessive daytime sleepiness, light-headedness, loss of balance, paresthesias and vertigo.     Objective:   Physical Exam  Constitutional:       Appearance: Normal appearance. She is well-developed.   HENT:      Head: Normocephalic and atraumatic.      Nose: Nose normal.   Cardiovascular:      Rate and Rhythm: Normal rate and regular rhythm.      Pulses: Intact distal pulses.      Heart sounds: Normal heart sounds.   Pulmonary:      Effort: Pulmonary effort is normal. No accessory muscle usage or respiratory distress.      Breath sounds: Normal breath sounds.   Abdominal:      General: Bowel sounds are normal.   Skin:     General: Skin is warm and dry.   Neurological:      General: No focal deficit present.      Mental Status: She is alert and oriented to person, place, and time.   Psychiatric:         Attention and Perception: Attention normal.         Mood and Affect: Mood and affect normal.         Speech: Speech normal.         Behavior: Behavior normal. Behavior is cooperative.         Thought Content:  Thought content normal.         Judgment: Judgment normal.

## 2022-11-15 NOTE — Clinical Note
Check out my note.  Re-starting HCTZ.  Was on it in 2018 and not sure why stopped.  She's gonna need to lose the Na.

## 2022-11-18 ENCOUNTER — PATIENT OUTREACH (OUTPATIENT)
Dept: ADMINISTRATIVE | Facility: OTHER | Age: 67
End: 2022-11-18
Payer: MEDICARE

## 2022-11-18 NOTE — PROGRESS NOTES
CHW - Follow Up    This Community Health Worker completed a follow up visit with patient via telephone today.  Pt/Caregiver reported: Pt stated she is doing good but CHW and Pt are waiting to hear more information on 11/22/22 after her pulmonary appointment to see if  can assist with her aide referral since PCP couldn't assist with aide request for bathing and ect.  Community Health Worker provided: None   Follow up required: Yes  Follow-up Outreach - Due: 11/23/2022

## 2022-11-21 NOTE — PROGRESS NOTES
Please call patient re normal pumping function of heart on echocardiogram. FUP with Dr Gaytan (already established)

## 2022-11-22 ENCOUNTER — OFFICE VISIT (OUTPATIENT)
Dept: PULMONOLOGY | Facility: CLINIC | Age: 67
End: 2022-11-22
Payer: MEDICARE

## 2022-11-22 ENCOUNTER — HOSPITAL ENCOUNTER (OUTPATIENT)
Dept: RADIOLOGY | Facility: HOSPITAL | Age: 67
Discharge: HOME | End: 2022-11-22
Attending: INTERNAL MEDICINE
Payer: MEDICARE

## 2022-11-22 ENCOUNTER — PATIENT OUTREACH (OUTPATIENT)
Dept: ADMINISTRATIVE | Facility: OTHER | Age: 67
End: 2022-11-22
Payer: MEDICARE

## 2022-11-22 VITALS
HEART RATE: 70 BPM | WEIGHT: 171 LBS | BODY MASS INDEX: 29.19 KG/M2 | HEIGHT: 64 IN | DIASTOLIC BLOOD PRESSURE: 78 MMHG | SYSTOLIC BLOOD PRESSURE: 126 MMHG | OXYGEN SATURATION: 97 %

## 2022-11-22 DIAGNOSIS — J43.2 CENTRILOBULAR EMPHYSEMA: ICD-10-CM

## 2022-11-22 DIAGNOSIS — J43.2 CENTRILOBULAR EMPHYSEMA: Primary | ICD-10-CM

## 2022-11-22 PROCEDURE — G0008 FLU VACCINE - QUADRIVALENT - ADJUVANTED: ICD-10-PCS | Mod: S$GLB,,, | Performed by: INTERNAL MEDICINE

## 2022-11-22 PROCEDURE — 3288F FALL RISK ASSESSMENT DOCD: CPT | Mod: CPTII,S$GLB,, | Performed by: INTERNAL MEDICINE

## 2022-11-22 PROCEDURE — 1159F PR MEDICATION LIST DOCUMENTED IN MEDICAL RECORD: ICD-10-PCS | Mod: CPTII,S$GLB,, | Performed by: INTERNAL MEDICINE

## 2022-11-22 PROCEDURE — 99214 OFFICE O/P EST MOD 30 MIN: CPT | Mod: S$GLB,,, | Performed by: INTERNAL MEDICINE

## 2022-11-22 PROCEDURE — 3078F DIAST BP <80 MM HG: CPT | Mod: CPTII,S$GLB,, | Performed by: INTERNAL MEDICINE

## 2022-11-22 PROCEDURE — 99999 PR PBB SHADOW E&M-EST. PATIENT-LVL III: CPT | Mod: PBBFAC,,, | Performed by: INTERNAL MEDICINE

## 2022-11-22 PROCEDURE — G0008 ADMIN INFLUENZA VIRUS VAC: HCPCS | Mod: S$GLB,,, | Performed by: INTERNAL MEDICINE

## 2022-11-22 PROCEDURE — 71046 X-RAY EXAM CHEST 2 VIEWS: CPT | Mod: 26,,, | Performed by: RADIOLOGY

## 2022-11-22 PROCEDURE — 1101F PT FALLS ASSESS-DOCD LE1/YR: CPT | Mod: CPTII,S$GLB,, | Performed by: INTERNAL MEDICINE

## 2022-11-22 PROCEDURE — 90694 FLU VACCINE - QUADRIVALENT - ADJUVANTED: ICD-10-PCS | Mod: S$GLB,,, | Performed by: INTERNAL MEDICINE

## 2022-11-22 PROCEDURE — 71046 X-RAY EXAM CHEST 2 VIEWS: CPT | Mod: TC,FY

## 2022-11-22 PROCEDURE — 3288F PR FALLS RISK ASSESSMENT DOCUMENTED: ICD-10-PCS | Mod: CPTII,S$GLB,, | Performed by: INTERNAL MEDICINE

## 2022-11-22 PROCEDURE — 3074F PR MOST RECENT SYSTOLIC BLOOD PRESSURE < 130 MM HG: ICD-10-PCS | Mod: CPTII,S$GLB,, | Performed by: INTERNAL MEDICINE

## 2022-11-22 PROCEDURE — 3008F BODY MASS INDEX DOCD: CPT | Mod: CPTII,S$GLB,, | Performed by: INTERNAL MEDICINE

## 2022-11-22 PROCEDURE — 3078F PR MOST RECENT DIASTOLIC BLOOD PRESSURE < 80 MM HG: ICD-10-PCS | Mod: CPTII,S$GLB,, | Performed by: INTERNAL MEDICINE

## 2022-11-22 PROCEDURE — 1125F AMNT PAIN NOTED PAIN PRSNT: CPT | Mod: CPTII,S$GLB,, | Performed by: INTERNAL MEDICINE

## 2022-11-22 PROCEDURE — 4010F ACE/ARB THERAPY RXD/TAKEN: CPT | Mod: CPTII,S$GLB,, | Performed by: INTERNAL MEDICINE

## 2022-11-22 PROCEDURE — 3044F HG A1C LEVEL LT 7.0%: CPT | Mod: CPTII,S$GLB,, | Performed by: INTERNAL MEDICINE

## 2022-11-22 PROCEDURE — 90694 VACC AIIV4 NO PRSRV 0.5ML IM: CPT | Mod: S$GLB,,, | Performed by: INTERNAL MEDICINE

## 2022-11-22 PROCEDURE — 3044F PR MOST RECENT HEMOGLOBIN A1C LEVEL <7.0%: ICD-10-PCS | Mod: CPTII,S$GLB,, | Performed by: INTERNAL MEDICINE

## 2022-11-22 PROCEDURE — 3008F PR BODY MASS INDEX (BMI) DOCUMENTED: ICD-10-PCS | Mod: CPTII,S$GLB,, | Performed by: INTERNAL MEDICINE

## 2022-11-22 PROCEDURE — 1101F PR PT FALLS ASSESS DOC 0-1 FALLS W/OUT INJ PAST YR: ICD-10-PCS | Mod: CPTII,S$GLB,, | Performed by: INTERNAL MEDICINE

## 2022-11-22 PROCEDURE — 4010F PR ACE/ARB THEARPY RXD/TAKEN: ICD-10-PCS | Mod: CPTII,S$GLB,, | Performed by: INTERNAL MEDICINE

## 2022-11-22 PROCEDURE — 1125F PR PAIN SEVERITY QUANTIFIED, PAIN PRESENT: ICD-10-PCS | Mod: CPTII,S$GLB,, | Performed by: INTERNAL MEDICINE

## 2022-11-22 PROCEDURE — 1159F MED LIST DOCD IN RCRD: CPT | Mod: CPTII,S$GLB,, | Performed by: INTERNAL MEDICINE

## 2022-11-22 PROCEDURE — 3074F SYST BP LT 130 MM HG: CPT | Mod: CPTII,S$GLB,, | Performed by: INTERNAL MEDICINE

## 2022-11-22 PROCEDURE — 99214 PR OFFICE/OUTPT VISIT, EST, LEVL IV, 30-39 MIN: ICD-10-PCS | Mod: S$GLB,,, | Performed by: INTERNAL MEDICINE

## 2022-11-22 PROCEDURE — 71046 XR CHEST PA AND LATERAL: ICD-10-PCS | Mod: 26,,, | Performed by: RADIOLOGY

## 2022-11-22 PROCEDURE — 99999 PR PBB SHADOW E&M-EST. PATIENT-LVL III: ICD-10-PCS | Mod: PBBFAC,,, | Performed by: INTERNAL MEDICINE

## 2022-11-22 NOTE — PROGRESS NOTES
CHW - Outreach Attempt    Community Health Worker left a voicemail message for 1st attempt to contact patient regarding: recent  Appointment and personal aide request.  Community Health Worker to attempt to contact patient on: 11/22/22
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
## 2022-11-23 ENCOUNTER — PATIENT OUTREACH (OUTPATIENT)
Dept: ADMINISTRATIVE | Facility: OTHER | Age: 67
End: 2022-11-23
Payer: MEDICARE

## 2022-11-23 NOTE — PROGRESS NOTES
CHW - Follow Up    This Community Health Worker completed a follow up visit with patient via telephone today.  Pt/Caregiver reported: Pt stated she is doing well and since she missed the call from pulmonary doctor and [facility] about personal aide request CHW will contact Pt on 12/2/22  Community Health Worker provided: None additional resources  Follow up required: Yes  Follow-up Outreach - Due: 12/2/2022

## 2022-11-25 ENCOUNTER — PATIENT MESSAGE (OUTPATIENT)
Dept: CARDIOLOGY | Facility: CLINIC | Age: 67
End: 2022-11-25
Payer: MEDICARE

## 2022-11-25 DIAGNOSIS — J44.9 CHRONIC OBSTRUCTIVE PULMONARY DISEASE, UNSPECIFIED COPD TYPE: Primary | ICD-10-CM

## 2022-11-25 DIAGNOSIS — J43.2 CENTRILOBULAR EMPHYSEMA: ICD-10-CM

## 2022-11-29 ENCOUNTER — LAB VISIT (OUTPATIENT)
Dept: LAB | Facility: HOSPITAL | Age: 67
End: 2022-11-29
Attending: INTERNAL MEDICINE
Payer: MEDICARE

## 2022-11-29 DIAGNOSIS — I10 ESSENTIAL HYPERTENSION: ICD-10-CM

## 2022-11-29 LAB
ANION GAP SERPL CALC-SCNC: 9 MMOL/L (ref 8–16)
BUN SERPL-MCNC: 14 MG/DL (ref 8–23)
CALCIUM SERPL-MCNC: 9.1 MG/DL (ref 8.7–10.5)
CHLORIDE SERPL-SCNC: 98 MMOL/L (ref 95–110)
CO2 SERPL-SCNC: 35 MMOL/L (ref 23–29)
CREAT SERPL-MCNC: 0.8 MG/DL (ref 0.5–1.4)
EST. GFR  (NO RACE VARIABLE): >60 ML/MIN/1.73 M^2
GLUCOSE SERPL-MCNC: 103 MG/DL (ref 70–110)
POTASSIUM SERPL-SCNC: 3.8 MMOL/L (ref 3.5–5.1)
SODIUM SERPL-SCNC: 142 MMOL/L (ref 136–145)

## 2022-11-29 PROCEDURE — 36415 COLL VENOUS BLD VENIPUNCTURE: CPT | Performed by: INTERNAL MEDICINE

## 2022-11-29 PROCEDURE — 80048 BASIC METABOLIC PNL TOTAL CA: CPT | Performed by: INTERNAL MEDICINE

## 2022-12-07 ENCOUNTER — PATIENT MESSAGE (OUTPATIENT)
Dept: INTERNAL MEDICINE | Facility: CLINIC | Age: 67
End: 2022-12-07
Payer: MEDICARE

## 2022-12-08 ENCOUNTER — TELEPHONE (OUTPATIENT)
Dept: PULMONOLOGY | Facility: CLINIC | Age: 67
End: 2022-12-08
Payer: MEDICARE

## 2022-12-08 DIAGNOSIS — J44.9 CHRONIC OBSTRUCTIVE PULMONARY DISEASE, UNSPECIFIED COPD TYPE: ICD-10-CM

## 2022-12-08 DIAGNOSIS — J44.1 ASTHMA EXACERBATION IN COPD: Primary | ICD-10-CM

## 2022-12-08 DIAGNOSIS — J45.901 ASTHMA EXACERBATION IN COPD: Primary | ICD-10-CM

## 2022-12-08 RX ORDER — AZITHROMYCIN 250 MG/1
TABLET, FILM COATED ORAL
Qty: 6 TABLET | Refills: 1 | Status: SHIPPED | OUTPATIENT
Start: 2022-12-08 | End: 2023-01-09 | Stop reason: ALTCHOICE

## 2022-12-08 RX ORDER — PREDNISONE 10 MG/1
TABLET ORAL
Qty: 30 TABLET | Refills: 1 | Status: SHIPPED | OUTPATIENT
Start: 2022-12-08 | End: 2023-01-09 | Stop reason: ALTCHOICE

## 2022-12-08 NOTE — TELEPHONE ENCOUNTER
Mrs Aguila called to report fever, nasal and chest congestion itchy throat and chest tightness x 2 days. Dr Dill ordered a Zithromax Zpak and Prednisone 20mg x 7 days, then 10mg x 7 days to [facility] on [address] in [place]. Mrs Aguila was satisfied with this and will call back if there is no improvement. Anastacia Hernandes

## 2022-12-09 ENCOUNTER — OFFICE VISIT (OUTPATIENT)
Dept: INTERNAL MEDICINE | Facility: CLINIC | Age: 67
End: 2022-12-09
Payer: MEDICARE

## 2022-12-09 VITALS
TEMPERATURE: 99 F | SYSTOLIC BLOOD PRESSURE: 140 MMHG | HEART RATE: 78 BPM | BODY MASS INDEX: 29.21 KG/M2 | DIASTOLIC BLOOD PRESSURE: 80 MMHG | WEIGHT: 170.19 LBS

## 2022-12-09 DIAGNOSIS — J06.9 UPPER RESPIRATORY TRACT INFECTION, UNSPECIFIED TYPE: Primary | ICD-10-CM

## 2022-12-09 DIAGNOSIS — R10.11 RUQ PAIN: ICD-10-CM

## 2022-12-09 DIAGNOSIS — I10 ESSENTIAL HYPERTENSION: ICD-10-CM

## 2022-12-09 DIAGNOSIS — J44.9 CHRONIC OBSTRUCTIVE PULMONARY DISEASE, UNSPECIFIED COPD TYPE: ICD-10-CM

## 2022-12-09 LAB
CTP QC/QA: YES
CTP QC/QA: YES
FLUAV AG NPH QL: NEGATIVE
FLUBV AG NPH QL: NEGATIVE
SARS-COV-2 RDRP RESP QL NAA+PROBE: NEGATIVE

## 2022-12-09 PROCEDURE — 99214 PR OFFICE/OUTPT VISIT, EST, LEVL IV, 30-39 MIN: ICD-10-PCS | Mod: S$GLB,,, | Performed by: INTERNAL MEDICINE

## 2022-12-09 PROCEDURE — 3079F DIAST BP 80-89 MM HG: CPT | Mod: CPTII,S$GLB,, | Performed by: INTERNAL MEDICINE

## 2022-12-09 PROCEDURE — 3044F PR MOST RECENT HEMOGLOBIN A1C LEVEL <7.0%: ICD-10-PCS | Mod: CPTII,S$GLB,, | Performed by: INTERNAL MEDICINE

## 2022-12-09 PROCEDURE — 1125F AMNT PAIN NOTED PAIN PRSNT: CPT | Mod: CPTII,S$GLB,, | Performed by: INTERNAL MEDICINE

## 2022-12-09 PROCEDURE — 87635 SARS-COV-2 COVID-19 AMP PRB: CPT | Mod: QW,S$GLB,, | Performed by: INTERNAL MEDICINE

## 2022-12-09 PROCEDURE — 1159F MED LIST DOCD IN RCRD: CPT | Mod: CPTII,S$GLB,, | Performed by: INTERNAL MEDICINE

## 2022-12-09 PROCEDURE — 99999 PR PBB SHADOW E&M-EST. PATIENT-LVL IV: CPT | Mod: PBBFAC,,, | Performed by: INTERNAL MEDICINE

## 2022-12-09 PROCEDURE — 3008F BODY MASS INDEX DOCD: CPT | Mod: CPTII,S$GLB,, | Performed by: INTERNAL MEDICINE

## 2022-12-09 PROCEDURE — 4010F PR ACE/ARB THEARPY RXD/TAKEN: ICD-10-PCS | Mod: CPTII,S$GLB,, | Performed by: INTERNAL MEDICINE

## 2022-12-09 PROCEDURE — 3008F PR BODY MASS INDEX (BMI) DOCUMENTED: ICD-10-PCS | Mod: CPTII,S$GLB,, | Performed by: INTERNAL MEDICINE

## 2022-12-09 PROCEDURE — 3044F HG A1C LEVEL LT 7.0%: CPT | Mod: CPTII,S$GLB,, | Performed by: INTERNAL MEDICINE

## 2022-12-09 PROCEDURE — 3077F SYST BP >= 140 MM HG: CPT | Mod: CPTII,S$GLB,, | Performed by: INTERNAL MEDICINE

## 2022-12-09 PROCEDURE — 99214 OFFICE O/P EST MOD 30 MIN: CPT | Mod: S$GLB,,, | Performed by: INTERNAL MEDICINE

## 2022-12-09 PROCEDURE — 3079F PR MOST RECENT DIASTOLIC BLOOD PRESSURE 80-89 MM HG: ICD-10-PCS | Mod: CPTII,S$GLB,, | Performed by: INTERNAL MEDICINE

## 2022-12-09 PROCEDURE — 3077F PR MOST RECENT SYSTOLIC BLOOD PRESSURE >= 140 MM HG: ICD-10-PCS | Mod: CPTII,S$GLB,, | Performed by: INTERNAL MEDICINE

## 2022-12-09 PROCEDURE — 1160F PR REVIEW ALL MEDS BY PRESCRIBER/CLIN PHARMACIST DOCUMENTED: ICD-10-PCS | Mod: CPTII,S$GLB,, | Performed by: INTERNAL MEDICINE

## 2022-12-09 PROCEDURE — 87804 POCT INFLUENZA A/B: ICD-10-PCS | Mod: 59,QW,S$GLB, | Performed by: INTERNAL MEDICINE

## 2022-12-09 PROCEDURE — 1159F PR MEDICATION LIST DOCUMENTED IN MEDICAL RECORD: ICD-10-PCS | Mod: CPTII,S$GLB,, | Performed by: INTERNAL MEDICINE

## 2022-12-09 PROCEDURE — 4010F ACE/ARB THERAPY RXD/TAKEN: CPT | Mod: CPTII,S$GLB,, | Performed by: INTERNAL MEDICINE

## 2022-12-09 PROCEDURE — 99999 PR PBB SHADOW E&M-EST. PATIENT-LVL IV: ICD-10-PCS | Mod: PBBFAC,,, | Performed by: INTERNAL MEDICINE

## 2022-12-09 PROCEDURE — 87635: ICD-10-PCS | Mod: QW,S$GLB,, | Performed by: INTERNAL MEDICINE

## 2022-12-09 PROCEDURE — 1160F RVW MEDS BY RX/DR IN RCRD: CPT | Mod: CPTII,S$GLB,, | Performed by: INTERNAL MEDICINE

## 2022-12-09 PROCEDURE — 1125F PR PAIN SEVERITY QUANTIFIED, PAIN PRESENT: ICD-10-PCS | Mod: CPTII,S$GLB,, | Performed by: INTERNAL MEDICINE

## 2022-12-09 PROCEDURE — 87804 INFLUENZA ASSAY W/OPTIC: CPT | Mod: QW,S$GLB,, | Performed by: INTERNAL MEDICINE

## 2022-12-09 NOTE — PROGRESS NOTES
CC:  Upper respiratory symptoms     HPI:  The patient is a 67-year-old female with COPD, hypertension, hyperlipidemia, reflux, right hemicolectomy secondary to GI bleed from a polypectomy who presents today with complaints of cough, low-grade fever, some sinus congestion.  Symptoms started on Tuesday in progress.  She was in contact with her pulmonologist who called in some prednisone and a Z-Otis.  She was concerned this might be related to her flu shot which had 2 weeks ago.  This morning she is feeling better.    ROS:  Patient reports feeling a knot in her throat.  Does complain of a chronic pain on her right side.  This has been going on for over a year.  She is doing better in that she is not using a walker her wheelchair currently.    Physical exam:   General appearance:  No acute distress   HEENT:  Conjunctiva is clear.  Pupils equal.  She has bilateral cataracts.  TMs are clear.  Nasal septum is midline without discharge.  Oropharynx is without erythema.  Trachea is midline without JVD or thyromegaly.    Pulmonary:  Good inspiratory, expiratory breath sounds are heard.  Lungs are clear to auscultation.    Cardiovascular:  S1-S2, rhythm appear to be regular.  Extremities without edema.    GI: Abdomen is nontender, nondistended without hepatosplenomegaly     Assessment:  1. Upper respiratory infection  2.  Hypertension   3.  COPD  4.  Chronic right side abdominal pain     Plan:  1. A COVID and flu test be done today  2.  Get an ultrasound the abdomen  3.  The patient's start prednisone and azithromycin pending lab test results.

## 2022-12-10 ENCOUNTER — NURSE TRIAGE (OUTPATIENT)
Dept: ADMINISTRATIVE | Facility: CLINIC | Age: 67
End: 2022-12-10
Payer: MEDICARE

## 2022-12-10 NOTE — TELEPHONE ENCOUNTER
"Ana calling states she was prescribed Prednisone 10 mg and Z-pack 2 days ago. Had appt with PCP yesterday and was tested for Covid & Flu were negative.Has COPD. Ana states she started Prednisone yesterday and began with headache 3-4 hours later after taking Prednisone. Ana states Tylenol does not help so far. Last taken at 0500 today. Advised per triage protocol to go to nearest ED now for physician eval. V/u.   Reason for Disposition   [1] SEVERE headache AND [2] sudden-onset (i.e., reaching maximum intensity within seconds to 1 hour)    Additional Information   Negative: Difficult to awaken or acting confused (e.g., disoriented, slurred speech)   Negative: [1] Weakness of the face, arm or leg on one side of the body AND [2] new-onset   Negative: [1] Numbness of the face, arm or leg on one side of the body AND [2] new-onset   Negative: [1] Loss of speech or garbled speech AND [2] new-onset   Negative: Passed out (i.e., lost consciousness, collapsed and was not responding)   Negative: Sounds like a life-threatening emergency to the triager   Negative: Unable to walk, or can only walk with assistance (e.g., requires support)   Negative: Stiff neck (can't touch chin to chest)   Negative: Severe pain in one eye   Negative: [1] Other family members (or roommates) with headaches AND [2] possibility of carbon monoxide exposure   Negative: [1] SEVERE headache (e.g., excruciating) AND [2] "worst headache" of life    Protocols used: Headache-A-AH    "

## 2022-12-11 ENCOUNTER — HOSPITAL ENCOUNTER (OUTPATIENT)
Dept: RADIOLOGY | Facility: HOSPITAL | Age: 67
Discharge: HOME | End: 2022-12-11
Attending: INTERNAL MEDICINE
Payer: MEDICARE

## 2022-12-11 DIAGNOSIS — R10.11 RUQ PAIN: ICD-10-CM

## 2022-12-11 PROCEDURE — 76700 US ABDOMEN COMPLETE: ICD-10-PCS | Mod: 26,,, | Performed by: RADIOLOGY

## 2022-12-11 PROCEDURE — 76700 US EXAM ABDOM COMPLETE: CPT | Mod: TC

## 2022-12-11 PROCEDURE — 76700 US EXAM ABDOM COMPLETE: CPT | Mod: 26,,, | Performed by: RADIOLOGY

## 2022-12-12 ENCOUNTER — PATIENT OUTREACH (OUTPATIENT)
Dept: ADMINISTRATIVE | Facility: OTHER | Age: 67
End: 2022-12-12
Payer: MEDICARE

## 2022-12-12 ENCOUNTER — TELEPHONE (OUTPATIENT)
Dept: PULMONOLOGY | Facility: CLINIC | Age: 67
End: 2022-12-12
Payer: MEDICARE

## 2022-12-12 NOTE — TELEPHONE ENCOUNTER
----- Message from Bren Doyle sent at 12/10/2022  8:22 AM CST -----  Contact: pt [phone]  Patient states that Rx predniSONE (DELTASONE) 10 MG tablet is causing her severe headaches.    Please call and advise.    Thank You

## 2022-12-12 NOTE — TELEPHONE ENCOUNTER
Mrs Aguila called to report a severe headache that occurred  after taking 20mg of Prednisone last week and it lasted 18 hours. She was told to go to ER by the [facility] on Call nurse but she did not. She has not take any more Prednisone. I told her I will let Dr Dill know about this, it has never happened before and she has taken Prednisone off and on for years. Anastacia Chavez LPN

## 2022-12-12 NOTE — PROGRESS NOTES
CHW - Case Closure    This Community Health Worker spoke to patient via telephone today.     Pt/Caregiver reported: Pt stated she is doing okay unfortunately, orders for her personal aide have been delayed and CHW encourages Pt to stay on top of the person responsible for submitting her paperwork to her insurance.    Pt/Caregiver denied any additional needs at this time and agrees with episode closure at this time.  Provided patient with Community Health Worker's contact information and encouraged him/her to contact this Community Health Worker if additional needs arise.

## 2022-12-14 ENCOUNTER — PATIENT MESSAGE (OUTPATIENT)
Dept: INTERNAL MEDICINE | Facility: CLINIC | Age: 67
End: 2022-12-14
Payer: MEDICARE

## 2022-12-14 NOTE — TELEPHONE ENCOUNTER
Msg 1 of 2 FYI.   Enoxaparin/Lovenox - Compliance/Enoxaparin/Lovenox - Dietary Advice/Enoxaparin/Lovenox - Follow up monitoring/Enoxaparin/Lovenox - Potential for adverse drug reactions and interactions

## 2023-01-02 ENCOUNTER — NURSE TRIAGE (OUTPATIENT)
Dept: ADMINISTRATIVE | Facility: CLINIC | Age: 68
End: 2023-01-02
Payer: MEDICARE

## 2023-01-02 DIAGNOSIS — U07.1 COVID-19: Primary | ICD-10-CM

## 2023-01-02 NOTE — TELEPHONE ENCOUNTER
Mrs. Aguila is calling to report that she was exposed to someone with COVID on Wednesday, she had a low grade  fever for a couple days last week, but was feeling fine since then, but yesterday she began with a cough, and took a home COVID test and it was positive.  She has COPD and is on continuous home O2, she denies any worsening sob from her baseline, O2 sats aree 94-95%, which is also her baseline.  She does report an intermittent cough with occasional thick yellow sputum production, but no fever or chest pain.  I advised that because she is high risk, I will get a message to both her pulmonologist and her PCP to make them aware, with request for them to call her back to discuss/advise further. We discussed s/s to monitor for and when to call back to [facility], as well as home care to help manage her symptoms; she verbalized understanding.      Reason for Disposition   [1] HIGH RISK for severe COVID complications (e.g., weak immune system, age > 64 years, obesity with BMI 30 or higher, pregnant, chronic lung disease or other chronic medical condition) AND [2] COVID symptoms (e.g., cough, fever)  (Exceptions: Already seen by PCP and no new or worsening symptoms.)    Additional Information   Negative: SEVERE difficulty breathing (e.g., struggling for each breath, speaks in single words)   Negative: Difficult to awaken or acting confused (e.g., disoriented, slurred speech)   Negative: Bluish (or gray) lips or face now   Negative: Shock suspected (e.g., cold/pale/clammy skin, too weak to stand, low BP, rapid pulse)   Negative: Sounds like a life-threatening emergency to the triager   Negative: SEVERE or constant chest pain or pressure  (Exception: Mild central chest pain, present only when coughing.)   Negative: MODERATE difficulty breathing (e.g., speaks in phrases, SOB even at rest, pulse 100-120)   Negative: [1] Headache AND [2] stiff neck (can't touch chin to chest)   Negative: Oxygen level (e.g., pulse oximetry) 90  percent or lower   Negative: Chest pain or pressure  (Exception: MILD central chest pain, present only when coughing)   Negative: Patient sounds very sick or weak to the triager   Negative: MILD difficulty breathing (e.g., minimal/no SOB at rest, SOB with walking, pulse <100)   Negative: Fever > 103 F (39.4 C)   Negative: [1] Fever > 101 F (38.3 C) AND [2] age > 60 years   Negative: [1] Fever > 100.0 F (37.8 C) AND [2] bedridden (e.g., CVA, chronic illness, recovering from surgery)   Negative: Oxygen level (e.g., pulse oximetry) 91 to 94 percent     Her baseline O2 saturation is 94-95%, and when she checked it today, it is still in the 94-95% range, no worsening sob from her baseline.    Protocols used: Coronavirus (COVID-19) Diagnosed or [id number]

## 2023-01-03 ENCOUNTER — TELEPHONE (OUTPATIENT)
Dept: INTERNAL MEDICINE | Facility: CLINIC | Age: 68
End: 2023-01-03
Payer: MEDICARE

## 2023-01-03 DIAGNOSIS — U07.1 COVID-19: Primary | ICD-10-CM

## 2023-01-03 NOTE — TELEPHONE ENCOUNTER
Pt tested pos for covid on Sunday. Pt is coughing up yellow/beige mucus. Pt denies body aches fever and chills. Pt does have diarrhea.  Pt would like a rx for this called in.

## 2023-01-03 NOTE — TELEPHONE ENCOUNTER
----- Message from Anastasia Mendes MA sent at 1/3/2023  8:56 AM CST -----  Contact: [phone]    ----- Message -----  From: Corin Dominique  Sent: 1/3/2023   8:37 AM CST  To: [facility] Staff    Patient is calling for Medical Advice regarding: PATIENT IS CALLING TO F/U ON CALL WITH TRIAGE NURSE THAT SHE TESTED POSITIVE FOR COVID(SEE NOTES)Please call and advise.    How long has patient had these symptoms:    Pharmacy name and phone#:[id number] - [place] - [address] AT [facility] [address]   Phone:  [phone]  Fax:  [phone]

## 2023-01-04 ENCOUNTER — PATIENT MESSAGE (OUTPATIENT)
Dept: INTERNAL MEDICINE | Facility: CLINIC | Age: 68
End: 2023-01-04
Payer: MEDICARE

## 2023-01-04 ENCOUNTER — TELEPHONE (OUTPATIENT)
Dept: INTERNAL MEDICINE | Facility: CLINIC | Age: 68
End: 2023-01-04
Payer: MEDICARE

## 2023-01-04 NOTE — TELEPHONE ENCOUNTER
Spoke with pt informed of msg, pt still a bit apprehensive but will decide. Pt is scheduled next Wednesday.

## 2023-01-04 NOTE — TELEPHONE ENCOUNTER
Spoke with pt informed of prescription and OTC Imodium.  Pt stated that she looked up the medication prescribed molnupiravir and that she does not want to take it because it's an investigational medicine although it is FDA approved. Pt stated that she would prefer Dexamethasone 4 mg, pt is still experiencing Nasal congestion and cough. Requesting something to help with that too.    Please advise,  Thank You.

## 2023-01-04 NOTE — TELEPHONE ENCOUNTER
Spoke with pt, she scheduled an in office appt but want to know if you can recommend her taking anything over the counter to help until her appt with you on 01/11/2023, pt refused appt with another Physician.    Please advise,  Thank You.

## 2023-01-04 NOTE — TELEPHONE ENCOUNTER
----- Message from Desirae Wallace sent at 1/4/2023  9:25 AM CST -----  Contact: Pt Mobile [phone]  Patient said that you called her on yesterday and you spoke with her, patient said that you told her that you would call her back but you have never called her back and she would like for you to please.

## 2023-01-06 ENCOUNTER — NURSE TRIAGE (OUTPATIENT)
Dept: ADMINISTRATIVE | Facility: CLINIC | Age: 68
End: 2023-01-06
Payer: MEDICARE

## 2023-01-07 NOTE — TELEPHONE ENCOUNTER
Pt calling regarding cough for the past week after being dx w/ Covid. Advised, per protocol. Verbalizes understanding.     Reason for Disposition   [1] COVID-19 diagnosed by doctor (or NP/PA) AND [2] mild symptoms (e.g., cough, fever, others) AND [3] no complications or SOB    Additional Information   Negative: SEVERE difficulty breathing (e.g., struggling for each breath, speaks in single words)   Negative: Difficult to awaken or acting confused (e.g., disoriented, slurred speech)   Negative: Bluish (or gray) lips or face now   Negative: Shock suspected (e.g., cold/pale/clammy skin, too weak to stand, low BP, rapid pulse)   Negative: Sounds like a life-threatening emergency to the triager   Negative: SEVERE or constant chest pain or pressure  (Exception: Mild central chest pain, present only when coughing.)   Negative: MODERATE difficulty breathing (e.g., speaks in phrases, SOB even at rest, pulse 100-120)   Negative: [1] Headache AND [2] stiff neck (can't touch chin to chest)   Negative: Oxygen level (e.g., pulse oximetry) 90 percent or lower   Negative: Chest pain or pressure  (Exception: MILD central chest pain, present only when coughing)   Negative: Patient sounds very sick or weak to the triager   Negative: MILD difficulty breathing (e.g., minimal/no SOB at rest, SOB with walking, pulse <100)   Negative: Fever > 103 F (39.4 C)   Negative: [1] Fever > 101 F (38.3 C) AND [2] age > 60 years   Negative: [1] Fever > 100.0 F (37.8 C) AND [2] bedridden (e.g., CVA, chronic illness, recovering from surgery)   Negative: Oxygen level (e.g., pulse oximetry) 91 to 94 percent   Negative: [1] HIGH RISK for severe COVID complications (e.g., weak immune system, age > 64 years, obesity with BMI 30 or higher, pregnant, chronic lung disease or other chronic medical condition) AND [2] COVID symptoms (e.g., cough, fever)  (Exceptions: Already seen by PCP and no new or worsening symptoms.)   Negative: [1] HIGH RISK patient AND [2]  influenza is widespread in the community AND [3] ONE OR MORE respiratory symptoms: cough, sore throat, runny or stuffy nose   Negative: [1] HIGH RISK patient AND [2] influenza exposure within the last 7 days AND [3] ONE OR MORE respiratory symptoms: cough, sore throat, runny or stuffy nose   Negative: Fever present > 3 days (72 hours)   Negative: [1] Fever returns after gone for over 24 hours AND [2] symptoms worse or not improved   Negative: [1] Continuous (nonstop) coughing interferes with work or school AND [2] no improvement using cough treatment per Care Advice   Negative: [1] COVID-19 infection suspected by caller or triager AND [2] mild symptoms (cough, fever, or others) AND [3] negative COVID-19 rapid test   Negative: Cough present > 3 weeks    Protocols used: Coronavirus (COVID-19) Diagnosed or [id number]

## 2023-01-08 ENCOUNTER — NURSE TRIAGE (OUTPATIENT)
Dept: ADMINISTRATIVE | Facility: CLINIC | Age: 68
End: 2023-01-08
Payer: MEDICARE

## 2023-01-09 ENCOUNTER — TELEPHONE (OUTPATIENT)
Dept: FAMILY MEDICINE | Facility: CLINIC | Age: 68
End: 2023-01-09

## 2023-01-09 ENCOUNTER — HOSPITAL ENCOUNTER (OUTPATIENT)
Dept: RADIOLOGY | Facility: HOSPITAL | Age: 68
Discharge: HOME | End: 2023-01-09
Attending: NURSE PRACTITIONER
Payer: MEDICARE

## 2023-01-09 ENCOUNTER — OFFICE VISIT (OUTPATIENT)
Dept: FAMILY MEDICINE | Facility: CLINIC | Age: 68
End: 2023-01-09
Payer: MEDICARE

## 2023-01-09 DIAGNOSIS — U07.1 COVID-19: Primary | ICD-10-CM

## 2023-01-09 DIAGNOSIS — U07.1 COVID-19: ICD-10-CM

## 2023-01-09 PROCEDURE — 1160F RVW MEDS BY RX/DR IN RCRD: CPT | Mod: CPTII,95,, | Performed by: NURSE PRACTITIONER

## 2023-01-09 PROCEDURE — 1160F PR REVIEW ALL MEDS BY PRESCRIBER/CLIN PHARMACIST DOCUMENTED: ICD-10-PCS | Mod: CPTII,95,, | Performed by: NURSE PRACTITIONER

## 2023-01-09 PROCEDURE — 99214 PR OFFICE/OUTPT VISIT, EST, LEVL IV, 30-39 MIN: ICD-10-PCS | Mod: 95,,, | Performed by: NURSE PRACTITIONER

## 2023-01-09 PROCEDURE — 71046 X-RAY EXAM CHEST 2 VIEWS: CPT | Mod: 26,,, | Performed by: RADIOLOGY

## 2023-01-09 PROCEDURE — 71046 X-RAY EXAM CHEST 2 VIEWS: CPT | Mod: TC,FY,PO

## 2023-01-09 PROCEDURE — 99214 OFFICE O/P EST MOD 30 MIN: CPT | Mod: 95,,, | Performed by: NURSE PRACTITIONER

## 2023-01-09 PROCEDURE — 71046 XR CHEST PA AND LATERAL: ICD-10-PCS | Mod: 26,,, | Performed by: RADIOLOGY

## 2023-01-09 PROCEDURE — 1159F MED LIST DOCD IN RCRD: CPT | Mod: CPTII,95,, | Performed by: NURSE PRACTITIONER

## 2023-01-09 PROCEDURE — 1159F PR MEDICATION LIST DOCUMENTED IN MEDICAL RECORD: ICD-10-PCS | Mod: CPTII,95,, | Performed by: NURSE PRACTITIONER

## 2023-01-09 NOTE — TELEPHONE ENCOUNTER
Reason for Disposition   [1] PERSISTING SYMPTOMS OF COVID-19 AND [2] symptoms WORSE    Additional Information   Negative: SEVERE difficulty breathing (e.g., struggling for each breath, speaks in single words)   Negative: [1] SEVERE weakness (e.g., can't stand or can barely walk) AND [2] new-onset or WORSE   Negative: Bluish (or gray) lips or face now   Negative: Difficult to awaken or acting confused (e.g., disoriented, slurred speech)   Negative: Sounds like a life-threatening emergency to the triager   Negative: [1] Typical COVID-19 symptoms AND [2] lasting less than 3 weeks   Negative: [1] Chest pain, pressure, or tightness AND [2] new-onset or worsening   Negative: [1] Fever AND [2] new-onset or worsening   Negative: [1] MODERATE difficulty breathing (e.g., speaks in phrases, SOB even at rest, pulse 100-120) AND [2] new-onset or WORSE   Negative: [1] MODERATE difficulty breathing AND [2] oxygen level (e.g., pulse oximetry) 91 to 94 percent   Negative: Oxygen level (e.g., pulse oximetry) 90 percent or lower   Negative: MODERATE difficulty breathing (e.g., speaks in phrases, SOB even at rest, pulse 100-120)   Negative: [1] Drinking very little AND [2] dehydration suspected (e.g., no urine > 12 hours, very dry mouth, very lightheaded)   Negative: Patient sounds very sick or weak to the triager   Negative: [1] MILD difficulty breathing (e.g., minimal/no SOB at rest, SOB with walking, pulse <100) AND [2] new-onset   Negative: Oxygen level (e.g., pulse oximetry) 91 to 94 percent   Negative: [1] PERSISTING SYMPTOMS OF COVID-19 AND [2] NEW symptom AND [3] could be serious   Negative: [1] Caller has URGENT question AND [2] triager unable to answer question    Protocols used: Coronavirus (COVID-19) Persisting Symptoms Follow-up Call-A-  pt called re tested pos last Sunday 1/1 for covid. Pt reports cough congestion. coughing up yellow mucous. pt states sx exposed to covid the week of Christmas. on coriciden and Mucinex  DM. T99.7. pt on home o2 and hx copd. Pt reports using nebs thru day to q 3 -4 hours. denies SOB. pt having small amt of diarrhea - took pepto. Rec to see dr within 24 hours. Pt requested virtual visit as she doesn't have a ride to the clinic. Appt set at 0730 1/9. ER warnings given.

## 2023-01-09 NOTE — PROGRESS NOTES
Subjective:       Patient ID: Ana Aguila is a 67 y.o. female.    Chief Complaint: COVID-19 Concerns (+ covid 19 on 1/1/2023 - remains with cough, chest congestion and low-grade fever)      COVID 19 Viral Infection  Reports POSITIVE at home covid test on 1/1/2023  Chest congestion, cough with productive sputum, low-grade fever 99.7 past 2 days  + COPD with home oxygen 2 LPM nasal canula  Using Mucinex with some relief  Her PCP prescribed her Molnupiravir for covid but patient reports she did NOT take medication due to being an investigative medication.  Symptoms are slowly improving  No fever today temp 97.9  Lives by [facility] - will send for chest xray today - I cc'ed results to PCP and Pulmonologist Dr. Dill  States she has appointment with her PCP Dr. Gallardo on Wednesday 1/11/2023    Cough  This is a new problem. The current episode started in the past 7 days. The problem has been waxing and waning. The problem occurs hourly. The cough is Productive of sputum. Associated symptoms include a fever and nasal congestion. Pertinent negatives include no chest pain, chills, ear congestion, ear pain, headaches, heartburn, hemoptysis, myalgias, postnasal drip, rash, rhinorrhea, sore throat, shortness of breath, sweats, weight loss or wheezing. Nothing aggravates the symptoms. Risk factors for lung disease include smoking/tobacco exposure. She has tried OTC cough suppressant, body position changes, ipratropium inhaler, oral steroids and rest for the symptoms. The treatment provided mild relief. Her past medical history is significant for COPD and pneumonia. There is no history of asthma, bronchiectasis, bronchitis or environmental allergies.   URI   This is a new problem. The current episode started in the past 7 days (+ for COVID 19 on 1/1/2023). The problem has been gradually improving. Fever duration: 99.7 past 2 days. Associated symptoms include congestion and coughing. Pertinent negatives include no chest  pain, ear pain, headaches, rash, rhinorrhea, sore throat or wheezing. Treatments tried: Mucinex and Tylenol; COPD medications. The treatment provided mild relief.     The patient location is: [place], [place]  The chief complaint leading to consultation is: covid 19, cough    Visit type: audiovisual    Face to Face time with patient: 19  25 minutes of total time spent on the encounter, which includes face to face time and non-face to face time preparing to see the patient (eg, review of tests), Obtaining and/or reviewing separately obtained history, Documenting clinical information in the electronic or other health record, Independently interpreting results (not separately reported) and communicating results to the patient/family/caregiver, or Care coordination (not separately reported).     Each patient to whom he or she provides medical services by telemedicine is:  (1) informed of the relationship between the physician and patient and the respective role of any other health care provider with respect to management of the patient; and (2) notified that he or she may decline to receive medical services by telemedicine and may withdraw from such care at any time.      Review of Systems   Constitutional:  Positive for fever. Negative for chills and weight loss.   HENT:  Positive for congestion. Negative for ear pain, postnasal drip, rhinorrhea and sore throat.    Respiratory:  Positive for cough. Negative for hemoptysis, chest tightness, shortness of breath and wheezing.    Cardiovascular:  Negative for chest pain.   Gastrointestinal:  Negative for heartburn.   Musculoskeletal:  Negative for myalgias.   Skin:  Negative for rash.   Allergic/Immunologic: Negative for environmental allergies.   Neurological:  Negative for headaches.       Objective:     There were no vitals filed for this visit.       Physical Exam  Constitutional:       General: She is not in acute distress.     Appearance: Normal appearance. She is  not toxic-appearing or diaphoretic.   HENT:      Head: Normocephalic and atraumatic.   Eyes:      Extraocular Movements: Extraocular movements intact.      Conjunctiva/sclera: Conjunctivae normal.   Pulmonary:      Effort: Pulmonary effort is normal. No respiratory distress.      Comments: Respirations are unlabored. + hacking cough noted during interview.  Home oxygen in use - states normally on 2 LPM n/c but state she did bump it up to 3 LPM  Neurological:      Mental Status: She is alert and oriented to person, place, and time.   Psychiatric:         Mood and Affect: Mood normal.         Behavior: Behavior normal.         Thought Content: Thought content normal.         Judgment: Judgment normal.         Assessment:         ICD-10-CM ICD-9-CM   1. COVID-19  U07.1 079.89       Plan:       COVID-19  CXR today at [facility] to evaluate for secondary pneumonia - results cc'ed to PCP and Pulmonologist.  Keep appt with PCP on Wednesday 1/11/23  Go to ER for SOB, worsening of symptoms.  Will call with CXR results.  -     X-Ray Chest PA And Lateral; Future; Expected date: 01/09/2023      Follow up for CXR today to evaluate for pneumonia; keep appt with PCP on Wednesday; go to ER for SOB.     Patient's Medications   New Prescriptions    No medications on file   Previous Medications    ACETAMINOPHEN (TYLENOL) 500 MG TABLET    Take 250 mg by mouth every 6 (six) hours as needed for Pain.    ALBUTEROL-IPRATROPIUM (DUO-NEB) 2.5 MG-0.5 MG/3 ML NEBULIZER SOLUTION    TAKE 3 ML BY NEBULIZER EVERY 6 HOURS AS NEEDED FOR WHEEZING OR SHORTNESS OF BREATH    CYANOCOBALAMIN (VITAMIN B-12) 100 MCG TABLET    Take 100 mcg by mouth once daily.    ERGOCALCIFEROL (ERGOCALCIFEROL) 50,000 UNIT CAP    TAKE 1 CAPSULE BY MOUTH EVERY 7 DAYS    FLUTICASONE-SALMETEROL DISKUS INHALER 250-50 MCG    INHALE 1 PUFF BY MOUTH EVERY 12 HOURS AS NEEDED    HYDROCHLOROTHIAZIDE (HYDRODIURIL) 25 MG TABLET    Take 1 tablet (25 mg total) by mouth once daily.     HYDROXYZINE HCL (ATARAX) 10 MG TAB    Take 1 tablet (10 mg total) by mouth 3 (three) times daily as needed (anxiety).    IPRATROPIUM (ATROVENT) 0.02 % NEBULIZER SOLUTION    Take 500 mcg by nebulization 4 (four) times daily. Rescue    LOSARTAN (COZAAR) 25 MG TABLET    Take 1 tablet (25 mg total) by mouth once daily.    METOPROLOL SUCCINATE (TOPROL-XL) 100 MG 24 HR TABLET    Take 1 tablet (100 mg total) by mouth once daily.    PROAIR HFA 90 MCG/ACTUATION INHALER    INHALE 2 PUFFS FOUR TIMES DAILY AS NEEDED FOR COPD    ROSUVASTATIN (CRESTOR) 10 MG TABLET    Take 1 tablet (10 mg total) by mouth once daily.   Modified Medications    No medications on file   Discontinued Medications    ASPIRIN (ECOTRIN) 81 MG EC TABLET    Take 81 mg by mouth once daily.    AZITHROMYCIN (ZITHROMAX Z-CHRISTOPHER) 250 MG TABLET    2 tabs now then I daily till gone    PREDNISONE (DELTASONE) 10 MG TABLET    20mg daily time 7 days, 10mg daily x 7 days-stop       Past Medical History:   Diagnosis Date    Addiction to drug     Anxiety     Aortic calcification 1/11/2018    Chronic diarrhea     Chronic obstructive pulmonary disease 8/27/2013    Chronic respiratory failure with hypoxia, on home O2 therapy 1/11/2018    Colon polyps 2010    COPD (chronic obstructive pulmonary disease)     Coronary artery calcification seen on CT scan 12/4/2020    Depression     Essential hypertension 8/12/2013    Former smoker 10/18/2016    Hepatomegaly 12/21/2015    Hyperlipidemia     Hypertension     MI (myocardial infarction)     Microscopic hematuria 1/13/2017    Panic disorder     Perianal abscess 6/1/2018    Reflux 2013    S/P right hemicolectomy 9/7/2017    Performed in 2011 after perforation during colonoscopy    Sleep difficulties     Takotsubo cardiomyopathy 10/18/2016    Noted on echo 10/2016, recovered on repeat echo 12/2016       Past Surgical History:   Procedure Laterality Date    ABDOMINAL SURGERY      APPENDECTOMY      CHOLECYSTECTOMY  2013    open     COLON SURGERY  2011    secondary to perforation after c-scope    COLONOSCOPY      CORONARY ANGIOGRAPHY N/A 2022    Procedure: ANGIOGRAM, CORONARY ARTERY;  Surgeon: Kylee Carreon MD;  Location: [facility] CATH LAB/EP;  Service: Cardiology;  Laterality: N/A;    FRACTURE SURGERY Left 1999    HERNIA REPAIR      HIATAL HERNIA REPAIR  2013    HYSTERECTOMY  1986    LEFT HEART CATHETERIZATION N/A 2022    Procedure: Left heart cath;  Surgeon: Kylee Carreon MD;  Location: [facility] CATH LAB/EP;  Service: Cardiology;  Laterality: N/A;    Left leg surgery         Family History   Problem Relation Age of Onset    Cancer Mother             Hypertension Mother     Coronary artery disease Father     Retinal detachment Father     Hypertension Sister     Hypertension Brother     Cancer Daughter         breast cancer - double mastectomy    Abnormal EKG Daughter     Breast cancer Daughter 43        negative genetic testing, unilateral breast ca    Cataracts Maternal Grandmother     Breast cancer Maternal Grandmother         unk age of onset    Breast cancer Other 44        thinks negative genetic testing, bilat ca    Amblyopia Neg Hx     Blindness Neg Hx     Diabetes Neg Hx     Glaucoma Neg Hx     Macular degeneration Neg Hx     Strabismus Neg Hx     Stroke Neg Hx     Thyroid disease Neg Hx     Colon cancer Neg Hx     Ovarian cancer Neg Hx        Social History     Socioeconomic History    Marital status: Single   Tobacco Use    Smoking status: Former     Packs/day: 1.50     Years: 30.00     Pack years: 45.00     Types: Cigarettes     Start date: 1970     Quit date: 1997     Years since quittin.0    Smokeless tobacco: Never   Substance and Sexual Activity    Alcohol use: No    Drug use: No    Sexual activity: Not Currently     Partners: Male     Birth control/protection: Abstinence   Social History Narrative    Lives alone, brother lives in [place]. Brothers and sisters also living elsewhere. Children and  grandchildren live in [place].     Lives in 2nd-story apartment.    Works as a  with [employer].     Social Determinants of Health     Financial Resource Strain: Low Risk     Difficulty of Paying Living Expenses: Not hard at all   Food Insecurity: No Food Insecurity    Worried About Running Out of Food in the Last Year: Never true    Ran Out of Food in the Last Year: Never true   Transportation Needs: No Transportation Needs    Lack of Transportation (Medical): No    Lack of Transportation (Non-Medical): No   Physical Activity: Insufficiently Active    Days of Exercise per Week: 1 day    Minutes of Exercise per Session: 10 min   Stress: No Stress Concern Present    Feeling of Stress : Not at all   Social Connections: Unknown    Frequency of Communication with Friends and Family: More than three times a week    Frequency of Social Gatherings with Friends and Family: Once a week    Attends Jewish Services: 1 to 4 times per year    Active Member of Clubs or Organizations: No    Attends Club or Organization Meetings: Never    Marital Status: Patient refused   Housing Stability: Low Risk     Unable to Pay for Housing in the Last Year: No    Number of Places Lived in the Last Year: 1    Unstable Housing in the Last Year: No

## 2023-01-09 NOTE — TELEPHONE ENCOUNTER
Advised patient that chest xray showed no pneumonia and no acute abnormalities.  Advised to keep appt with Dr. Gallardo on Wednesday 1/11/23 to re-evaluate/treat.  Patient verbalizes understanding.

## 2023-01-11 ENCOUNTER — HOSPITAL ENCOUNTER (OUTPATIENT)
Dept: RADIOLOGY | Facility: HOSPITAL | Age: 68
Discharge: HOME | End: 2023-01-11
Attending: INTERNAL MEDICINE
Payer: MEDICARE

## 2023-01-11 ENCOUNTER — OFFICE VISIT (OUTPATIENT)
Dept: INTERNAL MEDICINE | Facility: CLINIC | Age: 68
End: 2023-01-11
Payer: MEDICARE

## 2023-01-11 ENCOUNTER — TELEPHONE (OUTPATIENT)
Dept: INTERNAL MEDICINE | Facility: CLINIC | Age: 68
End: 2023-01-11

## 2023-01-11 VITALS
HEIGHT: 64 IN | TEMPERATURE: 99 F | SYSTOLIC BLOOD PRESSURE: 122 MMHG | BODY MASS INDEX: 28.76 KG/M2 | HEART RATE: 88 BPM | DIASTOLIC BLOOD PRESSURE: 68 MMHG | OXYGEN SATURATION: 97 % | WEIGHT: 168.44 LBS

## 2023-01-11 DIAGNOSIS — J44.9 CHRONIC OBSTRUCTIVE PULMONARY DISEASE, UNSPECIFIED COPD TYPE: ICD-10-CM

## 2023-01-11 DIAGNOSIS — I10 ESSENTIAL HYPERTENSION: ICD-10-CM

## 2023-01-11 DIAGNOSIS — R05.8 COUGH WITH SPUTUM: ICD-10-CM

## 2023-01-11 DIAGNOSIS — R05.8 COUGH WITH SPUTUM: Primary | ICD-10-CM

## 2023-01-11 DIAGNOSIS — R73.03 PREDIABETES: ICD-10-CM

## 2023-01-11 PROCEDURE — 3288F PR FALLS RISK ASSESSMENT DOCUMENTED: ICD-10-PCS | Mod: CPTII,S$GLB,, | Performed by: INTERNAL MEDICINE

## 2023-01-11 PROCEDURE — 3078F PR MOST RECENT DIASTOLIC BLOOD PRESSURE < 80 MM HG: ICD-10-PCS | Mod: CPTII,S$GLB,, | Performed by: INTERNAL MEDICINE

## 2023-01-11 PROCEDURE — 1101F PR PT FALLS ASSESS DOC 0-1 FALLS W/OUT INJ PAST YR: ICD-10-PCS | Mod: CPTII,S$GLB,, | Performed by: INTERNAL MEDICINE

## 2023-01-11 PROCEDURE — 99214 PR OFFICE/OUTPT VISIT, EST, LEVL IV, 30-39 MIN: ICD-10-PCS | Mod: S$GLB,,, | Performed by: INTERNAL MEDICINE

## 2023-01-11 PROCEDURE — 71046 XR CHEST PA AND LATERAL: ICD-10-PCS | Mod: 26,,, | Performed by: RADIOLOGY

## 2023-01-11 PROCEDURE — 1159F MED LIST DOCD IN RCRD: CPT | Mod: CPTII,S$GLB,, | Performed by: INTERNAL MEDICINE

## 2023-01-11 PROCEDURE — 71046 X-RAY EXAM CHEST 2 VIEWS: CPT | Mod: TC

## 2023-01-11 PROCEDURE — 1101F PT FALLS ASSESS-DOCD LE1/YR: CPT | Mod: CPTII,S$GLB,, | Performed by: INTERNAL MEDICINE

## 2023-01-11 PROCEDURE — 87804 INFLUENZA ASSAY W/OPTIC: CPT | Mod: 59,QW,S$GLB, | Performed by: INTERNAL MEDICINE

## 2023-01-11 PROCEDURE — 1160F RVW MEDS BY RX/DR IN RCRD: CPT | Mod: CPTII,S$GLB,, | Performed by: INTERNAL MEDICINE

## 2023-01-11 PROCEDURE — 99999 PR PBB SHADOW E&M-EST. PATIENT-LVL V: CPT | Mod: PBBFAC,,, | Performed by: INTERNAL MEDICINE

## 2023-01-11 PROCEDURE — 4010F PR ACE/ARB THEARPY RXD/TAKEN: ICD-10-PCS | Mod: CPTII,S$GLB,, | Performed by: INTERNAL MEDICINE

## 2023-01-11 PROCEDURE — 3288F FALL RISK ASSESSMENT DOCD: CPT | Mod: CPTII,S$GLB,, | Performed by: INTERNAL MEDICINE

## 2023-01-11 PROCEDURE — 87635 SARS-COV-2 COVID-19 AMP PRB: CPT | Mod: QW,S$GLB,, | Performed by: INTERNAL MEDICINE

## 2023-01-11 PROCEDURE — 71046 X-RAY EXAM CHEST 2 VIEWS: CPT | Mod: 26,,, | Performed by: RADIOLOGY

## 2023-01-11 PROCEDURE — 1125F PR PAIN SEVERITY QUANTIFIED, PAIN PRESENT: ICD-10-PCS | Mod: CPTII,S$GLB,, | Performed by: INTERNAL MEDICINE

## 2023-01-11 PROCEDURE — 1160F PR REVIEW ALL MEDS BY PRESCRIBER/CLIN PHARMACIST DOCUMENTED: ICD-10-PCS | Mod: CPTII,S$GLB,, | Performed by: INTERNAL MEDICINE

## 2023-01-11 PROCEDURE — 99999 PR PBB SHADOW E&M-EST. PATIENT-LVL V: ICD-10-PCS | Mod: PBBFAC,,, | Performed by: INTERNAL MEDICINE

## 2023-01-11 PROCEDURE — 3078F DIAST BP <80 MM HG: CPT | Mod: CPTII,S$GLB,, | Performed by: INTERNAL MEDICINE

## 2023-01-11 PROCEDURE — 99214 OFFICE O/P EST MOD 30 MIN: CPT | Mod: S$GLB,,, | Performed by: INTERNAL MEDICINE

## 2023-01-11 PROCEDURE — 87635: ICD-10-PCS | Mod: QW,S$GLB,, | Performed by: INTERNAL MEDICINE

## 2023-01-11 PROCEDURE — 3008F BODY MASS INDEX DOCD: CPT | Mod: CPTII,S$GLB,, | Performed by: INTERNAL MEDICINE

## 2023-01-11 PROCEDURE — 3074F PR MOST RECENT SYSTOLIC BLOOD PRESSURE < 130 MM HG: ICD-10-PCS | Mod: CPTII,S$GLB,, | Performed by: INTERNAL MEDICINE

## 2023-01-11 PROCEDURE — 1125F AMNT PAIN NOTED PAIN PRSNT: CPT | Mod: CPTII,S$GLB,, | Performed by: INTERNAL MEDICINE

## 2023-01-11 PROCEDURE — 1159F PR MEDICATION LIST DOCUMENTED IN MEDICAL RECORD: ICD-10-PCS | Mod: CPTII,S$GLB,, | Performed by: INTERNAL MEDICINE

## 2023-01-11 PROCEDURE — 4010F ACE/ARB THERAPY RXD/TAKEN: CPT | Mod: CPTII,S$GLB,, | Performed by: INTERNAL MEDICINE

## 2023-01-11 PROCEDURE — 3074F SYST BP LT 130 MM HG: CPT | Mod: CPTII,S$GLB,, | Performed by: INTERNAL MEDICINE

## 2023-01-11 PROCEDURE — 87804 POCT INFLUENZA A/B: ICD-10-PCS | Mod: 59,QW,S$GLB, | Performed by: INTERNAL MEDICINE

## 2023-01-11 PROCEDURE — 3008F PR BODY MASS INDEX (BMI) DOCUMENTED: ICD-10-PCS | Mod: CPTII,S$GLB,, | Performed by: INTERNAL MEDICINE

## 2023-01-11 RX ORDER — AZITHROMYCIN 250 MG/1
TABLET, FILM COATED ORAL
Qty: 6 TABLET | Refills: 0 | Status: SHIPPED | OUTPATIENT
Start: 2023-01-11 | End: 2023-01-16

## 2023-01-11 NOTE — TELEPHONE ENCOUNTER
Spoke with pt informed that x-ray was requested due to phlegm and nothing was heard when checking lungs. Also informed that the Covid and Flu test came back negative. Pt verbalized understanding and had no other questions.

## 2023-01-11 NOTE — PROGRESS NOTES
CC:  Cough with sputum production     HPI:  The patient is a 67-year-old female with COPD, hypertension, hyperlipidemia, reflux, right hemicolectomy secondary to GI bleed from a polypectomy and recent diagnosis of COVID-19 presents today with complaints of cough with sputum production.  Symptoms started on January 1st.  The patient had cough, chest congestion runny nose.  He did a home test which came back positive. Molnupiravir was called in; but, the patient never took it.  The patient was seen in urgent care by virtual visit.  A chest x-ray was ordered.  The results were negative.  The patient reports she feels about 65% better today but still has a cough with yellow sputum production.      ROS:  Patient reports no fever chills.  No sore throat.  She did have 1 episode of diarrhea.  The patient increased her oxygen to 2.5 L from 2 L. saturations been in the 94+ range.    Physical exam:   General appearance:  No acute distress   HEENT: Conjunctiva is clear.  Pupils equal.  TMs are clear.  Nasal septum is midline with a clear nasal discharge.  Oropharynx is without erythema.  Trachea is midline.    Pulmonary:  Good inspiratory, expiratory breath sounds are heard.  Her lungs sound clear to auscultation.  The patient did have a loose productive cough.  Sputum was yellow.  GI: Abdomen was nontender.  He had normal bowel sounds.   Cardiovascular:  S1-S2, rhythm is regular.    Assessment:  1. Cough with sputum production the patient positive COVID test at home   2. COPD  3.  Hypertension  4.  Hyperlipidemia  5.  Prediabetes     Plan:  1. Will repeat a COVID and flu test  2. Will schedule a chest x-ray for today   3. Will:  Azithromycin  4.  The patient has a follow-up pending test results.

## 2023-01-12 ENCOUNTER — PATIENT MESSAGE (OUTPATIENT)
Dept: INTERNAL MEDICINE | Facility: CLINIC | Age: 68
End: 2023-01-12
Payer: MEDICARE

## 2023-01-12 ENCOUNTER — TELEPHONE (OUTPATIENT)
Dept: INTERNAL MEDICINE | Facility: CLINIC | Age: 68
End: 2023-01-12
Payer: MEDICARE

## 2023-01-12 NOTE — TELEPHONE ENCOUNTER
----- Message from Zhou Gallardo MD sent at 1/12/2023  3:44 PM CST -----  Her chest x-ray did not show any sign of infection. Please see if she is improving.

## 2023-01-17 ENCOUNTER — TELEPHONE (OUTPATIENT)
Dept: INTERNAL MEDICINE | Facility: CLINIC | Age: 68
End: 2023-01-17
Payer: MEDICARE

## 2023-01-17 NOTE — TELEPHONE ENCOUNTER
Spoke with pt, she stated that she finished the z-kermit but still experiencing coughing and phlegm.   Pt want to know if you can prescribe her a steroid like dexamethasone or betamethasone 4 mg to take BID for a few days. Pt stated that she had this medication before by Dr. Zamarripa.    Please advise,  Thank You.

## 2023-01-17 NOTE — TELEPHONE ENCOUNTER
----- Message from Rina Du sent at 1/17/2023 10:46 AM CST -----  Contact: self [phone]  Medication follow up, regarding the medication  azithromycin (Z-CHRISTOPHER) 250 MG tablet she was given, was told to call back, please call her back and advise.    Thank you

## 2023-01-18 ENCOUNTER — PES CALL (OUTPATIENT)
Dept: ADMINISTRATIVE | Facility: CLINIC | Age: 68
End: 2023-01-18
Payer: MEDICARE

## 2023-01-19 ENCOUNTER — TELEPHONE (OUTPATIENT)
Dept: INTERNAL MEDICINE | Facility: CLINIC | Age: 68
End: 2023-01-19
Payer: MEDICARE

## 2023-01-19 NOTE — TELEPHONE ENCOUNTER
----- Message from Caryl Huang sent at 1/19/2023  3:34 PM CST -----  Contact: DONG DUBON [[id number]]@ [phone]  Waiting on a f/u up call from Tuesday from the nurse and a question she's presenting to  Dr Gallardo for her. .

## 2023-01-19 NOTE — TELEPHONE ENCOUNTER
Second request:  Pt update is that she is still feeling the same and requesting a prescription for DexaMETHasone 4 mg BID for 5 days that she received from Dr. Zamarripa before for this same condition. Pt stated that this helped her before and she would like for you to prescribe it again and send to her local pharmacy.    Please advise,  Thank You.

## 2023-01-20 ENCOUNTER — HOSPITAL ENCOUNTER (INPATIENT)
Facility: HOSPITAL | Age: 68
LOS: 11 days | Discharge: NURSING, REHAB OR LONG-TERM CARE | DRG: 208 | End: 2023-01-31
Attending: EMERGENCY MEDICINE | Admitting: INTERNAL MEDICINE
Payer: MEDICARE

## 2023-01-20 DIAGNOSIS — J96.00 ACUTE RESPIRATORY FAILURE: ICD-10-CM

## 2023-01-20 DIAGNOSIS — E78.2 MIXED HYPERLIPIDEMIA: ICD-10-CM

## 2023-01-20 DIAGNOSIS — J96.21 ACUTE ON CHRONIC RESPIRATORY FAILURE WITH HYPOXIA AND HYPERCAPNIA: ICD-10-CM

## 2023-01-20 DIAGNOSIS — J96.02 ACUTE RESPIRATORY FAILURE WITH HYPERCAPNIA: Primary | ICD-10-CM

## 2023-01-20 DIAGNOSIS — J44.1 COPD WITH ACUTE EXACERBATION: ICD-10-CM

## 2023-01-20 DIAGNOSIS — R06.02 SOB (SHORTNESS OF BREATH): ICD-10-CM

## 2023-01-20 DIAGNOSIS — J96.22 ACUTE ON CHRONIC RESPIRATORY FAILURE WITH HYPOXIA AND HYPERCAPNIA: ICD-10-CM

## 2023-01-20 DIAGNOSIS — Z01.818 ENCOUNTER FOR INTUBATION: ICD-10-CM

## 2023-01-20 DIAGNOSIS — R79.89 ELEVATED TROPONIN: ICD-10-CM

## 2023-01-20 DIAGNOSIS — I10 ESSENTIAL HYPERTENSION: ICD-10-CM

## 2023-01-20 DIAGNOSIS — I49.8 SINUS ARRHYTHMIA: ICD-10-CM

## 2023-01-20 DIAGNOSIS — J93.9 PNEUMOTHORAX, UNSPECIFIED TYPE: ICD-10-CM

## 2023-01-20 DIAGNOSIS — J96.90 RESPIRATORY FAILURE REQUIRING INTUBATION: ICD-10-CM

## 2023-01-20 DIAGNOSIS — J93.11 PRIMARY SPONTANEOUS PNEUMOTHORAX: ICD-10-CM

## 2023-01-20 DIAGNOSIS — R09.02 HYPOXIA: ICD-10-CM

## 2023-01-20 DIAGNOSIS — J93.9 PNEUMOTHORAX ON RIGHT: ICD-10-CM

## 2023-01-20 DIAGNOSIS — R94.31 PROLONGED QT INTERVAL: ICD-10-CM

## 2023-01-20 PROBLEM — K21.9 GERD (GASTROESOPHAGEAL REFLUX DISEASE): Status: ACTIVE | Noted: 2023-01-20

## 2023-01-20 LAB
ALBUMIN SERPL BCP-MCNC: 3.1 G/DL (ref 3.5–5.2)
ALLENS TEST: ABNORMAL
ALLENS TEST: ABNORMAL
ALP SERPL-CCNC: 59 U/L (ref 55–135)
ALT SERPL W/O P-5'-P-CCNC: 23 U/L (ref 10–44)
ANION GAP SERPL CALC-SCNC: 11 MMOL/L (ref 8–16)
ANISOCYTOSIS BLD QL SMEAR: SLIGHT
APTT BLDCRRT: 29.1 SEC (ref 21–32)
AST SERPL-CCNC: 32 U/L (ref 10–40)
BACTERIA #/AREA URNS HPF: NORMAL /HPF
BASOPHILS NFR BLD: 1 % (ref 0–1.9)
BILIRUB SERPL-MCNC: 0.2 MG/DL (ref 0.1–1)
BILIRUB UR QL STRIP: NEGATIVE
BNP SERPL-MCNC: 93 PG/ML (ref 0–99)
BUN SERPL-MCNC: 17 MG/DL (ref 8–23)
CALCIUM SERPL-MCNC: 8 MG/DL (ref 8.7–10.5)
CHLORIDE SERPL-SCNC: 100 MMOL/L (ref 95–110)
CLARITY UR: CLEAR
CO2 SERPL-SCNC: 28 MMOL/L (ref 23–29)
COLOR UR: COLORLESS
CREAT SERPL-MCNC: 1 MG/DL (ref 0.5–1.4)
CRP SERPL-MCNC: 2.4 MG/L (ref 0–8.2)
DELSYS: ABNORMAL
DELSYS: ABNORMAL
DIFFERENTIAL METHOD: ABNORMAL
EOSINOPHIL NFR BLD: 1 % (ref 0–8)
ERYTHROCYTE [DISTWIDTH] IN BLOOD BY AUTOMATED COUNT: 13.7 % (ref 11.5–14.5)
ERYTHROCYTE [SEDIMENTATION RATE] IN BLOOD BY PHOTOMETRIC METHOD: 46 MM/HR (ref 0–36)
ERYTHROCYTE [SEDIMENTATION RATE] IN BLOOD BY WESTERGREN METHOD: 22 MM/H
ERYTHROCYTE [SEDIMENTATION RATE] IN BLOOD BY WESTERGREN METHOD: 24 MM/H
EST. GFR  (NO RACE VARIABLE): >60 ML/MIN/1.73 M^2
FIO2: 100
FIO2: 80
GLUCOSE SERPL-MCNC: 287 MG/DL (ref 70–110)
GLUCOSE UR QL STRIP: ABNORMAL
HCO3 UR-SCNC: 29.5 MMOL/L (ref 24–28)
HCO3 UR-SCNC: 33.4 MMOL/L (ref 24–28)
HCT VFR BLD AUTO: 37 % (ref 37–48.5)
HGB BLD-MCNC: 11.5 G/DL (ref 12–16)
HGB UR QL STRIP: ABNORMAL
HYALINE CASTS #/AREA URNS LPF: 0 /LPF
IMM GRANULOCYTES # BLD AUTO: ABNORMAL K/UL (ref 0–0.04)
IMM GRANULOCYTES NFR BLD AUTO: ABNORMAL % (ref 0–0.5)
INFLUENZA A, MOLECULAR: NEGATIVE
INFLUENZA A, MOLECULAR: NOT DETECTED
INFLUENZA B, MOLECULAR: NEGATIVE
INFLUENZA B, MOLECULAR: NOT DETECTED
INR PPP: 1 (ref 0.8–1.2)
KETONES UR QL STRIP: NEGATIVE
LACTATE SERPL-SCNC: 1.6 MMOL/L (ref 0.5–2.2)
LEUKOCYTE ESTERASE UR QL STRIP: NEGATIVE
LIPASE SERPL-CCNC: 57 U/L (ref 4–60)
LYMPHOCYTES NFR BLD: 0 % (ref 18–48)
MAGNESIUM SERPL-MCNC: 2.6 MG/DL (ref 1.6–2.6)
MCH RBC QN AUTO: 29.4 PG (ref 27–31)
MCHC RBC AUTO-ENTMCNC: 31.1 G/DL (ref 32–36)
MCV RBC AUTO: 95 FL (ref 82–98)
MICROSCOPIC COMMENT: NORMAL
MIN VOL: 8.2
MODE: ABNORMAL
MODE: ABNORMAL
MONOCYTES NFR BLD: 0 % (ref 4–15)
NEUTROPHILS NFR BLD: 98 % (ref 38–73)
NITRITE UR QL STRIP: NEGATIVE
NRBC BLD-RTO: 0 /100 WBC
PCO2 BLDA: 54.2 MMHG (ref 35–45)
PCO2 BLDA: 98.7 MMHG (ref 35–45)
PEEP: 5
PEEP: 5
PH SMN: 7.14 [PH] (ref 7.35–7.45)
PH SMN: 7.34 [PH] (ref 7.35–7.45)
PH UR STRIP: 8 [PH] (ref 5–8)
PHOSPHATE SERPL-MCNC: 6.7 MG/DL (ref 2.7–4.5)
PIP: 29
PLATELET # BLD AUTO: 402 K/UL (ref 150–450)
PLATELET BLD QL SMEAR: ABNORMAL
PMV BLD AUTO: 9.7 FL (ref 9.2–12.9)
PO2 BLDA: 100 MMHG (ref 80–100)
PO2 BLDA: 169 MMHG (ref 80–100)
POC BE: 4 MMOL/L
POC BE: 4 MMOL/L
POC SATURATED O2: 97 % (ref 95–100)
POC SATURATED O2: 99 % (ref 95–100)
POC TCO2: 31 MMOL/L (ref 23–27)
POC TCO2: 36 MMOL/L (ref 23–27)
POLYCHROMASIA BLD QL SMEAR: ABNORMAL
POTASSIUM SERPL-SCNC: 3.4 MMOL/L (ref 3.5–5.1)
PROCALCITONIN SERPL IA-MCNC: 0.07 NG/ML
PROT SERPL-MCNC: 6.4 G/DL (ref 6–8.4)
PROT UR QL STRIP: ABNORMAL
PROTHROMBIN TIME: 10.6 SEC (ref 9–12.5)
RBC # BLD AUTO: 3.91 M/UL (ref 4–5.4)
RBC #/AREA URNS HPF: 3 /HPF (ref 0–4)
RSV AG BY MOLECULAR METHOD: NOT DETECTED
SAMPLE: ABNORMAL
SAMPLE: ABNORMAL
SARS-COV-2 RNA RESP QL NAA+PROBE: NOT DETECTED
SITE: ABNORMAL
SITE: ABNORMAL
SODIUM SERPL-SCNC: 139 MMOL/L (ref 136–145)
SP GR UR STRIP: 1.01 (ref 1–1.03)
SP02: 100
SPECIMEN SOURCE: NORMAL
SQUAMOUS #/AREA URNS HPF: 0 /HPF
TROPONIN I SERPL DL<=0.01 NG/ML-MCNC: 0.15 NG/ML (ref 0–0.03)
TROPONIN I SERPL DL<=0.01 NG/ML-MCNC: 0.49 NG/ML (ref 0–0.03)
TROPONIN I SERPL DL<=0.01 NG/ML-MCNC: 0.64 NG/ML (ref 0–0.03)
URN SPEC COLLECT METH UR: ABNORMAL
UROBILINOGEN UR STRIP-ACNC: NEGATIVE EU/DL
VT: 350
VT: 350
WBC # BLD AUTO: 25.62 K/UL (ref 3.9–12.7)
WBC #/AREA URNS HPF: 0 /HPF (ref 0–5)

## 2023-01-20 PROCEDURE — 25000003 PHARM REV CODE 250: Performed by: INTERNAL MEDICINE

## 2023-01-20 PROCEDURE — 87502 INFLUENZA DNA AMP PROBE: CPT | Performed by: EMERGENCY MEDICINE

## 2023-01-20 PROCEDURE — 80053 COMPREHEN METABOLIC PANEL: CPT | Performed by: EMERGENCY MEDICINE

## 2023-01-20 PROCEDURE — 99900035 HC TECH TIME PER 15 MIN (STAT)

## 2023-01-20 PROCEDURE — 82803 BLOOD GASES ANY COMBINATION: CPT

## 2023-01-20 PROCEDURE — 93010 EKG 12-LEAD: ICD-10-PCS | Mod: ,,, | Performed by: INTERNAL MEDICINE

## 2023-01-20 PROCEDURE — 32551 INSERTION OF CHEST TUBE: CPT

## 2023-01-20 PROCEDURE — 84484 ASSAY OF TROPONIN QUANT: CPT | Mod: 91 | Performed by: EMERGENCY MEDICINE

## 2023-01-20 PROCEDURE — 94660 CPAP INITIATION&MGMT: CPT | Mod: XB

## 2023-01-20 PROCEDURE — 85730 THROMBOPLASTIN TIME PARTIAL: CPT | Performed by: EMERGENCY MEDICINE

## 2023-01-20 PROCEDURE — 84145 PROCALCITONIN (PCT): CPT | Performed by: EMERGENCY MEDICINE

## 2023-01-20 PROCEDURE — 27000190 HC CPAP FULL FACE MASK W/VALVE

## 2023-01-20 PROCEDURE — 94002 VENT MGMT INPAT INIT DAY: CPT

## 2023-01-20 PROCEDURE — 86140 C-REACTIVE PROTEIN: CPT | Performed by: EMERGENCY MEDICINE

## 2023-01-20 PROCEDURE — 85610 PROTHROMBIN TIME: CPT | Performed by: EMERGENCY MEDICINE

## 2023-01-20 PROCEDURE — 20000000 HC ICU ROOM

## 2023-01-20 PROCEDURE — 81000 URINALYSIS NONAUTO W/SCOPE: CPT | Performed by: EMERGENCY MEDICINE

## 2023-01-20 PROCEDURE — 83690 ASSAY OF LIPASE: CPT | Performed by: EMERGENCY MEDICINE

## 2023-01-20 PROCEDURE — 99291 CRITICAL CARE FIRST HOUR: CPT | Mod: 25

## 2023-01-20 PROCEDURE — 63600175 PHARM REV CODE 636 W HCPCS: Performed by: STUDENT IN AN ORGANIZED HEALTH CARE EDUCATION/TRAINING PROGRAM

## 2023-01-20 PROCEDURE — 51702 INSERT TEMP BLADDER CATH: CPT

## 2023-01-20 PROCEDURE — 25000003 PHARM REV CODE 250: Performed by: STUDENT IN AN ORGANIZED HEALTH CARE EDUCATION/TRAINING PROGRAM

## 2023-01-20 PROCEDURE — 93005 ELECTROCARDIOGRAM TRACING: CPT

## 2023-01-20 PROCEDURE — 87040 BLOOD CULTURE FOR BACTERIA: CPT | Performed by: EMERGENCY MEDICINE

## 2023-01-20 PROCEDURE — 31500 INSERT EMERGENCY AIRWAY: CPT

## 2023-01-20 PROCEDURE — 63600175 PHARM REV CODE 636 W HCPCS: Performed by: INTERNAL MEDICINE

## 2023-01-20 PROCEDURE — 93010 ELECTROCARDIOGRAM REPORT: CPT | Mod: ,,, | Performed by: INTERNAL MEDICINE

## 2023-01-20 PROCEDURE — 25000003 PHARM REV CODE 250: Performed by: EMERGENCY MEDICINE

## 2023-01-20 PROCEDURE — 85652 RBC SED RATE AUTOMATED: CPT | Performed by: EMERGENCY MEDICINE

## 2023-01-20 PROCEDURE — 83880 ASSAY OF NATRIURETIC PEPTIDE: CPT | Performed by: EMERGENCY MEDICINE

## 2023-01-20 PROCEDURE — 93010 ELECTROCARDIOGRAM REPORT: CPT | Mod: 76,,, | Performed by: INTERNAL MEDICINE

## 2023-01-20 PROCEDURE — 85007 BL SMEAR W/DIFF WBC COUNT: CPT | Performed by: EMERGENCY MEDICINE

## 2023-01-20 PROCEDURE — 84484 ASSAY OF TROPONIN QUANT: CPT | Mod: 91 | Performed by: INTERNAL MEDICINE

## 2023-01-20 PROCEDURE — 84100 ASSAY OF PHOSPHORUS: CPT | Performed by: EMERGENCY MEDICINE

## 2023-01-20 PROCEDURE — 63600175 PHARM REV CODE 636 W HCPCS: Mod: TB,JG | Performed by: STUDENT IN AN ORGANIZED HEALTH CARE EDUCATION/TRAINING PROGRAM

## 2023-01-20 PROCEDURE — 25000242 PHARM REV CODE 250 ALT 637 W/ HCPCS: Performed by: INTERNAL MEDICINE

## 2023-01-20 PROCEDURE — 94640 AIRWAY INHALATION TREATMENT: CPT

## 2023-01-20 PROCEDURE — 36415 COLL VENOUS BLD VENIPUNCTURE: CPT | Performed by: STUDENT IN AN ORGANIZED HEALTH CARE EDUCATION/TRAINING PROGRAM

## 2023-01-20 PROCEDURE — 25000242 PHARM REV CODE 250 ALT 637 W/ HCPCS: Performed by: EMERGENCY MEDICINE

## 2023-01-20 PROCEDURE — 25000242 PHARM REV CODE 250 ALT 637 W/ HCPCS: Performed by: STUDENT IN AN ORGANIZED HEALTH CARE EDUCATION/TRAINING PROGRAM

## 2023-01-20 PROCEDURE — 94761 N-INVAS EAR/PLS OXIMETRY MLT: CPT

## 2023-01-20 PROCEDURE — 84484 ASSAY OF TROPONIN QUANT: CPT | Performed by: STUDENT IN AN ORGANIZED HEALTH CARE EDUCATION/TRAINING PROGRAM

## 2023-01-20 PROCEDURE — 63600175 PHARM REV CODE 636 W HCPCS: Performed by: EMERGENCY MEDICINE

## 2023-01-20 PROCEDURE — 0241U SARS-COV2 (COVID) WITH FLU/RSV BY PCR: CPT | Performed by: STUDENT IN AN ORGANIZED HEALTH CARE EDUCATION/TRAINING PROGRAM

## 2023-01-20 PROCEDURE — 85027 COMPLETE CBC AUTOMATED: CPT | Performed by: EMERGENCY MEDICINE

## 2023-01-20 PROCEDURE — 83605 ASSAY OF LACTIC ACID: CPT | Performed by: EMERGENCY MEDICINE

## 2023-01-20 PROCEDURE — 83735 ASSAY OF MAGNESIUM: CPT | Performed by: EMERGENCY MEDICINE

## 2023-01-20 PROCEDURE — 36600 WITHDRAWAL OF ARTERIAL BLOOD: CPT

## 2023-01-20 RX ORDER — IPRATROPIUM BROMIDE AND ALBUTEROL SULFATE 2.5; .5 MG/3ML; MG/3ML
3 SOLUTION RESPIRATORY (INHALATION) EVERY 4 HOURS
Status: DISCONTINUED | OUTPATIENT
Start: 2023-01-20 | End: 2023-01-26

## 2023-01-20 RX ORDER — PROPOFOL 10 MG/ML
20 INJECTION, EMULSION INTRAVENOUS
Status: COMPLETED | OUTPATIENT
Start: 2023-01-20 | End: 2023-01-20

## 2023-01-20 RX ORDER — MAGNESIUM SULFATE HEPTAHYDRATE 40 MG/ML
2 INJECTION, SOLUTION INTRAVENOUS ONCE
Status: DISCONTINUED | OUTPATIENT
Start: 2023-01-20 | End: 2023-01-20

## 2023-01-20 RX ORDER — ENOXAPARIN SODIUM 100 MG/ML
40 INJECTION SUBCUTANEOUS EVERY 24 HOURS
Status: DISCONTINUED | OUTPATIENT
Start: 2023-01-20 | End: 2023-01-31 | Stop reason: HOSPADM

## 2023-01-20 RX ORDER — FENTANYL CITRATE-0.9 % NACL/PF 10 MCG/ML
0-250 PLASTIC BAG, INJECTION (ML) INTRAVENOUS CONTINUOUS
Status: DISCONTINUED | OUTPATIENT
Start: 2023-01-20 | End: 2023-01-20

## 2023-01-20 RX ORDER — DEXMEDETOMIDINE HYDROCHLORIDE 4 UG/ML
0-1.4 INJECTION, SOLUTION INTRAVENOUS CONTINUOUS
Status: DISCONTINUED | OUTPATIENT
Start: 2023-01-20 | End: 2023-01-25

## 2023-01-20 RX ORDER — IPRATROPIUM BROMIDE AND ALBUTEROL SULFATE 2.5; .5 MG/3ML; MG/3ML
3 SOLUTION RESPIRATORY (INHALATION)
Status: COMPLETED | OUTPATIENT
Start: 2023-01-20 | End: 2023-01-20

## 2023-01-20 RX ORDER — MAGNESIUM SULFATE HEPTAHYDRATE 40 MG/ML
2 INJECTION, SOLUTION INTRAVENOUS ONCE
Status: COMPLETED | OUTPATIENT
Start: 2023-01-20 | End: 2023-01-20

## 2023-01-20 RX ORDER — FAMOTIDINE 10 MG/ML
20 INJECTION INTRAVENOUS EVERY 12 HOURS
Status: DISCONTINUED | OUTPATIENT
Start: 2023-01-20 | End: 2023-01-25

## 2023-01-20 RX ORDER — CEFEPIME HYDROCHLORIDE 1 G/50ML
2 INJECTION, SOLUTION INTRAVENOUS
Status: DISCONTINUED | OUTPATIENT
Start: 2023-01-20 | End: 2023-01-20

## 2023-01-20 RX ORDER — METOPROLOL SUCCINATE 25 MG/1
25 TABLET, EXTENDED RELEASE ORAL
Status: ON HOLD | COMMUNITY
Start: 2022-11-03 | End: 2023-01-20 | Stop reason: DRUGHIGH

## 2023-01-20 RX ORDER — LEVALBUTEROL INHALATION SOLUTION 0.63 MG/3ML
0.63 SOLUTION RESPIRATORY (INHALATION)
Status: COMPLETED | OUTPATIENT
Start: 2023-01-20 | End: 2023-01-20

## 2023-01-20 RX ORDER — GLUCAGON 1 MG
1 KIT INJECTION
Status: DISCONTINUED | OUTPATIENT
Start: 2023-01-20 | End: 2023-01-26

## 2023-01-20 RX ORDER — DEXMEDETOMIDINE HYDROCHLORIDE 4 UG/ML
INJECTION, SOLUTION INTRAVENOUS
Status: DISPENSED
Start: 2023-01-20 | End: 2023-01-21

## 2023-01-20 RX ORDER — SODIUM CHLORIDE 0.9 % (FLUSH) 0.9 %
10 SYRINGE (ML) INJECTION
Status: DISCONTINUED | OUTPATIENT
Start: 2023-01-20 | End: 2023-01-31 | Stop reason: HOSPADM

## 2023-01-20 RX ORDER — MUPIROCIN 20 MG/G
OINTMENT TOPICAL 2 TIMES DAILY
Status: DISPENSED | OUTPATIENT
Start: 2023-01-20 | End: 2023-01-25

## 2023-01-20 RX ADMIN — IPRATROPIUM BROMIDE AND ALBUTEROL SULFATE 3 ML: 2.5; .5 SOLUTION RESPIRATORY (INHALATION) at 03:01

## 2023-01-20 RX ADMIN — IPRATROPIUM BROMIDE AND ALBUTEROL SULFATE 3 ML: .5; 3 SOLUTION RESPIRATORY (INHALATION) at 09:01

## 2023-01-20 RX ADMIN — IPRATROPIUM BROMIDE AND ALBUTEROL SULFATE 3 ML: 2.5; .5 SOLUTION RESPIRATORY (INHALATION) at 11:01

## 2023-01-20 RX ADMIN — DEXMEDETOMIDINE HYDROCHLORIDE 0.5 MCG/KG/HR: 4 INJECTION, SOLUTION INTRAVENOUS at 03:01

## 2023-01-20 RX ADMIN — SODIUM CHLORIDE 1000 ML: 0.9 INJECTION, SOLUTION INTRAVENOUS at 05:01

## 2023-01-20 RX ADMIN — DEXMEDETOMIDINE HYDROCHLORIDE 0.3 MCG/KG/HR: 4 INJECTION, SOLUTION INTRAVENOUS at 08:01

## 2023-01-20 RX ADMIN — LEVALBUTEROL HYDROCHLORIDE 0.63 MG: 0.63 SOLUTION RESPIRATORY (INHALATION) at 05:01

## 2023-01-20 RX ADMIN — Medication 75 MCG/HR: at 06:01

## 2023-01-20 RX ADMIN — AZITHROMYCIN MONOHYDRATE 500 MG: 500 INJECTION, POWDER, LYOPHILIZED, FOR SOLUTION INTRAVENOUS at 07:01

## 2023-01-20 RX ADMIN — IPRATROPIUM BROMIDE AND ALBUTEROL SULFATE 3 ML: .5; 3 SOLUTION RESPIRATORY (INHALATION) at 07:01

## 2023-01-20 RX ADMIN — ENOXAPARIN SODIUM 40 MG: 40 INJECTION SUBCUTANEOUS at 04:01

## 2023-01-20 RX ADMIN — IPRATROPIUM BROMIDE AND ALBUTEROL SULFATE 3 ML: .5; 2.5 SOLUTION RESPIRATORY (INHALATION) at 04:01

## 2023-01-20 RX ADMIN — METHYLPREDNISOLONE SODIUM SUCCINATE 60 MG: 40 INJECTION, POWDER, FOR SOLUTION INTRAMUSCULAR; INTRAVENOUS at 07:01

## 2023-01-20 RX ADMIN — CEFEPIME HYDROCHLORIDE 2 G: 2 INJECTION, SOLUTION INTRAVENOUS at 10:01

## 2023-01-20 RX ADMIN — CEFTRIAXONE 1 G: 1 INJECTION, POWDER, FOR SOLUTION INTRAMUSCULAR; INTRAVENOUS at 05:01

## 2023-01-20 RX ADMIN — PROPOFOL 20 MCG/KG/MIN: 10 INJECTION, EMULSION INTRAVENOUS at 05:01

## 2023-01-20 RX ADMIN — MUPIROCIN: 20 OINTMENT TOPICAL at 09:01

## 2023-01-20 RX ADMIN — FAMOTIDINE 20 MG: 10 INJECTION, SOLUTION INTRAVENOUS at 09:01

## 2023-01-20 RX ADMIN — MAGNESIUM SULFATE 2 G: 2 INJECTION INTRAVENOUS at 05:01

## 2023-01-20 RX ADMIN — LEVALBUTEROL HYDROCHLORIDE 0.63 MG: 0.63 SOLUTION RESPIRATORY (INHALATION) at 04:01

## 2023-01-20 RX ADMIN — CEFEPIME 2 G: 2 INJECTION, POWDER, FOR SOLUTION INTRAVENOUS at 09:01

## 2023-01-20 RX ADMIN — IPRATROPIUM BROMIDE AND ALBUTEROL SULFATE 3 ML: 2.5; .5 SOLUTION RESPIRATORY (INHALATION) at 08:01

## 2023-01-20 NOTE — CONSULTS
[facility] Initial Consultation Note    Reason for Consultation:  Abnormal EKG.    HPI:  67 y.o. with known COPD who presents with respiratory distress unabated with home nebulizer treatments is admitted to [facility] for evaluation.  Initial EKG showed some minor ST segment elevation with minor KS segment depression in a pattern that is not suggestive of ACS.  BNP is negative for heart failure.  Initial troponin is weakly elevated.    Past Medical History:   Diagnosis Date    Addiction to drug     Anxiety     Aortic calcification 1/11/2018    Chronic diarrhea     Chronic obstructive pulmonary disease 8/27/2013    Chronic respiratory failure with hypoxia, on home O2 therapy 1/11/2018    Colon polyps 2010    COPD (chronic obstructive pulmonary disease)     Coronary artery calcification seen on CT scan 12/4/2020    Depression     Essential hypertension 8/12/2013    Former smoker 10/18/2016    Hepatomegaly 12/21/2015    Hyperlipidemia     Hypertension     MI (myocardial infarction)     Microscopic hematuria 1/13/2017    Panic disorder     Perianal abscess 6/1/2018    Reflux 2013    S/P right hemicolectomy 9/7/2017    Performed in 2011 after perforation during colonoscopy    Sleep difficulties     Takotsubo cardiomyopathy 10/18/2016    Noted on echo 10/2016, recovered on repeat echo 12/2016     Past Surgical History:   Procedure Laterality Date    ABDOMINAL SURGERY      APPENDECTOMY      CHOLECYSTECTOMY  2013    open    COLON SURGERY  2011    secondary to perforation after c-scope    COLONOSCOPY      CORONARY ANGIOGRAPHY N/A 7/26/2022    Procedure: ANGIOGRAM, CORONARY ARTERY;  Surgeon: Kylee Carreon MD;  Location: [facility] CATH LAB/EP;  Service: Cardiology;  Laterality: N/A;    FRACTURE SURGERY Left 1999    HERNIA REPAIR      HIATAL HERNIA REPAIR  2013    HYSTERECTOMY  1986    LEFT HEART CATHETERIZATION N/A 7/26/2022    Procedure: Left heart cath;  Surgeon: Kylee Carreon MD;  Location: [facility] CATH LAB/EP;  Service:  Cardiology;  Laterality: N/A;    Left leg surgery       Review of patient's allergies indicates:   Allergen Reactions    Ace inhibitors      cough    Coreg [carvedilol]      Headache     Pseudoephedrine hcl Nausea And Vomiting, Other (See Comments) and Anxiety     JITTERY     Social History     Socioeconomic History    Marital status: Single   Tobacco Use    Smoking status: Former     Packs/day: 1.50     Years: 30.00     Pack years: 45.00     Types: Cigarettes     Start date: 1970     Quit date: 1997     Years since quittin.0    Smokeless tobacco: Never   Substance and Sexual Activity    Alcohol use: No    Drug use: No    Sexual activity: Not Currently     Partners: Male     Birth control/protection: Abstinence   Social History Narrative    Lives alone, brother lives in [place]. Brothers and sisters also living elsewhere. Children and grandchildren live in [place].     Lives in [facility] apartment.    Works as a  with [email or web address].     Social Determinants of Health     Financial Resource Strain: Low Risk     Difficulty of Paying Living Expenses: Not hard at all   Food Insecurity: No Food Insecurity    Worried About Running Out of Food in the Last Year: Never true    Ran Out of Food in the Last Year: Never true   Transportation Needs: No Transportation Needs    Lack of Transportation (Medical): No    Lack of Transportation (Non-Medical): No   Physical Activity: Insufficiently Active    Days of Exercise per Week: 1 day    Minutes of Exercise per Session: 10 min   Stress: No Stress Concern Present    Feeling of Stress : Not at all   Social Connections: Unknown    Frequency of Communication with Friends and Family: More than three times a week    Frequency of Social Gatherings with Friends and Family: Once a week    Attends Sikh Services: 1 to 4 times per year    Active Member of Clubs or Organizations: No    Attends Club or Organization Meetings: Never    Marital Status: Patient  refused   Housing Stability: Low Risk     Unable to Pay for Housing in the Last Year: No    Number of Places Lived in the Last Year: 1    Unstable Housing in the Last Year: No     Family History   Problem Relation Age of Onset    Cancer Mother             Hypertension Mother     Coronary artery disease Father     Retinal detachment Father     Hypertension Sister     Hypertension Brother     Cancer Daughter         breast cancer - double mastectomy    Abnormal EKG Daughter     Breast cancer Daughter 43        negative genetic testing, unilateral breast ca    Cataracts Maternal Grandmother     Breast cancer Maternal Grandmother         unk age of onset    Breast cancer Other 44        thinks negative genetic testing, bilat ca    Amblyopia Neg Hx     Blindness Neg Hx     Diabetes Neg Hx     Glaucoma Neg Hx     Macular degeneration Neg Hx     Strabismus Neg Hx     Stroke Neg Hx     Thyroid disease Neg Hx     Colon cancer Neg Hx     Ovarian cancer Neg Hx            Current Hospital Medications  Prior to Admission medications    Medication Sig Start Date End Date Taking? Authorizing Provider   acetaminophen (TYLENOL) 500 MG tablet Take 250 mg by mouth every 6 (six) hours as needed for Pain.    Historical Provider   albuterol-ipratropium (DUO-NEB) 2.5 mg-0.5 mg/3 mL nebulizer solution TAKE 3 ML BY NEBULIZER EVERY 6 HOURS AS NEEDED FOR WHEEZING OR SHORTNESS OF BREATH 22   Zhou Gallardo MD   cyanocobalamin (VITAMIN B-12) 100 MCG tablet Take 100 mcg by mouth once daily.    Historical Provider   ergocalciferol (ERGOCALCIFEROL) 50,000 unit Cap TAKE 1 CAPSULE BY MOUTH EVERY 7 DAYS 22   Zhou Gallardo MD   fluticasone-salmeterol diskus inhaler 250-50 mcg INHALE 1 PUFF BY MOUTH EVERY 12 HOURS AS NEEDED 22   Zhou Gallardo MD   hydroCHLOROthiazide (HYDRODIURIL) 25 MG tablet Take 1 tablet (25 mg total) by mouth once daily. 11/15/22   Ferny Gaytan MD   hydrOXYzine HCL (ATARAX) 10 MG Tab Take 1 tablet  (10 mg total) by mouth 3 (three) times daily as needed (anxiety). 9/9/22   Zhou Gallardo MD   ipratropium (ATROVENT) 0.02 % nebulizer solution Take 500 mcg by nebulization 4 (four) times daily. Rescue    Historical Provider   losartan (COZAAR) 25 MG tablet Take 1 tablet (25 mg total) by mouth once daily. 7/28/22 7/28/23  Casper Phillips MD   metoprolol succinate (TOPROL-XL) 100 MG 24 hr tablet Take 1 tablet (100 mg total) by mouth once daily. 7/28/22 7/28/23  Casper Phillips MD   PROAIR HFA 90 mcg/actuation inhaler INHALE 2 PUFFS FOUR TIMES DAILY AS NEEDED FOR COPD 5/20/22   Zhou Gallardo MD   rosuvastatin (CRESTOR) 10 MG tablet Take 1 tablet (10 mg total) by mouth once daily. 5/20/22   Zhou Gallardo MD   amLODIPine (NORVASC) 2.5 MG tablet Take 1 tablet (2.5 mg total) by mouth once daily. 5/20/22 7/27/22  Zhou Gallardo MD       Scheduled Meds:   albuterol-ipratropium  3 mL Nebulization Q1H    azithromycin  500 mg Intravenous Once    [START ON 1/21/2023] azithromycin  500 mg Intravenous Q24H    ceFEPime (MAXIPIME) IVPB  2 g Intravenous Q8H    enoxaparin  40 mg Subcutaneous Daily    famotidine (PF)  20 mg Intravenous Q12H    methylPREDNISolone sodium succinate injection  60 mg Intravenous Q6H    mupirocin   Nasal BID     Continuous Infusions:   fentanyl 75 mcg/hr (01/20/23 0623)     PRN: sodium chloride 0.9%    VITAL SIGNS  BP (!) 89/67   Pulse 84   Temp 97.3 °F (36.3 °C) (Rectal)   Resp (!) 24   Wt 76.4 kg (168 lb 6.9 oz)   LMP  (LMP Unknown)   SpO2 100%   BMI 28.91 kg/m²     Intake/Output Summary (Last 24 hours) at 1/20/2023 0842  Last data filed at 1/20/2023 0654  Gross per 24 hour   Intake 1062.67 ml   Output --   Net 1062.67 ml       OBJECTIVE    Physical Exam  Temp:  [97.3 °F (36.3 °C)] 97.3 °F (36.3 °C)  Pulse:  [] 84  Resp:  [24-26] 24  SpO2:  [94 %-100 %] 100 %  BP: ()/() 89/67    Gen: Patient awake and alert, in NAD  Eyes: Pupils equal and round.  Sclerae anicteric,  noninjected conjunctivae.  HENT: NC/AT, nasal septum midline, MMM, OP clear and without exudates.  CV: Regular rhythm.  Normal S1, S2.  No murmurs, rub or gallop.  JVP normal.  Chest:  Symmetric chest wall expansion.  Good air movement.  No wheezes or crackles.  Abd:  Soft, Non-tender.  Non distended.  Normoactive bowel sounds, no rebound  Ext: +2 radial pulses, no C/C/E, warm to touch  Skin: intact, no lesions or rashes noted.  No decubitus ulcer.  Neuro:  Moves all extremities grossly.  Tongue midline.  Psych: Appropriate affect    Lab Results  Recent Labs   Lab 01/20/23  0556      K 3.4*      CO2 28   BUN 17   CREATININE 1.0   MG 2.6     Recent Labs   Lab 01/20/23  0556   TROPONINI 0.147*     Recent Labs   Lab 01/20/23  0556   WBC 25.62*   RBC 3.91*   HGB 11.5*   HCT 37.0      MCV 95   MCH 29.4   MCHC 31.1*     Recent Labs   Lab 01/20/23  0556   INR 1.0   APTT 29.1       EKG 1/20/23:         ASSESSMENT/PLAN    1.  COPD Exacerbation:  Patient is responding to initial therapy for known obstructive lung disease.    2.  Abnormal EKG:  EKG is not suggestive of STEMI.  There is tachycardia, likely reflecting acute respiratory distress +/- albuterol use.  Pericarditis is much lower on the list but cannot be entirely excluded.    --  Low pH may be the underlying metabolic reason for the EKG abnormality seen.    3.  Elevated troponin:  Patient with pH of 7.1 on ABG and tachycardia on presentation both of which likely resulted in the lab abnormality.  On prior chest CT scans, I see no coronary calcium making a cardiac etiology for elevation of troponin very unlikely.  Prior echo in August 2022 showed normal LV/RV function and there is no current suspicion for active heart failure.  --  No need to perform another echocardiogram at this time.      Please reach back out to us with any future cardiac concerns.   Dex Hopper MD  [facility]

## 2023-01-20 NOTE — ED NOTES
Report given to Melissa in ICU. Resp. Paged for transport assistance. Pt. Remains atable, awaiting transport to ICU.

## 2023-01-20 NOTE — CONSULTS
[facility]/Critical Consult Note      Patient: Ana Aguila  Age:67 y.o.   Sex: female   MRN:[id number]  Admit date:1/20/2023    Attending Physician: Keren   LOS:0 day(s)     SUBJECTIVE:    Result for Consult:   Mechanical Ventilation     History of Present Illness:  67F PMHx significant for COPD with chronic hypoxic respiratory failure on 2.5L home O2, HTN, HLD, Takasubo CM (resolved) presented to ED in respiratory distress. EMS called for respiratory distress. Reported hypoxemia on arrival patient started on supplemental oxygen without improvement and eventually escalated to BiPAP also without improvement. Intubated in the ED. CXR showed large right side pneumothorax. Pleuravent placed in the ED with repeat CXR showing significant improvement in pneumo. Admitted to the ICU.    Past Medical History:  COPD with chronic hypoxic respiratory failure   Former smoker  Takotsubo (resolved)  HLD  HTN  Mood disorder     Past Surgical History:  Hyterectomy  Appendectomy  Cholecystectomy  Hemicolectomy   Hiatal hernia repair    Allergies:  Review of patient's allergies indicates:   Allergen Reactions    Ace inhibitors      cough    Coreg [carvedilol]      Headache     Pseudoephedrine hcl Nausea And Vomiting, Other (See Comments) and Anxiety     JITTERY       Medications:  Prior to Admission medications    Medication Sig   acetaminophen (TYLENOL) 500 MG tablet Take 250 mg by mouth every 6 (six) hours as needed for Pain.   albuterol-ipratropium (DUO-NEB) 2.5 mg-0.5 mg/3 mL nebulizer solution TAKE 3 ML BY NEBULIZER EVERY 6 HOURS AS NEEDED FOR WHEEZING OR SHORTNESS OF BREATH   cyanocobalamin (VITAMIN B-12) 100 MCG tablet Take 100 mcg by mouth once daily.   ergocalciferol (ERGOCALCIFEROL) 50,000 unit Cap TAKE 1 CAPSULE BY MOUTH EVERY 7 DAYS   fluticasone-salmeterol diskus inhaler 250-50 mcg INHALE 1 PUFF BY MOUTH EVERY 12 HOURS AS NEEDED   hydroCHLOROthiazide (HYDRODIURIL) 25 MG tablet Take 1 tablet (25 mg total) by mouth  once daily.   hydrOXYzine HCL (ATARAX) 10 MG Tab Take 1 tablet (10 mg total) by mouth 3 (three) times daily as needed (anxiety).   ipratropium (ATROVENT) 0.02 % nebulizer solution Take 500 mcg by nebulization 4 (four) times daily. Rescue   losartan (COZAAR) 25 MG tablet Take 1 tablet (25 mg total) by mouth once daily.   metoprolol succinate (TOPROL-XL) 100 MG 24 hr tablet Take 1 tablet (100 mg total) by mouth once daily.   PROAIR HFA 90 mcg/actuation inhaler INHALE 2 PUFFS FOUR TIMES DAILY AS NEEDED FOR COPD   rosuvastatin (CRESTOR) 10 MG tablet Take 1 tablet (10 mg total) by mouth once daily.   amLODIPine (NORVASC) 2.5 MG tablet Take 1 tablet (2.5 mg total) by mouth once daily.     Unable to confirm which medications patient is actually taking.     Family History:  Non-pertinent.     Social History:  Tobacco Use    Smoking status: Former     Packs/day: 1.50     Years: 30.00     Pack years: 45.00     Types: Cigarettes     Start date: 1970     Quit date: 1997     Years since quittin.0    Smokeless tobacco: Never   Substance Use Topics    Alcohol use: No    Drug use: No     OBJECTIVE DATA:      Vital Signs (last 24h)   Temp:  [97.3 °F (36.3 °C)] 97.3 °F (36.3 °C)  Pulse:  [] 93  Resp:  [24-26] 24  SpO2:  [94 %-100 %] 95 %  BP: (120-131)/() 120/80        Intake/Output Summary (Last 24 hours) at 2023 0710  Last data filed at 2023 0654  Gross per 24 hour   Intake 1062.67 ml   Output --   Net 1062.67 ml       Physical Examination:   Vitals: /80 (BP Location: Left arm, Patient Position: Lying)   Pulse 93   Temp 97.3 °F (36.3 °C) (Rectal)   Resp (!) 24   Wt 76.4 kg (168 lb 6.9 oz)   LMP  (LMP Unknown)   SpO2 95%   BMI 28.91 kg/m²      General: RASS -2, awakens, follow commands to move extremities.   Head: Skull is normocephalic and symmetric. Symmetric facial features.   Eyes: Eyelids & skin overlying globe are intact. Sclera is white and not injected. PERRL.   Mouth: Et tube  in place.   Neck: Supple. Full ROM. No obvious enlargements or masses .    Cardiovascular: RRR. Audible S1/S2 without rubs, murmurs and gallops.  No edema on foot, ankle or leg bilaterally.   Pulmonary: No signs of increased respiratory effort. Breath sounds heard bilaterally without wheezes, rhonchi or crackles.  Abdomen: Active bowel sounds. Abdomen is soft and non-distended. No tenderness to palpation.   Extremities: All 4 extremities are warm with palpable pulses. Moving all 4 extremities voluntarily.       Laboratory:  CBC:   Recent Labs   Lab 01/20/23 0556   WBC 25.62*   HGB 11.5*      MCV 95   RDW 13.7     Coags:   Recent Labs   Lab 01/20/23 0556   INR 1.0     CMP:   Recent Labs   Lab 01/20/23 0556      K 3.4*      CO2 28   *   BUN 17   CREATININE 1.0   CALCIUM 8.0*   MG 2.6   PHOS 6.7*     Estimated Creatinine Clearance: 53.9 mL/min (based on SCr of 1 mg/dL).  LFTs:   Recent Labs   Lab 01/20/23 0556   PROT 6.4   ALBUMIN 3.1*   ALKPHOS 59   AST 32   ALT 23     Cardiac Data:    Recent Labs   Lab 01/20/23 0556   TROPONINI 0.147*   BNP 93         Radiology Results:   All images from this admission reviewed.     CXR 5:20AM 1/20: right side pneumothorax without midline shift.          CXR 6:09AM 1/20 s/p pleuravent.         EKG on admission 1/20 4:45AM (my interpretation): Sinus tachycardia ,  narrow QRS, normal intervals (Qtc 472), TWI in V1, V2      EKG 1/20 7:40AM: NSR, prolonged Qtc 520, TWI in V1, V2 resolved.         Microbiology Results:   Procedure Component Value Units Date/Time    Influenza A & B by Molecular [[id number]] Collected: 01/20/23 0535    Order Status: Completed Specimen: Nasopharyngeal Swab Updated: 01/20/23 0612     Influenza A, Molecular Negative     Influenza B, Molecular Negative     Flu A & B Source Nasal swab    Blood culture x two cultures. Draw prior to antibiotics. [[id number]] Collected: 01/20/23 0556    Order Status: Sent Specimen: Blood Updated:  01/20/23 0557    Blood culture x two cultures. Draw prior to antibiotics. [[id number]] Collected: 01/20/23 0556    Order Status: Sent Specimen: Blood from Antecubital, Right Hand Updated: 01/20/23 0557         Antibiotics and Day Number of Therapy:   Start     Stop Route Frequency Ordered    01/21/23 0600  azithromycin 500 mg in dextrose 5 % 250 mL IVPB (ready to mix system)         -- IV Every 24 hours (non-standard times) 01/20/23 0642 01/20/23 1000  cefepime in dextrose 5 % IVPB 2 g         -- IV Every 8 hours (non-standard times) 01/20/23 0642         Current Medications:     Continuous infusions:   fentanyl 75 mcg/hr (01/20/23 0623)        Scheduled:   albuterol-ipratropium  3 mL Nebulization Q1H    azithromycin  500 mg Intravenous Once    [START ON 1/21/2023] azithromycin  500 mg Intravenous Q24H    ceFEPime (MAXIPIME) IVPB  2 g Intravenous Q8H    enoxaparin  40 mg Subcutaneous Daily    famotidine (PF)  20 mg Intravenous Q12H    magnesium sulfate IVPB  2 g Intravenous Once    methylPREDNISolone sodium succinate injection  60 mg Intravenous Q6H    mupirocin   Nasal BID        PRN:  sodium chloride 0.9%      Assessment/Plan:     Ana Aguila is a 67 y.o. female with a PMHx significant for COPD with chronic hypoxic respiratory failure on 2.5L home O2, HTN, HLD, Takasubo CM (resolved) presented to ED in respiratory distress. Admitted to the ICU for mechanical ventitlation.      Neuro  - Exam: Awakes and moves extremities.   - RASS -2 on fentanyl 75mcg/h  - CT head: none this admission.      Cardiovascular  # Mildly Elevated Troponin   # Prolonged Qtc  # HTN  # HLD  - EKG on admission Sinus tachycardia ,  narrow QRS, normal intervals (Qtc 472), TWI in V1, V2. Repeat this AM NSR, prolonged Qtc 520, TWI in V1, V2 resolved.   - Troponin 0.147 --> 0.488  - Most recent TTE 8/2022 normal systolic fx 65%, indeterminate diastolic function and normal RV function. [facility] 7/2022 no significant CAD.   - Qtc prolonged  since admission. Patient on azithro but first dose obtained after repeat EKG with Qtc prolongation. Patient received 2g IV mag in between EKGs.   - Lactate and BNP wnl.   PLAN  - Trend troponin  - Avoid Qtc prolonging drugs. Switched from azithro to doxy.      Respiratory  # Acute Hypercapnic Respiratory Failure  # Pneumothorax  # COPD with Acute on Chronic Hypoxic Respiratory Failure on 2.5L home O2  - EMS called for respiratory distress. Reported hypoxemia on arrival patient started on supplemental oxygen without improvement and eventually escalated to BiPAP also without improvement. Intubated in the ED. ABG 15mins after intubation 7.14  pco2 99  po2 169. CXR showed large right side pneumothorax. Pleuravent placed in the ED with repeat CXR showing significant improvement in pneumo. Repeat ABG this AM significantly improved on 7.34  pco2 54.   - Treating for COPD exacerbation: duonebs, iv steroids.     Renal   - Cr stable and at baseline ~0.9  -  since admission.   - Electrolytes OK. K 3.4. Replete PRN.     GI  # Right hemicolectomy 2/2 GIB   - Diet: NPO (intubated)  - Last bowel movement: unclear  - Bowel regimen: n/a     Heme  - On admission, H/H 11.5  37.0 with MCV 95 & RDW 14. Baseline Hgb appears to be ~ 12.  - Plt 402    Infectious Disease  # Leukocytosis   - Afebrile with WBC 26k.   - CXR without consolidations or infiltrates.   - Procal wnl.  - On CAP coverage with cefepime and doxy.     Endocrine  - A1c 5.2%  serum gluc 287 on admission.   - Serum glucose goal of 140-180.     Analgesia: Fentanyl 75mcg/h  Thrombo PPX: lovenox 40 qd  Head of Bed: 30  Feeding: NPO    Code: full  Dispo: Critcally ill, continue ICU care.     Patient evaluated and plan discussed in the presence of Dr. Auguste.     Brenda Krueger MD  [facility]    Pt seen and examined with Pulmonary/Critical Care team. Critical Care time was spent validating the history and physical exam, reviewing the lab and imaging results, and  discussing the care of the patient with the bedside nurse. The following additional comments are made:    Lung is up by CXR.  Pleural vent is tidaling.  No air leak.  Mechanics of breathing are better. We will wake her up, perform SBT, and hopefully extubate.    Critical Care time 30 minutes    Molina Ruiz MD  Phone [phone]

## 2023-01-20 NOTE — PROGRESS NOTES
Pharmacist Renal Dose Adjustment Note    Ana Aguila is a 67 y.o. female being treated with the medication cefepime    Patient Data:    Vital Signs (Most Recent):  Temp: 97.5 °F (36.4 °C) (01/20/23 1100)  Pulse: 85 (01/20/23 1152)  Resp: (!) 24 (01/20/23 1152)  BP: 105/73 (01/20/23 1145)  SpO2: 97 % (01/20/23 1152)   Vital Signs (72h Range):  Temp:  [97.3 °F (36.3 °C)-97.5 °F (36.4 °C)]   Pulse:  []   Resp:  [17-35]   BP: ()/()   SpO2:  [94 %-100 %]      Recent Labs   Lab 01/20/23  0556   CREATININE 1.0     Serum creatinine: 1 mg/dL 01/20/23 0556  Estimated creatinine clearance: 55.4 mL/min    Medication:cefepime dose: 2 g frequency q 8 h will be changed to medication:cefepime dose:2 g frequency:q 12 h    Pharmacist's Name: Jone Forrester  Pharmacist's Extension: [phone]

## 2023-01-20 NOTE — ED NOTES
Admit team resident at bedside. Pt. Is responding to voice command-opens eyes and moves fingers and toes to command. Remains calm without distress.

## 2023-01-20 NOTE — ED PROVIDER NOTES
Encounter Date: 1/20/2023     History     Chief Complaint   Patient presents with    Shortness of Breath     EMT states pt was SOB for approx 1 hr before their arrival. Pt was using home nebs.  EMT gave pt duoneb tx., .5 epi, 16 mg dexamethasone, 2 alb  tx.  CPAP@15L/min, Peep 10.     HPI    59-year-old female multiple medical problems presents the ER for evaluation of acute respiratory distress hypoxemic respiratory failure.  Apparently was complaining of shortness of breath for over a day.  EMS was activated, found patient to be in acute distress.  Hypoxic accessory muscle use diffuse wheezing.  They gave dexamethasone 16 mg, 2 mg of magnesium as well as albuterol.  They placed patient on CPAP with no improvement and brought patient to the ER.  Patient arrived in the ER, obtunded    Review of patient's allergies indicates:   Allergen Reactions    Ace inhibitors      cough    Coreg [carvedilol]      Headache     Pseudoephedrine hcl Nausea And Vomiting, Other (See Comments) and Anxiety     JITTERY     Past Medical History:   Diagnosis Date    Addiction to drug     Anxiety     Aortic calcification 1/11/2018    Chronic diarrhea     Chronic obstructive pulmonary disease 8/27/2013    Chronic respiratory failure with hypoxia, on home O2 therapy 1/11/2018    Colon polyps 2010    COPD (chronic obstructive pulmonary disease)     Coronary artery calcification seen on CT scan 12/4/2020    Depression     Essential hypertension 8/12/2013    Former smoker 10/18/2016    Hepatomegaly 12/21/2015    Hyperlipidemia     Hypertension     MI (myocardial infarction)     Microscopic hematuria 1/13/2017    Panic disorder     Perianal abscess 6/1/2018    Reflux 2013    S/P right hemicolectomy 9/7/2017    Performed in 2011 after perforation during colonoscopy    Sleep difficulties     Takotsubo cardiomyopathy 10/18/2016    Noted on echo 10/2016, recovered on repeat echo 12/2016     Past Surgical History:   Procedure Laterality Date     ABDOMINAL SURGERY      APPENDECTOMY      CHOLECYSTECTOMY      open    COLON SURGERY      secondary to perforation after c-scope    COLONOSCOPY      CORONARY ANGIOGRAPHY N/A 2022    Procedure: ANGIOGRAM, CORONARY ARTERY;  Surgeon: Kylee Carreon MD;  Location: [facility] CATH LAB/EP;  Service: Cardiology;  Laterality: N/A;    FRACTURE SURGERY Left 1999    HERNIA REPAIR      HIATAL HERNIA REPAIR  2013    HYSTERECTOMY  1986    LEFT HEART CATHETERIZATION N/A 2022    Procedure: Left heart cath;  Surgeon: Kylee Carreon MD;  Location: [facility] CATH LAB/EP;  Service: Cardiology;  Laterality: N/A;    Left leg surgery       Family History   Problem Relation Age of Onset    Cancer Mother             Hypertension Mother     Coronary artery disease Father     Retinal detachment Father     Hypertension Sister     Hypertension Brother     Cancer Daughter         breast cancer - double mastectomy    Abnormal EKG Daughter     Breast cancer Daughter 43        negative genetic testing, unilateral breast ca    Cataracts Maternal Grandmother     Breast cancer Maternal Grandmother         unk age of onset    Breast cancer Other 44        thinks negative genetic testing, bilat ca    Amblyopia Neg Hx     Blindness Neg Hx     Diabetes Neg Hx     Glaucoma Neg Hx     Macular degeneration Neg Hx     Strabismus Neg Hx     Stroke Neg Hx     Thyroid disease Neg Hx     Colon cancer Neg Hx     Ovarian cancer Neg Hx      Social History     Tobacco Use    Smoking status: Former     Packs/day: 1.50     Years: 30.00     Pack years: 45.00     Types: Cigarettes     Start date: 1970     Quit date: 1997     Years since quittin.0    Smokeless tobacco: Never   Substance Use Topics    Alcohol use: No    Drug use: No     Review of Systems   Unable to perform ROS: Intubated     Physical Exam     Initial Vitals   BP Pulse Resp Temp SpO2   23 0501 23 0445 23 0445 -- 23 0442   (!) 131/100 (!) 121 (!) 24   100 %      MAP       --                Vitals:    01/20/23 0442 01/20/23 0445 01/20/23 0450 01/20/23 0455   BP:       Pulse:  (!) 121 (!) 121 (!) 121   Resp:  (!) 24 (!) 24 (!) 25   SpO2: 100% 100% 100% 100%   Weight: 76.4 kg (168 lb 6.9 oz)       01/20/23 0500 01/20/23 0501 01/20/23 0514 01/20/23 0609   BP:  (!) 131/100  120/80   Pulse: (!) 121 (!) 122 (!) 121 100   Resp: (!) 24 (!) 26 (!) 24 (!) 24   SpO2: 98% 98% 96% 100%   Weight:         Physical Exam    Nursing note and vitals reviewed.  Constitutional:   Chronically ill acute respiratory distress minimally responsive   HENT:   Head: Normocephalic and atraumatic.   Eyes: Pupils are equal, round, and reactive to light.   Cardiovascular:            Tachycardic rate and rhythm   Pulmonary/Chest:   Tachypneic accessory muscle use minimum air entry appears fatigued   Abdominal: Abdomen is soft. She exhibits no distension. There is no abdominal tenderness.   Musculoskeletal:         General: Normal range of motion.     Neurological:   Obtunded minimally responsive   Skin: Skin is warm and dry.   Psychiatric:   Unable to assess         ED Course   Critical Care    Date/Time: 1/20/2023 5:01 AM  Performed by: Artie Woodard MD  Authorized by: Artie Woodard MD   Total critical care time (exclusive of procedural time) : 0 minutes  Critical care time was exclusive of teaching time and separately billable procedures and treating other patients.  Critical care was time spent personally by me on the following activities: ventilator management, review of old charts, re-evaluation of patient's condition, pulse oximetry, ordering and review of radiographic studies, ordering and review of laboratory studies, ordering and performing treatments and interventions, evaluation of patient's response to treatment, examination of patient and discussions with primary provider.  Comments: 55 minutes of critical care time for diagnosis management of acute hypercapnic hypoxemic  respiratory failure requiring intubation frequent re-evaluations.      Intubation    Date/Time: 1/20/2023 5:05 AM  Location procedure was performed: [facility] EMERGENCY DEPARTMENT  Performed by: Artie Woodard MD  Authorized by: Artie Woodard MD   Consent Done: Emergent Situation  Indications: respiratory distress, respiratory failure, hypercapnia and hypoxemia  Intubation method: video-assisted  Patient status: Delayed intubation with ketamine.  Sedatives: ketmine  Paralytic: rocuronium  Laryngoscope size: Mac 3  Tube size: 7.5 mm  Tube type: cuffed  Number of attempts: 1  Post-procedure assessment: chest rise and ETCO2 monitor  Breath sounds: wheezing and diminished  Patient tolerance: Patient tolerated the procedure well with no immediate complications  Complications: No  Specimens: No  Implants: No  Comments: Delayed intubation with ketamine.  Patient was preoxygenated with BiPAP, given 100 mg of ketamine, and then, intubated in 1 attempt without complications.  Gave rafaela after intubation.      Chest Tube    Date/Time: 1/20/2023 6:07 AM  Location procedure was performed: [facility] EMERGENCY DEPARTMENT  Performed by: Artie Woodard MD  Authorized by: Artie Woodard MD   Consent Done: Emergent Situation  Indications: pneumothorax  Anesthesia: local infiltration  Preparation: skin prepped with ChloraPrep  Placement location: right anterior  Scalpel size: 11  Tube connected to: Heimlich valve  Drainage characteristics: clear  Complications: No  Specimens: No  Implants: No  Comments: Used Thoravent.  Sterile technique.  Thirteen [place] tubing.  Placed on anterior midclavicular line, 3rd rib space above the rib.  Fluttering at flutter valve noted.  Threaded catheter through.  Removed approximately 500 cc of air.  No obvious complications.                     MDM    67-year-old female multiple medical problems presents to the ER for evaluation of acute hypercapnic/hypoxemic respiratory failure.  Was obtunded  minimally responsive.  No further information available at this time.  She required emergent intubation due to altered mentation impending respiratory failure.  Delayed ketamine intubation was successful.  Differential is broad.  Will initiate sepsis protocol will continue continuous breathing treatments.  Patient has been given dexamethasone and magnesium by EMS.  Discussed with [facility] who accepted patient to their service to ICU.    History Acquisition   Additional history was acquired from other historians, EMS.  The patient's list of active medical problems, social history, medications, and allergies as documented per RN staff has been reviewed.     Differential Diagnoses   Based on available information and the initial assessment, the working differential diagnoses considered during this evaluation include but are not limited to sepsis, pneumonia, hypercapnia COPD exacerbation other cause.      LABS     Labs Reviewed   URINALYSIS, REFLEX TO URINE CULTURE - Abnormal; Notable for the following components:       Result Value    Color, UA Colorless (*)     Protein, UA 2+ (*)     Glucose, UA Trace (*)     Occult Blood UA Trace (*)     All other components within normal limits    Narrative:     Specimen Source->Urine   ISTAT PROCEDURE - Abnormal; Notable for the following components:    POC PH 7.138 (*)     POC PCO2 98.7 (*)     POC PO2 169 (*)     POC HCO3 33.4 (*)     POC TCO2 36 (*)     All other components within normal limits   INFLUENZA A & B BY MOLECULAR   CULTURE, BLOOD   CULTURE, BLOOD   URINALYSIS MICROSCOPIC    Narrative:     Specimen Source->Urine   CBC W/ AUTO DIFFERENTIAL   COMPREHENSIVE METABOLIC PANEL   LACTIC ACID, PLASMA   TROPONIN I   LIPASE   B-TYPE NATRIURETIC PEPTIDE   PROTIME-INR   APTT   PHOSPHORUS   MAGNESIUM   PROCALCITONIN   C-REACTIVE PROTEIN   SEDIMENTATION RATE     Notable findings from our independent review of the labs ordered include acute hypercapnic respiratory  failure.    Imaging     Imaging Results              X-Ray Chest AP Portable (In process)                       XR NG/OG tube placement check, non-radiologist performed (Final result)  Result time 01/20/23 06:13:05   Procedure changed from X-Ray Chest 1 View for Line/Tube Placement     Final result by Bethanie Leung MD (01/20/23 06:13:05)                   Impression:      Appropriately positioned enteric tube.    Redemonstration of large right-sided pneumothorax.      Electronically signed by: Bethanie Leung MD  Date:    01/20/2023  Time:    06:13               Narrative:    EXAMINATION:  XR NG/OG TUBE PLACEMENT CHECK, NON-RADIOLOGIST PERFORMED    CLINICAL HISTORY:  OG tube placement;    TECHNIQUE:  AP View(s) of the abdomen was performed.    COMPARISON:  07/23/2010    FINDINGS:  Enteric tube courses below the diaphragm with tip and side port projecting over the region of the stomach in the left upper quadrant.  No significantly dilated loops of bowel in the visualized upper abdomen.  Limited evaluation of free intraperitoneal air due to positioning/technique.  Redemonstration of large right-sided pneumothorax.    This report was flagged in Epic as abnormal.                                       X-Ray Chest AP Portable (Final result)  Result time 01/20/23 06:03:17      Final result by Bethanie Leung MD (01/20/23 06:03:17)                   Impression:      Large right-sided pneumothorax.  No significant associated midline shift at this time.    Medical support devices as above.      Electronically signed by: Bethanie Leung MD  Date:    01/20/2023  Time:    06:03               Narrative:    EXAMINATION:  XR CHEST AP PORTABLE    CLINICAL HISTORY:  Sepsis;    TECHNIQUE:  Single frontal view of the chest was performed.    COMPARISON:  01/11/2013    FINDINGS:  Cardiac monitoring leads overlie the chest.  Endotracheal tube in place with tip projecting at the level of the clavicular heads.  Enteric tube courses below the  diaphragm extending beyond the field of view.  The cardiomediastinal silhouette is within normal limits of size and configuration noting atherosclerosis of the thoracic aorta.  Mediastinal structures are midline.  There is a large right-sided pneumothorax present with at least 6 cm of pleural separation.  There is patchy basilar and perihilar opacity within the right lung likely relating to atelectasis.  The left lung is symmetrically expanded without evidence of pneumothorax there is diffuse coarse interstitial attenuation and left basilar atelectatic change versus scarring.    Findings were discussed with Dr. Artie Woodard MD at time of discovery/dictation at 05:55 via epic secure chat messenger.  Receipt of results was confirmed.                                       A radiology report was available for my review at the time of the encounter.    EKG    Sinus tach 120 beats per minute rate related ischemic changes no STEMI    Additional Consideration     Comorbidities taken into consideration during the patient's evaluation and treatment include COPD, former smoker history of hypertension.    Social determinants of health taken into consideration during development of our treatment plan     Medications   fentaNYL 2500 mcg in 0.9% sodium chloride 250 mL infusion premix (titrating) (has no administration in time range)   azithromycin 500 mg in dextrose 5 % 250 mL IVPB (ready to mix system) (has no administration in time range)   magnesium sulfate 2g in water 50mL IVPB (premix) (2 g Intravenous New Bag 1/20/23 4564)   sodium chloride 0.9% flush 10 mL (has no administration in time range)   famotidine (PF) injection 20 mg (has no administration in time range)   mupirocin 2 % ointment (has no administration in time range)   albuterol-ipratropium 2.5 mg-0.5 mg/3 mL nebulizer solution 3 mL (3 mLs Nebulization Given 1/20/23 5011)   propofol (DIPRIVAN) 10 mg/mL infusion (20 mcg/kg/min × 76.4 kg Intravenous New Bag  1/20/23 0523)   sodium chloride 0.9% bolus 1,000 mL 1,000 mL (1,000 mLs Intravenous New Bag 1/20/23 0516)   levalbuterol nebulizer solution 0.63 mg (0.63 mg Nebulization Given 1/20/23 0500)   cefTRIAXone (ROCEPHIN) 1 g/50 mL D5W IVPB (0 g Intravenous Stopped 1/20/23 0611)            ED Course as of 01/20/23 0623 Fri Jan 20, 2023   0615 Status post thoravent  placement by myself.  Sterile technique.  Removed approximately 500 cc of air until unable to remove anymore air from device.  X-ray shows re-expansion of the lungs.  However to appears a kink in the tubing, possibly from reexpansion. Overal stable. Awaiting ICU BED [SE]      ED Course User Index  [SE] Artie Woodard MD       ED Management/Discussion   Due to multiple comorbidities and critical illness patient was admitted to ICU.    CLINICAL IMPRESSION  1. Acute respiratory failure with hypercapnia    2. Hypoxia    3. Acute respiratory failure    4. Primary spontaneous pneumothorax    5. Encounter for intubation         ED Disposition Condition    Admit Stable               Artie Woodard MD  01/20/23 0623

## 2023-01-20 NOTE — ED NOTES
Pt. Is resting quietly with eyes open. Obeys simple commands. Pt. Updated on status and present treatments, plan of care. Pt. Is able to nod and squeeze hand to verbalize some understanding.

## 2023-01-20 NOTE — LETTER
January 25, 2023         [address] [place] [address] [place] [address]  Phone: [phone]  Fax: [phone]       To Whom It May Concern:    Maria Luz Honeycutt, is the granddaughter of a current hospitalized patient and has been accompanying her grandmother and assisting in medical decision making since her admission to [facility] on 01/20/23. Patient has been unable to perform her normal duties secondary to this obligation including her occupation. Maria Luz may return to work pending ongoing medical care of her grandmother. If you have any questions or concerns, or if I can be of further assistance, please do not hesitate to contact me.    Sincerely,    Daniel Marie MD

## 2023-01-20 NOTE — Clinical Note
Diagnosis: Acute respiratory failure [518.81.ICD-9-CM]   Admitting Provider:: YENNY HAWLEY [[id number]]   Future Attending Provider: YENNY HAWLEY [[id number]]   Reason for IP Medical Treatment  (Clinical interventions that can only be accomplished in the IP setting? ) :: intubated   Estimated Length of Stay:: 3-4 midnights   I certify that Inpatient services for greater than or equal to 2 midnights are medically necessary:: Yes   Plans for Post-Acute care--if anticipated (pick the single best option):: C. Discharge home with home health services   Special Needs:: No Special Needs [1]

## 2023-01-20 NOTE — ED NOTES
Bedside care hand off from SUSANNAH Vuong RN. The patient Is chemically sedated on ventilator. Fentanyl drip is infusing (R) ac iv at 75mcg/hr. Resp.-24 and pt. Is not breathing over set rate presently. (TV-350, fio2-100%, peep-5). Pt. Has a Thora-Vent chest tube to (R) upper chest wall-cxr post insertion reports improvement of pneumothorax. Ogt is in place to lws-cxr confirmation of placement. Gould patent to gravity drainage. Vital signs are stable, no distress.

## 2023-01-20 NOTE — PHARMACY MED REC
"    Admission Medication History     The home medication history was taken by Roxana Mukherjee CPhT.    Medication history obtained from, Patient's Granddaughter Verified    You may go to "Admission" then "Reconcile Home Medications" tabs to review and/or act upon these items.     The home medication list has been updated by the Pharmacy department.   Please read ALL comments highlighted in yellow.   Please address this information as you see fit.    Feel free to contact us if you have any questions or require assistance.      The medications listed below were removed from the home medication list.  Please reorder if appropriate:  Patient reports no longer taking the following medication(s):  Hydroxyzine 10 mg  Losartan 25 mg        Roxana Mukherjee CPhT.  Ext [phone]           .        "

## 2023-01-20 NOTE — H&P
[facility] Hospital Medicine H&P Note     Admitting Team: [facility] Hospitalist Team A  Attending Physician: Annamaria Veliz MD  Resident: Fransisca  Intern: Jason    Date of Admit: 1/20/2023    Chief Complaint     Shortness of breath x1 hr    Subjective:      History of Present Illness:  Ana Aguila is a 67 year old female with PMH of COPD on 2.5L home O2, Hypertension, Hyperlipidemia, GERD, MI, and Takasubo cardiomyopathy who presented for SOB for approximately 1 hour prior to EMS arrival. EMS provided duo-neb treatment, 0.5 epi, 16mg dexamethasone and 2x albuterol treatments    In the ED the patient was noted to be in respiratory distress and was trialed on BiPAP but without improvement and then she was intubated. Follow-up CXR noted pleural effusion and Pleuravent placed in ED. Further ED management detailed in the assessment/plan    History limited as patient intubated. Will attempt to reach family to gather additional info.        Past Medical History:  Past Medical History:   Diagnosis Date    Addiction to drug     Anxiety     Aortic calcification 1/11/2018    Chronic diarrhea     Chronic obstructive pulmonary disease 8/27/2013    Chronic respiratory failure with hypoxia, on home O2 therapy 1/11/2018    Colon polyps 2010    COPD (chronic obstructive pulmonary disease)     Coronary artery calcification seen on CT scan 12/4/2020    Depression     Essential hypertension 8/12/2013    Former smoker 10/18/2016    Hepatomegaly 12/21/2015    Hyperlipidemia     Hypertension     MI (myocardial infarction)     Microscopic hematuria 1/13/2017    Panic disorder     Perianal abscess 6/1/2018    Reflux 2013    S/P right hemicolectomy 9/7/2017    Performed in 2011 after perforation during colonoscopy    Sleep difficulties     Takotsubo cardiomyopathy 10/18/2016    Noted on echo 10/2016, recovered on repeat echo 12/2016       Past Surgical History:  Past Surgical History:   Procedure Laterality Date    ABDOMINAL SURGERY       APPENDECTOMY      CHOLECYSTECTOMY  2013    open    COLON SURGERY  2011    secondary to perforation after c-scope    COLONOSCOPY      CORONARY ANGIOGRAPHY N/A 7/26/2022    Procedure: ANGIOGRAM, CORONARY ARTERY;  Surgeon: Kylee Carreon MD;  Location: [facility] CATH LAB/EP;  Service: Cardiology;  Laterality: N/A;    FRACTURE SURGERY Left 1999    HERNIA REPAIR      HIATAL HERNIA REPAIR  2013    HYSTERECTOMY  1986    LEFT HEART CATHETERIZATION N/A 7/26/2022    Procedure: Left heart cath;  Surgeon: Kylee Carreon MD;  Location: [facility] CATH LAB/EP;  Service: Cardiology;  Laterality: N/A;    Left leg surgery         Allergies:  Review of patient's allergies indicates:   Allergen Reactions    Ace inhibitors      cough    Coreg [carvedilol]      Headache     Pseudoephedrine hcl Nausea And Vomiting, Other (See Comments) and Anxiety     JITTERY       Home Medications:  Prior to Admission medications    Medication Sig Start Date End Date Taking? Authorizing Provider   acetaminophen (TYLENOL) 500 MG tablet Take 250 mg by mouth every 6 (six) hours as needed for Pain.    Historical Provider   albuterol-ipratropium (DUO-NEB) 2.5 mg-0.5 mg/3 mL nebulizer solution TAKE 3 ML BY NEBULIZER EVERY 6 HOURS AS NEEDED FOR WHEEZING OR SHORTNESS OF BREATH 5/20/22   Zhou Gallardo MD   cyanocobalamin (VITAMIN B-12) 100 MCG tablet Take 100 mcg by mouth once daily.    Historical Provider   ergocalciferol (ERGOCALCIFEROL) 50,000 unit Cap TAKE 1 CAPSULE BY MOUTH EVERY 7 DAYS 9/13/22   Zhou Gallardo MD   fluticasone-salmeterol diskus inhaler 250-50 mcg INHALE 1 PUFF BY MOUTH EVERY 12 HOURS AS NEEDED 5/20/22   Zhou Gallardo MD   hydroCHLOROthiazide (HYDRODIURIL) 25 MG tablet Take 1 tablet (25 mg total) by mouth once daily. 11/15/22   Ferny Gaytan MD   hydrOXYzine HCL (ATARAX) 10 MG Tab Take 1 tablet (10 mg total) by mouth 3 (three) times daily as needed (anxiety). 9/9/22   Zhou Gallardo MD   ipratropium (ATROVENT) 0.02 % nebulizer solution  Take 500 mcg by nebulization 4 (four) times daily. Rescue    Historical Provider   losartan (COZAAR) 25 MG tablet Take 1 tablet (25 mg total) by mouth once daily. 22  Casper Phillips MD   metoprolol succinate (TOPROL-XL) 100 MG 24 hr tablet Take 1 tablet (100 mg total) by mouth once daily. 22  Casper Phillips MD   PROAIR HFA 90 mcg/actuation inhaler INHALE 2 PUFFS FOUR TIMES DAILY AS NEEDED FOR COPD 22   Zhou Gallardo MD   rosuvastatin (CRESTOR) 10 MG tablet Take 1 tablet (10 mg total) by mouth once daily. 22   Zhou Gallardo MD   amLODIPine (NORVASC) 2.5 MG tablet Take 1 tablet (2.5 mg total) by mouth once daily. 22  Zhou Gallardo MD       Family History:  Family History   Problem Relation Age of Onset    Cancer Mother             Hypertension Mother     Coronary artery disease Father     Retinal detachment Father     Hypertension Sister     Hypertension Brother     Cancer Daughter         breast cancer - double mastectomy    Abnormal EKG Daughter     Breast cancer Daughter 43        negative genetic testing, unilateral breast ca    Cataracts Maternal Grandmother     Breast cancer Maternal Grandmother         unk age of onset    Breast cancer Other 44        thinks negative genetic testing, bilat ca    Amblyopia Neg Hx     Blindness Neg Hx     Diabetes Neg Hx     Glaucoma Neg Hx     Macular degeneration Neg Hx     Strabismus Neg Hx     Stroke Neg Hx     Thyroid disease Neg Hx     Colon cancer Neg Hx     Ovarian cancer Neg Hx        Social History:  Social History     Tobacco Use    Smoking status: Former     Packs/day: 1.50     Years: 30.00     Pack years: 45.00     Types: Cigarettes     Start date: 1970     Quit date: 1997     Years since quittin.0    Smokeless tobacco: Never   Substance Use Topics    Alcohol use: No    Drug use: No       Review of Systems:  Pertinent items are noted in HPI. All other systems are reviewed and are  negative.    Health Maintaince :   Primary Care Physician: Sami    Immunizations:   unknown    Cancer Screening:  Unknown     Objective:   Last 24 Hour Vital Signs:  BP  Min: 120/80  Max: 131/100  Temp  Av.3 °F (36.3 °C)  Min: 97.3 °F (36.3 °C)  Max: 97.3 °F (36.3 °C)  Pulse  Av.6  Min: 93  Max: 122  Resp  Av.3  Min: 24  Max: 26  SpO2  Av.1 %  Min: 94 %  Max: 100 %  Weight  Av.4 kg (168 lb 6.9 oz)  Min: 76.4 kg (168 lb 6.9 oz)  Max: 76.4 kg (168 lb 6.9 oz)  Body mass index is 28.91 kg/m².  I/O last 3 completed shifts:  In: 1062.7 [I.V.:12.7; IV Piggyback:1050]  Out: -     Physical Examination:  Gen: intubated and sedated  HEENT: eyes protected, ET tube in place, unable to determine JVD   CV: tachycardic rate, regular rhythm, no murmurs appreciated  Pulm: ventilator sounds present anteriorly, decreased breath sounds on RL field laterally  Abd: Nondistended, BS present  Ext: no peripheral edema, knee replacement scar L knee, DP and radial pulses 2+  Neuro: sedated        Laboratory:  Most Recent Data:  CBC:   Pending CBC    Lab Results   Component Value Date    WBC 25.62 (H) 2023    HGB 11.5 (L) 2023    HCT 37.0 2023     2023    MCV 95 2023    RDW 13.7 2023     WBC Differential: Pending    BMP:   Lab Results   Component Value Date     2023    K 3.4 (L) 2023     2023    CO2 28 2023    BUN 17 2023    CREATININE 1.0 2023     (H) 2023    CALCIUM 8.0 (L) 2023    MG 2.6 2023    PHOS 6.7 (H) 2023     LFTs:   Lab Results   Component Value Date    PROT 6.4 2023    ALBUMIN 3.1 (L) 2023    BILITOT 0.2 2023    AST 32 2023    ALKPHOS 59 2023    ALT 23 2023     Coags:   Lab Results   Component Value Date    INR 1.0 2023     FLP:   Lab Results   Component Value Date    CHOL 154 2021    HDL 70 2021    LDLCALC 72.0 2021    TRIG  "60 07/27/2021    CHOLHDL 45.5 07/27/2021     DM:   Lab Results   Component Value Date    HGBA1C 5.2 09/09/2022    HGBA1C 5.8 (H) 07/23/2022    HGBA1C 5.4 10/18/2016    LDLCALC 72.0 07/27/2021    CREATININE 1.0 01/20/2023     Thyroid:   Lab Results   Component Value Date    TSH 2.258 07/27/2021     Anemia:   Lab Results   Component Value Date    IRON 44 01/16/2017    TIBC 337 01/16/2017    FERRITIN 98 01/16/2017    OQBCFALD27 585 07/27/2021     Cardiac:   Lab Results   Component Value Date    TROPONINI 0.147 (H) 01/20/2023    BNP 93 01/20/2023     Urinalysis:   Lab Results   Component Value Date    LABURIN No significant growth 07/23/2022    COLORU Colorless (A) 01/20/2023    SPECGRAV 1.010 01/20/2023    NITRITE Negative 01/20/2023    KETONESU Negative 01/20/2023    UROBILINOGEN Negative 01/20/2023    WBCUA 0 01/20/2023       Trended Lab Data:  Recent Labs   Lab 01/20/23  0556   WBC 25.62*   HGB 11.5*   HCT 37.0      MCV 95   RDW 13.7      K 3.4*      CO2 28   BUN 17   CREATININE 1.0   *   PROT 6.4   ALBUMIN 3.1*   BILITOT 0.2   AST 32   ALKPHOS 59   ALT 23       Trended Cardiac Data:  Recent Labs   Lab 01/20/23  0556   TROPONINI 0.147*   BNP 93       Microbiology Data:  Pending    Other Results:  EKG (my interpretation): Sinus rhythm, tachycardic rate, inferior lead abnormality    Radiology:  Imaging Results              X-Ray Chest AP Portable (Final result)  Result time 01/20/23 06:58:12      Final result by Josh Shaw MD (01/20/23 06:58:12)                   Impression:      Interval placement of right-sided chest tube with decreased size of right-sided pneumothorax, noting a small residual right pneumothorax.    Otherwise, similar findings when compared with 05:15.      Electronically signed by: Josh Shaw MD  Date:    01/20/2023  Time:    06:58               Narrative:    EXAMINATION:  XR CHEST AP PORTABLE    CLINICAL HISTORY:  Provided history is "pneumothorax;  " "".    TECHNIQUE:  One view of the chest.    COMPARISON:  01/20/2023 at 05:15.    FINDINGS:  Endotracheal tube terminates approximately 6 cm above the luis.  Enteric tube extends below the field of view likely overlying the stomach.  Interval placement of right-sided chest tube with the tip overlying the lateral aspect of the right upper lung.  Significant decrease in size of previously seen pneumothorax, with only small residual pneumothorax.  Cardiac silhouette is stable in size.  Atherosclerotic calcifications overlie the aortic arch.  Stable diffusely coarse interstitial lung markings.  Linear subsegmental atelectasis or scarring in the left lung base.  No large pleural effusion.                                        XR NG/OG tube placement check, non-radiologist performed (Final result)  Result time 01/20/23 06:13:05   Procedure changed from X-Ray Chest 1 View for Line/Tube Placement     Final result by Bethanie Leung MD (01/20/23 06:13:05)                   Impression:      Appropriately positioned enteric tube.    Redemonstration of large right-sided pneumothorax.      Electronically signed by: Bethanie Leung MD  Date:    01/20/2023  Time:    06:13               Narrative:    EXAMINATION:  XR NG/OG TUBE PLACEMENT CHECK, NON-RADIOLOGIST PERFORMED    CLINICAL HISTORY:  OG tube placement;    TECHNIQUE:  AP View(s) of the abdomen was performed.    COMPARISON:  07/23/2010    FINDINGS:  Enteric tube courses below the diaphragm with tip and side port projecting over the region of the stomach in the left upper quadrant.  No significantly dilated loops of bowel in the visualized upper abdomen.  Limited evaluation of free intraperitoneal air due to positioning/technique.  Redemonstration of large right-sided pneumothorax.    This report was flagged in Epic as abnormal.                                       X-Ray Chest AP Portable (Final result)  Result time 01/20/23 06:03:17      Final result by Bethanie Leung MD " (01/20/23 06:03:17)                   Impression:      Large right-sided pneumothorax.  No significant associated midline shift at this time.    Medical support devices as above.      Electronically signed by: Bethanie Leung MD  Date:    01/20/2023  Time:    06:03               Narrative:    EXAMINATION:  XR CHEST AP PORTABLE    CLINICAL HISTORY:  Sepsis;    TECHNIQUE:  Single frontal view of the chest was performed.    COMPARISON:  01/11/2013    FINDINGS:  Cardiac monitoring leads overlie the chest.  Endotracheal tube in place with tip projecting at the level of the clavicular heads.  Enteric tube courses below the diaphragm extending beyond the field of view.  The cardiomediastinal silhouette is within normal limits of size and configuration noting atherosclerosis of the thoracic aorta.  Mediastinal structures are midline.  There is a large right-sided pneumothorax present with at least 6 cm of pleural separation.  There is patchy basilar and perihilar opacity within the right lung likely relating to atelectasis.  The left lung is symmetrically expanded without evidence of pneumothorax there is diffuse coarse interstitial attenuation and left basilar atelectatic change versus scarring.    Findings were discussed with Dr. Artie Woodrad MD at time of discovery/dictation at 05:55 via [employer] chat messenger.  Receipt of results was confirmed.                                           Assessment/Plan:     Acute on Chronic Hypoxic Hypercarbic Respiratory Failure 2/2 COPD Exacerbation   - Patient had similar presentation 7/2022  - PFTs with obstruction with severe ventilatory impairment, FEV1 0.43L, FEV1/FVC 40%  - EMS administered dexamethasone 16mg, duo-neb tx, albuterol x2, and 0.5 epi  - Patient was trialed on BiPAP without improvement and then was intubated in ED for respiratory distress and accessory muscle usage  - ABG significant for pH 7.14, pCO2 98.7, pO2 169, HCO3 33.4 on ventilator with Vt 350, PEEP 5,  FiO2 100% RR22  - Received solumedrol 125mg IV, azithro, ceftriaxone, 2g Mg, 1L NS  - Ordered cefepime and azithro (pseudomonas coverage since on vent)  - Duo neb q1hr x3 then q2 hr; methylprednisolone 60 IV q6 hr  - lactate 1.6, procal 0.07, pend crp and esr  - Consulted Pulm/crit    Pneumothorax  -etiology unclear, possibly present and worsened by NIVP  -pleurvent placed in ED  -repeat CXR shows improvement  -appreciate pulm    Elevated Troponin  Abnormal EKG Findings  - EKG with sinus tachycardia and abnormal ST findings in inferior leads  - troponin at 0.147, trend  - BNP 93  - Repeat EKG  - Past Echo EF 65%  - Consult Cardiology    Hypertension  - Per chart review, home meds losartan 25mg, amlodipine 2.5mg  - Holding antihypertensives at this time    Hyperlipidemia  - Per chart review on rosuvastatin 10mg    GERD  - Famotidine IV for peptic ulcer ppx while on vent    R Hemicolectomy 2/2 GIB  - Monitor for bleeding    Recent COVID Positive  - COVID test pending      Code Status:     Full    Frederic Forrester MD  [facility] HO-II    [facility] Medicine Hospitalist Pager numbers:   [facility] Hospitalist Medicine Team A (Susanna/Keren): [phone]  [facility] Hospitalist Medicine Team B (Camilla/Lavelle):  [phone]

## 2023-01-21 PROBLEM — J96.02 ACUTE RESPIRATORY FAILURE WITH HYPERCAPNIA: Status: ACTIVE | Noted: 2023-01-21

## 2023-01-21 LAB
ALBUMIN SERPL BCP-MCNC: 3.3 G/DL (ref 3.5–5.2)
ALLENS TEST: ABNORMAL
ALP SERPL-CCNC: 69 U/L (ref 55–135)
ALT SERPL W/O P-5'-P-CCNC: 27 U/L (ref 10–44)
ANION GAP SERPL CALC-SCNC: 12 MMOL/L (ref 8–16)
AST SERPL-CCNC: 48 U/L (ref 10–40)
BASOPHILS # BLD AUTO: 0.03 K/UL (ref 0–0.2)
BASOPHILS NFR BLD: 0.2 % (ref 0–1.9)
BILIRUB SERPL-MCNC: 0.4 MG/DL (ref 0.1–1)
BUN SERPL-MCNC: 20 MG/DL (ref 8–23)
CALCIUM SERPL-MCNC: 8.7 MG/DL (ref 8.7–10.5)
CHLORIDE SERPL-SCNC: 98 MMOL/L (ref 95–110)
CO2 SERPL-SCNC: 26 MMOL/L (ref 23–29)
CREAT SERPL-MCNC: 0.8 MG/DL (ref 0.5–1.4)
DIFFERENTIAL METHOD: ABNORMAL
EOSINOPHIL # BLD AUTO: 0 K/UL (ref 0–0.5)
EOSINOPHIL NFR BLD: 0 % (ref 0–8)
ERYTHROCYTE [DISTWIDTH] IN BLOOD BY AUTOMATED COUNT: 13.8 % (ref 11.5–14.5)
EST. GFR  (NO RACE VARIABLE): >60 ML/MIN/1.73 M^2
GLUCOSE SERPL-MCNC: 144 MG/DL (ref 70–110)
HCO3 UR-SCNC: 33.6 MMOL/L (ref 24–28)
HCT VFR BLD AUTO: 39.9 % (ref 37–48.5)
HGB BLD-MCNC: 12.3 G/DL (ref 12–16)
IMM GRANULOCYTES # BLD AUTO: 0.1 K/UL (ref 0–0.04)
IMM GRANULOCYTES NFR BLD AUTO: 0.5 % (ref 0–0.5)
LYMPHOCYTES # BLD AUTO: 1.4 K/UL (ref 1–4.8)
LYMPHOCYTES NFR BLD: 7.6 % (ref 18–48)
MAGNESIUM SERPL-MCNC: 2.2 MG/DL (ref 1.6–2.6)
MCH RBC QN AUTO: 29 PG (ref 27–31)
MCHC RBC AUTO-ENTMCNC: 30.8 G/DL (ref 32–36)
MCV RBC AUTO: 94 FL (ref 82–98)
MONOCYTES # BLD AUTO: 1.3 K/UL (ref 0.3–1)
MONOCYTES NFR BLD: 6.9 % (ref 4–15)
NEUTROPHILS # BLD AUTO: 16 K/UL (ref 1.8–7.7)
NEUTROPHILS NFR BLD: 84.8 % (ref 38–73)
NRBC BLD-RTO: 0 /100 WBC
PCO2 BLDA: 60.4 MMHG (ref 35–45)
PH SMN: 7.35 [PH] (ref 7.35–7.45)
PHOSPHATE SERPL-MCNC: 3.8 MG/DL (ref 2.7–4.5)
PLATELET # BLD AUTO: 326 K/UL (ref 150–450)
PMV BLD AUTO: 9.9 FL (ref 9.2–12.9)
PO2 BLDA: 84 MMHG (ref 80–100)
POC BE: 8 MMOL/L
POC SATURATED O2: 95 % (ref 95–100)
POC TCO2: 35 MMOL/L (ref 23–27)
POCT GLUCOSE: 132 MG/DL (ref 70–110)
POCT GLUCOSE: 140 MG/DL (ref 70–110)
POCT GLUCOSE: 170 MG/DL (ref 70–110)
POCT GLUCOSE: 173 MG/DL (ref 70–110)
POCT GLUCOSE: 178 MG/DL (ref 70–110)
POTASSIUM SERPL-SCNC: 3.8 MMOL/L (ref 3.5–5.1)
PROT SERPL-MCNC: 6.9 G/DL (ref 6–8.4)
RBC # BLD AUTO: 4.24 M/UL (ref 4–5.4)
SAMPLE: ABNORMAL
SITE: ABNORMAL
SODIUM SERPL-SCNC: 136 MMOL/L (ref 136–145)
TROPONIN I SERPL DL<=0.01 NG/ML-MCNC: 0.93 NG/ML (ref 0–0.03)
TROPONIN I SERPL DL<=0.01 NG/ML-MCNC: 0.98 NG/ML (ref 0–0.03)
WBC # BLD AUTO: 18.87 K/UL (ref 3.9–12.7)

## 2023-01-21 PROCEDURE — 94660 CPAP INITIATION&MGMT: CPT

## 2023-01-21 PROCEDURE — 94002 VENT MGMT INPAT INIT DAY: CPT

## 2023-01-21 PROCEDURE — 84100 ASSAY OF PHOSPHORUS: CPT | Performed by: STUDENT IN AN ORGANIZED HEALTH CARE EDUCATION/TRAINING PROGRAM

## 2023-01-21 PROCEDURE — 25000242 PHARM REV CODE 250 ALT 637 W/ HCPCS: Performed by: INTERNAL MEDICINE

## 2023-01-21 PROCEDURE — 25000003 PHARM REV CODE 250

## 2023-01-21 PROCEDURE — 87070 CULTURE OTHR SPECIMN AEROBIC: CPT

## 2023-01-21 PROCEDURE — 36415 COLL VENOUS BLD VENIPUNCTURE: CPT | Performed by: STUDENT IN AN ORGANIZED HEALTH CARE EDUCATION/TRAINING PROGRAM

## 2023-01-21 PROCEDURE — 93005 ELECTROCARDIOGRAM TRACING: CPT

## 2023-01-21 PROCEDURE — 27000221 HC OXYGEN, UP TO 24 HOURS

## 2023-01-21 PROCEDURE — 36600 WITHDRAWAL OF ARTERIAL BLOOD: CPT

## 2023-01-21 PROCEDURE — 87106 FUNGI IDENTIFICATION YEAST: CPT

## 2023-01-21 PROCEDURE — 99900035 HC TECH TIME PER 15 MIN (STAT)

## 2023-01-21 PROCEDURE — 25000003 PHARM REV CODE 250: Performed by: STUDENT IN AN ORGANIZED HEALTH CARE EDUCATION/TRAINING PROGRAM

## 2023-01-21 PROCEDURE — 93010 ELECTROCARDIOGRAM REPORT: CPT | Mod: 76,,, | Performed by: INTERNAL MEDICINE

## 2023-01-21 PROCEDURE — 63600175 PHARM REV CODE 636 W HCPCS

## 2023-01-21 PROCEDURE — S5010 5% DEXTROSE AND 0.45% SALINE: HCPCS | Performed by: STUDENT IN AN ORGANIZED HEALTH CARE EDUCATION/TRAINING PROGRAM

## 2023-01-21 PROCEDURE — 82803 BLOOD GASES ANY COMBINATION: CPT

## 2023-01-21 PROCEDURE — 25000003 PHARM REV CODE 250: Performed by: INTERNAL MEDICINE

## 2023-01-21 PROCEDURE — 20000000 HC ICU ROOM

## 2023-01-21 PROCEDURE — 94640 AIRWAY INHALATION TREATMENT: CPT

## 2023-01-21 PROCEDURE — 83735 ASSAY OF MAGNESIUM: CPT | Performed by: STUDENT IN AN ORGANIZED HEALTH CARE EDUCATION/TRAINING PROGRAM

## 2023-01-21 PROCEDURE — 93010 EKG 12-LEAD: ICD-10-PCS | Mod: ,,, | Performed by: INTERNAL MEDICINE

## 2023-01-21 PROCEDURE — 63600175 PHARM REV CODE 636 W HCPCS: Performed by: STUDENT IN AN ORGANIZED HEALTH CARE EDUCATION/TRAINING PROGRAM

## 2023-01-21 PROCEDURE — 87205 SMEAR GRAM STAIN: CPT

## 2023-01-21 PROCEDURE — 80053 COMPREHEN METABOLIC PANEL: CPT | Performed by: STUDENT IN AN ORGANIZED HEALTH CARE EDUCATION/TRAINING PROGRAM

## 2023-01-21 PROCEDURE — 93010 ELECTROCARDIOGRAM REPORT: CPT | Mod: ,,, | Performed by: INTERNAL MEDICINE

## 2023-01-21 PROCEDURE — 99900026 HC AIRWAY MAINTENANCE (STAT)

## 2023-01-21 PROCEDURE — 63600175 PHARM REV CODE 636 W HCPCS: Performed by: INTERNAL MEDICINE

## 2023-01-21 PROCEDURE — 84484 ASSAY OF TROPONIN QUANT: CPT | Performed by: STUDENT IN AN ORGANIZED HEALTH CARE EDUCATION/TRAINING PROGRAM

## 2023-01-21 PROCEDURE — 85025 COMPLETE CBC W/AUTO DIFF WBC: CPT | Performed by: STUDENT IN AN ORGANIZED HEALTH CARE EDUCATION/TRAINING PROGRAM

## 2023-01-21 PROCEDURE — 27200966 HC CLOSED SUCTION SYSTEM

## 2023-01-21 RX ORDER — NOREPINEPHRINE BITARTRATE/D5W 4MG/250ML
0-3 PLASTIC BAG, INJECTION (ML) INTRAVENOUS CONTINUOUS
Status: DISCONTINUED | OUTPATIENT
Start: 2023-01-21 | End: 2023-01-23

## 2023-01-21 RX ORDER — ROCURONIUM BROMIDE 10 MG/ML
90 INJECTION, SOLUTION INTRAVENOUS ONCE
Status: COMPLETED | OUTPATIENT
Start: 2023-01-21 | End: 2023-01-21

## 2023-01-21 RX ORDER — ROCURONIUM BROMIDE 10 MG/ML
INJECTION, SOLUTION INTRAVENOUS
Status: COMPLETED
Start: 2023-01-21 | End: 2023-01-21

## 2023-01-21 RX ORDER — PHENYLEPHRINE HCL IN 0.9% NACL 1 MG/10 ML
SYRINGE (ML) INTRAVENOUS
Status: DISPENSED
Start: 2023-01-21 | End: 2023-01-22

## 2023-01-21 RX ORDER — HYDROXYZINE HYDROCHLORIDE 10 MG/5ML
5 SYRUP ORAL 3 TIMES DAILY
Status: DISCONTINUED | OUTPATIENT
Start: 2023-01-21 | End: 2023-01-21

## 2023-01-21 RX ORDER — ALBUTEROL SULFATE 2.5 MG/.5ML
2.5 SOLUTION RESPIRATORY (INHALATION)
Status: DISCONTINUED | OUTPATIENT
Start: 2023-01-21 | End: 2023-01-24

## 2023-01-21 RX ORDER — SODIUM CHLORIDE, SODIUM LACTATE, POTASSIUM CHLORIDE, CALCIUM CHLORIDE 600; 310; 30; 20 MG/100ML; MG/100ML; MG/100ML; MG/100ML
INJECTION, SOLUTION INTRAVENOUS CONTINUOUS
Status: DISCONTINUED | OUTPATIENT
Start: 2023-01-21 | End: 2023-01-21

## 2023-01-21 RX ORDER — SODIUM CHLORIDE FOR INHALATION 3 %
4 VIAL, NEBULIZER (ML) INHALATION
Status: DISCONTINUED | OUTPATIENT
Start: 2023-01-21 | End: 2023-01-24

## 2023-01-21 RX ORDER — ACETAMINOPHEN 325 MG/1
650 TABLET ORAL ONCE
Status: COMPLETED | OUTPATIENT
Start: 2023-01-21 | End: 2023-01-21

## 2023-01-21 RX ORDER — NOREPINEPHRINE BITARTRATE/D5W 4MG/250ML
PLASTIC BAG, INJECTION (ML) INTRAVENOUS
Status: DISPENSED
Start: 2023-01-21 | End: 2023-01-22

## 2023-01-21 RX ORDER — PROPOFOL 10 MG/ML
INJECTION, EMULSION INTRAVENOUS
Status: COMPLETED
Start: 2023-01-21 | End: 2023-01-21

## 2023-01-21 RX ORDER — DEXTROSE MONOHYDRATE AND SODIUM CHLORIDE 5; .45 G/100ML; G/100ML
INJECTION, SOLUTION INTRAVENOUS CONTINUOUS
Status: DISCONTINUED | OUTPATIENT
Start: 2023-01-21 | End: 2023-01-21

## 2023-01-21 RX ORDER — ETOMIDATE 2 MG/ML
20 INJECTION INTRAVENOUS ONCE
Status: COMPLETED | OUTPATIENT
Start: 2023-01-21 | End: 2023-01-21

## 2023-01-21 RX ORDER — METOPROLOL SUCCINATE 25 MG/1
25 TABLET, EXTENDED RELEASE ORAL DAILY
Status: DISCONTINUED | OUTPATIENT
Start: 2023-01-21 | End: 2023-01-22

## 2023-01-21 RX ORDER — ALBUTEROL SULFATE 90 UG/1
2 AEROSOL, METERED RESPIRATORY (INHALATION) EVERY 4 HOURS PRN
Status: DISCONTINUED | OUTPATIENT
Start: 2023-01-21 | End: 2023-01-31 | Stop reason: HOSPADM

## 2023-01-21 RX ORDER — ETOMIDATE 2 MG/ML
INJECTION INTRAVENOUS
Status: COMPLETED
Start: 2023-01-21 | End: 2023-01-21

## 2023-01-21 RX ORDER — FUROSEMIDE 10 MG/ML
60 INJECTION INTRAMUSCULAR; INTRAVENOUS ONCE
Status: COMPLETED | OUTPATIENT
Start: 2023-01-21 | End: 2023-01-21

## 2023-01-21 RX ORDER — LORAZEPAM 0.5 MG/1
0.5 TABLET ORAL EVERY 6 HOURS PRN
Status: DISCONTINUED | OUTPATIENT
Start: 2023-01-21 | End: 2023-01-25

## 2023-01-21 RX ORDER — PROPOFOL 10 MG/ML
0-50 INJECTION, EMULSION INTRAVENOUS CONTINUOUS
Status: DISCONTINUED | OUTPATIENT
Start: 2023-01-21 | End: 2023-01-25

## 2023-01-21 RX ORDER — FUROSEMIDE 10 MG/ML
INJECTION INTRAMUSCULAR; INTRAVENOUS
Status: DISPENSED
Start: 2023-01-21 | End: 2023-01-21

## 2023-01-21 RX ADMIN — LORAZEPAM 0.5 MG: 0.5 TABLET ORAL at 10:01

## 2023-01-21 RX ADMIN — Medication 0.04 MCG/KG/MIN: at 12:01

## 2023-01-21 RX ADMIN — FUROSEMIDE 60 MG: 10 INJECTION, SOLUTION INTRAMUSCULAR; INTRAVENOUS at 11:01

## 2023-01-21 RX ADMIN — Medication 0.1 MCG/KG/MIN: at 07:01

## 2023-01-21 RX ADMIN — DOXYCYCLINE 100 MG: 100 INJECTION, POWDER, LYOPHILIZED, FOR SOLUTION INTRAVENOUS at 08:01

## 2023-01-21 RX ADMIN — ETOMIDATE 20 MG: 2 INJECTION INTRAVENOUS at 12:01

## 2023-01-21 RX ADMIN — DOXYCYCLINE 100 MG: 100 INJECTION, POWDER, LYOPHILIZED, FOR SOLUTION INTRAVENOUS at 09:01

## 2023-01-21 RX ADMIN — CEFEPIME 2 G: 2 INJECTION, POWDER, FOR SOLUTION INTRAVENOUS at 10:01

## 2023-01-21 RX ADMIN — FAMOTIDINE 20 MG: 10 INJECTION, SOLUTION INTRAVENOUS at 09:01

## 2023-01-21 RX ADMIN — DEXMEDETOMIDINE HYDROCHLORIDE 0.4 MCG/KG/HR: 4 INJECTION, SOLUTION INTRAVENOUS at 01:01

## 2023-01-21 RX ADMIN — DEXMEDETOMIDINE HYDROCHLORIDE 1.4 MCG/KG/HR: 4 INJECTION, SOLUTION INTRAVENOUS at 11:01

## 2023-01-21 RX ADMIN — ENOXAPARIN SODIUM 40 MG: 40 INJECTION SUBCUTANEOUS at 04:01

## 2023-01-21 RX ADMIN — IPRATROPIUM BROMIDE AND ALBUTEROL SULFATE 3 ML: 2.5; .5 SOLUTION RESPIRATORY (INHALATION) at 11:01

## 2023-01-21 RX ADMIN — METHYLPREDNISOLONE SODIUM SUCCINATE 60 MG: 40 INJECTION, POWDER, FOR SOLUTION INTRAMUSCULAR; INTRAVENOUS at 08:01

## 2023-01-21 RX ADMIN — ROCURONIUM BROMIDE 90 MG: 10 INJECTION, SOLUTION INTRAVENOUS at 12:01

## 2023-01-21 RX ADMIN — PROPOFOL 50 MCG/KG/MIN: 10 INJECTION, EMULSION INTRAVENOUS at 10:01

## 2023-01-21 RX ADMIN — SODIUM CHLORIDE, SODIUM LACTATE, POTASSIUM CHLORIDE, AND CALCIUM CHLORIDE: .6; .31; .03; .02 INJECTION, SOLUTION INTRAVENOUS at 08:01

## 2023-01-21 RX ADMIN — ROCURONIUM BROMIDE 90 MG: 10 INJECTION INTRAVENOUS at 12:01

## 2023-01-21 RX ADMIN — IPRATROPIUM BROMIDE AND ALBUTEROL SULFATE 3 ML: 2.5; .5 SOLUTION RESPIRATORY (INHALATION) at 04:01

## 2023-01-21 RX ADMIN — PROPOFOL 15 MCG/KG/MIN: 10 INJECTION, EMULSION INTRAVENOUS at 12:01

## 2023-01-21 RX ADMIN — MUPIROCIN: 20 OINTMENT TOPICAL at 08:01

## 2023-01-21 RX ADMIN — ETOMIDATE 20 MG: 2 INJECTION, SOLUTION INTRAVENOUS at 12:01

## 2023-01-21 RX ADMIN — ALBUTEROL SULFATE 2 PUFF: 90 AEROSOL, METERED RESPIRATORY (INHALATION) at 11:01

## 2023-01-21 RX ADMIN — FAMOTIDINE 20 MG: 10 INJECTION, SOLUTION INTRAVENOUS at 08:01

## 2023-01-21 RX ADMIN — IPRATROPIUM BROMIDE AND ALBUTEROL SULFATE 3 ML: 2.5; .5 SOLUTION RESPIRATORY (INHALATION) at 07:01

## 2023-01-21 RX ADMIN — IPRATROPIUM BROMIDE AND ALBUTEROL SULFATE 3 ML: 2.5; .5 SOLUTION RESPIRATORY (INHALATION) at 03:01

## 2023-01-21 RX ADMIN — CEFEPIME 2 G: 2 INJECTION, POWDER, FOR SOLUTION INTRAVENOUS at 09:01

## 2023-01-21 RX ADMIN — PROPOFOL 50 MCG/KG/MIN: 10 INJECTION, EMULSION INTRAVENOUS at 05:01

## 2023-01-21 RX ADMIN — ACETAMINOPHEN 650 MG: 325 TABLET ORAL at 01:01

## 2023-01-21 RX ADMIN — MUPIROCIN: 20 OINTMENT TOPICAL at 09:01

## 2023-01-21 RX ADMIN — DEXTROSE AND SODIUM CHLORIDE: 5; .45 INJECTION, SOLUTION INTRAVENOUS at 05:01

## 2023-01-21 RX ADMIN — METOPROLOL SUCCINATE 25 MG: 25 TABLET, EXTENDED RELEASE ORAL at 08:01

## 2023-01-21 NOTE — PLAN OF CARE
Problem: Infection  Goal: Absence of Infection Signs and Symptoms  Outcome: Ongoing, Progressing     Problem: Adult Inpatient Plan of Care  Goal: Plan of Care Review  Outcome: Ongoing, Progressing  Goal: Patient-Specific Goal (Individualized)  Outcome: Ongoing, Progressing  Goal: Absence of Hospital-Acquired Illness or Injury  Outcome: Ongoing, Progressing  Goal: Optimal Comfort and Wellbeing  Outcome: Ongoing, Progressing  Goal: Readiness for Transition of Care  Outcome: Ongoing, Progressing     Problem: Communication Impairment (Mechanical Ventilation, Invasive)  Goal: Effective Communication  Outcome: Ongoing, Progressing     Problem: Device-Related Complication Risk (Mechanical Ventilation, Invasive)  Goal: Optimal Device Function  Outcome: Ongoing, Progressing     Problem: Inability to Wean (Mechanical Ventilation, Invasive)  Goal: Mechanical Ventilation Liberation  Outcome: Ongoing, Progressing     Problem: Nutrition Impairment (Mechanical Ventilation, Invasive)  Goal: Optimal Nutrition Delivery  Outcome: Ongoing, Progressing     Problem: Skin and Tissue Injury (Mechanical Ventilation, Invasive)  Goal: Absence of Device-Related Skin and Tissue Injury  Outcome: Ongoing, Progressing     Problem: Ventilator-Induced Lung Injury (Mechanical Ventilation, Invasive)  Goal: Absence of Ventilator-Induced Lung Injury  Outcome: Ongoing, Progressing     Problem: Communication Impairment (Artificial Airway)  Goal: Effective Communication  Outcome: Ongoing, Progressing     Problem: Device-Related Complication Risk (Artificial Airway)  Goal: Optimal Device Function  Outcome: Ongoing, Progressing     Problem: Skin and Tissue Injury (Artificial Airway)  Goal: Absence of Device-Related Skin or Tissue Injury  Outcome: Ongoing, Progressing     Problem: Noninvasive Ventilation Acute  Goal: Effective Unassisted Ventilation and Oxygenation  Outcome: Ongoing, Progressing     Problem: Fall Injury Risk  Goal: Absence of Fall and  Fall-Related Injury  Outcome: Ongoing, Progressing     Problem: Skin Injury Risk Increased  Goal: Skin Health and Integrity  Outcome: Ongoing, Progressing

## 2023-01-21 NOTE — NURSING
Spoke with MD about pt's chest valve clicking and subcutaneous emphysema felt around site at this time. MD also aware of pt's urine output <30ml/hr for the last two hours. See orders for interventions taken.

## 2023-01-21 NOTE — EICU
Intervention Initiated From:  Bedside    Geeta intervened regarding:  Documentation    Comments: Called to bedside for emergent intubation.  MDs, RNs, and RT present. SaO2 98%.  1218 - etomidate 20 mg given IVP  1218 - rocuronium 90 mg given IVP  1220 - intubated by MD Soto under direct supervision of MD Peña using glidescope. ETT #7.5 placed.  Color change noted to ETCO2 detector.  Bilat breath sounds auscultated. Tube secured at 22 cm to teeth.    1223 - OGT placed.  Pt tolerated procedures well.  PCXR ordered. SaO2 100%.

## 2023-01-21 NOTE — PROCEDURES
"Ana Aguila is a 67 y.o. female patient.    Temp: 97.6 °F (36.4 °C) (01/21/23 1215)  Pulse: 88 (01/21/23 1300)  Resp: 14 (01/21/23 1300)  BP: 116/72 (01/21/23 1245)  SpO2: 97 % (01/21/23 1300)  Weight: 77.5 kg (170 lb 13.7 oz) (01/20/23 1100)  Height: 5' 5" (165.1 cm) (01/20/23 1100)       Intubation    Date/Time: 1/21/2023 1:09 PM  Location procedure was performed: [facility] INTERNAL MEDICINE  Performed by: Lakisha Soto MD  Authorized by: Lakisha Soto MD   Consent Done: Emergent Situation  Indications: respiratory distress and respiratory failure  Intubation method: video-assisted  Preoxygenation: bipap.  Sedatives: etomidate (20)  Paralytic: rocuronium (90)  Laryngoscope size: Glide 3  Tube size: 7.5 mm  Tube type: cuffed  Number of attempts: 1  Cricoid pressure: no  Cords visualized: yes  Post-procedure assessment: chest rise and ETCO2 monitor  Breath sounds: diminished  Cuff inflated: yes  ETT to teeth: 22 cm  Tube secured with: adhesive tape  Chest x-ray interpreted by me.  Chest x-ray findings: endotracheal tube in appropriate position  Patient tolerance: Patient tolerated the procedure well with no immediate complications  Comments: Pt did become hypotensive 10 min post intubation. She was started on levophed. The pt was found to have elevated intrinsic peep as well likely contributing to decreased preload. RR was decreased to 14. Pressure improved on 0.08 of levophed.         1/21/2023    Supervised by me.    Molina Ruiz MD  Phone [phone]      "

## 2023-01-21 NOTE — PROGRESS NOTES
"[facility] Resident HO-2 Progress Note    Subjective:      Trial off BIPAP this AM. Patient tachypneic with some accessory muscle use, but able to speak in short sentences. O2 sats stable >90%.   Additionally pt tachycardic to 110s this AM, improved with re-initiation of BB.  Minimal UOP ON, started LR infusion.     Objective:   Last 24 Hour Vital Signs:  BP  Min: 76/52  Max: 204/114  Temp  Av.5 °F (36.4 °C)  Min: 96 °F (35.6 °C)  Max: 98.3 °F (36.8 °C)  Pulse  Av.7  Min: 73  Max: 119  Resp  Av.8  Min: 9  Max: 45  SpO2  Av.7 %  Min: 85 %  Max: 100 %  Height  Av' 5" (165.1 cm)  Min: 5' 5" (165.1 cm)  Max: 5' 5" (165.1 cm)  Weight  Av.5 kg (170 lb 13.7 oz)  Min: 77.5 kg (170 lb 13.7 oz)  Max: 77.5 kg (170 lb 13.7 oz)  I/O last 3 completed shifts:  In: 1641.4 [I.V.:244.9; IV Piggyback:1396.5]  Out: 755 [Urine:755]    Physical Examination:  General:  awake, alert, respiratory distress  Eyes: sclera non-injected, non-icteric, PERRL, EOMI  Nose: 4 L NC in place  Mouth: dry cracked lips, pursed lip breathing  Cardiovascular: RR, NR, no murmurs appreciated. No LE edema  Pulmonary: tachypneic, accessory muscle use, speaking in short sentences. Coarse breath sounds bilaterally. Chest tube in place R anterior chest.  Abdomen: Abdomen soft and non-tender, non-distended. Bowel sounds normoactive.  Extremities: All 4 extremities are warm with palpable pulses. Moving all 4 extremities voluntarily.     Laboratory:  Laboratory Data Reviewed: yes  Pertinent Findings:  WBC 25.6 -->18.87  H/H stable, platelets WNL  CMP without electrolyte abnormalities    Microbiology Data Reviewed: yes  Pertinent Findings:  Blood cx : NGTD  FLU A/B negative  Resp PCR pending  COVID PCR pending    Other Results:  EKG (my interpretation):   Admission EKG: sinus tach 120, TWIs V1/V2  Subsequent EKG : NSR, TWI in V1, V2 resolved    Radiology Data Reviewed: yes  Pertinent Findings:  Chest xray : R sided " pneumothorax  Chest xray 1/20: improvement in R sided pneumothorax, s/p pleuravent placement    Current Medications:     Infusions:   dexmedetomidine (PRECEDEX) infusion 0.6 mcg/kg/hr (01/21/23 0737)    dextrose 5 % and 0.45 % NaCl 75 mL/hr at 01/21/23 0737        Scheduled:   albuterol-ipratropium  3 mL Nebulization Q4H    ceFEPime (MAXIPIME) IVPB  2 g Intravenous Q12H    doxycycline (VIBRAMYCIN) IVPB  100 mg Intravenous Q12H    enoxaparin  40 mg Subcutaneous Daily    famotidine (PF)  20 mg Intravenous Q12H    methylPREDNISolone sodium succinate injection  60 mg Intravenous Daily    mupirocin   Nasal BID        PRN:  dextrose 10%, dextrose 10%, glucagon (human recombinant), sodium chloride 0.9%    Antibiotics and Day Number of Therapy:  Cefepime 1/20-present  Doxycycline 1/20-present    Lines and Day Number of Therapy:      Assessment:     Ana Aguila is a 67 year old female with PMHx of COPD on 2.5L home O2, Hypertension, Hyperlipidemia, GERD, MI, and hx of Takasubo cardiomyopathy who presented to the ED for respiratory distress, required emergent intubation in the ED. Pt was found to have a large R sided pneumothorax s/p pleuravent placement. Pt was extubated to BIPAP 10/20, remains in the ICU.    Plan:     Acute on Chronic Hypoxic Hypercarbic Respiratory Failure 2/2 COPD Exacerbation  - similar presentation 7/2022  - PFTs show obstruction with severe ventilatory impairment, FEV1 0.43L, FEV1/FVC 40%  - EMS administered dexamethasone 16mg, duo-neb tx, albuterol x2, and 0.5 epi  - ED: BiPAP --> intubation for respiratory distress and accessory muscle usage  - ABG significant for pH 7.14, pCO2 98.7, pO2 169, HCO3 33.4 on ventilator with Vt 350, PEEP 5, FiO2 100% RR22  - repeat ABG shows improvement in acidosis, hypercarbia  - extubated to BIPAP 1/20  - continue CAP/VAP coverage with cefepime and doxy 1/20-present  - continue to tx COPD exacerbation with scheduled duo-nebs and IV methylprednisolone 60 daily  -  precedex for anxiety/agitation associated with NIVP    Pneumothorax  -etiology unclear, possibly present initially and worsened by NIVP  -pleurvent placed in ED  -repeat CXR shows improvement     CAD  Chronic Heart failure with diastolic dysfunction  NSTEMI type 2  - Cardiology consulted: notes initial EKG with minor ST segment elevation/minor MS segment depression. Overall pattern not suggestive of ACS. Likely 2/2 demand in setting of ARF  - TTE 08/4: EF 65%, intermediate LV diastolic dysfunction  - BNP non-elevated, troponin trend 0.147 -->0.488-->0.642, repeat AM troponin pending  - restart home Toprol XL at lower dose 25 mg     Hypertension  - Per chart review, home meds losartan 25mg, amlodipine 2.5mg  - Holding antihypertensives at this time     Hyperlipidemia  - Per chart review on rosuvastatin 10mg, continue hold     GERD  R Hemicolectomy 2/2 GIB  - Continue Famotidine IV for peptic ulcer ppx  - Monitor for bleeding, daily CBC     Recent COVID Positive  - COVID test pending    Elzbieta Nicole  [facility] Internal Medicine HO-2  U IM Service Team A    [facility] Medicine Hospitalist Pager numbers:   U Hospitalist Medicine Team A (Susanna/Keren): [phone]  U Hospitalist Medicine Team B (Camilla/Lavelle):  [phone]

## 2023-01-21 NOTE — PROGRESS NOTES
[facility]/Critical Consult Note      Patient: Aan Aguila  Age:67 y.o.   Sex: female   MRN:[id number]  Admit date:1/20/2023    Attending Physician: Keren   LOS:1 day(s)     SUBJECTIVE:    Result for Consult:   COPD, pneumothorax     History of Present Illness:  67F PMHx significant for COPD with chronic hypoxic respiratory failure on 2.5L home O2, HTN, HLD, Takasubo CM (resolved) presented to ED in respiratory distress. EMS called for respiratory distress. Reported hypoxemia on arrival patient started on supplemental oxygen without improvement and eventually escalated to BiPAP also without improvement. Intubated in the ED. CXR showed large right side pneumothorax. Pleuravent placed in the ED with repeat CXR showing significant improvement in pneumo. Admitted to the ICU.    Interval history:   Pt successfully extubated to bipap yesterday with CT in place on R anterior chest. This am the patient had increased wob with decreased air movement bilaterally. She was receiving a duoneb during my examination. Chest tube valve was tidaling appropriately.     Reassessment- this patient continued to be in acute respiratory distress with hypoxic RF.         Medications:  Prior to Admission medications    Medication Sig   acetaminophen (TYLENOL) 500 MG tablet Take 250 mg by mouth every 6 (six) hours as needed for Pain.   albuterol-ipratropium (DUO-NEB) 2.5 mg-0.5 mg/3 mL nebulizer solution TAKE 3 ML BY NEBULIZER EVERY 6 HOURS AS NEEDED FOR WHEEZING OR SHORTNESS OF BREATH   cyanocobalamin (VITAMIN B-12) 100 MCG tablet Take 100 mcg by mouth once daily.   ergocalciferol (ERGOCALCIFEROL) 50,000 unit Cap TAKE 1 CAPSULE BY MOUTH EVERY 7 DAYS   fluticasone-salmeterol diskus inhaler 250-50 mcg INHALE 1 PUFF BY MOUTH EVERY 12 HOURS AS NEEDED   hydroCHLOROthiazide (HYDRODIURIL) 25 MG tablet Take 1 tablet (25 mg total) by mouth once daily.   hydrOXYzine HCL (ATARAX) 10 MG Tab Take 1 tablet (10 mg total) by mouth 3 (three) times  "daily as needed (anxiety).   ipratropium (ATROVENT) 0.02 % nebulizer solution Take 500 mcg by nebulization 4 (four) times daily. Rescue   losartan (COZAAR) 25 MG tablet Take 1 tablet (25 mg total) by mouth once daily.   metoprolol succinate (TOPROL-XL) 100 MG 24 hr tablet Take 1 tablet (100 mg total) by mouth once daily.   PROAIR HFA 90 mcg/actuation inhaler INHALE 2 PUFFS FOUR TIMES DAILY AS NEEDED FOR COPD   rosuvastatin (CRESTOR) 10 MG tablet Take 1 tablet (10 mg total) by mouth once daily.   amLODIPine (NORVASC) 2.5 MG tablet Take 1 tablet (2.5 mg total) by mouth once daily.       OBJECTIVE DATA:      Vital Signs (last 24h)   Temp:  [96 °F (35.6 °C)-98.3 °F (36.8 °C)] 97.6 °F (36.4 °C)  Pulse:  [] 88  Resp:  [9-45] 27  SpO2:  [85 %-100 %] 93 %  BP: ()/() 125/80        Intake/Output Summary (Last 24 hours) at 1/21/2023 0857  Last data filed at 1/21/2023 0848  Gross per 24 hour   Intake 886.99 ml   Output 560 ml   Net 326.99 ml       Physical Examination:   Vitals: /80   Pulse 88   Temp 97.6 °F (36.4 °C) (Axillary)   Resp (!) 27   Ht 5' 5" (1.651 m)   Wt 77.5 kg (170 lb 13.7 oz)   LMP  (LMP Unknown)   SpO2 (!) 93%   Breastfeeding No   BMI 28.43 kg/m²      General:  opens eyes, unable to speak in sentences.  Head: Skull is normocephalic and symmetric. Symmetric facial features.   Eyes: Eyelids & skin overlying globe are intact. Sclera is white and not injected. PERRL.   Mouth: non-rebreather with duoneb in place  Neck: Supple. Full ROM. No obvious enlargements or masses .    Cardiovascular: RRR. Audible S1/S2 without rubs, murmurs and gallops.  No edema on foot, ankle or leg bilaterally.   Pulmonary: increased respiratory effort with some accessory muscle use.  Duoneb in place on bipap. Coarse breath sounds with decreased air movement bilaterally. Anterior CT in place with heimlich valve tidaling.  Abdomen: Active bowel sounds. Abdomen is soft and non-distended. No tenderness to " palpation.   Extremities: All 4 extremities are warm with palpable pulses. Moving all 4 extremities voluntarily.       Laboratory:  CBC:   Recent Labs   Lab 01/20/23  0556 01/21/23  0634   WBC 25.62* 18.87*   HGB 11.5* 12.3    326   MCV 95 94   RDW 13.7 13.8     Coags:   Recent Labs   Lab 01/20/23  0556   INR 1.0     CMP:   Recent Labs   Lab 01/20/23  0556 01/21/23  0634    136   K 3.4* 3.8    98   CO2 28 26   * 144*   BUN 17 20   CREATININE 1.0 0.8   CALCIUM 8.0* 8.7   MG 2.6 2.2   PHOS 6.7* 3.8     Estimated Creatinine Clearance: 69.3 mL/min (based on SCr of 0.8 mg/dL).  LFTs:   Recent Labs   Lab 01/20/23  0556 01/21/23  0634   PROT 6.4 6.9   ALBUMIN 3.1* 3.3*   ALKPHOS 59 69   AST 32 48*   ALT 23 27     Cardiac Data:    Recent Labs   Lab 01/20/23  0556 01/20/23  0952 01/20/23  1619   TROPONINI 0.147* 0.488* 0.642*   BNP 93  --   --          Radiology Results:   All images from this admission reviewed.     CXR 5:20AM 1/20: right side pneumothorax without midline shift.          CXR 6:09AM 1/20 s/p pleuravent.         EKG on admission 1/20 4:45AM (my interpretation): Sinus tachycardia ,  narrow QRS, normal intervals (Qtc 472), TWI in V1, V2      EKG 1/20 7:40AM: NSR, prolonged Qtc 520, TWI in V1, V2 resolved.         Microbiology Results:   Procedure Component Value Units Date/Time    Influenza A & B by Molecular [[id number]] Collected: 01/20/23 0535    Order Status: Completed Specimen: Nasopharyngeal Swab Updated: 01/20/23 0612     Influenza A, Molecular Negative     Influenza B, Molecular Negative     Flu A & B Source Nasal swab    Blood culture x two cultures. Draw prior to antibiotics. [[id number]] Collected: 01/20/23 0556    Order Status: Sent Specimen: Blood Updated: 01/20/23 0557    Blood culture x two cultures. Draw prior to antibiotics. [[id number]] Collected: 01/20/23 0556    Order Status: Sent Specimen: Blood from Antecubital, Right Hand Updated: 01/20/23 0557          Antibiotics and Day Number of Therapy:   Start     Stop Route Frequency Ordered    01/21/23 0600  azithromycin 500 mg in dextrose 5 % 250 mL IVPB (ready to mix system)         -- IV Every 24 hours (non-standard times) 01/20/23 0642    01/20/23 1000  cefepime in dextrose 5 % IVPB 2 g         -- IV Every 8 hours (non-standard times) 01/20/23 0642         Current Medications:     Continuous infusions:   dexmedetomidine (PRECEDEX) infusion 0.6 mcg/kg/hr (01/21/23 0848)    lactated ringers          Scheduled:   albuterol-ipratropium  3 mL Nebulization Q4H    ceFEPime (MAXIPIME) IVPB  2 g Intravenous Q12H    doxycycline (VIBRAMYCIN) IVPB  100 mg Intravenous Q12H    enoxaparin  40 mg Subcutaneous Daily    famotidine (PF)  20 mg Intravenous Q12H    hydrOXYzine  5 mg Oral TID    methylPREDNISolone sodium succinate injection  60 mg Intravenous Daily    metoprolol succinate  25 mg Oral Daily    mupirocin   Nasal BID        PRN:  albuterol, dextrose 10%, dextrose 10%, glucagon (human recombinant), sodium chloride 0.9%      Assessment/Plan:     Ana Aguila is a 67 y.o. female with a PMHx significant for COPD with chronic hypoxic respiratory failure on 2.5L home O2, HTN, HLD, Takasubo CM (resolved) presented to ED in respiratory distress. Admitted to the ICU for mechanical ventitlation. Pt was extubated yesterday however currently in respiratory distress on bipap with worsening hypoxia and mental status. Discussed with daughter and grand daughter and they would like another trial of intubation. Patient intubated on 1/21/23. See procedure note for details.      Neuro  - Exam: in respiratory distress, opens eyes and was following commands earlier. Decreased mental status throughout the morning.   - propofol started, on precedex     Cardiovascular  #  Elevated Troponin, repeat EKG shows persistent deeply inverted T waves in inferior and lateral leads.   # hypotension- on low dose leveophed peripherally  # HLD  - Most  recent TTE 8/2022 normal systolic fx 65%, indeterminate diastolic function and normal RV function. [facility] 7/2022 no significant CAD.   - Qtc prolonged since admission. Patient on azithro but first dose obtained after repeat EKG with Qtc prolongation. Patient received 2g IV mag in between EKGs.   - Lactate and BNP wnl.   PLAN  - Trend troponin  - Avoid Qtc prolonging drugs. Switched from azithro to doxy.   - if troponin is rising, repeat EKG     Respiratory  # Acute Hypercapnic Respiratory Failure  # Pneumothorax  # COPD with Acute on Chronic Hypoxic Respiratory Failure on 2.5L home O2  - Patient required reintubation on 1/21/23 for severe respiratory distress and hypoxic RF.   - on ventilator the pt was having intrinsic peep elevation. Peep was 12 with set peep of 5. Decreased RR to 14 with improvement.   - continue q4 albuterol nebs, continue steroids   - CXR shows good ETT position and no increase in pneumothorax.   - obtain respiratory culture  - vbg ordered     Renal   - Cr stable and at baseline ~0.9  -  since admission.   - Electrolytes OK. K 3.8. Replete PRN.     GI  # Right hemicolectomy 2/2 GIB   - Diet: NPO (intubated)  - Last bowel movement: unclear  - Bowel regimen: n/a     Heme  - On admission, H/H 11.5  37.0 with MCV 95 & RDW 14. Baseline Hgb appears to be ~ 12.  - Plt 326    Infectious Disease  # Leukocytosis   - Afebrile with WBC downtrending to 18.87 from 25.62  - CXR with a worsening R upper lobe infiltrate  - On CAP coverage with cefepime and doxy pending culture    Endocrine  - A1c 5.2%  serum gluc 287 on admission.   - Serum glucose goal of 140-180.     Analgesia: propofol and precedex ggt  Thrombo PPX: lovenox 40 qd  Head of Bed: 30  Feeding: NPO    Code: full  Dispo: Critcally ill, continue ICU care.     Patient evaluated and plan discussed in the presence of Dr. Danielle Soto MD  Emergency Medicine Staff   Critical Care Fellow  3:28 PM

## 2023-01-22 LAB
ALBUMIN SERPL BCP-MCNC: 3.1 G/DL (ref 3.5–5.2)
ALLENS TEST: ABNORMAL
ALP SERPL-CCNC: 55 U/L (ref 55–135)
ALT SERPL W/O P-5'-P-CCNC: 24 U/L (ref 10–44)
ANION GAP SERPL CALC-SCNC: 12 MMOL/L (ref 8–16)
AST SERPL-CCNC: 37 U/L (ref 10–40)
BASOPHILS # BLD AUTO: 0.03 K/UL (ref 0–0.2)
BASOPHILS NFR BLD: 0.1 % (ref 0–1.9)
BILIRUB SERPL-MCNC: 0.8 MG/DL (ref 0.1–1)
BUN SERPL-MCNC: 21 MG/DL (ref 8–23)
CALCIUM SERPL-MCNC: 8.4 MG/DL (ref 8.7–10.5)
CHLORIDE SERPL-SCNC: 97 MMOL/L (ref 95–110)
CO2 SERPL-SCNC: 26 MMOL/L (ref 23–29)
CREAT SERPL-MCNC: 0.8 MG/DL (ref 0.5–1.4)
DELSYS: ABNORMAL
DIFFERENTIAL METHOD: ABNORMAL
EOSINOPHIL # BLD AUTO: 0 K/UL (ref 0–0.5)
EOSINOPHIL NFR BLD: 0 % (ref 0–8)
ERYTHROCYTE [DISTWIDTH] IN BLOOD BY AUTOMATED COUNT: 13.9 % (ref 11.5–14.5)
ERYTHROCYTE [SEDIMENTATION RATE] IN BLOOD BY WESTERGREN METHOD: 14 MM/H
EST. GFR  (NO RACE VARIABLE): >60 ML/MIN/1.73 M^2
FIO2: 25
GLUCOSE SERPL-MCNC: 131 MG/DL (ref 70–110)
HCO3 UR-SCNC: 35.8 MMOL/L (ref 24–28)
HCT VFR BLD AUTO: 40.3 % (ref 37–48.5)
HGB BLD-MCNC: 12.4 G/DL (ref 12–16)
IMM GRANULOCYTES # BLD AUTO: 0.09 K/UL (ref 0–0.04)
IMM GRANULOCYTES NFR BLD AUTO: 0.4 % (ref 0–0.5)
LYMPHOCYTES # BLD AUTO: 2.4 K/UL (ref 1–4.8)
LYMPHOCYTES NFR BLD: 11.7 % (ref 18–48)
MAGNESIUM SERPL-MCNC: 1.9 MG/DL (ref 1.6–2.6)
MCH RBC QN AUTO: 28.8 PG (ref 27–31)
MCHC RBC AUTO-ENTMCNC: 30.8 G/DL (ref 32–36)
MCV RBC AUTO: 94 FL (ref 82–98)
MODE: ABNORMAL
MONOCYTES # BLD AUTO: 2 K/UL (ref 0.3–1)
MONOCYTES NFR BLD: 9.8 % (ref 4–15)
NEUTROPHILS # BLD AUTO: 15.9 K/UL (ref 1.8–7.7)
NEUTROPHILS NFR BLD: 78 % (ref 38–73)
NRBC BLD-RTO: 0 /100 WBC
PCO2 BLDA: 46.3 MMHG (ref 35–45)
PEEP: 5
PH SMN: 7.5 [PH] (ref 7.35–7.45)
PHOSPHATE SERPL-MCNC: 1.4 MG/DL (ref 2.7–4.5)
PLATELET # BLD AUTO: 351 K/UL (ref 150–450)
PMV BLD AUTO: 9.7 FL (ref 9.2–12.9)
PO2 BLDA: 53 MMHG (ref 80–100)
POC BE: 13 MMOL/L
POC SATURATED O2: 89 % (ref 95–100)
POC TCO2: 37 MMOL/L (ref 23–27)
POCT GLUCOSE: 108 MG/DL (ref 70–110)
POCT GLUCOSE: 203 MG/DL (ref 70–110)
POCT GLUCOSE: 76 MG/DL (ref 70–110)
POCT GLUCOSE: 81 MG/DL (ref 70–110)
POCT GLUCOSE: 94 MG/DL (ref 70–110)
POTASSIUM SERPL-SCNC: 3.3 MMOL/L (ref 3.5–5.1)
PROT SERPL-MCNC: 6.4 G/DL (ref 6–8.4)
RBC # BLD AUTO: 4.31 M/UL (ref 4–5.4)
SAMPLE: ABNORMAL
SITE: ABNORMAL
SODIUM SERPL-SCNC: 135 MMOL/L (ref 136–145)
VT: 360
WBC # BLD AUTO: 20.37 K/UL (ref 3.9–12.7)

## 2023-01-22 PROCEDURE — 85025 COMPLETE CBC W/AUTO DIFF WBC: CPT | Performed by: STUDENT IN AN ORGANIZED HEALTH CARE EDUCATION/TRAINING PROGRAM

## 2023-01-22 PROCEDURE — 36410 VNPNXR 3YR/> PHY/QHP DX/THER: CPT

## 2023-01-22 PROCEDURE — 36415 COLL VENOUS BLD VENIPUNCTURE: CPT | Performed by: STUDENT IN AN ORGANIZED HEALTH CARE EDUCATION/TRAINING PROGRAM

## 2023-01-22 PROCEDURE — 94761 N-INVAS EAR/PLS OXIMETRY MLT: CPT

## 2023-01-22 PROCEDURE — 25000003 PHARM REV CODE 250: Performed by: INTERNAL MEDICINE

## 2023-01-22 PROCEDURE — 36600 WITHDRAWAL OF ARTERIAL BLOOD: CPT

## 2023-01-22 PROCEDURE — 63600175 PHARM REV CODE 636 W HCPCS: Performed by: STUDENT IN AN ORGANIZED HEALTH CARE EDUCATION/TRAINING PROGRAM

## 2023-01-22 PROCEDURE — 63600175 PHARM REV CODE 636 W HCPCS

## 2023-01-22 PROCEDURE — 84100 ASSAY OF PHOSPHORUS: CPT | Performed by: INTERNAL MEDICINE

## 2023-01-22 PROCEDURE — 25000003 PHARM REV CODE 250

## 2023-01-22 PROCEDURE — 83735 ASSAY OF MAGNESIUM: CPT | Performed by: INTERNAL MEDICINE

## 2023-01-22 PROCEDURE — 27200966 HC CLOSED SUCTION SYSTEM

## 2023-01-22 PROCEDURE — 99900035 HC TECH TIME PER 15 MIN (STAT)

## 2023-01-22 PROCEDURE — 25000242 PHARM REV CODE 250 ALT 637 W/ HCPCS: Performed by: INTERNAL MEDICINE

## 2023-01-22 PROCEDURE — 32551 INSERTION OF CHEST TUBE: CPT

## 2023-01-22 PROCEDURE — 80053 COMPREHEN METABOLIC PANEL: CPT | Performed by: INTERNAL MEDICINE

## 2023-01-22 PROCEDURE — 25000003 PHARM REV CODE 250: Performed by: STUDENT IN AN ORGANIZED HEALTH CARE EDUCATION/TRAINING PROGRAM

## 2023-01-22 PROCEDURE — 20000000 HC ICU ROOM

## 2023-01-22 PROCEDURE — C1751 CATH, INF, PER/CENT/MIDLINE: HCPCS

## 2023-01-22 PROCEDURE — 63600175 PHARM REV CODE 636 W HCPCS: Performed by: INTERNAL MEDICINE

## 2023-01-22 PROCEDURE — 94640 AIRWAY INHALATION TREATMENT: CPT

## 2023-01-22 PROCEDURE — 94003 VENT MGMT INPAT SUBQ DAY: CPT

## 2023-01-22 RX ORDER — LIDOCAINE HYDROCHLORIDE 10 MG/ML
1 INJECTION, SOLUTION EPIDURAL; INFILTRATION; INTRACAUDAL; PERINEURAL ONCE
Status: COMPLETED | OUTPATIENT
Start: 2023-01-22 | End: 2023-01-22

## 2023-01-22 RX ORDER — FENTANYL CITRATE 50 UG/ML
INJECTION, SOLUTION INTRAMUSCULAR; INTRAVENOUS
Status: COMPLETED
Start: 2023-01-22 | End: 2023-01-22

## 2023-01-22 RX ORDER — ACETAMINOPHEN 500 MG
1000 TABLET ORAL 3 TIMES DAILY
Status: DISCONTINUED | OUTPATIENT
Start: 2023-01-22 | End: 2023-01-23

## 2023-01-22 RX ORDER — MORPHINE SULFATE 2 MG/ML
2 INJECTION, SOLUTION INTRAMUSCULAR; INTRAVENOUS EVERY 4 HOURS PRN
Status: DISCONTINUED | OUTPATIENT
Start: 2023-01-22 | End: 2023-01-22

## 2023-01-22 RX ORDER — LIDOCAINE HYDROCHLORIDE 10 MG/ML
INJECTION, SOLUTION EPIDURAL; INFILTRATION; INTRACAUDAL; PERINEURAL
Status: COMPLETED
Start: 2023-01-22 | End: 2023-01-22

## 2023-01-22 RX ORDER — METOPROLOL SUCCINATE 50 MG/1
50 TABLET, EXTENDED RELEASE ORAL DAILY
Status: DISCONTINUED | OUTPATIENT
Start: 2023-01-23 | End: 2023-01-23

## 2023-01-22 RX ORDER — METOPROLOL SUCCINATE 25 MG/1
25 TABLET, EXTENDED RELEASE ORAL ONCE
Status: COMPLETED | OUTPATIENT
Start: 2023-01-22 | End: 2023-01-22

## 2023-01-22 RX ORDER — FENTANYL CITRATE 50 UG/ML
25 INJECTION, SOLUTION INTRAMUSCULAR; INTRAVENOUS ONCE
Status: COMPLETED | OUTPATIENT
Start: 2023-01-22 | End: 2023-01-22

## 2023-01-22 RX ORDER — HYDROMORPHONE HYDROCHLORIDE 1 MG/ML
0.5 INJECTION, SOLUTION INTRAMUSCULAR; INTRAVENOUS; SUBCUTANEOUS EVERY 6 HOURS PRN
Status: DISCONTINUED | OUTPATIENT
Start: 2023-01-22 | End: 2023-01-25

## 2023-01-22 RX ADMIN — DOXYCYCLINE 100 MG: 100 INJECTION, POWDER, LYOPHILIZED, FOR SOLUTION INTRAVENOUS at 10:01

## 2023-01-22 RX ADMIN — FENTANYL CITRATE 25 MCG: 50 INJECTION, SOLUTION INTRAMUSCULAR; INTRAVENOUS at 05:01

## 2023-01-22 RX ADMIN — IPRATROPIUM BROMIDE AND ALBUTEROL SULFATE 3 ML: 2.5; .5 SOLUTION RESPIRATORY (INHALATION) at 07:01

## 2023-01-22 RX ADMIN — IPRATROPIUM BROMIDE AND ALBUTEROL SULFATE 3 ML: 2.5; .5 SOLUTION RESPIRATORY (INHALATION) at 11:01

## 2023-01-22 RX ADMIN — METOPROLOL SUCCINATE 25 MG: 25 TABLET, EXTENDED RELEASE ORAL at 05:01

## 2023-01-22 RX ADMIN — Medication 0.06 MCG/KG/MIN: at 06:01

## 2023-01-22 RX ADMIN — PROPOFOL 50 MCG/KG/MIN: 10 INJECTION, EMULSION INTRAVENOUS at 06:01

## 2023-01-22 RX ADMIN — PROPOFOL 50 MCG/KG/MIN: 10 INJECTION, EMULSION INTRAVENOUS at 02:01

## 2023-01-22 RX ADMIN — HYDROMORPHONE HYDROCHLORIDE 0.5 MG: 1 INJECTION, SOLUTION INTRAMUSCULAR; INTRAVENOUS; SUBCUTANEOUS at 04:01

## 2023-01-22 RX ADMIN — CEFEPIME 2 G: 2 INJECTION, POWDER, FOR SOLUTION INTRAVENOUS at 10:01

## 2023-01-22 RX ADMIN — ACETAMINOPHEN 1000 MG: 500 TABLET ORAL at 08:01

## 2023-01-22 RX ADMIN — FENTANYL CITRATE 25 MCG: 50 INJECTION INTRAMUSCULAR; INTRAVENOUS at 05:01

## 2023-01-22 RX ADMIN — IPRATROPIUM BROMIDE AND ALBUTEROL SULFATE 3 ML: 2.5; .5 SOLUTION RESPIRATORY (INHALATION) at 03:01

## 2023-01-22 RX ADMIN — ENOXAPARIN SODIUM 40 MG: 40 INJECTION SUBCUTANEOUS at 04:01

## 2023-01-22 RX ADMIN — PROPOFOL 50 MCG/KG/MIN: 10 INJECTION, EMULSION INTRAVENOUS at 11:01

## 2023-01-22 RX ADMIN — METOPROLOL SUCCINATE 25 MG: 25 TABLET, EXTENDED RELEASE ORAL at 08:01

## 2023-01-22 RX ADMIN — MUPIROCIN: 20 OINTMENT TOPICAL at 08:01

## 2023-01-22 RX ADMIN — DOXYCYCLINE 100 MG: 100 INJECTION, POWDER, LYOPHILIZED, FOR SOLUTION INTRAVENOUS at 08:01

## 2023-01-22 RX ADMIN — LIDOCAINE HYDROCHLORIDE 50 MG: 10 INJECTION, SOLUTION EPIDURAL; INFILTRATION; INTRACAUDAL; PERINEURAL at 05:01

## 2023-01-22 RX ADMIN — METHYLPREDNISOLONE SODIUM SUCCINATE 60 MG: 40 INJECTION, POWDER, FOR SOLUTION INTRAMUSCULAR; INTRAVENOUS at 09:01

## 2023-01-22 RX ADMIN — FAMOTIDINE 20 MG: 10 INJECTION, SOLUTION INTRAVENOUS at 08:01

## 2023-01-22 RX ADMIN — Medication 0.04 MCG/KG/MIN: at 06:01

## 2023-01-22 RX ADMIN — CEFEPIME 2 G: 2 INJECTION, POWDER, FOR SOLUTION INTRAVENOUS at 06:01

## 2023-01-22 RX ADMIN — PROPOFOL 50 MCG/KG/MIN: 10 INJECTION, EMULSION INTRAVENOUS at 10:01

## 2023-01-22 NOTE — NURSING
0300: Called [facility] IM about chest x-ray showing new R apical pneumothorax and to review results;     0330: Called pulmonary (Brittany RYAN) about the chest x-ray results. Noted my concern about it progressively worsening. Also noted that the pleuravent is not tidaling. Repeat chest x-ray ordered. MD coming to bedside.    0400: New chest tube being placed.

## 2023-01-22 NOTE — PLAN OF CARE
Pt not progressing today, had to be reintubated today at 1220.  VS now stable. Intubated, sedated. See flow sheet for griffin info.

## 2023-01-22 NOTE — PLAN OF CARE
Dr. Kaiser had a discussion with patient's family including daughter and grand-daughter. Plan will be to optimize the patient overnight and attempt for extubation either tomorrow or Tuesday with plans to not re-intubate.

## 2023-01-22 NOTE — NURSING
Notified [facility] IM team about drainage from the pleuravent site and the catheter being visible. MD came to bedside. Chest x-ray ordered. Patient's VSS. No signs of respiratory distress. Breath sounds audible bilaterally.

## 2023-01-22 NOTE — PROGRESS NOTES
[facility] IM Resident HO-2 Progress Note    Subjective:      Patient required reintubation for respiratory distress yesterday. Chest xray with interval increase R middle lobe consolidation underlying chest tube.   ON nurse reported pleuravax leaking, interval increase R apical pneumothorax. R chest tube replaced by Dr. Soto ON.  Remains intubated, sedated. Requiring minimal pressure support with norepi.     Objective:   Last 24 Hour Vital Signs:  BP  Min: 60/45  Max: 189/113  Temp  Av.6 °F (36.4 °C)  Min: 96.9 °F (36.1 °C)  Max: 97.9 °F (36.6 °C)  Pulse  Av  Min: 79  Max: 114  Resp  Av.4  Min: 14  Max: 50  SpO2  Av.2 %  Min: 83 %  Max: 100 %  I/O last 3 completed shifts:  In: 2205.6 [P.O.:60; I.V.:1517; IV Piggyback:628.7]  Out:  [Urine:]    Physical Examination:  General: intubated, sedated  Eyes: sclera non-injected, non-icteric, PERRL, EOMI  Mouth:ET tube in place  Cardiovascular: RR, NR, no murmurs appreciated. No LE edema  Pulmonary: poor air movement bilaterally, minimal wheeze, R chest tube in place  Abdomen: Abdomen soft and non-tender, non-distended. Bowel sounds normoactive.  Extremities: All 4 extremities are warm with palpable pulses.     Laboratory:  Laboratory Data Reviewed: yes  Pertinent Findings:  WBC 25.6 -->18.87  H/H stable, platelets WNL  CMP without electrolyte abnormalities    Microbiology Data Reviewed: yes  Pertinent Findings:  Blood cx : NGTD  FLU A/B negative  Resp PCR pending  COVID PCR pending    Other Results:  EKG (my interpretation):   Admission EKG: sinus tach 120, TWIs V1/V2  Subsequent EKG : NSR, TWI in V1, V2 resolved    Radiology Data Reviewed: yes  Pertinent Findings:  Chest xray : R sided pneumothorax  Chest xray : improvement in R sided pneumothorax, s/p pleuravent placement    Current Medications:     Infusions:   dexmedetomidine (PRECEDEX) infusion Stopped (23 1219)    NORepinephrine bitartrate-D5W 0.04 mcg/kg/min (23  0723)    propofoL 50 mcg/kg/min (01/22/23 0723)        Scheduled:   albuterol-ipratropium  3 mL Nebulization Q4H    ceFEPime (MAXIPIME) IVPB  2 g Intravenous Q12H    doxycycline (VIBRAMYCIN) IVPB  100 mg Intravenous Q12H    enoxaparin  40 mg Subcutaneous Daily    famotidine (PF)  20 mg Intravenous Q12H    methylPREDNISolone sodium succinate injection  60 mg Intravenous Daily    metoprolol succinate  25 mg Oral Daily    mupirocin   Nasal BID        PRN:  albuterol, albuterol sulfate, dextrose 10%, dextrose 10%, glucagon (human recombinant), LORazepam, sodium chloride 0.9%, sodium chloride 3%    Antibiotics and Day Number of Therapy:  Cefepime 1/20-present  Doxycycline 1/20-present    Lines and Day Number of Therapy:      Assessment:     Ana Aguila is a 67 year old female with PMHx of COPD on 2.5L home O2, Hypertension, Hyperlipidemia, GERD, MI, and hx of Takasubo cardiomyopathy who presented to the ED for respiratory distress, required emergent intubation in the ED. Pt was found to have a large R sided pneumothorax s/p pleuravent --> chest tube placement for re-expansion of apical pneumo. Pt was re-intubated 1/21 for respiratory distress. Remains intubated sedated in ICU.    Plan:     Acute on Chronic Hypoxic Hypercarbic Respiratory Failure 2/2 COPD Exacerbation  - similar presentation 7/2022  - PFTs show obstruction with severe ventilatory impairment, FEV1 0.43L, FEV1/FVC 40%  - EMS administered dexamethasone 16mg, duo-neb tx, albuterol x2, and 0.5 epi  - ED: BiPAP --> intubation for respiratory distress and accessory muscle usage  - ABG significant for pH 7.14, pCO2 98.7, pO2 169, HCO3 33.4 on ventilator with Vt 350, PEEP 5, FiO2 100% RR22  - repeat ABG shows improvement in acidosis, hypercarbia  - extubated to BIPAP 1/20, re-intubated 1/21  - continue CAP/VAP coverage with cefepime and doxy 1/20-present  - continue to tx COPD exacerbation with scheduled duo-nebs and IV methylprednisolone 60 daily  - plan for  extubation when respiratory status allows, no plans for re-intubation, family on board    Recurrent pneumothorax  -etiology unclear, possibly present initially and worsened by NIVP  -pleurvent placed in ED, chest x-ray showed expansion of apical pneumo 1/21 --> chest tube placed, pleuravax pulled     CAD  Chronic Heart failure with diastolic dysfunction  NSTEMI type 2  - Cardiology consulted: notes initial EKG with minor ST segment elevation/minor IN segment depression. Overall pattern not suggestive of ACS. Likely 2/2 demand in setting of ARF  - TTE 08/4: EF 65%, intermediate LV diastolic dysfunction  - BNP non-elevated, troponin trend 0.147 -->0.488-->0.642--> peaked 1/21  - continue Toprol XL 25     Hx Hypertension, currently hypotensive  - Per chart review, home meds losartan 25mg, amlodipine 2.5mg, continue hold  - requiring minimal norepi, wean as pressures allow     Hyperlipidemia  - Per chart review on rosuvastatin 10mg, continue hold     GERD  R Hemicolectomy 2/2 GIB  - Continue Famotidine IV for peptic ulcer ppx  - Monitor for bleeding, daily CBC    Dispo: continue ICU care, intubated, sedated  PPX: lovenox    Elzbieta Nicole  [facility] Internal Medicine HO-2  U IM Service Team A    [facility] Medicine Hospitalist Pager numbers:   U Hospitalist Medicine Team A (Susanna/Keren): [phone]  U Hospitalist Medicine Team B (Camilla/Lavelle):  [phone]

## 2023-01-22 NOTE — PROGRESS NOTES
Pharmacist Renal Dose Adjustment Note    Ana Aguila is a 67 y.o. female being treated with the medication cefepime     Patient Data:    Vital Signs (Most Recent):  Temp: 97.9 °F (36.6 °C) (01/22/23 0700)  Pulse: 94 (01/22/23 1111)  Resp: 16 (01/22/23 1111)  BP: 122/67 (01/22/23 1100)  SpO2: 100 % (01/22/23 1111) Vital Signs (72h Range):  Temp:  [96 °F (35.6 °C)-98.3 °F (36.8 °C)]   Pulse:  []   Resp:  [9-50]   BP: ()/()   SpO2:  [83 %-100 %]      Recent Labs   Lab 01/20/23  0556 01/21/23  0634   CREATININE 1.0 0.8     Serum creatinine: 0.8 mg/dL 01/21/23 0634  Estimated creatinine clearance: 69.3 mL/min    Medication: cefepime dose: 2 gm frequency Q 12 hrs will be changed to medication: cefepime dose:2 gm frequency:Q 24 hrs     Pharmacist's Name: Skye Chen  Pharmacist's Extension: [phone]

## 2023-01-22 NOTE — PLAN OF CARE
Problem: Infection  Goal: Absence of Infection Signs and Symptoms  Outcome: Ongoing, Progressing   Patient remains intubated and sedated. Propofol titrated to maintain goal RASS. Vital signs stable. Chest tube placed by MD Brittany. Urine output adequate. Repositioned frequently. Skin protection interventions in place. Safety maintained. Plan of care reviewed with patient.

## 2023-01-22 NOTE — PROCEDURES
"Ana Aguila is a 67 y.o. female patient.    Temp: 96.9 °F (36.1 °C) (01/21/23 1901)  Pulse: 89 (01/22/23 0530)  Resp: (!) 21 (01/22/23 0530)  BP: (!) 144/92 (01/22/23 0530)  SpO2: 100 % (01/22/23 0530)  Weight: 77.5 kg (170 lb 13.7 oz) (01/20/23 1100)  Height: 5' 5" (165.1 cm) (01/20/23 1100)       Chest Tube Insertion    Date/Time: 1/22/2023 4:30 AM  Location procedure was performed: [facility] 5TH FLOOR  Performed by: Lakisha Soto MD  Authorized by: Lakisha Soto MD   Consent Done: Emergent Situation  Indications: pneumothorax    Patient sedated: yes  Sedation type: deep sedation    Sedation: propofol and precedex.  Anesthesia: local infiltration    Anesthesia:  Local Anesthetic: lidocaine 1% without epinephrine  Anesthetic total: 10 mL  Preparation: skin prepped with ChloraPrep  Placement location: right anterior  Scalpel size: 10  Tube size: 8 Tongan  Tension pneumothorax heard: no  Tube connected to: suction  Drainage characteristics: air.  Suture material: 2-0 silk  Dressing: 4x4 sterile gauze  Post-insertion x-ray findings: tube in good position  Patient tolerance: Patient tolerated the procedure well with no immediate complications        1/22/2023    "

## 2023-01-22 NOTE — PROGRESS NOTES
[facility] Pulmonary/Critical Consult Note      Patient: Ana Aguila  Age:67 y.o.   Sex: female   MRN:[id number]  Admit date:1/20/2023    Attending Physician: Keren   LOS:2 day(s)     SUBJECTIVE:    Result for Consult:   COPD, pneumothorax     History of Present Illness:  67F PMHx significant for COPD with chronic hypoxic respiratory failure on 2.5L home O2, HTN, HLD, Takasubo CM (resolved) presented to ED in respiratory distress. EMS called for respiratory distress. Reported hypoxemia on arrival patient started on supplemental oxygen without improvement and eventually escalated to BiPAP also without improvement. Intubated in the ED. CXR showed large right side pneumothorax. Pleuravent placed in the ED with repeat CXR showing significant improvement in pneumo. Admitted to the ICU.    Interval history:   1/20/23: Pt successfully extubated to bipap yesterday with CT in place on R anterior chest.     1/21/23: This am the patient had increased wob with decreased air movement bilaterally. She was receiving a duoneb during my examination. Chest tube valve was tidaling appropriately.     Reassessment- this patient continued to be in acute respiratory distress with hypoxic RF.     1/22/23: Received call at 340am regarding the patient's CT. It was displaced. CXR showed recurrent pneumothorax. The pt was otherwise hemodynamically stable, no change in levophed dose 0.08. satting 100% on same vent settings. No increase in respiratory effort. Due to concerns for worsening pneumothorax while on ventilator and increased subcutaneous emphysema a armaan catheter was placed in the R apical space. See procedure note and plan.         Medications:  Prior to Admission medications    Medication Sig   acetaminophen (TYLENOL) 500 MG tablet Take 250 mg by mouth every 6 (six) hours as needed for Pain.   albuterol-ipratropium (DUO-NEB) 2.5 mg-0.5 mg/3 mL nebulizer solution TAKE 3 ML BY NEBULIZER EVERY 6 HOURS AS NEEDED FOR WHEEZING OR SHORTNESS  "OF BREATH   cyanocobalamin (VITAMIN B-12) 100 MCG tablet Take 100 mcg by mouth once daily.   ergocalciferol (ERGOCALCIFEROL) 50,000 unit Cap TAKE 1 CAPSULE BY MOUTH EVERY 7 DAYS   fluticasone-salmeterol diskus inhaler 250-50 mcg INHALE 1 PUFF BY MOUTH EVERY 12 HOURS AS NEEDED   hydroCHLOROthiazide (HYDRODIURIL) 25 MG tablet Take 1 tablet (25 mg total) by mouth once daily.   hydrOXYzine HCL (ATARAX) 10 MG Tab Take 1 tablet (10 mg total) by mouth 3 (three) times daily as needed (anxiety).   ipratropium (ATROVENT) 0.02 % nebulizer solution Take 500 mcg by nebulization 4 (four) times daily. Rescue   losartan (COZAAR) 25 MG tablet Take 1 tablet (25 mg total) by mouth once daily.   metoprolol succinate (TOPROL-XL) 100 MG 24 hr tablet Take 1 tablet (100 mg total) by mouth once daily.   PROAIR HFA 90 mcg/actuation inhaler INHALE 2 PUFFS FOUR TIMES DAILY AS NEEDED FOR COPD   rosuvastatin (CRESTOR) 10 MG tablet Take 1 tablet (10 mg total) by mouth once daily.   amLODIPine (NORVASC) 2.5 MG tablet Take 1 tablet (2.5 mg total) by mouth once daily.       OBJECTIVE DATA:      Vital Signs (last 24h)   Temp:  [96.9 °F (36.1 °C)-97.8 °F (36.6 °C)] 96.9 °F (36.1 °C)  Pulse:  [] 89  Resp:  [14-50] 21  SpO2:  [83 %-100 %] 100 %  BP: ()/() 144/92        Intake/Output Summary (Last 24 hours) at 1/22/2023 0632  Last data filed at 1/22/2023 0610  Gross per 24 hour   Intake 2070.64 ml   Output 1765 ml   Net 305.64 ml       Physical Examination:   Vitals: BP (!) 144/92   Pulse 89   Temp 96.9 °F (36.1 °C) (Axillary)   Resp (!) 21   Ht 5' 5" (1.651 m)   Wt 77.5 kg (170 lb 13.7 oz)   LMP  (LMP Unknown)   SpO2 100%   Breastfeeding No   BMI 28.43 kg/m²      General:  opens eyes, unable to speak in sentences.  Head: Skull is normocephalic and symmetric. Symmetric facial features.   Eyes: Eyelids & skin overlying globe are intact. Sclera is white and not injected. PERRL.   Mouth: intubated  Neck: Supple. Full ROM. No " obvious enlargements or masses .    Cardiovascular: RRR. Audible S1/S2 without rubs, murmurs and gallops.  No edema on foot, ankle or leg bilaterally.   Pulmonary: decreased breath sounds on the R, anterior chest tube in place with tidaling. Minimal wheezing appreciated. Overall poor air movement but improved from yesterday.   Abdomen: Active bowel sounds. Abdomen is soft and non-distended. No tenderness to palpation.   Extremities: All 4 extremities are warm with palpable pulses. Moving all 4 extremities voluntarily.       Laboratory:  CBC:   Recent Labs   Lab 01/20/23  0556 01/21/23  0634   WBC 25.62* 18.87*   HGB 11.5* 12.3    326   MCV 95 94   RDW 13.7 13.8     Coags:   Recent Labs   Lab 01/20/23 0556   INR 1.0     CMP:   Recent Labs   Lab 01/20/23 0556 01/21/23  0634    136   K 3.4* 3.8    98   CO2 28 26   * 144*   BUN 17 20   CREATININE 1.0 0.8   CALCIUM 8.0* 8.7   MG 2.6 2.2   PHOS 6.7* 3.8     Estimated Creatinine Clearance: 69.3 mL/min (based on SCr of 0.8 mg/dL).  LFTs:   Recent Labs   Lab 01/20/23 0556 01/21/23  0634   PROT 6.4 6.9   ALBUMIN 3.1* 3.3*   ALKPHOS 59 69   AST 32 48*   ALT 23 27     Cardiac Data:    Recent Labs   Lab 01/20/23  0556 01/20/23  0952 01/20/23  1619 01/21/23  0832 01/21/23  1403   TROPONINI 0.147*   < > 0.642* 0.983* 0.934*   BNP 93  --   --   --   --     < > = values in this interval not displayed.         Radiology Results:   All images from this admission reviewed.       Microbiology Results:   Procedure Component Value Units Date/Time    Influenza A & B by Molecular [[id number]] Collected: 01/20/23 0535    Order Status: Completed Specimen: Nasopharyngeal Swab Updated: 01/20/23 0612     Influenza A, Molecular Negative     Influenza B, Molecular Negative     Flu A & B Source Nasal swab    Blood culture x two cultures. Draw prior to antibiotics. [[id number]] Collected: 01/20/23 0556    Order Status: Sent Specimen: Blood Updated: 01/20/23 0557    Blood  culture x two cultures. Draw prior to antibiotics. [[id number]] Collected: 01/20/23 0556    Order Status: Sent Specimen: Blood from Antecubital, Right Hand Updated: 01/20/23 0557         Antibiotics and Day Number of Therapy:   Start     Stop Route Frequency Ordered    01/21/23 0600  azithromycin 500 mg in dextrose 5 % 250 mL IVPB (ready to mix system)         -- IV Every 24 hours (non-standard times) 01/20/23 0642 01/20/23 1000  cefepime in dextrose 5 % IVPB 2 g         -- IV Every 8 hours (non-standard times) 01/20/23 0642         Current Medications:     Continuous infusions:   dexmedetomidine (PRECEDEX) infusion Stopped (01/21/23 1219)    NORepinephrine bitartrate-D5W 0.04 mcg/kg/min (01/22/23 0622)    propofoL 50 mcg/kg/min (01/22/23 0623)        Scheduled:   albuterol-ipratropium  3 mL Nebulization Q4H    ceFEPime (MAXIPIME) IVPB  2 g Intravenous Q12H    doxycycline (VIBRAMYCIN) IVPB  100 mg Intravenous Q12H    enoxaparin  40 mg Subcutaneous Daily    famotidine (PF)  20 mg Intravenous Q12H    methylPREDNISolone sodium succinate injection  60 mg Intravenous Daily    metoprolol succinate  25 mg Oral Daily    mupirocin   Nasal BID        PRN:  albuterol, albuterol sulfate, dextrose 10%, dextrose 10%, glucagon (human recombinant), LORazepam, sodium chloride 0.9%, sodium chloride 3%      Assessment/Plan:     Ana Aguila is a 67 y.o. female with a PMHx significant for COPD with chronic hypoxic respiratory failure on 2.5L home O2, HTN, HLD, Takasubo CM (resolved) presented to ED in respiratory distress. Admitted to the ICU for mechanical ventitlation. Pt was extubated yesterday however currently in respiratory distress on bipap with worsening hypoxia and mental status. Discussed with daughter and grand daughter and they would like another trial of intubation. Patient intubated on 1/21/23. Recurrent pneumothorax overnight with malposition of chest tube. Betty CT placed with re-expansion of lung.      Neuro  -  Exam: intubated. Appears comfortable. Eyes closed. Responds to painful stimuli   - propofol 40 and precedex, fentanyl push given during CT placement.     Cardiovascular  #  Elevated Troponin, repeat EKG shows persistent deeply inverted T waves in inferior and lateral leads.   # hypotension- on low dose leveophed peripherally, MAP 97, levo 0.04- titrating down.   # HLD  - Most recent TTE 2022 normal systolic fx 65%, indeterminate diastolic function and normal RV function. [facility] 2022 no significant CAD.   - Qtc prolonged since admission. Patient on azithro but first dose obtained after repeat EKG with Qtc prolongation. Patient received 2g IV mag in between EKGs.   - Lactate and BNP wnl.   PLAN  - Trend troponin  - Avoid Qtc prolonging drugs. Switched from azithro to doxy.   - if troponin is rising, repeat EKG     Respiratory  # Acute Hypercapnic Respiratory Failure  # Pneumothorax  # COPD with Acute on Chronic Hypoxic Respiratory Failure on 2.5L home O2  - Patient required reintubation on 23 for severe respiratory distress and hypoxic RF.   - on ventilator the pt was having intrinsic peep elevation. Peep was 12 with set peep of 5. Decreased RR to 14 with improvement. AB.35/60. RR increased to 17 per overnight respiratory.   - continue q4 albuterol nebs, continue steroids   - CXR shows good ETT position. Overnight CXR obtained due to malpositioned CT that was no longer tidaling. CXR showed new apical pneumothorax. A 8F armaan catheter was placed in the R apical space 2nd intercostal. Old CT was removed. CXR shows resolution of pneumothorax. CT with proper placement.   - Resp cx gram stain negative. Rare wbc. Culture pending.      Renal   - Cr stable and at baseline ~0.9, new labs pending  - UOP 1765 ON, net +305   - Replete electrolytes PRN.     GI  # Right hemicolectomy  GIB   - Diet: NPO (intubated): OG in place.   - Last bowel movement: unclear  - Bowel regimen: n/a     Heme  - On admission, H/H 11.5   37.0 with MCV 95 & RDW 14. Baseline Hgb appears to be ~ 12.    Infectious Disease  # Leukocytosis   - Afebrile with WBC downtrending to 18.87 from 25.62, cbc is pending this am.   - CXR with a worsening R upper lobe infiltrate  - On CAP coverage with cefepime and doxy pending culture    Endocrine  - A1c 5.2%  serum gluc 287 on admission.   - Serum glucose goal of 140-180.     Analgesia: propofol and precedex ggt  Thrombo PPX: lovenox 40 qd  Head of Bed: 30  Feeding: NPO    Code: full  Dispo: Critcally ill, continue ICU care.     Patient evaluated and plan discussed in the presence of Dr. Auguste.     Lakisha Soto MD  Emergency Medicine Staff   Critical Care Fellow  6:52 AM    Pt seen and examined with Pulmonary/Critical Care team. Critical Care time was spent validating the history and physical exam, reviewing the lab and imaging results, and discussing the care of the patient with the bedside nurse. The following additional comments are made:    Long discussion with daughters and granddaughter. Pt appears to have lower airways resistance today.  Lung is up and infiltrate has partially cleared. Her mechanics of breathing are still inadequate.  We will rest her today.  We all agreed with a timed trial to see if pt could be extubated.  If yes, then we will address goals of care with her.  If no, we would proceed with a palliative care only approach.  The duration of the trial will depend on pt's trajectory for improvement, i.e. if she is making progress, we would continue to support.      Critical Care time 40 minutes    Molina Ruiz MD  Phone [phone]

## 2023-01-23 LAB
ALBUMIN SERPL BCP-MCNC: 3 G/DL (ref 3.5–5.2)
ALP SERPL-CCNC: 55 U/L (ref 55–135)
ALT SERPL W/O P-5'-P-CCNC: 22 U/L (ref 10–44)
ANION GAP SERPL CALC-SCNC: 10 MMOL/L (ref 8–16)
AST SERPL-CCNC: 27 U/L (ref 10–40)
AV INDEX (PROSTH): 0.86
AV MEAN GRADIENT: 4 MMHG
AV PEAK GRADIENT: 9 MMHG
AV VALVE AREA: 2.79 CM2
AV VELOCITY RATIO: 0.64
BASOPHILS # BLD AUTO: 0.01 K/UL (ref 0–0.2)
BASOPHILS NFR BLD: 0.1 % (ref 0–1.9)
BILIRUB SERPL-MCNC: 0.4 MG/DL (ref 0.1–1)
BSA FOR ECHO PROCEDURE: 1.88 M2
BUN SERPL-MCNC: 19 MG/DL (ref 8–23)
CALCIUM SERPL-MCNC: 8.7 MG/DL (ref 8.7–10.5)
CHLORIDE SERPL-SCNC: 100 MMOL/L (ref 95–110)
CO2 SERPL-SCNC: 29 MMOL/L (ref 23–29)
CREAT SERPL-MCNC: 0.9 MG/DL (ref 0.5–1.4)
CV ECHO LV RWT: 0.51 CM
DIFFERENTIAL METHOD: ABNORMAL
DOP CALC AO PEAK VEL: 1.51 M/S
DOP CALC AO VTI: 18.7 CM
DOP CALC LVOT AREA: 3.2 CM2
DOP CALC LVOT DIAMETER: 2.03 CM
DOP CALC LVOT PEAK VEL: 0.97 M/S
DOP CALC LVOT STROKE VOLUME: 52.08 CM3
DOP CALC MV VTI: 15 CM
DOP CALCLVOT PEAK VEL VTI: 16.1 CM
E WAVE DECELERATION TIME: 146.92 MSEC
E/A RATIO: 0.58
ECHO LV POSTERIOR WALL: 1.17 CM (ref 0.6–1.1)
EJECTION FRACTION: 65 %
EOSINOPHIL # BLD AUTO: 0 K/UL (ref 0–0.5)
EOSINOPHIL NFR BLD: 0 % (ref 0–8)
ERYTHROCYTE [DISTWIDTH] IN BLOOD BY AUTOMATED COUNT: 14.4 % (ref 11.5–14.5)
EST. GFR  (NO RACE VARIABLE): >60 ML/MIN/1.73 M^2
FRACTIONAL SHORTENING: 29 % (ref 28–44)
GLUCOSE SERPL-MCNC: 149 MG/DL (ref 70–110)
HCT VFR BLD AUTO: 34.4 % (ref 37–48.5)
HGB BLD-MCNC: 11.3 G/DL (ref 12–16)
IMM GRANULOCYTES # BLD AUTO: 0.05 K/UL (ref 0–0.04)
IMM GRANULOCYTES NFR BLD AUTO: 0.4 % (ref 0–0.5)
INTERVENTRICULAR SEPTUM: 1.23 CM (ref 0.6–1.1)
IVRT: 79.92 MSEC
LA MAJOR: 3.77 CM
LA MINOR: 4.55 CM
LA WIDTH: 3 CM
LEFT ATRIUM SIZE: 3.81 CM
LEFT ATRIUM VOLUME INDEX MOD: 11.8 ML/M2
LEFT ATRIUM VOLUME INDEX: 21.7 ML/M2
LEFT ATRIUM VOLUME MOD: 21.79 CM3
LEFT ATRIUM VOLUME: 40.06 CM3
LEFT INTERNAL DIMENSION IN SYSTOLE: 3.25 CM (ref 2.1–4)
LEFT VENTRICLE DIASTOLIC VOLUME INDEX: 51.58 ML/M2
LEFT VENTRICLE DIASTOLIC VOLUME: 95.43 ML
LEFT VENTRICLE MASS INDEX: 109 G/M2
LEFT VENTRICLE SYSTOLIC VOLUME INDEX: 23 ML/M2
LEFT VENTRICLE SYSTOLIC VOLUME: 42.48 ML
LEFT VENTRICULAR INTERNAL DIMENSION IN DIASTOLE: 4.56 CM (ref 3.5–6)
LEFT VENTRICULAR MASS: 202.22 G
LVOT MG: 1.53 MMHG
LVOT MV: 0.57 CM/S
LYMPHOCYTES # BLD AUTO: 0.8 K/UL (ref 1–4.8)
LYMPHOCYTES NFR BLD: 5.9 % (ref 18–48)
MAGNESIUM SERPL-MCNC: 2.3 MG/DL (ref 1.6–2.6)
MCH RBC QN AUTO: 29.2 PG (ref 27–31)
MCHC RBC AUTO-ENTMCNC: 32.8 G/DL (ref 32–36)
MCV RBC AUTO: 89 FL (ref 82–98)
MONOCYTES # BLD AUTO: 1 K/UL (ref 0.3–1)
MONOCYTES NFR BLD: 7.4 % (ref 4–15)
MV MEAN GRADIENT: 2 MMHG
MV PEAK A VEL: 0.95 M/S
MV PEAK E VEL: 0.55 M/S
MV PEAK GRADIENT: 5 MMHG
MV STENOSIS PRESSURE HALF TIME: 42.61 MS
MV VALVE AREA BY CONTINUITY EQUATION: 3.47 CM2
MV VALVE AREA P 1/2 METHOD: 5.16 CM2
NEUTROPHILS # BLD AUTO: 11.6 K/UL (ref 1.8–7.7)
NEUTROPHILS NFR BLD: 86.2 % (ref 38–73)
NRBC BLD-RTO: 0 /100 WBC
PHOSPHATE SERPL-MCNC: 1.4 MG/DL (ref 2.7–4.5)
PISA TR MAX VEL: 2.97 M/S
PLATELET # BLD AUTO: 287 K/UL (ref 150–450)
PMV BLD AUTO: 10.2 FL (ref 9.2–12.9)
POCT GLUCOSE: 117 MG/DL (ref 70–110)
POCT GLUCOSE: 136 MG/DL (ref 70–110)
POCT GLUCOSE: 160 MG/DL (ref 70–110)
POTASSIUM SERPL-SCNC: 3.3 MMOL/L (ref 3.5–5.1)
PROT SERPL-MCNC: 6.3 G/DL (ref 6–8.4)
RA PRESSURE: 8 MMHG
RBC # BLD AUTO: 3.87 M/UL (ref 4–5.4)
RIGHT VENTRICULAR END-DIASTOLIC DIMENSION: 2.27 CM
SODIUM SERPL-SCNC: 139 MMOL/L (ref 136–145)
TR MAX PG: 35 MMHG
TV REST PULMONARY ARTERY PRESSURE: 43 MMHG
WBC # BLD AUTO: 13.47 K/UL (ref 3.9–12.7)

## 2023-01-23 PROCEDURE — 85025 COMPLETE CBC W/AUTO DIFF WBC: CPT | Performed by: STUDENT IN AN ORGANIZED HEALTH CARE EDUCATION/TRAINING PROGRAM

## 2023-01-23 PROCEDURE — 63600175 PHARM REV CODE 636 W HCPCS: Performed by: STUDENT IN AN ORGANIZED HEALTH CARE EDUCATION/TRAINING PROGRAM

## 2023-01-23 PROCEDURE — 94003 VENT MGMT INPAT SUBQ DAY: CPT

## 2023-01-23 PROCEDURE — 20000000 HC ICU ROOM

## 2023-01-23 PROCEDURE — 84100 ASSAY OF PHOSPHORUS: CPT | Performed by: STUDENT IN AN ORGANIZED HEALTH CARE EDUCATION/TRAINING PROGRAM

## 2023-01-23 PROCEDURE — 94640 AIRWAY INHALATION TREATMENT: CPT

## 2023-01-23 PROCEDURE — 99900035 HC TECH TIME PER 15 MIN (STAT)

## 2023-01-23 PROCEDURE — 63600175 PHARM REV CODE 636 W HCPCS: Performed by: INTERNAL MEDICINE

## 2023-01-23 PROCEDURE — 25000003 PHARM REV CODE 250: Performed by: INTERNAL MEDICINE

## 2023-01-23 PROCEDURE — 25000003 PHARM REV CODE 250: Performed by: STUDENT IN AN ORGANIZED HEALTH CARE EDUCATION/TRAINING PROGRAM

## 2023-01-23 PROCEDURE — 83735 ASSAY OF MAGNESIUM: CPT | Performed by: STUDENT IN AN ORGANIZED HEALTH CARE EDUCATION/TRAINING PROGRAM

## 2023-01-23 PROCEDURE — 80053 COMPREHEN METABOLIC PANEL: CPT | Performed by: STUDENT IN AN ORGANIZED HEALTH CARE EDUCATION/TRAINING PROGRAM

## 2023-01-23 PROCEDURE — 25000242 PHARM REV CODE 250 ALT 637 W/ HCPCS: Performed by: INTERNAL MEDICINE

## 2023-01-23 PROCEDURE — 94761 N-INVAS EAR/PLS OXIMETRY MLT: CPT

## 2023-01-23 PROCEDURE — 63600175 PHARM REV CODE 636 W HCPCS

## 2023-01-23 PROCEDURE — 27200966 HC CLOSED SUCTION SYSTEM

## 2023-01-23 RX ORDER — METOPROLOL TARTRATE 25 MG/1
25 TABLET, FILM COATED ORAL 2 TIMES DAILY
Status: DISCONTINUED | OUTPATIENT
Start: 2023-01-24 | End: 2023-01-29

## 2023-01-23 RX ADMIN — ENOXAPARIN SODIUM 40 MG: 40 INJECTION SUBCUTANEOUS at 05:01

## 2023-01-23 RX ADMIN — MUPIROCIN: 20 OINTMENT TOPICAL at 09:01

## 2023-01-23 RX ADMIN — IPRATROPIUM BROMIDE AND ALBUTEROL SULFATE 3 ML: 2.5; .5 SOLUTION RESPIRATORY (INHALATION) at 07:01

## 2023-01-23 RX ADMIN — DOXYCYCLINE 100 MG: 100 INJECTION, POWDER, LYOPHILIZED, FOR SOLUTION INTRAVENOUS at 12:01

## 2023-01-23 RX ADMIN — IPRATROPIUM BROMIDE AND ALBUTEROL SULFATE 3 ML: 2.5; .5 SOLUTION RESPIRATORY (INHALATION) at 12:01

## 2023-01-23 RX ADMIN — POTASSIUM BICARBONATE 50 MEQ: 978 TABLET, EFFERVESCENT ORAL at 08:01

## 2023-01-23 RX ADMIN — PROPOFOL 50 MCG/KG/MIN: 10 INJECTION, EMULSION INTRAVENOUS at 09:01

## 2023-01-23 RX ADMIN — MUPIROCIN: 20 OINTMENT TOPICAL at 08:01

## 2023-01-23 RX ADMIN — PROPOFOL 50 MCG/KG/MIN: 10 INJECTION, EMULSION INTRAVENOUS at 03:01

## 2023-01-23 RX ADMIN — ACETAMINOPHEN 1000 MG: 500 TABLET ORAL at 08:01

## 2023-01-23 RX ADMIN — SODIUM PHOSPHATE, MONOBASIC, MONOHYDRATE 15 MMOL: 276; 142 INJECTION, SOLUTION INTRAVENOUS at 08:01

## 2023-01-23 RX ADMIN — POTASSIUM PHOSPHATE, MONOBASIC AND POTASSIUM PHOSPHATE, DIBASIC 30 MMOL: 224; 236 INJECTION, SOLUTION, CONCENTRATE INTRAVENOUS at 11:01

## 2023-01-23 RX ADMIN — PROPOFOL 45 MCG/KG/MIN: 10 INJECTION, EMULSION INTRAVENOUS at 05:01

## 2023-01-23 RX ADMIN — CEFEPIME 2 G: 2 INJECTION, POWDER, FOR SOLUTION INTRAVENOUS at 03:01

## 2023-01-23 RX ADMIN — IPRATROPIUM BROMIDE AND ALBUTEROL SULFATE 3 ML: 2.5; .5 SOLUTION RESPIRATORY (INHALATION) at 03:01

## 2023-01-23 RX ADMIN — CEFEPIME 2 G: 2 INJECTION, POWDER, FOR SOLUTION INTRAVENOUS at 06:01

## 2023-01-23 RX ADMIN — FAMOTIDINE 20 MG: 10 INJECTION, SOLUTION INTRAVENOUS at 09:01

## 2023-01-23 RX ADMIN — METOPROLOL SUCCINATE 50 MG: 50 TABLET, EXTENDED RELEASE ORAL at 08:01

## 2023-01-23 RX ADMIN — METHYLPREDNISOLONE SODIUM SUCCINATE 60 MG: 40 INJECTION, POWDER, FOR SOLUTION INTRAMUSCULAR; INTRAVENOUS at 08:01

## 2023-01-23 RX ADMIN — IPRATROPIUM BROMIDE AND ALBUTEROL SULFATE 3 ML: 2.5; .5 SOLUTION RESPIRATORY (INHALATION) at 11:01

## 2023-01-23 RX ADMIN — PROPOFOL 45 MCG/KG/MIN: 10 INJECTION, EMULSION INTRAVENOUS at 12:01

## 2023-01-23 RX ADMIN — FAMOTIDINE 20 MG: 10 INJECTION, SOLUTION INTRAVENOUS at 08:01

## 2023-01-23 RX ADMIN — CEFEPIME 2 G: 2 INJECTION, POWDER, FOR SOLUTION INTRAVENOUS at 10:01

## 2023-01-23 NOTE — PLAN OF CARE
The pt's currently intubated in the ICU,so the sw spoke to Patience [facility]ack[facility][phone]/Jeannette Rosales(dtr)[phone] ([place])who were at bedside during the assessment. The pt lives alone in [place].  The pt's independent with her adl's and has a rollator at home but she doesn't use it. The pt drives occasionally but also uses [employer] transportation to get to her dr mohan's. The pt's family also transports her to dr mohan's and errands. The sw completed the white board in the pt's room and gave her family a d/c pamphlet with her name and contact info on it. The sw encouraged her them to call if they have any further questions or concerns. The sw will continue to follow the pt throughout her transitions of care and will assist with any d/c needs. The pt has a very supportive family.        01/23/23 1234   Discharge Assessment   Assessment Type Discharge Planning Assessment   Confirmed/corrected address, phone number and insurance Yes   Confirmed Demographics Correct on Facesheet   Source of Information family   If unable to respond/provide information was family/caregiver contacted? Yes   Contact Name/Number Patience Chacko([facility][phone]/Jeannette Rosales(dtr)[phone]   When was your last doctors appointment? 01/11/23   Communicated JACKIE with patient/caregiver Date not available/Unable to determine   Reason For Admission COPD w/acute exacerbation   People in Home alone   Do you have help at home or someone to help you manage your care at home? Yes   Who are your caregiver(s) and their phone number(s)? Patience Chacko([facility])[phone]   Prior to hospitilization cognitive status: Alert/Oriented   Current cognitive status: Coma/Sedated/Intubated   Home Accessibility wheelchair accessible   Home Layout Able to live on 1st floor   Equipment Currently Used at Home rollator  (pt has a rollator at home but she doesn't use it)   Readmission within 30 days? No   Patient currently being followed by outpatient case  management? No   Do you currently have service(s) that help you manage your care at home? No   Do you take prescription medications? Yes   Do you have prescription coverage? Yes   Coverage [employer]   Do you have any problems affording any of your prescribed medications? No   Is the patient taking medications as prescribed? yes   Who is going to help you get home at discharge? Patience Chacko(granddtr)[phone]/Jeannette Rosales(dtr)[phone]   How do you get to doctors appointments? car, drives self;family or friend will provide;health plan transportation   Are you on dialysis? No   Do you take coumadin? No   Discharge Plan A Home Health   Discharge Plan B Other  (TBD)   DME Needed Upon Discharge  other (see comments)  (TBD)   Discharge Plan discussed with: Patient;Adult children   Discharge Barriers Identified None

## 2023-01-23 NOTE — PLAN OF CARE
Problem: Infection  Goal: Absence of Infection Signs and Symptoms  Outcome: Ongoing, Progressing     Problem: Adult Inpatient Plan of Care  Goal: Plan of Care Review  Outcome: Ongoing, Progressing  Goal: Patient-Specific Goal (Individualized)  Outcome: Ongoing, Progressing  Goal: Absence of Hospital-Acquired Illness or Injury  Outcome: Ongoing, Progressing  Goal: Optimal Comfort and Wellbeing  Outcome: Ongoing, Progressing  Goal: Readiness for Transition of Care  Outcome: Ongoing, Progressing     Problem: Communication Impairment (Mechanical Ventilation, Invasive)  Goal: Effective Communication  Outcome: Ongoing, Progressing     Problem: Device-Related Complication Risk (Mechanical Ventilation, Invasive)  Goal: Optimal Device Function  Outcome: Ongoing, Progressing     Problem: Inability to Wean (Mechanical Ventilation, Invasive)  Goal: Mechanical Ventilation Liberation  Outcome: Ongoing, Progressing     Problem: Nutrition Impairment (Mechanical Ventilation, Invasive)  Goal: Optimal Nutrition Delivery  Outcome: Ongoing, Progressing

## 2023-01-23 NOTE — PLAN OF CARE
Patient on vent with documented settings.  Alarms are set and functioning with adequate volumes.  AMBU bag and mask at bedside.  The proper method of use, as well as anticipated side effects, of this aerosol treatment are discussed and demonstrated to the patient.   Will continue to monitor.

## 2023-01-23 NOTE — PROGRESS NOTES
[facility]/Critical Progress Note      Patient: Ana Aguila  Age:67 y.o.   Sex: female   MRN:[id number]  Admit date:1/20/2023    Attending Physician: Keren   LOS:3 day(s)     SUBJECTIVE:    Result for Consult:   COPD, pneumothorax     History of Present Illness:  67F PMHx significant for COPD with chronic hypoxic respiratory failure on 2.5L home O2, HTN, HLD, Takasubo CM (resolved) presented to ED in respiratory distress. EMS called for respiratory distress. Reported hypoxemia on arrival patient started on supplemental oxygen without improvement and eventually escalated to BiPAP also without improvement. Intubated in the ED. CXR showed large right side pneumothorax. Pleuravent placed in the ED with repeat CXR showing significant improvement in pneumo. Admitted to the ICU.    Interval history:   1/20/23: Pt successfully extubated to bipap yesterday with CT in place on R anterior chest.     1/21/23: This am the patient had increased wob with decreased air movement bilaterally. She was receiving a duoneb during my examination. Chest tube valve was tidaling appropriately, however, patient continued to be in acute respiratory distress with hypoxic RF.     1/22/23: Received call at 340am regarding the patient's CT. Chest tube was displaced. CXR showed recurrent pneumothorax. The pt was otherwise hemodynamically stable, no change in levophed dose 0.08. satting 100% on same vent settings. No increase in respiratory effort. Due to concerns for worsening pneumothorax while on ventilator and increased subcutaneous emphysema a armaan catheter was placed in the R apical space.     1/23/23: Patient was on minimal respiratory settings maintaining good settings. No requiring pressor support. Patient failed SBT due to increased WOB and anxiety.         Medications:  Prior to Admission medications    Medication Sig   acetaminophen (TYLENOL) 500 MG tablet Take 250 mg by mouth every 6 (six) hours as needed for Pain.  "  albuterol-ipratropium (DUO-NEB) 2.5 mg-0.5 mg/3 mL nebulizer solution TAKE 3 ML BY NEBULIZER EVERY 6 HOURS AS NEEDED FOR WHEEZING OR SHORTNESS OF BREATH   cyanocobalamin (VITAMIN B-12) 100 MCG tablet Take 100 mcg by mouth once daily.   ergocalciferol (ERGOCALCIFEROL) 50,000 unit Cap TAKE 1 CAPSULE BY MOUTH EVERY 7 DAYS   fluticasone-salmeterol diskus inhaler 250-50 mcg INHALE 1 PUFF BY MOUTH EVERY 12 HOURS AS NEEDED   hydroCHLOROthiazide (HYDRODIURIL) 25 MG tablet Take 1 tablet (25 mg total) by mouth once daily.   hydrOXYzine HCL (ATARAX) 10 MG Tab Take 1 tablet (10 mg total) by mouth 3 (three) times daily as needed (anxiety).   ipratropium (ATROVENT) 0.02 % nebulizer solution Take 500 mcg by nebulization 4 (four) times daily. Rescue   losartan (COZAAR) 25 MG tablet Take 1 tablet (25 mg total) by mouth once daily.   metoprolol succinate (TOPROL-XL) 100 MG 24 hr tablet Take 1 tablet (100 mg total) by mouth once daily.   PROAIR HFA 90 mcg/actuation inhaler INHALE 2 PUFFS FOUR TIMES DAILY AS NEEDED FOR COPD   rosuvastatin (CRESTOR) 10 MG tablet Take 1 tablet (10 mg total) by mouth once daily.   amLODIPine (NORVASC) 2.5 MG tablet Take 1 tablet (2.5 mg total) by mouth once daily.       OBJECTIVE DATA:      Vital Signs (last 24h)   Temp:  [97.8 °F (36.6 °C)-98.9 °F (37.2 °C)] 98.3 °F (36.8 °C)  Pulse:  [] 102  Resp:  [14-31] 18  SpO2:  [92 %-100 %] 96 %  BP: ()/(51-90) 111/64        Intake/Output Summary (Last 24 hours) at 1/23/2023 0839  Last data filed at 1/23/2023 0834  Gross per 24 hour   Intake 1324.52 ml   Output 3600 ml   Net -2275.48 ml         Physical Examination:   Vitals: /64   Pulse 102   Temp 98.3 °F (36.8 °C) (Oral)   Resp 18   Ht 5' 5" (1.651 m)   Wt 77.5 kg (170 lb 13.7 oz)   LMP  (LMP Unknown)   SpO2 96%   Breastfeeding No   BMI 28.43 kg/m²      General: RASS -4  Head: Skull is normocephalic and symmetric. Symmetric facial features.   Eyes: Eyelids & skin overlying globe are " intact. Sclera is white and not injected. PERRL.   Mouth: intubated  Neck: Supple. Full ROM. No obvious enlargements or masses.   Cardiovascular: RRR. Audible S1/S2 without rubs, murmurs and gallops.  No edema on foot, ankle or leg bilaterally.   Pulmonary: No signs of respiratory distress. Bilateral breath sounds.   Abdomen: Active bowel sounds. Abdomen is soft and non-distended.   Extremities: All 4 extremities are warm with palpable pulses. Moving all 4 extremities voluntarily.       Laboratory:  CBC:   Recent Labs   Lab 01/21/23  0634 01/22/23  0854 01/23/23  0316   WBC 18.87* 20.37* 13.47*   HGB 12.3 12.4 11.3*    351 287   MCV 94 94 89   RDW 13.8 13.9 14.4       Coags:   Recent Labs   Lab 01/20/23  0556   INR 1.0       CMP:   Recent Labs   Lab 01/21/23  0634 01/22/23  1307 01/23/23  0316    135* 139   K 3.8 3.3* 3.3*   CL 98 97 100   CO2 26 26 29   * 131* 149*   BUN 20 21 19   CREATININE 0.8 0.8 0.9   CALCIUM 8.7 8.4* 8.7   MG 2.2 1.9 2.3   PHOS 3.8 1.4* 1.4*       Estimated Creatinine Clearance: 61.6 mL/min (based on SCr of 0.9 mg/dL).  LFTs:   Recent Labs   Lab 01/21/23  0634 01/22/23  1307 01/23/23  0316   PROT 6.9 6.4 6.3   ALBUMIN 3.3* 3.1* 3.0*   ALKPHOS 69 55 55   AST 48* 37 27   ALT 27 24 22       Cardiac Data:    Recent Labs   Lab 01/20/23  0556 01/20/23  0952 01/20/23  1619 01/21/23  0832 01/21/23  1403   TROPONINI 0.147*   < > 0.642* 0.983* 0.934*   BNP 93  --   --   --   --     < > = values in this interval not displayed.           Radiology Results:   All images from this admission reviewed.       CXR 1/23: improved interstitial opacities, subQ air in the lateral chest wall, no pneumothorax with chest tube in place.         Microbiology Results:  Microbiology Results (last 7 days)       Procedure Component Value Units Date/Time    Blood culture x two cultures. Draw prior to antibiotics. [[id number]] Collected: 01/20/23 0556    Order Status: Completed Specimen: Blood Updated:  01/22/23 1612     Blood Culture, Routine No Growth to date      No Growth to date      No Growth to date    Narrative:      Aerobic and anaerobic    Blood culture x two cultures. Draw prior to antibiotics. [[id number]] Collected: 01/20/23 0556    Order Status: Completed Specimen: Blood from Antecubital, Right Hand Updated: 01/22/23 1612     Blood Culture, Routine No Growth to date      No Growth to date      No Growth to date    Narrative:      Aerobic and anaerobic    Culture, Respiratory with Gram Stain [[id number]] Collected: 01/21/23 1529    Order Status: Completed Specimen: Respiratory from Sputum Updated: 01/21/23 2106     Gram Stain (Respiratory) <10 epithelial cells per low power field.     Gram Stain (Respiratory) Rare WBC's     Gram Stain (Respiratory) No organisms seen    Respiratory Infection Panel (PCR), Nasopharyngeal [[id number]] Collected: 01/21/23 0913    Order Status: Canceled Specimen: Nasopharyngeal Swab     Influenza A & B by Molecular [[id number]] Collected: 01/20/23 0535    Order Status: Completed Specimen: Nasopharyngeal Swab Updated: 01/20/23 0612     Influenza A, Molecular Negative     Influenza B, Molecular Negative     Flu A & B Source Nasal swab             Antibiotics and Day Number of Therapy:  Antibiotics (From admission, onward)      Start     Stop Route Frequency Ordered    01/22/23 1900  ceFEPIme (MAXIPIME) 2 g in dextrose 5 % in water (D5W) 5 % 50 mL IVPB (MB+)         -- IV Every 8 hours (non-standard times) 01/22/23 1111    01/20/23 0900  mupirocin 2 % ointment         01/25 0859 Nasl 2 times daily 01/20/23 0545              Current Medications:     Continuous infusions:   dexmedetomidine (PRECEDEX) infusion Stopped (01/21/23 1219)    NORepinephrine bitartrate-D5W Stopped (01/22/23 0944)    propofoL 50 mcg/kg/min (01/23/23 0558)        Scheduled:   acetaminophen  1,000 mg Per NG tube TID    albuterol-ipratropium  3 mL Nebulization Q4H    ceFEPime (MAXIPIME) IVPB  2 g Intravenous Q8H     enoxaparin  40 mg Subcutaneous Daily    famotidine (PF)  20 mg Intravenous Q12H    methylPREDNISolone sodium succinate injection  60 mg Intravenous Daily    metoprolol succinate  50 mg Oral Daily    mupirocin   Nasal BID    sodium phosphate IVPB  15 mmol Intravenous Once        PRN:  albuterol, albuterol sulfate, dextrose 10%, dextrose 10%, glucagon (human recombinant), HYDROmorphone, LORazepam, sodium chloride 0.9%, sodium chloride 3%      Assessment/Plan:     Ana Aguila is a 67 y.o. female with a PMHx significant for COPD with chronic hypoxic respiratory failure on 2.5L home O2, HTN, HLD, Takasubo CM (resolved) presented to ED in respiratory distress in the setting of pneumothorax. Admitted to the ICU for mechanical ventilation s/p chest tube.      Neuro  - Exam: intubated. Appears comfortable. Eyes closed. Responds to painful stimuli   - propofol 40      Cardiovascular  # Prologned QTc  # HLD  - Most recent TTE 2022 normal systolic fx 65%, indeterminate diastolic function and normal RV function. [facility] 2022 no significant CAD.   - Qtc prolonged since admission. Most recent Qtc 516  - Troponin elevated with WKG abnormalities. Trended yvette.   PLAN  - Avoid Qtc prolonging drugs. Switched from azithro to doxy.      Respiratory  # Acute Hypercapnic Respiratory Failure  # Pneumothorax  # COPD with Acute on Chronic Hypoxic Respiratory Failure on 2.5L home O2  - Patient required reintubation on 23 for severe respiratory distress and hypoxic RF.   - on ventilator the pt was having intrinsic peep elevation. Peep was 12 with set peep of 5. Decreased RR to 14 with improvement. AB.35/60. RR increased to 17 per overnight respiratory.   - continue q4 albuterol nebs, continue steroids   - CXR shows good ETT position. Overnight CXR obtained due to malpositioned CT that was no longer tidaling. CXR showed new apical pneumothorax. A 8F armaan catheter was placed in the R apical space 2nd intercostal. Old CT was  removed. CXR shows resolution of pneumothorax. CT with proper placement.   - Resp cx 1/21 negative. Blood cultures negative.       Renal   Mildly hypokalemic   - Cr stable and at baseline ~0.9, new labs pending  - UOP 3L ON, net -1L  - K 3.3 this AM. Replete electrolytes PRN.     GI  # Right hemicolectomy 2/2 GIB   - Diet: tube feeds   - Last bowel movement: unclear  - Bowel regimen: n/a     Heme  - On admission, H/H 11.5  37.0 with MCV 95 & RDW 14. Baseline Hgb appears to be ~ 12. H/H stable since admission.     Infectious Disease  # Leukocytosis   - Afebrile with WBC down trending from 25.62--> 13, cbc is pending this am.   - CXR this morning with improved interstitial edema. Right subQ emphysema.   - On CAP coverage with cefepime. Completed doxy course (1 day azithro--> 2 days doxy;switched due to QTC prolongation).     Endocrine  - A1c 5.2%  serum gluc 287 on admission.   - Serum glucose goal of 140-180.     Analgesia: propofol   Thrombo PPX: lovenox 40 qd  Head of Bed: 30  Feeding: NPO    Code: full  Dispo: Critcally ill, continue ICU care.     Patient evaluated and plan discussed in the presence of .     Brenda Krueger MD  [facility] PGY-II

## 2023-01-23 NOTE — NURSING
Patient on SBT and failed after 20 mins. Patient was very anxious and using accessory muscles to breath. MD at the bedside. Ventilator put back on previous settings, sedation restarted, will continue to monitor.

## 2023-01-23 NOTE — PROGRESS NOTES
[facility] IM Progress Note    Subjective:      Overnight given dilaudid for chest pain, bleeding around chest tube site, resolved. Levophed weaned off.  Remains intubated, sedated on minimal propofol. Goal to wean sedation w/ SAT/SBT today.     Objective:   Last 24 Hour Vital Signs:  BP  Min: 94/53  Max: 144/86  Temp  Av.4 °F (36.9 °C)  Min: 97.8 °F (36.6 °C)  Max: 98.9 °F (37.2 °C)  Pulse  Av.6  Min: 88  Max: 114  Resp  Av.8  Min: 14  Max: 31  SpO2  Av.3 %  Min: 92 %  Max: 100 %  I/O last 3 completed shifts:  In: 2230.2 [I.V.:1173.6; NG/GT:125; IV Piggyback:931.6]  Out: 3915 [Urine:3415; Drains:500]    Physical Examination:  General: intubated, sedated  Eyes: sclera non-injected, non-icteric, PERRL, EOMI  Mouth:ET tube in place  Cardiovascular: RR, NR, no murmurs appreciated. No LE edema  Pulmonary: poor air movement bilaterally, minimal wheeze, R chest tube in place  Abdomen: Abdomen soft and non-tender, non-distended. Bowel sounds normoactive.  Extremities: All 4 extremities are warm with palpable pulses.     Laboratory:  Laboratory Data Reviewed: yes  Pertinent Findings:    Microbiology Data Reviewed: yes  Pertinent Findings:  Blood cx : NGTD  Resp Cx NG  Flu/COVID negative    Other Results:  EKG (my interpretation):   Admission EKG: sinus tach 120, TWIs V1/V2  Subsequent EKG : NSR, TWI in V1, V2 resolved    Radiology Data Reviewed: yes  Pertinent Findings:  Chest xray : R sided pneumothorax  Chest xray : improvement in R sided pneumothorax, s/p pleuravent placement    Current Medications:     Infusions:   dexmedetomidine (PRECEDEX) infusion Stopped (23 1219)    NORepinephrine bitartrate-D5W Stopped (23 0944)    propofoL 50 mcg/kg/min (23 0599)        Scheduled:   acetaminophen  1,000 mg Per NG tube TID    albuterol-ipratropium  3 mL Nebulization Q4H    ceFEPime (MAXIPIME) IVPB  2 g Intravenous Q8H    enoxaparin  40 mg Subcutaneous Daily    famotidine (PF)  20 mg  Intravenous Q12H    methylPREDNISolone sodium succinate injection  60 mg Intravenous Daily    metoprolol succinate  50 mg Oral Daily    mupirocin   Nasal BID    potassium bicarbonate  50 mEq Oral Once    sodium phosphate IVPB  15 mmol Intravenous Once        PRN:  albuterol, albuterol sulfate, dextrose 10%, dextrose 10%, glucagon (human recombinant), HYDROmorphone, LORazepam, sodium chloride 0.9%, sodium chloride 3%    Antibiotics and Day Number of Therapy:  Cefepime 1/20-present  Doxycycline 1/20-present    Lines and Day Number of Therapy:  PIV, ETT, NG tube    Assessment:     Ana Aguila is a 67 year old female with PMHx of COPD on 2.5L home O2, Hypertension, Hyperlipidemia, GERD, MI, and hx of Takasubo cardiomyopathy who presented on 1/20 for respiratory distress 2/2 COPD exacerbation, required emergent intubation in the ED. Pt was found to have a large R sided pneumothorax s/p pleuravent --> chest tube placement for re-expansion of apical pneumo. Pt was extubated on 1/20 but then re-intubated 1/21 for respiratory distress, pleuravent tube found to be out place on repeat CXR, new apical PTX, 8F armaan catheter was placed was resolution of PTX. Remains intubated sedated in ICU, cont abxs.    Plan:     Acute on Chronic Hypoxic Hypercapneic Respiratory Failure 2/2 COPD Exacerbation  - similar presentation 7/2022, on home 2.5L home O2  - PFTs show obstruction with severe ventilatory impairment, FEV1 0.43L, FEV1/FVC 40%  - EMS administered dexamethasone 16mg, duo-neb tx, albuterol x2, and 0.5 epi  - ED: BiPAP --> intubation for respiratory distress and accessory muscle usage  - ABG significant for pH 7.14, pCO2 98.7, pO2 169, HCO3 33.4 on ventilator with Vt 350, PEEP 5, FiO2 100% RR22  - repeat ABG shows improvement in acidosis, hypercarbia  - extubated to BIPAP 1/20, re-intubated 1/21  - continue CAP/VAP coverage with cefepime and doxy 1/20-present  - continue to tx COPD exacerbation with scheduled duo-nebs and IV  methylprednisolone 60 daily  - plan for re-extubation today    Recurrent pneumothorax  -etiology unclear, possibly present initially and worsened by NIVP  -pleurvent placed in ED, chest x-ray showed expansion of apical pneumo 1/21 --> chest tube placed, pleuravax pulled  -repeat CXR this AM shows improvement     Shock  Hx Hypertension  - Per chart review, home meds losartan 25mg, amlodipine 2.5mg, continue hold  - required minimal norepi, weaned off    CAD  Chronic Heart failure with diastolic dysfunction  NSTEMI type 2  - Cardiology consulted: notes initial EKG with minor ST segment elevation/minor DC segment depression. Overall pattern not suggestive of ACS. Likely 2/2 demand in setting of ARF  - TTE 08/4: EF 65%, intermediate LV diastolic dysfunction  - BNP non-elevated, troponin trend 0.147 -->0.488-->0.642--> peaked 1/21  - continue Toprol XL 50     Hyperlipidemia  - Per chart review on rosuvastatin 10mg, continue hold     GERD  R Hemicolectomy 2/2 GIB  - Continue Famotidine IV for peptic ulcer ppx  - Monitor for bleeding, daily CBC    Feeding: NPO  Analgesia: n/a  Sedation: propofol  Thrombo Ppx: lovenox   Head Up: 30 degrees  Ulcer Ppx: pepcid  Glycemic Control: SSI, q6hr POCT glucose  SAT/SBT: daily  Bowel Regimen: n/a   Indwelling Catheter Removal: PIV, ETT, NG tube  De-escalation of abxs: cefepime, doxy    Code: Full    Dispo: continue ICU care, attempt re-extubation today      Darci Gongora  [facility] Internal Medicine PGY3  [facility] IM Service Team A    [facility] Medicine Hospitalist Pager numbers:   [facility] Hospitalist Medicine Team A (Susanna/Keren): [phone]  [facility] Hospitalist Medicine Team B (Camilla/Lavelle):  [phone]

## 2023-01-24 LAB
ALBUMIN SERPL BCP-MCNC: 3.1 G/DL (ref 3.5–5.2)
ALP SERPL-CCNC: 53 U/L (ref 55–135)
ALT SERPL W/O P-5'-P-CCNC: 45 U/L (ref 10–44)
ANION GAP SERPL CALC-SCNC: 10 MMOL/L (ref 8–16)
ANION GAP SERPL CALC-SCNC: 9 MMOL/L (ref 8–16)
AST SERPL-CCNC: 38 U/L (ref 10–40)
BACTERIA SPEC AEROBE CULT: ABNORMAL
BACTERIA SPEC AEROBE CULT: ABNORMAL
BASOPHILS # BLD AUTO: 0.01 K/UL (ref 0–0.2)
BASOPHILS NFR BLD: 0.1 % (ref 0–1.9)
BILIRUB SERPL-MCNC: 0.5 MG/DL (ref 0.1–1)
BUN SERPL-MCNC: 22 MG/DL (ref 8–23)
BUN SERPL-MCNC: 29 MG/DL (ref 8–23)
CALCIUM SERPL-MCNC: 8.6 MG/DL (ref 8.7–10.5)
CALCIUM SERPL-MCNC: 8.7 MG/DL (ref 8.7–10.5)
CHLORIDE SERPL-SCNC: 104 MMOL/L (ref 95–110)
CHLORIDE SERPL-SCNC: 105 MMOL/L (ref 95–110)
CO2 SERPL-SCNC: 29 MMOL/L (ref 23–29)
CO2 SERPL-SCNC: 33 MMOL/L (ref 23–29)
CREAT SERPL-MCNC: 0.8 MG/DL (ref 0.5–1.4)
CREAT SERPL-MCNC: 0.8 MG/DL (ref 0.5–1.4)
DIFFERENTIAL METHOD: ABNORMAL
EOSINOPHIL # BLD AUTO: 0 K/UL (ref 0–0.5)
EOSINOPHIL NFR BLD: 0 % (ref 0–8)
ERYTHROCYTE [DISTWIDTH] IN BLOOD BY AUTOMATED COUNT: 15.1 % (ref 11.5–14.5)
EST. GFR  (NO RACE VARIABLE): >60 ML/MIN/1.73 M^2
EST. GFR  (NO RACE VARIABLE): >60 ML/MIN/1.73 M^2
GLUCOSE SERPL-MCNC: 101 MG/DL (ref 70–110)
GLUCOSE SERPL-MCNC: 131 MG/DL (ref 70–110)
GRAM STN SPEC: ABNORMAL
HCT VFR BLD AUTO: 35.6 % (ref 37–48.5)
HGB BLD-MCNC: 11.4 G/DL (ref 12–16)
IMM GRANULOCYTES # BLD AUTO: 0.07 K/UL (ref 0–0.04)
IMM GRANULOCYTES NFR BLD AUTO: 0.5 % (ref 0–0.5)
LYMPHOCYTES # BLD AUTO: 1.8 K/UL (ref 1–4.8)
LYMPHOCYTES NFR BLD: 12.1 % (ref 18–48)
MAGNESIUM SERPL-MCNC: 2.3 MG/DL (ref 1.6–2.6)
MAGNESIUM SERPL-MCNC: 2.5 MG/DL (ref 1.6–2.6)
MCH RBC QN AUTO: 29 PG (ref 27–31)
MCHC RBC AUTO-ENTMCNC: 32 G/DL (ref 32–36)
MCV RBC AUTO: 91 FL (ref 82–98)
MONOCYTES # BLD AUTO: 1.4 K/UL (ref 0.3–1)
MONOCYTES NFR BLD: 9.2 % (ref 4–15)
NEUTROPHILS # BLD AUTO: 11.8 K/UL (ref 1.8–7.7)
NEUTROPHILS NFR BLD: 78.1 % (ref 38–73)
NRBC BLD-RTO: 0 /100 WBC
PHOSPHATE SERPL-MCNC: 3.7 MG/DL (ref 2.7–4.5)
PLATELET # BLD AUTO: 291 K/UL (ref 150–450)
PMV BLD AUTO: 10.6 FL (ref 9.2–12.9)
POCT GLUCOSE: 120 MG/DL (ref 70–110)
POTASSIUM SERPL-SCNC: 4.4 MMOL/L (ref 3.5–5.1)
POTASSIUM SERPL-SCNC: 4.6 MMOL/L (ref 3.5–5.1)
PROT SERPL-MCNC: 6.3 G/DL (ref 6–8.4)
RBC # BLD AUTO: 3.93 M/UL (ref 4–5.4)
SODIUM SERPL-SCNC: 143 MMOL/L (ref 136–145)
SODIUM SERPL-SCNC: 147 MMOL/L (ref 136–145)
WBC # BLD AUTO: 15.15 K/UL (ref 3.9–12.7)

## 2023-01-24 PROCEDURE — 85025 COMPLETE CBC W/AUTO DIFF WBC: CPT | Performed by: STUDENT IN AN ORGANIZED HEALTH CARE EDUCATION/TRAINING PROGRAM

## 2023-01-24 PROCEDURE — 25000003 PHARM REV CODE 250: Performed by: INTERNAL MEDICINE

## 2023-01-24 PROCEDURE — 63600175 PHARM REV CODE 636 W HCPCS

## 2023-01-24 PROCEDURE — 25000003 PHARM REV CODE 250: Performed by: STUDENT IN AN ORGANIZED HEALTH CARE EDUCATION/TRAINING PROGRAM

## 2023-01-24 PROCEDURE — 20000000 HC ICU ROOM

## 2023-01-24 PROCEDURE — 63600175 PHARM REV CODE 636 W HCPCS: Performed by: INTERNAL MEDICINE

## 2023-01-24 PROCEDURE — 63600175 PHARM REV CODE 636 W HCPCS: Performed by: STUDENT IN AN ORGANIZED HEALTH CARE EDUCATION/TRAINING PROGRAM

## 2023-01-24 PROCEDURE — 94003 VENT MGMT INPAT SUBQ DAY: CPT

## 2023-01-24 PROCEDURE — 84100 ASSAY OF PHOSPHORUS: CPT | Performed by: STUDENT IN AN ORGANIZED HEALTH CARE EDUCATION/TRAINING PROGRAM

## 2023-01-24 PROCEDURE — 27200966 HC CLOSED SUCTION SYSTEM

## 2023-01-24 PROCEDURE — 36415 COLL VENOUS BLD VENIPUNCTURE: CPT | Performed by: INTERNAL MEDICINE

## 2023-01-24 PROCEDURE — 83735 ASSAY OF MAGNESIUM: CPT | Mod: 91 | Performed by: INTERNAL MEDICINE

## 2023-01-24 PROCEDURE — 27000221 HC OXYGEN, UP TO 24 HOURS

## 2023-01-24 PROCEDURE — 80053 COMPREHEN METABOLIC PANEL: CPT | Performed by: STUDENT IN AN ORGANIZED HEALTH CARE EDUCATION/TRAINING PROGRAM

## 2023-01-24 PROCEDURE — 25000242 PHARM REV CODE 250 ALT 637 W/ HCPCS: Performed by: INTERNAL MEDICINE

## 2023-01-24 PROCEDURE — 27000190 HC CPAP FULL FACE MASK W/VALVE

## 2023-01-24 PROCEDURE — 83735 ASSAY OF MAGNESIUM: CPT | Performed by: STUDENT IN AN ORGANIZED HEALTH CARE EDUCATION/TRAINING PROGRAM

## 2023-01-24 PROCEDURE — 80048 BASIC METABOLIC PNL TOTAL CA: CPT | Mod: XB | Performed by: INTERNAL MEDICINE

## 2023-01-24 PROCEDURE — 94640 AIRWAY INHALATION TREATMENT: CPT

## 2023-01-24 PROCEDURE — 94761 N-INVAS EAR/PLS OXIMETRY MLT: CPT

## 2023-01-24 PROCEDURE — 99900035 HC TECH TIME PER 15 MIN (STAT)

## 2023-01-24 PROCEDURE — 94660 CPAP INITIATION&MGMT: CPT | Mod: XB

## 2023-01-24 RX ORDER — L. ACIDOPHILUS/L.BULGARICUS 100MM CELL
1 GRANULES IN PACKET (EA) ORAL 2 TIMES DAILY
Status: DISCONTINUED | OUTPATIENT
Start: 2023-01-24 | End: 2023-01-26

## 2023-01-24 RX ADMIN — FAMOTIDINE 20 MG: 10 INJECTION, SOLUTION INTRAVENOUS at 08:01

## 2023-01-24 RX ADMIN — MUPIROCIN: 20 OINTMENT TOPICAL at 09:01

## 2023-01-24 RX ADMIN — METOPROLOL TARTRATE 25 MG: 25 TABLET, FILM COATED ORAL at 08:01

## 2023-01-24 RX ADMIN — CEFEPIME 2 G: 2 INJECTION, POWDER, FOR SOLUTION INTRAVENOUS at 06:01

## 2023-01-24 RX ADMIN — LACTOBACILLUS ACIDOPHILUS / LACTOBACILLUS BULGARICUS 1 EACH: 100 MILLION CFU STRENGTH GRANULES at 09:01

## 2023-01-24 RX ADMIN — IPRATROPIUM BROMIDE AND ALBUTEROL SULFATE 3 ML: 2.5; .5 SOLUTION RESPIRATORY (INHALATION) at 03:01

## 2023-01-24 RX ADMIN — DEXMEDETOMIDINE HYDROCHLORIDE 0.3 MCG/KG/HR: 4 INJECTION, SOLUTION INTRAVENOUS at 09:01

## 2023-01-24 RX ADMIN — LORAZEPAM 0.5 MG: 0.5 TABLET ORAL at 09:01

## 2023-01-24 RX ADMIN — METOPROLOL TARTRATE 25 MG: 25 TABLET, FILM COATED ORAL at 09:01

## 2023-01-24 RX ADMIN — IPRATROPIUM BROMIDE AND ALBUTEROL SULFATE 3 ML: 2.5; .5 SOLUTION RESPIRATORY (INHALATION) at 08:01

## 2023-01-24 RX ADMIN — ENOXAPARIN SODIUM 40 MG: 40 INJECTION SUBCUTANEOUS at 05:01

## 2023-01-24 RX ADMIN — PROPOFOL 50 MCG/KG/MIN: 10 INJECTION, EMULSION INTRAVENOUS at 12:01

## 2023-01-24 RX ADMIN — METHYLPREDNISOLONE SODIUM SUCCINATE 60 MG: 40 INJECTION, POWDER, FOR SOLUTION INTRAMUSCULAR; INTRAVENOUS at 08:01

## 2023-01-24 RX ADMIN — PROPOFOL 50 MCG/KG/MIN: 10 INJECTION, EMULSION INTRAVENOUS at 04:01

## 2023-01-24 RX ADMIN — MUPIROCIN: 20 OINTMENT TOPICAL at 08:01

## 2023-01-24 RX ADMIN — CEFEPIME 2 G: 2 INJECTION, POWDER, FOR SOLUTION INTRAVENOUS at 03:01

## 2023-01-24 RX ADMIN — LACTOBACILLUS ACIDOPHILUS / LACTOBACILLUS BULGARICUS 1 EACH: 100 MILLION CFU STRENGTH GRANULES at 08:01

## 2023-01-24 RX ADMIN — IPRATROPIUM BROMIDE AND ALBUTEROL SULFATE 3 ML: 2.5; .5 SOLUTION RESPIRATORY (INHALATION) at 11:01

## 2023-01-24 RX ADMIN — IPRATROPIUM BROMIDE AND ALBUTEROL SULFATE 3 ML: 2.5; .5 SOLUTION RESPIRATORY (INHALATION) at 12:01

## 2023-01-24 RX ADMIN — IPRATROPIUM BROMIDE AND ALBUTEROL SULFATE 3 ML: 2.5; .5 SOLUTION RESPIRATORY (INHALATION) at 07:01

## 2023-01-24 RX ADMIN — FAMOTIDINE 20 MG: 10 INJECTION, SOLUTION INTRAVENOUS at 09:01

## 2023-01-24 RX ADMIN — CEFEPIME 2 G: 2 INJECTION, POWDER, FOR SOLUTION INTRAVENOUS at 11:01

## 2023-01-24 NOTE — PLAN OF CARE
Received patient on vent, settings match flowsheet, alarms audible and working. given aerosol treatment, no adverse reactions will continue to monitor.

## 2023-01-24 NOTE — PROGRESS NOTES
"[facility] IM Progress Note    Subjective:      Yesterday failed SBT after 20 mins.  Today remains intubated, sedated on minimal propofol. Will attempt SBT again today.     Objective:   Last 24 Hour Vital Signs:  BP  Min: 87/55  Max: 168/82  Temp  Av.2 °F (36.8 °C)  Min: 98 °F (36.7 °C)  Max: 98.4 °F (36.9 °C)  Pulse  Av.1  Min: 92  Max: 108  Resp  Av.9  Min: 14  Max: 40  SpO2  Av.3 %  Min: 93 %  Max: 97 %  Height  Av' 5" (165.1 cm)  Min: 5' 5" (165.1 cm)  Max: 5' 5" (165.1 cm)  Weight  Av.1 kg (170 lb)  Min: 77.1 kg (170 lb)  Max: 77.1 kg (170 lb)  I/O last 3 completed shifts:  In: 2167.7 [I.V.:755.4; IV Piggyback:1412.3]  Out: 3725 [Urine:3275; Drains:400; Chest Tube:50]    Physical Examination:  General: intubated, sedated  Eyes: sclera non-injected, non-icteric, PERRL, EOMI  Mouth:ET tube in place  Cardiovascular: RR, NR, no murmurs appreciated. No LE edema  Pulmonary: poor air movement bilaterally, minimal wheeze, R chest tube in place  Abdomen: Abdomen soft and non-tender, non-distended. Bowel sounds normoactive.  Extremities: All 4 extremities are warm with palpable pulses.     Laboratory:  Laboratory Data Reviewed: yes  Pertinent Findings:    Microbiology Data Reviewed: yes  Pertinent Findings:  Blood cx : NGTD  Resp Cx NG  Flu/COVID negative    Other Results:  EKG (my interpretation):   Admission EKG: sinus tach 120, TWIs V1/V2  Subsequent EKG : NSR, TWI in V1, V2 resolved    Radiology Data Reviewed: yes  Pertinent Findings:  Chest xray : R sided pneumothorax  Chest xray : improvement in R sided pneumothorax, s/p pleuravent placement    Current Medications:     Infusions:   dexmedetomidine (PRECEDEX) infusion Stopped (23 1219)    propofoL 50 mcg/kg/min (23 0647)        Scheduled:   albuterol-ipratropium  3 mL Nebulization Q4H    ceFEPime (MAXIPIME) IVPB  2 g Intravenous Q8H    enoxaparin  40 mg Subcutaneous Daily    famotidine (PF)  20 mg Intravenous Q12H    " lactobacillus acidophilus & bulgar  1 packet Per OG tube BID    methylPREDNISolone sodium succinate injection  60 mg Intravenous Daily    metoprolol tartrate  25 mg Oral BID    mupirocin   Nasal BID        PRN:  albuterol, dextrose 10%, dextrose 10%, glucagon (human recombinant), HYDROmorphone, LORazepam, sodium chloride 0.9%    Antibiotics and Day Number of Therapy:  Cefepime 1/20-present  Doxycycline 1/20-present    Lines and Day Number of Therapy:  PIV, ETT, NG tube    Assessment:     Ana Aguila is a 67 year old female with PMHx of COPD on 2.5L home O2, Hypertension, Hyperlipidemia, GERD, MI, and hx of Takasubo cardiomyopathy who presented on 1/20 for respiratory distress 2/2 COPD exacerbation, required emergent intubation in the ED. Pt was found to have a large R sided pneumothorax s/p pleuravent --> chest tube placement for re-expansion of apical pneumo. Pt was extubated on 1/20 but then re-intubated 1/21 for respiratory distress, pleuravent tube found to be out of place on repeat CXR, new apical PTX, 8F armaan catheter was placed was resolution of PTX. Failed SBT on 1/23. Remains intubated sedated in ICU, cont abxs.    Plan:     Acute on Chronic Hypoxic Hypercapneic Respiratory Failure 2/2 COPD Exacerbation  - similar presentation 7/2022, on home 2.5L home O2  - PFTs show obstruction with severe ventilatory impairment, FEV1 0.43L, FEV1/FVC 40%  - EMS administered dexamethasone 16mg, duo-neb tx, albuterol x2, and 0.5 epi  - ED: BiPAP --> intubation for respiratory distress and accessory muscle usage  - ABG significant for pH 7.14, pCO2 98.7, pO2 169, HCO3 33.4 on ventilator with Vt 350, PEEP 5, FiO2 100% RR22  - repeat ABG shows improvement in acidosis, hypercarbia  - extubated to BIPAP 1/20, re-intubated 1/21  - continue CAP/VAP coverage with cefepime and doxy 1/20-present  - continue to tx COPD exacerbation with scheduled duo-nebs and IV methylprednisolone 60 daily  - plan for SBT again today    Recurrent  pneumothorax  -etiology unclear, possibly present initially and worsened by NIVP  -pleurvent placed in ED, chest x-ray showed expansion of apical pneumo 1/21 --> chest tube placed, pleuravax pulled  -CT remains in place     Shock  Hx Hypertension  - Per chart review, home meds losartan 25mg, amlodipine 2.5mg, continue hold  - required minimal norepi, weaned off on 1/22-1/23    CAD  Chronic Heart failure with diastolic dysfunction  NSTEMI type 2  - Cardiology consulted: notes initial EKG with minor ST segment elevation/minor WA segment depression. Overall pattern not suggestive of ACS. Likely 2/2 demand in setting of ARF  - TTE 08/4: EF 65%, intermediate LV diastolic dysfunction  - BNP non-elevated, troponin trend 0.147 -->0.488-->0.642--> peaked 1/21  - continue Toprol XL 50     Hyperlipidemia  - Per chart review on rosuvastatin 10mg, continue hold     GERD  R Hemicolectomy 2/2 GIB  - Continue Famotidine IV for peptic ulcer ppx  - Monitor for bleeding, daily CBC    Feeding: NPO  Analgesia: n/a  Sedation: propofol  Thrombo Ppx: lovenox   Head Up: 30 degrees  Ulcer Ppx: pepcid  Glycemic Control: SSI, q6hr POCT glucose  SAT/SBT: daily  Bowel Regimen: n/a   Indwelling Catheter Removal: PIV, ETT, NG tube  De-escalation of abxs: cefepime    Code: Full    Dispo: continue ICU care, SBT trial again today      Darci Gongora  [facility] Internal Medicine PGY3  [facility] IM Service Team A    [facility] Medicine Hospitalist Pager numbers:   [facility] Hospitalist Medicine Team A (Susanna/Keren): [phone]  [facility] Hospitalist Medicine Team B (Camilla/Lavelle):  [phone]

## 2023-01-24 NOTE — PLAN OF CARE
[facility] PLAN OF CARE    Patient extubated to BiPAP at noon today (IPAP 10 EPAP 5 FiO2 33%).     Vitals: HR 80s  normotensive  spO2 93%   RR 21-23 (2:25pm)    Patient is resting in bed comfortably. No signs respiratory distress.     Brenda Krueger MD  [facility] IM PGY-2    2:26pm

## 2023-01-24 NOTE — PROGRESS NOTES
"[facility]/Critical Progress Note      Patient: Ana Aguila  Age:67 y.o.   Sex: female   MRN:[id number]  Admit date:1/20/2023    Attending Physician: Dr Veliz ([facility])  LOS:4 day(s)     SUBJECTIVE:    Result for Consult:   Acute on chronic hypoxic and hypercapnic resp failure + pneumothorax     Hospital Course  67F PMHx significant for COPD with chronic hypoxic respiratory failure on 2.5L home O2, HTN, HLD, Takasubo CM (resolved) presented to ED in respiratory distress on 1/20. EMS called for respiratory distress. Reported hypoxemia on arrival patient started on supplemental oxygen without improvement and eventually escalated to BiPAP also without improvement. Intubated in the ED. CXR showed large right side pneumothorax. Pleuravent placed in the ED with repeat CXR showing significant improvement in pneumo. Admitted to the ICU and later extubated (1/20). Patient required re-intubation on 1/21, CXR following intubation showed no pneumothorax. However, CXR on 1/22 showed interval development of pneumothorax with displacement of pleuravent. Kirit chest tube placed and repeat CXR showed improvement in right-side pneumothorax.     HPI Interval  Patient extubated to BiPAP today.     OBJECTIVE DATA:      Vital Signs (last 24h)   Temp:  [98 °F (36.7 °C)-98.5 °F (36.9 °C)] 98.5 °F (36.9 °C)  Pulse:  [] 90  Resp:  [14-40] 17  SpO2:  [93 %-97 %] 96 %  BP: ()/(53-82) 123/67        Intake/Output Summary (Last 24 hours) at 1/24/2023 0927  Last data filed at 1/24/2023 0647  Gross per 24 hour   Intake 1562.85 ml   Output 1935 ml   Net -372.15 ml       Physical Examination:   Vitals: /67   Pulse 90   Temp 98.5 °F (36.9 °C) (Axillary)   Resp 17   Ht 5' 5" (1.651 m)   Wt 77.1 kg (170 lb)   LMP  (LMP Unknown)   SpO2 96%   Breastfeeding No   BMI 28.29 kg/m²      General: Awake, alert, following commands.   Head: Skull is normocephalic and symmetric. Symmetric facial features.   Eyes: Eyelids & skin " overlying globe are intact. Sclera is white and not injected. PERRL.   Mouth: BiPAP mask in place.   Neck: Supple. Full ROM. No obvious enlargements or masses.   Cardiovascular: RRR. Audible S1/S2 without rubs, murmurs and gallops.  No edema on foot, ankle or leg bilaterally.   Pulmonary: No signs of respiratory distress. Bilateral breath sounds.   Abdomen: Active bowel sounds. Abdomen is soft and non-distended.   Extremities: All 4 extremities are warm with palpable pulses. Moving all 4 extremities voluntarily.       Laboratory:  CBC:   Recent Labs   Lab 01/22/23  0854 01/23/23  0316 01/24/23 0318   WBC 20.37* 13.47* 15.15*   HGB 12.4 11.3* 11.4*    287 291   MCV 94 89 91   RDW 13.9 14.4 15.1*     Coags:   Recent Labs   Lab 01/20/23  0556   INR 1.0     CMP:   Recent Labs   Lab 01/22/23  1307 01/23/23  0316 01/24/23 0318   * 139 143   K 3.3* 3.3* 4.4   CL 97 100 104   CO2 26 29 29   * 149* 101   BUN 21 19 22   CREATININE 0.8 0.9 0.8   CALCIUM 8.4* 8.7 8.7   MG 1.9 2.3 2.3   PHOS 1.4* 1.4* 3.7     Estimated Creatinine Clearance: 69.1 mL/min (based on SCr of 0.8 mg/dL).  LFTs:   Recent Labs   Lab 01/22/23  1307 01/23/23 0316 01/24/23 0318   PROT 6.4 6.3 6.3   ALBUMIN 3.1* 3.0* 3.1*   ALKPHOS 55 55 53*   AST 37 27 38   ALT 24 22 45*     Cardiac Data:    Recent Labs   Lab 01/20/23  0556 01/20/23  0952 01/20/23  1619 01/21/23  0832 01/21/23  1403   TROPONINI 0.147*   < > 0.642* 0.983* 0.934*   BNP 93  --   --   --   --     < > = values in this interval not displayed.       Radiology Results:   All images from this admission reviewed.       Microbiology Results:  Microbiology Results (last 7 days)       Procedure Component Value Units Date/Time    Culture, Respiratory with Gram Stain [[id number]]  (Abnormal) Collected: 01/21/23 1529    Order Status: Completed Specimen: Respiratory from Sputum Updated: 01/24/23 0739     Respiratory Culture No S aureus or Pseudomonas isolated.      YEAST    Rare  Identification pending       Gram Stain (Respiratory) <10 epithelial cells per low power field.     Gram Stain (Respiratory) Rare WBC's     Gram Stain (Respiratory) No organisms seen    Blood culture x two cultures. Draw prior to antibiotics. [[id number]] Collected: 01/20/23 0556    Order Status: Completed Specimen: Blood Updated: 01/23/23 1612     Blood Culture, Routine No Growth to date      No Growth to date      No Growth to date      No Growth to date    Narrative:      Aerobic and anaerobic    Blood culture x two cultures. Draw prior to antibiotics. [[id number]] Collected: 01/20/23 0556    Order Status: Completed Specimen: Blood from Antecubital, Right Hand Updated: 01/23/23 1612     Blood Culture, Routine No Growth to date      No Growth to date      No Growth to date      No Growth to date    Narrative:      Aerobic and anaerobic    Respiratory Infection Panel (PCR), Nasopharyngeal [[id number]] Collected: 01/21/23 0913    Order Status: Canceled Specimen: Nasopharyngeal Swab     Influenza A & B by Molecular [[id number]] Collected: 01/20/23 0535    Order Status: Completed Specimen: Nasopharyngeal Swab Updated: 01/20/23 0612     Influenza A, Molecular Negative     Influenza B, Molecular Negative     Flu A & B Source Nasal swab             Antibiotics and Day Number of Therapy:     Start     Stop Route Frequency Ordered    01/22/23 1900  ceFEPIme (MAXIPIME) 2 g in dextrose 5 % in water (D5W) 5 % 50 mL IVPB (MB+)         -- IV Every 8 hours (non-standard times) 01/22/23 1111    01/20/23 0900  mupirocin 2 % ointment         01/25 0859 Nasl 2 times daily 01/20/23 0545              Current Medications:     Continuous infusions:   dexmedetomidine (PRECEDEX) infusion Stopped (01/21/23 1219)    propofoL 50 mcg/kg/min (01/24/23 0647)        Scheduled:   albuterol-ipratropium  3 mL Nebulization Q4H    ceFEPime (MAXIPIME) IVPB  2 g Intravenous Q8H    enoxaparin  40 mg Subcutaneous Daily    famotidine (PF)  20 mg  Intravenous Q12H    lactobacillus acidophilus & bulgar  1 packet Per OG tube BID    methylPREDNISolone sodium succinate injection  60 mg Intravenous Daily    metoprolol tartrate  25 mg Oral BID    mupirocin   Nasal BID        PRN:  albuterol, dextrose 10%, dextrose 10%, glucagon (human recombinant), HYDROmorphone, LORazepam, sodium chloride 0.9%      Assessment/Plan:     Ana Aguila is a 67 y.o. female with a PMHx significant for advanced COPD with chronic hypoxic respiratory failure on 2.5L home O2, HTN, HLD, Takasubo CM (resolved) presented to ED in respiratory distress in the setting of acute on chronic hypoxic & hypercapnic respiratory failure and pneumothorax. Admitted to the ICU for mechanical ventilation s/p chest tube.      Neuro  - Exam: Awake, alert, following commnads.       Cardiovascular  # Hemodynamically Stable  # Prolonged QTc  # HLD  - Normotensive. No meds.    Most recent TTE 1/23 normal systolic fx 65%, indeterminate diastolic function and normal RV function. [facility] 7/2022 no significant CAD.   - Qtc prolonged since admission. Most recent Qtc 516  - Troponin elevated with WKG abnormalities. Trended down.   PLAN  - Avoid Qtc prolonging drugs. Switched from azithro to doxy.   - On BB     Respiratory  # Acute on Chronic Hypoxic &  Hypercapnic Respiratory Failure on 2.5L home O2  # Pneumothorax  # Advanced COPD   - PFTs show obstruction with severe ventilatory impairment, FEV1 0.43L, FEV1/FVC 40%  - On 1/21, pt  intubated on arrival to ED and later extubated in ICU.  Required re-intubation on 1/21, CXR following intubation showed no pneumothorax. However, CXR on 1/22 showed interval development of pneumothorax w displacement of pleuravent. Kirit chest tube placed and repeat CXR showed improvement in right-side pneumothorax.   - Resp cx 1/21 yeast.  Blood cultures 1/22 negative.     PLAN  - Continue duonebs q4, methylprednisolone 60 daily.   - Extubated to BiPAP today; monitor closely.     Renal   -  Cr stable and at baseline ~0.9.  - UOP 2L in the last 24h; net -1.4L  - Electrolytes wnl. Replete electrolytes PRN.     GI  # Right hemicolectomy 2/2 GIB   - Diet: tube feeds   - Last bowel movement: Last Bowel Movement: 01/19/23   - Bowel regimen: lactobacillus, pepcid bid  - OG in place; output ~ 500 since admission.     Heme  - On admission, H/H 11.5  37.0 with MCV 95 & RDW 14. Baseline Hgb appears to be ~ 12. H/H stable since admission.   - Plt count stable and wnl.     Infectious Disease  # Leukocytosis   - Afebrile with WBC satble-anjelica 25.62--> 13 --> 15.    - CXR 1/23 with improved interstitial edema. Right subQ emphysema.   - On CAP coverage with cefepime. Completed doxy course (1 day azithro--> 2 days doxy;switched due to QTC prolongation).     Endocrine  - A1c 5.2%  serum gluc 287 on admission.   - Morning serum gluc 101. Not on insulin.   - Serum glucose goal of 140-180.     Analgesia: propofol   Thrombo PPX: lovenox 40 qd  Head of Bed: 30  Feeding: NPO    Code: full  Dispo: Critcally ill, continue ICU care.     Patient evaluated and plan discussed in the presence of .     Brenda Krueger MD  [facility] PGY-II

## 2023-01-24 NOTE — PLAN OF CARE
Problem: Infection  Goal: Absence of Infection Signs and Symptoms  Outcome: Ongoing, Progressing     Problem: Adult Inpatient Plan of Care  Goal: Plan of Care Review  Outcome: Ongoing, Progressing  Goal: Patient-Specific Goal (Individualized)  Outcome: Ongoing, Progressing  Goal: Absence of Hospital-Acquired Illness or Injury  Outcome: Ongoing, Progressing  Goal: Optimal Comfort and Wellbeing  Outcome: Ongoing, Progressing  Goal: Readiness for Transition of Care  Outcome: Ongoing, Progressing     Problem: Communication Impairment (Mechanical Ventilation, Invasive)  Goal: Effective Communication  Outcome: Met     Problem: Device-Related Complication Risk (Mechanical Ventilation, Invasive)  Goal: Optimal Device Function  Outcome: Met     Problem: Inability to Wean (Mechanical Ventilation, Invasive)  Goal: Mechanical Ventilation Liberation  Outcome: Met     Problem: Nutrition Impairment (Mechanical Ventilation, Invasive)  Goal: Optimal Nutrition Delivery  Outcome: Met     Problem: Skin and Tissue Injury (Mechanical Ventilation, Invasive)  Goal: Absence of Device-Related Skin and Tissue Injury  Outcome: Met     Problem: Ventilator-Induced Lung Injury (Mechanical Ventilation, Invasive)  Goal: Absence of Ventilator-Induced Lung Injury  Outcome: Met     Problem: Communication Impairment (Artificial Airway)  Goal: Effective Communication  Outcome: Met     Problem: Device-Related Complication Risk (Artificial Airway)  Goal: Optimal Device Function  Outcome: Met

## 2023-01-25 ENCOUNTER — PATIENT MESSAGE (OUTPATIENT)
Dept: PULMONOLOGY | Facility: CLINIC | Age: 68
End: 2023-01-25
Payer: MEDICARE

## 2023-01-25 LAB
ALBUMIN SERPL BCP-MCNC: 3 G/DL (ref 3.5–5.2)
ALP SERPL-CCNC: 59 U/L (ref 55–135)
ALT SERPL W/O P-5'-P-CCNC: 46 U/L (ref 10–44)
ANION GAP SERPL CALC-SCNC: 11 MMOL/L (ref 8–16)
AST SERPL-CCNC: 34 U/L (ref 10–40)
BACTERIA BLD CULT: NORMAL
BACTERIA BLD CULT: NORMAL
BASOPHILS # BLD AUTO: 0.02 K/UL (ref 0–0.2)
BASOPHILS NFR BLD: 0.1 % (ref 0–1.9)
BILIRUB SERPL-MCNC: 0.6 MG/DL (ref 0.1–1)
BUN SERPL-MCNC: 34 MG/DL (ref 8–23)
CALCIUM SERPL-MCNC: 8.8 MG/DL (ref 8.7–10.5)
CHLORIDE SERPL-SCNC: 106 MMOL/L (ref 95–110)
CO2 SERPL-SCNC: 31 MMOL/L (ref 23–29)
CREAT SERPL-MCNC: 0.7 MG/DL (ref 0.5–1.4)
DIFFERENTIAL METHOD: ABNORMAL
EOSINOPHIL # BLD AUTO: 0 K/UL (ref 0–0.5)
EOSINOPHIL NFR BLD: 0.1 % (ref 0–8)
ERYTHROCYTE [DISTWIDTH] IN BLOOD BY AUTOMATED COUNT: 15.3 % (ref 11.5–14.5)
EST. GFR  (NO RACE VARIABLE): >60 ML/MIN/1.73 M^2
GLUCOSE SERPL-MCNC: 80 MG/DL (ref 70–110)
HCT VFR BLD AUTO: 34.6 % (ref 37–48.5)
HGB BLD-MCNC: 10.8 G/DL (ref 12–16)
IMM GRANULOCYTES # BLD AUTO: 0.12 K/UL (ref 0–0.04)
IMM GRANULOCYTES NFR BLD AUTO: 0.7 % (ref 0–0.5)
LYMPHOCYTES # BLD AUTO: 2.6 K/UL (ref 1–4.8)
LYMPHOCYTES NFR BLD: 15.5 % (ref 18–48)
MAGNESIUM SERPL-MCNC: 2.6 MG/DL (ref 1.6–2.6)
MCH RBC QN AUTO: 29 PG (ref 27–31)
MCHC RBC AUTO-ENTMCNC: 31.2 G/DL (ref 32–36)
MCV RBC AUTO: 93 FL (ref 82–98)
MONOCYTES # BLD AUTO: 1.7 K/UL (ref 0.3–1)
MONOCYTES NFR BLD: 10.3 % (ref 4–15)
NEUTROPHILS # BLD AUTO: 12.2 K/UL (ref 1.8–7.7)
NEUTROPHILS NFR BLD: 73.3 % (ref 38–73)
NRBC BLD-RTO: 0 /100 WBC
PHOSPHATE SERPL-MCNC: 3.2 MG/DL (ref 2.7–4.5)
PLATELET # BLD AUTO: 301 K/UL (ref 150–450)
PMV BLD AUTO: 10 FL (ref 9.2–12.9)
POCT GLUCOSE: 87 MG/DL (ref 70–110)
POTASSIUM SERPL-SCNC: 4.3 MMOL/L (ref 3.5–5.1)
PROT SERPL-MCNC: 6.1 G/DL (ref 6–8.4)
RBC # BLD AUTO: 3.72 M/UL (ref 4–5.4)
SODIUM SERPL-SCNC: 148 MMOL/L (ref 136–145)
WBC # BLD AUTO: 16.63 K/UL (ref 3.9–12.7)

## 2023-01-25 PROCEDURE — 63600175 PHARM REV CODE 636 W HCPCS: Performed by: STUDENT IN AN ORGANIZED HEALTH CARE EDUCATION/TRAINING PROGRAM

## 2023-01-25 PROCEDURE — 97163 PT EVAL HIGH COMPLEX 45 MIN: CPT

## 2023-01-25 PROCEDURE — 25000003 PHARM REV CODE 250: Performed by: STUDENT IN AN ORGANIZED HEALTH CARE EDUCATION/TRAINING PROGRAM

## 2023-01-25 PROCEDURE — 97165 OT EVAL LOW COMPLEX 30 MIN: CPT

## 2023-01-25 PROCEDURE — 25000003 PHARM REV CODE 250: Performed by: INTERNAL MEDICINE

## 2023-01-25 PROCEDURE — 97116 GAIT TRAINING THERAPY: CPT

## 2023-01-25 PROCEDURE — 85025 COMPLETE CBC W/AUTO DIFF WBC: CPT | Performed by: STUDENT IN AN ORGANIZED HEALTH CARE EDUCATION/TRAINING PROGRAM

## 2023-01-25 PROCEDURE — 36415 COLL VENOUS BLD VENIPUNCTURE: CPT | Performed by: STUDENT IN AN ORGANIZED HEALTH CARE EDUCATION/TRAINING PROGRAM

## 2023-01-25 PROCEDURE — 84100 ASSAY OF PHOSPHORUS: CPT | Performed by: STUDENT IN AN ORGANIZED HEALTH CARE EDUCATION/TRAINING PROGRAM

## 2023-01-25 PROCEDURE — 20000000 HC ICU ROOM

## 2023-01-25 PROCEDURE — 99900035 HC TECH TIME PER 15 MIN (STAT)

## 2023-01-25 PROCEDURE — 80053 COMPREHEN METABOLIC PANEL: CPT | Performed by: STUDENT IN AN ORGANIZED HEALTH CARE EDUCATION/TRAINING PROGRAM

## 2023-01-25 PROCEDURE — 97110 THERAPEUTIC EXERCISES: CPT

## 2023-01-25 PROCEDURE — 94660 CPAP INITIATION&MGMT: CPT

## 2023-01-25 PROCEDURE — 27000221 HC OXYGEN, UP TO 24 HOURS

## 2023-01-25 PROCEDURE — 94640 AIRWAY INHALATION TREATMENT: CPT

## 2023-01-25 PROCEDURE — 83735 ASSAY OF MAGNESIUM: CPT | Performed by: STUDENT IN AN ORGANIZED HEALTH CARE EDUCATION/TRAINING PROGRAM

## 2023-01-25 PROCEDURE — 25000003 PHARM REV CODE 250

## 2023-01-25 PROCEDURE — 25000242 PHARM REV CODE 250 ALT 637 W/ HCPCS: Performed by: INTERNAL MEDICINE

## 2023-01-25 PROCEDURE — 94761 N-INVAS EAR/PLS OXIMETRY MLT: CPT

## 2023-01-25 RX ORDER — LORAZEPAM 2 MG/ML
0.5 INJECTION INTRAMUSCULAR ONCE AS NEEDED
Status: DISCONTINUED | OUTPATIENT
Start: 2023-01-25 | End: 2023-01-25

## 2023-01-25 RX ORDER — PREDNISONE 20 MG/1
40 TABLET ORAL DAILY
Status: DISCONTINUED | OUTPATIENT
Start: 2023-01-25 | End: 2023-01-25

## 2023-01-25 RX ORDER — POLYETHYLENE GLYCOL 3350 17 G/17G
17 POWDER, FOR SOLUTION ORAL DAILY
Status: DISCONTINUED | OUTPATIENT
Start: 2023-01-25 | End: 2023-01-31 | Stop reason: HOSPADM

## 2023-01-25 RX ORDER — LORAZEPAM 0.5 MG/1
0.5 TABLET ORAL ONCE AS NEEDED
Status: DISCONTINUED | OUTPATIENT
Start: 2023-01-25 | End: 2023-01-25

## 2023-01-25 RX ORDER — PREDNISONE 20 MG/1
40 TABLET ORAL DAILY
Status: DISCONTINUED | OUTPATIENT
Start: 2023-01-26 | End: 2023-01-26

## 2023-01-25 RX ORDER — LORAZEPAM 0.5 MG/1
0.5 TABLET ORAL ONCE AS NEEDED
Status: COMPLETED | OUTPATIENT
Start: 2023-01-25 | End: 2023-01-25

## 2023-01-25 RX ADMIN — IPRATROPIUM BROMIDE AND ALBUTEROL SULFATE 3 ML: 2.5; .5 SOLUTION RESPIRATORY (INHALATION) at 08:01

## 2023-01-25 RX ADMIN — METHYLPREDNISOLONE SODIUM SUCCINATE 60 MG: 40 INJECTION, POWDER, FOR SOLUTION INTRAMUSCULAR; INTRAVENOUS at 09:01

## 2023-01-25 RX ADMIN — IPRATROPIUM BROMIDE AND ALBUTEROL SULFATE 3 ML: 2.5; .5 SOLUTION RESPIRATORY (INHALATION) at 12:01

## 2023-01-25 RX ADMIN — IPRATROPIUM BROMIDE AND ALBUTEROL SULFATE 3 ML: 2.5; .5 SOLUTION RESPIRATORY (INHALATION) at 11:01

## 2023-01-25 RX ADMIN — LORAZEPAM 0.5 MG: 0.5 TABLET ORAL at 10:01

## 2023-01-25 RX ADMIN — METOPROLOL TARTRATE 25 MG: 25 TABLET, FILM COATED ORAL at 08:01

## 2023-01-25 RX ADMIN — LACTOBACILLUS ACIDOPHILUS / LACTOBACILLUS BULGARICUS 1 EACH: 100 MILLION CFU STRENGTH GRANULES at 08:01

## 2023-01-25 RX ADMIN — ENOXAPARIN SODIUM 40 MG: 40 INJECTION SUBCUTANEOUS at 06:01

## 2023-01-25 RX ADMIN — LACTOBACILLUS ACIDOPHILUS / LACTOBACILLUS BULGARICUS 1 EACH: 100 MILLION CFU STRENGTH GRANULES at 09:01

## 2023-01-25 RX ADMIN — IPRATROPIUM BROMIDE AND ALBUTEROL SULFATE 3 ML: 2.5; .5 SOLUTION RESPIRATORY (INHALATION) at 03:01

## 2023-01-25 RX ADMIN — IPRATROPIUM BROMIDE AND ALBUTEROL SULFATE 3 ML: 2.5; .5 SOLUTION RESPIRATORY (INHALATION) at 07:01

## 2023-01-25 RX ADMIN — IPRATROPIUM BROMIDE AND ALBUTEROL SULFATE 3 ML: 2.5; .5 SOLUTION RESPIRATORY (INHALATION) at 04:01

## 2023-01-25 RX ADMIN — FAMOTIDINE 20 MG: 10 INJECTION, SOLUTION INTRAVENOUS at 09:01

## 2023-01-25 RX ADMIN — METOPROLOL TARTRATE 25 MG: 25 TABLET, FILM COATED ORAL at 09:01

## 2023-01-25 NOTE — PLAN OF CARE
Patient will increase functional independence with ADLs by performing:    UE Dressing with Stand-by Assistance.  LE Dressing with Stand-by Assistance.  Grooming while standing at sink with Stand-by Assistance.  Toileting from bedside commode with Stand-by Assistance for hygiene and clothing management.   Supine to sit with Stand-by Assistance.  Functional mobility in room with CGA and DME as needed.      1/25/2023 1550 by JOE Mello, OTR/L  Outcome: Ongoing, Progressing     Eval completed. Patient with anxiety affecting O2 saturation, and ability to participate with functional transfers. Expect improved performance once CT removed. Please see the evaluation note for details.    JOE Mello, OTR/L, CEAS-I  1/25/2023

## 2023-01-25 NOTE — PT/OT/SLP EVAL
Occupational Therapy   Evaluation    Name: Ana Aguila  MRN: [id number]  Admitting Diagnosis: Respiratory failure requiring intubation  Recent Surgery: * No surgery found *      Recommendations:     Discharge Recommendations:  (TBD, potentially home with family once medically stable, or short term stay in post acute therapy unit, as patient with 2 flights of stairs at home.)  Discharge Equipment Recommendations:   (to be determined)  Barriers to discharge:  Decreased caregiver support, Inaccessible home environment    Assessment:     Ana Aguila is a 67 y.o. female with a medical diagnosis of Respiratory failure requiring intubation.  She presents with anxiety, and shallow breathing, requiring cues, but motivated to particapate and in understanding of importance of being out of bed. Performance deficits affecting function: impaired endurance, impaired self care skills, impaired functional mobility, weakness, decreased safety awareness, other (comment) (anxiety).      Rehab Prognosis: Good; patient would benefit from acute skilled OT services to address these deficits and reach maximum level of function.       Plan:     Patient to be seen 5 x a week to address the above listed problems via self-care/home management, therapeutic activities, therapeutic exercises  Plan of Care Expires: 02/16/23  Plan of Care Reviewed with:  patient    Subjective     Chief Complaint: anxious about moving  Patient/Family Comments/goals: return to prior level of functioning.    Living Environment:  Patient lives alone in 2nd floor apartment, 2 flights of steps with B HR to reach apartment.  Bathroom has a T/S with a shower chair.    Prior to admission, patients level of function was independent with ADLs and walk walking in the home independently without AD.  Patient reports she used her rollator in the community.  Granddaughter and friend assist with getting her to appointments.  Equipment used at home: rollator, shower chair.  DME  owned (not currently used): none.  Upon discharge, patient will have limited assist from family (granddaughter and/or friend)    Pain/Comfort:  Pain Rating 1: 0/10    Patients cultural, spiritual, Moravian conflicts given the current situation: no    Objective:     Communicated with: RN prior to session.  Patient found supine with chest tube, kimbrough catheter, blood pressure cuff, peripheral IV, oxygen, pulse ox (continuous), telemetry upon OT entry to room.    General Precautions: Standard, fall  Respiratory Status: Nasal cannula, flow 4 L/min; MD approving titration and weaning patient of supplemental O2 with sats to be around ~92%. Decreased to 2 liters while in supine, however, during session required to up flow to 3.4 liters. Patient cued to attempt to take deeper breaths, as well as use pursed-lip breathing.     Occupational Performance:     Bed Mobility:    Rolling Right: moderate assistance  Scooting: moderate assistance  Supine to Sit: moderate assistance    Functional Mobility/Transfers:  Transfers:     Sit to Stand:  moderate assistance with rolling walker x 2 trials.  O2 sats decrease slightly, on 3-4L supplemental O2  Functional mobility: 4-5 steps with RW and moderate assist to walk to bedside chair    Activities of Daily Living:  Grooming: moderate assistance in seated, due to fatigue with overhead reaching during hair care.  Upper Body Dressing: maximal assistance due to lines  Lower Body Dressing: maximal assistance due to lines, and SOB.  Toileting: total assistance; kimbrough in place.    Cognitive/Visual Perceptual:  Cognitive/Psychosocial Skills:     -       Oriented to: Person, Place, Time, and Situation   -       Follows Commands/attention:Follows one-step commands  -       Communication: low volume, normal rate.  -       Mood/Affect/Coping skills/emotional control: Appropriate to situation    Physical Exam:  Balance: Balance: Seated:  CGA, no overt LOB, mild posterior lean   Standing:  with RW,  Mod A required, unsteady, B knees mild buckling  Upper Extremity Range of Motion:  WFL  Upper Extremity Strength: WFL  Postural Exam:  Patient presented with the following abnormalities:    -       Rounded shoulders  -       Forward head  -       Kyphosis  Sensation:    -       Intact  light/touch B LEs  Skin Integrity/Edema:      -       Skin integrity: chest tube to R, otherwise WNL  -       Edema: Mild B LEs     AMPAC 6 Click ADL:  AMPAC Total Score: 13    Treatment & Education:  Education on role of OT, OT plan of care, importance of being OOB, UE dynamic ROM to improve activity tolerance.    Patient left up in chair with all lines intact, call button in reach, and RN present    GOALS:   Multidisciplinary Problems       Occupational Therapy Goals          Problem: Occupational Therapy    Goal Priority Disciplines Outcome Interventions   Occupational Therapy Goal     OT, PT/OT Ongoing, Progressing    Description: Goals to be met by: 2/24/2023     Patient will increase functional independence with ADLs by performing:    UE Dressing with Stand-by Assistance.  LE Dressing with Stand-by Assistance.  Grooming while standing at sink with Stand-by Assistance.  Toileting from bedside commode with Stand-by Assistance for hygiene and clothing management.   Supine to sit with Stand-by Assistance.  Functional mobility in room with CGA and DME as needed.                           History:     Past Medical History:   Diagnosis Date    Addiction to drug     Anxiety     Aortic calcification 1/11/2018    Chronic diarrhea     Chronic obstructive pulmonary disease 8/27/2013    Chronic respiratory failure with hypoxia, on home O2 therapy 1/11/2018    Colon polyps 2010    COPD (chronic obstructive pulmonary disease)     Coronary artery calcification seen on CT scan 12/4/2020    Depression     Essential hypertension 8/12/2013    Former smoker 10/18/2016    Hepatomegaly 12/21/2015    Hyperlipidemia     Hypertension     MI (myocardial  infarction)     Microscopic hematuria 1/13/2017    Panic disorder     Perianal abscess 6/1/2018    Reflux 2013    S/P right hemicolectomy 9/7/2017    Performed in 2011 after perforation during colonoscopy    Sleep difficulties     Takotsubo cardiomyopathy 10/18/2016    Noted on echo 10/2016, recovered on repeat echo 12/2016       Past Surgical History:   Procedure Laterality Date    ABDOMINAL SURGERY      APPENDECTOMY      CHOLECYSTECTOMY  2013    open    COLON SURGERY  2011    secondary to perforation after c-scope    COLONOSCOPY      CORONARY ANGIOGRAPHY N/A 7/26/2022    Procedure: ANGIOGRAM, CORONARY ARTERY;  Surgeon: Kylee Carreon MD;  Location: [facility] CATH LAB/EP;  Service: Cardiology;  Laterality: N/A;    FRACTURE SURGERY Left 1999    HERNIA REPAIR      HIATAL HERNIA REPAIR  2013    HYSTERECTOMY  1986    LEFT HEART CATHETERIZATION N/A 7/26/2022    Procedure: Left heart cath;  Surgeon: Kylee Carreon MD;  Location: [facility] CATH LAB/EP;  Service: Cardiology;  Laterality: N/A;    Left leg surgery         Time Tracking:     OT Date of Treatment: 01/25/23  OT Start Time: 1009  OT Stop Time: 1051  OT Total Time (min): 42 min    Billable Minutes:Evaluation 32  Therapeutic Exercise 10    JOE Mello OTR/L, CEAS-I  1/25/2023

## 2023-01-25 NOTE — PROGRESS NOTES
[facility] Progress Note    Subjective:      Yesterday extubated to BiPAP then NC.  Today resting comfortably on NC, pleasant denies difficulty breathing. Chest tube remains in place, attempt to remove today.     Objective:   Last 24 Hour Vital Signs:  BP  Min: 103/74  Max: 164/78  Temp  Av.5 °F (36.9 °C)  Min: 98.3 °F (36.8 °C)  Max: 98.8 °F (37.1 °C)  Pulse  Av.8  Min: 68  Max: 103  Resp  Av.7  Min: 10  Max: 34  SpO2  Av.4 %  Min: 89 %  Max: 99 %  I/O last 3 completed shifts:  In: 357.4 [I.V.:357.4]  Out: 1865 [Urine:1815; Chest Tube:50]    Physical Examination:  GEN: Well-appearing, well-nourished, in NAD  HEENT: NCAT, PERRL, EOMI, neck supple, no thyromegaly  CARD: RRR, no m/r/g, JVP wnl, 2+ peripheral pulses  PULM: CTAB, no wheezes/crackles/rhonchi  R chest tube in place  GI: Abdomen soft, NT/ND, normoactive BS, no rebound/guarding  MSKL: Normal ROM, no tenderness 5/5 strength in all extremities  SKIN: Warm, dry, no rashes  NEURO: AAOx3, CN grossly intact, SILT all extremities, DTRs deferred  PSYCH: Normal mood and affect.    Laboratory:  Laboratory Data Reviewed: yes  Pertinent Findings:    Microbiology Data Reviewed: yes  Pertinent Findings:  Blood cx : NGTD  Resp Cx NG  Flu/COVID negative    Other Results:  EKG (my interpretation):   Admission EKG: sinus tach 120, TWIs V1/V2  Subsequent EKG : NSR, TWI in V1, V2 resolved    Radiology Data Reviewed: yes  Pertinent Findings:  Chest xray : R sided pneumothorax  Chest xray : improvement in R sided pneumothorax, s/p pleuravent placement    Current Medications:     Infusions:         Scheduled:   albuterol-ipratropium  3 mL Nebulization Q4H    enoxaparin  40 mg Subcutaneous Daily    famotidine (PF)  20 mg Intravenous Q12H    lactobacillus acidophilus & bulgar  1 packet Per OG tube BID    methylPREDNISolone sodium succinate injection  60 mg Intravenous Daily    metoprolol tartrate  25 mg Oral BID    mupirocin   Nasal BID         PRN:  albuterol, dextrose 10%, dextrose 10%, glucagon (human recombinant), sodium chloride 0.9%    Antibiotics and Day Number of Therapy:  Cefepime 1/20-present  Doxycycline 1/20-1/24    Lines and Day Number of Therapy:  PIV, ETT, NG tube    Assessment:     Ana Aguila is a 67 year old female with PMHx of COPD on 2.5L home O2, Hypertension, Hyperlipidemia, GERD, MI, and hx of Takasubo cardiomyopathy who presented on 1/20 for respiratory distress 2/2 COPD exacerbation, required emergent intubation in the ED. Pt was found to have a large R sided pneumothorax s/p pleuravent --> chest tube placement for re-expansion of apical pneumo. Pt was extubated on 1/20 but then re-intubated 1/21 for respiratory distress, pleuravent tube found to be out of place on repeat CXR, new apical PTX, 8F armaan catheter was placed was resolution of PTX. Failed SBT on 1/23, but successfully extubated on 1/24. Will attempt chest tube removal today    Plan:     Acute on Chronic Hypoxic Hypercapneic Respiratory Failure 2/2 COPD Exacerbation  - similar presentation 7/2022, on home 2.5L home O2  - PFTs show obstruction with severe ventilatory impairment, FEV1 0.43L, FEV1/FVC 40%  - EMS administered dexamethasone 16mg, duo-neb tx, albuterol x2, and 0.5 epi  - ED: BiPAP --> intubation for respiratory distress and accessory muscle usage  - ABG significant for pH 7.14, pCO2 98.7, pO2 169, HCO3 33.4 on ventilator with Vt 350, PEEP 5, FiO2 100% RR22  - repeat ABG shows improvement in acidosis, hypercarbia  - extubated to BIPAP 1/20, re-intubated 1/21  - continue CAP/VAP coverage with cefepime, doxy course completed 1/24  - continue scheduled duo-nebs and IV methylprednisolone 60 daily  -successfully extubated on 1/24, on NC    Recurrent pneumothorax  -etiology unclear, possibly present initially and worsened by NIVP  -pleurvent placed in ED, chest x-ray showed expansion of apical pneumo 1/21 --> chest tube placed, pleuravax pulled  -Will attempt  chest tube removal today     Shock  Hx Hypertension  - Per chart review, home meds losartan 25mg, amlodipine 2.5mg, continue hold  - required minimal norepi, weaned off on 1/22-1/23    CAD  Chronic Heart failure with diastolic dysfunction  NSTEMI type 2  - Cardiology consulted: notes initial EKG with minor ST segment elevation/minor DE segment depression. Overall pattern not suggestive of ACS. Likely 2/2 demand in setting of ARF  - TTE 08/4: EF 65%, intermediate LV diastolic dysfunction  - BNP non-elevated, troponin trend 0.147 -->0.488-->0.642--> peaked 1/21  - continue Toprol XL 50     Hyperlipidemia  - Per chart review on rosuvastatin 10mg, continue hold     GERD  R Hemicolectomy 2/2 GIB  - Continue Famotidine IV for peptic ulcer ppx  - Monitor for bleeding, daily CB    Diet: regular  Ppx: lovenox  Code: Full    Dispo: monitor O2 status, attempt removal of chest tube       Darci Gongora  [facility] Internal Medicine PGY3  U IM Service Team A    [facility] Medicine Hospitalist Pager numbers:   [facility] Hospitalist Medicine Team A (Susanna/Keren): [phone]  [facility] Hospitalist Medicine Team B (Camilla/Lavelle):  [phone]

## 2023-01-25 NOTE — PROGRESS NOTES
[facility]/Critical Progress Note      Patient: Ana Aguila  Age:67 y.o.   Sex: female   MRN:[id number]  Admit date:1/20/2023    Attending Physician: Dr Veliz ([facility])  LOS:5 day(s)     SUBJECTIVE:    Result for Consult:   Acute on chronic hypoxic and hypercapnic resp failure + pneumothorax     Hospital Course  67F PMHx significant for COPD with chronic hypoxic respiratory failure on 2.5L home O2, HTN, HLD, Takasubo CM (resolved) presented to ED in respiratory distress on 1/20. EMS called for respiratory distress. Reported hypoxemia on arrival patient started on supplemental oxygen without improvement and eventually escalated to BiPAP also without improvement. Intubated in the ED. CXR showed large right side pneumothorax. Pleuravent placed in the ED with repeat CXR showing significant improvement in pneumo. Admitted to the ICU and later extubated (1/20). Patient required re-intubation on 1/21, CXR following intubation showed no pneumothorax. However, CXR on 1/22 showed interval development of pneumothorax with displacement of pleuravent. Kirit chest tube placed and repeat CXR showed improvement in right-side pneumothorax. Extubated on 1/24 to BiPAP.     HPI Interval/Updates:  - Patient did well overnight on BiPAP. This morning, transitioned from BIPAP to nasal canula. Chest tube clamped ~ 7:20am, will monitor for ~2 hours and repeat CXR to assess for pneumothorax recurrence. Plan to remove chest tube today.   - Patient drank apple juice yesterday without issues, advance diet.   - She is talking without discomfort.   - PT consulted.       OBJECTIVE DATA:      Vital Signs (last 24h)   Temp:  [98.3 °F (36.8 °C)-98.8 °F (37.1 °C)] 98.4 °F (36.9 °C)  Pulse:  [] 96  Resp:  [10-34] 20  SpO2:  [89 %-99 %] 94 %  BP: (103-164)/(58-83) 110/74        Intake/Output Summary (Last 24 hours) at 1/25/2023 0787  Last data filed at 1/25/2023 0600  Gross per 24 hour   Intake 69 ml   Output 1280 ml   Net -1211 ml  "        Physical Examination:   Vitals: /74   Pulse 96   Temp 98.4 °F (36.9 °C) (Axillary)   Resp 20   Ht 5' 5" (1.651 m)   Wt 77.1 kg (170 lb)   LMP  (LMP Unknown)   SpO2 (!) 94%   Breastfeeding No   BMI 28.29 kg/m²      General: Awake, alert, oriented, following commands, conversant.   Head: Skull is normocephalic and symmetric. Symmetric facial features.   Eyes: Eyelids & skin overlying globe are intact. Sclera is white and not injected. PERRL.   Neck: Supple. Full ROM. No obvious enlargements or masses.   Cardiovascular: RRR. Audible S1/S2 without rubs, murmurs and gallops.  No edema on foot, ankle or leg bilaterally.   Pulmonary: No signs of respiratory distress. Bilateral breath sounds.   Abdomen: Active bowel sounds. Abdomen is soft and non-distended.   Extremities: All 4 extremities are warm with palpable pulses. Moving all 4 extremities voluntarily.       Laboratory:  CBC:   Recent Labs   Lab 01/23/23 0316 01/24/23 0318 01/25/23 0423   WBC 13.47* 15.15* 16.63*   HGB 11.3* 11.4* 10.8*    291 301   MCV 89 91 93   RDW 14.4 15.1* 15.3*       Coags:   Recent Labs   Lab 01/20/23  0556   INR 1.0       CMP:   Recent Labs   Lab 01/23/23 0316 01/24/23 0318 01/24/23  1732 01/25/23  0423    143 147* 148*   K 3.3* 4.4 4.6 4.3    104 105 106   CO2 29 29 33* 31*   * 101 131* 80   BUN 19 22 29* 34*   CREATININE 0.9 0.8 0.8 0.7   CALCIUM 8.7 8.7 8.6* 8.8   MG 2.3 2.3 2.5 2.6   PHOS 1.4* 3.7  --  3.2       Estimated Creatinine Clearance: 78.9 mL/min (based on SCr of 0.7 mg/dL).  LFTs:   Recent Labs   Lab 01/23/23 0316 01/24/23 0318 01/25/23  0423   PROT 6.3 6.3 6.1   ALBUMIN 3.0* 3.1* 3.0*   ALKPHOS 55 53* 59   AST 27 38 34   ALT 22 45* 46*       Cardiac Data:    Recent Labs   Lab 01/20/23  0556 01/20/23  0952 01/20/23  1619 01/21/23  0832 01/21/23  1403   TROPONINI 0.147*   < > 0.642* 0.983* 0.934*   BNP 93  --   --   --   --     < > = values in this interval not displayed. "         Radiology Results:   All images from this admission reviewed.       Microbiology Results:  Microbiology Results (last 7 days)       Procedure Component Value Units Date/Time    Blood culture x two cultures. Draw prior to antibiotics. [[id number]] Collected: 01/20/23 0556    Order Status: Completed Specimen: Blood from Antecubital, Right Hand Updated: 01/24/23 1612     Blood Culture, Routine No Growth to date      No Growth to date      No Growth to date      No Growth to date      No Growth to date    Narrative:      Aerobic and anaerobic    Blood culture x two cultures. Draw prior to antibiotics. [[id number]] Collected: 01/20/23 0556    Order Status: Completed Specimen: Blood Updated: 01/24/23 1612     Blood Culture, Routine No Growth to date      No Growth to date      No Growth to date      No Growth to date      No Growth to date    Narrative:      Aerobic and anaerobic    Culture, Respiratory with Gram Stain [[id number]]  (Abnormal) Collected: 01/21/23 1529    Order Status: Completed Specimen: Respiratory from Sputum Updated: 01/24/23 1100     Respiratory Culture No S aureus or Pseudomonas isolated.      TOBY ALBICANS  Rare       Gram Stain (Respiratory) <10 epithelial cells per low power field.     Gram Stain (Respiratory) Rare WBC's     Gram Stain (Respiratory) No organisms seen    Respiratory Infection Panel (PCR), Nasopharyngeal [[id number]] Collected: 01/21/23 0913    Order Status: Canceled Specimen: Nasopharyngeal Swab     Influenza A & B by Molecular [[id number]] Collected: 01/20/23 0535    Order Status: Completed Specimen: Nasopharyngeal Swab Updated: 01/20/23 0612     Influenza A, Molecular Negative     Influenza B, Molecular Negative     Flu A & B Source Nasal swab             Antibiotics and Day Number of Therapy:  Completed course of Cefepime + doxy for CAP      Current Medications:     Continuous infusions:   dexmedetomidine (PRECEDEX) infusion 0.3 mcg/kg/hr (01/24/23 0948)    propofoL  Stopped (01/24/23 0947)        Scheduled:   albuterol-ipratropium  3 mL Nebulization Q4H    enoxaparin  40 mg Subcutaneous Daily    famotidine (PF)  20 mg Intravenous Q12H    lactobacillus acidophilus & bulgar  1 packet Per OG tube BID    methylPREDNISolone sodium succinate injection  60 mg Intravenous Daily    metoprolol tartrate  25 mg Oral BID    mupirocin   Nasal BID        PRN:  albuterol, dextrose 10%, dextrose 10%, glucagon (human recombinant), HYDROmorphone, LORazepam, sodium chloride 0.9%      Assessment/Plan:     Ana Aguila is a 67 y.o. female with a PMHx significant for advanced COPD with chronic hypoxic respiratory failure on 2.5L home O2, HTN, HLD, Takasubo CM (resolved) presented to ED in respiratory distress in the setting of acute on chronic hypoxic & hypercapnic respiratory failure and pneumothorax. Admitted to the ICU for mechanical ventilation s/p chest tube.      Neuro  - Exam: Awake, alert, following commnads.       Cardiovascular  # Hemodynamically Stable  # Prolonged QTc  # HLD  - Normotensive. No meds.    Most recent TTE 1/23 normal systolic fx 65%, indeterminate diastolic function and normal RV function. [facility] 7/2022 no significant CAD.   - Qtc prolonged since admission. Most recent Qtc 516  PLAN  - Avoid Qtc prolonging drugs.   - On BB     Respiratory  # Acute on Chronic Hypoxic &  Hypercapnic Respiratory Failure on 2.5L home O2  # Pneumothorax  # Advanced COPD   - PFTs show obstruction with severe ventilatory impairment, FEV1 0.43L, FEV1/FVC 40%  - On 1/21, pt  intubated on arrival to ED and later extubated in ICU.  Required re-intubation on 1/21, CXR following intubation showed no pneumothorax. However, CXR on 1/22 showed interval development of pneumothorax w displacement of pleuravent. Kirit chest tube placed and repeat CXR showed improvement in right-side pneumothorax.   - Resp cx 1/21 yeast.  Blood cultures 1/22 no growth.     PLAN  - Continue duonebs q4, will transitioned from  methylprednisolone 60 daily to prednisone 40mg.  - Patient did well overnight on BiPAP. This morning, transitioned from BIPAP to nasal canula. Chest tube clamped ~ 7:20am, will monitor for ~2 hours and repeat CXR to assess for pneumothorax recurrence. Plan to remove chest tube today.   - spO2 goal 93%; currently on 2L NC.    Renal   - Cr stable and at baseline ~0.9.  - UOP 1.3L in the last 24h; net -2.6L  - Na 148 likely dehydration. Electrolytes wnl. Replete electrolytes PRN. Full liquid diet started today.     GI  # Right hemicolectomy 2/2 GIB   - Diet: Patient drank apple juice yesterday without issues, advance diet.   - Last bowel movement: Last Bowel Movement: 01/19/23   - Bowel regimen: lactobacillus, miralax    Heme  - On admission, H/H 11.5  37.0 with MCV 95 & RDW 14. Baseline Hgb appears to be ~ 12. H/H stable since admission.   - Plt count stable and wnl.     Infectious Disease  # Leukocytosis   - Afebrile with WBC satble-anjelica 25.62--> 13 --> 15 -->16   - Most recent CXR 1/23 with improved interstitial edema.   - Completed cefepime and doxy course.     Endocrine  - A1c 5.2%  serum gluc 287 on admission.   - Morning serum gluc 80. Not on insulin.   - Serum glucose goal of 140-180.     Thrombo PPX: lovenox 40 qd  Head of Bed: 30  Feeding: full liquid.     Code: full  Dispo: Continue ICU care.     Patient evaluated and plan discussed in the presence of .     Brenda Krueger MD  [facility] PGY-II

## 2023-01-25 NOTE — PT/OT/SLP EVAL
Physical Therapy Evaluation    Patient Name:  Ana Aguila   MRN:  [id number]    Recommendations:     Discharge Recommendations: other (see comments) (TBD, pending progress)   Discharge Equipment Recommendations: other (see comments) (TBD)   Barriers to discharge: Decreased caregiver support and current functional mobility status requires assist    Assessment:     Ana Aguila is a 67 y.o. female admitted with a medical diagnosis of Respiratory failure requiring intubation.  She presents with the following impairments/functional limitations: weakness, impaired functional mobility, impaired cardiopulmonary response to activity, impaired endurance, gait instability, impaired balance, decreased upper extremity function, impaired skin, impaired self care skills, decreased lower extremity function, edema     Patient seen for physical therapy evaluation on this date, co-evaluation with occupational therapy.  Patient tolerated evaluation fair, full report to follow.  Recommendations TBD, pending progress.  Patient will continue to benefit from inpatient physical therapy to address functional limitations.      Rehab Prognosis: Good and Fair; patient would benefit from acute skilled PT services to address these deficits and reach maximum level of function.    Recent Surgery: * No surgery found *      Plan:     During this hospitalization, patient to be seen 5 x/week to address the identified rehab impairments via gait training, therapeutic activities, therapeutic exercises, neuromuscular re-education and progress toward the following goals:    Plan of Care Expires:  02/24/23    Subjective     Chief Complaint: complains of being nervous to transfer  Patient/Family Comments/goals: To return to PLOF  Pain/Comfort:  Pain Rating 1: 0/10  Pain Rating Post-Intervention 1: 0/10    Patients cultural, spiritual, [employer] conflicts given the current situation: no    Living Environment:  Patient lives alone in 2nd floor apartment,  2 flights of steps with B HR to reach apartment.  Bathroom has a T/S with a shower chair.    Prior to admission, patients level of function was independent with ADLs and walk walking in the home independently without AD.  Patient reports she used her rollator in the community.  Granddaughter and friend assist with getting her to appointments.  Equipment used at home: rollator, shower chair.  DME owned (not currently used): none.  Upon discharge, patient will have assistance from limited assist from family.    Objective:     Communicated with nurse Cole prior to session.  Patient found HOB elevated with bed alarm, blood pressure cuff, chest tube, peripheral IV, kimbrough catheter, oxygen  upon PT entry to room.    General Precautions: Standard, fall  Orthopedic Precautions:N/A   Braces: N/A  Respiratory Status: Nasal cannula, flow 4 L/min    Exams:  Cognitive Exam:  Patient is oriented to Person, Place, Time, and Situation  Gross Motor Coordination:  WFL B LEs  Postural Exam:  Patient presented with the following abnormalities:    -       Rounded shoulders  -       Forward head  -       Kyphosis  Sensation:    -       Intact  light/touch B LEs  Skin Integrity/Edema:      -       Skin integrity: chest tube to R, otherwise WNL  -       Edema: Mild B LEs  RLE ROM: WFL  RLE Strength: 3+ to 4/5 grossly  LLE ROM: WFL  LLE Strength: 3+ to 4/5 grossly    Functional Mobility:  Bed Mobility:     Rolling Right: moderate assistance  Scooting: moderate assistance  Supine to Sit: moderate assistance  Transfers:     Sit to Stand:  moderate assistance with rolling walker x 2 trials.  O2 sats decrease slightly, on 3-4L supplemental O2  Gait: x 4-5 steps with RW and moderate assist to walk to bedside chair  Balance: Seated:  CGA, no overt LOB, mild posterior lean   Standing:  with RW, Mod A required, unsteady, B knees mild buckling      AM-PAC 6 CLICK MOBILITY  Total Score:11       Treatment & Education:  Patient educated on role of  physical therapy and POC.  Patient educated on deep breathing.  Patient on 3-4L supplemental oxygen during session with O2 sats dropping to mid 80s at times, but back up to lower 90s with deep breathing and relaxation techniques.  Patient educated to perform B LE exercises while up in chair.      Patient left up in chair with all lines intact, call button in reach, and nurse present.    GOALS:   Multidisciplinary Problems       Physical Therapy Goals          Problem: Physical Therapy    Goal Priority Disciplines Outcome Goal Variances Interventions   Physical Therapy Goal     PT, PT/OT Ongoing, Progressing     Description: Goals to be met by: 2023     Patient will increase functional independence with mobility by performin. Supine to sit with Modified Wheelwright  2. Sit to supine with Modified Wheelwright  3. Rolling to Left and Right with Modified Wheelwright.  4. Sit to stand transfer with Stand-by Assistance  5. Bed to chair transfer with Stand-by Assistance using Rolling Walker  6. Gait  x 100 feet with Contact Guard Assistance using Rolling Walker.   7. Ascend/descend 4 stair with bilateral Handrails Minimal Assistance.                         History:     Past Medical History:   Diagnosis Date    Addiction to drug     Anxiety     Aortic calcification 2018    Chronic diarrhea     Chronic obstructive pulmonary disease 2013    Chronic respiratory failure with hypoxia, on home O2 therapy 2018    Colon polyps     COPD (chronic obstructive pulmonary disease)     Coronary artery calcification seen on CT scan 2020    Depression     Essential hypertension 2013    Former smoker 10/18/2016    Hepatomegaly 2015    Hyperlipidemia     Hypertension     MI (myocardial infarction)     Microscopic hematuria 2017    Panic disorder     Perianal abscess 2018    Reflux 2013    S/P right hemicolectomy 2017    Performed in  after perforation during colonoscopy     Sleep difficulties     Takotsubo cardiomyopathy 10/18/2016    Noted on echo 10/2016, recovered on repeat echo 12/2016       Past Surgical History:   Procedure Laterality Date    ABDOMINAL SURGERY      APPENDECTOMY      CHOLECYSTECTOMY  2013    open    COLON SURGERY  2011    secondary to perforation after c-scope    COLONOSCOPY      CORONARY ANGIOGRAPHY N/A 7/26/2022    Procedure: ANGIOGRAM, CORONARY ARTERY;  Surgeon: Kylee Carreon MD;  Location: [facility] CATH LAB/EP;  Service: Cardiology;  Laterality: N/A;    FRACTURE SURGERY Left 1999    HERNIA REPAIR      HIATAL HERNIA REPAIR  2013    HYSTERECTOMY  1986    LEFT HEART CATHETERIZATION N/A 7/26/2022    Procedure: Left heart cath;  Surgeon: Kylee Carreon MD;  Location: [facility] CATH LAB/EP;  Service: Cardiology;  Laterality: N/A;    Left leg surgery         Time Tracking:     PT Received On: 01/25/23  PT Start Time: 1009     PT Stop Time: 1050  PT Total Time (min): 41 min Co-evaluation with OT    Billable Minutes: Evaluation 15 and Gait Training 13      01/25/2023

## 2023-01-25 NOTE — PLAN OF CARE
Patient seen for physical therapy evaluation on this date, co-evaluation with occupational therapy.  Patient tolerated evaluation fair, full report to follow.  Recommendations TBD, pending progress.  Patient will continue to benefit from inpatient physical therapy to address functional limitations.      Problem: Physical Therapy  Goal: Physical Therapy Goal  Description: Goals to be met by: 2023     Patient will increase functional independence with mobility by performin. Supine to sit with Modified [place]  2. Sit to supine with Modified [place]  3. Rolling to Left and Right with Modified [place].  4. Sit to stand transfer with Stand-by Assistance  5. Bed to chair transfer with Stand-by Assistance using Rolling Walker  6. Gait  x 100 feet with Contact Guard Assistance using Rolling Walker.   7. Ascend/descend 4 stair with bilateral Handrails Minimal Assistance.    Outcome: Ongoing, Progressing

## 2023-01-26 LAB
ALBUMIN SERPL BCP-MCNC: 2.9 G/DL (ref 3.5–5.2)
ALP SERPL-CCNC: 54 U/L (ref 55–135)
ALT SERPL W/O P-5'-P-CCNC: 65 U/L (ref 10–44)
ANION GAP SERPL CALC-SCNC: 9 MMOL/L (ref 8–16)
AST SERPL-CCNC: 58 U/L (ref 10–40)
BASOPHILS # BLD AUTO: 0.01 K/UL (ref 0–0.2)
BASOPHILS NFR BLD: 0.1 % (ref 0–1.9)
BILIRUB SERPL-MCNC: 0.5 MG/DL (ref 0.1–1)
BUN SERPL-MCNC: 31 MG/DL (ref 8–23)
CALCIUM SERPL-MCNC: 8.5 MG/DL (ref 8.7–10.5)
CHLORIDE SERPL-SCNC: 106 MMOL/L (ref 95–110)
CO2 SERPL-SCNC: 28 MMOL/L (ref 23–29)
CREAT SERPL-MCNC: 0.7 MG/DL (ref 0.5–1.4)
DIFFERENTIAL METHOD: ABNORMAL
EOSINOPHIL # BLD AUTO: 0.1 K/UL (ref 0–0.5)
EOSINOPHIL NFR BLD: 0.8 % (ref 0–8)
ERYTHROCYTE [DISTWIDTH] IN BLOOD BY AUTOMATED COUNT: 15.4 % (ref 11.5–14.5)
EST. GFR  (NO RACE VARIABLE): >60 ML/MIN/1.73 M^2
GLUCOSE SERPL-MCNC: 83 MG/DL (ref 70–110)
HCT VFR BLD AUTO: 33.6 % (ref 37–48.5)
HGB BLD-MCNC: 10.2 G/DL (ref 12–16)
IMM GRANULOCYTES # BLD AUTO: 0.08 K/UL (ref 0–0.04)
IMM GRANULOCYTES NFR BLD AUTO: 0.6 % (ref 0–0.5)
LYMPHOCYTES # BLD AUTO: 3.4 K/UL (ref 1–4.8)
LYMPHOCYTES NFR BLD: 25.2 % (ref 18–48)
MCH RBC QN AUTO: 29.2 PG (ref 27–31)
MCHC RBC AUTO-ENTMCNC: 30.4 G/DL (ref 32–36)
MCV RBC AUTO: 96 FL (ref 82–98)
MONOCYTES # BLD AUTO: 1.3 K/UL (ref 0.3–1)
MONOCYTES NFR BLD: 9.4 % (ref 4–15)
NEUTROPHILS # BLD AUTO: 8.5 K/UL (ref 1.8–7.7)
NEUTROPHILS NFR BLD: 63.9 % (ref 38–73)
NRBC BLD-RTO: 0 /100 WBC
PLATELET # BLD AUTO: 285 K/UL (ref 150–450)
PMV BLD AUTO: 9.9 FL (ref 9.2–12.9)
POCT GLUCOSE: 111 MG/DL (ref 70–110)
POCT GLUCOSE: 118 MG/DL (ref 70–110)
POCT GLUCOSE: 134 MG/DL (ref 70–110)
POCT GLUCOSE: 136 MG/DL (ref 70–110)
POCT GLUCOSE: 94 MG/DL (ref 70–110)
POCT GLUCOSE: 98 MG/DL (ref 70–110)
POTASSIUM SERPL-SCNC: 4.1 MMOL/L (ref 3.5–5.1)
PROT SERPL-MCNC: 5.6 G/DL (ref 6–8.4)
RBC # BLD AUTO: 3.49 M/UL (ref 4–5.4)
SODIUM SERPL-SCNC: 143 MMOL/L (ref 136–145)
WBC # BLD AUTO: 13.27 K/UL (ref 3.9–12.7)

## 2023-01-26 PROCEDURE — 63600175 PHARM REV CODE 636 W HCPCS: Performed by: INTERNAL MEDICINE

## 2023-01-26 PROCEDURE — 85025 COMPLETE CBC W/AUTO DIFF WBC: CPT | Performed by: STUDENT IN AN ORGANIZED HEALTH CARE EDUCATION/TRAINING PROGRAM

## 2023-01-26 PROCEDURE — 94640 AIRWAY INHALATION TREATMENT: CPT

## 2023-01-26 PROCEDURE — 30200315 PPD INTRADERMAL TEST REV CODE 302

## 2023-01-26 PROCEDURE — 93010 ELECTROCARDIOGRAM REPORT: CPT | Mod: ,,, | Performed by: INTERNAL MEDICINE

## 2023-01-26 PROCEDURE — 51798 US URINE CAPACITY MEASURE: CPT

## 2023-01-26 PROCEDURE — 94761 N-INVAS EAR/PLS OXIMETRY MLT: CPT

## 2023-01-26 PROCEDURE — 94660 CPAP INITIATION&MGMT: CPT

## 2023-01-26 PROCEDURE — 25000242 PHARM REV CODE 250 ALT 637 W/ HCPCS: Performed by: STUDENT IN AN ORGANIZED HEALTH CARE EDUCATION/TRAINING PROGRAM

## 2023-01-26 PROCEDURE — 25000003 PHARM REV CODE 250: Performed by: STUDENT IN AN ORGANIZED HEALTH CARE EDUCATION/TRAINING PROGRAM

## 2023-01-26 PROCEDURE — 36415 COLL VENOUS BLD VENIPUNCTURE: CPT | Performed by: STUDENT IN AN ORGANIZED HEALTH CARE EDUCATION/TRAINING PROGRAM

## 2023-01-26 PROCEDURE — 25000003 PHARM REV CODE 250: Performed by: INTERNAL MEDICINE

## 2023-01-26 PROCEDURE — 93010 EKG 12-LEAD: ICD-10-PCS | Mod: ,,, | Performed by: INTERNAL MEDICINE

## 2023-01-26 PROCEDURE — 25000242 PHARM REV CODE 250 ALT 637 W/ HCPCS: Performed by: INTERNAL MEDICINE

## 2023-01-26 PROCEDURE — 97530 THERAPEUTIC ACTIVITIES: CPT

## 2023-01-26 PROCEDURE — 86580 TB INTRADERMAL TEST: CPT

## 2023-01-26 PROCEDURE — 99900035 HC TECH TIME PER 15 MIN (STAT)

## 2023-01-26 PROCEDURE — 93005 ELECTROCARDIOGRAM TRACING: CPT

## 2023-01-26 PROCEDURE — 27000221 HC OXYGEN, UP TO 24 HOURS

## 2023-01-26 PROCEDURE — 80053 COMPREHEN METABOLIC PANEL: CPT | Performed by: STUDENT IN AN ORGANIZED HEALTH CARE EDUCATION/TRAINING PROGRAM

## 2023-01-26 PROCEDURE — 20000000 HC ICU ROOM

## 2023-01-26 PROCEDURE — 63600175 PHARM REV CODE 636 W HCPCS: Performed by: STUDENT IN AN ORGANIZED HEALTH CARE EDUCATION/TRAINING PROGRAM

## 2023-01-26 RX ORDER — L. ACIDOPHILUS/L.BULGARICUS 100MM CELL
1 GRANULES IN PACKET (EA) ORAL 2 TIMES DAILY
Status: DISCONTINUED | OUTPATIENT
Start: 2023-01-26 | End: 2023-01-31 | Stop reason: HOSPADM

## 2023-01-26 RX ORDER — HYDROXYZINE HYDROCHLORIDE 25 MG/1
25 TABLET, FILM COATED ORAL 3 TIMES DAILY PRN
Status: DISCONTINUED | OUTPATIENT
Start: 2023-01-26 | End: 2023-01-29

## 2023-01-26 RX ORDER — IPRATROPIUM BROMIDE AND ALBUTEROL SULFATE 2.5; .5 MG/3ML; MG/3ML
3 SOLUTION RESPIRATORY (INHALATION)
Status: DISCONTINUED | OUTPATIENT
Start: 2023-01-26 | End: 2023-01-27

## 2023-01-26 RX ADMIN — IPRATROPIUM BROMIDE AND ALBUTEROL SULFATE 3 ML: 2.5; .5 SOLUTION RESPIRATORY (INHALATION) at 07:01

## 2023-01-26 RX ADMIN — IPRATROPIUM BROMIDE AND ALBUTEROL SULFATE 3 ML: 2.5; .5 SOLUTION RESPIRATORY (INHALATION) at 12:01

## 2023-01-26 RX ADMIN — METOPROLOL TARTRATE 25 MG: 25 TABLET, FILM COATED ORAL at 08:01

## 2023-01-26 RX ADMIN — IPRATROPIUM BROMIDE AND ALBUTEROL SULFATE 3 ML: 2.5; .5 SOLUTION RESPIRATORY (INHALATION) at 04:01

## 2023-01-26 RX ADMIN — PREDNISONE 40 MG: 20 TABLET ORAL at 08:01

## 2023-01-26 RX ADMIN — ENOXAPARIN SODIUM 40 MG: 40 INJECTION SUBCUTANEOUS at 05:01

## 2023-01-26 RX ADMIN — LACTOBACILLUS ACIDOPHILUS / LACTOBACILLUS BULGARICUS 1 EACH: 100 MILLION CFU STRENGTH GRANULES at 08:01

## 2023-01-26 RX ADMIN — HYDROXYZINE HYDROCHLORIDE 25 MG: 25 TABLET ORAL at 12:01

## 2023-01-26 RX ADMIN — TUBERCULIN PURIFIED PROTEIN DERIVATIVE 5 UNITS: 5 INJECTION, SOLUTION INTRADERMAL at 05:01

## 2023-01-26 RX ADMIN — METOPROLOL TARTRATE 25 MG: 25 TABLET, FILM COATED ORAL at 09:01

## 2023-01-26 RX ADMIN — HYDROXYZINE HYDROCHLORIDE 25 MG: 25 TABLET ORAL at 09:01

## 2023-01-26 NOTE — PLAN OF CARE
Pt aroused easily, presently on bipap 10/5 33%fi02, denies sob, breath sounds audible but diminished, post , greater rt, crepitous noted rt ant chest area where chest tube was, extending upward, and laterally, pt switched to nc 2.5 l, sat's 96%, instructed in taking deep breaths, and coughing, ap 92 st, no chest pain, pt started talking and telling me what was going on prior to being in hospital, then became, tearful, sad, stated she will go to [place] to live,  where daughters are,but not with her, in her own place, she was hoping to live with her, seemed sad about  this, states she feels anxious at times, more so since in hospital, has atarax at home but afraid to take it when she is alone, says she is afraid to go to sleep at night for fear she won't wake up, crying, let her discuss her feelings, talked with her, felt better,     Dr Gongora here, told her that she should at discharge go to Rehab to get stronger, she agrees, she spoke with him re, the ativan, that she needs something for her anxiety, told him after about her fears of going to sleep at home, and also about the crepitous ,ordered the atarax for now    Pulm also by, informed status, told of crepitous rt ant, lat chest

## 2023-01-26 NOTE — PROGRESS NOTES
[facility] Progress Note    Subjective:      Yesterday chest tube clamped and removed successfully. Overnight tolerated BiPAP well.  Today resting comfortably on NC, pleasant denies difficulty breathing. Worked well with PT yesterday. Complains of intermittent anxiety.     Objective:   Last 24 Hour Vital Signs:  BP  Min: 100/55  Max: 159/72  Temp  Av.5 °F (36.9 °C)  Min: 98.2 °F (36.8 °C)  Max: 99 °F (37.2 °C)  Pulse  Av.1  Min: 79  Max: 115  Resp  Av.8  Min: 10  Max: 30  SpO2  Av.5 %  Min: 79 %  Max: 100 %  I/O last 3 completed shifts:  In: 590 [P.O.:590]  Out: 1095 [Urine:1095]    Physical Examination:  GEN: alert, NAD, but generalized weakness  HEENT: NCAT, PERRL, EOMI, neck supple, no thyromegaly  CARD: RRR, no m/r/g, JVP wnl, 2+ peripheral pulses  PULM: CTAB, no wheezes/crackles/rhonchi  GI: Abdomen soft, NT/ND, normoactive BS, no rebound/guarding  MSKL: Normal ROM, no tenderness 5/5 strength in all extremities  SKIN: Warm, dry, no rashes  NEURO: AAOx3, CN grossly intact, SILT all extremities, DTRs deferred  PSYCH: Normal mood and affect.    Laboratory:  Laboratory Data Reviewed: yes  Pertinent Findings:    Microbiology Data Reviewed: yes  Pertinent Findings:  Blood cx : NGTD  Resp Cx NG  Flu/COVID negative    Other Results:  EKG (my interpretation):   Admission EKG: sinus tach 120, TWIs V1/V2  Subsequent EKG : NSR, TWI in V1, V2 resolved    Radiology Data Reviewed: yes  Pertinent Findings:  Chest xray : R sided pneumothorax  Chest xray : improvement in R sided pneumothorax, s/p pleuravent placement    Current Medications:     Infusions:         Scheduled:   albuterol-ipratropium  3 mL Nebulization Q4H    enoxaparin  40 mg Subcutaneous Daily    lactobacillus acidophilus & bulgar  1 packet Per OG tube BID    metoprolol tartrate  25 mg Oral BID    polyethylene glycol  17 g Oral Daily    predniSONE  40 mg Oral Daily        PRN:  albuterol, dextrose 10%, dextrose 10%, glucagon (human  recombinant), sodium chloride 0.9%    Antibiotics and Day Number of Therapy:  Cefepime 1/20-1/25  Doxycycline 1/20-1/24    Lines and Day Number of Therapy:  PIV, ETT, NG tube    Assessment:     Ana Aguila is a 67 year old female with PMHx of COPD on 2.5L home O2, Hypertension, Hyperlipidemia, GERD, MI, and hx of Takasubo cardiomyopathy who presented on 1/20 for respiratory distress 2/2 COPD exacerbation, required emergent intubation in the ED. Pt was found to have a large R sided pneumothorax s/p pleuravent --> chest tube placement for re-expansion of apical pneumo. Pt was extubated on 1/20 but then re-intubated 1/21 for respiratory distress, pleuravent tube found to be out of place on repeat CXR, new apical PTX, 8F armaan catheter was placed was resolution of PTX. Failed SBT on 1/23, but successfully extubated on 1/24. Chest tube removed 1/25 and stepped down.    Plan:     Acute on Chronic Hypoxic Hypercapneic Respiratory Failure 2/2 COPD Exacerbation  - similar presentation 7/2022, on home 2.5L home O2  - PFTs show obstruction with severe ventilatory impairment, FEV1 0.43L, FEV1/FVC 40%  - EMS administered dexamethasone 16mg, duo-neb tx, albuterol x2, and 0.5 epi  - ED: BiPAP --> intubation for respiratory distress and accessory muscle usage  - ABG significant for pH 7.14, pCO2 98.7, pO2 169, HCO3 33.4 on ventilator with Vt 350, PEEP 5, FiO2 100% RR22  - repeat ABG shows improvement in acidosis, hypercarbia  - extubated to BIPAP 1/20, re-intubated 1/21 after pleuravent dislodged, reintubated and replaced 8F chest tube, successfully extubated on 1/24, on NC with nightly BiPAP  - completed doxy/cefepime course for CAP  - space out duonebs from q4 to q6h, s/p 7d steroids will discontinue today  -will need BiPAP qHS outpt    Recurrent pneumothorax, resolved  -etiology unclear, possibly present initially and worsened by NIVP  -pleurvent placed in ED, chest x-ray showed expansion of apical pneumo 1/21 --> chest tube  placed, pleuravax pulled  -chest tube successful removed 1/25     Anxiety  -pending repeat EKG to add on PRN hydroxyzine given prolonged QT - may have to do PRN ativan    Prolonged Qtc  -EKG w/ Qtc 540  -repeat EKG pending    Shock, resolved  Hx Hypertension  - Per chart review, home meds losartan 25mg, amlodipine 2.5mg, continue hold  - required minimal norepi, weaned off on 1/22-1/23    CAD  Chronic Heart failure with diastolic dysfunction  NSTEMI type 2  - Cardiology consulted: notes initial EKG with minor ST segment elevation/minor OR segment depression. Overall pattern not suggestive of ACS. Likely 2/2 demand in setting of ARF  - TTE 08/4: EF 65%, intermediate LV diastolic dysfunction  - BNP non-elevated, troponin trend 0.147 -->0.488-->0.642--> peaked 1/21  - continue lopressor 25 bid     Hyperlipidemia  - Per chart review on rosuvastatin 10mg, continue hold     GERD  R Hemicolectomy 2/2 GIB  - Continue Famotidine IV for peptic ulcer ppx  - Monitor for bleeding, daily CB    Diet: regular  Ppx: lovenox  Code: Full    Dispo: monitor O2 status, pending discussion on home with fam or SNF      Darci Gongora  [facility] Internal Medicine PGY3  [facility] IM Service Team A    [facility] Medicine Hospitalist Pager numbers:   [facility] Hospitalist Medicine Team A (Susanna/Keren): [phone]  [facility] Hospitalist Medicine Team B (Camilla/Lavelle):  [phone]

## 2023-01-26 NOTE — PLAN OF CARE
Care Plan    POC reviewed with Ana Aguila and family. Questions and concerns addressed. No acute events today. Pt progressing toward goals. Will continue to monitor. See below and flowsheets for full assessment and VS info.       Neuro:  Kacey Coma Scale  Best Eye Response: 4-->(E4) spontaneous  Best Motor Response: 6-->(M6) obeys commands  Best Verbal Response: 5-->(V5) oriented  Kacey Coma Scale Score: 15  Assessment Qualifiers: no eye obstruction present, patient chemically sedated or paralyzed, patient intubated  Pupil PERRLA: yes  24 hr Temp:  [98.3 °F (36.8 °C)-98.7 °F (37.1 °C)]      CV:  Rhythm: normal sinus rhythm  DVT prophylaxis: VTE Required Core Measure: (SCDs) Sequential compression device initiated/maintained    Resp:     Vent Mode: A/CMV-VC  Set Rate: 14 BPM  Oxygen Concentration (%): 33  Vt Set: 360 mL  PEEP/CPAP: 5.2 cmH20    GI/:  GI prophylaxis: yes  Diet/Nutrition Received: full liquid  Last Bowel Movement: 01/25/23  Voiding Characteristics: ureteral catheter  [REMOVED]      Urethral Catheter 01/20/23 0449 Straight-tip 16 Fr.-Reason for Continuing Urinary Catheterization: Critically ill in ICU and requiring hourly monitoring of intake/output   Intake/Output Summary (Last 24 hours) at 1/25/2023 1824  Last data filed at 1/25/2023 1400  Gross per 24 hour   Intake --   Output 845 ml   Net -845 ml       Labs/Accuchecks:  Recent Labs   Lab 01/25/23  0423   WBC 16.63*   RBC 3.72*   HGB 10.8*   HCT 34.6*         Recent Labs   Lab 01/25/23  0423   *   K 4.3   CO2 31*      BUN 34*   CREATININE 0.7   ALKPHOS 59   ALT 46*   AST 34   BILITOT 0.6      Recent Labs   Lab 01/20/23  0556   INR 1.0   APTT 29.1      Recent Labs   Lab 01/21/23  1403   TROPONINI 0.934*       Electrolytes: No replacement orders  Accuchecks: Q6H    Gtts/LDAs:      Lines/Drains/Airways       Drain  Duration             Female External Urinary Catheter 01/25/23 1424 <1 day              Peripheral  Intravenous Line  Duration                  Peripheral IV - Single Lumen 01/20/23 22 G Left Forearm 5 days         Midline Catheter Insertion/Assessment  - Single Lumen 01/22/23 1305 Right cephalic vein (lateral side of arm) 18g x 10cm 3 days                    Skin/Wounds  Bathing/Skin Care: other (see comments) (hands and face washed) (01/25/23 0720)  Wounds: Yes  Wound care consulted: No    Consults  Consults (From admission, onward)          Status Ordering Provider     Inpatient consult to Midline team  Once        Provider:  (Not yet assigned)    Acknowledged BALJINDER FLORES     Inpatient consult to Cardiology-[facility]  Once        Provider:  Dex Hopper MD    Completed RIVERA NOBLES     Consult to Pulmonology  Once        Provider:  Mick Kaiser MD    Completed RIVERA NOBLES

## 2023-01-26 NOTE — PLAN OF CARE
Pt still with sob with exertion, takes awhile to recover, sat's not dropping too much when this occurs, but resp rate inc, heart inc also, dec breath sounds, throughout lung fields, occ prod cough, crepitous, rt ant , lateral chest, area of chest tube, was in chair for 4 hrs, still weak, inc activity as leodan, poor appetite, does not want anything in particular, does not want supplements, afraid of diarrhea, told does not cause it always, side rails up x4 bed low position, call bell easy reach, bed alarm on

## 2023-01-26 NOTE — PLAN OF CARE
Problem: Occupational Therapy  Goal: Occupational Therapy Goal  Description: Goals to be met by: 2/24/2023     Patient will increase functional independence with ADLs by performing:    UE Dressing with Stand-by Assistance.  LE Dressing with Stand-by Assistance.  Grooming while standing at sink with Stand-by Assistance.  Toileting from bedside commode with Stand-by Assistance for hygiene and clothing management.   Supine to sit with Stand-by Assistance.  Functional mobility in room with CGA and DME as needed.      Outcome: Ongoing, Progressing  Pt progressing towards OT goals. Pt reported dizziness, lightheadedness, and shakiness in stance with pt becoming flushed upon second attempt with orthostatics taken. Pt with increased BP and HR with transition sit>stand but spO2 fairly stable reading 89-95% with all functional mobility. Pt returned to supine in bed with some improvement of symptoms. Nsg present and attending well to pt at end of OT session.

## 2023-01-26 NOTE — PHYSICIAN QUERY
PT Name: Ana Aguila  MR #: [id number]     DOCUMENTATION CLARIFICATION     CDS: Romi Shelton RN      Contact information:[email or web address]   This form is a permanent document in the medical record.     Query Date: January 26, 2023    By submitting this query, we are merely seeking further clarification of documentation.  Please utilize your independent clinical judgment when addressing the question(s) below.  The Medical Record contains the following:  Indicators Supporting Clinical Findings Location in Medical Record   x Acute Illness (e.g. AMI, Sepsis, etc.) Acute on Chronic Hypoxic Hypercapneic Respiratory Failure 2/2     COPD Exacerbation    Recurrent pneumothorax    CAD    Chronic Heart failure with diastolic dysfunction    NSTEMI type 2  - Cardiology consulted: notes initial EKG with minor ST segment elevation/minor WI segment depression. Overall pattern not suggestive of ACS. Likely 2/2 demand in setting of ARF.   - Restart home Toprol XL at lower dose 25 mg   1/21 Internal Medicine PN   x Vital Signs  HR  113, 114, 111, 109, 105, 102, 112    RR 29, 30, 30, 28, 29, 28, 27, 30, 43    /120, 187/107, 109/59, 64/47, 60/45, 67/50    Temp  97.6 (ax), 97.6 (ax), 97.6 (ax), 98.0 (o), 96.9 (ax)   1/20-1/23 VS Flowsheet   x Acidosis documented Repeat ABG shows improvement in acidosis, hypercarbia   1/21 Internal Medicine PN   x ABGs/Labs  01/20/23 04:46 01/20/23 08:09 01/21/23 15:34 01/22/23 21:46   Sample ARTERIAL ARTERIAL ARTERIAL ARTERIAL   POC PH 7.138 (LL) 7.344 (L) 7.354 7.496 (H)   POC PCO2 98.7 (HH) 54.2 (H) 60.4 (HH) 46.3 (H)   POC PO2 169 (H) 100 84 53 (LL)   POC HCO3 33.4 (H) 29.5 (H) 33.6 (H) 35.8 (H)   POC TCO2 36 (H) 31 (H) 35 (H) 37 (H)   POC SATURATED O2 99 97 95 89 (L)   Allens Test N/A Pass N/A Pass   POC BE 4 4 8 13   FiO2 100 80  25   Vt 350 350  360   PiP  29.  5   PEEP 5 5     DelSys Adult Vent Adult Vent  Adult Vent   Site LR RR LR RR   Mode AC/PRVC AC/PRVC  AC/PRVC           01/20/23 05:56 01/20/23 09:52 01/20/23 16:19 01/21/23 08:32 01/21/23 14:03   Troponin I 0.147 (H) 0.488 (H) 0.642 (H) 0.983 (H) 0.934 (H)    Lab Values   x Hypotension or Low Blood Pressure documented Hypotension- on low dose levophed peripherally      Shock  Hx Hypertension  - Per chart review, home meds losartan 25mg, amlodipine 2.5mg, continue hold  - required minimal norepi, weaned off   1/21-1/22 CCM PN      1/23, 1/25 Internal Medicine PNs and 1/24-1/25 HM PNs    Altered Mental Status or Confusion      Diaphoresis, Cold Extremities or Cyanosis      Oliguria     x Medication/Treatment  -Vasopressors  -Inotropic Drugs  -IV Fluids   -IV Antibiotics  -Cardiac Assist Devices  -Hemodynamic Monitoring  -Blood/Blood Products Norepinephrine gtt, continuous IV, titrate to maintain MAP > 65mmHg   (1/21-1/23)    NS 1L bolus IV x 1  (1/20)  D5 1/2NS at 75 mL/hr continuous IV  (1/21)  LR at 100 mL/hr continuous IV  (1/21)    Ceftriaxone 1g IV x 1  (1/20)  Cefepime 2g Iv q 12hrs  (1/20-1/22)  Azithromycin 500mg Iv x 1  (1/21)  Doxycycline 100mg IV q 12hrs  (1/21-1/22)  Cefepime 2g Iv q 8hrs  (1/22-1/24)    Precedex IV gtt, titrated to RASS (0)  1/20-1/21)  Fentanyl IV gtt, titrated to RASS (-3)   (1/20)  Propofol IV gtt, titrate to RASS (-1)  1/21-1/25)   MAR   x Other Intubated on mechanical ventilator    1/20-1/24 VS Flowsheets       Provider, please further specify the Type of Shock associated with above clinical findings.    [  X  ] Cardiogenic Shock     [    ] Drug-induced Shock (please specify drug): __________     [    ] Other Shock (please specify): __________     [    ] Other Condition (please specify): _________     [  ] Clinically Undetermined           Please document in your progress notes daily for the duration of treatment until resolved and include in your discharge summary.     Form No. [id number]

## 2023-01-26 NOTE — PT/OT/SLP PROGRESS
Occupational Therapy   Treatment    Name: Ana Aguila  MRN: [id number]  Admitting Diagnosis:  Respiratory failure requiring intubation       Recommendations:     Discharge Recommendations:  (TBD pending progress with therapies, possible post acute placement but may progress to home with family)  Discharge Equipment Recommendations:   (TBD)  Barriers to discharge:  Inaccessible home environment, Decreased caregiver support    Assessment:     Ana Aguila is a 67 y.o. female with a medical diagnosis of Respiratory failure requiring intubation.  She presents with dizziness, lightheadedness, and shakiness in stance with pt becoming flushed upon second attempt to stand with orthostatics taken. Pt with increased BP and HR with transition sit>stand but spO2 fairly stable reading 89-95% with all functional mobility. Pt returned to supine in bed with some improvement of symptoms.  . Performance deficits affecting function are weakness, impaired endurance, impaired self care skills, impaired functional mobility, decreased upper extremity function, gait instability, decreased lower extremity function, decreased ROM, impaired cardiopulmonary response to activity.     Rehab Prognosis:  Good; patient would benefit from acute skilled OT services to address these deficits and reach maximum level of function.       Plan:     Patient to be seen 5 x/week to address the above listed problems via self-care/home management, therapeutic activities, therapeutic exercises  Plan of Care Expires: 02/16/23  Plan of Care Reviewed with: patient (daughters x2 present but did not participate in session)    Subjective     Pain/Comfort:  Pain Rating 1: 0/10 (but did c/o muscle spasms in back after first sit<>stand)    Objective:     Communicated with: nsg prior to session.  Patient found up in chair with peripheral IV, blood pressure cuff, pulse ox (continuous), telemetry, oxygen, PureWick upon OT entry to room.    General Precautions: Standard,  fall    Orthopedic Precautions:N/A  Braces: N/A  Respiratory Status: Nasal cannula, flow 2 L/min     Occupational Performance:     Bed Mobility:    Patient completed Scooting/Bridging with dependent, 2 persons, and drawsheet  Patient completed Sit to Supine with maximal assistance     Functional Mobility/Transfers:  Patient completed Sit <> Stand Transfer with moderate assistance  with  rolling walker   Patient completed Bed <> Chair Transfer using Stand Pivot technique with moderate assistance with hand-held assist  Functional Mobility: Pt with fair dynamic seated and standing balance but limited tolerance for OOB activity 2/2 pt reporting dizziness/lightheadedness, and pt becoming hot in stance. Pt minimally verbal and closing eyes with continued time in stance and required increased verbal cues to return to seated position.     Activities of Daily Living:  Upper Body Dressing: moderate assistance to don gown as robe 2/2 multiple lines  Lower Body Dressing: maximal assistance to doff B socks supine in bed      [facility] 6 Click ADL: 15    Treatment & Education:  Pt educated on role of OT and POC.   Pt performing skills as listed above.     Initially pt with concerns for Orthostatic hypotension with BP taken as follows:   BP GA MAP   Seated 143/68 101 97   Standing 165/77 112 110   Return to Supine 152/65  107 104     Patient left HOB elevated with all lines intact, call button in reach, nsg notified, and nsg and daughters present    GOALS:   Multidisciplinary Problems       Occupational Therapy Goals          Problem: Occupational Therapy    Goal Priority Disciplines Outcome Interventions   Occupational Therapy Goal     OT, PT/OT Ongoing, Progressing    Description: Goals to be met by: 2/24/2023     Patient will increase functional independence with ADLs by performing:    UE Dressing with Stand-by Assistance.  LE Dressing with Stand-by Assistance.  Grooming while standing at sink with Stand-by Assistance.  Toileting  from bedside commode with Stand-by Assistance for hygiene and clothing management.   Supine to sit with Stand-by Assistance.  Functional mobility in room with CGA and DME as needed.                           Time Tracking:     OT Date of Treatment: 01/26/23  OT Start Time: 1509  OT Stop Time: 1544  OT Total Time (min): 35 min    Billable Minutes:Therapeutic Activity 35    OT/DEBRA: OT          1/26/2023

## 2023-01-26 NOTE — PT/OT/SLP PROGRESS
Physical Therapy      Patient Name:  Ana Aguila   MRN:  [id number]    Patient not seen today secondary to pt working with OT at this time. Will follow-up in AM..

## 2023-01-26 NOTE — PLAN OF CARE
The pt will go to snf at d/c.        01/26/23 4686   Discharge Reassessment   Assessment Type Discharge Planning Reassessment   Did the patient's condition or plan change since previous assessment? No   Discharge Plan discussed with: Patient;Adult children   Communicated JACKIE with patient/caregiver Date not available/Unable to determine   Discharge Plan A Skilled Nursing Facility   Discharge Plan B Other  (TBD)   DME Needed Upon Discharge  none   Discharge Barriers Identified None   Why the patient remains in the hospital Requires continued medical care   Post-Acute Status   Post-Acute Authorization Placement   Post-Acute Placement Status Referrals Sent   Discharge Delays None known at this time

## 2023-01-26 NOTE — PLAN OF CARE
The sw called the pt's PASRR in to [employer] and faxed the LOCET to [facility]. The sw met with the pt and her dtr's Jeannette and  to speak with them about their preferences for snf. The pt chose in order of preference [facility] ,[facility] and [facility]. The sw faxed the pt's info to the snf's listed above via [employer]. The pt and her dtr's acknowledged understanding the pt will not be able to receive any post acute in [place] b/c [facility] only covers [place] at this time.     3:10pm The sw spoke to Jackie at [facility] and she has 75 people on her list but some may fall off. She has the pt on her list.        01/26/23 1311   Post-Acute Status   Post-Acute Authorization Placement   Post-Acute Placement Status Pending state direction/certification   Discharge Plan   Discharge Plan A Skilled Nursing Facility

## 2023-01-26 NOTE — PLAN OF CARE
Had been weaning the 02 was down to 1 l and sat's 93-94%, but upon getting up to bsc, she was sob, winded, tired and sat's down to 86% inc 02 to 3l, sat's up 92%, assisted to chair past using the bsc, once settled , felt she was breathing easier,     Pt coughed up thick reddish brown sputum, shown pulm, encouraged her to continue to try to cough

## 2023-01-27 LAB
ALBUMIN SERPL BCP-MCNC: 2.7 G/DL (ref 3.5–5.2)
ALP SERPL-CCNC: 43 U/L (ref 55–135)
ALT SERPL W/O P-5'-P-CCNC: 47 U/L (ref 10–44)
ANION GAP SERPL CALC-SCNC: 8 MMOL/L (ref 8–16)
AST SERPL-CCNC: 30 U/L (ref 10–40)
BASOPHILS # BLD AUTO: 0.01 K/UL (ref 0–0.2)
BASOPHILS NFR BLD: 0.1 % (ref 0–1.9)
BILIRUB SERPL-MCNC: 0.4 MG/DL (ref 0.1–1)
BUN SERPL-MCNC: 29 MG/DL (ref 8–23)
CALCIUM SERPL-MCNC: 8.6 MG/DL (ref 8.7–10.5)
CHLORIDE SERPL-SCNC: 106 MMOL/L (ref 95–110)
CO2 SERPL-SCNC: 32 MMOL/L (ref 23–29)
CREAT SERPL-MCNC: 0.7 MG/DL (ref 0.5–1.4)
DIFFERENTIAL METHOD: ABNORMAL
EOSINOPHIL # BLD AUTO: 0.2 K/UL (ref 0–0.5)
EOSINOPHIL NFR BLD: 1.2 % (ref 0–8)
ERYTHROCYTE [DISTWIDTH] IN BLOOD BY AUTOMATED COUNT: 15.3 % (ref 11.5–14.5)
EST. GFR  (NO RACE VARIABLE): >60 ML/MIN/1.73 M^2
GLUCOSE SERPL-MCNC: 100 MG/DL (ref 70–110)
HCT VFR BLD AUTO: 33.3 % (ref 37–48.5)
HGB BLD-MCNC: 10.1 G/DL (ref 12–16)
IMM GRANULOCYTES # BLD AUTO: 0.08 K/UL (ref 0–0.04)
IMM GRANULOCYTES NFR BLD AUTO: 0.6 % (ref 0–0.5)
LYMPHOCYTES # BLD AUTO: 2.9 K/UL (ref 1–4.8)
LYMPHOCYTES NFR BLD: 21.8 % (ref 18–48)
MCH RBC QN AUTO: 29.1 PG (ref 27–31)
MCHC RBC AUTO-ENTMCNC: 30.3 G/DL (ref 32–36)
MCV RBC AUTO: 96 FL (ref 82–98)
MONOCYTES # BLD AUTO: 1.2 K/UL (ref 0.3–1)
MONOCYTES NFR BLD: 8.7 % (ref 4–15)
NEUTROPHILS # BLD AUTO: 9.1 K/UL (ref 1.8–7.7)
NEUTROPHILS NFR BLD: 67.6 % (ref 38–73)
NRBC BLD-RTO: 0 /100 WBC
PLATELET # BLD AUTO: 312 K/UL (ref 150–450)
PMV BLD AUTO: 9.8 FL (ref 9.2–12.9)
POTASSIUM SERPL-SCNC: 4 MMOL/L (ref 3.5–5.1)
PROT SERPL-MCNC: 5.4 G/DL (ref 6–8.4)
RBC # BLD AUTO: 3.47 M/UL (ref 4–5.4)
SODIUM SERPL-SCNC: 146 MMOL/L (ref 136–145)
WBC # BLD AUTO: 13.44 K/UL (ref 3.9–12.7)

## 2023-01-27 PROCEDURE — 97530 THERAPEUTIC ACTIVITIES: CPT | Mod: CO

## 2023-01-27 PROCEDURE — 25000003 PHARM REV CODE 250: Performed by: STUDENT IN AN ORGANIZED HEALTH CARE EDUCATION/TRAINING PROGRAM

## 2023-01-27 PROCEDURE — 25000242 PHARM REV CODE 250 ALT 637 W/ HCPCS: Performed by: STUDENT IN AN ORGANIZED HEALTH CARE EDUCATION/TRAINING PROGRAM

## 2023-01-27 PROCEDURE — 63600175 PHARM REV CODE 636 W HCPCS: Performed by: STUDENT IN AN ORGANIZED HEALTH CARE EDUCATION/TRAINING PROGRAM

## 2023-01-27 PROCEDURE — 97110 THERAPEUTIC EXERCISES: CPT | Mod: CO

## 2023-01-27 PROCEDURE — 11000001 HC ACUTE MED/SURG PRIVATE ROOM

## 2023-01-27 PROCEDURE — 80053 COMPREHEN METABOLIC PANEL: CPT | Performed by: STUDENT IN AN ORGANIZED HEALTH CARE EDUCATION/TRAINING PROGRAM

## 2023-01-27 PROCEDURE — 97116 GAIT TRAINING THERAPY: CPT

## 2023-01-27 PROCEDURE — 97110 THERAPEUTIC EXERCISES: CPT

## 2023-01-27 PROCEDURE — 85025 COMPLETE CBC W/AUTO DIFF WBC: CPT | Performed by: STUDENT IN AN ORGANIZED HEALTH CARE EDUCATION/TRAINING PROGRAM

## 2023-01-27 PROCEDURE — 25000003 PHARM REV CODE 250: Performed by: INTERNAL MEDICINE

## 2023-01-27 PROCEDURE — 94660 CPAP INITIATION&MGMT: CPT

## 2023-01-27 PROCEDURE — 27000221 HC OXYGEN, UP TO 24 HOURS

## 2023-01-27 PROCEDURE — 94761 N-INVAS EAR/PLS OXIMETRY MLT: CPT

## 2023-01-27 PROCEDURE — 99900035 HC TECH TIME PER 15 MIN (STAT)

## 2023-01-27 PROCEDURE — 94640 AIRWAY INHALATION TREATMENT: CPT

## 2023-01-27 PROCEDURE — 36415 COLL VENOUS BLD VENIPUNCTURE: CPT | Performed by: STUDENT IN AN ORGANIZED HEALTH CARE EDUCATION/TRAINING PROGRAM

## 2023-01-27 RX ORDER — IPRATROPIUM BROMIDE AND ALBUTEROL SULFATE 2.5; .5 MG/3ML; MG/3ML
3 SOLUTION RESPIRATORY (INHALATION)
Status: DISCONTINUED | OUTPATIENT
Start: 2023-01-27 | End: 2023-01-30

## 2023-01-27 RX ADMIN — IPRATROPIUM BROMIDE AND ALBUTEROL SULFATE 3 ML: 2.5; .5 SOLUTION RESPIRATORY (INHALATION) at 03:01

## 2023-01-27 RX ADMIN — LACTOBACILLUS ACIDOPHILUS / LACTOBACILLUS BULGARICUS 1 EACH: 100 MILLION CFU STRENGTH GRANULES at 08:01

## 2023-01-27 RX ADMIN — LACTOBACILLUS ACIDOPHILUS / LACTOBACILLUS BULGARICUS 1 EACH: 100 MILLION CFU STRENGTH GRANULES at 09:01

## 2023-01-27 RX ADMIN — HYDROXYZINE HYDROCHLORIDE 25 MG: 25 TABLET ORAL at 11:01

## 2023-01-27 RX ADMIN — IPRATROPIUM BROMIDE AND ALBUTEROL SULFATE 3 ML: 2.5; .5 SOLUTION RESPIRATORY (INHALATION) at 07:01

## 2023-01-27 RX ADMIN — METOPROLOL TARTRATE 25 MG: 25 TABLET, FILM COATED ORAL at 08:01

## 2023-01-27 RX ADMIN — ENOXAPARIN SODIUM 40 MG: 40 INJECTION SUBCUTANEOUS at 04:01

## 2023-01-27 RX ADMIN — METOPROLOL TARTRATE 25 MG: 25 TABLET, FILM COATED ORAL at 09:01

## 2023-01-27 NOTE — PLAN OF CARE
Problem: Occupational Therapy  Goal: Occupational Therapy Goal  Description: Goals to be met by: 2/24/2023     Patient will increase functional independence with ADLs by performing:    UE Dressing with Stand-by Assistance.  LE Dressing with Stand-by Assistance.  Grooming while standing at sink with Stand-by Assistance.  Toileting from bedside commode with Stand-by Assistance for hygiene and clothing management.   Supine to sit with Stand-by Assistance.  Functional mobility in room with CGA and DME as needed.      Outcome: Ongoing, Progressing   Ana Aguila is a 67 y.o. female with a medical diagnosis of Respiratory failure requiring intubation.  Performance deficits affecting function are weakness, impaired endurance, impaired self care skills, impaired functional mobility, gait instability, impaired balance, decreased upper extremity function, decreased lower extremity function, impaired cardiopulmonary response to activity. Pt found in chair, agreeable to therapy.  Pt with improved performance and tolerance of therapy.  She did require increased O2 to 2.5 L when ambulating however recovered quickly with rest break and PLB.  Pt is progressing well towards goals. Continue OT services to address functional goals, progressing as a

## 2023-01-27 NOTE — PROGRESS NOTES
[facility] IM Progress Note    Subjective:      Overnight tolerated BiPAP well.  Today resting comfortably on NC, speaking more clearly without distress. Denied fever, chills, HA, CP, palpitations, abd pain, N/V/D, dysuria.     Objective:   Last 24 Hour Vital Signs:  BP  Min: 104/58  Max: 155/77  Temp  Av.3 °F (36.8 °C)  Min: 97.9 °F (36.6 °C)  Max: 98.9 °F (37.2 °C)  Pulse  Av.2  Min: 64  Max: 100  Resp  Av.7  Min: 11  Max: 56  SpO2  Av %  Min: 90 %  Max: 100 %  I/O last 3 completed shifts:  In: 1100 [P.O.:1100]  Out: 250 [Urine:250]    Physical Examination:  GEN: alert, NAD, but generalized weakness  HEENT: NCAT, PERRL, EOMI, neck supple, no thyromegaly  CARD: RRR, no m/r/g, JVP wnl, 2+ peripheral pulses  PULM: CTAB, no wheezes/crackles/rhonchi, on 2L  GI: Abdomen soft, NT/ND, normoactive BS, no rebound/guarding  MSKL: Normal ROM, no tenderness 5/5 strength in all extremities  SKIN: Warm, dry, no rashes  NEURO: AAOx3, CN grossly intact, SILT all extremities, DTRs deferred  PSYCH: Normal mood and affect.    Laboratory:  Laboratory Data Reviewed: yes  Pertinent Findings:    Microbiology Data Reviewed: yes  Pertinent Findings:  Blood cx : NGTD  Resp Cx NG  Flu/COVID negative    Other Results:  EKG (my interpretation):   Admission EKG: sinus tach 120, TWIs V1/V2  Subsequent EKG : NSR, TWI in V1, V2 resolved    Radiology Data Reviewed: yes  Pertinent Findings:  Chest xray : R sided pneumothorax  Chest xray : improvement in R sided pneumothorax, s/p pleuravent placement    Current Medications:     Infusions:         Scheduled:   albuterol-ipratropium  3 mL Nebulization Q8H WA    enoxaparin  40 mg Subcutaneous Daily    lactobacillus acidophilus & bulgar  1 packet Oral BID    metoprolol tartrate  25 mg Oral BID    polyethylene glycol  17 g Oral Daily        PRN:  albuterol, dextrose 10%, dextrose 10%, hydrOXYzine HCL, sodium chloride 0.9%    Antibiotics and Day Number of Therapy:  Cefepime  1/20-1/25  Doxycycline 1/20-1/24    Lines and Day Number of Therapy:  PIV, ETT, NG tube    Assessment:     Ana Aguila is a 67 year old female with PMHx of COPD on 2.5L home O2, Hypertension, Hyperlipidemia, GERD, MI, and hx of Takasubo cardiomyopathy who presented on 1/20 for respiratory distress 2/2 COPD exacerbation, required emergent intubation in the ED. Pt was found to have a large R sided pneumothorax s/p pleuravent --> chest tube placement for re-expansion of apical pneumo. Pt was extubated on 1/20 but then re-intubated 1/21 for respiratory distress, pleuravent tube found to be out of place on repeat CXR, new apical PTX, 8F armaan catheter was placed was resolution of PTX. Failed SBT on 1/23, but successfully extubated on 1/24. Chest tube removed 1/25 and stepped down. Continue to work w/ PT/OT    Plan:     Acute on Chronic Hypoxic Hypercapneic Respiratory Failure 2/2 COPD Exacerbation  - similar presentation 7/2022, on home 2.5L home O2  - PFTs show obstruction with severe ventilatory impairment, FEV1 0.43L, FEV1/FVC 40%  - EMS administered dexamethasone 16mg, duo-neb tx, albuterol x2, and 0.5 epi  - ED: BiPAP --> intubation for respiratory distress and accessory muscle usage  - ABG significant for pH 7.14, pCO2 98.7, pO2 169, HCO3 33.4 on ventilator with Vt 350, PEEP 5, FiO2 100% RR22  - repeat ABG shows improvement in acidosis, hypercarbia  - extubated to BIPAP 1/20, re-intubated 1/21 after pleuravent dislodged, reintubated and replaced 8F chest tube, successfully extubated on 1/24, on NC with nightly BiPAP  - completed doxy/cefepime course for CAP  - s/p 7d steroid course  - continue PRN duonebs  -will need BiPAP qHS outpt    Recurrent pneumothorax, resolved  -etiology unclear, possibly present initially and worsened by NIVP  -pleurvent placed in ED, chest x-ray showed expansion of apical pneumo 1/21 --> chest tube placed, pleuravax pulled  -chest tube successful removed 1/25     Anxiety  -hydroxyzine  25mg TID PRN    Prolonged Qtc  -EKG w/ Qtc 540, repeat EKG Qtc 494    Shock, resolved  Hx Hypertension  - Per chart review, home meds losartan 25mg, amlodipine 2.5mg, continue hold  - required minimal norepi, weaned off on 1/22-1/23    CAD  Chronic Heart failure with diastolic dysfunction  NSTEMI type 2  - Cardiology consulted: notes initial EKG with minor ST segment elevation/minor [place] segment depression. Overall pattern not suggestive of ACS. Likely 2/2 demand in setting of ARF  - TTE 08/4: EF 65%, intermediate LV diastolic dysfunction  - BNP non-elevated, troponin trend 0.147 -->0.488-->0.642--> peaked 1/21  - continue lopressor 25 bid     Hyperlipidemia  - Per chart review on rosuvastatin 10mg, continue hold     GERD  R Hemicolectomy 2/2 GIB  - Continue Famotidine IV for peptic ulcer ppx  - Monitor for bleeding, daily CB    Diet: regular  Ppx: lovenox  Code: Full    Dispo: monitor O2 status, continue to work with PT/OT      Darci Gongora  [facility] Internal Medicine PGY3  [facility] IM Service Team A    [facility] Medicine Hospitalist Pager numbers:   [facility] Hospitalist Medicine Team A (Susanna/Keren): [phone]  [facility] Hospitalist Medicine Team B (Camilla/Lavelle):  [phone]

## 2023-01-27 NOTE — PLAN OF CARE
The sw spoke to Jackie at [facility] [phone] who states she will not have beds until Tuesday. She will review pt's again on Monday for beds for Tuesday. The sw spoke to [facility] [phone] at [facility] who states she received the pt's info and she will review it. The sw called [facility] [phone] and left a message for them to return the call in reference to the referral. The sw informed the pt and her dtr of this info mentioned above.        01/27/23 1323   Post-Acute Status   Post-Acute Authorization Placement   Post-Acute Placement Status Referrals Sent   Discharge Delays None known at this time   Discharge Plan   Discharge Plan A Skilled Nursing Facility   Discharge Plan B Other  (TBD)

## 2023-01-27 NOTE — PLAN OF CARE
Pt getting stronger, up in chair, walking in room, less sob, when she does so, less anxiety due to less sob, encourage coughing and deep breathing, continues on 02 2lnc, diminished lung fields, pt to continue to inc activity, when in bed, low position, cAll bell easy reach, side rails up x4, bed alarm on, give atarax as needed for anxiety, needed none today

## 2023-01-27 NOTE — PLAN OF CARE
Pt up to bsc, voided, no stool, co of burning vaginal area when she urinated, did not, see any redness, left the purwick off for now, told her will get up to bsc if she needs to urinate,

## 2023-01-27 NOTE — PT/OT/SLP PROGRESS
"Physical Therapy Treatment    Patient Name:  Ana Aguila   MRN:  [id number]    Recommendations:     Discharge Recommendations: other (see comments) (TBD, pending progress)  Discharge Equipment Recommendations: other (see comments) (TBD)  Barriers to discharge: Decreased caregiver support and current functional mobility status requires assist    Assessment:     Ana Aguila is a 67 y.o. female admitted with a medical diagnosis of Respiratory failure requiring intubation.  She presents with the following impairments/functional limitations: weakness, impaired functional mobility, impaired cardiopulmonary response to activity, impaired endurance, gait instability, impaired balance, decreased upper extremity function, impaired skin, impaired self care skills, decreased lower extremity function, edema     Patient seen for physical therapy session on this date.  Patient presents up in chair after working with OT.  Patient performs well and is agreeable to therapy.  Full report to follow.  Continue to recommend SNF at discharge.  .    Rehab Prognosis: Good; patient would benefit from acute skilled PT services to address these deficits and reach maximum level of function.    Recent Surgery: * No surgery found *      Plan:     During this hospitalization, patient to be seen 5 x/week to address the identified rehab impairments via gait training, therapeutic activities, therapeutic exercises, neuromuscular re-education and progress toward the following goals:    Plan of Care Expires:  02/24/23    Subjective     Chief Complaint: "I am doing better than yesterday"  Patient/Family Comments/goals: To return home at [facility]  Pain/Comfort:  No complaints of pain      Objective:     Communicated with nurse prior to session.  Patient found supine with bed alarm, blood pressure cuff, chest tube, peripheral IV, kimbrough catheter, oxygen upon PT entry to room.     General Precautions: Standard, fall  Orthopedic Precautions: N/A  Braces: " N/A  Respiratory Status: Nasal cannula, flow 2 L/min     Functional Mobility:  Transfers:     Sit to Stand:  contact guard assistance with rolling walker  Gait: x 10' to bedside commode with RW and CGA, VCs for deep breathing  Balance: Standing trials x 3 x 3 min, 5 min, 7 min with CGA, performing B LE exercises      AM-PAC 6 CLICK MOBILITY  Turning over in bed (including adjusting bedclothes, sheets and blankets)?: 3  Sitting down on and standing up from a chair with arms (e.g., wheelchair, bedside commode, etc.): 3  Moving from lying on back to sitting on the side of the bed?: 3  Moving to and from a bed to a chair (including a wheelchair)?: 3  Need to walk in hospital room?: 3  Climbing 3-5 steps with a railing?: 1  Basic Mobility Total Score: 16       Treatment & Education:  Patient up in chair upon PT arrival.  Patient on 2L supplemental O2 during entire session.  Patient with mild dyspnea with standing activites, however maintains O2 sats 88% and above throughout session.  Patient performs standing trials as above with RW, performing calf raises, hip flexion, hip abduction - tolerates well.  Patient ambulates to bedside commode x 10' with RW and CGA, slow pace, tolerates well.      Patient left up in chair with all lines intact, call button in reach, and nurse notified..    GOALS:   Multidisciplinary Problems       Physical Therapy Goals          Problem: Physical Therapy    Goal Priority Disciplines Outcome Goal Variances Interventions   Physical Therapy Goal     PT, PT/OT Ongoing, Progressing     Description: Goals to be met by: 2023     Patient will increase functional independence with mobility by performin. Supine to sit with Modified [place]  2. Sit to supine with Modified [place]  3. Rolling to Left and Right with Modified [place].  4. Sit to stand transfer with Stand-by Assistance  5. Bed to chair transfer with Stand-by Assistance using Rolling Walker  6. Gait  x 100 feet  with Contact Guard Assistance using Rolling Walker.   7. Ascend/descend 4 stair with bilateral Handrails Minimal Assistance.                         Time Tracking:     PT Received On: 01/27/23  PT Start Time: 1146     PT Stop Time: 1211  PT Total Time (min): 25 min     Billable Minutes: Gait Training 10 and Therapeutic Exercise 15    Treatment Type: Evaluation  PT/PTA: PT     PTA Visit Number: 0     01/27/2023

## 2023-01-27 NOTE — PLAN OF CARE
Pt states she slept well, last night, no co of sob , on nasal cannula, did not keep bipap on very long, 2hrs, last night,

## 2023-01-27 NOTE — PT/OT/SLP PROGRESS
Occupational Therapy   Treatment    Name: Ana Aguila  MRN: [id number]  Admitting Diagnosis:  Respiratory failure requiring intubation       Recommendations:     Discharge Recommendations:  (TBD pending progress with therapies, possible post acute placement but may progress to home with family)  Discharge Equipment Recommendations:  other (see comments) (TBD pending progress)  Barriers to discharge:  Inaccessible home environment, Decreased caregiver support, Other (Comment) (Increased level of assistance)    Assessment:     Ana Aguila is a 67 y.o. female with a medical diagnosis of Respiratory failure requiring intubation.  Performance deficits affecting function are weakness, impaired endurance, impaired self care skills, impaired functional mobility, gait instability, impaired balance, decreased upper extremity function, decreased lower extremity function, impaired cardiopulmonary response to activity. Pt found in chair, agreeable to therapy.  Pt with improved performance and tolerance of therapy.  She did require increased O2 to 2.5 L when ambulating however recovered quickly with rest break and PLB.  Pt is progressing well towards goals. Continue OT services to address functional goals, progressing as able.      Rehab Prognosis:  Good; patient would benefit from acute skilled OT services to address these deficits and reach maximum level of function.       Plan:     Patient to be seen 5 x/week to address the above listed problems via self-care/home management, therapeutic activities, therapeutic exercises  Plan of Care Expires: 02/16/23  Plan of Care Reviewed with: patient    Subjective     Pain/Comfort:  Pain Rating 1: 0/10  Pain Rating Post-Intervention 1: 0/10    Objective:     Communicated with: RN prior to session.  Patient found up in chair with peripheral IV, oxygen (multiple ICU lines and monitors) upon OT entry to room.    General Precautions: Standard, fall    Orthopedic Precautions:N/A  Braces:  N/A  Respiratory Status: Nasal cannula, flow 2 L/min Increased to 2.5 L during therapy     Occupational Performance:       Functional Mobility/Transfers:  Patient completed Sit <> Stand Transfer with contact guard assistance and minimum assistance  with  rolling walker and vcs for hand placement/effective tech   Patient completed Bed <> Chair Transfer using Stand Pivot technique with contact guard assistance and minimum assistance with rolling walker  Functional Mobility: Pt ambulated room distances with CGA/Min A using RW.  Slow pace, 2 bouts of LOB requiring CGA to regain.          [facility] 6 Click ADL: 15    Treatment & Education:  Pt performed BUE AROM ex x 10 reps all jts/planes.  Pt tolerated well with rest breaks and PLB 2/2 SOB.  Pt instructed on Pursed Lip Breathing Technique requiring min verbal cues to perform efficiently.     Pt educated on [employer] techs and provided with handout.        Patient left up in chair with all lines intact, call button in reach, family present, and setup with lunch    GOALS:   Multidisciplinary Problems       Occupational Therapy Goals          Problem: Occupational Therapy    Goal Priority Disciplines Outcome Interventions   Occupational Therapy Goal     OT, PT/OT Ongoing, Progressing    Description: Goals to be met by: 2/24/2023     Patient will increase functional independence with ADLs by performing:    UE Dressing with Stand-by Assistance.  LE Dressing with Stand-by Assistance.  Grooming while standing at sink with Stand-by Assistance.  Toileting from bedside commode with Stand-by Assistance for hygiene and clothing management.   Supine to sit with Stand-by Assistance.  Functional mobility in room with CGA and DME as needed.                           Time Tracking:     OT Date of Treatment: 01/27/23  OT Start Time: 1058  OT Stop Time: 1129  OT Total Time (min): 31 min    Billable Minutes:Therapeutic Activity 20  Therapeutic Exercise 11               1/27/2023

## 2023-01-27 NOTE — PLAN OF CARE
Patient seen for physical therapy session on this date.  Patient presents up in chair after working with OT.  Patient performs well and is agreeable to therapy.  Full report to follow.  Continue to recommend SNF at discharge.      Problem: Physical Therapy  Goal: Physical Therapy Goal  Description: Goals to be met by: 2023     Patient will increase functional independence with mobility by performin. Supine to sit with Modified [place]  2. Sit to supine with Modified [place]  3. Rolling to Left and Right with Modified [place].  4. Sit to stand transfer with Stand-by Assistance  5. Bed to chair transfer with Stand-by Assistance using Rolling Walker  6. Gait  x 100 feet with Contact Guard Assistance using Rolling Walker.   7. Ascend/descend 4 stair with bilateral Handrails Minimal Assistance.    Outcome: Ongoing, Progressing

## 2023-01-28 LAB
ALBUMIN SERPL BCP-MCNC: 3 G/DL (ref 3.5–5.2)
ALP SERPL-CCNC: 56 U/L (ref 55–135)
ALT SERPL W/O P-5'-P-CCNC: 40 U/L (ref 10–44)
ANION GAP SERPL CALC-SCNC: 10 MMOL/L (ref 8–16)
AST SERPL-CCNC: 31 U/L (ref 10–40)
BASOPHILS # BLD AUTO: 0.02 K/UL (ref 0–0.2)
BASOPHILS NFR BLD: 0.1 % (ref 0–1.9)
BILIRUB SERPL-MCNC: 0.6 MG/DL (ref 0.1–1)
BUN SERPL-MCNC: 21 MG/DL (ref 8–23)
CALCIUM SERPL-MCNC: 8.7 MG/DL (ref 8.7–10.5)
CHLORIDE SERPL-SCNC: 106 MMOL/L (ref 95–110)
CO2 SERPL-SCNC: 30 MMOL/L (ref 23–29)
CREAT SERPL-MCNC: 0.7 MG/DL (ref 0.5–1.4)
DIFFERENTIAL METHOD: ABNORMAL
EOSINOPHIL # BLD AUTO: 0.5 K/UL (ref 0–0.5)
EOSINOPHIL NFR BLD: 3.6 % (ref 0–8)
ERYTHROCYTE [DISTWIDTH] IN BLOOD BY AUTOMATED COUNT: 15.2 % (ref 11.5–14.5)
EST. GFR  (NO RACE VARIABLE): >60 ML/MIN/1.73 M^2
GLUCOSE SERPL-MCNC: 80 MG/DL (ref 70–110)
HCT VFR BLD AUTO: 35.9 % (ref 37–48.5)
HGB BLD-MCNC: 11 G/DL (ref 12–16)
IMM GRANULOCYTES # BLD AUTO: 0.08 K/UL (ref 0–0.04)
IMM GRANULOCYTES NFR BLD AUTO: 0.6 % (ref 0–0.5)
LYMPHOCYTES # BLD AUTO: 4.3 K/UL (ref 1–4.8)
LYMPHOCYTES NFR BLD: 32 % (ref 18–48)
MCH RBC QN AUTO: 29 PG (ref 27–31)
MCHC RBC AUTO-ENTMCNC: 30.6 G/DL (ref 32–36)
MCV RBC AUTO: 95 FL (ref 82–98)
MONOCYTES # BLD AUTO: 1.1 K/UL (ref 0.3–1)
MONOCYTES NFR BLD: 7.9 % (ref 4–15)
NEUTROPHILS # BLD AUTO: 7.5 K/UL (ref 1.8–7.7)
NEUTROPHILS NFR BLD: 55.8 % (ref 38–73)
NRBC BLD-RTO: 0 /100 WBC
PLATELET # BLD AUTO: 337 K/UL (ref 150–450)
PMV BLD AUTO: 10.2 FL (ref 9.2–12.9)
POTASSIUM SERPL-SCNC: 4.2 MMOL/L (ref 3.5–5.1)
PROT SERPL-MCNC: 5.9 G/DL (ref 6–8.4)
RBC # BLD AUTO: 3.79 M/UL (ref 4–5.4)
SODIUM SERPL-SCNC: 146 MMOL/L (ref 136–145)
WBC # BLD AUTO: 13.39 K/UL (ref 3.9–12.7)

## 2023-01-28 PROCEDURE — 85025 COMPLETE CBC W/AUTO DIFF WBC: CPT | Performed by: STUDENT IN AN ORGANIZED HEALTH CARE EDUCATION/TRAINING PROGRAM

## 2023-01-28 PROCEDURE — 94640 AIRWAY INHALATION TREATMENT: CPT

## 2023-01-28 PROCEDURE — 11000001 HC ACUTE MED/SURG PRIVATE ROOM

## 2023-01-28 PROCEDURE — 36415 COLL VENOUS BLD VENIPUNCTURE: CPT | Performed by: STUDENT IN AN ORGANIZED HEALTH CARE EDUCATION/TRAINING PROGRAM

## 2023-01-28 PROCEDURE — 63600175 PHARM REV CODE 636 W HCPCS: Performed by: STUDENT IN AN ORGANIZED HEALTH CARE EDUCATION/TRAINING PROGRAM

## 2023-01-28 PROCEDURE — 25000242 PHARM REV CODE 250 ALT 637 W/ HCPCS: Performed by: INTERNAL MEDICINE

## 2023-01-28 PROCEDURE — 94761 N-INVAS EAR/PLS OXIMETRY MLT: CPT

## 2023-01-28 PROCEDURE — 99900035 HC TECH TIME PER 15 MIN (STAT)

## 2023-01-28 PROCEDURE — 80053 COMPREHEN METABOLIC PANEL: CPT | Performed by: STUDENT IN AN ORGANIZED HEALTH CARE EDUCATION/TRAINING PROGRAM

## 2023-01-28 PROCEDURE — 25000003 PHARM REV CODE 250: Performed by: STUDENT IN AN ORGANIZED HEALTH CARE EDUCATION/TRAINING PROGRAM

## 2023-01-28 PROCEDURE — 25000242 PHARM REV CODE 250 ALT 637 W/ HCPCS: Performed by: STUDENT IN AN ORGANIZED HEALTH CARE EDUCATION/TRAINING PROGRAM

## 2023-01-28 PROCEDURE — 97530 THERAPEUTIC ACTIVITIES: CPT | Mod: CQ

## 2023-01-28 PROCEDURE — 25000003 PHARM REV CODE 250: Performed by: INTERNAL MEDICINE

## 2023-01-28 PROCEDURE — 94660 CPAP INITIATION&MGMT: CPT

## 2023-01-28 PROCEDURE — 27000221 HC OXYGEN, UP TO 24 HOURS

## 2023-01-28 RX ORDER — LORAZEPAM 2 MG/ML
1 INJECTION INTRAMUSCULAR ONCE
Status: COMPLETED | OUTPATIENT
Start: 2023-01-28 | End: 2023-01-28

## 2023-01-28 RX ADMIN — METOPROLOL TARTRATE 25 MG: 25 TABLET, FILM COATED ORAL at 09:01

## 2023-01-28 RX ADMIN — LACTOBACILLUS ACIDOPHILUS / LACTOBACILLUS BULGARICUS 1 EACH: 100 MILLION CFU STRENGTH GRANULES at 09:01

## 2023-01-28 RX ADMIN — ENOXAPARIN SODIUM 40 MG: 40 INJECTION SUBCUTANEOUS at 06:01

## 2023-01-28 RX ADMIN — LORAZEPAM 1 MG: 2 INJECTION INTRAMUSCULAR; INTRAVENOUS at 03:01

## 2023-01-28 RX ADMIN — HYDROXYZINE HYDROCHLORIDE 25 MG: 25 TABLET ORAL at 09:01

## 2023-01-28 RX ADMIN — ALBUTEROL SULFATE 2 PUFF: 90 AEROSOL, METERED RESPIRATORY (INHALATION) at 03:01

## 2023-01-28 RX ADMIN — IPRATROPIUM BROMIDE AND ALBUTEROL SULFATE 3 ML: 2.5; .5 SOLUTION RESPIRATORY (INHALATION) at 07:01

## 2023-01-28 RX ADMIN — IPRATROPIUM BROMIDE AND ALBUTEROL SULFATE 3 ML: 2.5; .5 SOLUTION RESPIRATORY (INHALATION) at 03:01

## 2023-01-28 NOTE — PROGRESS NOTES
"[facility] IM Progress Note    Subjective:      Pt was seen at bedside after chart review sitting up comfortably in bed on O2 via NC. She states that she was unable to tolerate BiPAP overnight d/t the mask causing her to be anxious. She also states that she had an "anxiety attack" overnight. Denies experiencing any fever, chills, HA, CP, palpitations, abd pain, N/V/D, dysuria.     Objective:   Last 24 Hour Vital Signs:  BP  Min: 106/69  Max: 147/81  Temp  Av.4 °F (36.9 °C)  Min: 97.9 °F (36.6 °C)  Max: 99 °F (37.2 °C)  Pulse  Av.4  Min: 74  Max: 96  Resp  Av.2  Min: 16  Max: 33  SpO2  Av.6 %  Min: 81 %  Max: 97 %  Weight  Av.1 kg (174 lb 6.1 oz)  Min: 79.1 kg (174 lb 6.1 oz)  Max: 79.1 kg (174 lb 6.1 oz)  I/O last 3 completed shifts:  In: 580 [P.O.:580]  Out: 250 [Urine:250]    Physical Examination:  GEN: alert, NAD, but generalized weakness  HEENT: NCAT, PERRL, EOMI, neck supple, no thyromegaly  CARD: RRR, no m/r/g, JVP wnl, 2+ peripheral pulses  PULM: CTAB, no wheezes/crackles/rhonchi, on 2L via NC  GI: Abdomen soft, NT/ND, normoactive BS, no rebound/guarding  MSKL: Normal ROM, no tenderness 5/5 strength in all extremities  SKIN: Warm, dry, no rashes  NEURO: AAOx3, CN grossly intact, SILT all extremities, DTRs deferred  PSYCH: Normal mood and affect.    Laboratory:  Laboratory Data Reviewed: yes  Pertinent Findings:    Microbiology Data Reviewed: yes  Pertinent Findings:  Blood cx : NGTD  Resp Cx NG  Flu/COVID negative    Other Results:  EKG (my interpretation):   Admission EKG: sinus tach 120, TWIs V1/V2  Subsequent EKG : NSR, TWI in V1, V2 resolved    Radiology Data Reviewed: yes  Pertinent Findings:  Chest xray : R sided pneumothorax  Chest xray : improvement in R sided pneumothorax, s/p pleuravent placement    Current Medications:     Infusions:         Scheduled:   albuterol-ipratropium  3 mL Nebulization Q8H WA    enoxaparin  40 mg Subcutaneous Daily    lactobacillus " acidophilus & bulgar  1 packet Oral BID    metoprolol tartrate  25 mg Oral BID    polyethylene glycol  17 g Oral Daily        PRN:  albuterol, dextrose 10%, dextrose 10%, hydrOXYzine HCL, sodium chloride 0.9%    Antibiotics and Day Number of Therapy:  Cefepime 1/20-1/25  Doxycycline 1/20-1/24    Lines and Day Number of Therapy:  PIV, ETT, NG tube    Assessment:     Ana Aguila is a 67 year old female with PMHx of COPD on 2.5L home O2, Hypertension, Hyperlipidemia, GERD, MI, and hx of Takasubo cardiomyopathy who presented on 1/20 for respiratory distress 2/2 COPD exacerbation, required emergent intubation in the ED. Pt was found to have a large R sided pneumothorax s/p pleuravent --> chest tube placement for re-expansion of apical pneumo. Pt was extubated on 1/20 but then re-intubated 1/21 for respiratory distress, pleuravent tube found to be out of place on repeat CXR, new apical PTX, 8F armaan catheter was placed was resolution of PTX. Failed SBT on 1/23, but successfully extubated on 1/24. Chest tube removed 1/25 and stepped down. Continuing to work w/ PT/OT. Pending SNF placement at this time.    Plan:     Acute on Chronic Hypoxic Hypercapneic Respiratory Failure 2/2 COPD Exacerbation  - similar presentation 7/2022, on home 2.5L home O2  - PFTs show obstruction with severe ventilatory impairment, FEV1 0.43L, FEV1/FVC 40%  - EMS administered dexamethasone 16mg, duo-neb tx, albuterol x2, and 0.5 epi  - ED: BiPAP --> intubation for respiratory distress and accessory muscle usage  - ABG significant for pH 7.14, pCO2 98.7, pO2 169, HCO3 33.4 on ventilator with Vt 350, PEEP 5, FiO2 100% RR22  - repeat ABG shows improvement in acidosis, hypercarbia  - extubated to BIPAP 1/20, re-intubated 1/21 after pleuravent dislodged, reintubated and replaced 8F chest tube, successfully extubated on 1/24, on NC with nightly BiPAP  - completed doxy/cefepime course for CAP  - s/p 7d steroid course  - currently on 2L via NC w/ O2 sat  of 90%  - continue PRN duonebs  - will need BiPAP qHS outpt  - will consult w/ Pulm regarding chronic inhaler regimen for discharge    Recurrent pneumothorax, resolved  - etiology unclear, possibly present initially and worsened by NIVP  - pleurvent placed in ED, chest x-ray showed expansion of apical pneumo 1/21 --> chest tube placed, pleuravax pulled  - chest tube successful removed 1/25     Anxiety  - hydroxyzine 25mg TID PRN    Prolonged Qtc  - EKG w/ Qtc 540, repeat EKG Qtc 494    Shock, resolved  Hx Hypertension  - Per chart review, home meds losartan 25mg, amlodipine 2.5mg, continue hold  - required minimal norepi, weaned off on 1/22-1/23    CAD  Chronic Heart failure with diastolic dysfunction  NSTEMI type 2  - Cardiology consulted: notes initial EKG with minor ST segment elevation/minor OK segment depression. Overall pattern not suggestive of ACS. Likely 2/2 demand in setting of ARF  - TTE 08/4: EF 65%, intermediate LV diastolic dysfunction  - BNP non-elevated, troponin trend 0.147 -->0.488-->0.642--> peaked 1/21  - continue lopressor 25 bid     Hyperlipidemia  - Per chart review on rosuvastatin 10mg, continue hold     GERD  R Hemicolectomy 2/2 GIB  - Continue Famotidine IV for peptic ulcer ppx  - Monitor for bleeding, daily CB    Diet: regular  Ppx: lovenox  Code: Full    Dispo: Pending SNF placement, continuing to monitor O2 status and working with PT/OT      Link Gomez DO  [facility] Internal Medicine, PGY-1  U IM Service Team A    [facility] Medicine Hospitalist Pager numbers:   [facility] Hospitalist Medicine Team A (Susanna/Keren): [phone]  [facility] Hospitalist Medicine Team B (Camilla/Lavelle):  [phone]

## 2023-01-28 NOTE — PT/OT/SLP PROGRESS
Physical Therapy Treatment    Patient Name:  Ana Aguila   MRN:  [id number]    Recommendations:     Discharge Recommendations: other (see comments) (TBD pending pt progress)  Discharge Equipment Recommendations: other (see comments) (TBD)  Barriers to discharge: Decreased caregiver support    Assessment:     Ana Aguila is a 67 y.o. female admitted with a medical diagnosis of Respiratory failure requiring intubation.  She presents with the following impairments/functional limitations: weakness, decreased coordination, decreased upper extremity function, decreased lower extremity function, impaired functional mobility, gait instability, impaired balance, impaired self care skills, impaired cardiopulmonary response to activity.    Rehab Prognosis: Good; patient would benefit from acute skilled PT services to address these deficits and reach maximum level of function.    Recent Surgery: * No surgery found *      Plan:     During this hospitalization, patient to be seen 5 x/week to address the identified rehab impairments via gait training, therapeutic activities, therapeutic exercises, neuromuscular re-education and progress toward the following goals:    Plan of Care Expires:  02/24/23    Subjective     Chief Complaint: Pt with no complaints or concerns at this time.   Patient/Family Comments/goals: Pt agreeable to PT treatment.   Pain/Comfort:  Pain Rating 1: 0/10      Objective:     Patient found HOB elevated with oxygen, peripheral IV, PureWick upon PT entry to room.     General Precautions: Standard, fall  Orthopedic Precautions: N/A  Braces: N/A       Functional Mobility:  Bed Mobility:     Supine to Sit: contact guard assistance  Transfers:     Sit to Stand:  contact guard assistance with rolling walker  Toilet Transfer: contact guard assistance with  rolling walker  using  Step Transfer requiring verbal cueing on proper step sequencing to perform transfer safely.   Balance: Pt with good sitting and fair  standing balance.       AM-PAC 6 CLICK MOBILITY  Turning over in bed (including adjusting bedclothes, sheets and blankets)?: 3  Sitting down on and standing up from a chair with arms (e.g., wheelchair, bedside commode, etc.): 3  Moving from lying on back to sitting on the side of the bed?: 3  Moving to and from a bed to a chair (including a wheelchair)?: 3  Need to walk in hospital room?: 3  Climbing 3-5 steps with a railing?: 1  Basic Mobility Total Score: 16       Treatment & Education:      Patient left  seated on bedside commode  with all lines intact, call button in reach, and nursing notified..    GOALS:   Multidisciplinary Problems       Physical Therapy Goals          Problem: Physical Therapy    Goal Priority Disciplines Outcome Goal Variances Interventions   Physical Therapy Goal     PT, PT/OT Ongoing, Progressing     Description: Goals to be met by: 2023     Patient will increase functional independence with mobility by performin. Supine to sit with Modified [place]  2. Sit to supine with Modified [place]  3. Rolling to Left and Right with Modified [place].  4. Sit to stand transfer with Stand-by Assistance  5. Bed to chair transfer with Stand-by Assistance using Rolling Walker  6. Gait  x 100 feet with Contact Guard Assistance using Rolling Walker.   7. Ascend/descend 4 stair with bilateral Handrails Minimal Assistance.                         Time Tracking:     PT Received On: 23  PT Start Time: 952     PT Stop Time:   PT Total Time (min): 25 min     Billable Minutes: Therapeutic Activity 25    Treatment Type: Treatment  PT/PTA: PTA     PTA Visit Number: 0     2023

## 2023-01-28 NOTE — PLAN OF CARE
Problem: Adult Inpatient Plan of Care  Goal: Plan of Care Review  Outcome: Ongoing, Progressing  Care plan explained & understood by pt. AAOx4. On O2 at 2.5L NC, Sats 93%. Lorazepam given for anxiety. Encouraged deep breathing/relaxation. NSR on Telemetry, no alarm noted. Safety maintained at all times. Call bell within reach. Bed on low position. Bed alarm on. Will continue to monitor.

## 2023-01-28 NOTE — PLAN OF CARE
Pt transferred to 479 per bed, on monitor and 2lnc, chart, belongings, meds sent with her, has her phone, and , no reading glasses found, now has a tablet and  in her room, left by daughter

## 2023-01-28 NOTE — PLAN OF CARE
Problem: Adult Inpatient Plan of Care  Goal: Plan of Care Review  Chart reviewed and care plan updated

## 2023-01-29 LAB
ALBUMIN SERPL BCP-MCNC: 2.9 G/DL (ref 3.5–5.2)
ALP SERPL-CCNC: 52 U/L (ref 55–135)
ALT SERPL W/O P-5'-P-CCNC: 32 U/L (ref 10–44)
ANION GAP SERPL CALC-SCNC: 10 MMOL/L (ref 8–16)
AST SERPL-CCNC: 19 U/L (ref 10–40)
BASOPHILS # BLD AUTO: 0.02 K/UL (ref 0–0.2)
BASOPHILS NFR BLD: 0.2 % (ref 0–1.9)
BILIRUB SERPL-MCNC: 0.6 MG/DL (ref 0.1–1)
BUN SERPL-MCNC: 19 MG/DL (ref 8–23)
CALCIUM SERPL-MCNC: 8.5 MG/DL (ref 8.7–10.5)
CHLORIDE SERPL-SCNC: 105 MMOL/L (ref 95–110)
CO2 SERPL-SCNC: 28 MMOL/L (ref 23–29)
CREAT SERPL-MCNC: 0.7 MG/DL (ref 0.5–1.4)
DIFFERENTIAL METHOD: ABNORMAL
EOSINOPHIL # BLD AUTO: 0.4 K/UL (ref 0–0.5)
EOSINOPHIL NFR BLD: 3.7 % (ref 0–8)
ERYTHROCYTE [DISTWIDTH] IN BLOOD BY AUTOMATED COUNT: 15.1 % (ref 11.5–14.5)
EST. GFR  (NO RACE VARIABLE): >60 ML/MIN/1.73 M^2
GLUCOSE SERPL-MCNC: 76 MG/DL (ref 70–110)
HCT VFR BLD AUTO: 36.7 % (ref 37–48.5)
HGB BLD-MCNC: 11.5 G/DL (ref 12–16)
IMM GRANULOCYTES # BLD AUTO: 0.06 K/UL (ref 0–0.04)
IMM GRANULOCYTES NFR BLD AUTO: 0.5 % (ref 0–0.5)
LYMPHOCYTES # BLD AUTO: 2.8 K/UL (ref 1–4.8)
LYMPHOCYTES NFR BLD: 24.6 % (ref 18–48)
MCH RBC QN AUTO: 29.5 PG (ref 27–31)
MCHC RBC AUTO-ENTMCNC: 31.3 G/DL (ref 32–36)
MCV RBC AUTO: 94 FL (ref 82–98)
MONOCYTES # BLD AUTO: 0.9 K/UL (ref 0.3–1)
MONOCYTES NFR BLD: 7.7 % (ref 4–15)
NEUTROPHILS # BLD AUTO: 7.3 K/UL (ref 1.8–7.7)
NEUTROPHILS NFR BLD: 63.3 % (ref 38–73)
NRBC BLD-RTO: 0 /100 WBC
PLATELET # BLD AUTO: 321 K/UL (ref 150–450)
PMV BLD AUTO: 10.3 FL (ref 9.2–12.9)
POTASSIUM SERPL-SCNC: 4.1 MMOL/L (ref 3.5–5.1)
PROT SERPL-MCNC: 5.7 G/DL (ref 6–8.4)
RBC # BLD AUTO: 3.9 M/UL (ref 4–5.4)
SODIUM SERPL-SCNC: 143 MMOL/L (ref 136–145)
TB INDURATION 48 - 72 HR READ: 0 MM
WBC # BLD AUTO: 11.51 K/UL (ref 3.9–12.7)

## 2023-01-29 PROCEDURE — 94640 AIRWAY INHALATION TREATMENT: CPT

## 2023-01-29 PROCEDURE — 85025 COMPLETE CBC W/AUTO DIFF WBC: CPT | Performed by: STUDENT IN AN ORGANIZED HEALTH CARE EDUCATION/TRAINING PROGRAM

## 2023-01-29 PROCEDURE — 25000242 PHARM REV CODE 250 ALT 637 W/ HCPCS: Performed by: STUDENT IN AN ORGANIZED HEALTH CARE EDUCATION/TRAINING PROGRAM

## 2023-01-29 PROCEDURE — 25000003 PHARM REV CODE 250

## 2023-01-29 PROCEDURE — 25000003 PHARM REV CODE 250: Performed by: INTERNAL MEDICINE

## 2023-01-29 PROCEDURE — 11000001 HC ACUTE MED/SURG PRIVATE ROOM

## 2023-01-29 PROCEDURE — 25000003 PHARM REV CODE 250: Performed by: STUDENT IN AN ORGANIZED HEALTH CARE EDUCATION/TRAINING PROGRAM

## 2023-01-29 PROCEDURE — 63600175 PHARM REV CODE 636 W HCPCS: Performed by: STUDENT IN AN ORGANIZED HEALTH CARE EDUCATION/TRAINING PROGRAM

## 2023-01-29 PROCEDURE — 99900035 HC TECH TIME PER 15 MIN (STAT)

## 2023-01-29 PROCEDURE — 94761 N-INVAS EAR/PLS OXIMETRY MLT: CPT

## 2023-01-29 PROCEDURE — 27000221 HC OXYGEN, UP TO 24 HOURS

## 2023-01-29 PROCEDURE — 80053 COMPREHEN METABOLIC PANEL: CPT | Performed by: STUDENT IN AN ORGANIZED HEALTH CARE EDUCATION/TRAINING PROGRAM

## 2023-01-29 PROCEDURE — 36415 COLL VENOUS BLD VENIPUNCTURE: CPT | Performed by: STUDENT IN AN ORGANIZED HEALTH CARE EDUCATION/TRAINING PROGRAM

## 2023-01-29 RX ORDER — METOPROLOL TARTRATE 50 MG/1
50 TABLET ORAL 2 TIMES DAILY
Status: DISCONTINUED | OUTPATIENT
Start: 2023-01-29 | End: 2023-01-29

## 2023-01-29 RX ORDER — LORAZEPAM 0.5 MG/1
0.5 TABLET ORAL ONCE
Status: COMPLETED | OUTPATIENT
Start: 2023-01-29 | End: 2023-01-29

## 2023-01-29 RX ORDER — METOPROLOL TARTRATE 25 MG/1
25 TABLET, FILM COATED ORAL ONCE
Status: DISCONTINUED | OUTPATIENT
Start: 2023-01-29 | End: 2023-01-29

## 2023-01-29 RX ORDER — GUAIFENESIN 600 MG/1
600 TABLET, EXTENDED RELEASE ORAL 2 TIMES DAILY
Status: DISCONTINUED | OUTPATIENT
Start: 2023-01-29 | End: 2023-01-31 | Stop reason: HOSPADM

## 2023-01-29 RX ORDER — METOPROLOL SUCCINATE 50 MG/1
100 TABLET, EXTENDED RELEASE ORAL DAILY
Status: DISCONTINUED | OUTPATIENT
Start: 2023-01-29 | End: 2023-01-31 | Stop reason: HOSPADM

## 2023-01-29 RX ORDER — LANOLIN ALCOHOL/MO/W.PET/CERES
1000 CREAM (GRAM) TOPICAL DAILY
Status: DISCONTINUED | OUTPATIENT
Start: 2023-01-29 | End: 2023-01-31 | Stop reason: HOSPADM

## 2023-01-29 RX ADMIN — IPRATROPIUM BROMIDE AND ALBUTEROL SULFATE 3 ML: 2.5; .5 SOLUTION RESPIRATORY (INHALATION) at 09:01

## 2023-01-29 RX ADMIN — HYDROXYZINE HYDROCHLORIDE 25 MG: 25 TABLET ORAL at 06:01

## 2023-01-29 RX ADMIN — LORAZEPAM 0.5 MG: 0.5 TABLET ORAL at 09:01

## 2023-01-29 RX ADMIN — ALBUTEROL SULFATE 2 PUFF: 90 AEROSOL, METERED RESPIRATORY (INHALATION) at 08:01

## 2023-01-29 RX ADMIN — METOPROLOL TARTRATE 25 MG: 25 TABLET, FILM COATED ORAL at 09:01

## 2023-01-29 RX ADMIN — GUAIFENESIN 600 MG: 600 TABLET, EXTENDED RELEASE ORAL at 08:01

## 2023-01-29 RX ADMIN — CYANOCOBALAMIN TAB 1000 MCG 1000 MCG: 1000 TAB at 11:01

## 2023-01-29 RX ADMIN — IPRATROPIUM BROMIDE AND ALBUTEROL SULFATE 3 ML: 2.5; .5 SOLUTION RESPIRATORY (INHALATION) at 04:01

## 2023-01-29 RX ADMIN — METOPROLOL SUCCINATE 100 MG: 50 TABLET, EXTENDED RELEASE ORAL at 10:01

## 2023-01-29 RX ADMIN — GUAIFENESIN 600 MG: 600 TABLET, EXTENDED RELEASE ORAL at 11:01

## 2023-01-29 RX ADMIN — LACTOBACILLUS ACIDOPHILUS / LACTOBACILLUS BULGARICUS 1 EACH: 100 MILLION CFU STRENGTH GRANULES at 08:01

## 2023-01-29 RX ADMIN — ENOXAPARIN SODIUM 40 MG: 40 INJECTION SUBCUTANEOUS at 04:01

## 2023-01-29 NOTE — PROGRESS NOTES
[facility] IM Progress Note    Subjective:      No acute events overnight, on 2.5 LNC. Patient noticably anxious during interview Patient states she didn't sleep well 2/2 anxiety and vistaril not helping. Patient also endorsing shortness of breath and coughing up blood - during interview patient coughed up dark blood tinged sputum. Patient denies chest pain, fever, or chills.     Objective:   Last 24 Hour Vital Signs:  BP  Min: 101/58  Max: 182/82  Temp  Av.6 °F (37 °C)  Min: 97 °F (36.1 °C)  Max: 99.3 °F (37.4 °C)  Pulse  Av.1  Min: 75  Max: 118  Resp  Av.3  Min: 16  Max: 20  SpO2  Av.3 %  Min: 91 %  Max: 97 %  I/O last 3 completed shifts:  In: 125 [P.O.:125]  Out: 350 [Urine:350]    Physical Examination:  GEN: alert, NAD, but generalized weakness  HEENT: NCAT, PERRL, EOMI, neck supple, no thyromegaly  CARD: RRR, no m/r/g, JVP wnl, 2+ peripheral pulses  PULM: CTAB, no wheezes/crackles/rhonchi, on 2.5L via NC  GI: Abdomen soft, NT/ND, normoactive BS, no rebound/guarding  MSKL: Normal ROM, no tenderness 5/5 strength in all extremities  SKIN: Warm, dry, no rashes  NEURO: AAOx3, CN grossly intact, SILT all extremities, DTRs deferred  PSYCH: Normal mood and affect.    Laboratory:  Laboratory Data Reviewed: yes  Pertinent Findings:    Microbiology Data Reviewed: yes  Pertinent Findings:  Blood cx : NGTD  Resp Cx NG  Flu/COVID negative    Other Results:  EKG (my interpretation):   Admission EKG: sinus tach 120, TWIs V1/V2  Subsequent EKG : NSR, TWI in V1, V2 resolved    Radiology Data Reviewed: yes  Pertinent Findings:  Chest xray : R sided pneumothorax  Chest xray : improvement in R sided pneumothorax, s/p pleuravent placement    Current Medications:     Infusions:         Scheduled:   albuterol-ipratropium  3 mL Nebulization Q8H WA    enoxaparin  40 mg Subcutaneous Daily    lactobacillus acidophilus & bulgar  1 packet Oral BID    metoprolol tartrate  25 mg Oral BID    polyethylene glycol  17  g Oral Daily        PRN:  albuterol, dextrose 10%, dextrose 10%, hydrOXYzine HCL, sodium chloride 0.9%    Antibiotics and Day Number of Therapy:  Cefepime 1/20-1/25  Doxycycline 1/20-1/24    Lines and Day Number of Therapy:  PIV, ETT, NG tube    Assessment:   Ana Aguila is a 67 year old female with PMHx of COPD on 2.5L home O2, Hypertension, Hyperlipidemia, GERD, MI, and hx of Takasubo cardiomyopathy who presented on 1/20 for respiratory distress 2/2 COPD exacerbation, required emergent intubation in the ED. Pt was found to have a large R sided pneumothorax s/p pleuravent --> chest tube placement for re-expansion of apical pneumo. Pt was extubated on 1/20 but then re-intubated 1/21 for respiratory distress, pleuravent tube found to be out of place on repeat CXR, new apical PTX, 8F amraan catheter was placed was resolution of PTX. Failed SBT on 1/23, but successfully extubated on 1/24. Chest tube removed 1/25 and stepped down. Continuing to work w/ PT/OT. Pending SNF placement at this time.    Plan:   Acute on Chronic Hypoxic Hypercapneic Respiratory Failure 2/2 COPD Exacerbation  - similar presentation 7/2022, on home 2.5L home O2  - PFTs show obstruction with severe ventilatory impairment, FEV1 0.43L, FEV1/FVC 40%  - EMS administered dexamethasone 16mg, duo-neb tx, albuterol x2, and 0.5 epi  - ED: BiPAP --> intubation for respiratory distress and accessory muscle usage  - ABG significant for pH 7.14, pCO2 98.7, pO2 169, HCO3 33.4 on ventilator with Vt 350, PEEP 5, FiO2 100% RR22  - repeat ABG shows improvement in acidosis, hypercarbia  - extubated to BIPAP 1/20, re-intubated 1/21 after pleuravent dislodged, reintubated and replaced 8F chest tube, successfully extubated on 1/24, on NC with nightly BiPAP  - completed doxy/cefepime course for CAP  - s/p 7d steroid course  - currently on 2L via NC w/ O2 sat of 90%  - continue PRN duonebs  - will need BiPAP qHS outpt  - Brebryce Sharif on discharge, discontinue  home fluticasone/salmeterol diskus  - Repeat CXR 01/29 stable     Recurrent pneumothorax, resolved  - etiology unclear, possibly present initially and worsened by NIVP  - pleurvent placed in ED, chest x-ray showed expansion of apical pneumo 1/21 --> chest tube placed, pleuravax pulled  - chest tube successful removed 1/25     Anxiety  - hydroxyzine 25mg TID PRN (discontinued 01/29 - not relieving)   - Ativan PO 0.5 given as needed for breakthrough anxiety     Prolonged Qtc  - EKG w/ Qtc 540, repeat EKG Qtc 494    Shock, resolved  Hx Hypertension  - Per chart review, home meds losartan 25mg, amlodipine 2.5mg, continue hold  - required minimal norepi, weaned off on 1/22-1/23    CAD  Chronic Heart failure with diastolic dysfunction  NSTEMI type 2  - Cardiology consulted: notes initial EKG with minor ST segment elevation/minor MS segment depression. Overall pattern not suggestive of ACS. Likely 2/2 demand in setting of ARF  - TTE 08/4: EF 65%, intermediate LV diastolic dysfunction  - BNP non-elevated, troponin trend 0.147 -->0.488-->0.642--> peaked 1/21  - continue lopressor 25 bid     Hyperlipidemia  - Per chart review on rosuvastatin 10mg, continue hold     GERD  R Hemicolectomy 2/2 GIB  - Continue Famotidine IV for peptic ulcer ppx  - Monitor for bleeding, daily CB    Diet: regular  Ppx: lovenox  Code: Full    Dispo: Pending SNF placement, continuing to monitor O2 status. Repeat CXR pending     Daniel Marie DO  [facility] Internal Medicine, PGY-1  [facility] IM Service Team A    [facility] Medicine Hospitalist Pager numbers:   [facility] Hospitalist Medicine Team A (Susanna/Keren): [phone]  [facility] Hospitalist Medicine Team B (Camilla/Lavelle):  [phone]

## 2023-01-29 NOTE — PLAN OF CARE
Problem: Adult Inpatient Plan of Care  Goal: Plan of Care Review  Outcome: Ongoing, Progressing  On O2 at 2L NC, Sats 92-96%. NSR on telemetry, no alarm noted. Safety maintained at all times. Call bell within reach. Bed on low position. Bed alarm on. Will continue to monitor.

## 2023-01-30 ENCOUNTER — PATIENT OUTREACH (OUTPATIENT)
Dept: ADMINISTRATIVE | Facility: OTHER | Age: 68
End: 2023-01-30
Payer: MEDICARE

## 2023-01-30 LAB
ALBUMIN SERPL BCP-MCNC: 2.8 G/DL (ref 3.5–5.2)
ALP SERPL-CCNC: 49 U/L (ref 55–135)
ALT SERPL W/O P-5'-P-CCNC: 27 U/L (ref 10–44)
ANION GAP SERPL CALC-SCNC: 9 MMOL/L (ref 8–16)
AST SERPL-CCNC: 22 U/L (ref 10–40)
BASOPHILS # BLD AUTO: 0.01 K/UL (ref 0–0.2)
BASOPHILS NFR BLD: 0.1 % (ref 0–1.9)
BILIRUB SERPL-MCNC: 0.6 MG/DL (ref 0.1–1)
BUN SERPL-MCNC: 14 MG/DL (ref 8–23)
CALCIUM SERPL-MCNC: 8.6 MG/DL (ref 8.7–10.5)
CHLORIDE SERPL-SCNC: 106 MMOL/L (ref 95–110)
CO2 SERPL-SCNC: 25 MMOL/L (ref 23–29)
CREAT SERPL-MCNC: 0.8 MG/DL (ref 0.5–1.4)
DIFFERENTIAL METHOD: ABNORMAL
EOSINOPHIL # BLD AUTO: 0.4 K/UL (ref 0–0.5)
EOSINOPHIL NFR BLD: 3.3 % (ref 0–8)
ERYTHROCYTE [DISTWIDTH] IN BLOOD BY AUTOMATED COUNT: 14.8 % (ref 11.5–14.5)
EST. GFR  (NO RACE VARIABLE): >60 ML/MIN/1.73 M^2
GLUCOSE SERPL-MCNC: 80 MG/DL (ref 70–110)
HCT VFR BLD AUTO: 34.3 % (ref 37–48.5)
HGB BLD-MCNC: 10.5 G/DL (ref 12–16)
IMM GRANULOCYTES # BLD AUTO: 0.06 K/UL (ref 0–0.04)
IMM GRANULOCYTES NFR BLD AUTO: 0.6 % (ref 0–0.5)
LYMPHOCYTES # BLD AUTO: 3 K/UL (ref 1–4.8)
LYMPHOCYTES NFR BLD: 28.5 % (ref 18–48)
MCH RBC QN AUTO: 29.6 PG (ref 27–31)
MCHC RBC AUTO-ENTMCNC: 30.6 G/DL (ref 32–36)
MCV RBC AUTO: 97 FL (ref 82–98)
MONOCYTES # BLD AUTO: 0.8 K/UL (ref 0.3–1)
MONOCYTES NFR BLD: 7.5 % (ref 4–15)
NEUTROPHILS # BLD AUTO: 6.3 K/UL (ref 1.8–7.7)
NEUTROPHILS NFR BLD: 60 % (ref 38–73)
NRBC BLD-RTO: 0 /100 WBC
PLATELET # BLD AUTO: 275 K/UL (ref 150–450)
PMV BLD AUTO: 9.9 FL (ref 9.2–12.9)
POTASSIUM SERPL-SCNC: 4.1 MMOL/L (ref 3.5–5.1)
PROT SERPL-MCNC: 5.5 G/DL (ref 6–8.4)
RBC # BLD AUTO: 3.55 M/UL (ref 4–5.4)
SARS-COV-2 RDRP RESP QL NAA+PROBE: NEGATIVE
SODIUM SERPL-SCNC: 140 MMOL/L (ref 136–145)
WBC # BLD AUTO: 10.45 K/UL (ref 3.9–12.7)

## 2023-01-30 PROCEDURE — 63600175 PHARM REV CODE 636 W HCPCS: Performed by: STUDENT IN AN ORGANIZED HEALTH CARE EDUCATION/TRAINING PROGRAM

## 2023-01-30 PROCEDURE — 80053 COMPREHEN METABOLIC PANEL: CPT | Performed by: STUDENT IN AN ORGANIZED HEALTH CARE EDUCATION/TRAINING PROGRAM

## 2023-01-30 PROCEDURE — 97530 THERAPEUTIC ACTIVITIES: CPT | Mod: CO

## 2023-01-30 PROCEDURE — 97110 THERAPEUTIC EXERCISES: CPT | Mod: CQ

## 2023-01-30 PROCEDURE — 11000001 HC ACUTE MED/SURG PRIVATE ROOM

## 2023-01-30 PROCEDURE — 97530 THERAPEUTIC ACTIVITIES: CPT | Mod: CQ

## 2023-01-30 PROCEDURE — 25000003 PHARM REV CODE 250

## 2023-01-30 PROCEDURE — 25000003 PHARM REV CODE 250: Performed by: INTERNAL MEDICINE

## 2023-01-30 PROCEDURE — 94640 AIRWAY INHALATION TREATMENT: CPT

## 2023-01-30 PROCEDURE — 25000003 PHARM REV CODE 250: Performed by: STUDENT IN AN ORGANIZED HEALTH CARE EDUCATION/TRAINING PROGRAM

## 2023-01-30 PROCEDURE — 25000242 PHARM REV CODE 250 ALT 637 W/ HCPCS: Performed by: INTERNAL MEDICINE

## 2023-01-30 PROCEDURE — 99900035 HC TECH TIME PER 15 MIN (STAT)

## 2023-01-30 PROCEDURE — 27000221 HC OXYGEN, UP TO 24 HOURS

## 2023-01-30 PROCEDURE — 94761 N-INVAS EAR/PLS OXIMETRY MLT: CPT

## 2023-01-30 PROCEDURE — U0002 COVID-19 LAB TEST NON-CDC: HCPCS | Performed by: INTERNAL MEDICINE

## 2023-01-30 PROCEDURE — 97116 GAIT TRAINING THERAPY: CPT | Mod: CQ

## 2023-01-30 PROCEDURE — 36415 COLL VENOUS BLD VENIPUNCTURE: CPT | Performed by: STUDENT IN AN ORGANIZED HEALTH CARE EDUCATION/TRAINING PROGRAM

## 2023-01-30 PROCEDURE — 97110 THERAPEUTIC EXERCISES: CPT | Mod: CO

## 2023-01-30 PROCEDURE — 25000242 PHARM REV CODE 250 ALT 637 W/ HCPCS: Performed by: STUDENT IN AN ORGANIZED HEALTH CARE EDUCATION/TRAINING PROGRAM

## 2023-01-30 PROCEDURE — 85025 COMPLETE CBC W/AUTO DIFF WBC: CPT | Performed by: STUDENT IN AN ORGANIZED HEALTH CARE EDUCATION/TRAINING PROGRAM

## 2023-01-30 RX ORDER — IPRATROPIUM BROMIDE AND ALBUTEROL SULFATE 2.5; .5 MG/3ML; MG/3ML
3 SOLUTION RESPIRATORY (INHALATION) EVERY 6 HOURS PRN
Status: DISCONTINUED | OUTPATIENT
Start: 2023-01-30 | End: 2023-01-31 | Stop reason: HOSPADM

## 2023-01-30 RX ORDER — LORAZEPAM 0.5 MG/1
0.5 TABLET ORAL ONCE
Status: COMPLETED | OUTPATIENT
Start: 2023-01-30 | End: 2023-01-30

## 2023-01-30 RX ORDER — IPRATROPIUM BROMIDE 0.5 MG/2.5ML
500 SOLUTION RESPIRATORY (INHALATION) 4 TIMES DAILY
Qty: 75 ML | Refills: 0
Start: 2023-01-30 | End: 2024-01-30

## 2023-01-30 RX ORDER — IPRATROPIUM BROMIDE AND ALBUTEROL SULFATE 2.5; .5 MG/3ML; MG/3ML
3 SOLUTION RESPIRATORY (INHALATION) EVERY 8 HOURS
Status: DISCONTINUED | OUTPATIENT
Start: 2023-01-30 | End: 2023-01-31 | Stop reason: HOSPADM

## 2023-01-30 RX ORDER — BUDESONIDE, GLYCOPYRROLATE, AND FORMOTEROL FUMARATE 160; 9; 4.8 UG/1; UG/1; UG/1
2 AEROSOL, METERED RESPIRATORY (INHALATION) 2 TIMES DAILY
Qty: 10.7 G | Refills: 3 | Status: SHIPPED | OUTPATIENT
Start: 2023-01-30 | End: 2023-10-18

## 2023-01-30 RX ORDER — FLUTICASONE PROPIONATE AND SALMETEROL 250; 50 UG/1; UG/1
1 POWDER RESPIRATORY (INHALATION) 2 TIMES DAILY
Qty: 14 EACH | Refills: 0
Start: 2023-01-30 | End: 2023-10-18

## 2023-01-30 RX ADMIN — IPRATROPIUM BROMIDE AND ALBUTEROL SULFATE 3 ML: 2.5; .5 SOLUTION RESPIRATORY (INHALATION) at 03:01

## 2023-01-30 RX ADMIN — METOPROLOL SUCCINATE 100 MG: 50 TABLET, EXTENDED RELEASE ORAL at 08:01

## 2023-01-30 RX ADMIN — IPRATROPIUM BROMIDE AND ALBUTEROL SULFATE 3 ML: 2.5; .5 SOLUTION RESPIRATORY (INHALATION) at 07:01

## 2023-01-30 RX ADMIN — ENOXAPARIN SODIUM 40 MG: 40 INJECTION SUBCUTANEOUS at 05:01

## 2023-01-30 RX ADMIN — GUAIFENESIN 600 MG: 600 TABLET, EXTENDED RELEASE ORAL at 08:01

## 2023-01-30 RX ADMIN — CYANOCOBALAMIN TAB 1000 MCG 1000 MCG: 1000 TAB at 08:01

## 2023-01-30 RX ADMIN — LACTOBACILLUS ACIDOPHILUS / LACTOBACILLUS BULGARICUS 1 EACH: 100 MILLION CFU STRENGTH GRANULES at 09:01

## 2023-01-30 RX ADMIN — LORAZEPAM 0.5 MG: 0.5 TABLET ORAL at 09:01

## 2023-01-30 RX ADMIN — GUAIFENESIN 600 MG: 600 TABLET, EXTENDED RELEASE ORAL at 09:01

## 2023-01-30 NOTE — PROGRESS NOTES
"2:32pm -- CM spoke with Gricelda in admissions at [facility]  [phone]   She was advised that PHN has sent auth   Gricelda is awaiting the return of paperwork per email from pt's daughter    She will call CM with "ok" for discharge.    "

## 2023-01-30 NOTE — PROGRESS NOTES
"[facility] - Telemetry  Adult Nutrition  Progress Note    SUMMARY       Recommendations    Recommendation:   1. Encourage intake of meals as tolerated.   2. Monitor wt and labs.   3. Rd to follow up on PO intake.    Goals:   1. Pt will consume 25-50% of meals by RD follow up.    Nutrition Goal Status: new  Communication of RD Recs: other (comment) (POC)    Assessment and Plan  Nutrition Problem  Inadequate energy intake    Related to (etiology):   diagnosis    Signs and Symptoms (as evidenced by):   25-50% intake of meals     Interventions (treatment strategy):  Collaboration with other providers    Nutrition Diagnosis Status:   New       Malnutrition Assessment  Unable to assess NFPE at visit.    Reason for Assessment  Reason For Assessment: length of stay  Diagnosis: other (see comments) (respiratory failure requiring intubation)  Relevant Medical History: Hypertension, hyperlipidemia, COPD, MI, chronic respiratory failure  General Information Comments: Pt currently on cardiac diet, noted 25-50% intake at meals. Pt wt is stable since 22. Roger-20 skin intact. Unable to asses NFPE at visit 2/2 pt with therapy.  Nutrition Discharge Planning: Pt to D/C on cardiac diet    Nutrition Risk Screen  Nutrition Risk Screen: no indicators present    Nutrition/Diet History  Patient Reported Diet/Restrictions/Preferences: other (see comments) (cardiac)  Food Preferences: no Mormon or cultural food pref  Spiritual, Cultural Beliefs, Buddhist Practices, Values that Affect Care: no  Factors Affecting Nutritional Intake: decreased appetite    Anthropometrics  Temp: 98.9 °F (37.2 °C)  Height Method: Stated  Height: 5' 4.96" (165 cm)  Height (inches): 64.96 in  Weight Method: Bed Scale  Weight: 78.9 kg (174 lb)  Weight (lb): 174 lb  Ideal Body Weight (IBW), Female: 124.8 lb  % Ideal Body Weight, Female (lb): 139.42 %  BMI (Calculated): 29  BMI Grade: 25 - 29.9 - overweight  Usual Body Weight (UBW), k kg (22)  % Usual " Body Weight: 102.72  % Weight Change From Usual Weight: 2.5 %       Lab/Procedures/Meds  Pertinent Labs Reviewed: reviewed  Pertinent Labs Comments: Cal8.6L,  Alk49L,  Alb2.8L  Pertinent Medications Reviewed: reviewed  Pertinent Medications Comments: Cyanocabalamin,  enoxaparin,  guaitenesin,  lactobacillus,  metroprolol succinate,  polyethylene    Estimated/Assessed Needs  Weight Used For Calorie Calculations: 79 kg (174 lb 2.6 oz) (Current BW 1/30)  Energy Calorie Requirements (kcal): 1975 (25 kcal)  Energy Need Method: Kcal/kg  Protein Requirements: 79 (1.0 g)  Weight Used For Protein Calculations: 79 kg (174 lb 2.6 oz)     Estimated Fluid Requirement Method: RDA Method  RDA Method (mL): 1975       Nutrition Prescription Ordered  Current Diet Order: Cardiac    Evaluation of Received Nutrient/Fluid Intake  I/O: 490/400  Energy Calories Required: not meeting needs  Protein Required: not meeting needs  Fluid Required: not meeting needs  Comments: LBM: 1/29/23  % Intake of Estimated Energy Needs: 25 - 50 %  % Meal Intake: 25 - 50 %    Nutrition Risk  Level of Risk/Frequency of Follow-up:  (1x per wk)     Monitor and Evaluation  Food and Nutrient Intake: food and beverage intake  Food and Nutrient Adminstration: diet order  Physical Activity and Function: nutrition-related ADLs and IADLs  Nutrition-Focused Physical Findings: overall appearance     Nutrition Follow-Up  RD Follow-up?: Yes    Bhavna Quiroz Dietetic Intern    I certify that I, Tatianna Altamirano RD, directed the dietetic intern in service delivery and guided them using my skilled judgment. As the cosigning dietitian, I have reviewed the dietetic interns documentation and am responsible for the treatment, assessment, and plan.

## 2023-01-30 NOTE — PT/OT/SLP PROGRESS
"Physical Therapy Treatment    Patient Name:  Ana Aguila   MRN:  [id number]    Recommendations:     Discharge Recommendations: nursing facility, skilled  Discharge Equipment Recommendations:  (TBD at next level of care)  Barriers to discharge: Decreased caregiver support    Assessment:     Ana Aguila is a 67 y.o. female admitted with a medical diagnosis of Respiratory failure requiring intubation.  She presents with the following impairments/functional limitations: weakness, impaired endurance, impaired self care skills, impaired functional mobility, gait instability, impaired balance, decreased coordination, decreased upper extremity function, decreased lower extremity function, decreased safety awareness, impaired cardiopulmonary response to activity, edema ;pt with good mobility today, amb'd short distance in room w/ O2@3L:90% following, CGA for safety. Mild SOB noted, pt somewhat anxious.    Rehab Prognosis: Good; patient would benefit from acute skilled PT services to address these deficits and reach maximum level of function.    Recent Surgery: * No surgery found *      Plan:     During this hospitalization, patient to be seen 5 x/week to address the identified rehab impairments via gait training, therapeutic activities, therapeutic exercises, neuromuscular re-education and progress toward the following goals:    Plan of Care Expires:  02/24/23    Subjective     Chief Complaint: pt had no c/o's pain, though a little anxious.   Patient/Family Comments/goals: pt agreeable to session, "I think I'm going to the other place today" (SNF)  Pain/Comfort:  Pain Rating 1: 0/10  Pain Rating Post-Intervention 1: 0/10      Objective:     Communicated with nurse prior to session.  Patient found up in chair with oxygen, peripheral IV, PureWick, telemetry upon PT entry to room.     General Precautions: Standard, fall  Orthopedic Precautions: N/A  Braces: N/A  Respiratory Status: Nasal cannula, flow 2.5 L/min   "   Functional Mobility:  Transfers:     Sit to Stand:  contact guard assistance with rolling walker  Gait: amb'd 25' x 2 w/ RW and CGA. O2@3L:90%.       AM-PAC 6 CLICK MOBILITY  Turning over in bed (including adjusting bedclothes, sheets and blankets)?: 3  Sitting down on and standing up from a chair with arms (e.g., wheelchair, bedside commode, etc.): 3  Moving from lying on back to sitting on the side of the bed?: 3  Moving to and from a bed to a chair (including a wheelchair)?: 3  Need to walk in hospital room?: 3  Climbing 3-5 steps with a railing?: 1  Basic Mobility Total Score: 16       Treatment & Education:  Pt perf'd commode t/f's in bathroom w/ CGA and use of counter top for support. Pt able to perform hygiene following urination w/ SBA.   Seated LE ex's of hip flex,.LAQ's x 10 ea.   Pt c/o some SOB during ex's.    Patient left up in chair with all lines intact, call button in reach, nurse notified, and purewick reapplied per PCT request, despite education on decreasing use of purewicks during the day .    GOALS:   Multidisciplinary Problems       Physical Therapy Goals          Problem: Physical Therapy    Goal Priority Disciplines Outcome Goal Variances Interventions   Physical Therapy Goal     PT, PT/OT Ongoing, Progressing     Description: Goals to be met by: 2023     Patient will increase functional independence with mobility by performin. Supine to sit with Modified [place]  2. Sit to supine with Modified [place]  3. Rolling to Left and Right with Modified [place].  4. Sit to stand transfer with Stand-by Assistance  5. Bed to chair transfer with Stand-by Assistance using Rolling Walker  6. Gait  x 100 feet with Contact Guard Assistance using Rolling Walker.   7. Ascend/descend 4 stair with bilateral Handrails Minimal Assistance.                         Time Tracking:     PT Received On: 23  PT Start Time: 1310     PT Stop Time: 1348  PT Total Time (min): 38 min      Billable Minutes: Gait Training 20, Therapeutic Activity 8, and Therapeutic Exercise 10    Treatment Type: Treatment  PT/PTA: PTA     PTA Visit Number: 2     01/30/2023

## 2023-01-30 NOTE — PROGRESS NOTES
[facility] IM Progress Note    Subjective:      Pt was seen at bedside after chart review reclined in bed in NAD. She denies any acute events overnight but states that she is still experiencing anxiety and was given ativan last night. She states that she is no longer able to get her DuoNeb treatments every 6 hours because they were changed to every 8 hours and doesn't understand why. She denies experiencing any chest pain, SOB, wheezing, abdominal pain, nausea, headache, or dizziness at this time.    Objective:   Last 24 Hour Vital Signs:  BP  Min: 104/61  Max: 132/74  Temp  Av.5 °F (36.9 °C)  Min: 96.9 °F (36.1 °C)  Max: 99.4 °F (37.4 °C)  Pulse  Av  Min: 75  Max: 101  Resp  Av.5  Min: 14  Max: 24  SpO2  Av.9 %  Min: 93 %  Max: 100 %  I/O last 3 completed shifts:  In: 615 [P.O.:615]  Out: 750 [Urine:750]    Physical Examination:  GEN: alert, NAD, but generalized weakness  HEENT: NCAT, PERRL, EOMI, neck supple, no thyromegaly  CARD: RRR, no m/r/g, JVP wnl, 2+ peripheral pulses  PULM: CTAB, no wheezes/crackles/rhonchi, on 2.5L via NC  GI: Abdomen soft, NT/ND, normoactive BS, no rebound/guarding  MSKL: Normal ROM, no tenderness 5/5 strength in all extremities  SKIN: Warm, dry, no rashes  NEURO: AAOx3, CN grossly intact, SILT all extremities, DTRs deferred  PSYCH: Normal mood and affect.    Laboratory:  Laboratory Data Reviewed: yes  Pertinent Findings:    Microbiology Data Reviewed: yes  Pertinent Findings:  Blood cx : NGTD  Resp Cx NG  Flu/COVID negative    Other Results:  EKG (my interpretation):   Admission EKG: sinus tach 120, TWIs V1/V2  Subsequent EKG : NSR, TWI in V1, V2 resolved    Radiology Data Reviewed: yes  Pertinent Findings:  Chest xray : R sided pneumothorax  Chest xray : improvement in R sided pneumothorax, s/p pleuravent placement    Current Medications:     Infusions:         Scheduled:   albuterol-ipratropium  3 mL Nebulization Q8H WA    cyanocobalamin  1,000 mcg Oral  Daily    enoxaparin  40 mg Subcutaneous Daily    guaiFENesin  600 mg Oral BID    lactobacillus acidophilus & bulgar  1 packet Oral BID    metoprolol succinate  100 mg Oral Daily    polyethylene glycol  17 g Oral Daily        PRN:  albuterol, dextrose 10%, dextrose 10%, sodium chloride 0.9%    Antibiotics and Day Number of Therapy:  Cefepime 1/20-1/25  Doxycycline 1/20-1/24    Lines and Day Number of Therapy:  PIV, ETT, NG tube    Assessment:     Ana Aguila is a 67 year old female with PMHx of COPD on 2.5L home O2, Hypertension, Hyperlipidemia, GERD, MI, and hx of Takasubo cardiomyopathy who presented on 1/20 for respiratory distress 2/2 COPD exacerbation, required emergent intubation in the ED. Pt was found to have a large R sided pneumothorax s/p pleuravent --> chest tube placement for re-expansion of apical pneumo. Pt was extubated on 1/20 but then re-intubated 1/21 for respiratory distress, pleuravent tube found to be out of place on repeat CXR, new apical PTX, 8F armaan catheter was placed was resolution of PTX. Failed SBT on 1/23, but successfully extubated on 1/24. Chest tube removed 1/25 and stepped down. Continuing to work w/ PT/OT. Pending SNF placement at this time.      Plan:     Acute on Chronic Hypoxic Hypercapneic Respiratory Failure 2/2 COPD Exacerbation  - similar presentation 7/2022, on home 2.5L home O2  - PFTs show obstruction with severe ventilatory impairment, FEV1 0.43L, FEV1/FVC 40%  - EMS administered dexamethasone 16mg, duo-neb tx, albuterol x2, and 0.5 epi  - ED: BiPAP --> intubation for respiratory distress and accessory muscle usage  - ABG significant for pH 7.14, pCO2 98.7, pO2 169, HCO3 33.4 on ventilator with Vt 350, PEEP 5, FiO2 100% RR22  - repeat ABG shows improvement in acidosis, hypercarbia  - extubated to BIPAP 1/20, re-intubated 1/21 after pleuravent dislodged, reintubated and replaced 8F chest tube, successfully extubated on 1/24, on NC with nightly BiPAP  - completed  doxy/cefepime course for CAP  - s/p 7d steroid course  - will need BiPAP qHS outpt  - Repeat CXR 01/29 stable   - Breztri vs. Chante on discharge, discontinue home fluticasone/salmeterol diskus  - currently on 2L via NC w/ O2 sat of 90%  - continue PRN duonebs    Recurrent pneumothorax, Resolved  - etiology unclear, possibly present initially and worsened by NIVP  - pleurvent placed in ED, chest x-ray showed expansion of apical pneumo 1/21 --> chest tube placed, pleuravax pulled  - chest tube successful removed 1/25     Anxiety  - Hydroxyzine 25mg TID PRN (discontinued 01/29 - not relieving)   - Ativan PO 0.5 given as needed for breakthrough anxiety   - Will continue to monitor    Prolonged Qtc  - EKG w/ Qtc 540, repeat EKG Qtc 494    Shock, resolved  Hx Hypertension  - Per chart review, home meds losartan 25mg, amlodipine 2.5mg, continue hold  - required minimal norepi, weaned off on 1/22-1/23    CAD  Chronic Heart failure with diastolic dysfunction  NSTEMI type 2  - Cardiology consulted: notes initial EKG with minor ST segment elevation/minor CT segment depression. Overall pattern not suggestive of ACS. Likely 2/2 demand in setting of ARF  - TTE 08/4: EF 65%, intermediate LV diastolic dysfunction  - BNP non-elevated, troponin trend 0.147 -->0.488-->0.642--> peaked 1/21  - continue lopressor 25 bid     Hyperlipidemia  - Per chart review on rosuvastatin 10mg, continue hold     GERD  R Hemicolectomy 2/2 GIB  - Continue Famotidine IV for peptic ulcer ppx  - Monitor for bleeding, daily CB      Diet: Regular  Ppx: Lovenox  Code: Full    Dispo: Pending SNF placement, continuing to monitor O2 status      Likn Gomez DO  [facility] Medicine, PGY-1  [facility] IM Service Team A    [facility] Hospitalist Pager numbers:   [facility] Hospitalist Medicine Team A (Susanna/Keren): [phone]  [facility] Hospitalist Medicine Team B (Camilla/Lavelle):  [phone]

## 2023-01-30 NOTE — PLAN OF CARE
"RN Case  Order Review      Date:  01/30/2023    Discharging Facility noted:   [facility]     Med List Reconciled?:     Reviewed by writer. NO changes noted at present. Facility to review orders for acceptance.    Lines noted:     Midline Catheter Insertion/Assessment - Single Lumen 01/22/23 1305 Right cephalic vein (lateral side of arm) 18g x 10cm  Placement Date/Time: 01/22/23 1305 Present Prior to Hospital Arrival?: No Hand Hygiene: Performed Barrier Precautions: Performed Skin Antisepsis: ChloraPrep Device: Bard Side: Right Location: cephalic vein (lateral side of arm) Size: (c) 18g ...  8 days    Female External Urinary Catheter 01/25/23 1424  Placement Date/Time: 01/25/23 1424   4 days    Incision/Site 01/26/23 0740 Right Chest   Date First Assessed/Time First Assessed: 01/26/23 0740 Present Prior to Hospital Arrival?: No Side: Right Location: Chest Incision Type: (c)   4 days    LBM: 01/29/2023  Woundcare/Wound vac:  NA    ABX with end date: NA    Discharging with Line:  NONE. Midline SL'd to be DC'd Prior to DC. Purewick to be removed prior to DC.      Ins auth:   Pending    Intake forms completed:    Pending    No future appointments.    /66 (BP Location: Left arm, Patient Position: Sitting)   Pulse 74   Temp 98.9 °F (37.2 °C) (Oral)   Resp 18   Ht 5' 4.96" (1.65 m)   Wt 78.9 kg (174 lb)   LMP  (LMP Unknown)   SpO2 96%   Breastfeeding No   BMI 28.99 kg/m²        Medication List        START taking these medications      [facility] 160-9-4.8 mcg/actuation Hfaa  Generic drug: budesonide-glycopyr-formoterol  Inhale 2 puffs into the lungs 2 (two) times a day.            CHANGE how you take these medications      fluticasone-salmeterol 250-50 mcg/dose 250-50 mcg/dose diskus inhaler  Commonly known as: ADVAIR  Inhale 1 puff into the lungs 2 (two) times daily. Controller  What changed:   how much to take  how to take this  when to take this  additional instructions   "   ipratropium 0.02 % nebulizer solution  Commonly known as: ATROVENT  Take 2.5 mLs (500 mcg total) by nebulization 4 (four) times daily. Rescue            CONTINUE taking these medications      albuterol-ipratropium 2.5 mg-0.5 mg/3 mL nebulizer solution  Commonly known as: DUO-NEB  TAKE 3 ML BY NEBULIZER EVERY 6 HOURS AS NEEDED FOR WHEEZING OR SHORTNESS OF BREATH     cyanocobalamin 100 MCG tablet  Commonly known as: VITAMIN B-12     ergocalciferol 50,000 unit Cap  Commonly known as: ERGOCALCIFEROL  TAKE 1 CAPSULE BY MOUTH EVERY 7 DAYS     hydroCHLOROthiazide 25 MG tablet  Commonly known as: HYDRODIURIL  Take 1 tablet (25 mg total) by mouth once daily.     metoprolol succinate 100 MG 24 hr tablet  Commonly known as: TOPROL-XL  Take 1 tablet (100 mg total) by mouth once daily.     PROAIR HFA 90 mcg/actuation inhaler  Generic drug: albuterol  INHALE 2 PUFFS FOUR TIMES DAILY AS NEEDED FOR COPD     rosuvastatin 10 MG tablet  Commonly known as: CRESTOR  Take 1 tablet (10 mg total) by mouth once daily.            STOP taking these medications      acetaminophen 500 MG tablet  Commonly known as: TYLENOL               Where to Get Your Medications        These medications were sent to [id number] - [place] - [address] AT [facility] [address]  [facility] [place] [address]      Phone: [phone]   [facility] AEROSPHERE 160-9-4.8 mcg/actuation Hfaa       Information about where to get these medications is not yet available    Ask your nurse or doctor about these medications  fluticasone-salmeterol 250-50 mcg/dose 250-50 mcg/dose diskus inhaler  ipratropium 0.02 % nebulizer solution

## 2023-01-30 NOTE — PROGRESS NOTES
IP Liaison - Initial Visit Note    Patient: Ana Aguila  MRN:  [id number]  Date of Service:  1/30/2023  Completed by:  ANT Mckeon    Reason for Visit   Patient presents with    IP Liaison Initial Visit       RSW met with patient at bedside in order to complete SDOH questionnaire and liaison assessment.  Pt has identified no social barriers to care. Per pt, pt is not in need of resources at this time.    The following were addressed during this visit:  - Review SDOH Questions   - Complete patient assessment   - Complete initial visit with patient        Patient Summary     IP Liaison Patient Assessment    General  Level of Caregiver support: Member independent and does not need caregiver assistance  Have you had to make a decision between paying for any of the following in the last 2 months?: None  Transportation means: Family  Employment status: Retired and not working  Assessments  Was the PHQ Depression Screening completed this visit?: No  Was the TERI-7 Screening completed this visit?: No       ANT Mckeon

## 2023-01-30 NOTE — PLAN OF CARE
[facility]    FACILITY TRANSFER ORDERS      Patient Name: Ana Aguila  YOB: 1955    PCP: Zhou Gallardo MD   PCP Address: [facility] [facility] / [place] [address]  PCP Phone Number: [phone]  PCP Fax: [phone]    Encounter Date: 01/30/2023     Admit to: [facility]    Vital Signs:  Routine    Diagnoses:   Active Hospital Problems    Diagnosis  POA    *Respiratory failure requiring intubation [J96.90]  Yes    Acute respiratory failure with hypercapnia [J96.02]  Yes    Pneumothorax [J93.9]  Yes    COPD with acute exacerbation [J44.1]  Yes    Elevated troponin [R77.8]  Yes    GERD (gastroesophageal reflux disease) [K21.9]  Yes    Acute on chronic respiratory failure with hypoxia and hypercapnia [J96.21, J96.22]  Yes    Hyperlipidemia [E78.5]  Yes    S/P right hemicolectomy [Z90.49]  Not Applicable     Performed in 2011 after perforation during colonoscopy      Essential hypertension [I10]  Yes      Resolved Hospital Problems   No resolved problems to display.       Allergies:  Review of patient's allergies indicates:   Allergen Reactions    Ace inhibitors      cough    Coreg [carvedilol]      Headache     Pseudoephedrine hcl Nausea And Vomiting, Other (See Comments) and Anxiety     JITTERY       Diet: cardiac diet    Activities: Activity as tolerated    Goals of Care Treatment Preferences:  Code Status: Full Code      Nursing: per facility     Oxygen Needs:   -2.5L NC during the day, titrate as needed for goal O2 sats 88-92%    Labs: Routine per facility    CONSULTS:    Physical Therapy to evaluate and treat.  and Occupational Therapy to evaluate and treat.    MISCELLANEOUS CARE:  N/A    WOUND CARE ORDERS  None    Medications: Review discharge medications with patient and family and provide education.      Current Discharge Medication List        START taking these medications    Details   budesonide-glycopyr-formoterol (BREZTRI AEROSPHERE) 160-9-4.8 mcg/actuation HFAA Inhale 2  puffs into the lungs 2 (two) times a day. Please start at discharge from [facility]  Qty: 10.7 g, Refills: 3      guaiFENesin (MUCINEX) 600 mg 12 hr tablet Take 1 tablet (600 mg total) by mouth 2 (two) times daily.      LORazepam (ATIVAN) 0.5 MG tablet Take 1 tablet (0.5 mg total) by mouth every 8 (eight) hours as needed for Anxiety.           CONTINUE these medications which have CHANGED    Details   fluticasone-salmeterol diskus inhaler 250-50 mcg Inhale 1 puff into the lungs 2 (two) times daily. Controller  Qty: 14 each, Refills: 0      ipratropium (ATROVENT) 0.02 % nebulizer solution Take 2.5 mLs (500 mcg total) by nebulization 4 (four) times daily. Rescue  Qty: 75 mL, Refills: 0           CONTINUE these medications which have NOT CHANGED    Details   albuterol-ipratropium (DUO-NEB) 2.5 mg-0.5 mg/3 mL nebulizer solution TAKE 3 ML BY NEBULIZER EVERY 6 HOURS AS NEEDED FOR WHEEZING OR SHORTNESS OF BREATH  Qty: 270 mL, Refills: 6    Associated Diagnoses: Chronic obstructive pulmonary disease, unspecified COPD type      cyanocobalamin (VITAMIN B-12) 100 MCG tablet Take 100 mcg by mouth once daily.      ergocalciferol (ERGOCALCIFEROL) 50,000 unit Cap TAKE 1 CAPSULE BY MOUTH EVERY 7 DAYS  Qty: 12 capsule, Refills: 1    Associated Diagnoses: Vitamin D deficiency      hydroCHLOROthiazide (HYDRODIURIL) 25 MG tablet Take 1 tablet (25 mg total) by mouth once daily. Resume as need for blood pressure  Qty: 90 tablet, Refills: 3      metoprolol succinate (TOPROL-XL) 100 MG 24 hr tablet Take 1 tablet (100 mg total) by mouth once daily.  Qty: 30 tablet, Refills: 11    Comments: .      PROAIR HFA 90 mcg/actuation inhaler INHALE 2 PUFFS FOUR TIMES DAILY AS NEEDED FOR COPD  Qty: 8.5 g, Refills: 12    Associated Diagnoses: Chronic obstructive pulmonary disease, unspecified COPD type      rosuvastatin (CRESTOR) 10 MG tablet Take 1 tablet (10 mg total) by mouth once daily.  Qty: 90 tablet, Refills: 3    Associated Diagnoses: Mixed  hyperlipidemia             Immunizations Administered as of 1/30/2023       Name Date Dose VIS Date Route Exp Date    COVID-19, MRNA, LN-S, PF (Pfizer) (Purple Cap) 10/2/2021 10:07 AM 0.3 mL 12/12/2020 Intramuscular 10/31/2021    Site: Left deltoid     Given By: Lisa Colletti, RN     : [employer]     Lot: [id number]     COVID-19, MRNA, LN-S, PF (Pfizer) (Purple Cap) 3/11/2021 10:40 AM 0.3 mL 12/12/2020 Intramuscular 6/30/2021    Site: Left deltoid     Given By: Marimar Funes RN     : [employer]     Lot: [id number]     COVID-19, MRNA, LN-S, PF (Pfizer) (Purple Cap) 2/18/2021 11:20 AM 0.3 mL 12/12/2020 Intramuscular 5/31/2021    Site: Left deltoid     Given By: Selina Belle RN     : [employer]     Lot: [id number]           This patient has had both covid vaccinations    Some patients may experience side effects after vaccination.  These may include fever, headache, muscle or joint aches.  Most symptoms resolve with 24-48 hours and do not require urgent medical evaluation unless they persist for more than 72 hours or symptoms are concerning for an unrelated medical condition.          _________________________________  Ale Davalos MD  01/30/2023

## 2023-01-30 NOTE — PLAN OF CARE
Patient received on   2.5 Lpm NC with SpO2   98 %. Pt with no apparent distress noted. Will continue to monitor.

## 2023-01-30 NOTE — PT/OT/SLP PROGRESS
Occupational Therapy   Treatment    Name: Ana Aguila  MRN: [id number]  Admitting Diagnosis:  Respiratory failure requiring intubation       Recommendations:     Discharge Recommendations: nursing facility, skilled  Discharge Equipment Recommendations:  other (see comments) (defer to SNF)  Barriers to discharge:  Inaccessible home environment, Decreased caregiver support, Other (Comment) (Increased level of assistance)    Assessment:     Aan Aguila is a 67 y.o. female with a medical diagnosis of Respiratory failure requiring intubation.  Performance deficits affecting function are weakness, impaired endurance, impaired self care skills, impaired functional mobility, gait instability, impaired balance, decreased upper extremity function, decreased lower extremity function, impaired coordination, impaired cardiopulmonary response to activity. Pt found in chair, agreeable to therapy.  Pt continues with decreased overall strength and endurance requiring frequent rest breaks throughout session.  Otherwise, pt is progressing well towards goals. Continue OT services to address functional goals, progressing as able.      Rehab Prognosis:  Good; patient would benefit from acute skilled OT services to address these deficits and reach maximum level of function.       Plan:     Patient to be seen 5 x/week to address the above listed problems via self-care/home management, therapeutic exercises, therapeutic activities  Plan of Care Expires: 02/16/23  Plan of Care Reviewed with: patient    Subjective     Pain/Comfort:  Pain Rating 1: 0/10  Pain Rating Post-Intervention 1: 0/10    Objective:     Communicated with: RN prior to session.  Patient found up in chair with oxygen, peripheral IV, PureWick, telemetry upon OT entry to room.    General Precautions: Standard, fall    Orthopedic Precautions:N/A  Braces: N/A  Respiratory Status: Nasal cannula, flow 2.5 L/min     Occupational Performance:     Functional  Mobility/Transfers:  Patient completed Sit <> Stand Transfer with stand by assistance  with  rolling walker and vcs for hand placement/effect tech   Patient completed Bed <> Chair Transfer using Stand Pivot technique with stand by assistance and contact guard assistance with rolling walker  Functional Mobility: Pt ambulated with CGA/SBA using RW.  Slow pace.  Pt with 1 bout of LOB requiring CGA to regain balance. Increased distance with 2 standing rest breaks and PLB.     Activities of Daily Living:  Declined       AMPA 6 Click ADL: 20    Treatment & Education:  Pt performed BUE AROM ex x 10 reps all jts/planes.  Pt tolerated well with rest breaks 2/2 muscle fatigue and SOB.   Pt instructed on Pursed Lip Breathing Technique and Diaphragmatic Breathing Technique requiring min verbal cues to perform efficiently.          Patient left up in chair with all lines intact, call button in reach, and RN notified    GOALS:   Multidisciplinary Problems       Occupational Therapy Goals          Problem: Occupational Therapy    Goal Priority Disciplines Outcome Interventions   Occupational Therapy Goal     OT, PT/OT Ongoing, Progressing    Description: Goals to be met by: 2/24/2023     Patient will increase functional independence with ADLs by performing:    UE Dressing with Stand-by Assistance.  LE Dressing with Stand-by Assistance.  Grooming while standing at sink with Stand-by Assistance.  Toileting from bedside commode with Stand-by Assistance for hygiene and clothing management.   Supine to sit with Stand-by Assistance.  Functional mobility in room with CGA and DME as needed.                           Time Tracking:     OT Date of Treatment: 01/30/23  OT Start Time: 1030  OT Stop Time: 1102  OT Total Time (min): 32 min    Billable Minutes:Therapeutic Activity 20  Therapeutic Exercise 12               1/30/2023

## 2023-01-30 NOTE — PROGRESS NOTES
Ana Aguila  #[id number] (CSN: [id number]) (67 y.o. F) (Adm: 01/20/23)  [facility] [id number]  PCP    JOSE CARLOS CHACON  Date of Birth    1955     Demographics    Address:   [address] [place] [address] [address] [facility] [place] [address]    Home Phone:   [phone]    Work Phone:       Mobile Phone:   [phone]              SSN:       Insurance:   [employer]    Marital Status:   Single    Confucianist:   Tenriism                Admission Dx    J96.00 Acute respiratory failure   R09.02 Hypoxia   R77.8 Elevated troponin   J93.11 Primary spontaneous pneumothorax   Z01.818 Encounter for intubation   J96.02 Acute respiratory failure with hypercapnia     Chief Complaint    Complaint Comment   Shortness of Breath EMT states pt was SOB for approx 1 hr before their arrival. Pt was using home nebs.  EMT gave pt duoneb tx., .5 epi, 16 mg dexamethasone, 2 alb  tx.  CPAP@15L/min, Peep 10.     Documents Filed to Patient    Power of  Living Will Clinical Unknown Study Attachment Consent Form ABN Waiver After Visit Summary Lab Result Scan Code Status Patient Portal Status    Not on File  Not on File  Not on File  Not on File  Filed  Not on File  Filed  Not on File  FULL [Updated on 01/20/23 5155]  Active     Admission Information    Current Information    Attending Provider Admitting Provider Admission Type Admission Status   MD Annamaria Escamilla MD Emergency Confirmed Admission          Admission Date/Time Discharge Date Hospital Service Auth/Cert Status   01/20/23  04:28 AM  Internal Medicine Incomplete          Hospital Area Unit Room/Bed    [facility] TELEMETRY UNIT K479/K479 A              Hospital Account    Name Acct ID Class Status Primary Coverage   Ana Aguila [id number] IP- Inpatient Open [employer] - [employer] CHO[employer] 65            Guarantor Account (for Hospital Account #[id number])    Name Relation to Pt Service Area Active? Acct Type    Mingo Ana BERRY Self OHSSA Yes Personal/Family   Address Phone     [address]   [place] [id number] [phone](H)              Coverage Information (for Hospital Account #[id number])    F/O Payor/Plan Precert #   [employer] 65 Case# [id number]   Subscriber Subscriber #   Ana Aguila [id number]   Address Phone   [address]   [place] [address] [phone]            Emergency Contact Information    Name: Patience Chacko Relationship: Grandchild   Address:     City:  State:  Zip:  Phone:     Business phone:       Inpatient consult to Social Work [[id number]] (Order [id number])  Consult  Date and Time: 2023 11:39 AM Department: [facility] Telemetry Unit Rel By: Flaca Toth RN (auto-released) Authorizing: Annamaria Veliz MD     Order Information    Order Date/Time Release Date/Time Start Date/Time End Date/Time   23 11:39 AM 23 11:39 AM 23 11:40 AM 23 11:40 AM     Order Details    Frequency Duration Priority Order Class   Once 1  occurrence Routine Hospital Performed     Original Order    Ordered On Ordered By    2023 11:39 AM Flaca Toth RN               Inpatient consult to Social Work: Patient Communication     Not Released  Not seen     Order Questions    Question Answer   Reason for Consult SNF                      Collection Information          Consult Order Info    ID Description Priority Start Date Start Time   [id number] Inpatient consult to Social Work Routine 2023 11:40 AM   Provider Specialty Referred to   ______________________________________ _____________________________________                       Cosign Order Info    Action Created on Order Mode Entered by Responsible Provider Signed by Signed on   Ordering 23 1139 Verbal with readback EDWIN Gama MD             Patient Information    Patient Name   Ana Aguila Legal Sex   Female    1955 N           Additional Information    Associated Reports   View Parent Encounter   Priority and Order Details         Order Provider Info        Office phone Pager E-mail   Ordering User Flaca Toth [phone] -- [email or web address]   Authorizing Provider Annamaria Veliz [phone] -- --   Attending Provider Annamaria Veliz [phone] -- --       Inpatient consult to Social Work [[id number]]    Awaiting signature from: Annamaria Veliz MD Status: Active   Mode: Ordering in Verbal with readback mode Communicated by: Flaca Toth RN   Ordering user: Flaca Toth RN 01/30/23 1093 Ordering provider: Annamaria Veliz MD   Authorized by: Annamaria Veliz MD Ordering mode: Verbal with readback     Questionnaire    Question Answer   Reason for Consult SNF

## 2023-01-30 NOTE — PLAN OF CARE
Problem: Occupational Therapy  Goal: Occupational Therapy Goal  Description: Goals to be met by: 2/24/2023     Patient will increase functional independence with ADLs by performing:    UE Dressing with Stand-by Assistance.  LE Dressing with Stand-by Assistance.  Grooming while standing at sink with Stand-by Assistance.  Toileting from bedside commode with Stand-by Assistance for hygiene and clothing management.   Supine to sit with Stand-by Assistance.  Functional mobility in room with CGA and DME as needed.      Outcome: Ongoing, Progressing   Ana Aguila is a 67 y.o. female with a medical diagnosis of Respiratory failure requiring intubation.  Performance deficits affecting function are weakness, impaired endurance, impaired self care skills, impaired functional mobility, gait instability, impaired balance, decreased upper extremity function, decreased lower extremity function, impaired coordination, impaired cardiopulmonary response to activity. Pt found in chair, agreeable to therapy.  Pt continues with decreased overall strength and endurance requiring frequent rest breaks throughout session.  Otherwise, pt is progressing well towards goals. Continue OT services to address functional goals, progressing as able.

## 2023-01-30 NOTE — PLAN OF CARE
Recommendation:   1. Encourage intake of meals as tolerated.   2. Monitor wt and labs.   3. Rd to follow up on PO intake.     Goals:   1. Pt will consume 25-50% of meals by RD follow up.

## 2023-01-30 NOTE — PLAN OF CARE
VN note: VN completed AVS and attachments and notified bedside nurseShea. Will cont to be available and intervene prn.

## 2023-01-30 NOTE — PLAN OF CARE
per am MDR - pt is ready for d/c to SNF   Per [facility] - pt accepted by [facility] -- TALHA spoke with Gricelda  [phone] -   She wants an Rx for pt's bipap so she can order this item.  TALHA checked with pt - she doesn't have a bipap.   MD asked to write RX now so item can be ordered.      Updated notes sent to Gricelda as well as daughters info :  Jeannette Rosales   email  [email or web address].  Gricelda to email paperwork to daughter    CM met with pt per [facility]  - she agrees with transfer to [facility]      Bipap order sent to Gricelda per [facility]   Confirmed that pt can't bring her own inhalers as the NH is responsible for purchase if pt stays past her SNF stay.  Trelegy cost - $700;  Breztri $600 per Gricelda.  OP cost  $45.00 for each.      email sent to Lizbeth with [facility] requesting auth   Request for auth faxed/routed to her.   ----------------------------------------  Orders sent to Gricelda per CP    ---------------------------------------  Per Lizbeth with [facility] - auth has been faxed  to [facility]           01/30/23 1461   Post-Acute Status   Post-Acute Authorization Placement   Post-Acute Placement Status Pending payor medical review/second level review   Discharge Plan   Discharge Plan A Skilled Nursing Facility

## 2023-01-31 ENCOUNTER — PATIENT OUTREACH (OUTPATIENT)
Dept: ADMINISTRATIVE | Facility: OTHER | Age: 68
End: 2023-01-31
Payer: MEDICARE

## 2023-01-31 VITALS
DIASTOLIC BLOOD PRESSURE: 63 MMHG | WEIGHT: 174 LBS | TEMPERATURE: 96 F | RESPIRATION RATE: 18 BRPM | SYSTOLIC BLOOD PRESSURE: 116 MMHG | BODY MASS INDEX: 28.99 KG/M2 | HEIGHT: 65 IN | OXYGEN SATURATION: 95 % | HEART RATE: 71 BPM

## 2023-01-31 LAB
ALBUMIN SERPL BCP-MCNC: 2.8 G/DL (ref 3.5–5.2)
ALP SERPL-CCNC: 47 U/L (ref 55–135)
ALT SERPL W/O P-5'-P-CCNC: 25 U/L (ref 10–44)
ANION GAP SERPL CALC-SCNC: 7 MMOL/L (ref 8–16)
AST SERPL-CCNC: 22 U/L (ref 10–40)
BASOPHILS # BLD AUTO: 0.03 K/UL (ref 0–0.2)
BASOPHILS NFR BLD: 0.3 % (ref 0–1.9)
BILIRUB SERPL-MCNC: 0.5 MG/DL (ref 0.1–1)
BUN SERPL-MCNC: 11 MG/DL (ref 8–23)
CALCIUM SERPL-MCNC: 8.5 MG/DL (ref 8.7–10.5)
CHLORIDE SERPL-SCNC: 107 MMOL/L (ref 95–110)
CO2 SERPL-SCNC: 28 MMOL/L (ref 23–29)
CREAT SERPL-MCNC: 0.8 MG/DL (ref 0.5–1.4)
DIFFERENTIAL METHOD: ABNORMAL
EOSINOPHIL # BLD AUTO: 0.2 K/UL (ref 0–0.5)
EOSINOPHIL NFR BLD: 2.7 % (ref 0–8)
ERYTHROCYTE [DISTWIDTH] IN BLOOD BY AUTOMATED COUNT: 14.7 % (ref 11.5–14.5)
EST. GFR  (NO RACE VARIABLE): >60 ML/MIN/1.73 M^2
GLUCOSE SERPL-MCNC: 86 MG/DL (ref 70–110)
HCT VFR BLD AUTO: 32.1 % (ref 37–48.5)
HGB BLD-MCNC: 10.1 G/DL (ref 12–16)
IMM GRANULOCYTES # BLD AUTO: 0.04 K/UL (ref 0–0.04)
IMM GRANULOCYTES NFR BLD AUTO: 0.5 % (ref 0–0.5)
LYMPHOCYTES # BLD AUTO: 2.5 K/UL (ref 1–4.8)
LYMPHOCYTES NFR BLD: 28.1 % (ref 18–48)
MCH RBC QN AUTO: 29.5 PG (ref 27–31)
MCHC RBC AUTO-ENTMCNC: 31.5 G/DL (ref 32–36)
MCV RBC AUTO: 94 FL (ref 82–98)
MONOCYTES # BLD AUTO: 0.8 K/UL (ref 0.3–1)
MONOCYTES NFR BLD: 8.9 % (ref 4–15)
NEUTROPHILS # BLD AUTO: 5.3 K/UL (ref 1.8–7.7)
NEUTROPHILS NFR BLD: 59.5 % (ref 38–73)
NRBC BLD-RTO: 0 /100 WBC
PLATELET # BLD AUTO: 263 K/UL (ref 150–450)
PMV BLD AUTO: 9.5 FL (ref 9.2–12.9)
POTASSIUM SERPL-SCNC: 3.6 MMOL/L (ref 3.5–5.1)
PROT SERPL-MCNC: 5.6 G/DL (ref 6–8.4)
RBC # BLD AUTO: 3.42 M/UL (ref 4–5.4)
SODIUM SERPL-SCNC: 142 MMOL/L (ref 136–145)
WBC # BLD AUTO: 8.84 K/UL (ref 3.9–12.7)

## 2023-01-31 PROCEDURE — 97530 THERAPEUTIC ACTIVITIES: CPT | Mod: CO

## 2023-01-31 PROCEDURE — 94640 AIRWAY INHALATION TREATMENT: CPT

## 2023-01-31 PROCEDURE — 94761 N-INVAS EAR/PLS OXIMETRY MLT: CPT

## 2023-01-31 PROCEDURE — 27000221 HC OXYGEN, UP TO 24 HOURS

## 2023-01-31 PROCEDURE — 99900035 HC TECH TIME PER 15 MIN (STAT)

## 2023-01-31 PROCEDURE — 25000242 PHARM REV CODE 250 ALT 637 W/ HCPCS: Performed by: INTERNAL MEDICINE

## 2023-01-31 PROCEDURE — 85025 COMPLETE CBC W/AUTO DIFF WBC: CPT | Performed by: STUDENT IN AN ORGANIZED HEALTH CARE EDUCATION/TRAINING PROGRAM

## 2023-01-31 PROCEDURE — 80053 COMPREHEN METABOLIC PANEL: CPT | Performed by: STUDENT IN AN ORGANIZED HEALTH CARE EDUCATION/TRAINING PROGRAM

## 2023-01-31 PROCEDURE — 94660 CPAP INITIATION&MGMT: CPT

## 2023-01-31 PROCEDURE — 25000003 PHARM REV CODE 250: Performed by: STUDENT IN AN ORGANIZED HEALTH CARE EDUCATION/TRAINING PROGRAM

## 2023-01-31 PROCEDURE — 36415 COLL VENOUS BLD VENIPUNCTURE: CPT | Performed by: STUDENT IN AN ORGANIZED HEALTH CARE EDUCATION/TRAINING PROGRAM

## 2023-01-31 RX ORDER — GUAIFENESIN 600 MG/1
600 TABLET, EXTENDED RELEASE ORAL 2 TIMES DAILY
Start: 2023-01-31

## 2023-01-31 RX ORDER — LORAZEPAM 0.5 MG/1
0.5 TABLET ORAL EVERY 8 HOURS PRN
Qty: 14 TABLET | Refills: 0 | Status: SHIPPED | OUTPATIENT
Start: 2023-01-31 | End: 2023-01-31 | Stop reason: SDUPTHER

## 2023-01-31 RX ORDER — LORAZEPAM 0.5 MG/1
0.5 TABLET ORAL EVERY 8 HOURS PRN
Qty: 14 TABLET | Refills: 0 | Status: SHIPPED | OUTPATIENT
Start: 2023-01-31 | End: 2023-02-17 | Stop reason: SDUPTHER

## 2023-01-31 RX ORDER — LORAZEPAM 0.5 MG/1
0.5 TABLET ORAL EVERY 8 HOURS PRN
Start: 2023-01-31 | End: 2023-01-31 | Stop reason: SDUPTHER

## 2023-01-31 RX ADMIN — IPRATROPIUM BROMIDE AND ALBUTEROL SULFATE 3 ML: 2.5; .5 SOLUTION RESPIRATORY (INHALATION) at 07:01

## 2023-01-31 RX ADMIN — IPRATROPIUM BROMIDE AND ALBUTEROL SULFATE 3 ML: 2.5; .5 SOLUTION RESPIRATORY (INHALATION) at 03:01

## 2023-01-31 RX ADMIN — CYANOCOBALAMIN TAB 1000 MCG 1000 MCG: 1000 TAB at 08:01

## 2023-01-31 RX ADMIN — GUAIFENESIN 600 MG: 600 TABLET, EXTENDED RELEASE ORAL at 08:01

## 2023-01-31 RX ADMIN — METOPROLOL SUCCINATE 100 MG: 50 TABLET, EXTENDED RELEASE ORAL at 08:01

## 2023-01-31 RX ADMIN — IPRATROPIUM BROMIDE AND ALBUTEROL SULFATE 3 ML: 2.5; .5 SOLUTION RESPIRATORY (INHALATION) at 12:01

## 2023-01-31 NOTE — PT/OT/SLP PROGRESS
Physical Therapy      Patient Name:  Ana Aguila   MRN:  [id number]    Patient not seen today secondary to pt reports ambulating earlier w/ OT, and trying to get ready for d/c to SNF at this time. .

## 2023-01-31 NOTE — PLAN OF CARE
Problem: Infection  Goal: Absence of Infection Signs and Symptoms  Outcome: Ongoing, Progressing     Problem: Adult Inpatient Plan of Care  Goal: Plan of Care Review  Outcome: Ongoing, Progressing  Goal: Patient-Specific Goal (Individualized)  Outcome: Ongoing, Progressing  Goal: Absence of Hospital-Acquired Illness or Injury  Outcome: Ongoing, Progressing  Goal: Optimal Comfort and Wellbeing  Outcome: Ongoing, Progressing  Goal: Readiness for Transition of Care  Outcome: Ongoing, Progressing     Problem: Fall Injury Risk  Goal: Absence of Fall and Fall-Related Injury  Outcome: Ongoing, Progressing     Problem: Skin Injury Risk Increased  Goal: Skin Health and Integrity  Outcome: Ongoing, Progressing     Problem: Breathing Pattern Ineffective  Goal: Effective Breathing Pattern  Outcome: Ongoing, Progressing     Problem: COPD (Chronic Obstructive Pulmonary Disease) Comorbidity  Goal: Maintenance of COPD Symptom Control  Outcome: Ongoing, Progressing     Problem: Heart Failure Comorbidity  Goal: Maintenance of Heart Failure Symptom Control  Outcome: Ongoing, Progressing     Problem: Hypertension Comorbidity  Goal: Blood Pressure in Desired Range  Outcome: Ongoing, Progressing

## 2023-01-31 NOTE — PLAN OF CARE
Problem: Adult Inpatient Plan of Care  Goal: Plan of Care Review  Outcome: Ongoing, Progressing    Chart check complete. Vitals, orders, labs, and progress notes reviewed. Care plan updated. Will monitor.

## 2023-01-31 NOTE — PT/OT/SLP PROGRESS
Occupational Therapy   Treatment    Name: Ana Aguila  MRN: [id number]  Admitting Diagnosis:  Respiratory failure requiring intubation       Recommendations:     Discharge Recommendations: nursing facility, skilled  Discharge Equipment Recommendations:  other (see comments) (defer to SNF)  Barriers to discharge:  Inaccessible home environment, Decreased caregiver support, Other (Comment) (Increased level of assistance)    Assessment:     Ana Aguila is a 67 y.o. female with a medical diagnosis of Respiratory failure requiring intubation.  Performance deficits affecting function are weakness, impaired endurance, impaired self care skills, impaired functional mobility, gait instability, impaired balance, decreased upper extremity function, decreased lower extremity function. Pt found on BSC, asking for assistance to transfer.  Pt able to transfer with SBA.  She continues to require increased time to complete all tasks/mobility secondary to frequent rest breaks due to SOB.  O2 sats maintaining 88%-94% on 3L throughout session. Continue OT services to address functional goals, progressing as able.       Rehab Prognosis:  Good; patient would benefit from acute skilled OT services to address these deficits and reach maximum level of function.       Plan:     Patient to be seen 5 x/week to address the above listed problems via self-care/home management, therapeutic activities, therapeutic exercises  Plan of Care Expires: 02/16/23  Plan of Care Reviewed with: patient    Subjective     Pain/Comfort:  Pain Rating 1: 0/10  Pain Rating Post-Intervention 1: 0/10    Objective:     Communicated with: RN prior to session.  Patient found  on BSC  with PureWick, peripheral IV, telemetry, oxygen upon OT entry to room.    General Precautions: Standard, fall    Orthopedic Precautions:N/A  Braces: N/A  Respiratory Status: Nasal cannula, flow 2.5 L/min Increased to 3L on portable oxygen tank     Occupational Performance:      Functional Mobility/Transfers:  Patient completed Sit <> Stand Transfer with supervision  with  no assistive device   Patient completed Bed <> Chair Transfer using Stand Pivot technique with supervision with rolling walker  Patient completed Toilet Transfer Stand Pivot technique with supervision with  bedside commode  Functional Mobility: Pt ambulated with SBA using RW.  Slow pace, frequent rest breaks.  No LOB.   Pt stood statically at window ~10 min with Supervision using RW.     Activities of Daily Living:  Toileting: supervision all aspects of toileting-hygiene and clothing mgmt.       [facility] 6 Click ADL: 21    Patient left up in chair with all lines intact and call button in reach    GOALS:   Multidisciplinary Problems       Occupational Therapy Goals          Problem: Occupational Therapy    Goal Priority Disciplines Outcome Interventions   Occupational Therapy Goal     OT, PT/OT Ongoing, Progressing    Description: Goals to be met by: 2/24/2023     Patient will increase functional independence with ADLs by performing:    UE Dressing with Stand-by Assistance.  LE Dressing with Stand-by Assistance.  Grooming while standing at sink with Stand-by Assistance.  Toileting from bedside commode with Stand-by Assistance for hygiene and clothing management.   Supine to sit with Stand-by Assistance.  Functional mobility in room with CGA and DME as needed.                           Time Tracking:     OT Date of Treatment: 01/31/23  OT Start Time: 0930  OT Stop Time: 0959  OT Total Time (min): 29 min    Billable Minutes:Therapeutic Activity 29 1/31/2023

## 2023-01-31 NOTE — PLAN OF CARE
"All needed info rec'd per Cyrus at [facility]    Nurse to call report; WC van set up for now   CM met with pt prior to d/c - pt choice form signed   CALL REPORT TO EMRE AT [phone]. PT WILL BE GOING TO ROOM 307B"   CM spoke with Renato with [facility] - O2 2l/min will be included with transport.          01/31/23 1500   Final Note   Assessment Type Final Discharge Note   Anticipated Discharge Disposition SNF  ([facility])   What phone number can be called within the next 1-3 days to see how you are doing after discharge? [phone]   Hospital Resources/Appts/Education Provided Post-Acute resouces added to AVS   Post-Acute Status   Post-Acute Authorization Placement   Post-Acute Placement Status Set-up Complete/Auth obtained   Patient choice form signed by patient/caregiver List from [facility] Compare   Discharge Delays None known at this time       "

## 2023-01-31 NOTE — PROGRESS NOTES
ALISSONW met with patient to discuss discharge and additional social barriers to care. Pt anticipates discharge to be today. Pt plans to discharge to [facility]. Pt identified no additional social barriers to care. Per pt, pt is not in need of resources at this time.    The following were addressed during this visit:  - Complete follow-up visit with patient    ANT Mckeon

## 2023-01-31 NOTE — NURSING
Patient discharging to [facility]. Midline and telemetry removed, patient tolerated well. AVS placed in her packet of paperwork. Report called to Tia at [facility]. Awaiting transport to take her to [facility].     [id number] Transport arrived to transport patient to [facility].

## 2023-01-31 NOTE — PLAN OF CARE
Problem: Occupational Therapy  Goal: Occupational Therapy Goal  Description: Goals to be met by: 2/24/2023     Patient will increase functional independence with ADLs by performing:    UE Dressing with Stand-by Assistance.  LE Dressing with Stand-by Assistance.  Grooming while standing at sink with Stand-by Assistance.  Toileting from bedside commode with Stand-by Assistance for hygiene and clothing management.   Supine to sit with Stand-by Assistance.  Functional mobility in room with CGA and DME as needed.      Outcome: Ongoing, Progressing   Ana Aguila is a 67 y.o. female with a medical diagnosis of Respiratory failure requiring intubation.  Performance deficits affecting function are weakness, impaired endurance, impaired self care skills, impaired functional mobility, gait instability, impaired balance, decreased upper extremity function, decreased lower extremity function. Pt found on BSC, asking for assistance to transfer.  Pt able to transfer with SBA.  She continues to require increased time to complete all tasks/mobility secondary to frequent rest breaks due to SOB.  O2 sats maintaining 88%-94% on 3L throughout session. Continue OT services to address functional goals, progressing as able.

## 2023-01-31 NOTE — PROGRESS NOTES
"Call placed to Gricelda with [facility] - left message asking that she contact TALHA with final "ok" for pt transfer today.   TALHA spoke with Gricelda -- revised orders sent to her for reviewe   Pt's daughter has completed paperwork per email.      1:42 pm   Revised orders sent to Gricelda per [facility]; Rx for Ativan sent per [facility] and enclosed in packed   awaiting final "ok" per Gricelda for d/c       "

## 2023-01-31 NOTE — PROGRESS NOTES
"[facility] IM Progress Note    Subjective:      No acute issues overnight. Patient was planning to discharge yesterday, but due to insurance issues patient was unable to be discharged to facility. Patient seen today resting in no acute distress upon initiation of interview. Voicing no complaints at this time.     Objective:   Last 24 Hour Vital Signs:  BP  Min: 101/66  Max: 121/72  Temp  Av.8 °F (36.6 °C)  Min: 96.3 °F (35.7 °C)  Max: 98.9 °F (37.2 °C)  Pulse  Av.9  Min: 73  Max: 88  Resp  Av.2  Min: 16  Max: 20  SpO2  Av.7 %  Min: 92 %  Max: 97 %  Height  Av' 4.96" (165 cm)  Min: 5' 4.96" (165 cm)  Max: 5' 4.96" (165 cm)  Weight  Av.9 kg (174 lb)  Min: 78.9 kg (174 lb)  Max: 78.9 kg (174 lb)  I/O last 3 completed shifts:  In: 730 [P.O.:730]  Out: 700 [Urine:700]    Physical Examination:  GEN: alert, NAD, but generalized weakness  HEENT: NCAT, PERRL, EOMI, neck supple, no thyromegaly  CARD: RRR, no m/r/g, JVP wnl, 2+ peripheral pulses  PULM: CTAB, no wheezes/crackles/rhonchi, on 2.5L via NC  GI: Abdomen soft, NT/ND, normoactive BS, no rebound/guarding  MSKL: Normal ROM, no tenderness 5/5 strength in all extremities  SKIN: Warm, dry, no rashes  NEURO: AAOx3, CN grossly intact, SILT all extremities, DTRs deferred  PSYCH: Normal mood and affect.    Laboratory:  Laboratory Data Reviewed: yes  Pertinent Findings:    Microbiology Data Reviewed: yes  Pertinent Findings:  Blood cx : NGTD  Resp Cx NG  Flu/COVID negative    Other Results:  EKG (my interpretation):   Admission EKG: sinus tach 120, TWIs V1/V2  Subsequent EKG : NSR, TWI in V1, V2 resolved    Radiology Data Reviewed: yes  Pertinent Findings:  Chest xray : R sided pneumothorax  Chest xray : improvement in R sided pneumothorax, s/p pleuravent placement    Current Medications:     Infusions:         Scheduled:   albuterol-ipratropium  3 mL Nebulization Q8H    cyanocobalamin  1,000 mcg Oral Daily    enoxaparin  40 mg Subcutaneous " Daily    guaiFENesin  600 mg Oral BID    lactobacillus acidophilus & bulgar  1 packet Oral BID    metoprolol succinate  100 mg Oral Daily    polyethylene glycol  17 g Oral Daily        PRN:  albuterol, albuterol-ipratropium, dextrose 10%, dextrose 10%, sodium chloride 0.9%    Antibiotics and Day Number of Therapy:  Cefepime 1/20-1/25  Doxycycline 1/20-1/24    Lines and Day Number of Therapy:  PIV, ETT, NG tube    Assessment:     Ana Aguila is a 67 year old female with PMHx of COPD on 2.5L home O2, Hypertension, Hyperlipidemia, GERD, MI, and hx of Takasubo cardiomyopathy who presented on 1/20 for respiratory distress 2/2 COPD exacerbation, required emergent intubation in the ED. Pt was found to have a large R sided pneumothorax s/p pleuravent --> chest tube placement for re-expansion of apical pneumo. Pt was extubated on 1/20 but then re-intubated 1/21 for respiratory distress, pleuravent tube found to be out of place on repeat CXR, new apical PTX, 8F armaan catheter was placed was resolution of PTX. Failed SBT on 1/23, but successfully extubated on 1/24. Chest tube removed 1/25 and stepped down. Continuing to work w/ PT/OT. Approved for SNF at [facility]- pending final insurance approval.     Plan:     Acute on Chronic Hypoxic Hypercapneic Respiratory Failure 2/2 COPD Exacerbation  - similar presentation 7/2022, on home 2.5L home O2  - PFTs show obstruction with severe ventilatory impairment, FEV1 0.43L, FEV1/FVC 40%  - EMS administered dexamethasone 16mg, duo-neb tx, albuterol x2, and 0.5 epi  - ED: BiPAP --> intubation for respiratory distress and accessory muscle usage  - ABG significant for pH 7.14, pCO2 98.7, pO2 169, HCO3 33.4 on ventilator with Vt 350, PEEP 5, FiO2 100% RR22  - repeat ABG shows improvement in acidosis, hypercarbia  - extubated to BIPAP 1/20, re-intubated 1/21 after pleuravent dislodged, reintubated and replaced 8F chest tube, successfully extubated on 1/24, on NC with nightly BiPAP  -  completed doxy/cefepime course for CAP  - s/p 7d steroid course  - will need BiPAP qHS outpt  - Repeat CXR 01/29 stable   - Breztri vs. Chante on discharge, discontinue home fluticasone/salmeterol diskus  - currently on 2L via NC w/ O2 sat of 90%  - continue PRN duonebs    Recurrent pneumothorax, Resolved  - etiology unclear, possibly present initially and worsened by NIVP  - pleurvent placed in ED, chest x-ray showed expansion of apical pneumo 1/21 --> chest tube placed, pleuravax pulled  - chest tube successful removed 1/25     Anxiety  - Hydroxyzine 25mg TID PRN (discontinued 01/29 - not relieving)   - Ativan PO 0.5 given as needed for breakthrough anxiety   - Will continue to monitor    Prolonged Qtc  - EKG w/ Qtc 540, repeat EKG Qtc 494    Shock, resolved  Hx Hypertension  - Per chart review, home meds losartan 25mg, amlodipine 2.5mg, continue hold  - required minimal norepi, weaned off on 1/22-1/23    CAD  Chronic Heart failure with diastolic dysfunction  NSTEMI type 2  - Cardiology consulted: notes initial EKG with minor ST segment elevation/minor GA segment depression. Overall pattern not suggestive of ACS. Likely 2/2 demand in setting of ARF  - TTE 08/4: EF 65%, intermediate LV diastolic dysfunction  - BNP non-elevated, troponin trend 0.147 -->0.488-->0.642--> peaked 1/21  - continue lopressor 25 bid     Hyperlipidemia  - Per chart review on rosuvastatin 10mg, continue hold     GERD  R Hemicolectomy 2/2 GIB  - Continue Famotidine IV for peptic ulcer ppx  - Monitor for bleeding, daily CB      Diet: Regular  Ppx: Lovenox  Code: Full    Dispo: Medically stable for discharge    Ale Davalos MD  [facility] Internal Medicine/Pediatrics HO-II  01/31/2023  8:52 AM    [facility] Medicine Hospitalist Pager numbers:   U Hospitalist Medicine Team A (Susanna/Keren): [phone]  [facility] Hospitalist Medicine Team B (Camilla/Lavelle):  [phone]

## 2023-01-31 NOTE — DISCHARGE SUMMARY
[facility] Hospital Medicine Discharge Summary    Primary Team: [facility] Hospitalist Team A  Attending Physician: Annamaria Veliz MD  Resident: Anoop  Intern: Jason    Date of Admit: 1/20/2023  Date of Discharge: 1/31/2023    Discharge to: [facility]  Condition: stable    Discharge Diagnoses     Patient Active Problem List   Diagnosis    Essential hypertension    Chronic obstructive pulmonary disease    Hepatomegaly    Former smoker    Takotsubo cardiomyopathy - resolved    Microscopic hematuria    Chronic diarrhea    Fatty liver disease, nonalcoholic    S/P right hemicolectomy    Aortic atherosclerosis    Chronic respiratory failure with hypoxia, on home oxygen therapy    Situational depression    Adjustment disorder with depressed mood    Umbilical hernia without obstruction and without gangrene    Chronic RUQ pain    TERI (generalized anxiety disorder)    Osteopenia determined by x-ray    Solitary pulmonary nodule    Vitamin B12 deficiency due to intestinal malabsorption    Coronary artery calcification seen on CT scan    Hyperlipidemia    Calcified granuloma of lung    Mood disorder    Acute on chronic respiratory failure with hypoxia and hypercapnia    Prolonged QT interval    Leukemoid reaction    Prediabetes    Hemoptysis    Pneumothorax    COPD with acute exacerbation    Respiratory failure requiring intubation    Elevated troponin    GERD (gastroesophageal reflux disease)    Acute respiratory failure with hypercapnia       Consultants and Procedures     Consultants:  Cardiology  Critical Care    Procedures:   Chest Tube placement 1/22    Imaging:  CxR (01/20/23)  Impression:   Large right-sided pneumothorax.  No significant associated midline shift at this time.    Brief History of Present Illness      Ana Aguila is a 67 year old female with PMH of COPD on 2.5L home O2, Hypertension, Hyperlipidemia, GERD, MI, and Takasubo cardiomyopathy who presented for SOB for approximately 1 hour prior to EMS arrival. EMS  provided duo-neb treatment, 0.5 epi, 16mg dexamethasone and 2x albuterol treatments     In the ED the patient was noted to be in respiratory distress and was trialed on BiPAP but without improvement and then she was intubated. Follow-up CXR noted pleural effusion and Pleuravent placed in ED. Patient intubated upon initial team assessment.      For the full HPI please refer to the History & Physical from this admission.    Hospital Course By Problem with Pertinent Findings   Acute on Chronic Hypoxic Hypercapneic Respiratory Failure 2/2 COPD Exacerbation  - similar presentation 7/2022, on home 2.5L home O2  - PFTs show obstruction with severe ventilatory impairment, FEV1 0.43L, FEV1/FVC 40%  - EMS administered dexamethasone 16mg, duo-neb tx, albuterol x2, and 0.5 epi  - ED: BiPAP --> intubation for respiratory distress and accessory muscle usage  - ABG significant for pH 7.14, pCO2 98.7, pO2 169, HCO3 33.4 on ventilator with Vt 350, PEEP 5, FiO2 100% RR22  - repeat ABG shows improvement in acidosis, hypercarbia  - extubated to BIPAP 1/20, re-intubated 1/21 after pleuravent dislodged, reintubated and replaced 8F chest tube, successfully extubated on 1/24, on NC with nightly BiPAP  - completed doxy/cefepime course for CAP  - s/p 7d steroid course  - Repeat CXR 01/29 stable   - continue PRN duonebs  -Patient will be discharged with NC (2.5L, titrate as needed for goal O2 sats 88-92%) and nightly BIPAP. She will be discharged with Breztri and prn duonebs. While at the SNF she will continue her current home inhalers for affordability purposes, but patient has been informed to initiate Breztri upon discharge from there.     Recurrent pneumothorax, Resolved  - etiology unclear, possibly present initially and worsened by NIVP  - pleurvent placed in ED, chest x-ray showed expansion of apical pneumo 1/21 --> chest tube placed, pleuravax pulled  - chest tube successful removed 1/25     Anxiety  - Hydroxyzine 25mg TID PRN  (discontinued 01/29 - not relieving)   - Ativan PO 0.5 given as needed for breakthrough anxiety   -will send out with prn Ativan PO 0.5 mg     Prolonged Qtc  - EKG w/ Qtc 540, repeat EKG Qtc 494     Shock, resolved  Hx Hypertension  - Per chart review, home meds losartan 25mg, amlodipine 2.5mg, held during this hospitalization  - required minimal norepi, weaned off on 1/22-1/23  -will discharge with Toprol 100mg and HCTZ 25mg      CAD  Chronic Heart failure with diastolic dysfunction  NSTEMI type 2  - Cardiology consulted: notes initial EKG with minor ST segment elevation/minor AK segment depression. Overall pattern not suggestive of ACS. Likely 2/2 demand in setting of ARF  - TTE 08/4: EF 65%, intermediate LV diastolic dysfunction  - BNP non-elevated, troponin trend 0.147 -->0.488-->0.642--> peaked 1/21  -will discharge on home Toprol dose    Hyperlipidemia  - Per chart review on rosuvastatin 10mg, resume at discharge     GERD  R Hemicolectomy 2/2 GIB  - Continue Famotidine IV for peptic ulcer ppx  - No acute issues    Discharge Medications        Medication List        START taking these medications      BREZTRI AEROSPHERE 160-9-4.8 mcg/actuation Hfaa  Generic drug: budesonide-glycopyr-formoterol  Inhale 2 puffs into the lungs 2 (two) times a day.     guaiFENesin 600 mg 12 hr tablet  Commonly known as: MUCINEX  Take 1 tablet (600 mg total) by mouth 2 (two) times daily.     LORazepam 0.5 MG tablet  Commonly known as: ATIVAN  Take 1 tablet (0.5 mg total) by mouth every 8 (eight) hours as needed for Anxiety.            CHANGE how you take these medications      fluticasone-salmeterol 250-50 mcg/dose 250-50 mcg/dose diskus inhaler  Commonly known as: ADVAIR  Inhale 1 puff into the lungs 2 (two) times daily. Controller  What changed:   how much to take  how to take this  when to take this  additional instructions     ipratropium 0.02 % nebulizer solution  Commonly known as: ATROVENT  Take 2.5 mLs (500 mcg total) by  nebulization 4 (four) times daily. Rescue            CONTINUE taking these medications      albuterol-ipratropium 2.5 mg-0.5 mg/3 mL nebulizer solution  Commonly known as: DUO-NEB  TAKE 3 ML BY NEBULIZER EVERY 6 HOURS AS NEEDED FOR WHEEZING OR SHORTNESS OF BREATH     cyanocobalamin 100 MCG tablet  Commonly known as: VITAMIN B-12     ergocalciferol 50,000 unit Cap  Commonly known as: ERGOCALCIFEROL  TAKE 1 CAPSULE BY MOUTH EVERY 7 DAYS     hydroCHLOROthiazide 25 MG tablet  Commonly known as: HYDRODIURIL  Take 1 tablet (25 mg total) by mouth once daily.     metoprolol succinate 100 MG 24 hr tablet  Commonly known as: TOPROL-XL  Take 1 tablet (100 mg total) by mouth once daily.     PROAIR HFA 90 mcg/actuation inhaler  Generic drug: albuterol  INHALE 2 PUFFS FOUR TIMES DAILY AS NEEDED FOR COPD     rosuvastatin 10 MG tablet  Commonly known as: CRESTOR  Take 1 tablet (10 mg total) by mouth once daily.            STOP taking these medications      acetaminophen 500 MG tablet  Commonly known as: TYLENOL               Where to Get Your Medications        These medications were sent to [id number] - [place] - [address] [facility] [address]  [address] [address]      Phone: [phone]   [facility] AEROSPHERE 160-9-4.8 mcg/actuation Hfaa       Information about where to get these medications is not yet available    Ask your nurse or doctor about these medications  fluticasone-salmeterol 250-50 mcg/dose 250-50 mcg/dose diskus inhaler  guaiFENesin 600 mg 12 hr tablet  ipratropium 0.02 % nebulizer solution  LORazepam 0.5 MG tablet         Discharge Information:   Diet:  Cardiac    Physical Activity:  As tolerated             Instructions:  1. Take all medications as prescribed  2. Keep all follow-up appointments  3. Return to the hospital or call your primary care physicians if any worsening symptoms such as fever, chest pain, shortness of breath, return of symptoms, or any  other concerns.    Follow-Up Appointments:  -Follow up with PCP upon discharge from SNF    Ale Davalos MD  [facility] Internal Medicine/Pediatrics HO-II  01/31/2023  8:55 AM

## 2023-02-01 ENCOUNTER — PATIENT OUTREACH (OUTPATIENT)
Dept: ADMINISTRATIVE | Facility: OTHER | Age: 68
End: 2023-02-01
Payer: MEDICARE

## 2023-02-01 NOTE — PROGRESS NOTES
IP Liaison - Final Visit Note    Patient: Ana Aguila  MRN:  [id number]  Date of Service:  2/1/2023  Completed by:  ANT Mckeon    Reason for Visit   Patient presents with    IP Liaison Chart Review        Patient Summary     Discharge Date: 1/31/2023  Discharge telephone number/address: [phone] / [facility]  Follow up provider: n/a  Follow up appointments: n/a  Home Health agency & telephone number: n/a  DME ordered &  name: n/a  Assigned OPCM RN/SW: n/a  Report sent to follow up team (PCP/OPCM) via in basket message: n/a  Community Resources arranged including agency name & contact info: n/a      ANT Mckeon

## 2023-02-06 ENCOUNTER — TELEPHONE (OUTPATIENT)
Dept: INTERNAL MEDICINE | Facility: CLINIC | Age: 68
End: 2023-02-06
Payer: MEDICARE

## 2023-02-06 NOTE — TELEPHONE ENCOUNTER
----- Message from [facility] sent at 2/6/2023  9:05 AM CST -----  Type:  Needs Medical Advice    Who Called: Pt   Would the patient rather a call back or a response via [employer]? Callback   Best Call Back Number: [phone]  Additional Information: Pt requesting callback from office to discuss recent hospital stay and requesting appt

## 2023-02-06 NOTE — TELEPHONE ENCOUNTER
MELANYI.  Skilled Nursing Facility [facility], pt was in ICU and had a collapsed lung. Pt went in 01/21/2023 and discharged to SNF 01/31/2023. Pt stated that she can not live alone anymore and need a form completed for her apartment complex. Sending form for you to review and FMLA paperwork for her daughter is being sent straight to Disability Desk. Hosfu appt scheduled.

## 2023-02-07 ENCOUNTER — TELEPHONE (OUTPATIENT)
Dept: INTERNAL MEDICINE | Facility: CLINIC | Age: 68
End: 2023-02-07
Payer: MEDICARE

## 2023-02-07 NOTE — TELEPHONE ENCOUNTER
Pt requesting a letter for early termination of her lease because she can not live alone or independently. This is to get out of her lease, so that she can move and have assistance. Please include on paperwork the Dx: chronic respiratory failure, Disability and limitations. Pt sending over form with information that show what is requested from Physician.  Paperwork printed and on your desk.

## 2023-02-07 NOTE — TELEPHONE ENCOUNTER
----- Message from Jennifer Redd sent at 2/7/2023  8:01 AM CST -----  Contact: Pt [phone]  Consult with Anastasia    She said she forgot what you told her to do and she forgot all of the fax numbers you gave her.     Thank you

## 2023-02-10 ENCOUNTER — TELEPHONE (OUTPATIENT)
Dept: INTERNAL MEDICINE | Facility: CLINIC | Age: 68
End: 2023-02-10
Payer: MEDICARE

## 2023-02-10 NOTE — TELEPHONE ENCOUNTER
----- Message from Kristine Reese sent at 2/10/2023  3:42 PM CST -----  Contact: [phone] Patient  Pt is requesting a call back from Anastasia about her upcoming 02/17/23 hospital follow up appt. Pt also states she has some paperwork for Dr Gallardo to fill out.

## 2023-02-10 NOTE — TELEPHONE ENCOUNTER
Patient states her paperwork is ready to be signed. She spoke to disability office. Can a different provider sign her paperwork?

## 2023-02-16 DIAGNOSIS — J44.9 CHRONIC OBSTRUCTIVE PULMONARY DISEASE, UNSPECIFIED COPD TYPE: Primary | ICD-10-CM

## 2023-02-16 NOTE — TELEPHONE ENCOUNTER
"Chief Complaint  ADD (3 month f/u)    Subjective          Ignacio Fatima presents to [facility]  History of Present Illness     Attention deficit disorder  The patient reports he has been taking Adderall 30 mg daily with good relief. He states the medication is helping his focus. He denies any side effects from the medication. He denies depression.     Testosterone injections  He reports that he was using a topical cream which did not provide any relief. He reports that he is receiving testosterone injections every Wednesday and Saturday.     Weight  His weight is stable. He reports he will begin a diet soon. He reports he saw a dietician, but he was not happy with the plan. He denies overeating. He has been drinking an adequate amount of water. He reports he drinks 1 soda per week. He reports that he has been drinking smoothies. He is taking vitamin B and vitamin C.    Fatigue  He reports he wakes up in the morning and his \"arms will shake a little bit\". He states this occurs approximately 4 days out of the week, and afterward \"every bit of my energy is gone\" and he must lie down. He has an appointment with his neurologist scheduled for next week. He has an appointment with Dr. Mae, a neurosurgeon, scheduled for 08/15/2022 for his pituitary adenoma. He reports he is experiencing side effects from fatigue. His appetite is normal. He denies difficulty sleeping. He denies chest pain or shortness of breath. His last thyroid reading was normal.     Review of Systems   Constitutional: Positive for fatigue.   HENT: Negative.    Respiratory: Negative.    Cardiovascular: Negative.    Gastrointestinal: Negative.         Objective       Vital Signs:   /74   Pulse 82   Temp 97.5 °F (36.4 °C)   Resp 18   Ht 185.4 cm (73\")   Wt 95.7 kg (211 lb)   BMI 27.84 kg/m²     Physical Exam  Vitals and nursing note reviewed.   Constitutional:       Appearance: He is well-developed.   HENT: " Forms faxed to Disability Desk.        Head: Normocephalic and atraumatic.      Right Ear: External ear normal.      Left Ear: External ear normal.   Eyes:      Pupils: Pupils are equal, round, and reactive to light.   Cardiovascular:      Rate and Rhythm: Normal rate and regular rhythm.      Heart sounds: Normal heart sounds.   Pulmonary:      Effort: Pulmonary effort is normal. No respiratory distress.      Breath sounds: Normal breath sounds. No wheezing or rales.   Skin:     General: Skin is warm and dry.   Neurological:      Mental Status: He is alert.   Psychiatric:         Mood and Affect: Mood normal.         Behavior: Behavior normal.          Result Review :                     Assessment and Plan {CC Problem List  Visit Diagnosis   ROS  Review (Popup)  [facility] Maintenance  Quality  BestPractice  Medications  SmartSets  SnapShot Encounters  Media :23}   Diagnoses and all orders for this visit:    1. Attention deficit disorder (ADD) without hyperactivity (Primary)    2. Other fatigue  -     CBC & Differential  -     Vitamin B12  -     Iron and TIBC  -     Ferritin    3. Anemia, unspecified type  -     Vitamin B12  -     Iron and TIBC  -     Ferritin      1. Attention deficit disorder  He is stable on Adderall 30 mg XR one daily. We will continue this. No evidence of misuse or diversion. Taking medication appropriately.    2. Fatigue with recent blood work showing anemia  We will recheck CBC today and also check a B12, ferritin, and iron panel. Recent TSH was normal. Further plan once labs are back.        DISCUSSION      Isidro dated 8/12/2022  was reviewed and appropriate.     Follow Up   Return in about 3 months (around 11/11/2022).    Patient was given instructions and counseling regarding his condition or for health maintenance advice. Please see specific information pulled into the AVS if appropriate.       Jose G Mckeon MD     Transcribed from ambient dictation for Jose G Mckeon MD by ZOHREH ALEJANDRE.  08/11/22   16:45  EDT    Patient verbalized consent to the visit recording.  I have personally performed the services described in this document as transcribed by the above individual, and it is both accurate and complete.  Jose G Mckeon MD  8/12/2022  18:09 EDT

## 2023-02-17 ENCOUNTER — OFFICE VISIT (OUTPATIENT)
Dept: INTERNAL MEDICINE | Facility: CLINIC | Age: 68
End: 2023-02-17
Payer: MEDICARE

## 2023-02-17 ENCOUNTER — HOSPITAL ENCOUNTER (OUTPATIENT)
Dept: RADIOLOGY | Facility: HOSPITAL | Age: 68
Discharge: HOME | End: 2023-02-17
Attending: INTERNAL MEDICINE
Payer: MEDICARE

## 2023-02-17 VITALS
HEART RATE: 72 BPM | WEIGHT: 164.44 LBS | SYSTOLIC BLOOD PRESSURE: 124 MMHG | BODY MASS INDEX: 27.4 KG/M2 | OXYGEN SATURATION: 97 % | DIASTOLIC BLOOD PRESSURE: 70 MMHG | HEIGHT: 65 IN

## 2023-02-17 DIAGNOSIS — J96.22 ACUTE ON CHRONIC RESPIRATORY FAILURE WITH HYPERCAPNIA: ICD-10-CM

## 2023-02-17 DIAGNOSIS — Z98.890 H/O CHEST TUBE PLACEMENT: ICD-10-CM

## 2023-02-17 DIAGNOSIS — I10 ESSENTIAL HYPERTENSION: ICD-10-CM

## 2023-02-17 DIAGNOSIS — J44.9 CHRONIC OBSTRUCTIVE PULMONARY DISEASE, UNSPECIFIED COPD TYPE: ICD-10-CM

## 2023-02-17 DIAGNOSIS — J44.9 CHRONIC OBSTRUCTIVE PULMONARY DISEASE, UNSPECIFIED COPD TYPE: Primary | ICD-10-CM

## 2023-02-17 DIAGNOSIS — Z99.81 DEPENDENCE ON SUPPLEMENTAL OXYGEN: ICD-10-CM

## 2023-02-17 PROCEDURE — 3008F PR BODY MASS INDEX (BMI) DOCUMENTED: ICD-10-PCS | Mod: CPTII,S$GLB,, | Performed by: INTERNAL MEDICINE

## 2023-02-17 PROCEDURE — 1159F PR MEDICATION LIST DOCUMENTED IN MEDICAL RECORD: ICD-10-PCS | Mod: CPTII,S$GLB,, | Performed by: INTERNAL MEDICINE

## 2023-02-17 PROCEDURE — 1159F MED LIST DOCD IN RCRD: CPT | Mod: CPTII,S$GLB,, | Performed by: INTERNAL MEDICINE

## 2023-02-17 PROCEDURE — 71046 XR CHEST PA AND LATERAL: ICD-10-PCS | Mod: 26,,, | Performed by: RADIOLOGY

## 2023-02-17 PROCEDURE — 3074F PR MOST RECENT SYSTOLIC BLOOD PRESSURE < 130 MM HG: ICD-10-PCS | Mod: CPTII,S$GLB,, | Performed by: INTERNAL MEDICINE

## 2023-02-17 PROCEDURE — 1101F PR PT FALLS ASSESS DOC 0-1 FALLS W/OUT INJ PAST YR: ICD-10-PCS | Mod: CPTII,S$GLB,, | Performed by: INTERNAL MEDICINE

## 2023-02-17 PROCEDURE — 99999 PR PBB SHADOW E&M-EST. PATIENT-LVL IV: CPT | Mod: PBBFAC,,, | Performed by: INTERNAL MEDICINE

## 2023-02-17 PROCEDURE — 99999 PR PBB SHADOW E&M-EST. PATIENT-LVL IV: ICD-10-PCS | Mod: PBBFAC,,, | Performed by: INTERNAL MEDICINE

## 2023-02-17 PROCEDURE — 4010F PR ACE/ARB THEARPY RXD/TAKEN: ICD-10-PCS | Mod: CPTII,S$GLB,, | Performed by: INTERNAL MEDICINE

## 2023-02-17 PROCEDURE — 3078F DIAST BP <80 MM HG: CPT | Mod: CPTII,S$GLB,, | Performed by: INTERNAL MEDICINE

## 2023-02-17 PROCEDURE — 1111F DSCHRG MED/CURRENT MED MERGE: CPT | Mod: CPTII,S$GLB,, | Performed by: INTERNAL MEDICINE

## 2023-02-17 PROCEDURE — 1101F PT FALLS ASSESS-DOCD LE1/YR: CPT | Mod: CPTII,S$GLB,, | Performed by: INTERNAL MEDICINE

## 2023-02-17 PROCEDURE — 4010F ACE/ARB THERAPY RXD/TAKEN: CPT | Mod: CPTII,S$GLB,, | Performed by: INTERNAL MEDICINE

## 2023-02-17 PROCEDURE — 99215 OFFICE O/P EST HI 40 MIN: CPT | Mod: S$GLB,,, | Performed by: INTERNAL MEDICINE

## 2023-02-17 PROCEDURE — 3288F FALL RISK ASSESSMENT DOCD: CPT | Mod: CPTII,S$GLB,, | Performed by: INTERNAL MEDICINE

## 2023-02-17 PROCEDURE — 1126F PR PAIN SEVERITY QUANTIFIED, NO PAIN PRESENT: ICD-10-PCS | Mod: CPTII,S$GLB,, | Performed by: INTERNAL MEDICINE

## 2023-02-17 PROCEDURE — 71046 X-RAY EXAM CHEST 2 VIEWS: CPT | Mod: TC

## 2023-02-17 PROCEDURE — 1111F PR DISCHARGE MEDS RECONCILED W/ CURRENT OUTPATIENT MED LIST: ICD-10-PCS | Mod: CPTII,S$GLB,, | Performed by: INTERNAL MEDICINE

## 2023-02-17 PROCEDURE — 99215 PR OFFICE/OUTPT VISIT, EST, LEVL V, 40-54 MIN: ICD-10-PCS | Mod: S$GLB,,, | Performed by: INTERNAL MEDICINE

## 2023-02-17 PROCEDURE — 3008F BODY MASS INDEX DOCD: CPT | Mod: CPTII,S$GLB,, | Performed by: INTERNAL MEDICINE

## 2023-02-17 PROCEDURE — 1126F AMNT PAIN NOTED NONE PRSNT: CPT | Mod: CPTII,S$GLB,, | Performed by: INTERNAL MEDICINE

## 2023-02-17 PROCEDURE — 3074F SYST BP LT 130 MM HG: CPT | Mod: CPTII,S$GLB,, | Performed by: INTERNAL MEDICINE

## 2023-02-17 PROCEDURE — 71046 X-RAY EXAM CHEST 2 VIEWS: CPT | Mod: 26,,, | Performed by: RADIOLOGY

## 2023-02-17 PROCEDURE — 3078F PR MOST RECENT DIASTOLIC BLOOD PRESSURE < 80 MM HG: ICD-10-PCS | Mod: CPTII,S$GLB,, | Performed by: INTERNAL MEDICINE

## 2023-02-17 PROCEDURE — 3288F PR FALLS RISK ASSESSMENT DOCUMENTED: ICD-10-PCS | Mod: CPTII,S$GLB,, | Performed by: INTERNAL MEDICINE

## 2023-02-17 RX ORDER — LORAZEPAM 0.5 MG/1
TABLET ORAL
Qty: 30 TABLET | Refills: 0 | Status: SHIPPED | OUTPATIENT
Start: 2023-02-17 | End: 2023-04-24 | Stop reason: SDUPTHER

## 2023-02-18 DIAGNOSIS — E87.6 HYPOKALEMIA: Primary | ICD-10-CM

## 2023-02-18 PROCEDURE — G0180 MD CERTIFICATION HHA PATIENT: HCPCS | Mod: ,,, | Performed by: INTERNAL MEDICINE

## 2023-02-18 PROCEDURE — G0180 PR HOME HEALTH MD CERTIFICATION: ICD-10-PCS | Mod: ,,, | Performed by: INTERNAL MEDICINE

## 2023-02-18 NOTE — PROGRESS NOTES
CC:  Hospital follow-up     HPI:  The patient is a 67-year-old female with COPD, hypertension, hyperlipidemia, reflux, right hemicolectomy secondary to GI bleed from polypectomy, and history of recent COVID-19 a who presents today as a hospital follow-up for acute on chronic respiratory failure secondary to a COPD exacerbation.  The patient did require intubation.  She was intubated on the 20 and extubated on the 24th.  She was discharged on 2.5 L nasal cannula and nightly BiPAP.  Her Advair was changed to Breztri.  CS new not started this medication yet, it is in the pharmacy.  The patient did have a recurrent pneumothorax right.  The chest tube was placed and removed on the 25th.  Her home blood pressure medication of losartan and amlodipine were held during hospitalization.  She was discharged home on Toprol 100 mg once a day HCTZ 25 mg once a day.  Her rosuvastatin was restarted time of discharge.  The patient is requesting a letter to break her lease from her apartment.  She is planning on moving to [place] with her daughter in the very near future.      ROS:  Patient reports otherwise feeling well.  No chest pain.  Does feel little bit more short on breathing that she used to be.  No nausea vomiting.  No abdominal pain.    Physical exam:   General appearance:  No acute distress   HEENT: Conjunctiva is clear.  Pupils equal.  TMs are clear.  Nasal septum is midline without discharge.  Oropharynx is without erythema.  Trachea is midline without JVD.    Pulmonary:  Fair inspiratory, expiratory breath sounds heard.  Her lungs were clear to auscultation.  She had no crackles, wheezing or rhonchi.    GI: Bowel sounds were normal.      Assessment:  1. Hospital follow-up for acute on chronic respiratory failure  2.  COPD  3.  History of chest tube placement for spontaneous right pneumothorax  4.  Hypertension   5. Hyperlipidemia    Plan:  1.  Will order chest x-ray  2. Will repeat a CBC and CMP today  3.  A letter was  given to patient that she might dissolve her lease.

## 2023-02-20 ENCOUNTER — TELEPHONE (OUTPATIENT)
Dept: INTERNAL MEDICINE | Facility: CLINIC | Age: 68
End: 2023-02-20
Payer: MEDICARE

## 2023-02-20 NOTE — TELEPHONE ENCOUNTER
----- Message from Zhou Gallardo MD sent at 2/18/2023  3:58 PM CST -----  Her blood test showed that her potassium was a little low. I would like to repeat a BMP when she comes in to see Pulmonary. She can do it after her visit. It is not a fasting test.

## 2023-02-23 ENCOUNTER — OFFICE VISIT (OUTPATIENT)
Dept: PULMONOLOGY | Facility: CLINIC | Age: 68
End: 2023-02-23
Payer: MEDICARE

## 2023-02-23 ENCOUNTER — HOSPITAL ENCOUNTER (OUTPATIENT)
Dept: PULMONOLOGY | Facility: CLINIC | Age: 68
Discharge: HOME | End: 2023-02-23
Payer: MEDICARE

## 2023-02-23 VITALS
BODY MASS INDEX: 27.85 KG/M2 | HEIGHT: 65 IN | HEART RATE: 69 BPM | WEIGHT: 167.13 LBS | SYSTOLIC BLOOD PRESSURE: 118 MMHG | OXYGEN SATURATION: 90 % | DIASTOLIC BLOOD PRESSURE: 68 MMHG

## 2023-02-23 DIAGNOSIS — J43.2 CENTRILOBULAR EMPHYSEMA: Primary | ICD-10-CM

## 2023-02-23 DIAGNOSIS — J44.9 CHRONIC OBSTRUCTIVE PULMONARY DISEASE, UNSPECIFIED COPD TYPE: ICD-10-CM

## 2023-02-23 PROCEDURE — 94010 BREATHING CAPACITY TEST: CPT | Mod: S$GLB,,, | Performed by: INTERNAL MEDICINE

## 2023-02-23 PROCEDURE — 3074F SYST BP LT 130 MM HG: CPT | Mod: CPTII,S$GLB,, | Performed by: INTERNAL MEDICINE

## 2023-02-23 PROCEDURE — 94010 BREATHING CAPACITY TEST: ICD-10-PCS | Mod: S$GLB,,, | Performed by: INTERNAL MEDICINE

## 2023-02-23 PROCEDURE — 1126F AMNT PAIN NOTED NONE PRSNT: CPT | Mod: CPTII,S$GLB,, | Performed by: INTERNAL MEDICINE

## 2023-02-23 PROCEDURE — 3008F PR BODY MASS INDEX (BMI) DOCUMENTED: ICD-10-PCS | Mod: CPTII,S$GLB,, | Performed by: INTERNAL MEDICINE

## 2023-02-23 PROCEDURE — 1111F DSCHRG MED/CURRENT MED MERGE: CPT | Mod: CPTII,S$GLB,, | Performed by: INTERNAL MEDICINE

## 2023-02-23 PROCEDURE — 3078F PR MOST RECENT DIASTOLIC BLOOD PRESSURE < 80 MM HG: ICD-10-PCS | Mod: CPTII,S$GLB,, | Performed by: INTERNAL MEDICINE

## 2023-02-23 PROCEDURE — 4010F PR ACE/ARB THEARPY RXD/TAKEN: ICD-10-PCS | Mod: CPTII,S$GLB,, | Performed by: INTERNAL MEDICINE

## 2023-02-23 PROCEDURE — 4010F ACE/ARB THERAPY RXD/TAKEN: CPT | Mod: CPTII,S$GLB,, | Performed by: INTERNAL MEDICINE

## 2023-02-23 PROCEDURE — 3288F FALL RISK ASSESSMENT DOCD: CPT | Mod: CPTII,S$GLB,, | Performed by: INTERNAL MEDICINE

## 2023-02-23 PROCEDURE — 3288F PR FALLS RISK ASSESSMENT DOCUMENTED: ICD-10-PCS | Mod: CPTII,S$GLB,, | Performed by: INTERNAL MEDICINE

## 2023-02-23 PROCEDURE — 1159F MED LIST DOCD IN RCRD: CPT | Mod: CPTII,S$GLB,, | Performed by: INTERNAL MEDICINE

## 2023-02-23 PROCEDURE — 99999 PR PBB SHADOW E&M-EST. PATIENT-LVL III: CPT | Mod: PBBFAC,,, | Performed by: INTERNAL MEDICINE

## 2023-02-23 PROCEDURE — 1111F PR DISCHARGE MEDS RECONCILED W/ CURRENT OUTPATIENT MED LIST: ICD-10-PCS | Mod: CPTII,S$GLB,, | Performed by: INTERNAL MEDICINE

## 2023-02-23 PROCEDURE — 99214 OFFICE O/P EST MOD 30 MIN: CPT | Mod: S$GLB,,, | Performed by: INTERNAL MEDICINE

## 2023-02-23 PROCEDURE — 99214 PR OFFICE/OUTPT VISIT, EST, LEVL IV, 30-39 MIN: ICD-10-PCS | Mod: S$GLB,,, | Performed by: INTERNAL MEDICINE

## 2023-02-23 PROCEDURE — 99999 PR PBB SHADOW E&M-EST. PATIENT-LVL III: ICD-10-PCS | Mod: PBBFAC,,, | Performed by: INTERNAL MEDICINE

## 2023-02-23 PROCEDURE — 3078F DIAST BP <80 MM HG: CPT | Mod: CPTII,S$GLB,, | Performed by: INTERNAL MEDICINE

## 2023-02-23 PROCEDURE — 3008F BODY MASS INDEX DOCD: CPT | Mod: CPTII,S$GLB,, | Performed by: INTERNAL MEDICINE

## 2023-02-23 PROCEDURE — 1126F PR PAIN SEVERITY QUANTIFIED, NO PAIN PRESENT: ICD-10-PCS | Mod: CPTII,S$GLB,, | Performed by: INTERNAL MEDICINE

## 2023-02-23 PROCEDURE — 1101F PT FALLS ASSESS-DOCD LE1/YR: CPT | Mod: CPTII,S$GLB,, | Performed by: INTERNAL MEDICINE

## 2023-02-23 PROCEDURE — 1159F PR MEDICATION LIST DOCUMENTED IN MEDICAL RECORD: ICD-10-PCS | Mod: CPTII,S$GLB,, | Performed by: INTERNAL MEDICINE

## 2023-02-23 PROCEDURE — 1101F PR PT FALLS ASSESS DOC 0-1 FALLS W/OUT INJ PAST YR: ICD-10-PCS | Mod: CPTII,S$GLB,, | Performed by: INTERNAL MEDICINE

## 2023-02-23 PROCEDURE — 3074F PR MOST RECENT SYSTOLIC BLOOD PRESSURE < 130 MM HG: ICD-10-PCS | Mod: CPTII,S$GLB,, | Performed by: INTERNAL MEDICINE

## 2023-02-23 NOTE — PROGRESS NOTES
Subjective:      Patient ID: Ana Aguila is a 67 y.o. female.    Chief Complaint: COPD and Shortness of Breath    HPI  68 yo female with severe COPD and compensated respiratory failure comes for follow up. She was hospitalized at [facility] for acute exacerbation that awoke her about 3 AM  had been doing well but have marked breathing distress, call EMT taken to [facility], close to her apt. Had a  moderated right sided  pneumothorax  and required intubation and small chest tube. She was extubated and then had to be re intubated  and finally was off the ventilator. She was referred to a local rehab facility in [place]. She had worked there briefly at age 15 as a [employer] .    She looks good today In  wheelchair and accompanied by her cousin.  [id number]%on two liters     PFT's  today: 0.92 Liters 36% and Fev-1:0.34 L  37% of FVC  These only 2% lower than her study in June 2022     Her survival is a miracle         No flowsheet data found.  Review of Systems   Constitutional: Negative.    HENT: Negative.     Eyes: Negative.    Respiratory:  Positive for dyspnea on extertion.         Severe COPD  recently hospitalized and intubated x 2 for acute COPD exacerbation Complicated by a right sided pneumothorax.   Cardiovascular: Negative.    Genitourinary: Negative.    Musculoskeletal: Negative.    Skin: Negative.    Gastrointestinal: Negative.         S/P Partial colectomy after perforation during a colonosocpe   Neurological: Negative.    Psychiatric/Behavioral: Negative.     Objective:     Physical Exam  Vitals and nursing note reviewed.   Constitutional:       General: She is not in acute distress.     Appearance: Normal appearance. She is well-developed. She is not ill-appearing or toxic-appearing.   HENT:      Head: Normocephalic and atraumatic.      Right Ear: External ear normal.      Left Ear: External ear normal.      Nose: Nose normal.   Eyes:      Conjunctiva/sclera: Conjunctivae normal.       Pupils: Pupils are equal, round, and reactive to light.   Neck:      Thyroid: No thyromegaly.      Vascular: No JVD.   Cardiovascular:      Rate and Rhythm: Normal rate and regular rhythm.      Heart sounds: Normal heart sounds. No murmur heard.    No gallop.   Pulmonary:      Effort: No respiratory distress.      Breath sounds: Normal breath sounds. No stridor. No wheezing or rales.      Comments: Decreased air entry    PFT's  only slightly below the valued of June,2022    Sa02: 93% of 2 liters.  Chest:      Chest wall: No tenderness.   Abdominal:      General: Bowel sounds are normal. There is no distension.      Palpations: Abdomen is soft. There is no mass.      Tenderness: There is no abdominal tenderness. There is no guarding or rebound.   Musculoskeletal:         General: Normal range of motion.      Cervical back: Normal range of motion and neck supple.   Lymphadenopathy:      Cervical: No cervical adenopathy.   Skin:     General: Skin is warm and dry.      Findings: No rash.   Neurological:      Mental Status: She is alert and oriented to person, place, and time.      Cranial Nerves: No cranial nerve deficit.      Deep Tendon Reflexes: Reflexes are normal and symmetric. Reflexes normal.   Psychiatric:         Behavior: Behavior normal.         Thought Content: Thought content normal.         Judgment: Judgment normal.       Assessment:     1. Centrilobular emphysema      Outpatient Encounter Medications as of 2/23/2023   Medication Sig Dispense Refill    albuterol-ipratropium (DUO-NEB) 2.5 mg-0.5 mg/3 mL nebulizer solution TAKE 3 ML BY NEBULIZER EVERY 6 HOURS AS NEEDED FOR WHEEZING OR SHORTNESS OF BREATH 270 mL 6    budesonide-glycopyr-formoterol (BREZTRI AEROSPHERE) 160-9-4.8 mcg/actuation HFAA Inhale 2 puffs into the lungs 2 (two) times a day. 10.7 g 3    cyanocobalamin (VITAMIN B-12) 100 MCG tablet Take 100 mcg by mouth once daily.      ergocalciferol (ERGOCALCIFEROL) 50,000 unit Cap TAKE 1 CAPSULE  BY MOUTH EVERY 7 DAYS 12 capsule 1    fluticasone-salmeterol diskus inhaler 250-50 mcg Inhale 1 puff into the lungs 2 (two) times daily. Controller 14 each 0    guaiFENesin (MUCINEX) 600 mg 12 hr tablet Take 1 tablet (600 mg total) by mouth 2 (two) times daily.      hydroCHLOROthiazide (HYDRODIURIL) 25 MG tablet Take 1 tablet (25 mg total) by mouth once daily. 90 tablet 3    ipratropium (ATROVENT) 0.02 % nebulizer solution Take 2.5 mLs (500 mcg total) by nebulization 4 (four) times daily. Rescue 75 mL 0    LORazepam (ATIVAN) 0.5 MG tablet Take 1 by mouth every night. 30 tablet 0    metoprolol succinate (TOPROL-XL) 100 MG 24 hr tablet Take 1 tablet (100 mg total) by mouth once daily. 30 tablet 11    PROAIR HFA 90 mcg/actuation inhaler INHALE 2 PUFFS FOUR TIMES DAILY AS NEEDED FOR COPD 8.5 g 12    rosuvastatin (CRESTOR) 10 MG tablet Take 1 tablet (10 mg total) by mouth once daily. 90 tablet 3    [DISCONTINUED] amLODIPine (NORVASC) 2.5 MG tablet Take 1 tablet (2.5 mg total) by mouth once daily. 30 tablet 11     No facility-administered encounter medications on file as of 2/23/2023.       Plan:   RTC one month with chest x-ray  Problem List Items Addressed This Visit       Chronic obstructive pulmonary disease - Primary

## 2023-02-24 LAB
FEF 25 75 LLN: 0.67
FEF 25 75 PRE REF: 7.6 %
FEF 25 75 REF: 1.74
FEV05 LLN: 0.92
FEV05 REF: 1.78
FEV1 FVC LLN: 66
FEV1 FVC PRE REF: 46.6 %
FEV1 FVC REF: 79
FEV1 LLN: 1.41
FEV1 PRE REF: 16.8 %
FEV1 REF: 2.01
FVC LLN: 1.83
FVC PRE REF: 35.9 %
FVC REF: 2.57
PEF LLN: 3.11
PEF PRE REF: 29.9 %
PEF REF: 5.18
PHYSICIAN COMMENT: ABNORMAL
PRE FEF 25 75: 0.13 L/S (ref 0.67–3.35)
PRE FET 100: 6.86 SEC
PRE FEV05 REF: 12.2 %
PRE FEV1 FVC: 36.62 % (ref 65.92–89.56)
PRE FEV1: 0.34 L (ref 1.41–2.58)
PRE FEV5: 0.22 L (ref 0.92–2.63)
PRE FVC: 0.92 L (ref 1.83–3.35)
PRE PEF: 1.55 L/S (ref 3.11–7.24)

## 2023-02-28 ENCOUNTER — TELEPHONE (OUTPATIENT)
Dept: INTERNAL MEDICINE | Facility: CLINIC | Age: 68
End: 2023-02-28
Payer: MEDICARE

## 2023-02-28 NOTE — TELEPHONE ENCOUNTER
Spoke with pt in regards to the paperwork for Disability with the updated extended date. Faxed again to Disability Desk and pt need RTW letter. Will get letter done.

## 2023-02-28 NOTE — TELEPHONE ENCOUNTER
----- Message from Desirae Wallace sent at 2/28/2023  8:29 AM CST -----  Contact: Pt Mobile [phone]  Patient would like for you to return her call in regards to documents. Patient did not give me any other information.

## 2023-03-14 RX ORDER — FLUTICASONE PROPIONATE AND SALMETEROL 250; 50 UG/1; UG/1
1 POWDER RESPIRATORY (INHALATION) 2 TIMES DAILY
Qty: 180 EACH | Refills: 3 | OUTPATIENT
Start: 2023-03-14 | End: 2024-03-13

## 2023-03-14 NOTE — TELEPHONE ENCOUNTER
Pt wants;  1) a refill on her Advair Discus. (Placed refill request)  2)extension on P/T (told to have P/T send this request)  3)and want to know when does Dr Gallardo needs to see her back?

## 2023-03-14 NOTE — TELEPHONE ENCOUNTER
Care Due:                  Date            Visit Type   Department     Provider  --------------------------------------------------------------------------------                                HOSPITAL     [facility] INTERNAL  Last Visit: 02-      FOLLOW UP    MEDICINE       JOSE CARLOS CHACON  Next Visit: None Scheduled  None         None Found                                                            Last  Test          Frequency    Reason                     Performed    Due Date  --------------------------------------------------------------------------------    Lipid Panel.  12 months..  rosuvastatin.............  07- 07-    Health [facility] Embedded Care Gaps. Reference number: [id number]. 3/14/2023   3:02:30 PM CDT

## 2023-03-14 NOTE — TELEPHONE ENCOUNTER
----- Message from Anastasia Mendes MA sent at 3/14/2023  2:17 PM CDT -----  Contact: [phone]    ----- Message -----  From: Corin Dominique  Sent: 3/14/2023   2:05 PM CDT  To: [facility]i [facility] Staff    Patient is calling for Medical Advice regarding: Patient would like a call back today.

## 2023-03-14 NOTE — TELEPHONE ENCOUNTER
Refill Routing Note   Medication(s) are not appropriate for processing by [facility] for the following reason(s):       No active prescription written by PCP:  pt call requesting therapy change back to Advair    [facility] action(s):  Defer   Requires labs : Yes    Medication Therapy Plan: Labs (lipid panel) outdated; pt called and specifically requested Advair. Requesting a call back.    Appointments  past 12m or future 3m with PCP    Date Provider   Last Visit   2/17/2023 Zhou Gallardo MD   Next Visit   Visit date not found Zhou Gallardo MD   ED visits in past 90 days: 0        Note composed:4:06 PM 03/14/2023

## 2023-03-15 ENCOUNTER — TELEPHONE (OUTPATIENT)
Dept: INTERNAL MEDICINE | Facility: CLINIC | Age: 68
End: 2023-03-15
Payer: MEDICARE

## 2023-03-15 NOTE — TELEPHONE ENCOUNTER
----- Message from Zhou Gallardo MD sent at 3/14/2023  6:45 PM CDT -----  Regarding: Inhaler  Please contact patient. Please see if she is on Breztri and not fluticasone-salmeterol. It is one or the other, not both.

## 2023-03-17 ENCOUNTER — TELEPHONE (OUTPATIENT)
Dept: INTERNAL MEDICINE | Facility: CLINIC | Age: 68
End: 2023-03-17
Payer: MEDICARE

## 2023-03-17 NOTE — TELEPHONE ENCOUNTER
----- Message from Kristina Awan sent at 3/17/2023 10:39 AM CDT -----  Contact: Self/[phone]  Pt said that she is calling in regards to needing to get a return call from the nurse about the medication she has been trying to get . Please advise

## 2023-03-17 NOTE — TELEPHONE ENCOUNTER
Spoke with pt informed that Advair inhalers were called into [facility]'s pharmacy. Also informed that Physician want to see back in office in 1 month. Held slot for Monday 04/17/23 at 12:30, sending msg to advanced  RW.

## 2023-03-17 NOTE — TELEPHONE ENCOUNTER
Nupur with pt, she stated that she use the Advair inhaler and NOT the Breztri. Spoke with the pharmacist and phoned in the Advair inhaler. Pt would like to know when do you want to see her again for a follow up appt.    Please advise,  Thank You.

## 2023-03-20 ENCOUNTER — TELEPHONE (OUTPATIENT)
Dept: PULMONOLOGY | Facility: CLINIC | Age: 68
End: 2023-03-20
Payer: MEDICARE

## 2023-03-20 NOTE — TELEPHONE ENCOUNTER
----- Message from Phyllis Newton CMA sent at 3/20/2023  2:41 PM CDT -----  Regarding: Please call  Contact: [phone]  Zena with Pulmonary rehab at [facility] calling to request PFT and EKG most recent     Fax [phone]    Thank you

## 2023-03-20 NOTE — TELEPHONE ENCOUNTER
I faxed Mrs Aguila's EKG dated 1-26-23 and PFT dated 2-23-23 to Pulmonary Rehab at [facility].[phone]. Anastacia Chavez LPN

## 2023-03-26 ENCOUNTER — DOCUMENT SCAN (OUTPATIENT)
Dept: HOME HEALTH SERVICES | Facility: HOSPITAL | Age: 68
End: 2023-03-26
Payer: MEDICARE

## 2023-03-29 ENCOUNTER — OFFICE VISIT (OUTPATIENT)
Dept: PULMONOLOGY | Facility: CLINIC | Age: 68
End: 2023-03-29
Payer: MEDICARE

## 2023-03-29 ENCOUNTER — HOSPITAL ENCOUNTER (OUTPATIENT)
Dept: RADIOLOGY | Facility: HOSPITAL | Age: 68
Discharge: HOME | End: 2023-03-29
Attending: INTERNAL MEDICINE
Payer: MEDICARE

## 2023-03-29 VITALS
HEART RATE: 71 BPM | DIASTOLIC BLOOD PRESSURE: 68 MMHG | WEIGHT: 161.81 LBS | SYSTOLIC BLOOD PRESSURE: 118 MMHG | HEIGHT: 65 IN | BODY MASS INDEX: 26.96 KG/M2 | OXYGEN SATURATION: 93 %

## 2023-03-29 DIAGNOSIS — J43.2 CENTRILOBULAR EMPHYSEMA: Primary | ICD-10-CM

## 2023-03-29 DIAGNOSIS — J43.2 CENTRILOBULAR EMPHYSEMA: ICD-10-CM

## 2023-03-29 DIAGNOSIS — J93.81 CHRONIC PNEUMOTHORAX: ICD-10-CM

## 2023-03-29 DIAGNOSIS — E55.9 VITAMIN D DEFICIENCY: ICD-10-CM

## 2023-03-29 PROCEDURE — 3008F BODY MASS INDEX DOCD: CPT | Mod: CPTII,S$GLB,, | Performed by: INTERNAL MEDICINE

## 2023-03-29 PROCEDURE — 3008F PR BODY MASS INDEX (BMI) DOCUMENTED: ICD-10-PCS | Mod: CPTII,S$GLB,, | Performed by: INTERNAL MEDICINE

## 2023-03-29 PROCEDURE — 1159F PR MEDICATION LIST DOCUMENTED IN MEDICAL RECORD: ICD-10-PCS | Mod: CPTII,S$GLB,, | Performed by: INTERNAL MEDICINE

## 2023-03-29 PROCEDURE — 4010F ACE/ARB THERAPY RXD/TAKEN: CPT | Mod: CPTII,S$GLB,, | Performed by: INTERNAL MEDICINE

## 2023-03-29 PROCEDURE — 3288F PR FALLS RISK ASSESSMENT DOCUMENTED: ICD-10-PCS | Mod: CPTII,S$GLB,, | Performed by: INTERNAL MEDICINE

## 2023-03-29 PROCEDURE — 3288F FALL RISK ASSESSMENT DOCD: CPT | Mod: CPTII,S$GLB,, | Performed by: INTERNAL MEDICINE

## 2023-03-29 PROCEDURE — 1159F MED LIST DOCD IN RCRD: CPT | Mod: CPTII,S$GLB,, | Performed by: INTERNAL MEDICINE

## 2023-03-29 PROCEDURE — 99214 OFFICE O/P EST MOD 30 MIN: CPT | Mod: S$GLB,,, | Performed by: INTERNAL MEDICINE

## 2023-03-29 PROCEDURE — 3074F SYST BP LT 130 MM HG: CPT | Mod: CPTII,S$GLB,, | Performed by: INTERNAL MEDICINE

## 2023-03-29 PROCEDURE — 99999 PR PBB SHADOW E&M-EST. PATIENT-LVL III: ICD-10-PCS | Mod: PBBFAC,,, | Performed by: INTERNAL MEDICINE

## 2023-03-29 PROCEDURE — 99999 PR PBB SHADOW E&M-EST. PATIENT-LVL III: CPT | Mod: PBBFAC,,, | Performed by: INTERNAL MEDICINE

## 2023-03-29 PROCEDURE — 71046 X-RAY EXAM CHEST 2 VIEWS: CPT | Mod: TC,FY

## 2023-03-29 PROCEDURE — 3078F DIAST BP <80 MM HG: CPT | Mod: CPTII,S$GLB,, | Performed by: INTERNAL MEDICINE

## 2023-03-29 PROCEDURE — 99214 PR OFFICE/OUTPT VISIT, EST, LEVL IV, 30-39 MIN: ICD-10-PCS | Mod: S$GLB,,, | Performed by: INTERNAL MEDICINE

## 2023-03-29 PROCEDURE — 1101F PT FALLS ASSESS-DOCD LE1/YR: CPT | Mod: CPTII,S$GLB,, | Performed by: INTERNAL MEDICINE

## 2023-03-29 PROCEDURE — 71046 X-RAY EXAM CHEST 2 VIEWS: CPT | Mod: 26,,, | Performed by: RADIOLOGY

## 2023-03-29 PROCEDURE — 4010F PR ACE/ARB THEARPY RXD/TAKEN: ICD-10-PCS | Mod: CPTII,S$GLB,, | Performed by: INTERNAL MEDICINE

## 2023-03-29 PROCEDURE — 1125F AMNT PAIN NOTED PAIN PRSNT: CPT | Mod: CPTII,S$GLB,, | Performed by: INTERNAL MEDICINE

## 2023-03-29 PROCEDURE — 1125F PR PAIN SEVERITY QUANTIFIED, PAIN PRESENT: ICD-10-PCS | Mod: CPTII,S$GLB,, | Performed by: INTERNAL MEDICINE

## 2023-03-29 PROCEDURE — 71046 XR CHEST PA AND LATERAL: ICD-10-PCS | Mod: 26,,, | Performed by: RADIOLOGY

## 2023-03-29 PROCEDURE — 3078F PR MOST RECENT DIASTOLIC BLOOD PRESSURE < 80 MM HG: ICD-10-PCS | Mod: CPTII,S$GLB,, | Performed by: INTERNAL MEDICINE

## 2023-03-29 PROCEDURE — 3074F PR MOST RECENT SYSTOLIC BLOOD PRESSURE < 130 MM HG: ICD-10-PCS | Mod: CPTII,S$GLB,, | Performed by: INTERNAL MEDICINE

## 2023-03-29 PROCEDURE — 1101F PR PT FALLS ASSESS DOC 0-1 FALLS W/OUT INJ PAST YR: ICD-10-PCS | Mod: CPTII,S$GLB,, | Performed by: INTERNAL MEDICINE

## 2023-03-29 RX ORDER — ERGOCALCIFEROL 1.25 MG/1
50000 CAPSULE ORAL
Qty: 12 CAPSULE | Refills: 3 | Status: SHIPPED | OUTPATIENT
Start: 2023-03-29

## 2023-03-29 NOTE — PROGRESS NOTES
Subjective:      Patient ID: Ana Aguila is a 67 y.o. female.    Chief Complaint: COPD and Shortness of Breath    HPI67 yo female with advanced  but stable pulmonary emphysema comes for follow up x-ray to monitor a pneumonthrax on the right that [facility]. She awoke in the middle of the night and could not breath, EMT came at took her to [facility]. She was agonal on arrival to the ER  had to be intuabated noted to have a right sided pneumothorax  She was extubated and had to be re intubated. The pneumothorax resolved and she has been doing well. On presentation she is in no distress on 2 L oxygen and Sa02; 94%.  Her chest x-ray shows marked hyperinflation but no active process  Has a linear based scar over the right middle lobe best seen on lateral that goes back many years.     She has been to [place] and back but is doing well and looks. Well  She had moved to [place] to be closer to family but could not work for [employer] [place] [employer] [place]. So has moved  back and lives alone in [place]..       No flowsheet data found.  Review of Systems   Constitutional: Negative.    HENT: Negative.     Eyes: Negative.    Respiratory: Negative.          Compensated Respiratory failure from COPD      Recently hospitalized at [facility] and required intubation x2   Cardiovascular: Negative.    Genitourinary: Negative.    Musculoskeletal: Negative.    Skin: Negative.    Gastrointestinal: Negative.         S/P Partial colectomy after sustaining a perforation during a colonoscope   Neurological: Negative.    Psychiatric/Behavioral: Negative.          At time reactive depression..    Smiling and in good spirits today   Objective:     Physical Exam  Vitals and nursing note reviewed.   Constitutional:       General: She is not in acute distress.     Appearance: She is well-developed.   HENT:      Head: Normocephalic and atraumatic.      Right Ear: External ear normal.      Left Ear: External ear  normal.      Nose: Nose normal.   Eyes:      Conjunctiva/sclera: Conjunctivae normal.      Pupils: Pupils are equal, round, and reactive to light.   Neck:      Thyroid: No thyromegaly.      Vascular: No JVD.   Cardiovascular:      Rate and Rhythm: Normal rate and regular rhythm.      Heart sounds: Normal heart sounds. No murmur heard.    No gallop.   Pulmonary:      Effort: No respiratory distress.      Breath sounds: Normal breath sounds. No stridor. No wheezing or rales.      Comments: Reduced air entry:    No wheezes    Reviewed today's chest x-ray   Clear and full resolution of the right sided pneumothorax      PFT's 2/23: FVC: 0.92 L 36%  & Fev-1:0.34 L 37% of FVC  Chest:      Chest wall: No tenderness.   Abdominal:      General: Bowel sounds are normal. There is no distension.      Palpations: Abdomen is soft. There is no mass.      Tenderness: There is no abdominal tenderness. There is no guarding or rebound.   Musculoskeletal:         General: Normal range of motion.      Cervical back: Normal range of motion and neck supple.   Lymphadenopathy:      Cervical: No cervical adenopathy.   Skin:     General: Skin is warm and dry.      Findings: No rash.   Neurological:      Mental Status: She is alert and oriented to person, place, and time.      Cranial Nerves: No cranial nerve deficit.      Deep Tendon Reflexes: Reflexes are normal and symmetric. Reflexes normal.   Psychiatric:         Behavior: Behavior normal.         Thought Content: Thought content normal.         Judgment: Judgment normal.       Assessment:     1. Centrilobular emphysema    2. Vitamin D deficiency    3. Chronic pneumothorax      Outpatient Encounter Medications as of 3/29/2023   Medication Sig Dispense Refill    albuterol-ipratropium (DUO-NEB) 2.5 mg-0.5 mg/3 mL nebulizer solution TAKE 3 ML BY NEBULIZER EVERY 6 HOURS AS NEEDED FOR WHEEZING OR SHORTNESS OF BREATH 270 mL 6    budesonide-glycopyr-formoterol (BREZI AEROSPHERE) 160-9-4.8  mcg/actuation HFAA Inhale 2 puffs into the lungs 2 (two) times a day. 10.7 g 3    cyanocobalamin (VITAMIN B-12) 100 MCG tablet Take 100 mcg by mouth once daily.      ergocalciferol (ERGOCALCIFEROL) 50,000 unit Cap Take 1 capsule (50,000 Units total) by mouth every 7 days. 12 capsule 3    fluticasone-salmeterol diskus inhaler 250-50 mcg Inhale 1 puff into the lungs 2 (two) times daily. Controller 14 each 0    guaiFENesin (MUCINEX) 600 mg 12 hr tablet Take 1 tablet (600 mg total) by mouth 2 (two) times daily.      hydroCHLOROthiazide (HYDRODIURIL) 25 MG tablet Take 1 tablet (25 mg total) by mouth once daily. 90 tablet 3    ipratropium (ATROVENT) 0.02 % nebulizer solution Take 2.5 mLs (500 mcg total) by nebulization 4 (four) times daily. Rescue 75 mL 0    LORazepam (ATIVAN) 0.5 MG tablet Take 1 by mouth every night. 30 tablet 0    metoprolol succinate (TOPROL-XL) 100 MG 24 hr tablet Take 1 tablet (100 mg total) by mouth once daily. 30 tablet 11    PROAIR HFA 90 mcg/actuation inhaler INHALE 2 PUFFS FOUR TIMES DAILY AS NEEDED FOR COPD 8.5 g 12    rosuvastatin (CRESTOR) 10 MG tablet Take 1 tablet (10 mg total) by mouth once daily. 90 tablet 3    [DISCONTINUED] amLODIPine (NORVASC) 2.5 MG tablet Take 1 tablet (2.5 mg total) by mouth once daily. 30 tablet 11    [DISCONTINUED] ergocalciferol (ERGOCALCIFEROL) 50,000 unit Cap TAKE 1 CAPSULE BY MOUTH EVERY 7 DAYS 12 capsule 1     No facility-administered encounter medications on file as of 3/29/2023.       Plan:     Problem List Items Addressed This Visit       Chronic obstructive pulmonary disease - Primary    Pneumothorax     Other Visit Diagnoses       Vitamin D deficiency        continue weekly high-dose vitamin D    Relevant Medications    ergocalciferol (ERGOCALCIFEROL) 50,000 unit Cap            Severe but stable COPD from emphysema with compensated respiratory failure of 02

## 2023-04-11 ENCOUNTER — TELEPHONE (OUTPATIENT)
Dept: OPTOMETRY | Facility: CLINIC | Age: 68
End: 2023-04-11
Payer: MEDICARE

## 2023-04-11 NOTE — TELEPHONE ENCOUNTER
Scheduled appointment    ----- Message from Geena Epperson sent at 4/10/2023  4:55 PM CDT -----  Contact: [phone]  Keren pt   ----- Message -----  From: Casper Troy  Sent: 4/10/2023   2:24 PM CDT  To: [facility] Clinical Support Staff    Pt is calling because her eyes are red and itchy and her vision is blurry. But states that she is on a medication that can cause these things as a side affect. She was trying to see if she could be seen soon by anyone. She is taking LORazepam (ATIVAN) 0.5 MG tablet and was trying to see if those side affects are something to worry about and if she can treat it. Please call back to further assist.

## 2023-04-13 DIAGNOSIS — J96.11 CHRONIC RESPIRATORY FAILURE WITH HYPOXIA: Primary | ICD-10-CM

## 2023-04-13 DIAGNOSIS — J44.9 CHRONIC OBSTRUCTIVE PULMONARY DISEASE, UNSPECIFIED COPD TYPE: ICD-10-CM

## 2023-04-13 RX ORDER — IPRATROPIUM BROMIDE AND ALBUTEROL SULFATE 2.5; .5 MG/3ML; MG/3ML
SOLUTION RESPIRATORY (INHALATION)
Qty: 270 ML | Refills: 11 | Status: SHIPPED | OUTPATIENT
Start: 2023-04-13 | End: 2024-01-24

## 2023-04-14 ENCOUNTER — TELEPHONE (OUTPATIENT)
Dept: INTERNAL MEDICINE | Facility: CLINIC | Age: 68
End: 2023-04-14
Payer: MEDICARE

## 2023-04-14 NOTE — TELEPHONE ENCOUNTER
Spoke with pt, held slot for Thursday 4/20/23 1 pm will rescheduled 04/17/23 1 pm sending msg to advanced  RW.

## 2023-04-19 ENCOUNTER — TELEPHONE (OUTPATIENT)
Dept: INTERNAL MEDICINE | Facility: CLINIC | Age: 68
End: 2023-04-19
Payer: MEDICARE

## 2023-04-19 ENCOUNTER — EXTERNAL HOME HEALTH (OUTPATIENT)
Dept: HOME HEALTH SERVICES | Facility: HOSPITAL | Age: 68
End: 2023-04-19
Payer: MEDICARE

## 2023-04-19 ENCOUNTER — OFFICE VISIT (OUTPATIENT)
Dept: OPTOMETRY | Facility: CLINIC | Age: 68
End: 2023-04-19
Payer: MEDICARE

## 2023-04-19 DIAGNOSIS — H25.13 NUCLEAR SCLEROSIS OF BOTH EYES: ICD-10-CM

## 2023-04-19 DIAGNOSIS — H04.123 BILATERAL DRY EYES: Primary | ICD-10-CM

## 2023-04-19 PROCEDURE — 1126F AMNT PAIN NOTED NONE PRSNT: CPT | Mod: CPTII,S$GLB,, | Performed by: OPTOMETRIST

## 2023-04-19 PROCEDURE — 1126F PR PAIN SEVERITY QUANTIFIED, NO PAIN PRESENT: ICD-10-PCS | Mod: CPTII,S$GLB,, | Performed by: OPTOMETRIST

## 2023-04-19 PROCEDURE — 3288F PR FALLS RISK ASSESSMENT DOCUMENTED: ICD-10-PCS | Mod: CPTII,S$GLB,, | Performed by: OPTOMETRIST

## 2023-04-19 PROCEDURE — 92002 INTRM OPH EXAM NEW PATIENT: CPT | Mod: S$GLB,,, | Performed by: OPTOMETRIST

## 2023-04-19 PROCEDURE — 1159F MED LIST DOCD IN RCRD: CPT | Mod: CPTII,S$GLB,, | Performed by: OPTOMETRIST

## 2023-04-19 PROCEDURE — 4010F PR ACE/ARB THEARPY RXD/TAKEN: ICD-10-PCS | Mod: CPTII,S$GLB,, | Performed by: OPTOMETRIST

## 2023-04-19 PROCEDURE — 3288F FALL RISK ASSESSMENT DOCD: CPT | Mod: CPTII,S$GLB,, | Performed by: OPTOMETRIST

## 2023-04-19 PROCEDURE — 1101F PT FALLS ASSESS-DOCD LE1/YR: CPT | Mod: CPTII,S$GLB,, | Performed by: OPTOMETRIST

## 2023-04-19 PROCEDURE — 1159F PR MEDICATION LIST DOCUMENTED IN MEDICAL RECORD: ICD-10-PCS | Mod: CPTII,S$GLB,, | Performed by: OPTOMETRIST

## 2023-04-19 PROCEDURE — 4010F ACE/ARB THERAPY RXD/TAKEN: CPT | Mod: CPTII,S$GLB,, | Performed by: OPTOMETRIST

## 2023-04-19 PROCEDURE — 99999 PR PBB SHADOW E&M-EST. PATIENT-LVL III: ICD-10-PCS | Mod: PBBFAC,,, | Performed by: OPTOMETRIST

## 2023-04-19 PROCEDURE — 92002 PR EYE EXAM, NEW PATIENT,INTERMED: ICD-10-PCS | Mod: S$GLB,,, | Performed by: OPTOMETRIST

## 2023-04-19 PROCEDURE — 99999 PR PBB SHADOW E&M-EST. PATIENT-LVL III: CPT | Mod: PBBFAC,,, | Performed by: OPTOMETRIST

## 2023-04-19 PROCEDURE — 1101F PR PT FALLS ASSESS DOC 0-1 FALLS W/OUT INJ PAST YR: ICD-10-PCS | Mod: CPTII,S$GLB,, | Performed by: OPTOMETRIST

## 2023-04-19 NOTE — PATIENT INSTRUCTIONS
Nuclear sclerosis of lens of both eyes, consistent with age.     Bilateral dry eye, presumably exacerbated by oxygen therapy.   Systane Complete artificial tears- use at least every three or four hours in both eyes.    Call/email with progress report on symptoms.     Has appointment with Dr. Lamas on 05/01/2023.  Keep appointment as scheduled.   Return to me (Dr. Veliz) as recommended by Dr. Lamas.

## 2023-04-19 NOTE — TELEPHONE ENCOUNTER
----- Message from Danni Kurtz sent at 4/19/2023 10:03 AM CDT -----  Regarding: [facility]  Contact: [phone]  Pt is requesting an urgent callback       Please callback ASAP in regards to appointment and concerns @ [phone]

## 2023-04-20 ENCOUNTER — OFFICE VISIT (OUTPATIENT)
Dept: INTERNAL MEDICINE | Facility: CLINIC | Age: 68
End: 2023-04-20
Payer: MEDICARE

## 2023-04-20 ENCOUNTER — LAB VISIT (OUTPATIENT)
Dept: LAB | Facility: HOSPITAL | Age: 68
End: 2023-04-20
Attending: INTERNAL MEDICINE
Payer: MEDICARE

## 2023-04-20 VITALS
WEIGHT: 160.06 LBS | HEART RATE: 75 BPM | OXYGEN SATURATION: 98 % | SYSTOLIC BLOOD PRESSURE: 116 MMHG | DIASTOLIC BLOOD PRESSURE: 74 MMHG | BODY MASS INDEX: 26.67 KG/M2 | HEIGHT: 65 IN

## 2023-04-20 DIAGNOSIS — Z98.890 H/O CHEST TUBE PLACEMENT: ICD-10-CM

## 2023-04-20 DIAGNOSIS — E87.6 LOW BLOOD POTASSIUM: ICD-10-CM

## 2023-04-20 DIAGNOSIS — J96.22 ACUTE ON CHRONIC RESPIRATORY FAILURE WITH HYPERCAPNIA: ICD-10-CM

## 2023-04-20 DIAGNOSIS — J44.9 CHRONIC OBSTRUCTIVE PULMONARY DISEASE, UNSPECIFIED COPD TYPE: Primary | ICD-10-CM

## 2023-04-20 DIAGNOSIS — G89.29 CHRONIC CHEST WALL PAIN: ICD-10-CM

## 2023-04-20 DIAGNOSIS — R07.89 CHRONIC CHEST WALL PAIN: ICD-10-CM

## 2023-04-20 LAB
ANION GAP SERPL CALC-SCNC: 11 MMOL/L (ref 8–16)
BUN SERPL-MCNC: 13 MG/DL (ref 8–23)
CALCIUM SERPL-MCNC: 9.4 MG/DL (ref 8.7–10.5)
CHLORIDE SERPL-SCNC: 97 MMOL/L (ref 95–110)
CO2 SERPL-SCNC: 35 MMOL/L (ref 23–29)
CREAT SERPL-MCNC: 0.8 MG/DL (ref 0.5–1.4)
EST. GFR  (NO RACE VARIABLE): >60 ML/MIN/1.73 M^2
GLUCOSE SERPL-MCNC: 87 MG/DL (ref 70–110)
POTASSIUM SERPL-SCNC: 2.9 MMOL/L (ref 3.5–5.1)
SODIUM SERPL-SCNC: 143 MMOL/L (ref 136–145)

## 2023-04-20 PROCEDURE — 3074F SYST BP LT 130 MM HG: CPT | Mod: CPTII,S$GLB,, | Performed by: INTERNAL MEDICINE

## 2023-04-20 PROCEDURE — 1159F PR MEDICATION LIST DOCUMENTED IN MEDICAL RECORD: ICD-10-PCS | Mod: CPTII,S$GLB,, | Performed by: INTERNAL MEDICINE

## 2023-04-20 PROCEDURE — 80048 BASIC METABOLIC PNL TOTAL CA: CPT | Performed by: INTERNAL MEDICINE

## 2023-04-20 PROCEDURE — 3008F BODY MASS INDEX DOCD: CPT | Mod: CPTII,S$GLB,, | Performed by: INTERNAL MEDICINE

## 2023-04-20 PROCEDURE — 3078F PR MOST RECENT DIASTOLIC BLOOD PRESSURE < 80 MM HG: ICD-10-PCS | Mod: CPTII,S$GLB,, | Performed by: INTERNAL MEDICINE

## 2023-04-20 PROCEDURE — 1125F AMNT PAIN NOTED PAIN PRSNT: CPT | Mod: CPTII,S$GLB,, | Performed by: INTERNAL MEDICINE

## 2023-04-20 PROCEDURE — 1159F MED LIST DOCD IN RCRD: CPT | Mod: CPTII,S$GLB,, | Performed by: INTERNAL MEDICINE

## 2023-04-20 PROCEDURE — 3074F PR MOST RECENT SYSTOLIC BLOOD PRESSURE < 130 MM HG: ICD-10-PCS | Mod: CPTII,S$GLB,, | Performed by: INTERNAL MEDICINE

## 2023-04-20 PROCEDURE — 3288F PR FALLS RISK ASSESSMENT DOCUMENTED: ICD-10-PCS | Mod: CPTII,S$GLB,, | Performed by: INTERNAL MEDICINE

## 2023-04-20 PROCEDURE — 1125F PR PAIN SEVERITY QUANTIFIED, PAIN PRESENT: ICD-10-PCS | Mod: CPTII,S$GLB,, | Performed by: INTERNAL MEDICINE

## 2023-04-20 PROCEDURE — 1101F PR PT FALLS ASSESS DOC 0-1 FALLS W/OUT INJ PAST YR: ICD-10-PCS | Mod: CPTII,S$GLB,, | Performed by: INTERNAL MEDICINE

## 2023-04-20 PROCEDURE — 99999 PR PBB SHADOW E&M-EST. PATIENT-LVL IV: ICD-10-PCS | Mod: PBBFAC,,, | Performed by: INTERNAL MEDICINE

## 2023-04-20 PROCEDURE — 1101F PT FALLS ASSESS-DOCD LE1/YR: CPT | Mod: CPTII,S$GLB,, | Performed by: INTERNAL MEDICINE

## 2023-04-20 PROCEDURE — 36415 COLL VENOUS BLD VENIPUNCTURE: CPT | Performed by: INTERNAL MEDICINE

## 2023-04-20 PROCEDURE — 99214 PR OFFICE/OUTPT VISIT, EST, LEVL IV, 30-39 MIN: ICD-10-PCS | Mod: S$GLB,,, | Performed by: INTERNAL MEDICINE

## 2023-04-20 PROCEDURE — 3008F PR BODY MASS INDEX (BMI) DOCUMENTED: ICD-10-PCS | Mod: CPTII,S$GLB,, | Performed by: INTERNAL MEDICINE

## 2023-04-20 PROCEDURE — 99214 OFFICE O/P EST MOD 30 MIN: CPT | Mod: S$GLB,,, | Performed by: INTERNAL MEDICINE

## 2023-04-20 PROCEDURE — 3288F FALL RISK ASSESSMENT DOCD: CPT | Mod: CPTII,S$GLB,, | Performed by: INTERNAL MEDICINE

## 2023-04-20 PROCEDURE — 4010F ACE/ARB THERAPY RXD/TAKEN: CPT | Mod: CPTII,S$GLB,, | Performed by: INTERNAL MEDICINE

## 2023-04-20 PROCEDURE — 4010F PR ACE/ARB THEARPY RXD/TAKEN: ICD-10-PCS | Mod: CPTII,S$GLB,, | Performed by: INTERNAL MEDICINE

## 2023-04-20 PROCEDURE — 3078F DIAST BP <80 MM HG: CPT | Mod: CPTII,S$GLB,, | Performed by: INTERNAL MEDICINE

## 2023-04-20 PROCEDURE — 99999 PR PBB SHADOW E&M-EST. PATIENT-LVL IV: CPT | Mod: PBBFAC,,, | Performed by: INTERNAL MEDICINE

## 2023-04-21 ENCOUNTER — TELEPHONE (OUTPATIENT)
Dept: INTERNAL MEDICINE | Facility: CLINIC | Age: 68
End: 2023-04-21
Payer: MEDICARE

## 2023-04-21 ENCOUNTER — DOCUMENT SCAN (OUTPATIENT)
Dept: HOME HEALTH SERVICES | Facility: HOSPITAL | Age: 68
End: 2023-04-21
Payer: MEDICARE

## 2023-04-21 DIAGNOSIS — E87.6 HYPOKALEMIA: Primary | ICD-10-CM

## 2023-04-21 RX ORDER — POTASSIUM CHLORIDE 20 MEQ/1
20 TABLET, EXTENDED RELEASE ORAL DAILY
Qty: 30 TABLET | Refills: 4 | Status: SHIPPED | OUTPATIENT
Start: 2023-04-21 | End: 2023-09-25 | Stop reason: SDUPTHER

## 2023-04-21 NOTE — TELEPHONE ENCOUNTER
----- Message from Zhou Gallardo MD sent at 4/21/2023  3:24 PM CDT -----  Patient's potassium level is low. I called in a prescription for potassium to her pharmacy. She is to take one a day. She will need a repeat BMP in one month.

## 2023-04-21 NOTE — PROGRESS NOTES
CC:  Follow-up    HPI:  The patient is a 67-year-old female with COPD, hypertension, hyperlipidemia, reflux right hemicolectomy secondary to GI bleed polypectomy and history of a recent COVID-19 infection was seen approximately 1 month ago for acute on chronic respiratory failure secondary to COPD exacerbation.  The patient had been intubated for short period of time.  He was discharged in to 2.5 L cannula well as BiPAP at night.  She was changed to Breztri.  She found the medication did not agree with her and went back to [facility].  Her amlodipine and losartan were held during her hospitalization.  Her blood pressure has remained stable on just HCTZ and metoprolol.  The patient complains today chest wall pain on both left and right side in the mid axillary line.    ROS: Patient does have problems with dry eyes and blurry vision.  She still gets out of breath with exertion.  The patient is very concerned about spontaneous pneumothorax.  She is planning on going to her daughter's house in [place] at the end of May for grandchild graduation.  She is inquiring whether she can fly or not.    Physical exam:   General appearance:  No acute distress   HEENT:  Trachea is midline without JVD   Pulmonary:  Fair inspiratory, expiratory breath sounds were heard.  Her lungs sound clear without any crackles or wheezing.    Cardiovascular:  S1-S2, rhythm is regular.  Extremities without edema.    GI: Abdomen is nontender, nondistended without hepatosplenomegaly    Assessment:   1. COPD  2.  Episode of acute on chronic respiratory failure with hypercapnia  3.  History of chest 2 platelets for spontaneous pneumothorax  4.  Bilateral chest wall pain  5. Low potassium review of labs    Plan:  1. Will schedule a CT of the chest  2.  Repeat a BMP

## 2023-04-23 ENCOUNTER — DOCUMENT SCAN (OUTPATIENT)
Dept: HOME HEALTH SERVICES | Facility: HOSPITAL | Age: 68
End: 2023-04-23
Payer: MEDICARE

## 2023-04-24 ENCOUNTER — PES CALL (OUTPATIENT)
Dept: ADMINISTRATIVE | Facility: CLINIC | Age: 68
End: 2023-04-24
Payer: MEDICARE

## 2023-04-24 DIAGNOSIS — E87.6 HYPOKALEMIA: Primary | ICD-10-CM

## 2023-04-24 PROBLEM — J96.21 ACUTE ON CHRONIC RESPIRATORY FAILURE WITH HYPOXIA AND HYPERCAPNIA: Status: RESOLVED | Noted: 2022-07-23 | Resolved: 2023-04-24

## 2023-04-24 PROBLEM — J96.22 ACUTE ON CHRONIC RESPIRATORY FAILURE WITH HYPOXIA AND HYPERCAPNIA: Status: RESOLVED | Noted: 2022-07-23 | Resolved: 2023-04-24

## 2023-04-24 PROBLEM — J96.90 RESPIRATORY FAILURE REQUIRING INTUBATION: Status: RESOLVED | Noted: 2023-01-20 | Resolved: 2023-04-24

## 2023-04-24 PROBLEM — J96.02 ACUTE RESPIRATORY FAILURE WITH HYPERCAPNIA: Status: RESOLVED | Noted: 2023-01-21 | Resolved: 2023-04-24

## 2023-04-24 RX ORDER — LORAZEPAM 0.5 MG/1
TABLET ORAL
Qty: 30 TABLET | Refills: 0 | Status: SHIPPED | OUTPATIENT
Start: 2023-04-24 | End: 2023-05-22

## 2023-04-24 NOTE — TELEPHONE ENCOUNTER
No new care gaps identified.  [facility] Embedded Care Gaps. Reference number: [id number]. 4/24/2023   1:30:02 PM CDT

## 2023-04-24 NOTE — TELEPHONE ENCOUNTER
----- Message from Partha Chaudhry sent at 4/24/2023 11:53 AM CDT -----  Contact: [phone]  Pt said she needs a call back about some questions she has.

## 2023-04-24 NOTE — TELEPHONE ENCOUNTER
Spoke with pt, she is currently taking Potassium CL Micro 10 MEQ ER Tab. Pt stated that she been taking since December 2022 and has a whole bottle of this with a refill good until September 2023. Pt report that she take 1 tablet daily and would like to not spend money on purchasing medication that she already has. Should pt increase to two tablets. Please advise on if pt can continue using this medication.     Please advise,  Thank You.

## 2023-04-25 NOTE — TELEPHONE ENCOUNTER
Spoke with pt informed about the Potassium to increase to 2 tablets. Scheduled lab appt in June 2023 and rescheduled CT scan back to Saturday.

## 2023-04-26 ENCOUNTER — TELEPHONE (OUTPATIENT)
Dept: ADMINISTRATIVE | Facility: CLINIC | Age: 68
End: 2023-04-26
Payer: MEDICARE

## 2023-04-26 ENCOUNTER — PATIENT MESSAGE (OUTPATIENT)
Dept: ADMINISTRATIVE | Facility: CLINIC | Age: 68
End: 2023-04-26
Payer: MEDICARE

## 2023-04-26 NOTE — TELEPHONE ENCOUNTER
Called pt; informed pt I was calling to confirm her virtual EAWV on 4/28/23 at 11:00am and to see if she needed any help; pt stated she did not need any help and would complete e-pre check later today; pt informed to login 15 minutes prior to appt time; [employer] message sent   [Mother] : mother [Yes] : Patient goes to dentist yearly [Up to date] : Up to date [Eats meals with family] : eats meals with family [Has family members/adults to turn to for help] : has family members/adults to turn to for help [Grade: ____] : Grade: [unfilled] [Normal Performance] : normal performance [Normal Behavior/Attention] : normal behavior/attention [Normal Homework] : normal homework [Eats regular meals including adequate fruits and vegetables] : eats regular meals including adequate fruits and vegetables [Drinks non-sweetened liquids] : drinks non-sweetened liquids  [Has friends] : has friends [At least 1 hour of physical activity a day] : at least 1 hour of physical activity a day [Has interests/participates in community activities/volunteers] : has interests/participates in community activities/volunteers. [Drinks alcohol] : drinks alcohol [Has ways to cope with stress] : has ways to cope with stress [Displays self-confidence] : displays self-confidence [Sleep Concerns] : no sleep concerns [Has concerns about body or appearance] : does not have concerns about body or appearance [Screen time (except homework) less than 2 hours a day] : no screen time (except homework) less than 2 hours a day [Uses electronic nicotine delivery system] : does not use electronic nicotine delivery system [Uses tobacco] : does not use tobacco [Has problems with sleep] : does not have problems with sleep [Gets depressed, anxious, or irritable/has mood swings] : does not get depressed, anxious, or irritable/has mood swings [Has thought about hurting self or considered suicide] : has not thought about hurting self or considered suicide [de-identified] : College Freshman [de-identified] : lacrosse at [employer]  [FreeTextEntry1] : This is a 18 year M here for routine exam and immunizations . parents deny any recent illnesses or ER visits\par

## 2023-04-28 ENCOUNTER — TELEPHONE (OUTPATIENT)
Dept: ADMINISTRATIVE | Facility: CLINIC | Age: 68
End: 2023-04-28
Payer: MEDICARE

## 2023-04-28 ENCOUNTER — OFFICE VISIT (OUTPATIENT)
Dept: FAMILY MEDICINE | Facility: CLINIC | Age: 68
End: 2023-04-28
Payer: MEDICARE

## 2023-04-28 VITALS — BODY MASS INDEX: 26.66 KG/M2 | WEIGHT: 160 LBS | HEIGHT: 65 IN

## 2023-04-28 DIAGNOSIS — F39 MOOD DISORDER: ICD-10-CM

## 2023-04-28 DIAGNOSIS — J44.9 CHRONIC OBSTRUCTIVE PULMONARY DISEASE, UNSPECIFIED COPD TYPE: ICD-10-CM

## 2023-04-28 DIAGNOSIS — F15.20 OTHER STIMULANT DEPENDENCE, UNCOMPLICATED: ICD-10-CM

## 2023-04-28 DIAGNOSIS — J96.11 CHRONIC RESPIRATORY FAILURE WITH HYPOXIA, ON HOME OXYGEN THERAPY: ICD-10-CM

## 2023-04-28 DIAGNOSIS — I70.0 AORTIC ATHEROSCLEROSIS: ICD-10-CM

## 2023-04-28 DIAGNOSIS — J84.10 CALCIFIED GRANULOMA OF LUNG: ICD-10-CM

## 2023-04-28 DIAGNOSIS — I10 ESSENTIAL HYPERTENSION: ICD-10-CM

## 2023-04-28 DIAGNOSIS — Z99.81 CHRONIC RESPIRATORY FAILURE WITH HYPOXIA, ON HOME OXYGEN THERAPY: ICD-10-CM

## 2023-04-28 DIAGNOSIS — Z00.00 ENCOUNTER FOR PREVENTIVE HEALTH EXAMINATION: Primary | ICD-10-CM

## 2023-04-28 DIAGNOSIS — Z99.81 DEPENDENCE ON SUPPLEMENTAL OXYGEN: ICD-10-CM

## 2023-04-28 PROCEDURE — 1101F PT FALLS ASSESS-DOCD LE1/YR: CPT | Mod: CPTII,95,,

## 2023-04-28 PROCEDURE — 4010F ACE/ARB THERAPY RXD/TAKEN: CPT | Mod: CPTII,95,,

## 2023-04-28 PROCEDURE — 1126F PR PAIN SEVERITY QUANTIFIED, NO PAIN PRESENT: ICD-10-PCS | Mod: CPTII,95,,

## 2023-04-28 PROCEDURE — 3288F PR FALLS RISK ASSESSMENT DOCUMENTED: ICD-10-PCS | Mod: CPTII,95,,

## 2023-04-28 PROCEDURE — G0439 PPPS, SUBSEQ VISIT: HCPCS | Mod: 95,,,

## 2023-04-28 PROCEDURE — 1159F MED LIST DOCD IN RCRD: CPT | Mod: CPTII,95,,

## 2023-04-28 PROCEDURE — 3008F BODY MASS INDEX DOCD: CPT | Mod: CPTII,95,,

## 2023-04-28 PROCEDURE — 3288F FALL RISK ASSESSMENT DOCD: CPT | Mod: CPTII,95,,

## 2023-04-28 PROCEDURE — G0439 PR MEDICARE ANNUAL WELLNESS SUBSEQUENT VISIT: ICD-10-PCS | Mod: 95,,,

## 2023-04-28 PROCEDURE — 3008F PR BODY MASS INDEX (BMI) DOCUMENTED: ICD-10-PCS | Mod: CPTII,95,,

## 2023-04-28 PROCEDURE — 1101F PR PT FALLS ASSESS DOC 0-1 FALLS W/OUT INJ PAST YR: ICD-10-PCS | Mod: CPTII,95,,

## 2023-04-28 PROCEDURE — 4010F PR ACE/ARB THEARPY RXD/TAKEN: ICD-10-PCS | Mod: CPTII,95,,

## 2023-04-28 PROCEDURE — 1159F PR MEDICATION LIST DOCUMENTED IN MEDICAL RECORD: ICD-10-PCS | Mod: CPTII,95,,

## 2023-04-28 PROCEDURE — 1160F PR REVIEW ALL MEDS BY PRESCRIBER/CLIN PHARMACIST DOCUMENTED: ICD-10-PCS | Mod: CPTII,95,,

## 2023-04-28 PROCEDURE — 1160F RVW MEDS BY RX/DR IN RCRD: CPT | Mod: CPTII,95,,

## 2023-04-28 PROCEDURE — 1126F AMNT PAIN NOTED NONE PRSNT: CPT | Mod: CPTII,95,,

## 2023-04-28 NOTE — TELEPHONE ENCOUNTER
Pt called in and stated she was having issues joining the visit from her phone and laptop; pt stated she keeps getting an error message; walked pt through steps to sign in to portal and join visit but pt still have getting error message; informed pt [facility] to get the issue resolved; pt verbalized understanding and pt was transferred to

## 2023-04-28 NOTE — PROGRESS NOTES
The patient location is: [place]  The chief complaint leading to consultation is: Medicare Wellness Visit       Visit type: audiovisual     Face to Face time with patient: 25  60 minutes of total time spent on the encounter, which includes face to face time and non-face to face time preparing to see the patient (eg, review of tests), Obtaining and/or reviewing separately obtained history, Documenting clinical information in the electronic or other health record, Independently interpreting results (not separately reported) and communicating results to the patient/family/caregiver, or Care coordination (not separately reported).            Each patient to whom he or she provides medical services by telemedicine is:  (1) informed of the relationship between the physician and patient and the respective role of any other health care provider with respect to management of the patient; and (2) notified that he or she may decline to receive medical services by telemedicine and may withdraw from such care at any time.      Ana Aguila presented for a  Medicare AWV and comprehensive Health Risk Assessment today. The following components were reviewed and updated:    Medical history  Family History  Social history  Allergies and Current Medications  Health Risk Assessment  Health Maintenance  Care Team         ** See Completed Assessments for Annual Wellness Visit within the encounter summary.**         The following assessments were completed:  Living Situation  CAGE  Depression Screening  Timed Get Up and Go  Whisper Test  Cognitive Function Screening  Nutrition Screening  ADL Screening  PAQ Screening      Review for Opioid Screening: Pt does not have Rx for Opioids      Review for Substance Use Disorders: Patient does use substance        Current Outpatient Medications:     albuterol-ipratropium (DUO-NEB) 2.5 mg-0.5 mg/3 mL nebulizer solution, TAKE 3 ML BY NEBULIZER EVERY 6 HOURS AS NEEDED FOR WHEEZING OR SHORTNESS OF  "BREATH, Disp: 270 mL, Rfl: 11    cyanocobalamin (VITAMIN B-12) 100 MCG tablet, Take 100 mcg by mouth once daily., Disp: , Rfl:     ergocalciferol (ERGOCALCIFEROL) 50,000 unit Cap, Take 1 capsule (50,000 Units total) by mouth every 7 days., Disp: 12 capsule, Rfl: 3    fluticasone-salmeterol diskus inhaler 250-50 mcg, Inhale 1 puff into the lungs 2 (two) times daily. Controller, Disp: 14 each, Rfl: 0    hydroCHLOROthiazide (HYDRODIURIL) 25 MG tablet, Take 1 tablet (25 mg total) by mouth once daily., Disp: 90 tablet, Rfl: 3    ipratropium (ATROVENT) 0.02 % nebulizer solution, Take 2.5 mLs (500 mcg total) by nebulization 4 (four) times daily. Rescue, Disp: 75 mL, Rfl: 0    LORazepam (ATIVAN) 0.5 MG tablet, Take 1 by mouth every night., Disp: 30 tablet, Rfl: 0    metoprolol succinate (TOPROL-XL) 100 MG 24 hr tablet, Take 1 tablet (100 mg total) by mouth once daily., Disp: 30 tablet, Rfl: 11    potassium chloride SA (K-DUR,KLOR-CON) 20 MEQ tablet, Take 1 tablet (20 mEq total) by mouth once daily., Disp: 30 tablet, Rfl: 4    PROAIR HFA 90 mcg/actuation inhaler, INHALE 2 PUFFS FOUR TIMES DAILY AS NEEDED FOR COPD, Disp: 8.5 g, Rfl: 12    rosuvastatin (CRESTOR) 10 MG tablet, Take 1 tablet (10 mg total) by mouth once daily., Disp: 90 tablet, Rfl: 3    budesonide-glycopyr-formoterol (BREZTRI AEROSPHERE) 160-9-4.8 mcg/actuation HFAA, Inhale 2 puffs into the lungs 2 (two) times a day. (Patient not taking: Reported on 4/20/2023), Disp: 10.7 g, Rfl: 3    guaiFENesin (MUCINEX) 600 mg 12 hr tablet, Take 1 tablet (600 mg total) by mouth 2 (two) times daily. (Patient not taking: Reported on 4/28/2023), Disp: , Rfl:          Vitals:    04/28/23 1152   Weight: 72.6 kg (160 lb)   Height: 5' 5" (1.651 m)   PainSc: 0-No pain      Physical Exam  Constitutional:       General: She is not in acute distress.     Appearance: Normal appearance. She is well-developed and well-groomed. She is not ill-appearing or toxic-appearing.   Pulmonary:      " Effort: No respiratory distress.   Skin:     Coloration: Skin is not pale.   Neurological:      Mental Status: She is alert and oriented to person, place, and time.   Psychiatric:         Mood and Affect: Mood normal.         Speech: Speech normal.         Behavior: Behavior normal. Behavior is cooperative.         Thought Content: Thought content normal.   Physical exam limited d/t virtual visit. Patient does not appear to be in any respiratory distress. Able to speak in complete sentences without becoming short of breath.           Diagnoses and health risks identified today and associated recommendations/orders:    1. Encounter for preventive health examination      2. Mood disorder  Chronic; stable. Followed by PCP, seen in the past by Behavioral Health and psychiatry.     3. Other stimulant dependence, uncomplicated  Chronic; stable. Followed by PCP. Discussed using only when absolutely necessary. Reviewed potential drug use risk factors.    4. Chronic obstructive pulmonary disease, unspecified COPD type  Chronic; stable on medication. Followed by Pulmonology.     5. Chronic respiratory failure with hypoxia, on home oxygen therapy  Chronic; stable on Home O2 - 2L NC. Followed by Pulmonology.     6. Calcified granuloma of lung  Chronic; stable. Followed by Pulmonology.    7. Aortic atherosclerosis  Chronic; stable on medication. Followed by Cardiology.     8. Essential hypertension  Chronic; stable on medication. Followed by PCP.     9. Dependence on supplemental oxygen  Chronic; stable on 2 L NC. Followed by Pulmonology.       Provided Ana with a 5-10 year written screening schedule and personal prevention plan. Recommendations were developed using the USPSTF age appropriate recommendations. Education, counseling, and referrals were provided as needed. After Visit Summary printed and given to patient which includes a list of additional screenings\tests needed.    Follow up in about 1 year (around  4/28/2024).    Eula Cardoza, NP    Advance Care Planning   I offered to discuss advanced care planning, including how to pick a person who would make decisions for you if you were unable to make them for yourself, called a health care power of , and what kind of decisions you might make such as use of life sustaining treatments such as ventilators and tube feeding when faced with a life limiting illness recorded on a living will that they will need to know. (How you want to be cared for as you near the end of your natural life)     X Patient is interested in learning more about how to make advanced directives.  I provided them paperwork and offered to discuss this with them.

## 2023-04-28 NOTE — PATIENT INSTRUCTIONS
Counseling and Referral of Other Preventative  (Italic type indicates deductible and co-insurance are waived)    Patient Name: Ana Aguila  Today's Date: 4/28/2023    Health Maintenance       Date Due Completion Date    Shingles Vaccine (1 of 2) Never done ---    DEXA Scan 10/11/2020 10/11/2018    Colorectal Cancer Screening 11/17/2021 11/16/2021    TETANUS VACCINE 07/12/2023 (Originally 8/15/1973) ---    Hemoglobin A1c (Prediabetes) 09/09/2023 9/9/2022    Mammogram 09/15/2023 9/15/2022    Lipid Panel 07/27/2026 7/27/2021        No orders of the defined types were placed in this encounter.    The following information is provided to all patients.  This information is to help you find resources for any of the problems found today that may be affecting your health:                Living healthy guide: www.[facility]      Understanding Diabetes: www.diabetes.org      Eating healthy: www.cdc.gov/healthyweight      [facility] home safety checklist: www.cdc.gov/steadi/patient.html      Agency on Aging: www.goea.[place].[facility]      Alcoholics anonymous (AA): www.aa.org      Physical Activity: www.lucia.nih.gov/pi6wudx      Tobacco use: www.quitwithusla.org

## 2023-04-29 ENCOUNTER — HOSPITAL ENCOUNTER (OUTPATIENT)
Dept: RADIOLOGY | Facility: HOSPITAL | Age: 68
Discharge: HOME | End: 2023-04-29
Attending: INTERNAL MEDICINE
Payer: MEDICARE

## 2023-04-29 DIAGNOSIS — R07.89 CHRONIC CHEST WALL PAIN: ICD-10-CM

## 2023-04-29 DIAGNOSIS — Z98.890 H/O CHEST TUBE PLACEMENT: ICD-10-CM

## 2023-04-29 DIAGNOSIS — G89.29 CHRONIC CHEST WALL PAIN: ICD-10-CM

## 2023-04-29 DIAGNOSIS — J44.9 CHRONIC OBSTRUCTIVE PULMONARY DISEASE, UNSPECIFIED COPD TYPE: ICD-10-CM

## 2023-04-29 PROCEDURE — 71250 CT THORAX DX C-: CPT | Mod: TC

## 2023-04-29 PROCEDURE — 71250 CT CHEST WITHOUT CONTRAST: ICD-10-PCS | Mod: 26,,, | Performed by: RADIOLOGY

## 2023-04-29 PROCEDURE — 71250 CT THORAX DX C-: CPT | Mod: 26,,, | Performed by: RADIOLOGY

## 2023-04-30 NOTE — PROGRESS NOTES
"HPI    67 yr old BF        Approximate date of last eye examination:  05/15/2017  Name of last eye doctor seen:  Dr. Veliz       Wears glasses? Only uses +1.25 or +1.50 otc readers  Wears CLs?:  no  Headaches?yes   Eye pain/discomfort?  Eye pain when turning her head , itch , scratchy ,   film OU                                                                                       Flashes?  no  Floaters?  No  Diplopia/Double vision?  no  Patient's Ocular History:         Any eye surgeries?  no         Any eye injury?  no         Any treatment for eye disease?  no  Family history of eye disease?  Late dad-detached retina- grandmother   cataracts  Significant patient medical history:         1. Diabetes?  no       If yes, IDDM or NIDDM?  n/a   2. HBP?  yes              3. Other (describe):  Borderline high cholesterol, COPD   ! OTC eyedrops currently using:  eye wash once a month    ! Prescription eye meds currently using: No   ! Any history of allergy/adverse reaction to any eye meds used   previously?  no    ! Any history of allergy/adverse reaction to eyedrops used during prior   eye exam(s)? no    ! Any history of allergy/adverse reaction to Novacaine or similar meds?   no   ! Any history of allergy/adverse reaction to Epinephrine or similar meds?   no    ! Patient okay with use of anesthetic eyedrops to check eye pressure? yes            ! Patient okay with use of eyedrops to dilate pupils today? yes    !  Allergies/Medications/Medical History/Family History reviewed today?    yes    PD =   68/64  Desired reading distance =  16.75"                                                               Last edited by Kyler Cao MA on 4/19/2023 12:03 PM.            Assessment /Plan     For exam results, see Encounter Report.    Bilateral dry eyes    Nuclear sclerosis of both eyes                 Nuclear sclerosis of lens of both eyes, consistent with age.     Bilateral dry eye, presumably exacerbated by oxygen " therapy.   Systane Complete artificial tears- use at least every three or four hours in both eyes.    Call/email with progress report on symptoms.     Has appointment with Dr. Lamas on 05/01/2023.  Keep appointment as scheduled.   Return to me (Dr. Veliz) as recommended by Dr. Lamas.

## 2023-05-09 ENCOUNTER — HOSPITAL ENCOUNTER (OUTPATIENT)
Dept: PULMONOLOGY | Facility: CLINIC | Age: 68
Discharge: HOME | End: 2023-05-09
Payer: MEDICARE

## 2023-05-09 ENCOUNTER — OFFICE VISIT (OUTPATIENT)
Dept: PULMONOLOGY | Facility: CLINIC | Age: 68
End: 2023-05-09
Payer: MEDICARE

## 2023-05-09 VITALS
WEIGHT: 161 LBS | BODY MASS INDEX: 26.82 KG/M2 | HEART RATE: 73 BPM | SYSTOLIC BLOOD PRESSURE: 126 MMHG | OXYGEN SATURATION: 94 % | HEIGHT: 65 IN | DIASTOLIC BLOOD PRESSURE: 74 MMHG

## 2023-05-09 DIAGNOSIS — J43.2 CENTRILOBULAR EMPHYSEMA: ICD-10-CM

## 2023-05-09 DIAGNOSIS — J96.11 CHRONIC RESPIRATORY FAILURE WITH HYPOXIA: Primary | ICD-10-CM

## 2023-05-09 PROCEDURE — 99999 PR PBB SHADOW E&M-EST. PATIENT-LVL III: ICD-10-PCS | Mod: PBBFAC,,, | Performed by: INTERNAL MEDICINE

## 2023-05-09 PROCEDURE — 3078F PR MOST RECENT DIASTOLIC BLOOD PRESSURE < 80 MM HG: ICD-10-PCS | Mod: CPTII,S$GLB,, | Performed by: INTERNAL MEDICINE

## 2023-05-09 PROCEDURE — 1159F PR MEDICATION LIST DOCUMENTED IN MEDICAL RECORD: ICD-10-PCS | Mod: CPTII,S$GLB,, | Performed by: INTERNAL MEDICINE

## 2023-05-09 PROCEDURE — 99999 PR PBB SHADOW E&M-EST. PATIENT-LVL III: CPT | Mod: PBBFAC,,, | Performed by: INTERNAL MEDICINE

## 2023-05-09 PROCEDURE — 3288F PR FALLS RISK ASSESSMENT DOCUMENTED: ICD-10-PCS | Mod: CPTII,S$GLB,, | Performed by: INTERNAL MEDICINE

## 2023-05-09 PROCEDURE — 3008F PR BODY MASS INDEX (BMI) DOCUMENTED: ICD-10-PCS | Mod: CPTII,S$GLB,, | Performed by: INTERNAL MEDICINE

## 2023-05-09 PROCEDURE — 3074F SYST BP LT 130 MM HG: CPT | Mod: CPTII,S$GLB,, | Performed by: INTERNAL MEDICINE

## 2023-05-09 PROCEDURE — 1101F PT FALLS ASSESS-DOCD LE1/YR: CPT | Mod: CPTII,S$GLB,, | Performed by: INTERNAL MEDICINE

## 2023-05-09 PROCEDURE — 1126F PR PAIN SEVERITY QUANTIFIED, NO PAIN PRESENT: ICD-10-PCS | Mod: CPTII,S$GLB,, | Performed by: INTERNAL MEDICINE

## 2023-05-09 PROCEDURE — 99214 OFFICE O/P EST MOD 30 MIN: CPT | Mod: S$GLB,,, | Performed by: INTERNAL MEDICINE

## 2023-05-09 PROCEDURE — 1126F AMNT PAIN NOTED NONE PRSNT: CPT | Mod: CPTII,S$GLB,, | Performed by: INTERNAL MEDICINE

## 2023-05-09 PROCEDURE — 1159F MED LIST DOCD IN RCRD: CPT | Mod: CPTII,S$GLB,, | Performed by: INTERNAL MEDICINE

## 2023-05-09 PROCEDURE — 3078F DIAST BP <80 MM HG: CPT | Mod: CPTII,S$GLB,, | Performed by: INTERNAL MEDICINE

## 2023-05-09 PROCEDURE — 3008F BODY MASS INDEX DOCD: CPT | Mod: CPTII,S$GLB,, | Performed by: INTERNAL MEDICINE

## 2023-05-09 PROCEDURE — 99214 PR OFFICE/OUTPT VISIT, EST, LEVL IV, 30-39 MIN: ICD-10-PCS | Mod: S$GLB,,, | Performed by: INTERNAL MEDICINE

## 2023-05-09 PROCEDURE — 1101F PR PT FALLS ASSESS DOC 0-1 FALLS W/OUT INJ PAST YR: ICD-10-PCS | Mod: CPTII,S$GLB,, | Performed by: INTERNAL MEDICINE

## 2023-05-09 PROCEDURE — 3074F PR MOST RECENT SYSTOLIC BLOOD PRESSURE < 130 MM HG: ICD-10-PCS | Mod: CPTII,S$GLB,, | Performed by: INTERNAL MEDICINE

## 2023-05-09 PROCEDURE — 4010F PR ACE/ARB THEARPY RXD/TAKEN: ICD-10-PCS | Mod: CPTII,S$GLB,, | Performed by: INTERNAL MEDICINE

## 2023-05-09 PROCEDURE — 3288F FALL RISK ASSESSMENT DOCD: CPT | Mod: CPTII,S$GLB,, | Performed by: INTERNAL MEDICINE

## 2023-05-09 PROCEDURE — 4010F ACE/ARB THERAPY RXD/TAKEN: CPT | Mod: CPTII,S$GLB,, | Performed by: INTERNAL MEDICINE

## 2023-05-09 NOTE — PROGRESS NOTES
Subjective:      Patient ID: Ana Aguila is a 67 y.o. female.    Chief Complaint: COPD and Shortness of Breath    HPI  68 yo female who I have followed for COPD since the 1990's She was recently hosptiaized for acute respiratory failure complicated a pneumothorax of the right lung post resuscitation. She is dong quite well at the moment, her spirits are good, except she says that she will miss me after all these years.   She is oxygen dependent and today on 2 liters she has 97% Saturation    Her  peak flow is 135 L / min and FVC: 0.92 L 36% and Fev-1: 0.34 L 36 % of FVC  ( These numbers are as about as low as they go!)    She is oriented and alert       67 y.o.    Needs review if Total Development score is :  Below 11 (2 year 11 month old)  Below 12 (3 year old)  Below 13 (3 year 1 month old)  Below 14 (3 year 2-3 month old)  Below 15 (3 year 4-5 month old)  Below 16 (3 year 6-7 month old)  Below 17 (3 year 8-10 month old)% & Fev-1: 0.34 L 37%      No flowsheet data found.  Review of Systems   Constitutional: Negative.    HENT: Negative.     Eyes: Negative.    Respiratory: Negative.          Recent hospitalization with respiratory failure that requited intubabtion x 2 and complicated by a pneumothorax of the right lung.  Doing well now   Cardiovascular: Negative.    Genitourinary: Negative.    Musculoskeletal: Negative.    Skin: Negative.    Gastrointestinal: Negative.         S/P Partial colectomy after perforation of colon during a colonoscope   Neurological: Negative.    Psychiatric/Behavioral: Negative.     Objective:     Physical Exam  Vitals and nursing note reviewed.   Constitutional:       General: She is not in acute distress.     Appearance: She is well-developed.   HENT:      Head: Normocephalic and atraumatic.      Right Ear: External ear normal.      Left Ear: External ear normal.      Nose: Nose normal.   Eyes:      Conjunctiva/sclera: Conjunctivae normal.      Pupils: Pupils are equal, round, and  reactive to light.   Neck:      Thyroid: No thyromegaly.      Vascular: No JVD.   Cardiovascular:      Rate and Rhythm: Normal rate and regular rhythm.      Heart sounds: Normal heart sounds. No murmur heard.    No gallop.   Pulmonary:      Effort: No respiratory distress.      Breath sounds: Normal breath sounds. No stridor. No wheezing or rales.      Comments: Decreased air entry    Recent Chest film shows marked hyperinflation other wise normal  Chest:      Chest wall: No tenderness.   Abdominal:      General: Bowel sounds are normal. There is no distension.      Palpations: Abdomen is soft. There is no mass.      Tenderness: There is no abdominal tenderness. There is no guarding or rebound.   Musculoskeletal:         General: Normal range of motion.      Cervical back: Normal range of motion and neck supple.   Lymphadenopathy:      Cervical: No cervical adenopathy.   Skin:     General: Skin is warm and dry.      Findings: No rash.   Neurological:      Mental Status: She is alert and oriented to person, place, and time.      Cranial Nerves: No cranial nerve deficit.      Deep Tendon Reflexes: Reflexes are normal and symmetric. Reflexes normal.   Psychiatric:         Behavior: Behavior normal.         Thought Content: Thought content normal.         Judgment: Judgment normal.       Assessment:     1. Chronic respiratory failure with hypoxia    2. Centrilobular emphysema      Outpatient Encounter Medications as of 5/9/2023   Medication Sig Dispense Refill    albuterol-ipratropium (DUO-NEB) 2.5 mg-0.5 mg/3 mL nebulizer solution TAKE 3 ML BY NEBULIZER EVERY 6 HOURS AS NEEDED FOR WHEEZING OR SHORTNESS OF BREATH 270 mL 11    budesonide-glycopyr-formoterol (BREZTRI AEROSPHERE) 160-9-4.8 mcg/actuation HFAA Inhale 2 puffs into the lungs 2 (two) times a day. (Patient not taking: Reported on 4/20/2023) 10.7 g 3    cyanocobalamin (VITAMIN B-12) 100 MCG tablet Take 100 mcg by mouth once daily.      ergocalciferol  (ERGOCALCIFEROL) 50,000 unit Cap Take 1 capsule (50,000 Units total) by mouth every 7 days. 12 capsule 3    fluticasone-salmeterol diskus inhaler 250-50 mcg Inhale 1 puff into the lungs 2 (two) times daily. Controller 14 each 0    guaiFENesin (MUCINEX) 600 mg 12 hr tablet Take 1 tablet (600 mg total) by mouth 2 (two) times daily. (Patient not taking: Reported on 4/28/2023)      hydroCHLOROthiazide (HYDRODIURIL) 25 MG tablet Take 1 tablet (25 mg total) by mouth once daily. 90 tablet 3    ipratropium (ATROVENT) 0.02 % nebulizer solution Take 2.5 mLs (500 mcg total) by nebulization 4 (four) times daily. Rescue 75 mL 0    LORazepam (ATIVAN) 0.5 MG tablet Take 1 by mouth every night. 30 tablet 0    metoprolol succinate (TOPROL-XL) 100 MG 24 hr tablet Take 1 tablet (100 mg total) by mouth once daily. 30 tablet 11    potassium chloride SA (K-DUR,KLOR-CON) 20 MEQ tablet Take 1 tablet (20 mEq total) by mouth once daily. 30 tablet 4    PROAIR HFA 90 mcg/actuation inhaler INHALE 2 PUFFS FOUR TIMES DAILY AS NEEDED FOR COPD 8.5 g 12    rosuvastatin (CRESTOR) 10 MG tablet Take 1 tablet (10 mg total) by mouth once daily. 90 tablet 3    [DISCONTINUED] amLODIPine (NORVASC) 2.5 MG tablet Take 1 tablet (2.5 mg total) by mouth once daily. 30 tablet 11    [DISCONTINUED] LORazepam (ATIVAN) 0.5 MG tablet Take 1 by mouth every night. 30 tablet 0     No facility-administered encounter medications on file as of 5/9/2023.       Plan:   Stay out of the hospital  Problem List Items Addressed This Visit       Chronic obstructive pulmonary disease     Other Visit Diagnoses       Chronic respiratory failure with hypoxia    -  Primary          Advanced COPD with respiratory failure

## 2023-05-22 DIAGNOSIS — E78.2 MIXED HYPERLIPIDEMIA: ICD-10-CM

## 2023-05-22 RX ORDER — LORAZEPAM 0.5 MG/1
TABLET ORAL
Qty: 30 TABLET | Refills: 0 | Status: SHIPPED | OUTPATIENT
Start: 2023-05-22 | End: 2023-06-20

## 2023-05-22 RX ORDER — ROSUVASTATIN CALCIUM 10 MG/1
TABLET, COATED ORAL
Qty: 90 TABLET | Refills: 3 | Status: SHIPPED | OUTPATIENT
Start: 2023-05-22

## 2023-05-22 NOTE — TELEPHONE ENCOUNTER
Care Due:                  Date            Visit Type   Department     Provider  --------------------------------------------------------------------------------                                EP -                              PRIMARY      [facility]  Last Visit: 04-      CARE ([facility])   MEDICINE       JOSE CARLOS CHACON  Next Visit: None Scheduled  None         None Found                                                            Last  Test          Frequency    Reason                     Performed    Due Date  --------------------------------------------------------------------------------    Lipid Panel.  12 months..  rosuvastatin.............  07- 07-    [facility] Embedded Care Due Messages. Reference number: [id number].   5/22/2023 9:11:26 AM CDT

## 2023-05-22 NOTE — TELEPHONE ENCOUNTER
Refill Routing Note   Medication(s) are not appropriate for processing by [facility] for the following reason(s):      Medication outside of protocol  Required labs outdated:  Lipid panel    [facility] action(s):  Defer  Route Care Due:  Labs due          Appointments  past 12m or future 3m with PCP    Date Provider   Last Visit   4/20/2023 Zhou Gallardo MD   Next Visit   Visit date not found Zhou Gallardo MD   ED visits in past 90 days: 0        Note composed:1:12 PM 05/22/2023

## 2023-06-20 RX ORDER — LORAZEPAM 0.5 MG/1
TABLET ORAL
Qty: 30 TABLET | Refills: 0 | Status: SHIPPED | OUTPATIENT
Start: 2023-06-20 | End: 2023-08-09 | Stop reason: SDUPTHER

## 2023-06-20 NOTE — TELEPHONE ENCOUNTER
No care due was identified.  [facility] Embedded Care Due Messages. Reference number: [id number].   6/20/2023 10:26:22 AM CDT

## 2023-07-14 ENCOUNTER — TELEPHONE (OUTPATIENT)
Dept: INTERNAL MEDICINE | Facility: CLINIC | Age: 68
End: 2023-07-14
Payer: MEDICARE

## 2023-07-14 DIAGNOSIS — Z12.31 VISIT FOR SCREENING MAMMOGRAM: Primary | ICD-10-CM

## 2023-07-14 NOTE — TELEPHONE ENCOUNTER
----- Message from Kamille Howard sent at 7/13/2023  5:01 PM CDT -----  Regarding: mammo order  Contact: pt [phone]    Patient requesting orders:Ana    Orders needed:Mammo    Communication preference: [phone]

## 2023-07-14 NOTE — TELEPHONE ENCOUNTER
----- Message from Lauren Monsivais sent at 7/13/2023  4:13 PM CDT -----  Contact: [phone]  Patient called, requested a call back from Dr Gallardo's ma ASAP. Did not specify what for. Thank you.

## 2023-07-17 ENCOUNTER — TELEPHONE (OUTPATIENT)
Dept: PULMONOLOGY | Facility: CLINIC | Age: 68
End: 2023-07-17
Payer: MEDICARE

## 2023-07-17 ENCOUNTER — PATIENT MESSAGE (OUTPATIENT)
Dept: INTERNAL MEDICINE | Facility: CLINIC | Age: 68
End: 2023-07-17
Payer: MEDICARE

## 2023-07-17 DIAGNOSIS — J96.11 CHRONIC RESPIRATORY FAILURE WITH HYPOXIA: ICD-10-CM

## 2023-07-17 DIAGNOSIS — J43.2 CENTRILOBULAR EMPHYSEMA: Primary | ICD-10-CM

## 2023-07-17 NOTE — TELEPHONE ENCOUNTER
I spoke with Mrs Aguila about a new Pulmonologist visit now that Dr Dill is retired. She will see Dr Owens on 8-22-23 with PFTS prior. I assured Mrs Aguila that Dr Owens is a excellent physician. Anastacia Hernandes

## 2023-07-17 NOTE — TELEPHONE ENCOUNTER
----- Message from Alanis Stephens sent at 7/17/2023  8:44 AM CDT -----  Regarding: Establish Care  Contact: Pt @ [phone]  Pt is calling to establish care with a new provider. Dr Dill was her  Asking for a call back to get appt

## 2023-07-24 ENCOUNTER — TELEPHONE (OUTPATIENT)
Dept: PULMONOLOGY | Facility: CLINIC | Age: 68
End: 2023-07-24
Payer: MEDICARE

## 2023-07-24 ENCOUNTER — TELEPHONE (OUTPATIENT)
Dept: INTERNAL MEDICINE | Facility: CLINIC | Age: 68
End: 2023-07-24
Payer: MEDICARE

## 2023-07-24 NOTE — TELEPHONE ENCOUNTER
----- Message from Casey Rueda sent at 7/24/2023 10:22 AM CDT -----  Contact: @[phone]  Pt is calling in requesting a call back from the nurse in regards to an appt and to speak with the nurse. Please call to discuss further.@

## 2023-07-24 NOTE — TELEPHONE ENCOUNTER
Spoke with patient, informed her that I have received her message. Pt states that she needs appointment before 8/15/23. I verbalized to pt that I understand and advised pt that I can schedule her appointment with Dr Jacobs on 8/4/23 for 2:00pm. Patient verbalized that she understand and states that she wants to speak with Ms Galaviz before doing any rescheduling. I verbalized to pt that Ms Galaviz is not here at the time but however I will forward message to Ms Galaviz and upon her arrival =, Ms Galaviz can contact pt. Pt verbalized that she understand.

## 2023-07-24 NOTE — TELEPHONE ENCOUNTER
----- Message from Casey Rueda sent at 7/24/2023 10:18 AM CDT -----  Contact: @[phone]  Pt is calling in to get a sooner appts and to speak with the nurse in regards to some concerns she has, please call to discuss further.     No

## 2023-07-25 NOTE — TELEPHONE ENCOUNTER
Spoke with pt held slot for Thursday mrn#[id number] back pain, headache 07/27/23 11:30 Dr. Gallardo, sent msg to [facility] advanced .

## 2023-07-26 ENCOUNTER — OFFICE VISIT (OUTPATIENT)
Dept: SURGERY | Facility: CLINIC | Age: 68
End: 2023-07-26
Payer: MEDICARE

## 2023-07-26 VITALS
HEART RATE: 69 BPM | OXYGEN SATURATION: 93 % | WEIGHT: 160 LBS | HEIGHT: 65 IN | SYSTOLIC BLOOD PRESSURE: 121 MMHG | DIASTOLIC BLOOD PRESSURE: 74 MMHG | BODY MASS INDEX: 26.66 KG/M2

## 2023-07-26 DIAGNOSIS — Z12.39 ENCOUNTER FOR SCREENING BREAST EXAMINATION: ICD-10-CM

## 2023-07-26 DIAGNOSIS — N64.4 MASTALGIA IN FEMALE: Primary | ICD-10-CM

## 2023-07-26 PROCEDURE — 3008F PR BODY MASS INDEX (BMI) DOCUMENTED: ICD-10-PCS | Mod: CPTII,S$GLB,, | Performed by: NURSE PRACTITIONER

## 2023-07-26 PROCEDURE — 3078F DIAST BP <80 MM HG: CPT | Mod: CPTII,S$GLB,, | Performed by: NURSE PRACTITIONER

## 2023-07-26 PROCEDURE — 3288F PR FALLS RISK ASSESSMENT DOCUMENTED: ICD-10-PCS | Mod: CPTII,S$GLB,, | Performed by: NURSE PRACTITIONER

## 2023-07-26 PROCEDURE — 99204 PR OFFICE/OUTPT VISIT, NEW, LEVL IV, 45-59 MIN: ICD-10-PCS | Mod: S$GLB,,, | Performed by: NURSE PRACTITIONER

## 2023-07-26 PROCEDURE — 1125F AMNT PAIN NOTED PAIN PRSNT: CPT | Mod: CPTII,S$GLB,, | Performed by: NURSE PRACTITIONER

## 2023-07-26 PROCEDURE — 1159F PR MEDICATION LIST DOCUMENTED IN MEDICAL RECORD: ICD-10-PCS | Mod: CPTII,S$GLB,, | Performed by: NURSE PRACTITIONER

## 2023-07-26 PROCEDURE — 1101F PR PT FALLS ASSESS DOC 0-1 FALLS W/OUT INJ PAST YR: ICD-10-PCS | Mod: CPTII,S$GLB,, | Performed by: NURSE PRACTITIONER

## 2023-07-26 PROCEDURE — 99999 PR PBB SHADOW E&M-EST. PATIENT-LVL III: ICD-10-PCS | Mod: PBBFAC,,, | Performed by: NURSE PRACTITIONER

## 2023-07-26 PROCEDURE — 4010F ACE/ARB THERAPY RXD/TAKEN: CPT | Mod: CPTII,S$GLB,, | Performed by: NURSE PRACTITIONER

## 2023-07-26 PROCEDURE — 3074F SYST BP LT 130 MM HG: CPT | Mod: CPTII,S$GLB,, | Performed by: NURSE PRACTITIONER

## 2023-07-26 PROCEDURE — 3074F PR MOST RECENT SYSTOLIC BLOOD PRESSURE < 130 MM HG: ICD-10-PCS | Mod: CPTII,S$GLB,, | Performed by: NURSE PRACTITIONER

## 2023-07-26 PROCEDURE — 1101F PT FALLS ASSESS-DOCD LE1/YR: CPT | Mod: CPTII,S$GLB,, | Performed by: NURSE PRACTITIONER

## 2023-07-26 PROCEDURE — 99999 PR PBB SHADOW E&M-EST. PATIENT-LVL III: CPT | Mod: PBBFAC,,, | Performed by: NURSE PRACTITIONER

## 2023-07-26 PROCEDURE — 1159F MED LIST DOCD IN RCRD: CPT | Mod: CPTII,S$GLB,, | Performed by: NURSE PRACTITIONER

## 2023-07-26 PROCEDURE — 3288F FALL RISK ASSESSMENT DOCD: CPT | Mod: CPTII,S$GLB,, | Performed by: NURSE PRACTITIONER

## 2023-07-26 PROCEDURE — 3078F PR MOST RECENT DIASTOLIC BLOOD PRESSURE < 80 MM HG: ICD-10-PCS | Mod: CPTII,S$GLB,, | Performed by: NURSE PRACTITIONER

## 2023-07-26 PROCEDURE — 1125F PR PAIN SEVERITY QUANTIFIED, PAIN PRESENT: ICD-10-PCS | Mod: CPTII,S$GLB,, | Performed by: NURSE PRACTITIONER

## 2023-07-26 PROCEDURE — 99204 OFFICE O/P NEW MOD 45 MIN: CPT | Mod: S$GLB,,, | Performed by: NURSE PRACTITIONER

## 2023-07-26 PROCEDURE — 4010F PR ACE/ARB THEARPY RXD/TAKEN: ICD-10-PCS | Mod: CPTII,S$GLB,, | Performed by: NURSE PRACTITIONER

## 2023-07-26 PROCEDURE — 3008F BODY MASS INDEX DOCD: CPT | Mod: CPTII,S$GLB,, | Performed by: NURSE PRACTITIONER

## 2023-07-26 NOTE — PROGRESS NOTES
"[facility]  Department of Surgery        REFERRING PROVIDER:   Zhou Gallardo MD  [address] [place] [address]    Chief Complaint: Breast Pain (New Patient Bilat Breast Pain .)    History of Present Illness: Ana Aguila is a 63 y.o. female who presents today with bilateral breast pain. Patient reports she had a right pneumothorax requiring chest tube placement. Per patient, since then, she has had tenderness and "mass-like" are near previous insertion site. Also reports feeling "knots" of the left breast, near her underarm. This is not a new issue, she has had this for years.  She was last seen in clinic by Dr. Garcia on 3/10/2020. Patient denies breast-related skin changes, nipple discharge and nipple retraction.  No other breast-related concerns.     Pt's 3/2019 echo was normal per cardiologist's documentation.     Breast Imagin/15/2022 Mammo Digital Screening Bilat w/ [facility]     History:  Patient is 67 y.o. and is seen for a screening mammogram.        Films Compared:  Prior images (if available) were compared.      Findings:   This procedure was performed using tomosynthesis.   Computer-aided detection was utilized in the interpretation of this examination.     The breasts have scattered areas of fibroglandular density. There is no evidence of suspicious masses, microcalcifications or architectural distortion. Findings are stable.         Impression:   No mammographic evidence of malignancy.     BI-RADS Category 1: Negative     Recommendation:  Routine screening mammogram in 1 year is recommended.        Interval Breast History         Patient reports feeling dense breast tissue in 12oc regions of bilateral breasts.  First noticed within past few months.  Size not changing.     Patient reports bilateral axillary shooting pains.  Mild.  Onset a little less than 1 week ago.  Began on L first, and then R.  More intense on L.  These pains come and go.  Heating pad has helped.  Pt " "has been doing an exercise regimen for the past 2 weeks that she suspects could be the cause of the pains.     Patient denies palpable breast mass, breast-related skin changes, nipple discharge and nipple retraction.  No other breast-related concerns.     Pt's 3/2019 echo was normal per cardiologist's documentation.     GYN History:  Age of menarche:  13 y/o  , first live birth at 17 y/o  Uterus and ovaries:  s/p hysterectomy at 34 y/o, pt unsure whether ovaries remain intact  Menopause:  34 y/o  HRT usage:  never     Other Oncologic History:  Personal history of other cancer not abovementioned:  no  Personal history of genetic testing:  no     Social History:  Tobacco use:  quit smoking around over 20 years ago     Family Oncologic History:     Ashkenazi Alevism ancestry:  no           Family History   Problem Relation Age of Onset    Cancer Mother           lung    Breast cancer Maternal Grandmother           unk age of onset    Breast cancer Daughter 43         negative genetic testing, unilateral breast ca    Breast cancer Other 44         thinks negative genetic testing, bilat ca    Ovarian cancer Neg Hx           Patient's Breast Cancer Risk Assessment Scores:  the patient's breast cancer risk assessment scores were calculated 19 as follows:  Tyrer-Cuzick lifetime risk:  9.4%  Valerie model 5-year risk:  2.6%        Review of Systems - See HPI.  Negative for chest pain, unexplained fatigue, recurrent infections, or unexplained weight loss.  Objective:     /74 (BP Location: Right arm, Patient Position: Sitting, BP Method: Medium (Automatic))   Pulse 69   Ht 5' 5" (1.651 m)   Wt 72.6 kg (160 lb)   LMP  (LMP Unknown)   SpO2 (!) 93%   BMI 26.63 kg/m²     Physical Exam   Vitals reviewed.  Constitutional: She is oriented to person, place, and time.   Eyes: Right eye exhibits no discharge. Left eye exhibits no discharge.   Pulmonary/Chest: No respiratory distress. She has no wheezes. Right breast " exhibits tenderness. Right breast exhibits no inverted nipple, no mass, no nipple discharge and no skin change. Left breast exhibits tenderness. Left breast exhibits no inverted nipple, no mass, no nipple discharge and no skin change.       Abdominal: Normal appearance.   Musculoskeletal:      Comments: Wheelchair  Lymphadenopathy:      Cervical: No cervical adenopathy.     Neurological: She is alert and oriented to person, place, and time.   Skin: Skin is warm and dry. No erythema. No pallor.         Assessment:   This is 67 year old female who presents today for bilateral breast pain L>R, no discrete mass appreciated today.        Plan:   We discussed there was nothing concerning on exam today. Reassurance given   Discussed OTC therapies such as acetaminophen or nonsteroidal anti-inflammatory drugs (NSAIDs). They can both be used to relieve breast pain. Topical NSAIDS such as OTC Diclofenac (Volteran) gel can be used. Other recommendations include use of a supportive bra. Wearing a soft, supportive bra at night prevents the breasts from pulling down on the chest wall. Some women obtain relief from application of warm compresses or ice packs.   Discussed lifestyle recommendations such as decrease or elimination of caffeine. Also low-fat diet, high complex carbohydrate diet has been shown to be effective.    Return to clinic in 2 weeks following diagnostic mammogram     The patient is in agreement with the plan. Questions were encouraged and answered to patient's satisfaction. Ana will call our office with any questions or concerns.       Total time spent with the patient: 45.  35 minutes of face to face consultation and 10 minutes of chart review and coordination of care.

## 2023-07-27 ENCOUNTER — OFFICE VISIT (OUTPATIENT)
Dept: INTERNAL MEDICINE | Facility: CLINIC | Age: 68
End: 2023-07-27
Payer: MEDICARE

## 2023-07-27 VITALS
SYSTOLIC BLOOD PRESSURE: 116 MMHG | WEIGHT: 157.63 LBS | DIASTOLIC BLOOD PRESSURE: 68 MMHG | OXYGEN SATURATION: 99 % | BODY MASS INDEX: 26.26 KG/M2 | HEART RATE: 68 BPM | HEIGHT: 65 IN

## 2023-07-27 DIAGNOSIS — I10 ESSENTIAL HYPERTENSION: ICD-10-CM

## 2023-07-27 DIAGNOSIS — M54.2 NECK PAIN ON LEFT SIDE: ICD-10-CM

## 2023-07-27 DIAGNOSIS — J44.9 CHRONIC OBSTRUCTIVE PULMONARY DISEASE, UNSPECIFIED COPD TYPE: Primary | ICD-10-CM

## 2023-07-27 PROCEDURE — 3288F FALL RISK ASSESSMENT DOCD: CPT | Mod: CPTII,S$GLB,, | Performed by: INTERNAL MEDICINE

## 2023-07-27 PROCEDURE — 99999 PR PBB SHADOW E&M-EST. PATIENT-LVL IV: ICD-10-PCS | Mod: PBBFAC,,, | Performed by: INTERNAL MEDICINE

## 2023-07-27 PROCEDURE — 99999 PR PBB SHADOW E&M-EST. PATIENT-LVL IV: CPT | Mod: PBBFAC,,, | Performed by: INTERNAL MEDICINE

## 2023-07-27 PROCEDURE — 4010F PR ACE/ARB THEARPY RXD/TAKEN: ICD-10-PCS | Mod: CPTII,S$GLB,, | Performed by: INTERNAL MEDICINE

## 2023-07-27 PROCEDURE — 3078F PR MOST RECENT DIASTOLIC BLOOD PRESSURE < 80 MM HG: ICD-10-PCS | Mod: CPTII,S$GLB,, | Performed by: INTERNAL MEDICINE

## 2023-07-27 PROCEDURE — 1125F PR PAIN SEVERITY QUANTIFIED, PAIN PRESENT: ICD-10-PCS | Mod: CPTII,S$GLB,, | Performed by: INTERNAL MEDICINE

## 2023-07-27 PROCEDURE — 1159F PR MEDICATION LIST DOCUMENTED IN MEDICAL RECORD: ICD-10-PCS | Mod: CPTII,S$GLB,, | Performed by: INTERNAL MEDICINE

## 2023-07-27 PROCEDURE — 3074F PR MOST RECENT SYSTOLIC BLOOD PRESSURE < 130 MM HG: ICD-10-PCS | Mod: CPTII,S$GLB,, | Performed by: INTERNAL MEDICINE

## 2023-07-27 PROCEDURE — 1159F MED LIST DOCD IN RCRD: CPT | Mod: CPTII,S$GLB,, | Performed by: INTERNAL MEDICINE

## 2023-07-27 PROCEDURE — 99213 PR OFFICE/OUTPT VISIT, EST, LEVL III, 20-29 MIN: ICD-10-PCS | Mod: S$GLB,,, | Performed by: INTERNAL MEDICINE

## 2023-07-27 PROCEDURE — 99213 OFFICE O/P EST LOW 20 MIN: CPT | Mod: S$GLB,,, | Performed by: INTERNAL MEDICINE

## 2023-07-27 PROCEDURE — 4010F ACE/ARB THERAPY RXD/TAKEN: CPT | Mod: CPTII,S$GLB,, | Performed by: INTERNAL MEDICINE

## 2023-07-27 PROCEDURE — 1101F PT FALLS ASSESS-DOCD LE1/YR: CPT | Mod: CPTII,S$GLB,, | Performed by: INTERNAL MEDICINE

## 2023-07-27 PROCEDURE — 3078F DIAST BP <80 MM HG: CPT | Mod: CPTII,S$GLB,, | Performed by: INTERNAL MEDICINE

## 2023-07-27 PROCEDURE — 1101F PR PT FALLS ASSESS DOC 0-1 FALLS W/OUT INJ PAST YR: ICD-10-PCS | Mod: CPTII,S$GLB,, | Performed by: INTERNAL MEDICINE

## 2023-07-27 PROCEDURE — 3008F PR BODY MASS INDEX (BMI) DOCUMENTED: ICD-10-PCS | Mod: CPTII,S$GLB,, | Performed by: INTERNAL MEDICINE

## 2023-07-27 PROCEDURE — 3008F BODY MASS INDEX DOCD: CPT | Mod: CPTII,S$GLB,, | Performed by: INTERNAL MEDICINE

## 2023-07-27 PROCEDURE — 1160F RVW MEDS BY RX/DR IN RCRD: CPT | Mod: CPTII,S$GLB,, | Performed by: INTERNAL MEDICINE

## 2023-07-27 PROCEDURE — 3288F PR FALLS RISK ASSESSMENT DOCUMENTED: ICD-10-PCS | Mod: CPTII,S$GLB,, | Performed by: INTERNAL MEDICINE

## 2023-07-27 PROCEDURE — 1160F PR REVIEW ALL MEDS BY PRESCRIBER/CLIN PHARMACIST DOCUMENTED: ICD-10-PCS | Mod: CPTII,S$GLB,, | Performed by: INTERNAL MEDICINE

## 2023-07-27 PROCEDURE — 1125F AMNT PAIN NOTED PAIN PRSNT: CPT | Mod: CPTII,S$GLB,, | Performed by: INTERNAL MEDICINE

## 2023-07-27 PROCEDURE — 3074F SYST BP LT 130 MM HG: CPT | Mod: CPTII,S$GLB,, | Performed by: INTERNAL MEDICINE

## 2023-07-27 NOTE — PROGRESS NOTES
CC:  Left neck and upper back pain     HPI:  The patient is a 67-year-old female with COPD, hypertension, hyperlipidemia, reflux, right hemicolectomy secondary to GI bleed following polypectomy and history of COVID-19 who presents today with complaints of left neck and upper back pain.  Symptoms started about 3-4 weeks ago; but, were exacerbated after she started doing pulmonary rehab.  They do have her doing light weights as well as walking on a treadmill and resistance bands.    ROS:  Patient reports breathing is at baseline.  Does report having some lumps or bumps in her chest wall.  She was seen in [facility].  A diagnostic mammogram has been ordered.    Physical exam:  General appearance:  No acute distress   HEENT:  Conjunctiva is clear.  Pupils equal.  She has no photophobia.  TMs are clear.  Nasal septum is midline without discharge.  Oropharynx is without erythema.  Trachea is midline without JVD   Pulmonary:  Good inspiratory, expiratory breath sounds are heard.  Lungs are clear to auscultation.    Cardiovascular:  S1-S2, rhythm is regular.  Extremities without edema.  GI:  Abdomen is nontender, nondistended without hepatosplenomegaly  Ortho:  A cervical spine exam was performed.  The patient had good flexion, extension lateral bending.  , upper extremity motor strength appeared to be 5 x 5 bilaterally.  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    Assessment:  1. Left neck and upper back pain which appears to be musculoskeletal  2. COPD   3.  Hypertension currently stable    Plan:  1. The patient's lab results reviewed.  She can take one 500 mg Tylenol a day.    2. The patient has a try ice.  3.  She is to call for any worsening of symptoms.

## 2023-07-28 ENCOUNTER — TELEPHONE (OUTPATIENT)
Dept: PULMONOLOGY | Facility: CLINIC | Age: 68
End: 2023-07-28
Payer: MEDICARE

## 2023-07-28 NOTE — TELEPHONE ENCOUNTER
Spoke with patient  in regards to being sooner.  I advised patient  will only be here the week of August 21,2023.  Patient verbalized she understands.

## 2023-07-28 NOTE — TELEPHONE ENCOUNTER
----- Message from Noah Moody sent at 7/28/2023 12:44 PM CDT -----  Regarding: Call back request  Contact: [phone]  Hi, pt called to request a call back to discuss her appt on 8/22. Pls call the pt at [phone] to k with the pt. Pt says she has been awaiting a call back since Monday.

## 2023-07-31 NOTE — TELEPHONE ENCOUNTER
I called Mrs Aguila to tell her she should have take taken the August 4th Dr Jacobs visit she was offered as it is difficult to get in to be seen. Today she is telling me she cannot make her 8-15 appointment with Dr Owens as she will be training for a new job. Mrs Aguila is asking if there is availability before August 14th? I told her not now but I will watch for a appointment for her. Anastacia Hernandes

## 2023-08-09 ENCOUNTER — HOSPITAL ENCOUNTER (OUTPATIENT)
Dept: RADIOLOGY | Facility: HOSPITAL | Age: 68
Discharge: HOME | End: 2023-08-09
Attending: NURSE PRACTITIONER
Payer: MEDICARE

## 2023-08-09 ENCOUNTER — OFFICE VISIT (OUTPATIENT)
Dept: SURGERY | Facility: CLINIC | Age: 68
End: 2023-08-09
Payer: MEDICARE

## 2023-08-09 VITALS
HEIGHT: 65 IN | BODY MASS INDEX: 26.16 KG/M2 | WEIGHT: 157 LBS | DIASTOLIC BLOOD PRESSURE: 63 MMHG | SYSTOLIC BLOOD PRESSURE: 126 MMHG | HEART RATE: 64 BPM

## 2023-08-09 DIAGNOSIS — Z12.31 ENCOUNTER FOR SCREENING MAMMOGRAM FOR MALIGNANT NEOPLASM OF BREAST: ICD-10-CM

## 2023-08-09 DIAGNOSIS — N64.4 BREAST PAIN: Primary | ICD-10-CM

## 2023-08-09 DIAGNOSIS — L98.9 SKIN LESION OF RIGHT UPPER EXTREMITY: ICD-10-CM

## 2023-08-09 DIAGNOSIS — Z12.39 ENCOUNTER FOR SCREENING BREAST EXAMINATION: ICD-10-CM

## 2023-08-09 DIAGNOSIS — N64.4 MASTALGIA IN FEMALE: ICD-10-CM

## 2023-08-09 PROCEDURE — 1159F PR MEDICATION LIST DOCUMENTED IN MEDICAL RECORD: ICD-10-PCS | Mod: CPTII,S$GLB,, | Performed by: NURSE PRACTITIONER

## 2023-08-09 PROCEDURE — 77066 DX MAMMO INCL CAD BI: CPT | Mod: 26,,, | Performed by: RADIOLOGY

## 2023-08-09 PROCEDURE — 1159F MED LIST DOCD IN RCRD: CPT | Mod: CPTII,S$GLB,, | Performed by: NURSE PRACTITIONER

## 2023-08-09 PROCEDURE — 3074F SYST BP LT 130 MM HG: CPT | Mod: CPTII,S$GLB,, | Performed by: NURSE PRACTITIONER

## 2023-08-09 PROCEDURE — 77062 BREAST TOMOSYNTHESIS BI: CPT | Mod: 26,,, | Performed by: RADIOLOGY

## 2023-08-09 PROCEDURE — 1101F PR PT FALLS ASSESS DOC 0-1 FALLS W/OUT INJ PAST YR: ICD-10-PCS | Mod: CPTII,S$GLB,, | Performed by: NURSE PRACTITIONER

## 2023-08-09 PROCEDURE — 99999 PR PBB SHADOW E&M-EST. PATIENT-LVL III: ICD-10-PCS | Mod: PBBFAC,,, | Performed by: NURSE PRACTITIONER

## 2023-08-09 PROCEDURE — 3288F PR FALLS RISK ASSESSMENT DOCUMENTED: ICD-10-PCS | Mod: CPTII,S$GLB,, | Performed by: NURSE PRACTITIONER

## 2023-08-09 PROCEDURE — 1101F PT FALLS ASSESS-DOCD LE1/YR: CPT | Mod: CPTII,S$GLB,, | Performed by: NURSE PRACTITIONER

## 2023-08-09 PROCEDURE — 99212 OFFICE O/P EST SF 10 MIN: CPT | Mod: S$GLB,,, | Performed by: NURSE PRACTITIONER

## 2023-08-09 PROCEDURE — 3074F PR MOST RECENT SYSTOLIC BLOOD PRESSURE < 130 MM HG: ICD-10-PCS | Mod: CPTII,S$GLB,, | Performed by: NURSE PRACTITIONER

## 2023-08-09 PROCEDURE — 3078F DIAST BP <80 MM HG: CPT | Mod: CPTII,S$GLB,, | Performed by: NURSE PRACTITIONER

## 2023-08-09 PROCEDURE — 3078F PR MOST RECENT DIASTOLIC BLOOD PRESSURE < 80 MM HG: ICD-10-PCS | Mod: CPTII,S$GLB,, | Performed by: NURSE PRACTITIONER

## 2023-08-09 PROCEDURE — 77062 MAMMO DIGITAL DIAGNOSTIC BILAT WITH TOMO: ICD-10-PCS | Mod: 26,,, | Performed by: RADIOLOGY

## 2023-08-09 PROCEDURE — 3008F BODY MASS INDEX DOCD: CPT | Mod: CPTII,S$GLB,, | Performed by: NURSE PRACTITIONER

## 2023-08-09 PROCEDURE — 3288F FALL RISK ASSESSMENT DOCD: CPT | Mod: CPTII,S$GLB,, | Performed by: NURSE PRACTITIONER

## 2023-08-09 PROCEDURE — 77062 BREAST TOMOSYNTHESIS BI: CPT | Mod: TC

## 2023-08-09 PROCEDURE — 99212 PR OFFICE/OUTPT VISIT, EST, LEVL II, 10-19 MIN: ICD-10-PCS | Mod: S$GLB,,, | Performed by: NURSE PRACTITIONER

## 2023-08-09 PROCEDURE — 3008F PR BODY MASS INDEX (BMI) DOCUMENTED: ICD-10-PCS | Mod: CPTII,S$GLB,, | Performed by: NURSE PRACTITIONER

## 2023-08-09 PROCEDURE — 1126F PR PAIN SEVERITY QUANTIFIED, NO PAIN PRESENT: ICD-10-PCS | Mod: CPTII,S$GLB,, | Performed by: NURSE PRACTITIONER

## 2023-08-09 PROCEDURE — 4010F ACE/ARB THERAPY RXD/TAKEN: CPT | Mod: CPTII,S$GLB,, | Performed by: NURSE PRACTITIONER

## 2023-08-09 PROCEDURE — 4010F PR ACE/ARB THEARPY RXD/TAKEN: ICD-10-PCS | Mod: CPTII,S$GLB,, | Performed by: NURSE PRACTITIONER

## 2023-08-09 PROCEDURE — 77066 MAMMO DIGITAL DIAGNOSTIC BILAT WITH TOMO: ICD-10-PCS | Mod: 26,,, | Performed by: RADIOLOGY

## 2023-08-09 PROCEDURE — 99999 PR PBB SHADOW E&M-EST. PATIENT-LVL III: CPT | Mod: PBBFAC,,, | Performed by: NURSE PRACTITIONER

## 2023-08-09 PROCEDURE — 1126F AMNT PAIN NOTED NONE PRSNT: CPT | Mod: CPTII,S$GLB,, | Performed by: NURSE PRACTITIONER

## 2023-08-09 RX ORDER — LORAZEPAM 0.5 MG/1
0.5 TABLET ORAL NIGHTLY
Qty: 30 TABLET | Refills: 0 | Status: SHIPPED | OUTPATIENT
Start: 2023-08-09 | End: 2023-09-06 | Stop reason: SDUPTHER

## 2023-08-09 NOTE — TELEPHONE ENCOUNTER
No care due was identified.  [facility] Embedded Care Due Messages. Reference number: [id number].   8/09/2023 3:04:55 PM CDT

## 2023-08-09 NOTE — TELEPHONE ENCOUNTER
----- Message from Andres Lagos MA sent at 8/9/2023  2:36 PM CDT -----  Contact: [phone]  Patient called                In regards to needing to speak with staff or provider about medication. I asked patient which medication specifically and she stated why I needed to know.              Call back [phone]

## 2023-08-22 ENCOUNTER — TELEPHONE (OUTPATIENT)
Dept: PULMONOLOGY | Facility: CLINIC | Age: 68
End: 2023-08-22
Payer: MEDICARE

## 2023-08-22 DIAGNOSIS — J43.2 CENTRILOBULAR EMPHYSEMA: Primary | ICD-10-CM

## 2023-08-27 NOTE — TELEPHONE ENCOUNTER
No care due was identified.  [facility] Embedded Care Due Messages. Reference number: [id number].   8/27/2023 5:59:51 PM CDT

## 2023-08-28 RX ORDER — METOPROLOL SUCCINATE 100 MG/1
100 TABLET, EXTENDED RELEASE ORAL
Qty: 90 TABLET | Refills: 3 | Status: SHIPPED | OUTPATIENT
Start: 2023-08-28

## 2023-08-28 NOTE — TELEPHONE ENCOUNTER
Refill Routing Note     Refill Routing Note   Medication(s) are not appropriate for processing by [facility] for the following reason(s):      No active prescription written by provider    [facility] action(s):  Defer Care Due:  None identified            Appointments  past 12m or future 3m with PCP    Date Provider   Last Visit   7/27/2023 Zhou Gallardo MD   Next Visit   11/27/2023 Zhou Gallardo MD   ED visits in past 90 days: 0        Note composed:4:59 AM 08/28/2023

## 2023-09-07 RX ORDER — LORAZEPAM 0.5 MG/1
0.5 TABLET ORAL NIGHTLY
Qty: 30 TABLET | Refills: 0 | Status: SHIPPED | OUTPATIENT
Start: 2023-09-07 | End: 2023-10-07 | Stop reason: SDUPTHER

## 2023-09-07 NOTE — TELEPHONE ENCOUNTER
No care due was identified.  [facility] Embedded Care Due Messages. Reference number: [id number].   9/06/2023 9:20:30 PM CDT

## 2023-09-11 ENCOUNTER — TELEPHONE (OUTPATIENT)
Dept: INTERNAL MEDICINE | Facility: CLINIC | Age: 68
End: 2023-09-11
Payer: MEDICARE

## 2023-09-11 NOTE — TELEPHONE ENCOUNTER
"She's calling to be seen by Dr Gallardo only due to recurrent "side pain" that has been going on x 1 week now. Pt is at work.  "

## 2023-09-11 NOTE — TELEPHONE ENCOUNTER
----- Message from Randolph George sent at 9/11/2023 11:06 AM CDT -----  Contact: self [phone]  Pt requesting a call.    Please call and advise

## 2023-09-11 NOTE — TELEPHONE ENCOUNTER
----- Message from Randolph George sent at 9/11/2023 11:06 AM CDT -----  Contact: self [phone]  Pt requesting a call.    Please call and advise

## 2023-09-12 ENCOUNTER — TELEPHONE (OUTPATIENT)
Dept: INTERNAL MEDICINE | Facility: CLINIC | Age: 68
End: 2023-09-12
Payer: MEDICARE

## 2023-09-14 ENCOUNTER — TELEPHONE (OUTPATIENT)
Dept: INTERNAL MEDICINE | Facility: CLINIC | Age: 68
End: 2023-09-14
Payer: MEDICARE

## 2023-09-14 NOTE — TELEPHONE ENCOUNTER
Spoke with pt held slot 09/20/23 9:30 Dr. Gallardo, sent msg to [facility]. Pt requested to schedule with MARIO Stephens DNP for 09/15/23 8 am.

## 2023-09-14 NOTE — TELEPHONE ENCOUNTER
----- Message from Bren Doyle sent at 9/14/2023  7:32 AM CDT -----  Contact: pt [phone]  Patients appt for today was rescheduled and she is requesting to speak with Anastasia.    Please call and advise.    Thank You

## 2023-09-18 ENCOUNTER — TELEPHONE (OUTPATIENT)
Dept: INTERNAL MEDICINE | Facility: CLINIC | Age: 68
End: 2023-09-18
Payer: MEDICARE

## 2023-09-18 NOTE — TELEPHONE ENCOUNTER
Spoke with pt informed that slot is held for Wednesday 09/20/23 9:30 with Dr. Gallardo but  have to schedule. Pt verbalized understanding and had no other questions.

## 2023-09-18 NOTE — TELEPHONE ENCOUNTER
----- Message from Desirae Wallace sent at 9/18/2023  8:24 AM CDT -----  Contact: Mobile [phone]  Patient would like a call back in regards to her wanting to speak with you about a cancelled appointment.

## 2023-09-20 ENCOUNTER — OFFICE VISIT (OUTPATIENT)
Dept: INTERNAL MEDICINE | Facility: CLINIC | Age: 68
End: 2023-09-20
Payer: MEDICARE

## 2023-09-20 VITALS
SYSTOLIC BLOOD PRESSURE: 130 MMHG | BODY MASS INDEX: 27.03 KG/M2 | WEIGHT: 162.25 LBS | HEIGHT: 65 IN | DIASTOLIC BLOOD PRESSURE: 80 MMHG | OXYGEN SATURATION: 95 % | HEART RATE: 67 BPM

## 2023-09-20 DIAGNOSIS — R07.81 RIB PAIN ON RIGHT SIDE: ICD-10-CM

## 2023-09-20 DIAGNOSIS — J44.9 CHRONIC OBSTRUCTIVE PULMONARY DISEASE, UNSPECIFIED COPD TYPE: Primary | ICD-10-CM

## 2023-09-20 DIAGNOSIS — I10 ESSENTIAL HYPERTENSION: ICD-10-CM

## 2023-09-20 DIAGNOSIS — E87.6 LOW SERUM POTASSIUM: ICD-10-CM

## 2023-09-20 PROCEDURE — 3079F DIAST BP 80-89 MM HG: CPT | Mod: CPTII,S$GLB,, | Performed by: INTERNAL MEDICINE

## 2023-09-20 PROCEDURE — 3288F FALL RISK ASSESSMENT DOCD: CPT | Mod: CPTII,S$GLB,, | Performed by: INTERNAL MEDICINE

## 2023-09-20 PROCEDURE — 1125F AMNT PAIN NOTED PAIN PRSNT: CPT | Mod: CPTII,S$GLB,, | Performed by: INTERNAL MEDICINE

## 2023-09-20 PROCEDURE — 3288F PR FALLS RISK ASSESSMENT DOCUMENTED: ICD-10-PCS | Mod: CPTII,S$GLB,, | Performed by: INTERNAL MEDICINE

## 2023-09-20 PROCEDURE — 1159F MED LIST DOCD IN RCRD: CPT | Mod: CPTII,S$GLB,, | Performed by: INTERNAL MEDICINE

## 2023-09-20 PROCEDURE — 99999 PR PBB SHADOW E&M-EST. PATIENT-LVL IV: CPT | Mod: PBBFAC,,, | Performed by: INTERNAL MEDICINE

## 2023-09-20 PROCEDURE — 3008F BODY MASS INDEX DOCD: CPT | Mod: CPTII,S$GLB,, | Performed by: INTERNAL MEDICINE

## 2023-09-20 PROCEDURE — 99999 PR PBB SHADOW E&M-EST. PATIENT-LVL IV: ICD-10-PCS | Mod: PBBFAC,,, | Performed by: INTERNAL MEDICINE

## 2023-09-20 PROCEDURE — 3079F PR MOST RECENT DIASTOLIC BLOOD PRESSURE 80-89 MM HG: ICD-10-PCS | Mod: CPTII,S$GLB,, | Performed by: INTERNAL MEDICINE

## 2023-09-20 PROCEDURE — 1101F PR PT FALLS ASSESS DOC 0-1 FALLS W/OUT INJ PAST YR: ICD-10-PCS | Mod: CPTII,S$GLB,, | Performed by: INTERNAL MEDICINE

## 2023-09-20 PROCEDURE — 99214 OFFICE O/P EST MOD 30 MIN: CPT | Mod: S$GLB,,, | Performed by: INTERNAL MEDICINE

## 2023-09-20 PROCEDURE — 4010F ACE/ARB THERAPY RXD/TAKEN: CPT | Mod: CPTII,S$GLB,, | Performed by: INTERNAL MEDICINE

## 2023-09-20 PROCEDURE — 99214 PR OFFICE/OUTPT VISIT, EST, LEVL IV, 30-39 MIN: ICD-10-PCS | Mod: S$GLB,,, | Performed by: INTERNAL MEDICINE

## 2023-09-20 PROCEDURE — 3075F PR MOST RECENT SYSTOLIC BLOOD PRESS GE 130-139MM HG: ICD-10-PCS | Mod: CPTII,S$GLB,, | Performed by: INTERNAL MEDICINE

## 2023-09-20 PROCEDURE — 4010F PR ACE/ARB THEARPY RXD/TAKEN: ICD-10-PCS | Mod: CPTII,S$GLB,, | Performed by: INTERNAL MEDICINE

## 2023-09-20 PROCEDURE — 1159F PR MEDICATION LIST DOCUMENTED IN MEDICAL RECORD: ICD-10-PCS | Mod: CPTII,S$GLB,, | Performed by: INTERNAL MEDICINE

## 2023-09-20 PROCEDURE — 3075F SYST BP GE 130 - 139MM HG: CPT | Mod: CPTII,S$GLB,, | Performed by: INTERNAL MEDICINE

## 2023-09-20 PROCEDURE — 3008F PR BODY MASS INDEX (BMI) DOCUMENTED: ICD-10-PCS | Mod: CPTII,S$GLB,, | Performed by: INTERNAL MEDICINE

## 2023-09-20 PROCEDURE — 1125F PR PAIN SEVERITY QUANTIFIED, PAIN PRESENT: ICD-10-PCS | Mod: CPTII,S$GLB,, | Performed by: INTERNAL MEDICINE

## 2023-09-20 PROCEDURE — 1101F PT FALLS ASSESS-DOCD LE1/YR: CPT | Mod: CPTII,S$GLB,, | Performed by: INTERNAL MEDICINE

## 2023-09-20 NOTE — PROGRESS NOTES
CC:  Pain involving the right side    HPI:  The patient is a 68-year-old female with COPD, hypertension, hyperlipidemia, reflux, right hemicolectomy secondary to GI bleed following a polypectomy and history of COVID-19 who presents today with complaints of right-sided pain.  Symptoms came on suddenly approximately 2 weeks ago.  No history of trauma.  The pain was sharp.  It has improved in his now faint.  She felt it with certain positions.  She can not bend over.    ROS:  Patient reports no chest pain.  No shortness of breath.  No nausea vomiting.  She did have problems with constipation and her bowels being off.  At 1 point she saw weight pellets in his stool.  She meant to bring 1 in.    Physical exam:   General appearance:  No acute distress   HEENT:  Trachea is midline without JVD   Pulmonary:  Good inspiratory, expiratory breath sounds are heard.  Lungs are clear to auscultation.  She had no crackles or wheezing.    Cardiovascular:  S1-S2, rhythm is regular.  Extremities without edema.    GI:  Patient reports some discomfort on palpation over the right lower ribs in the mid axillary line.  She had no hepatosplenomegaly.  She had normal bowel sounds.    Assessment:  1. Right rib pain  2.  COPD  3.  Hypertension  4. Low potassium on review of labs    Plan:  1. Will schedule a chest x-ray  2. Will schedule right rib views  3. Will schedule a CMP.

## 2023-09-23 ENCOUNTER — HOSPITAL ENCOUNTER (OUTPATIENT)
Dept: RADIOLOGY | Facility: HOSPITAL | Age: 68
Discharge: HOME | End: 2023-09-23
Attending: INTERNAL MEDICINE
Payer: MEDICARE

## 2023-09-23 DIAGNOSIS — J44.9 CHRONIC OBSTRUCTIVE PULMONARY DISEASE, UNSPECIFIED COPD TYPE: ICD-10-CM

## 2023-09-23 DIAGNOSIS — R07.81 RIB PAIN ON RIGHT SIDE: ICD-10-CM

## 2023-09-23 PROCEDURE — 71046 XR CHEST PA AND LATERAL: ICD-10-PCS | Mod: 26,,, | Performed by: RADIOLOGY

## 2023-09-23 PROCEDURE — 71100 X-RAY EXAM RIBS UNI 2 VIEWS: CPT | Mod: TC,RT

## 2023-09-23 PROCEDURE — 71046 X-RAY EXAM CHEST 2 VIEWS: CPT | Mod: 26,,, | Performed by: RADIOLOGY

## 2023-09-23 PROCEDURE — 71046 X-RAY EXAM CHEST 2 VIEWS: CPT | Mod: TC

## 2023-09-23 PROCEDURE — 71100 X-RAY EXAM RIBS UNI 2 VIEWS: CPT | Mod: 26,RT,, | Performed by: RADIOLOGY

## 2023-09-23 PROCEDURE — 71100 XR RIBS 2 VIEW RIGHT: ICD-10-PCS | Mod: 26,RT,, | Performed by: RADIOLOGY

## 2023-09-24 DIAGNOSIS — Z78.0 POST-MENOPAUSAL: Primary | ICD-10-CM

## 2023-09-25 ENCOUNTER — PATIENT MESSAGE (OUTPATIENT)
Dept: INTERNAL MEDICINE | Facility: CLINIC | Age: 68
End: 2023-09-25
Payer: MEDICARE

## 2023-09-25 ENCOUNTER — TELEPHONE (OUTPATIENT)
Dept: INTERNAL MEDICINE | Facility: CLINIC | Age: 68
End: 2023-09-25
Payer: MEDICARE

## 2023-09-25 RX ORDER — POTASSIUM CHLORIDE 20 MEQ/1
20 TABLET, EXTENDED RELEASE ORAL DAILY
Qty: 90 TABLET | Refills: 3 | Status: SHIPPED | OUTPATIENT
Start: 2023-09-25 | End: 2024-01-05

## 2023-09-25 NOTE — TELEPHONE ENCOUNTER
----- Message from Zhou Gallardo MD sent at 9/24/2023  6:36 AM CDT -----  Please contact patient. Her rib x-rays suggest that she may have a rib fracture. Please schedule a bone density test. An order is in    Also your potassium was low. Please see if she has been taking her potassium supplements.     Message sent via the portal.

## 2023-09-25 NOTE — TELEPHONE ENCOUNTER
Refill Decision Note   Ana Aguila  is requesting a refill authorization.  Brief Assessment and Rationale for Refill:  Approve     Medication Therapy Plan:         Comments:     Note composed:3:04 PM 09/25/2023

## 2023-09-25 NOTE — TELEPHONE ENCOUNTER
No care due was identified.  [facility] Embedded Care Due Messages. Reference number: [id number].   9/25/2023 2:21:27 PM CDT

## 2023-09-25 NOTE — TELEPHONE ENCOUNTER
----- Message from Chaparrita Chappell sent at 9/25/2023  2:03 PM CDT -----  Contact: [phone]  Pt called to speak to the nurse. She says she has not been taking her potassium as she should and has requested a refill and will do right. Please Advise

## 2023-09-28 ENCOUNTER — HOSPITAL ENCOUNTER (OUTPATIENT)
Dept: RADIOLOGY | Facility: HOSPITAL | Age: 68
Discharge: HOME | End: 2023-09-28
Attending: INTERNAL MEDICINE
Payer: MEDICARE

## 2023-09-28 DIAGNOSIS — Z78.0 POST-MENOPAUSAL: ICD-10-CM

## 2023-09-28 PROCEDURE — 77080 DXA BONE DENSITY AXIAL: CPT | Mod: 26,,, | Performed by: INTERNAL MEDICINE

## 2023-09-28 PROCEDURE — 77080 DXA BONE DENSITY AXIAL SKELETON 1 OR MORE SITES: ICD-10-PCS | Mod: 26,,, | Performed by: INTERNAL MEDICINE

## 2023-09-28 PROCEDURE — 77080 DXA BONE DENSITY AXIAL: CPT | Mod: TC

## 2023-10-03 ENCOUNTER — TELEPHONE (OUTPATIENT)
Dept: PULMONOLOGY | Facility: CLINIC | Age: 68
End: 2023-10-03
Payer: MEDICARE

## 2023-10-03 DIAGNOSIS — R06.02 SOB (SHORTNESS OF BREATH): Primary | ICD-10-CM

## 2023-10-03 NOTE — TELEPHONE ENCOUNTER
Patient have an appointment on 10/18/23 at 2 PM with a 6MWT. Will fax [facility] the oxygen orders on that day.

## 2023-10-03 NOTE — TELEPHONE ENCOUNTER
----- Message from Alanis Stephens sent at 10/3/2023  2:14 PM CDT -----  Regarding: Pulse Dose ORders  Contact: Sabrina @ [phone]  Sabrina from [employer] is calling to get orders for Pulse Dose. Asking to be faxed to [phone]

## 2023-10-04 ENCOUNTER — TELEPHONE (OUTPATIENT)
Dept: PULMONOLOGY | Facility: CLINIC | Age: 68
End: 2023-10-04
Payer: MEDICARE

## 2023-10-04 NOTE — TELEPHONE ENCOUNTER
I spoke with Sabrina from [employer] in regards to Ms Aguila needing a new order for her concentrator. Patient have an appointment on 10/18/23 at 2 PM. Faxed number was updated [phone].

## 2023-10-04 NOTE — TELEPHONE ENCOUNTER
----- Message from Mary Zimmerman sent at 10/4/2023 11:21 AM CDT -----  Regarding: orders  Contact: [phone]  Sabrina with [facility] calling on behalf of pt, is going to need new order for her concentrator and also her with pulstos. Faxed to: [phone]

## 2023-10-05 NOTE — TELEPHONE ENCOUNTER
Called pt back in regards to this message.     Pt agreed to be rescheduled tomorrow at 11 am    ND   no concerns

## 2023-10-07 DIAGNOSIS — J44.9 CHRONIC OBSTRUCTIVE PULMONARY DISEASE, UNSPECIFIED COPD TYPE: ICD-10-CM

## 2023-10-08 NOTE — TELEPHONE ENCOUNTER
No care due was identified.  [facility] Embedded Care Due Messages. Reference number: [id number].   10/07/2023 7:49:22 PM CDT

## 2023-10-09 RX ORDER — LORAZEPAM 0.5 MG/1
0.5 TABLET ORAL NIGHTLY
Qty: 30 TABLET | Refills: 0 | Status: SHIPPED | OUTPATIENT
Start: 2023-10-09 | End: 2023-11-04 | Stop reason: SDUPTHER

## 2023-10-09 RX ORDER — ALBUTEROL SULFATE 90 UG/1
AEROSOL, METERED RESPIRATORY (INHALATION)
Qty: 8.5 G | Refills: 12 | Status: SHIPPED | OUTPATIENT
Start: 2023-10-09 | End: 2023-10-10 | Stop reason: SDUPTHER

## 2023-10-10 ENCOUNTER — TELEPHONE (OUTPATIENT)
Dept: INTERNAL MEDICINE | Facility: CLINIC | Age: 68
End: 2023-10-10
Payer: MEDICARE

## 2023-10-10 DIAGNOSIS — J44.9 CHRONIC OBSTRUCTIVE PULMONARY DISEASE, UNSPECIFIED COPD TYPE: ICD-10-CM

## 2023-10-10 RX ORDER — ALBUTEROL SULFATE 90 UG/1
AEROSOL, METERED RESPIRATORY (INHALATION)
Qty: 8.5 G | Refills: 12 | Status: SHIPPED | OUTPATIENT
Start: 2023-10-10

## 2023-10-10 NOTE — TELEPHONE ENCOUNTER
[facility] fax refill request stated that Proair HFA oral inhaler is no longer available. Alternative needed.

## 2023-10-18 ENCOUNTER — HOSPITAL ENCOUNTER (OUTPATIENT)
Dept: PULMONOLOGY | Facility: CLINIC | Age: 68
Discharge: HOME | End: 2023-10-18
Payer: MEDICARE

## 2023-10-18 ENCOUNTER — OFFICE VISIT (OUTPATIENT)
Dept: PULMONOLOGY | Facility: CLINIC | Age: 68
End: 2023-10-18
Payer: MEDICARE

## 2023-10-18 VITALS
BODY MASS INDEX: 27.25 KG/M2 | HEIGHT: 65 IN | BODY MASS INDEX: 27.25 KG/M2 | DIASTOLIC BLOOD PRESSURE: 83 MMHG | HEART RATE: 72 BPM | OXYGEN SATURATION: 97 % | HEIGHT: 65 IN | WEIGHT: 163.56 LBS | SYSTOLIC BLOOD PRESSURE: 147 MMHG | WEIGHT: 163.56 LBS

## 2023-10-18 DIAGNOSIS — J43.2 CENTRILOBULAR EMPHYSEMA: ICD-10-CM

## 2023-10-18 DIAGNOSIS — J96.11 CHRONIC RESPIRATORY FAILURE WITH HYPOXIA, ON HOME OXYGEN THERAPY: ICD-10-CM

## 2023-10-18 DIAGNOSIS — R06.02 SOB (SHORTNESS OF BREATH): ICD-10-CM

## 2023-10-18 DIAGNOSIS — R91.1 SOLITARY PULMONARY NODULE: ICD-10-CM

## 2023-10-18 DIAGNOSIS — J44.9 CHRONIC OBSTRUCTIVE PULMONARY DISEASE, UNSPECIFIED COPD TYPE: Primary | ICD-10-CM

## 2023-10-18 DIAGNOSIS — Z99.81 CHRONIC RESPIRATORY FAILURE WITH HYPOXIA, ON HOME OXYGEN THERAPY: ICD-10-CM

## 2023-10-18 PROBLEM — J98.4 CALCIFIED GRANULOMA OF LUNG: Status: RESOLVED | Noted: 2022-03-14 | Resolved: 2023-10-18

## 2023-10-18 PROBLEM — J84.10 CALCIFIED GRANULOMA OF LUNG: Status: RESOLVED | Noted: 2022-03-14 | Resolved: 2023-10-18

## 2023-10-18 PROBLEM — J93.9 PNEUMOTHORAX: Status: RESOLVED | Noted: 2023-01-20 | Resolved: 2023-10-18

## 2023-10-18 PROBLEM — J44.1 COPD WITH ACUTE EXACERBATION: Status: RESOLVED | Noted: 2023-01-20 | Resolved: 2023-10-18

## 2023-10-18 PROCEDURE — 3288F PR FALLS RISK ASSESSMENT DOCUMENTED: ICD-10-PCS | Mod: CPTII,S$GLB,, | Performed by: INTERNAL MEDICINE

## 2023-10-18 PROCEDURE — 94618 PULMONARY STRESS TESTING: ICD-10-PCS | Mod: S$GLB,,, | Performed by: INTERNAL MEDICINE

## 2023-10-18 PROCEDURE — 1160F RVW MEDS BY RX/DR IN RCRD: CPT | Mod: CPTII,S$GLB,, | Performed by: INTERNAL MEDICINE

## 2023-10-18 PROCEDURE — 94010 BREATHING CAPACITY TEST: ICD-10-PCS | Mod: 59,S$GLB,, | Performed by: INTERNAL MEDICINE

## 2023-10-18 PROCEDURE — 3288F FALL RISK ASSESSMENT DOCD: CPT | Mod: CPTII,S$GLB,, | Performed by: INTERNAL MEDICINE

## 2023-10-18 PROCEDURE — 1101F PR PT FALLS ASSESS DOC 0-1 FALLS W/OUT INJ PAST YR: ICD-10-PCS | Mod: CPTII,S$GLB,, | Performed by: INTERNAL MEDICINE

## 2023-10-18 PROCEDURE — 94618 PULMONARY STRESS TESTING: CPT | Mod: S$GLB,,, | Performed by: INTERNAL MEDICINE

## 2023-10-18 PROCEDURE — 3077F SYST BP >= 140 MM HG: CPT | Mod: CPTII,S$GLB,, | Performed by: INTERNAL MEDICINE

## 2023-10-18 PROCEDURE — 99999 PR PBB SHADOW E&M-EST. PATIENT-LVL III: CPT | Mod: PBBFAC,,, | Performed by: INTERNAL MEDICINE

## 2023-10-18 PROCEDURE — 3008F PR BODY MASS INDEX (BMI) DOCUMENTED: ICD-10-PCS | Mod: CPTII,S$GLB,, | Performed by: INTERNAL MEDICINE

## 2023-10-18 PROCEDURE — 94010 BREATHING CAPACITY TEST: CPT | Mod: 59,S$GLB,, | Performed by: INTERNAL MEDICINE

## 2023-10-18 PROCEDURE — 1159F MED LIST DOCD IN RCRD: CPT | Mod: CPTII,S$GLB,, | Performed by: INTERNAL MEDICINE

## 2023-10-18 PROCEDURE — 94726 PULM FUNCT TST PLETHYSMOGRAP: ICD-10-PCS | Mod: S$GLB,,, | Performed by: INTERNAL MEDICINE

## 2023-10-18 PROCEDURE — 99999 PR PBB SHADOW E&M-EST. PATIENT-LVL III: ICD-10-PCS | Mod: PBBFAC,,, | Performed by: INTERNAL MEDICINE

## 2023-10-18 PROCEDURE — 3077F PR MOST RECENT SYSTOLIC BLOOD PRESSURE >= 140 MM HG: ICD-10-PCS | Mod: CPTII,S$GLB,, | Performed by: INTERNAL MEDICINE

## 2023-10-18 PROCEDURE — 4010F ACE/ARB THERAPY RXD/TAKEN: CPT | Mod: CPTII,S$GLB,, | Performed by: INTERNAL MEDICINE

## 2023-10-18 PROCEDURE — 99214 PR OFFICE/OUTPT VISIT, EST, LEVL IV, 30-39 MIN: ICD-10-PCS | Mod: 25,S$GLB,, | Performed by: INTERNAL MEDICINE

## 2023-10-18 PROCEDURE — 1159F PR MEDICATION LIST DOCUMENTED IN MEDICAL RECORD: ICD-10-PCS | Mod: CPTII,S$GLB,, | Performed by: INTERNAL MEDICINE

## 2023-10-18 PROCEDURE — 1160F PR REVIEW ALL MEDS BY PRESCRIBER/CLIN PHARMACIST DOCUMENTED: ICD-10-PCS | Mod: CPTII,S$GLB,, | Performed by: INTERNAL MEDICINE

## 2023-10-18 PROCEDURE — 4010F PR ACE/ARB THEARPY RXD/TAKEN: ICD-10-PCS | Mod: CPTII,S$GLB,, | Performed by: INTERNAL MEDICINE

## 2023-10-18 PROCEDURE — 94726 PLETHYSMOGRAPHY LUNG VOLUMES: CPT | Mod: S$GLB,,, | Performed by: INTERNAL MEDICINE

## 2023-10-18 PROCEDURE — 3008F BODY MASS INDEX DOCD: CPT | Mod: CPTII,S$GLB,, | Performed by: INTERNAL MEDICINE

## 2023-10-18 PROCEDURE — 3079F DIAST BP 80-89 MM HG: CPT | Mod: CPTII,S$GLB,, | Performed by: INTERNAL MEDICINE

## 2023-10-18 PROCEDURE — 1101F PT FALLS ASSESS-DOCD LE1/YR: CPT | Mod: CPTII,S$GLB,, | Performed by: INTERNAL MEDICINE

## 2023-10-18 PROCEDURE — 3079F PR MOST RECENT DIASTOLIC BLOOD PRESSURE 80-89 MM HG: ICD-10-PCS | Mod: CPTII,S$GLB,, | Performed by: INTERNAL MEDICINE

## 2023-10-18 PROCEDURE — 99214 OFFICE O/P EST MOD 30 MIN: CPT | Mod: 25,S$GLB,, | Performed by: INTERNAL MEDICINE

## 2023-10-18 RX ORDER — FLUTICASONE FUROATE, UMECLIDINIUM BROMIDE AND VILANTEROL TRIFENATATE 200; 62.5; 25 UG/1; UG/1; UG/1
1 POWDER RESPIRATORY (INHALATION) DAILY
Qty: 3 EACH | Refills: 4 | Status: SHIPPED | OUTPATIENT
Start: 2023-10-18

## 2023-10-18 NOTE — ASSESSMENT & PLAN NOTE
Significant airtrapping on PFTs.   Trial of triple therapy with [id number]-PGP:  Trelegy 1 inhalation daily.  Full explanation of inhaler technique using demo inhaler performed during the visit.  Patient instructed to rinse/gargle after each use.  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

## 2023-10-18 NOTE — PROGRESS NOTES
Subjective:       Patient ID: Ana Aguila is a 68 y.o. female.    Chief Complaint: Shortness of Breath and Hypoxia    HPI:   Ana Aguila is a 68 y.o. female who presents to establish care for COPD.    Last seen in the office in May of 2023 by Dr. Dill.  History of COPD.  History of right sided pneumothorax  Chronic hypoxemic respiratory failure on home O2.  History of tobacco abuse disorder: quit in     Able to walk three blocks, but notes significantly more shortness of breath with activity.  Currently enrolled in pulmonary rehab- but taking a break due to a broken rib.  Prescribed triple therapy in the past,  but was skeptical of the recommendation so she continue her advair.    Denies sinus issues.  No cough.  Some sinus issues/PND, but denies seasonal allergies.    Since her last visit,  her respiratory status has been stable.   She is concerned about her istuation.  Retired   2 daughters in [place]; 7 grandkids    Review of Systems   HENT:  Positive for postnasal drip.    Respiratory:  Positive for dyspnea on extertion.          Social History     Tobacco Use    Smoking status: Former     Current packs/day: 0.00     Average packs/day: 1.5 packs/day for 30.0 years (45.0 ttl pk-yrs)     Types: Cigarettes     Start date: 1970     Quit date: 1997     Years since quittin.8    Smokeless tobacco: Never   Substance Use Topics    Alcohol use: No       Review of patient's allergies indicates:   Allergen Reactions    Ace inhibitors      cough    Coreg [carvedilol]      Headache     Pseudoephedrine hcl Nausea And Vomiting, Other (See Comments) and Anxiety     JITTERY     Past Medical History:   Diagnosis Date    Addiction to drug     Anxiety     Aortic calcification 2018    Chronic diarrhea     Chronic obstructive pulmonary disease 2013    Chronic respiratory failure with hypoxia, on home O2 therapy 2018    Colon polyps     COPD (chronic obstructive pulmonary disease)      Coronary artery calcification seen on CT scan 12/4/2020    Depression     Essential hypertension 8/12/2013    Former smoker 10/18/2016    Hepatomegaly 12/21/2015    Hyperlipidemia     Hypertension     MI (myocardial infarction)     Microscopic hematuria 1/13/2017    Panic disorder     Perianal abscess 6/1/2018    Reflux 2013    S/P right hemicolectomy 9/7/2017    Performed in 2011 after perforation during colonoscopy    Sleep difficulties     Takotsubo cardiomyopathy 10/18/2016    Noted on echo 10/2016, recovered on repeat echo 12/2016     Past Surgical History:   Procedure Laterality Date    ABDOMINAL SURGERY      APPENDECTOMY      CHOLECYSTECTOMY  2013    open    COLON SURGERY  2011    secondary to perforation after c-scope    COLONOSCOPY      CORONARY ANGIOGRAPHY N/A 7/26/2022    Procedure: ANGIOGRAM, CORONARY ARTERY;  Surgeon: Kylee Carreon MD;  Location: [facility] CATH LAB/EP;  Service: Cardiology;  Laterality: N/A;    FRACTURE SURGERY Left 1999    HERNIA REPAIR      HIATAL HERNIA REPAIR  2013    HYSTERECTOMY  1986    LEFT HEART CATHETERIZATION N/A 7/26/2022    Procedure: Left heart cath;  Surgeon: Kylee Carreon MD;  Location: [facility] CATH LAB/EP;  Service: Cardiology;  Laterality: N/A;    Left leg surgery       Current Outpatient Medications on File Prior to Visit   Medication Sig    albuterol (PROAIR HFA) 90 mcg/actuation inhaler INHALE 2 PUFFS FOUR TIMES DAILY AS NEEDED FOR COPD    albuterol-ipratropium (DUO-NEB) 2.5 mg-0.5 mg/3 mL nebulizer solution TAKE 3 ML BY NEBULIZER EVERY 6 HOURS AS NEEDED FOR WHEEZING OR SHORTNESS OF BREATH    budesonide-glycopyr-formoterol (BREZTRI AEROSPHERE) 160-9-4.8 mcg/actuation HFAA Inhale 2 puffs into the lungs 2 (two) times a day.    cyanocobalamin (VITAMIN B-12) 100 MCG tablet Take 100 mcg by mouth once daily.    ergocalciferol (ERGOCALCIFEROL) 50,000 unit Cap Take 1 capsule (50,000 Units total) by mouth every 7 days.    fluticasone-salmeterol diskus inhaler 250-50 mcg  "Inhale 1 puff into the lungs 2 (two) times daily. Controller    guaiFENesin (MUCINEX) 600 mg 12 hr tablet Take 1 tablet (600 mg total) by mouth 2 (two) times daily.    hydroCHLOROthiazide (HYDRODIURIL) 25 MG tablet Take 1 tablet (25 mg total) by mouth once daily.    ipratropium (ATROVENT) 0.02 % nebulizer solution Take 2.5 mLs (500 mcg total) by nebulization 4 (four) times daily. Rescue    LORazepam (ATIVAN) 0.5 MG tablet Take 1 tablet (0.5 mg total) by mouth every evening.    metoprolol succinate (TOPROL-XL) 100 MG 24 hr tablet TAKE ONE TABLET BY MOUTH DAILY    potassium chloride SA (K-DUR,KLOR-CON) 20 MEQ tablet Take 1 tablet (20 mEq total) by mouth once daily.    rosuvastatin (CRESTOR) 10 MG tablet TAKE 1 TABLET(10 MG) BY MOUTH EVERY DAY    [DISCONTINUED] amLODIPine (NORVASC) 2.5 MG tablet Take 1 tablet (2.5 mg total) by mouth once daily.     No current facility-administered medications on file prior to visit.       Objective:      Vitals:    10/18/23 1407   BP: (!) 147/83   Pulse: 72   SpO2: 97%  Comment: 2.0   Weight: 74.2 kg (163 lb 9.3 oz)   Height: 5' 5" (1.651 m)     Physical Exam   Constitutional: She is oriented to person, place, and time. She appears well-developed.   Cardiovascular: Normal rate and regular rhythm.   Pulmonary/Chest: Effort normal. She has no wheezes. She has no rales.   Musculoskeletal:         General: No edema.   Neurological: She is alert and oriented to person, place, and time.   Skin: Skin is warm and dry.   Psychiatric: She has a normal mood and affect. Her behavior is normal. Judgment and thought content normal.     Personal Diagnostic Review        10/18/2023     2:07 PM 10/18/2023     2:06 PM 9/20/2023     9:58 AM 8/9/2023    10:22 AM 7/27/2023    11:49 AM 7/26/2023     8:52 AM 5/9/2023     1:28 PM   Pulmonary Function Tests   SpO2 97 %  95 %  99 % 93 % 94 %   Ordering Provider  MD Graciela        Performing nurse/tech/RT  Estopinal RRT        Diagnosis  COPD        Height 5' " "5" (1.651 m) 5' 5" (1.651 m) 5' 5" (1.651 m) 5' 5" (1.651 m) 5' 5" (1.651 m) 5' 5" (1.651 m) 5' 5" (1.651 m)   Weight 74.2 kg (163 lb 9.3 oz) 74.2 kg (163 lb 9.3 oz) 73.6 kg (162 lb 4.1 oz) 71.2 kg (157 lb) 71.5 kg (157 lb 10.1 oz) 72.6 kg (160 lb) 73 kg (161 lb)   BMI (Calculated) 27.2 27.2 27 26.1 26.2 26.6 26.8   Patient Race          6MWT Status  completed without stopping        Patient Reported  No complaints        Was O2 used?  Yes        Delivery Method  Cannula;Continuous Flow;Pull Tank        6MW Distance walked (feet)  600 feet        Distance walked (meters)  182.88 meters        Did patient stop?  No        Type of assistive device(s) used?  no assistive devices        Oxygen Saturation  95 %        Supplemental Oxygen  3 L/M        Heart Rate  67 bpm        Blood Pressure  147/83        Jessi Dyspnea Rating   nothing at all        Oxygen Saturation  92 %        Supplemental Oxygen  3 L/M        Heart Rate  72 bpm        Blood Pressure  171/78        Jessi Dyspnea Rating   moderate        Recovery Time (seconds)  95 seconds        Oxygen Saturation  94 %        Supplemental Oxygen  3 L/M        Heart Rate  70 bpm        Blood Pressure  158/77        Is procedure ready for interpretation?  Yes        Oxygen Qualification?  Yes        Oxygen Saturation  88 %        Supplemental Oxygen  Room Air        Heart Rate  71 bpm        Blood Pressure  147/88        Jessi Dyspnea Rating   nothing at all              DXA Bone Density Axial Skeleton 1 or more sites  Narrative: EXAMINATION:  DXA BONE DENSITY AXIAL SKELETON 1 OR MORE SITES    CLINICAL HISTORY:  Asymptomatic menopausal state.  67 y/o female with a history of fractured left tib / fib at 42 y/o.  She had a hysterectomy 31 y/o.  She is taking Vit D supplements.  She has a history of COPD.  She does not exercise or smoke.    TECHNIQUE:  DXA specification: [facility] (S/N [id number])    Bone Mineral Density scanning was performed " over the hip and lumbar spine.    Review of the images confirms satisfactory positioning and technique.    COMPARISON:  Comparison study done on 10/11/2018.    Lumbar spine:     BMD 0.811 g/cm2 and T-score -2.1.    Total Hip:           BMD 0.739 g/cm2 and T-score -1.7.    Distal 1/3 radius: Not applicable    FINDINGS:  Lumbar spine (L1-L4):               BMD is 0.793 g/cm2, T-score is -2.3, and Z-score is -1.0.    Total hip:                                BMD is 0.712 g/cm2, T-score is -1.9, and Z-score is -0.9.    Femoral neck:                          BMD is 0.653 g/cm2, T-score is -1.8, and Z-score is -0.7.    Distal 1/3 radius:                      Not applicable    FRAX:    7.6% risk of a major osteoporotic fracture in the next 10 years.    1.1% risk of hip fracture in the next 10 years.  Impression: *Low bone mass (Osteopenia); FRAX calculations do not support treatment as osteoporosis.  *Compared with previous DXA, BMD at the lumbar spine has remained stable, and BMD at the total hip has remained stable.    RECOMMENDATIONS:  *Daily calcium intake 9568-0437 mg, dietary sources preferred; Vitamin D 2070-8312 IU daily.  *Weight bearing exercise and fall precautions.  *Repeat BMD in 2 years.    EXPLANATION OF RESULTS:  T-score compares these results to the average bone density of a 20-29 year-old of the same gender.    Z-score compares this result to the average bone density to people of the same age, gender, and race.    The amounts indicate the number of standard deviations above or below the mean.    * Osteoporosis is generally defined as having a T-score between less than or equal to -2.5.    * Low bone mass (osteopenia) is generally defined as having a T-score between -1.0 and -2.5.    * The normal range is generally defined as having a T-score greater than or equal to -1.0.    * Calculated FRAX scores for fracture risk prediction may not be accurate in the setting of certain clinical factors such as  "pharmacologic therapy for osteoporosis, prior fragility fractures, high dose glucocorticoid use.    Electronically signed by: Monae Mckenzie MD  Date:    10/06/2023  Time:    10:54    PFTs: 10/18/23: severe obstruction, significant air trapping, unable to perform DLCO.  FEV1 and FVC appear to be relatively stable.     6MWT: 10/18/23: O2 Sat =88% at rest on RA; 3L NC: O2 Sat was 92% during exercise.    Assessment:     Orders Placed This Encounter   Procedures    NEBULIZER KIT (SUPPLIES) FOR HOME USE     Order Specific Question:   Height:     Answer:   5' 5" (1.651 m)     Order Specific Question:   Weight:     Answer:   74.2 kg (163 lb 9.3 oz)     Order Specific Question:   Length of need (1-99 months):     Answer:   99     Order Specific Question:   Mask or Mouthpiece?     Answer:   Mouthpiece    NEBULIZER FOR HOME USE     Order Specific Question:   Height:     Answer:   5' 5" (1.651 m)     Order Specific Question:   Weight:     Answer:   74.2 kg (163 lb 9.3 oz)     Order Specific Question:   Length of need (1-99 months):     Answer:   99    OXYGEN FOR HOME USE     Order Specific Question:   Liter Flow     Answer:   3     Order Specific Question:   Duration     Answer:   With activity     Order Specific Question:   Qualifying Test Performed at:     Answer:   Rest     Order Specific Question:   Oxygen saturation:     Answer:   88     Order Specific Question:   Portable mode:     Answer:   pulse dose acceptable     Order Specific Question:   Mode:     Answer:   Portable concentrator     Order Specific Question:   Route     Answer:   nasal cannula     Order Specific Question:   Device:     Answer:   home concentrator with portable concentrator     Order Specific Question:   Length of need (in months):     Answer:   99 mos     Order Specific Question:   Patient condition with qualifying saturation     Answer:   COPD     Order Specific Question:   Height:     Answer:   5' 5" (1.651 m)     Order Specific Question:   " Weight:     Answer:   74.2 kg (163 lb 9.3 oz)     Order Specific Question:   Alternative treatment measures have been tried or considered and deemed clinically ineffective.     Answer:   Yes     1. Chronic obstructive pulmonary disease, unspecified COPD type    2. Solitary pulmonary nodule    3. Chronic respiratory failure with hypoxia, on home oxygen therapy        Plan:         Problem List Items Addressed This Visit          Pulmonary    Chronic obstructive pulmonary disease - Primary    Current Assessment & Plan     Significant airtrapping on PFTs.   Trial of triple therapy with [id number]-[id number]-PHM:  Trelegy 1 inhalation daily.  Full explanation of inhaler technique using demo inhaler performed during the visit.  Patient instructed to rinse/gargle after each use.  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         Relevant Medications    fluticasone-umeclidin-vilanter (TRELEGY ELLIPTA) 200-62.5-25 mcg inhaler    Other Relevant Orders    NEBULIZER KIT (SUPPLIES) FOR HOME USE    NEBULIZER FOR HOME USE    OXYGEN FOR HOME USE    Chronic respiratory failure with hypoxia, on home oxygen therapy    Current Assessment & Plan     2/2 COPD  6MWT shows that patient requires 3L O2 with exertion.  Will order portable O2.         Solitary pulmonary nodule    Overview     3mm nodule. Stable over several years. No further surveillance required.

## 2023-10-19 LAB
ERV LLN: -16449.33
ERV PRE REF: 26.7 %
ERV REF: 0.67
ERVULN: ABNORMAL
FEF 25 75 LLN: 0.66
FEF 25 75 PRE REF: 8.9 %
FEF 25 75 REF: 1.72
FEV05 LLN: 0.91
FEV05 REF: 1.76
FEV1 FVC LLN: 66
FEV1 FVC PRE REF: 45.3 %
FEV1 FVC REF: 79
FEV1 LLN: 1.4
FEV1 PRE REF: 17.8 %
FEV1 REF: 1.99
FRCPLETH LLN: 1.94
FRCPLETH PREREF: 139.9 %
FRCPLETH REF: 2.77
FRCPLETHULN: 3.59
FVC LLN: 1.81
FVC PRE REF: 38.9 %
FVC REF: 2.55
LLN IC: -16447.92
PEF LLN: 3.03
PEF PRE REF: 15.8 %
PEF REF: 5.1
PHYSICIAN COMMENT: ABNORMAL
PRE ERV: 0.18 L (ref -16449.33–16450.67)
PRE FEF 25 75: 0.15 L/S (ref 0.66–3.33)
PRE FET 100: 6.8 SEC
PRE FEV05 REF: 13.1 %
PRE FEV1 FVC: 35.59 % (ref 65.76–89.56)
PRE FEV1: 0.35 L (ref 1.4–2.56)
PRE FEV5: 0.23 L (ref 0.91–2.62)
PRE FRC PL: 3.87 L (ref 1.94–3.59)
PRE FVC: 0.99 L (ref 1.81–3.33)
PRE IC: 0.93 L (ref -16447.92–16452.08)
PRE PEF: 0.8 L/S (ref 3.03–7.16)
PRE REF IC: 44.5 %
PRE RV: 3.69 L (ref 1.52–2.67)
PRE TLC: 4.8 L (ref 4.12–6.09)
RAW PRE REF: 286.8 %
RAW PRE: 8.77 CMH2O*S/L (ref 3.06–3.06)
RAW REF: 3.06
REF IC: 2.08
RV LLN: 1.52
RV PRE REF: 176.4 %
RV REF: 2.09
RVTLC LLN: 33
RVTLC PRE REF: 181.4 %
RVTLC PRE: 76.94 % (ref 32.83–52.01)
RVTLC REF: 42
RVTLCULN: 52
RVULN: 2.67
SGAW PRE REF: 27 %
SGAW PRE: 0.03 1/(CMH2O*S) (ref 0.1–0.1)
SGAW REF: 0.1
TLC LLN: 4.12
TLC PRE REF: 94 %
TLC REF: 5.11
TLC ULN: 6.09
ULN IC: ABNORMAL
VC LLN: 1.81
VC PRE REF: 43.4 %
VC PRE: 1.11 L (ref 1.81–3.33)
VC REF: 2.55
VC ULN: 3.33

## 2023-10-19 NOTE — PROCEDURES
Ana Aguila is a 68 y.o.  female patient, who presents for a 6 minute walk test ordered by MD Graciela.  The diagnosis is COPD/Emphysema; Oxygen Qualification.  The patient's BMI is 27.2 kg/m2. Predicted distance (lower limit of normal) is 306 meters.    Test Results:    The test was completed without stopping. The total time walked was 360 seconds. During walking, the patient reported:  No complaints. The patient used supplemental oxygen and a wheelchair for assistance during testing.     10/18/2023---------Distance: 182.88 meters (600 feet)     O2 Sat % Supplemental Oxygen Heart Rate Blood Pressure Jessi Scale   Pre-exercise  (Resting) 88 % Room Air 71 bpm 147/88 mmHg 0   Pre-exercise  (Resting) 95 % 3 L/M 67 bpm 147/83 mmHg 0   During Exercise 92 % 3 L/M 72 bpm 171/78 mmHg 3   Post-exercise   94 % 3 L/M  70 bpm 158/77 mmHg      Recovery Time: 95 seconds    Oxygen Qualification:     O2 Sat % Supplemental Oxygen Heart Rate Blood Pressure Jessi Scale   Pre-exercise  (Resting) 88 % Room Air  71 bpm  147/88 mmHg 0      Performing nurse/tech: Estopinal RRT      PREVIOUS STUDY:   08/11/2021---------Distance: 60.96 meters (200 feet)       O2 Sat % Supplemental Oxygen Heart Rate Blood Pressure Jessi Scale   Pre-exercise  (Resting) 97 % Room Air 94 bpm (!) 184/97 3   During Exercise 80 % Room Air 110 bpm (!) 215/102 5-6   Post-exercise    94 % Room Air  86 bpm 168/85        CLINICAL INTERPRETATION:  Six minute walk distance is 182.88 meters (600 feet) with moderate dyspnea.  At rest, there was significant desaturation while breathing room air.  During exercise, there was desaturation while breathing supplemental oxygen.  Blood pressure increased significantly and Heart rate remained stable with walking.  The patient did not report non-pulmonary symptoms during exercise.  Significant exercise impairment is likely due to desaturation, cardiovascular causes, and subjective symptoms.  The patient did  complete the study, walking 360 seconds of the 360 second test.  The patient may benefit from using supplemental oxygen.  Since the previous study in August 2021, exercise capacity is significantly improved.  Based upon age and body mass index, exercise capacity is less than predicted.  Oxygen saturation did improve while breathing supplemental oxygen.

## 2023-10-23 ENCOUNTER — TELEPHONE (OUTPATIENT)
Dept: PULMONOLOGY | Facility: CLINIC | Age: 68
End: 2023-10-23
Payer: MEDICARE

## 2023-10-23 NOTE — TELEPHONE ENCOUNTER
----- Message from Deana Landers sent at 10/23/2023 11:40 AM CDT -----  Who Called: [facility]     What is the request in detail: Requesting call back to discuss not [facility], she prefers to use [facility]. Please advise    Can the clinic reply by [facility]? No    Best Call Back Number: [phone]    Additional Information:

## 2023-11-05 NOTE — TELEPHONE ENCOUNTER
Care Due:                  Date            Visit Type   Department     Provider  --------------------------------------------------------------------------------                                EP -                              PRIMARY      [facility] INTER[facility]  Last Visit: 09-      CARE ([facility])   MEDICINE       JOSE CARLOS CHACON  Next Visit: None Scheduled  None         None Found                                                            Last  Test          Frequency    Reason                     Performed    Due Date  --------------------------------------------------------------------------------    Lipid Panel.  12 months..  rosuvastatin.............  Not Found    Overdue    [employer] Embedded Care Due Messages. Reference number: [id number].   11/04/2023 11:15:11 PM CDT

## 2023-11-06 RX ORDER — LORAZEPAM 0.5 MG/1
0.5 TABLET ORAL NIGHTLY
Qty: 30 TABLET | Refills: 0 | Status: SHIPPED | OUTPATIENT
Start: 2023-11-06 | End: 2023-11-07 | Stop reason: SDUPTHER

## 2023-11-08 ENCOUNTER — PATIENT MESSAGE (OUTPATIENT)
Dept: INTERNAL MEDICINE | Facility: CLINIC | Age: 68
End: 2023-11-08
Payer: MEDICARE

## 2023-11-08 RX ORDER — LORAZEPAM 0.5 MG/1
0.5 TABLET ORAL NIGHTLY
Qty: 30 TABLET | Refills: 0 | Status: SHIPPED | OUTPATIENT
Start: 2023-11-08 | End: 2024-01-04 | Stop reason: SDUPTHER

## 2023-11-08 NOTE — TELEPHONE ENCOUNTER
No care due was identified.  Health [facility] Embedded Care Due Messages. Reference number: [id number].   11/07/2023 8:21:24 PM CST

## 2023-11-09 ENCOUNTER — IMMUNIZATION (OUTPATIENT)
Dept: INTERNAL MEDICINE | Facility: CLINIC | Age: 68
End: 2023-11-09
Payer: MEDICARE

## 2023-11-09 PROCEDURE — G0008 ADMIN INFLUENZA VIRUS VAC: HCPCS | Mod: S$GLB,,, | Performed by: INTERNAL MEDICINE

## 2023-11-09 PROCEDURE — G0008 FLU VACCINE - QUADRIVALENT - ADJUVANTED: ICD-10-PCS | Mod: S$GLB,,, | Performed by: INTERNAL MEDICINE

## 2023-11-09 PROCEDURE — 90694 FLU VACCINE - QUADRIVALENT - ADJUVANTED: ICD-10-PCS | Mod: S$GLB,,, | Performed by: INTERNAL MEDICINE

## 2023-11-09 PROCEDURE — 90694 VACC AIIV4 NO PRSRV 0.5ML IM: CPT | Mod: S$GLB,,, | Performed by: INTERNAL MEDICINE

## 2023-11-13 ENCOUNTER — TELEPHONE (OUTPATIENT)
Dept: PULMONOLOGY | Facility: CLINIC | Age: 68
End: 2023-11-13
Payer: MEDICARE

## 2023-11-13 NOTE — TELEPHONE ENCOUNTER
I spoke with Sabrina to let her know I received her message and will fax most recent clinic note to [facility] at fax# provided by Sabrina. Sabrina verbalized understanding.     Most recent clinic note from 10/18/23 with Dr Owens faxed to [facility] fax#[phone] and received confirmation of fax at 3:14pm.

## 2023-11-13 NOTE — TELEPHONE ENCOUNTER
----- Message from Lolis Bazzi sent at 11/13/2023  2:15 PM CST -----  Regarding: Pt Advice  Contact: Aldo [phone]  Sabrina Yepez is calling to request progress notes from last visit be sent to [facility]   @ [phone]

## 2023-11-15 ENCOUNTER — TELEPHONE (OUTPATIENT)
Dept: INTERNAL MEDICINE | Facility: CLINIC | Age: 68
End: 2023-11-15
Payer: MEDICARE

## 2023-11-15 NOTE — TELEPHONE ENCOUNTER
----- Message from Lynnette Willoughby sent at 11/13/2023  4:30 PM CST -----  Contact: [phone]  Pt requesting a call from Anastasia. She declined further info.    Also, she wants to confirm her Covid shot she is scheduled for will be Pfizer. She scheduled it through the portal.

## 2023-11-17 ENCOUNTER — TELEPHONE (OUTPATIENT)
Dept: PULMONOLOGY | Facility: CLINIC | Age: 68
End: 2023-11-17
Payer: MEDICARE

## 2023-11-17 NOTE — TELEPHONE ENCOUNTER
I spoke with Ms Bennett from [facility]'s [facility] in regards to faxing nebulizer order to [facility]. I informed Ms Bennett that I have faxed the nebulizer order and most recent clinic notes to [facility]. She verbalized understanding.    Faxed Confirmed on 11/17/23 at 2:49 PM

## 2023-11-17 NOTE — TELEPHONE ENCOUNTER
----- Message from Lincoln Martel sent at 11/17/2023  2:08 PM CST -----  Regarding: Order: Nebulizer  Contact: Sabrina @ [phone]  CallerSabrina with [employer] is calling asking to speak with someone in the office to have orders for: Nebulizer sent to: [facility]. Please fax orders to: [phone]. If any questions, please call Sabrina at: [phone]. Thanks.

## 2023-12-05 ENCOUNTER — TELEPHONE (OUTPATIENT)
Dept: INTERNAL MEDICINE | Facility: CLINIC | Age: 68
End: 2023-12-05
Payer: MEDICARE

## 2023-12-05 NOTE — TELEPHONE ENCOUNTER
----- Message from Nir Humphreys MA sent at 12/5/2023 11:01 AM CST -----  Contact: Ana @ [phone]  The patient calling to speak with Anastasia about some documents. Please give her a call back at [phone].

## 2023-12-05 NOTE — TELEPHONE ENCOUNTER
Spoke with pt informed PCP is out of office on Holiday. Attempted to schedule sooner with another Physician but pt requested return call.

## 2023-12-06 ENCOUNTER — TELEPHONE (OUTPATIENT)
Dept: INTERNAL MEDICINE | Facility: CLINIC | Age: 68
End: 2023-12-06
Payer: MEDICARE

## 2023-12-06 NOTE — TELEPHONE ENCOUNTER
----- Message from Samia Chandler sent at 12/6/2023 10:50 AM CST -----  Contact: Ana [phone]  Returning a phone call.    Who left a message for the patient:  Anastasia Mendes MA    Do they know what this is regarding:  yes    Would they like a phone call back or a response via [employer]:  call back

## 2023-12-07 ENCOUNTER — TELEPHONE (OUTPATIENT)
Dept: INTERNAL MEDICINE | Facility: CLINIC | Age: 68
End: 2023-12-07
Payer: MEDICARE

## 2023-12-07 NOTE — TELEPHONE ENCOUNTER
----- Message from Chaparrita Chappell sent at 12/7/2023  9:38 AM CST -----  Contact: [phone]  Pt called to speak to the MA in reference to scheduling her appointment. Please Advise

## 2023-12-08 NOTE — TELEPHONE ENCOUNTER
No care due was identified.  Health [facility] Embedded Care Due Messages. Reference number: [id number].   12/08/2023 1:43:04 PM CST

## 2023-12-08 NOTE — TELEPHONE ENCOUNTER
Refill Routing Note   Medication(s) are not appropriate for processing by [facility] for the following reason(s):        Required labs abnormal  Required vitals abnormal    [facility] action(s):  Defer               Appointments  past 12m or future 3m with PCP    Date Provider   Last Visit   9/20/2023 Zhou Gallardo MD   Next Visit   12/29/2023 Zhou Gallardo MD   ED visits in past 90 days: 0        Note composed:1:44 PM 12/08/2023

## 2023-12-09 RX ORDER — HYDROCHLOROTHIAZIDE 25 MG/1
25 TABLET ORAL
Qty: 90 TABLET | Refills: 3 | Status: SHIPPED | OUTPATIENT
Start: 2023-12-09

## 2023-12-15 ENCOUNTER — PATIENT OUTREACH (OUTPATIENT)
Dept: ADMINISTRATIVE | Facility: HOSPITAL | Age: 68
End: 2023-12-15
Payer: MEDICARE

## 2023-12-15 NOTE — PROGRESS NOTES

## 2023-12-27 ENCOUNTER — TELEPHONE (OUTPATIENT)
Dept: INTERNAL MEDICINE | Facility: CLINIC | Age: 68
End: 2023-12-27
Payer: MEDICARE

## 2023-12-27 NOTE — TELEPHONE ENCOUNTER
----- Message from Huyen Snowden sent at 12/27/2023  8:35 AM CST -----  Contact: Ana   Ana would like a call back she said it is in reference to the appt she has on Friday 12/29/23

## 2023-12-29 ENCOUNTER — TELEPHONE (OUTPATIENT)
Dept: INTERNAL MEDICINE | Facility: CLINIC | Age: 68
End: 2023-12-29
Payer: MEDICARE

## 2023-12-29 NOTE — TELEPHONE ENCOUNTER
----- Message from Jennifer Redd sent at 12/29/2023  7:09 AM CST -----  Contact: Pt [phone]  Caller is requesting an earlier appointment then we can schedule.  Caller is requesting a message be sent to the provider.      When is the next available appointment with their provider:  1/16/24  Reason for the appointment:  Follow up and she is not sure she can make the 10am today. She wants to know if you can get her in anytime Thursday 1/4/24    Would the patient like a call back, or a response through their [facility] portal?:   Either

## 2024-01-02 ENCOUNTER — TELEPHONE (OUTPATIENT)
Dept: ADMINISTRATIVE | Facility: HOSPITAL | Age: 69
End: 2024-01-02
Payer: MEDICARE

## 2024-01-04 ENCOUNTER — OFFICE VISIT (OUTPATIENT)
Dept: INTERNAL MEDICINE | Facility: CLINIC | Age: 69
End: 2024-01-04
Payer: MEDICARE

## 2024-01-04 ENCOUNTER — LAB VISIT (OUTPATIENT)
Dept: LAB | Facility: HOSPITAL | Age: 69
End: 2024-01-04
Attending: INTERNAL MEDICINE
Payer: MEDICARE

## 2024-01-04 VITALS
HEIGHT: 65 IN | OXYGEN SATURATION: 94 % | HEART RATE: 87 BPM | DIASTOLIC BLOOD PRESSURE: 72 MMHG | SYSTOLIC BLOOD PRESSURE: 128 MMHG | BODY MASS INDEX: 27.22 KG/M2

## 2024-01-04 DIAGNOSIS — J44.9 CHRONIC OBSTRUCTIVE PULMONARY DISEASE, UNSPECIFIED COPD TYPE: Primary | ICD-10-CM

## 2024-01-04 DIAGNOSIS — I10 ESSENTIAL HYPERTENSION: ICD-10-CM

## 2024-01-04 DIAGNOSIS — E87.6 HYPOKALEMIA: ICD-10-CM

## 2024-01-04 LAB
ANION GAP SERPL CALC-SCNC: 19 MMOL/L (ref 8–16)
BUN SERPL-MCNC: 14 MG/DL (ref 8–23)
CALCIUM SERPL-MCNC: 9.5 MG/DL (ref 8.7–10.5)
CHLORIDE SERPL-SCNC: 93 MMOL/L (ref 95–110)
CO2 SERPL-SCNC: 33 MMOL/L (ref 23–29)
CREAT SERPL-MCNC: 0.8 MG/DL (ref 0.5–1.4)
EST. GFR  (NO RACE VARIABLE): >60 ML/MIN/1.73 M^2
GLUCOSE SERPL-MCNC: 80 MG/DL (ref 70–110)
POTASSIUM SERPL-SCNC: 3.1 MMOL/L (ref 3.5–5.1)
SODIUM SERPL-SCNC: 145 MMOL/L (ref 136–145)

## 2024-01-04 PROCEDURE — 99999 PR PBB SHADOW E&M-EST. PATIENT-LVL III: CPT | Mod: PBBFAC,,, | Performed by: INTERNAL MEDICINE

## 2024-01-04 PROCEDURE — 80048 BASIC METABOLIC PNL TOTAL CA: CPT | Performed by: INTERNAL MEDICINE

## 2024-01-04 PROCEDURE — 99213 OFFICE O/P EST LOW 20 MIN: CPT | Mod: S$GLB,,, | Performed by: INTERNAL MEDICINE

## 2024-01-04 PROCEDURE — 36415 COLL VENOUS BLD VENIPUNCTURE: CPT | Performed by: INTERNAL MEDICINE

## 2024-01-04 RX ORDER — LORAZEPAM 0.5 MG/1
0.5 TABLET ORAL NIGHTLY
Qty: 30 TABLET | Refills: 4 | Status: SHIPPED | OUTPATIENT
Start: 2024-01-04

## 2024-01-04 NOTE — PROGRESS NOTES
CC:  Follow-up     HPI:  The patient is a 68-year-old female with COPD, hypertension, hyperlipidemia, reflux, right hemicolectomy secondary GI bleed following polypectomy and history of COVID-19 who presents today for follow-up.  The patient is requesting reduced hours for work.  The reason for this is to allow her time in order to participate in pulmonary rehab as well as 210 clinic visits.  Does have a home health nurse that comes out on Monday Wednesdays and Fridays.  She also needs scheduled breaks so she can do her breathing treatments.  The patient is oxygen dependent since 2018.  She was diagnosed with COPD around 1993.    ROS:  Patient reports her COPD is currently stable.  She did have a five day period of headache.  It started on Sunday of this week where she had pain on the top her head tried Tylenol without much success.  Symptoms dissipated yesterday.  She was trying to work on decreasing her stress.    Physical exam:   General appearance:  No acute distress   HEENT:  Conjunctiva is clear.  Pupils equal.  He had no photophobia.  TMs are clear.  Nasal septum is midline without discharge.  Oropharynx without erythema.    Pulmonary:  Good inspiratory, expiratory breath sounds heard.  Lungs are clear to auscultation.    Cardiovascular:  S1-S2, rhythm is regular.  Extremities without edema.    GI: Abdomen is nontender, nondistended without hepatosplenomegaly    Assessment:  1.  COPD currently stable  2.  Hypertension  3.  Headaches currently resolved     Plan:  1.  Will fill out form for ADA support for her insurance.

## 2024-01-05 ENCOUNTER — PATIENT MESSAGE (OUTPATIENT)
Dept: INTERNAL MEDICINE | Facility: CLINIC | Age: 69
End: 2024-01-05
Payer: MEDICARE

## 2024-01-05 DIAGNOSIS — E87.6 HYPOKALEMIA: Primary | ICD-10-CM

## 2024-01-05 RX ORDER — POTASSIUM CHLORIDE 20 MEQ/1
20 TABLET, EXTENDED RELEASE ORAL 2 TIMES DAILY
Qty: 180 TABLET | Refills: 3 | Status: SHIPPED | OUTPATIENT
Start: 2024-01-05

## 2024-01-08 ENCOUNTER — TELEPHONE (OUTPATIENT)
Dept: INTERNAL MEDICINE | Facility: CLINIC | Age: 69
End: 2024-01-08
Payer: MEDICARE

## 2024-01-08 ENCOUNTER — PATIENT MESSAGE (OUTPATIENT)
Dept: INTERNAL MEDICINE | Facility: CLINIC | Age: 69
End: 2024-01-08
Payer: MEDICARE

## 2024-01-08 NOTE — TELEPHONE ENCOUNTER
----- Message from Zhou Gallardo MD sent at 1/5/2024  6:39 PM CST -----  Her potassium was low. I increased her potassium tablet to twice a day. I would like to repeat a BMP in one month. An order is in.

## 2024-01-11 ENCOUNTER — TELEPHONE (OUTPATIENT)
Dept: INTERNAL MEDICINE | Facility: CLINIC | Age: 69
End: 2024-01-11
Payer: MEDICARE

## 2024-01-11 NOTE — TELEPHONE ENCOUNTER
A new fax with additional information will be sent to be completed. The fax with additional questions is on your desk to review, sign and fax back.

## 2024-01-11 NOTE — TELEPHONE ENCOUNTER
----- Message from Kamille Baltazar sent at 1/11/2024  9:21 AM CST -----  Would like to receive medical advice.  Art calling from [employer] (pt insurance) and he want to know if the doctor advising pt to take 20 minute breaks for pt work shift. Please call Art at [phone] reference number is : [id number]     Would they like a call back or a response via [employer]:  call    Additional information:  n/a

## 2024-01-12 ENCOUNTER — TELEPHONE (OUTPATIENT)
Dept: INTERNAL MEDICINE | Facility: CLINIC | Age: 69
End: 2024-01-12
Payer: MEDICARE

## 2024-01-12 NOTE — TELEPHONE ENCOUNTER
Spoke with company they received the forms that you completed but sent additional forms with other questions. [employer] is aware that you are out of the office until later next week Forms are on your desk.

## 2024-01-12 NOTE — TELEPHONE ENCOUNTER
----- Message from Desirae Wallace sent at 1/12/2024  8:47 AM CST -----  Contact: [employer]  [phone]  Patient would like to speak with you about a private matter.

## 2024-01-12 NOTE — TELEPHONE ENCOUNTER
Spoke with pt, she wanted to make sure that a couple question son the new questionnaire is answered permanent for her condition. Forms are on your desk when you return. Pt and company is aware forms will be completed sometime next week.

## 2024-01-24 DIAGNOSIS — J44.9 CHRONIC OBSTRUCTIVE PULMONARY DISEASE, UNSPECIFIED COPD TYPE: ICD-10-CM

## 2024-01-24 DIAGNOSIS — J96.11 CHRONIC RESPIRATORY FAILURE WITH HYPOXIA: ICD-10-CM

## 2024-01-24 RX ORDER — IPRATROPIUM BROMIDE AND ALBUTEROL SULFATE 2.5; .5 MG/3ML; MG/3ML
SOLUTION RESPIRATORY (INHALATION)
Qty: 360 ML | Refills: 3 | Status: SHIPPED | OUTPATIENT
Start: 2024-01-24

## 2024-01-29 ENCOUNTER — TELEPHONE (OUTPATIENT)
Dept: INTERNAL MEDICINE | Facility: CLINIC | Age: 69
End: 2024-01-29
Payer: MEDICARE

## 2024-01-29 NOTE — TELEPHONE ENCOUNTER
This is a new form that came through and it was in your folder, I have the other forms but the new form have additional questions.

## 2024-01-29 NOTE — TELEPHONE ENCOUNTER
----- Message from Trinh Alfaro sent at 1/29/2024  8:48 AM CST -----  Contact: Pt @  [phone]  1MEDICALADVICE     Patient is calling for Medical Advice regarding: Medical Docs    How long has patient had these symptoms:    Pharmacy name and phone#:    Would like response via [employer]:  call     Comments: Pt states that she would like a call as soon as possible regarding medical documentation

## 2024-01-29 NOTE — TELEPHONE ENCOUNTER
Spoke with pt, she is awaiting forms to be faxed. And that PCP is out of office on Monday. Will check for forms and faxed when receive.

## 2024-01-30 ENCOUNTER — TELEPHONE (OUTPATIENT)
Dept: INTERNAL MEDICINE | Facility: CLINIC | Age: 69
End: 2024-01-30
Payer: MEDICARE

## 2024-02-01 ENCOUNTER — TELEPHONE (OUTPATIENT)
Dept: INTERNAL MEDICINE | Facility: CLINIC | Age: 69
End: 2024-02-01
Payer: MEDICARE

## 2024-02-01 NOTE — TELEPHONE ENCOUNTER
----- Message from Chaparrita Chappell sent at 2/1/2024  9:08 AM CST -----  Contact: [phone]  1MEDICALADVICE     Patient is calling for Medical Advice regarding: Form update    Comments:    Pt says she has left several messages in regards to getting an update on some forms she left. Please Advise

## 2024-03-20 ENCOUNTER — TELEPHONE (OUTPATIENT)
Dept: INTERNAL MEDICINE | Facility: CLINIC | Age: 69
End: 2024-03-20
Payer: MEDICARE

## 2024-03-20 NOTE — TELEPHONE ENCOUNTER
----- Message from Kristina Awan sent at 3/20/2024 11:28 AM CDT -----  Contact: Self/ [phone]  1MEDICALADVICE     Patient is calling for Medical Advice regarding:pt requesting to speak with Anastasia       Would like response via [employer]:  Would like a return call     Comments:pt stated thatt she would like a return call from Anastasia when she has a chance. Please advise

## 2024-03-20 NOTE — TELEPHONE ENCOUNTER
----- Message from Corin Dominique sent at 3/20/2024  1:32 PM CDT -----  Contact: [phone]  Patient is returning a phone call.  Who left a message for the patient: Anastasia Mendes MA  Does patient know what this is regarding:  would like an appointment  Would you like a call back, or a response through your [facility] portal?:   call back  Comments: Patient don't have access to portal right now

## 2024-04-09 ENCOUNTER — TELEPHONE (OUTPATIENT)
Dept: INTERNAL MEDICINE | Facility: CLINIC | Age: 69
End: 2024-04-09
Payer: MEDICARE

## 2024-04-09 NOTE — TELEPHONE ENCOUNTER
Spoke with pt, after checking she was scheduled with BERENICE Hoover on 04/04/2024 and thought it was Dr. Gallardo. Pt will keep appt with Sneha Thursday 04/11/24 and has scheduled to come back in with Dr. Gallardo 04/17/2024 10:30.

## 2024-04-09 NOTE — TELEPHONE ENCOUNTER
----- Message from Efrain Orosco MA sent at 4/8/2024 10:16 AM CDT -----  Contact: PT Ana @[phone]  I did not find any notes of this. Maybe I overlooked.  ----- Message -----  From: Amy Peguero  Sent: 4/8/2024   9:32 AM CDT  To: [facility] Staff    PT calling to speak with the Anastasia to see why she was schedule with the NP, because she was told that Dr Gallardo had a 10 am on this Thursday. Please call to advise.

## 2024-04-11 ENCOUNTER — OFFICE VISIT (OUTPATIENT)
Dept: INTERNAL MEDICINE | Facility: CLINIC | Age: 69
End: 2024-04-11
Payer: MEDICARE

## 2024-04-11 ENCOUNTER — LAB VISIT (OUTPATIENT)
Dept: LAB | Facility: HOSPITAL | Age: 69
End: 2024-04-11
Attending: INTERNAL MEDICINE
Payer: MEDICARE

## 2024-04-11 VITALS
SYSTOLIC BLOOD PRESSURE: 134 MMHG | HEIGHT: 65 IN | OXYGEN SATURATION: 96 % | BODY MASS INDEX: 27.22 KG/M2 | DIASTOLIC BLOOD PRESSURE: 88 MMHG | HEART RATE: 72 BPM

## 2024-04-11 DIAGNOSIS — E78.2 MIXED HYPERLIPIDEMIA: ICD-10-CM

## 2024-04-11 DIAGNOSIS — L02.224 BOIL OF GROIN: Primary | ICD-10-CM

## 2024-04-11 DIAGNOSIS — J44.9 CHRONIC OBSTRUCTIVE PULMONARY DISEASE, UNSPECIFIED COPD TYPE: ICD-10-CM

## 2024-04-11 DIAGNOSIS — E87.6 HYPOKALEMIA: ICD-10-CM

## 2024-04-11 LAB
ANION GAP SERPL CALC-SCNC: 10 MMOL/L (ref 8–16)
BUN SERPL-MCNC: 12 MG/DL (ref 8–23)
CALCIUM SERPL-MCNC: 10.1 MG/DL (ref 8.7–10.5)
CHLORIDE SERPL-SCNC: 95 MMOL/L (ref 95–110)
CO2 SERPL-SCNC: 38 MMOL/L (ref 23–29)
CREAT SERPL-MCNC: 0.8 MG/DL (ref 0.5–1.4)
EST. GFR  (NO RACE VARIABLE): >60 ML/MIN/1.73 M^2
GLUCOSE SERPL-MCNC: 83 MG/DL (ref 70–110)
POTASSIUM SERPL-SCNC: 3.8 MMOL/L (ref 3.5–5.1)
SODIUM SERPL-SCNC: 143 MMOL/L (ref 136–145)

## 2024-04-11 PROCEDURE — 80048 BASIC METABOLIC PNL TOTAL CA: CPT | Performed by: INTERNAL MEDICINE

## 2024-04-11 PROCEDURE — 3008F BODY MASS INDEX DOCD: CPT | Mod: CPTII,S$GLB,,

## 2024-04-11 PROCEDURE — 3288F FALL RISK ASSESSMENT DOCD: CPT | Mod: CPTII,S$GLB,,

## 2024-04-11 PROCEDURE — 1125F AMNT PAIN NOTED PAIN PRSNT: CPT | Mod: CPTII,S$GLB,,

## 2024-04-11 PROCEDURE — 1159F MED LIST DOCD IN RCRD: CPT | Mod: CPTII,S$GLB,,

## 2024-04-11 PROCEDURE — 3075F SYST BP GE 130 - 139MM HG: CPT | Mod: CPTII,S$GLB,,

## 2024-04-11 PROCEDURE — 99999 PR PBB SHADOW E&M-EST. PATIENT-LVL III: CPT | Mod: PBBFAC,,,

## 2024-04-11 PROCEDURE — 3079F DIAST BP 80-89 MM HG: CPT | Mod: CPTII,S$GLB,,

## 2024-04-11 PROCEDURE — 1101F PT FALLS ASSESS-DOCD LE1/YR: CPT | Mod: CPTII,S$GLB,,

## 2024-04-11 PROCEDURE — 36415 COLL VENOUS BLD VENIPUNCTURE: CPT | Performed by: INTERNAL MEDICINE

## 2024-04-11 PROCEDURE — 99215 OFFICE O/P EST HI 40 MIN: CPT | Mod: S$GLB,,,

## 2024-04-11 PROCEDURE — 1160F RVW MEDS BY RX/DR IN RCRD: CPT | Mod: CPTII,S$GLB,,

## 2024-04-11 RX ORDER — FLUTICASONE PROPIONATE AND SALMETEROL 250; 50 UG/1; UG/1
1 POWDER RESPIRATORY (INHALATION) 2 TIMES DAILY
Qty: 60 EACH | Refills: 2 | Status: SHIPPED | OUTPATIENT
Start: 2024-04-11 | End: 2025-04-11

## 2024-04-11 RX ORDER — ROSUVASTATIN CALCIUM 10 MG/1
10 TABLET, COATED ORAL DAILY
Qty: 90 TABLET | Refills: 3 | Status: SHIPPED | OUTPATIENT
Start: 2024-04-11

## 2024-04-11 RX ORDER — AMOXICILLIN AND CLAVULANATE POTASSIUM 875; 125 MG/1; MG/1
1 TABLET, FILM COATED ORAL 2 TIMES DAILY
Qty: 20 TABLET | Refills: 0 | Status: SHIPPED | OUTPATIENT
Start: 2024-04-11 | End: 2024-04-17

## 2024-04-11 NOTE — PROGRESS NOTES
INTERNAL MEDICINE PROGRESS/URGENT CARE NOTE    Patient: Ana Aguila  : 1955  MRN: [id number]  PCP: Zhou Gallardo MD    CHIEF COMPLAINT     Chief Complaint   Patient presents with    Recurrent Skin Infections    Headache    Shortness of Breath       DIMITRY Perez is a 68 y.o. female with depression, anxiety, COPD, HTN, HLD, IFG, chronic diarrhea, chronic RUQ pain, GERD, R hemicolectomy secondary to GI bleed following polypectomy, osteopenia, former smoker who presents for an urgent visit today with c/o possible boil to right inner thigh. She noticed this 1 week ago. Had tried applying warm towel. Prep H wipes but she is unsure if this helped. Noticed a white tip to this boil yesterday. She states it drained a red fluid the other day.    Also has concerns of difficulty with her breathing. She states it seems it gets worse when she feels anxious. On 2L oxygen per [place] at present. She states she last saw Pulm 10/18/23. She was advised to trial triple therapy. Patient states she doesn't want to take triple therapy when the double works fine for her. Declines wanting to try triple therapy inhaler. Would like advair refilled. Spo2 96% today in clinic.     Also has c/o knot and pain (7/10 with palpation) to left upper back that has been bothering her for 1-2 weeks now. Denies cough, mucus drainage. Has tried heating pad which helps.    Has c/o being on potassium and recent hypokalemia. Would like to repeat blloodwork today.    Sedentary lifestyle. Does try to exercise arms. 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    Past Medical History:  Past Medical History:   Diagnosis Date    Addiction to drug     Anxiety     Aortic calcification 2018    Chronic diarrhea     Chronic obstructive pulmonary disease 2013    Chronic respiratory failure with hypoxia, on home O2 therapy 2018    Colon polyps     COPD (chronic obstructive pulmonary disease)     Coronary artery calcification seen on CT scan 2020     Depression     Essential hypertension 8/12/2013    Former smoker 10/18/2016    Hepatomegaly 12/21/2015    Hyperlipidemia     Hypertension     MI (myocardial infarction)     Microscopic hematuria 1/13/2017    Panic disorder     Perianal abscess 6/1/2018    Pneumothorax 1/20/2023    Reflux 2013    S/P right hemicolectomy 9/7/2017    Performed in 2011 after perforation during colonoscopy    Sleep difficulties     Takotsubo cardiomyopathy 10/18/2016    Noted on echo 10/2016, recovered on repeat echo 12/2016        Past Surgical History:  Past Surgical History:   Procedure Laterality Date    ABDOMINAL SURGERY      APPENDECTOMY      CHOLECYSTECTOMY  2013    open    COLON SURGERY  2011    secondary to perforation after c-scope    COLONOSCOPY      CORONARY ANGIOGRAPHY N/A 7/26/2022    Procedure: ANGIOGRAM, CORONARY ARTERY;  Surgeon: Kylee Carreon MD;  Location: [facility] CATH LAB/EP;  Service: Cardiology;  Laterality: N/A;    FRACTURE SURGERY Left 1999    HERNIA REPAIR      HIATAL HERNIA REPAIR  2013    HYSTERECTOMY  1986    LEFT HEART CATHETERIZATION N/A 7/26/2022    Procedure: Left heart cath;  Surgeon: Kylee Carreon MD;  Location: [facility] CATH LAB/EP;  Service: Cardiology;  Laterality: N/A;    Left leg surgery          Allergies:  Review of patient's allergies indicates:   Allergen Reactions    Ace inhibitors      cough    Coreg [carvedilol]      Headache     Pseudoephedrine hcl Nausea And Vomiting, Other (See Comments) and Anxiety     JITTERY       Home Medications:    Current Outpatient Medications:     albuterol (PROAIR HFA) 90 mcg/actuation inhaler, INHALE 2 PUFFS FOUR TIMES DAILY AS NEEDED FOR COPD, Disp: 8.5 g, Rfl: 12    albuterol-ipratropium (DUO-NEB) 2.5 mg-0.5 mg/3 mL nebulizer solution, USE 1 VIAL IN NEBULIZER EVERY 6 HOURS AS NEEDED FOR WHEEZING OR SHORTNESS OF BREATH, Disp: 360 mL, Rfl: 3    cyanocobalamin (VITAMIN B-12) 100 MCG tablet, Take 100 mcg by mouth once daily., Disp: , Rfl:     ergocalciferol  (ERGOCALCIFEROL) 50,000 unit Cap, Take 1 capsule (50,000 Units total) by mouth every 7 days., Disp: 12 capsule, Rfl: 3    guaiFENesin (MUCINEX) 600 mg 12 hr tablet, Take 1 tablet (600 mg total) by mouth 2 (two) times daily., Disp: , Rfl:     hydroCHLOROthiazide (HYDRODIURIL) 25 MG tablet, TAKE 1 TABLET(25 MG) BY MOUTH EVERY DAY, Disp: 90 tablet, Rfl: 3    LORazepam (ATIVAN) 0.5 MG tablet, Take 1 tablet (0.5 mg total) by mouth every evening., Disp: 30 tablet, Rfl: 4    metoprolol succinate (TOPROL-XL) 100 MG 24 hr tablet, TAKE ONE TABLET BY MOUTH DAILY, Disp: 90 tablet, Rfl: 3    potassium chloride SA (K-DUR,KLOR-CON) 20 MEQ tablet, Take 1 tablet (20 mEq total) by mouth 2 (two) times daily., Disp: 180 tablet, Rfl: 3    amoxicillin-clavulanate 875-125mg (AUGMENTIN) 875-125 mg per tablet, Take 1 tablet by mouth 2 (two) times daily. for 10 days, Disp: 20 tablet, Rfl: 0    fluticasone-salmeterol diskus inhaler 250-50 mcg, Inhale 1 puff into the lungs 2 (two) times daily. Controller, Disp: 60 each, Rfl: 2    ipratropium (ATROVENT) 0.02 % nebulizer solution, Take 2.5 mLs (500 mcg total) by nebulization 4 (four) times daily. Rescue, Disp: 75 mL, Rfl: 0    rosuvastatin (CRESTOR) 10 MG tablet, Take 1 tablet (10 mg total) by mouth once daily., Disp: 90 tablet, Rfl: 3     Review of Systems:  Review of Systems   Constitutional:  Negative for fever.   Respiratory:  Positive for shortness of breath. Negative for chest tightness.    Cardiovascular:  Negative for chest pain.   Gastrointestinal:  Negative for abdominal pain, blood in stool, diarrhea, nausea and vomiting.   Musculoskeletal:  Negative for myalgias.   Skin:         Boil to right inner thigh   Neurological:  Negative for dizziness, weakness and headaches.   Psychiatric/Behavioral:  Negative for suicidal ideas. The patient is nervous/anxious.          PHYSICAL EXAM     Vitals:    04/11/24 1024   BP: 134/88   BP Location: Left arm   Patient Position: Sitting   BP Method:  "Medium (Manual)   Pulse: 72   SpO2: 96%   Height: 5' 5" (1.651 m)      Body mass index is 27.22 kg/m².     Physical Exam  Constitutional:       General: She is not in acute distress.     Appearance: Normal appearance. She is not ill-appearing, toxic-appearing or diaphoretic.      Comments: Well-appearing. Speaking in complete sentences. Sitting in wheelchair.   HENT:      Head: Normocephalic.   Eyes:      Extraocular Movements: Extraocular movements intact.      Pupils: Pupils are equal, round, and reactive to light.   Cardiovascular:      Rate and Rhythm: Normal rate and regular rhythm.   Pulmonary:      Effort: Pulmonary effort is normal. No respiratory distress.      Breath sounds: Normal breath sounds. No wheezing.   Abdominal:      General: Bowel sounds are normal.      Palpations: Abdomen is soft.   Musculoskeletal:         General: Normal range of motion.      Cervical back: Normal range of motion.   Skin:     General: Skin is warm and dry.      Findings: Lesion present.      Comments: Approx. Pleasant Unity-sized nodule noted to right inner thigh/groin. Nttp. No swelling, erythema, warmth. Draining purulent fluid.   Neurological:      General: No focal deficit present.      Mental Status: She is alert and oriented to person, place, and time.         LABS     Lab Results   Component Value Date    HGBA1C 5.2 09/09/2022     CMP  Sodium   Date Value Ref Range Status   01/04/2024 145 136 - 145 mmol/L Final     Potassium   Date Value Ref Range Status   01/04/2024 3.1 (L) 3.5 - 5.1 mmol/L Final     Chloride   Date Value Ref Range Status   01/04/2024 93 (L) 95 - 110 mmol/L Final     CO2   Date Value Ref Range Status   01/04/2024 33 (H) 23 - 29 mmol/L Final     Glucose   Date Value Ref Range Status   01/04/2024 80 70 - 110 mg/dL Final     BUN   Date Value Ref Range Status   01/04/2024 14 8 - 23 mg/dL Final     Creatinine   Date Value Ref Range Status   01/04/2024 0.8 0.5 - 1.4 mg/dL Final     Calcium   Date Value Ref Range " Status   01/04/2024 9.5 8.7 - 10.5 mg/dL Final     Total Protein   Date Value Ref Range Status   09/23/2023 7.2 6.0 - 8.4 g/dL Final     Albumin   Date Value Ref Range Status   09/23/2023 3.8 3.5 - 5.2 g/dL Final     Total Bilirubin   Date Value Ref Range Status   09/23/2023 0.5 0.1 - 1.0 mg/dL Final     Comment:     For infants and newborns, interpretation of results should be based  on gestational age, weight and in agreement with clinical  observations.    Premature Infant recommended reference ranges:  Up to 24 hours.............<8.0 mg/dL  Up to 48 hours............<12.0 mg/dL  3-5 days..................<15.0 mg/dL  6-29 days.................<15.0 mg/dL       Alkaline Phosphatase   Date Value Ref Range Status   09/23/2023 56 55 - 135 U/L Final     AST   Date Value Ref Range Status   09/23/2023 21 10 - 40 U/L Final     ALT   Date Value Ref Range Status   09/23/2023 10 10 - 44 U/L Final     Anion Gap   Date Value Ref Range Status   01/04/2024 19 (H) 8 - 16 mmol/L Final     eGFR if    Date Value Ref Range Status   07/28/2022 >60 >60 mL/min/1.73 m^2 Final     eGFR if non    Date Value Ref Range Status   07/28/2022 >60 >60 mL/min/1.73 m^2 Final     Comment:     Calculation used to obtain the estimated glomerular filtration  rate (eGFR) is the CKD-EPI equation.        Lab Results   Component Value Date    WBC 6.69 02/17/2023    HGB 12.0 02/17/2023    HCT 40.0 02/17/2023    MCV 96 02/17/2023     02/17/2023     Lab Results   Component Value Date    CHOL 154 07/27/2021    CHOL 149 01/19/2021    CHOL 251 (H) 11/11/2020     Lab Results   Component Value Date    HDL 70 07/27/2021    HDL 72 01/19/2021    HDL 82 (H) 11/11/2020     Lab Results   Component Value Date    LDLCALC 72.0 07/27/2021    LDLCALC 65.4 01/19/2021    LDLCALC 153.8 11/11/2020     Lab Results   Component Value Date    TRIG 60 07/27/2021    TRIG 58 01/19/2021    TRIG 76 11/11/2020     Lab Results   Component Value Date     CHOLHDL 45.5 07/27/2021    CHOLHDL 48.3 01/19/2021    CHOLHDL 32.7 11/11/2020     Lab Results   Component Value Date    TSH 2.258 07/27/2021       ASSESSMENT & PLAN       1. Boil of groin  Comments:  Stable. Augmentin course. Cleanse with soap and water. Keep clean. Will recheck next week, may consider derm fu  Orders:  -     amoxicillin-clavulanate 875-125mg (AUGMENTIN) 875-125 mg per tablet; Take 1 tablet by mouth 2 (two) times daily. for 10 days  Dispense: 20 tablet; Refill: 0    2. Chronic obstructive pulmonary disease, unspecified COPD type  Comments:  Stable. Declines triple therapy. Advair diskus refilled. Advised fu with Pulm  Orders:  -     fluticasone-salmeterol diskus inhaler 250-50 mcg; Inhale 1 puff into the lungs 2 (two) times daily. Controller  Dispense: 60 each; Refill: 2    3. Mixed hyperlipidemia  Comments:  Stable. Cont present mgmt. refill sent. Work on low fat diet and regular activity as tolerated.  Orders:  -     rosuvastatin (CRESTOR) 10 MG tablet; Take 1 tablet (10 mg total) by mouth once daily.  Dispense: 90 tablet; Refill: 3     Boil cleansed, abx ointment applied, and re-dressed with non-adherent pad.     Follow up in about 1 week (around 4/18/2024), or if symptoms worsen or fail to improve.    >40min spent with patient.    Patient was counseled on when to seek emergent care. Patient's plan/treatment was discussed including medications and possible side effects. Verbalized understanding of all instructions.            IRIS Mercado, FNP-C  Department of Internal Medicine  [facility]

## 2024-04-17 ENCOUNTER — HOSPITAL ENCOUNTER (OUTPATIENT)
Dept: RADIOLOGY | Facility: HOSPITAL | Age: 69
Discharge: HOME | End: 2024-04-17
Attending: INTERNAL MEDICINE
Payer: MEDICARE

## 2024-04-17 ENCOUNTER — OFFICE VISIT (OUTPATIENT)
Dept: INTERNAL MEDICINE | Facility: CLINIC | Age: 69
End: 2024-04-17
Payer: MEDICARE

## 2024-04-17 VITALS
OXYGEN SATURATION: 90 % | DIASTOLIC BLOOD PRESSURE: 74 MMHG | BODY MASS INDEX: 27.22 KG/M2 | SYSTOLIC BLOOD PRESSURE: 136 MMHG | HEART RATE: 56 BPM | HEIGHT: 65 IN

## 2024-04-17 DIAGNOSIS — J44.9 CHRONIC OBSTRUCTIVE PULMONARY DISEASE, UNSPECIFIED COPD TYPE: ICD-10-CM

## 2024-04-17 DIAGNOSIS — M54.9 UPPER BACK PAIN: ICD-10-CM

## 2024-04-17 DIAGNOSIS — L02.91 ABSCESS: Primary | ICD-10-CM

## 2024-04-17 DIAGNOSIS — R30.0 DYSURIA: ICD-10-CM

## 2024-04-17 DIAGNOSIS — R20.0 NUMBNESS IN BOTH LEGS: ICD-10-CM

## 2024-04-17 DIAGNOSIS — M79.10 MUSCLE PAIN: ICD-10-CM

## 2024-04-17 LAB
BACTERIA #/AREA URNS AUTO: NORMAL /HPF
BILIRUB UR QL STRIP: NEGATIVE
CLARITY UR REFRACT.AUTO: CLEAR
COLOR UR AUTO: YELLOW
GLUCOSE UR QL STRIP: NEGATIVE
HGB UR QL STRIP: ABNORMAL
KETONES UR QL STRIP: NEGATIVE
LEUKOCYTE ESTERASE UR QL STRIP: ABNORMAL
MICROSCOPIC COMMENT: NORMAL
NITRITE UR QL STRIP: NEGATIVE
PH UR STRIP: 8 [PH] (ref 5–8)
PROT UR QL STRIP: NEGATIVE
RBC #/AREA URNS AUTO: 2 /HPF (ref 0–4)
SP GR UR STRIP: 1.01 (ref 1–1.03)
SQUAMOUS #/AREA URNS AUTO: 1 /HPF
URN SPEC COLLECT METH UR: ABNORMAL
WBC #/AREA URNS AUTO: 1 /HPF (ref 0–5)

## 2024-04-17 PROCEDURE — 1101F PT FALLS ASSESS-DOCD LE1/YR: CPT | Mod: CPTII,S$GLB,, | Performed by: INTERNAL MEDICINE

## 2024-04-17 PROCEDURE — 3075F SYST BP GE 130 - 139MM HG: CPT | Mod: CPTII,S$GLB,, | Performed by: INTERNAL MEDICINE

## 2024-04-17 PROCEDURE — 71046 X-RAY EXAM CHEST 2 VIEWS: CPT | Mod: TC

## 2024-04-17 PROCEDURE — 1159F MED LIST DOCD IN RCRD: CPT | Mod: CPTII,S$GLB,, | Performed by: INTERNAL MEDICINE

## 2024-04-17 PROCEDURE — 99214 OFFICE O/P EST MOD 30 MIN: CPT | Mod: S$GLB,,, | Performed by: INTERNAL MEDICINE

## 2024-04-17 PROCEDURE — 99999 PR PBB SHADOW E&M-EST. PATIENT-LVL III: CPT | Mod: PBBFAC,,, | Performed by: INTERNAL MEDICINE

## 2024-04-17 PROCEDURE — 71046 X-RAY EXAM CHEST 2 VIEWS: CPT | Mod: 26,,, | Performed by: INTERNAL MEDICINE

## 2024-04-17 PROCEDURE — 81001 URINALYSIS AUTO W/SCOPE: CPT | Performed by: INTERNAL MEDICINE

## 2024-04-17 PROCEDURE — 1126F AMNT PAIN NOTED NONE PRSNT: CPT | Mod: CPTII,S$GLB,, | Performed by: INTERNAL MEDICINE

## 2024-04-17 PROCEDURE — 3288F FALL RISK ASSESSMENT DOCD: CPT | Mod: CPTII,S$GLB,, | Performed by: INTERNAL MEDICINE

## 2024-04-17 PROCEDURE — 3078F DIAST BP <80 MM HG: CPT | Mod: CPTII,S$GLB,, | Performed by: INTERNAL MEDICINE

## 2024-04-17 PROCEDURE — 3008F BODY MASS INDEX DOCD: CPT | Mod: CPTII,S$GLB,, | Performed by: INTERNAL MEDICINE

## 2024-04-17 RX ORDER — DOXYCYCLINE HYCLATE 100 MG
100 TABLET ORAL 2 TIMES DAILY
Qty: 20 TABLET | Refills: 0 | Status: SHIPPED | OUTPATIENT
Start: 2024-04-17 | End: 2024-04-27

## 2024-04-17 NOTE — PROGRESS NOTES
CC:  Follow-up     HPI:  The patient is a 68-year-old female with COPD, hypertension, hyperlipidemia, reflux, right hemicolectomy secondary to GI lead long a polypectomy and history of COVID-19 who presents today follow-up abscess in the right upper thigh.  The patient is treated with Augmentin.  Reports the lesion is better but now be developing a 2nd lesion.  He Augmentin gave her diarrhea.  She also reports having bumps in her scalp.  This has last noted when she had her hair cleaned at the beautician.  Does not feel any lesions currently. She does state she will feel a bump on the top of the head or in the occipital region on the side of the head.  She also complains of some tingling in her legs going down her toes.  She also reports some numbness as well.  She also reports some tenderness in the upper back around the level of the shoulder blades.  She will take a half Tylenol which helps.    ROS:  Patient reports breathing is a little short on occasion.  Trouble urinating.  Does report waking up around 4 5:00 a.m. in the morning with a headache.  This has relieved with urinating.  Does report the hours at work or now between 830 and 430.    Physical exam:   General appearance:  No acute distress  HEENT: Trachea is midline without JVD   Pulmonary:  Good inspiratory, expiratory breath sounds are heard.  Lungs are clear to auscultation.    Cardiovascular:  S1-S2, rhythm is regular.  Extremities without edema.    GI:  Abdomen is nontender, nondistended without hepatosplenomegaly  Derm:  The patient is right upper thigh was evaluated.  The patient has a very small 3 mm resolving abscess.  She maybe having a new abscess forming.  Ortho:  The patient has upper back evaluated.  Reports some tenderness on palpation near the shoulder blade on the right side.    Assessment:  1. Right thigh abscess  2.  Upper back pain   3. Bilateral leg numbness pain  4.  COPD    5. Dysuria     Plan:  1.  Will start the patient on  doxycycline 1 mg b.i.d.   2. Will order chest x-ray   3. Will check a CBC, CMP magnesium and B12 level  and TSH  4.  Will check a UA

## 2024-04-18 ENCOUNTER — PATIENT MESSAGE (OUTPATIENT)
Dept: INTERNAL MEDICINE | Facility: CLINIC | Age: 69
End: 2024-04-18
Payer: MEDICARE

## 2024-04-18 ENCOUNTER — TELEPHONE (OUTPATIENT)
Dept: INTERNAL MEDICINE | Facility: CLINIC | Age: 69
End: 2024-04-18
Payer: MEDICARE

## 2024-04-18 NOTE — TELEPHONE ENCOUNTER
----- Message from Zhou Gallardo MD sent at 4/17/2024  7:05 PM CDT -----  Please contact patient.  Potassium is little low.  Have that she has on a potassium supplement twice a day.  Please see if she was taking this medication.

## 2024-04-23 ENCOUNTER — TELEPHONE (OUTPATIENT)
Dept: INTERNAL MEDICINE | Facility: CLINIC | Age: 69
End: 2024-04-23
Payer: MEDICARE

## 2024-04-23 NOTE — TELEPHONE ENCOUNTER
----- Message from Zhou Gallardo MD sent at 4/22/2024  6:50 PM CDT -----  Regarding: Follow up  Please contact patient and give her a follow up in one to two weeks.

## 2024-04-30 DIAGNOSIS — Z00.00 ENCOUNTER FOR MEDICARE ANNUAL WELLNESS EXAM: ICD-10-CM

## 2024-05-01 ENCOUNTER — OFFICE VISIT (OUTPATIENT)
Dept: INTERNAL MEDICINE | Facility: CLINIC | Age: 69
End: 2024-05-01
Payer: MEDICARE

## 2024-05-01 VITALS
SYSTOLIC BLOOD PRESSURE: 120 MMHG | DIASTOLIC BLOOD PRESSURE: 80 MMHG | OXYGEN SATURATION: 97 % | BODY MASS INDEX: 28.21 KG/M2 | HEART RATE: 97 BPM | HEIGHT: 65 IN | WEIGHT: 169.31 LBS

## 2024-05-01 DIAGNOSIS — J44.9 CHRONIC OBSTRUCTIVE PULMONARY DISEASE, UNSPECIFIED COPD TYPE: Primary | ICD-10-CM

## 2024-05-01 DIAGNOSIS — L02.91 ABSCESS: ICD-10-CM

## 2024-05-01 PROCEDURE — 3079F DIAST BP 80-89 MM HG: CPT | Mod: CPTII,S$GLB,, | Performed by: INTERNAL MEDICINE

## 2024-05-01 PROCEDURE — 1101F PT FALLS ASSESS-DOCD LE1/YR: CPT | Mod: CPTII,S$GLB,, | Performed by: INTERNAL MEDICINE

## 2024-05-01 PROCEDURE — 99214 OFFICE O/P EST MOD 30 MIN: CPT | Mod: S$GLB,,, | Performed by: INTERNAL MEDICINE

## 2024-05-01 PROCEDURE — 99999 PR PBB SHADOW E&M-EST. PATIENT-LVL IV: CPT | Mod: PBBFAC,,, | Performed by: INTERNAL MEDICINE

## 2024-05-01 PROCEDURE — 1126F AMNT PAIN NOTED NONE PRSNT: CPT | Mod: CPTII,S$GLB,, | Performed by: INTERNAL MEDICINE

## 2024-05-01 PROCEDURE — 3074F SYST BP LT 130 MM HG: CPT | Mod: CPTII,S$GLB,, | Performed by: INTERNAL MEDICINE

## 2024-05-01 PROCEDURE — 3008F BODY MASS INDEX DOCD: CPT | Mod: CPTII,S$GLB,, | Performed by: INTERNAL MEDICINE

## 2024-05-01 PROCEDURE — 3288F FALL RISK ASSESSMENT DOCD: CPT | Mod: CPTII,S$GLB,, | Performed by: INTERNAL MEDICINE

## 2024-05-01 PROCEDURE — 1159F MED LIST DOCD IN RCRD: CPT | Mod: CPTII,S$GLB,, | Performed by: INTERNAL MEDICINE

## 2024-05-01 RX ORDER — DOXYCYCLINE HYCLATE 100 MG
100 TABLET ORAL 2 TIMES DAILY
Qty: 14 TABLET | Refills: 0 | Status: SHIPPED | OUTPATIENT
Start: 2024-05-01 | End: 2024-05-08

## 2024-05-14 ENCOUNTER — TELEPHONE (OUTPATIENT)
Dept: PULMONOLOGY | Facility: CLINIC | Age: 69
End: 2024-05-14
Payer: MEDICARE

## 2024-05-14 NOTE — TELEPHONE ENCOUNTER
----- Message from Fransisca Lopez sent at 5/14/2024 12:56 PM CDT -----  Regarding: Appt  Contact: [phone]  Pt calling to speak with someone in provider office regarding scheduling an appt. Please call pt back at  [phone]

## 2024-05-14 NOTE — TELEPHONE ENCOUNTER
----- Message from Fransisca Lopez sent at 5/14/2024  1:05 PM CDT -----  Regarding: Appt  Contact: [phone]   Pt calling to speak with someone in provider office regarding scheduling an appt. Please call pt back at [phone]

## 2024-06-02 RX ORDER — LORAZEPAM 0.5 MG/1
0.5 TABLET ORAL NIGHTLY
Qty: 30 TABLET | Refills: 4 | Status: CANCELLED | OUTPATIENT
Start: 2024-06-02

## 2024-06-02 NOTE — TELEPHONE ENCOUNTER
No care due was identified.  Health [facility] Embedded Care Due Messages. Reference number: [id number].   6/02/2024 10:51:01 AM CDT

## 2024-06-02 NOTE — TELEPHONE ENCOUNTER
No care due was identified.  Health [facility] Embedded Care Due Messages. Reference number: [id number].   6/02/2024 10:49:15 AM CDT

## 2024-06-03 RX ORDER — LORAZEPAM 0.5 MG/1
0.5 TABLET ORAL
Qty: 30 TABLET | Refills: 0 | Status: SHIPPED | OUTPATIENT
Start: 2024-06-03

## 2024-06-08 ENCOUNTER — PATIENT MESSAGE (OUTPATIENT)
Dept: ADMINISTRATIVE | Facility: CLINIC | Age: 69
End: 2024-06-08
Payer: MEDICARE

## 2024-06-10 ENCOUNTER — TELEPHONE (OUTPATIENT)
Dept: ADMINISTRATIVE | Facility: CLINIC | Age: 69
End: 2024-06-10
Payer: MEDICARE

## 2024-06-10 ENCOUNTER — PATIENT MESSAGE (OUTPATIENT)
Dept: INTERNAL MEDICINE | Facility: CLINIC | Age: 69
End: 2024-06-10
Payer: MEDICARE

## 2024-06-25 ENCOUNTER — TELEPHONE (OUTPATIENT)
Dept: INTERNAL MEDICINE | Facility: CLINIC | Age: 69
End: 2024-06-25
Payer: MEDICARE

## 2024-06-25 NOTE — TELEPHONE ENCOUNTER
----- Message from Mirella Abraham sent at 6/24/2024  3:50 PM CDT -----  Contact: [phone]@[email or web address]  Good afternoon patient would like a call back to discuss a medical paper she has. Please call patient to advise [phone]   Visit Information Date & Time Provider Department Dept. Phone Encounter #  
 2/9/2017 11:30 AM Shanice Seth, [address] [place] [address],[address] [phone] [id number] Follow-up Instructions Return in about 3 months (around 5/9/2017), or if symptoms worsen or fail to improve, for follow up . Your Appointments 4/6/2017  9:00 AM  
ROUTINE CARE with Shanice Seth MD  
[facility] [address] [facility]) Appt Note: o/b mychart - f/up appt [facility] [address] [phone]  
[address] [facility] [address] [place] [address] Upcoming Health Maintenance Date Due Hepatitis C Screening 1952 FOOT EXAM Q1 3/3/1961 COLONOSCOPY 3/3/1969 OSTEOPOROSIS SCREENING (DEXA) 3/3/2016 MEDICARE YEARLY EXAM 3/3/2016 EYE EXAM RETINAL OR DILATED Q1 4/20/2016 HEMOGLOBIN A1C Q6M 7/15/2016 BREAST CANCER SCRN MAMMOGRAM 5/30/2017 GLAUCOMA SCREENING Q2Y 4/20/2017 Pneumococcal 65+ Low/Medium Risk (2 of 2 - PPSV23) 10/5/2017 MICROALBUMIN Q1 10/5/2017 LIPID PANEL Q1 10/5/2017 DTaP/Tdap/Td series (2 - Td) 10/5/2026 Allergies as of 2/9/2017  Review Complete On: 2/9/2017 By: Lucia Jarrett Severity Noted Reaction Type Reactions Codeine  06/26/2015   Side Effect Nausea Only Tetracycline  06/26/2015   Side Effect Nausea Only Current Immunizations  Never Reviewed No immunizations on file. Not reviewed this visit You Were Diagnosed With   
  
 Codes Comments Itchy eyes    -  Primary ICD-10-CM: H57.8 ICD-9-CM: 379.99 Type 2 diabetes mellitus without complication, without long-term current use of insulin (HCC)     ICD-10-CM: E11.9 ICD-9-CM: 250.00 Other depression     ICD-10-CM: F32.89 Screening for glaucoma     ICD-10-CM: Z13.5 ICD-9-CM: V80.1 Vitals BP Pulse Temp Resp Height(growth percentile) Weight(growth percentile) 112/73 (BP 1 Location: Left arm, BP Patient Position: Sitting) 86 98.5 °F (36.9 °C) (Oral) 19 5' 2\" (1.575 m) 187 lb (84.8 kg) LMP SpO2 BMI OB Status Smoking Status 01/30/2014 96% 34.2 kg/m2 Hysterectomy Never Smoker Vitals History BMI and BSA Data Body Mass Index Body Surface Area  
 34.2 kg/m 2 1.93 m 2 Preferred Pharmacy Pharmacy Name Phone [facility] [phone] Your Updated Medication List  
  
   
This list is accurate as of: 2/9/17 12:06 PM.  Always use your most recent med list.  
  
  
  
  
 aspirin delayed-release 81 mg tablet Take  by mouth daily. busPIRone 15 mg tablet Commonly known as:  BUSPAR Take 15 mg by mouth once over twenty-four (24) hours. CLARITIN 10 mg tablet Generic drug:  loratadine Take 10 mg by mouth daily. glucose blood VI test strips strip Commonly known as:  ONETOUCH ULTRA TEST Use as directed, once daily testing and as needed. E11.9  
  
 lisinopril-hydroCHLOROthiazide 10-12.5 mg per tablet Commonly known as:  PRINZIDE, ZESTORETIC  
TAKE ONE TABLET BY MOUTH DAILY  
  
 olopatadine 0.1 % ophthalmic solution Commonly known as:  PATANOL Administer 1 Drop to both eyes two (2) times a day. omeprazole 10 mg capsule Commonly known as:  PRILOSEC Take 10 mg by mouth daily. SITagliptin-metFORMIN 100-1,000 mg Tm24 Commonly known as:  JANUMET XR  
TAKE ONE TABLET IN THE MORNING. temazepam 15 mg capsule Commonly known as:  RESTORIL Take 1 Cap by mouth nightly as needed for Sleep. Max Daily Amount: 15 mg.  
  
 venlafaxine- mg capsule Commonly known as:  EFFEXOR-XR  
TAKE ONE CAPSULE BY MOUTH DAILY Prescriptions Sent to Pharmacy Refills  
 venlafaxine-SR (EFFEXOR-XR) 150 mg capsule 11 Sig: TAKE ONE CAPSULE BY MOUTH DAILY  Class: Normal  
 [facility] [address]  Ph #: [phone] SITagliptin-metFORMIN (JANUMET XR) 100-1,000 mg TM24 11 Sig: TAKE ONE TABLET IN THE MORNING. Class: Normal  
 Pharmacy: [address] [place] [address] WAY Ph #: [phone]  
 olopatadine (PATANOL) 0.1 % ophthalmic solution 2 Sig: Administer 1 Drop to both eyes two (2) times a day. Class: Normal  
 [facility] [address] [address] [facility] Ph #: [phone] Route: Both Eyes  
 glucose blood VI test strips (ONETOUCH ULTRA TEST) strip 3 Sig: Use as directed, once daily testing and as needed. E11.9 Class: Normal  
 Pharmacy: [place] [address] [address] [facility] Ph #: [phone] We Performed the Following AMB POC HEMOGLOBIN A1C [72764 CPT(R)] REFERRAL TO OPHTHALMOLOGY [REF57 Custom] Comments:  
 Itchy, dry eyes Follow-up Instructions Return in about 3 months (around 5/9/2017), or if symptoms worsen or fail to improve, for follow up . Referral Information Referral ID Referred By Referred To  
  
 [id number] Marlyce Abbey [facility] [address] [place], [address] Visits Status Start Date End Date 1 New Request 2/9/17 2/9/18 If your referral has a status of pending review or denied, additional information will be sent to support the outcome of this decision. Patient Instructions Start patanol eye drops 1 drop twice a day for itching. Continue claritin 10mg once a day. Hemoglobin A1C 6.7%. Introducing [facility]! Dear Raj Hinojosa: Thank you for requesting a TAPQUAD account. Our records indicate that you already have an active TAPQUAD account. You can access your account anytime at [email or web address] Did you know that you can access your hospital and ER discharge instructions at any time in Xencor? You can also review all of your test results from your hospital stay or ER visit. Additional Information If you have questions, please visit the Frequently Asked Questions section of the Xencor website at [email or web address] Remember, Xencor is NOT to be used for urgent needs. For medical emergencies, dial 911. Now available from your iPhone and Android! Please provide this summary of care documentation to your next provider. Your primary care clinician is listed as Addy Jaquez. If you have any questions after today's visit, please call [phone].

## 2024-06-28 ENCOUNTER — TELEPHONE (OUTPATIENT)
Dept: PULMONOLOGY | Facility: CLINIC | Age: 69
End: 2024-06-28
Payer: MEDICARE

## 2024-06-28 NOTE — TELEPHONE ENCOUNTER
Spoke with patient about moving her 7/11 appointment to 7/1. Patient stated she has been having right upper back pain since Wednesday. Tylenol has been helping. Advised patient if pain gets worse over the weekend to go the emergency department. Patient verbalized understanding.

## 2024-07-01 ENCOUNTER — OFFICE VISIT (OUTPATIENT)
Dept: PULMONOLOGY | Facility: CLINIC | Age: 69
End: 2024-07-01
Payer: MEDICARE

## 2024-07-01 ENCOUNTER — HOSPITAL ENCOUNTER (OUTPATIENT)
Dept: RADIOLOGY | Facility: HOSPITAL | Age: 69
Discharge: HOME | End: 2024-07-01
Attending: INTERNAL MEDICINE
Payer: MEDICARE

## 2024-07-01 VITALS
BODY MASS INDEX: 28.51 KG/M2 | OXYGEN SATURATION: 93 % | SYSTOLIC BLOOD PRESSURE: 138 MMHG | HEIGHT: 64 IN | WEIGHT: 167 LBS | HEART RATE: 90 BPM | DIASTOLIC BLOOD PRESSURE: 80 MMHG

## 2024-07-01 DIAGNOSIS — Z99.81 CHRONIC RESPIRATORY FAILURE WITH HYPOXIA, ON HOME OXYGEN THERAPY: Primary | ICD-10-CM

## 2024-07-01 DIAGNOSIS — J96.11 CHRONIC RESPIRATORY FAILURE WITH HYPOXIA, ON HOME OXYGEN THERAPY: Primary | ICD-10-CM

## 2024-07-01 DIAGNOSIS — J44.9 CHRONIC OBSTRUCTIVE PULMONARY DISEASE, UNSPECIFIED COPD TYPE: ICD-10-CM

## 2024-07-01 PROCEDURE — 3075F SYST BP GE 130 - 139MM HG: CPT | Mod: CPTII,S$GLB,, | Performed by: INTERNAL MEDICINE

## 2024-07-01 PROCEDURE — 3008F BODY MASS INDEX DOCD: CPT | Mod: CPTII,S$GLB,, | Performed by: INTERNAL MEDICINE

## 2024-07-01 PROCEDURE — 71046 X-RAY EXAM CHEST 2 VIEWS: CPT | Mod: TC,FY

## 2024-07-01 PROCEDURE — 71046 X-RAY EXAM CHEST 2 VIEWS: CPT | Mod: 26,,, | Performed by: RADIOLOGY

## 2024-07-01 PROCEDURE — 99999 PR PBB SHADOW E&M-EST. PATIENT-LVL IV: CPT | Mod: PBBFAC,,, | Performed by: INTERNAL MEDICINE

## 2024-07-01 PROCEDURE — 1125F AMNT PAIN NOTED PAIN PRSNT: CPT | Mod: CPTII,S$GLB,, | Performed by: INTERNAL MEDICINE

## 2024-07-01 PROCEDURE — 1101F PT FALLS ASSESS-DOCD LE1/YR: CPT | Mod: CPTII,S$GLB,, | Performed by: INTERNAL MEDICINE

## 2024-07-01 PROCEDURE — 1160F RVW MEDS BY RX/DR IN RCRD: CPT | Mod: CPTII,S$GLB,, | Performed by: INTERNAL MEDICINE

## 2024-07-01 PROCEDURE — 3288F FALL RISK ASSESSMENT DOCD: CPT | Mod: CPTII,S$GLB,, | Performed by: INTERNAL MEDICINE

## 2024-07-01 PROCEDURE — 1159F MED LIST DOCD IN RCRD: CPT | Mod: CPTII,S$GLB,, | Performed by: INTERNAL MEDICINE

## 2024-07-01 PROCEDURE — 3079F DIAST BP 80-89 MM HG: CPT | Mod: CPTII,S$GLB,, | Performed by: INTERNAL MEDICINE

## 2024-07-01 PROCEDURE — 99213 OFFICE O/P EST LOW 20 MIN: CPT | Mod: S$GLB,,, | Performed by: INTERNAL MEDICINE

## 2024-07-01 NOTE — ASSESSMENT & PLAN NOTE
Patient with Hypercapnic Respiratory failure which is Chronic.  she is on home oxygen at 2 LPM. Supplemental oxygen was provided and noted- [unfilled].   Signs/symptoms of respiratory failure include- tachypnea. Contributing diagnoses includes - COPD Labs and images were reviewed. Patient Has not had a recent ABG. Will treat underlying causes and adjust management of respiratory failure as follows

## 2024-07-01 NOTE — PROGRESS NOTES
Subjective:      Patient ID: Ana Aguila is a 68 y.o. female.    Chief Complaint: No chief complaint on file.    Ana Aguila is a 68 y.o. female who presents to establish care for COPD.     Last seen in the office in May of 2023 by Dr. Dill.  History of COPD.  History of right sided pneumothorax  Chronic hypoxemic respiratory failure on home O2.  History of tobacco abuse disorder: quit in 1997     Able to walk three blocks, but notes significantly more shortness of breath with activity.  Currently enrolled in pulmonary rehab- but taking a break due to a broken rib.  Prescribed triple therapy in the past,  but was skeptical of the recommendation so she continue her advair.     Denies sinus issues.  No cough.  Some sinus issues/PND, but denies seasonal allergies.     Since her last visit,  her respiratory status has been stable.   She is concerned about her istuation.  Retired   2 daughters in [place]; 7 grandkids      Interval hx: 7/1/2024: Patient has some sharp un resolving pain in her back.   Occasional SOB.       Review of Systems   Respiratory:  Positive for shortness of breath. Negative for cough, dyspnea on extertion and use of rescue inhaler.      Objective:     Physical Exam   Constitutional: She is oriented to person, place, and time. She appears well-developed and well-nourished.   Cardiovascular: Normal rate.   Pulmonary/Chest: Normal expansion and symmetric chest wall expansion. She has no rhonchi. She has no rales.   Musculoskeletal:      Comments: TTP in the L lateral thoracic spine area   Neurological: She is alert and oriented to person, place, and time.   Skin: Skin is warm and dry.   Nursing note and vitals reviewed.    Personal Diagnostic Review  none pertinent      7/1/2024    12:59 PM 5/1/2024    10:26 AM 4/17/2024    10:49 AM 4/11/2024    10:24 AM 1/4/2024     1:24 PM 10/18/2023     2:07 PM 10/18/2023     2:06 PM   Pulmonary Function Tests   SpO2 93 % 97 % 90 % 96 % 94 % 97 %   "  Ordering Provider       MD Graciela   Performing nurse/tech/RT       Estopinal RRT   Diagnosis       COPD   Height 5' 4" (1.626 m) 5' 5" (1.651 m) 5' 5" (1.651 m) 5' 5" (1.651 m) 5' 5" (1.651 m) 5' 5" (1.651 m) 5' 5" (1.651 m)   Weight 75.8 kg (167 lb) 76.8 kg (169 lb 5 oz)    74.2 kg (163 lb 9.3 oz) 74.2 kg (163 lb 9.3 oz)   BMI (Calculated) 28.7 28.2    27.2 27.2   Patient Race          6MWT Status       completed without stopping   Patient Reported       No complaints   Was O2 used?       Yes   Delivery Method       Cannula;Continuous Flow;Pull Tank   6MW Distance walked (feet)       600 feet   Distance walked (meters)       182.88 meters   Did patient stop?       No   Type of assistive device(s) used?       no assistive devices   Oxygen Saturation       95 %   Supplemental Oxygen       3 L/M   Heart Rate       67 bpm   Blood Pressure       147/83   Jessi Dyspnea Rating        nothing at all   Oxygen Saturation       92 %   Supplemental Oxygen       3 L/M   Heart Rate       72 bpm   Blood Pressure       171/78   Jessi Dyspnea Rating        moderate   Recovery Time (seconds)       95 seconds   Oxygen Saturation       94 %   Supplemental Oxygen       3 L/M   Heart Rate       70 bpm   Blood Pressure       158/77   Is procedure ready for interpretation?       Yes   Oxygen Qualification?       Yes   Oxygen Saturation       88 %   Supplemental Oxygen       Room Air   Heart Rate       71 bpm   Blood Pressure       147/88   Jessi Dyspnea Rating        nothing at all        Assessment:     1. Chronic obstructive pulmonary disease, unspecified COPD type         Outpatient Encounter Medications as of 7/1/2024   Medication Sig Dispense Refill    albuterol (PROAIR HFA) 90 mcg/actuation inhaler INHALE 2 PUFFS FOUR TIMES DAILY AS NEEDED FOR COPD 8.5 g 12    albuterol-ipratropium (DUO-NEB) 2.5 mg-0.5 mg/3 mL nebulizer solution USE 1 VIAL IN NEBULIZER EVERY 6 HOURS AS NEEDED FOR WHEEZING OR SHORTNESS OF BREATH " 360 mL 3    cyanocobalamin (VITAMIN B-12) 100 MCG tablet Take 100 mcg by mouth once daily.      ergocalciferol (ERGOCALCIFEROL) 50,000 unit Cap Take 1 capsule (50,000 Units total) by mouth every 7 days. 12 capsule 3    fluticasone-salmeterol diskus inhaler 250-50 mcg Inhale 1 puff into the lungs 2 (two) times daily. Controller 60 each 2    guaiFENesin (MUCINEX) 600 mg 12 hr tablet Take 1 tablet (600 mg total) by mouth 2 (two) times daily.      hydroCHLOROthiazide (HYDRODIURIL) 25 MG tablet TAKE 1 TABLET(25 MG) BY MOUTH EVERY DAY 90 tablet 3    ipratropium (ATROVENT) 0.02 % nebulizer solution Take 2.5 mLs (500 mcg total) by nebulization 4 (four) times daily. Rescue 75 mL 0    LORazepam (ATIVAN) 0.5 MG tablet TAKE 1 TABLET(0.5 MG) BY MOUTH EVERY EVENING 30 tablet 0    metoprolol succinate (TOPROL-XL) 100 MG 24 hr tablet TAKE ONE TABLET BY MOUTH DAILY 90 tablet 3    potassium chloride SA (K-DUR,KLOR-CON) 20 MEQ tablet Take 1 tablet (20 mEq total) by mouth 2 (two) times daily. 180 tablet 3    rosuvastatin (CRESTOR) 10 MG tablet Take 1 tablet (10 mg total) by mouth once daily. 90 tablet 3    [DISCONTINUED] amLODIPine (NORVASC) 2.5 MG tablet Take 1 tablet (2.5 mg total) by mouth once daily. 30 tablet 11     No facility-administered encounter medications on file as of 7/1/2024.     No orders of the defined types were placed in this encounter.      Plan:   1. Chronic respiratory failure with hypoxia, on home oxygen therapy  Assessment & Plan:  Patient with Hypercapnic Respiratory failure which is Chronic.  she is on home oxygen at 2 LPM. Supplemental oxygen was provided and noted- [unfilled].   Signs/symptoms of respiratory failure include- tachypnea. Contributing diagnoses includes - COPD Labs and images were reviewed. Patient Has not had a recent ABG. Will treat underlying causes and adjust management of respiratory failure as follows      2. Chronic obstructive pulmonary disease, unspecified COPD type  Assessment &  Plan:  Stable with advair.   Pulmonary rehab    Orders:  -     Ambulatory referral/consult to Pulmonology  -     X-Ray Chest PA And Lateral; Future; Expected date: 07/01/2024      Nehemias Hoff MD

## 2024-07-03 ENCOUNTER — TELEPHONE (OUTPATIENT)
Dept: INTERNAL MEDICINE | Facility: CLINIC | Age: 69
End: 2024-07-03
Payer: MEDICARE

## 2024-07-03 NOTE — TELEPHONE ENCOUNTER
----- Message from Corin Dominique sent at 7/2/2024  3:56 PM CDT -----  Contact: [phone]  1MEDICALADVICE     Patient is calling for Medical Advice regarding:Patient would like a call back and this is her second time calling.         Comments:Please call patient is frustrated because she has not had a return call since last week!

## 2024-07-04 NOTE — TELEPHONE ENCOUNTER
No care due was identified.  Health [facility] Embedded Care Due Messages. Reference number: [id number].   7/04/2024 12:42:04 PM CDT

## 2024-07-05 RX ORDER — LORAZEPAM 0.5 MG/1
0.5 TABLET ORAL DAILY PRN
Qty: 30 TABLET | Refills: 0 | Status: SHIPPED | OUTPATIENT
Start: 2024-07-05

## 2024-07-05 RX ORDER — LORAZEPAM 0.5 MG/1
0.5 TABLET ORAL
Qty: 30 TABLET | OUTPATIENT
Start: 2024-07-05

## 2024-07-05 NOTE — TELEPHONE ENCOUNTER
----- Message from Batsheva Lamarobdulio Stephens sent at 7/5/2024  9:56 AM CDT -----  Contact: [phone]  1MEDICALADVICE     Patient is calling for Medical Advice regarding:pt is calling this is her 3rd call...calling about a prescription that needs to be filled LORazepam (ATIVAN) 0.5 MG tablet 30 tablet  and she has other questions please call her back    How long has patient had these symptoms:    Pharmacy name and phone#:  [id number] - [place] - [address] [facility] [address]  [address] [address]  Phone: [phone] Fax: [phone]    Patient wants a call back or thru [facility]:callback    Comments:    Please advise patient replies from provider may take up to 48 hours.  - Message from Corin Dominique sent at 7/2/2024  3:56 PM CDT -----  Contact: [phone]  1MEDICALADVICE      Patient is calling for Medical Advice regarding:Patient would like a call back and this is her second time calling.        Comments:Please call patient is frustrated because she has not had a return call since last week!

## 2024-07-05 NOTE — TELEPHONE ENCOUNTER
No care due was identified.  Health [facility] Embedded Care Due Messages. Reference number: [id number].   7/04/2024 10:09:46 PM CDT

## 2024-07-05 NOTE — TELEPHONE ENCOUNTER
Patient Comment: I am going out of town on an emergency on Saturday and I am in need of the medication on Friday 7/5/24.     Refill Request.

## 2024-07-05 NOTE — TELEPHONE ENCOUNTER
----- Message from Amanda Smart sent at 7/5/2024  8:50 AM CDT -----  Contact: [phone]  1MEDICALADVICE     Patient is calling for Medical Advice regarding: asking for Mrs Oconnor     How long has patient had these symptoms:    Pharmacy name and phone#:    Patient wants a call back or thru [facility]: call back     Comments:  Pt is asking for a return call ASAP no other info given please advise       Please advise patient replies from provider may take up to 48 hours.

## 2024-07-05 NOTE — TELEPHONE ENCOUNTER
No care due was identified.  [facility] Embedded Care Due Messages. Reference number: [id number].   7/05/2024 9:51:36 AM CDT

## 2024-07-08 ENCOUNTER — TELEPHONE (OUTPATIENT)
Dept: ADMINISTRATIVE | Facility: CLINIC | Age: 69
End: 2024-07-08
Payer: MEDICARE

## 2024-07-08 ENCOUNTER — PATIENT MESSAGE (OUTPATIENT)
Dept: ADMINISTRATIVE | Facility: CLINIC | Age: 69
End: 2024-07-08
Payer: MEDICARE

## 2024-07-10 ENCOUNTER — OFFICE VISIT (OUTPATIENT)
Dept: INTERNAL MEDICINE | Facility: CLINIC | Age: 69
End: 2024-07-10
Payer: MEDICARE

## 2024-07-10 ENCOUNTER — TELEPHONE (OUTPATIENT)
Dept: PULMONOLOGY | Facility: CLINIC | Age: 69
End: 2024-07-10
Payer: MEDICARE

## 2024-07-10 VITALS
HEIGHT: 64 IN | BODY MASS INDEX: 28.53 KG/M2 | WEIGHT: 167.13 LBS | SYSTOLIC BLOOD PRESSURE: 119 MMHG | DIASTOLIC BLOOD PRESSURE: 76 MMHG

## 2024-07-10 DIAGNOSIS — F39 MOOD DISORDER: ICD-10-CM

## 2024-07-10 DIAGNOSIS — Z00.00 ENCOUNTER FOR MEDICARE ANNUAL WELLNESS EXAM: ICD-10-CM

## 2024-07-10 DIAGNOSIS — J44.9 CHRONIC OBSTRUCTIVE PULMONARY DISEASE, UNSPECIFIED COPD TYPE: ICD-10-CM

## 2024-07-10 DIAGNOSIS — I25.10 CORONARY ARTERY CALCIFICATION SEEN ON CT SCAN: ICD-10-CM

## 2024-07-10 DIAGNOSIS — E78.5 HYPERLIPIDEMIA, UNSPECIFIED HYPERLIPIDEMIA TYPE: ICD-10-CM

## 2024-07-10 DIAGNOSIS — Z12.12 SCREENING FOR COLORECTAL CANCER: ICD-10-CM

## 2024-07-10 DIAGNOSIS — I10 ESSENTIAL HYPERTENSION: ICD-10-CM

## 2024-07-10 DIAGNOSIS — Z12.11 SCREEN FOR COLON CANCER: ICD-10-CM

## 2024-07-10 DIAGNOSIS — Z99.81 DEPENDENCE ON SUPPLEMENTAL OXYGEN: ICD-10-CM

## 2024-07-10 DIAGNOSIS — R26.9 ABNORMALITY OF GAIT AND MOBILITY: ICD-10-CM

## 2024-07-10 DIAGNOSIS — Z12.11 SCREENING FOR COLORECTAL CANCER: ICD-10-CM

## 2024-07-10 DIAGNOSIS — Z87.891 FORMER SMOKER: Chronic | ICD-10-CM

## 2024-07-10 DIAGNOSIS — Z00.00 ENCOUNTER FOR PREVENTIVE HEALTH EXAMINATION: Primary | ICD-10-CM

## 2024-07-10 DIAGNOSIS — Z99.89 DEPENDENCE ON OTHER ENABLING MACHINES AND DEVICES: ICD-10-CM

## 2024-07-10 DIAGNOSIS — I70.0 AORTIC ATHEROSCLEROSIS: ICD-10-CM

## 2024-07-10 DIAGNOSIS — F15.20 OTHER STIMULANT DEPENDENCE, UNCOMPLICATED: ICD-10-CM

## 2024-07-10 NOTE — PROGRESS NOTES
"The patient location is: [place]  The chief complaint leading to consultation is:  Medicare AWV    Visit type: audiovisual    Face to Face time with patient: 30 min  45  minutes of total time spent on the encounter, which includes face to face time and non-face to face time preparing to see the patient (eg, review of tests), Obtaining and/or reviewing separately obtained history, Documenting clinical information in the electronic or other health record, Independently interpreting results (not separately reported) and communicating results to the patient/family/caregiver, or Care coordination (not separately reported).         Each patient to whom he or she provides medical services by telemedicine is:  (1) informed of the relationship between the physician and patient and the respective role of any other health care provider with respect to management of the patient; and (2) notified that he or she may decline to receive medical services by telemedicine and may withdraw from such care at any time.    Notes:       Ana Aguila presented for a  Medicare AWV and comprehensive Health Risk Assessment today. The following components were reviewed and updated:    Medical history  Family History  Social history  Allergies and Current Medications  Health Risk Assessment  Health Maintenance  Care Team         ** See Completed Assessments for Annual Wellness Visit within the encounter summary.**         The following assessments were completed:  Living Situation  CAGE  Depression Screening  Fall Risk Assessment (MACH 10)  Hearing Assessment(HHI)  Cognitive Function Screening  Nutrition Screening  ADL Screening  PAQ Screening    Opioid documentation:      Patient does not have a current opioid prescription.        Vitals:    07/10/24 1006   BP: 119/76   Weight: 75.8 kg (167 lb 1.7 oz)   Height: 5' 4" (1.626 m)     Body mass index is 28.68 kg/m².  Physical Exam  Constitutional:       General: She is not in acute distress.     " Appearance: Normal appearance. She is not ill-appearing, toxic-appearing or diaphoretic.   Pulmonary:      Effort: Pulmonary effort is normal.   Neurological:      Mental Status: She is alert and oriented to person, place, and time.   Psychiatric:         Mood and Affect: Mood normal.         Behavior: Behavior normal.               Diagnoses and health risks identified today and associated recommendations/orders:    1. Encounter for preventive health examination  Screenings performed, as noted above.  Personal preventative testing needs reviewed.      2. Encounter for Medicare annual wellness exam  Screenings performed, as noted above.  Personal preventative testing needs reviewed.    - Ambulatory Referral/Consult to Enhanced Annual Wellness Visit (eAWV)    3. Other stimulant dependence, uncomplicated  Monitored, stable, follow up with pcp    4. Mood disorder  Treated Ativan prn, stable, states is breathing better, follow up as indicated    5. Aortic atherosclerosis  Monitored, stable, on a statin, cont tx    6. Dependence on other enabling machines and devices  Assessed, stable, uses rollator as needed    7. Dependence on supplemental oxygen  Assessed, stable, uses continuously, stable, follow up with pulmonary    8. Abnormality of gait and mobility  Assessed, stable, uses rollator as needed, no recent falls    9. Essential hypertension  Treated with metoprolol, hctz, stable, cont tx    10. Hyperlipidemia, unspecified hyperlipidemia type  Treated with crestor, stable, cont tx    11. Chronic obstructive pulmonary disease, unspecified COPD type  Treated with inhalers, stable, cont tx  on    12. Former smoker  Monitored, stable, not smoking    Discussed vaccines, she will discuss the Fit Kit with her pcp next visit, she is scheduled for her mammogram in Sept and is aware of it      Provided Ana with a 5-10 year written screening schedule and personal prevention plan. Recommendations were developed using the  USPSTF age appropriate recommendations. Education, counseling, and referrals were provided as needed. After Visit Summary printed and given to patient which includes a list of additional screenings\tests needed.    No follow-ups on file.    Erik Dougherty NP  I offered to discuss advanced care planning, including how to pick a person who would make decisions for you if you were unable to make them for yourself, called a health care power of , and what kind of decisions you might make such as use of life sustaining treatments such as ventilators and tube feeding when faced with a life limiting illness recorded on a living will that they will need to know. (How you want to be cared for as you near the end of your natural life)     X Patient is interested in learning more about how to make advanced directives.  I provided them paperwork and offered to discuss this with them.  I offered to discuss advanced care planning, including how to pick a person who would make decisions for you if you were unable to make them for yourself, called a health care power of , and what kind of decisions you might make such as use of life sustaining treatments such as ventilators and tube feeding when faced with a life limiting illness recorded on a living will that they will need to know. (How you want to be cared for as you near the end of your natural life)     X Patient is interested in learning more about how to make advanced directives.  I provided them paperwork and offered to discuss this with them.

## 2024-07-10 NOTE — TELEPHONE ENCOUNTER
----- Message from Debora Boo sent at 7/10/2024  4:06 PM CDT -----  Type:  Patient Requesting Referral    Who Called:Inga with [facility]   Does the patient already have the specialty appointment scheduled?:No   If yes, what is the date of that appointment?:  Referral to What Specialty:Pulmonary Rehab   Reason for Referral:Pulmonary   Does the patient want the referral with a specific physician?:no   Is the specialist an [facility]?:No   Patient Requesting a Response?:yes   Would the patient rather a call back or a response via [facility]? Call   Best Call Back Number:[phone]  Additional Information:      Inga stated they need a referral faxed to Fax# [phone]

## 2024-07-10 NOTE — PATIENT INSTRUCTIONS
Counseling and Referral of Other Preventative  (Italic type indicates deductible and co-insurance are waived)    Patient Name: Ana Aguila  Today's Date: 7/10/2024    Health Maintenance       Date Due Completion Date    TETANUS VACCINE Never done ---    Shingles Vaccine (1 of 2) Never done ---    RSV Vaccine (Age 60+ and Pregnant patients) (1 - 1-dose 60+ series) Never done ---    Colorectal Cancer Screening 11/17/2021 11/16/2021    Hemoglobin A1c (Prediabetes) 09/09/2023 9/9/2022    COVID-19 Vaccine (7 - 2023-24 season) 04/10/2024 12/10/2023    Mammogram 08/09/2024 8/9/2023    Influenza Vaccine (1) 09/01/2024 11/9/2023    DEXA Scan 09/28/2025 9/28/2023    Lipid Panel 07/27/2026 7/27/2021        No orders of the defined types were placed in this encounter.    The following information is provided to all patients.  This information is to help you find resources for any of the problems found today that may be affecting your health:                  Living healthy guide: www.[facility].[place].[facility]      Understanding Diabetes: www.diabetes.org      Eating healthy: www.cdc.gov/healthyweight      CDC home safety checklist: www.cdc.gov/steadi/patient.html      Agency on Aging: www.goea.[place].[facility]      Alcoholics anonymous (AA): www.aa.org      Physical Activity: www.lucia.nih.gov/tt3czqg      Tobacco use: www.quitwithusla.org         Counseling and Referral of Other Preventative  (Italic type indicates deductible and co-insurance are waived)    Patient Name: Ana Aguila  Today's Date: 7/10/2024    Health Maintenance       Date Due Completion Date    TETANUS VACCINE Never done ---    Shingles Vaccine (1 of 2) Never done ---    RSV Vaccine (Age 60+ and Pregnant patients) (1 - 1-dose 60+ series) Never done ---    Colorectal Cancer Screening 11/17/2021 11/16/2021    Hemoglobin A1c (Prediabetes) 09/09/2023 9/9/2022    COVID-19 Vaccine (7 - 2023-24 season) 04/10/2024 12/10/2023    Mammogram 08/09/2024 8/9/2023    Influenza  Vaccine (1) 09/01/2024 11/9/2023    DEXA Scan 09/28/2025 9/28/2023    Lipid Panel 07/27/2026 7/27/2021        No orders of the defined types were placed in this encounter.    The following information is provided to all patients.  This information is to help you find resources for any of the problems found today that may be affecting your health:                  Living healthy guide: www.[facility].[place].[facility]      Understanding Diabetes: www.diabetes.org      Eating healthy: www.cdc.gov/healthyweight      [facility] home safety checklist: www.cdc.gov/steadi/patient.html      Agency on Aging: www.goea.[place]      Alcoholics anonymous (AA): www.aa.org      Physical Activity: www.lucia.nih.gov/au0ctmu      Tobacco use: www.quitwithusla.org

## 2024-07-17 ENCOUNTER — TELEPHONE (OUTPATIENT)
Dept: INTERNAL MEDICINE | Facility: CLINIC | Age: 69
End: 2024-07-17
Payer: MEDICARE

## 2024-07-17 ENCOUNTER — NURSE TRIAGE (OUTPATIENT)
Dept: ADMINISTRATIVE | Facility: CLINIC | Age: 69
End: 2024-07-17
Payer: MEDICARE

## 2024-07-17 DIAGNOSIS — J44.9 CHRONIC OBSTRUCTIVE PULMONARY DISEASE, UNSPECIFIED COPD TYPE: ICD-10-CM

## 2024-07-17 RX ORDER — FLUTICASONE PROPIONATE AND SALMETEROL 250; 50 UG/1; UG/1
1 POWDER RESPIRATORY (INHALATION) 2 TIMES DAILY
Qty: 180 EACH | Refills: 3 | OUTPATIENT
Start: 2024-07-17 | End: 2025-07-17

## 2024-07-17 RX ORDER — FLUTICASONE PROPIONATE AND SALMETEROL 250; 50 UG/1; UG/1
1 POWDER RESPIRATORY (INHALATION) 2 TIMES DAILY
Qty: 180 EACH | Refills: 3 | Status: SHIPPED | OUTPATIENT
Start: 2024-07-17 | End: 2024-07-17

## 2024-07-17 RX ORDER — FLUTICASONE PROPIONATE AND SALMETEROL 250; 50 UG/1; UG/1
1 POWDER RESPIRATORY (INHALATION) 2 TIMES DAILY
Qty: 60 EACH | Refills: 2 | OUTPATIENT
Start: 2024-07-17

## 2024-07-17 RX ORDER — FLUTICASONE PROPIONATE AND SALMETEROL 250; 50 UG/1; UG/1
1 POWDER RESPIRATORY (INHALATION) 2 TIMES DAILY
Qty: 180 EACH | Refills: 0 | Status: SHIPPED | OUTPATIENT
Start: 2024-07-17 | End: 2024-07-19 | Stop reason: SDUPTHER

## 2024-07-17 NOTE — TELEPHONE ENCOUNTER
Refill Routing Note   Medication(s) are not appropriate for processing by [facility] for the following reason(s):        No active prescription written by provider    [facility] action(s):  Defer               Appointments  past 12m or future 3m with PCP    Date Provider   Last Visit   5/1/2024 Zhou Gallardo MD   Next Visit   7/17/2024 Zhou Gallardo MD   ED visits in past 90 days: 0        Note composed:12:52 PM 07/17/2024

## 2024-07-17 NOTE — TELEPHONE ENCOUNTER
No care due was identified.  [facility] Embedded Care Due Messages. Reference number: [id number].   7/17/2024 12:56:11 PM CDT

## 2024-07-17 NOTE — TELEPHONE ENCOUNTER
Patient states her prescription for Advir was sent to her Pharmacy today by her PCP, Dr. Zhou Gallardo and her Pharmacy, [facility] #[id number] advised that they have not received her prescription submission at this time.     fluticasone-salmeterol diskus inhaler 250-50 mcg 180 each 3 7/17/2024 -- No   Sig: USE 1 INHALATION BY MOUTH TWICE DAILY   Sent to pharmacy as: fluticasone-salmeterol 250-50 mcg/dose (ADVAIR) 250-50 mcg/dose diskus inhaler   Class: Normal   Order: [id number]   Date/Time Signed: 7/17/2024 15:06       E-Prescribing Status: Receipt confirmed by pharmacy (7/17/2024  3:06 PM CDT)     Pharmacy    [id number] [address] [facility] [address]        Triage RN spoke with [facility] Staff Member, Rhona, that states the Pharmacy has not received patient's prescription for Advir at this time.     On Call Provider, Dr. Niraj Martel, provided verbal order for Advair 250-50 mcg/dose discus inhaler, Inhalation by mouth twice daily, Dispense 180 each, No Refills.     Telephone order provided to [facility] #[id number] Pharmacist, Kandice SegundoD for Advair 250-50 mcg/dose discus inhaler, Inhalation by mouth twice daily, Dispense 180 each, No Refills.     Patient advised that her prescription has been submitted to [facility][id number]. Patient advised that prescription was submitted without refills due to after hours submission by On Call Provider that is not patient's actual PCP. Patient advised that a message will be sent to her PCP to submit prescription for Advair Diskus with refills as originally submitted on today, 7/17/24. Patient also advised to contact the [facility] for any additional questions/symptoms. Patient states understanding of care advice.     Reason for Disposition   [1] Prescription refill request for ESSENTIAL medicine (i.e., likelihood of harm to patient if not taken) AND [2] triager unable to refill per department  policy    Additional Information   Negative: New-onset or worsening symptoms, see that guideline (e.g., diarrhea, runny nose, sore throat)   Negative: Medicine question not related to refill or renewal   Negative: Caller (e.g., patient or pharmacist) requesting information about a new medicine   Negative: Caller requesting information unrelated to medicine    Protocols used: Medication Refill and Renewal Call-A-AH

## 2024-07-17 NOTE — TELEPHONE ENCOUNTER
----- Message from Janee West sent at 7/16/2024  4:22 PM CDT -----  Contact: [phone] Patient  Pt is calling in regards to asking if Ms Oconnor can please call her back ASAP. Pt gave no other info. Please call and advise. Thank you

## 2024-07-17 NOTE — TELEPHONE ENCOUNTER
Refill Decision Note   Ana Aguila  is requesting a refill authorization.  Brief Assessment and Rationale for Refill:  Quick Discontinue     Medication Therapy Plan:         Comments:     Note composed:12:55 PM 07/17/2024

## 2024-07-17 NOTE — TELEPHONE ENCOUNTER
No care due was identified.  [facility] Embedded Care Due Messages. Reference number: [id number].   7/17/2024 4:50:11 PM CDT

## 2024-07-17 NOTE — TELEPHONE ENCOUNTER
No care due was identified.  [facility] Embedded Care Due Messages. Reference number: [id number].   7/17/2024 10:47:07 AM CDT

## 2024-07-17 NOTE — TELEPHONE ENCOUNTER
Spoke with pt slot held 07/19/24 12 pm Dr. Gallardo and sent msg to advanced  group to reschedule.

## 2024-07-17 NOTE — TELEPHONE ENCOUNTER
----- Message from Kristine Mary Ann sent at 7/17/2024 10:55 AM CDT -----  Contact: [phone] Patient  Requesting an RX refill or new RX.    Is this a refill or new RX: new    RX name and strength (copy/paste from chart):  fluticasone-salmeterol diskus inhaler 250-50 mcg    Is this a 30 day or 90 day RX: 90    Pharmacy name and phone # (copy/paste from chart):    [id number] - [address] - [address] [facility] [address]  [address] [address]  Phone: [phone] Fax: [phone]      The doctors have asked that we provide their patients with the following 2 reminders -- prescription refills can take up to 72 hours, and a friendly reminder that in the future you can use your [facility] account to request refills: yes

## 2024-07-17 NOTE — TELEPHONE ENCOUNTER
Refill Decision Note   Ana Aguila  is requesting a refill authorization.  Brief Assessment and Rationale for Refill:  Quick Discontinue     Medication Therapy Plan:         Comments:     Note composed:12:57 PM 07/17/2024

## 2024-07-17 NOTE — TELEPHONE ENCOUNTER
No care due was identified.  [facility] Embedded Care Due Messages. Reference number: [id number].   7/17/2024 10:44:58 AM CDT

## 2024-07-18 RX ORDER — FLUTICASONE PROPIONATE AND SALMETEROL 250; 50 UG/1; UG/1
1 POWDER RESPIRATORY (INHALATION) 2 TIMES DAILY
Qty: 180 EACH | Refills: 3 | OUTPATIENT
Start: 2024-07-18

## 2024-07-18 NOTE — TELEPHONE ENCOUNTER
----- Message from Amanda Smart sent at 7/17/2024  4:15 PM CDT -----  Contact: [phone]  Patient is returning a phone call.    Who left a message for the patient: Anastasia Mendes MA    Does patient know what this is regarding:  yes     Would you like a call back, or a response through your [facility] portal?:   call back     Comments:   She states she got a message stating they could not fill her Advair Diskus

## 2024-07-18 NOTE — TELEPHONE ENCOUNTER
Refill Routing Note   Medication(s) are not appropriate for processing by [facility] for the following reason(s):        No active prescription written by provider    [facility] action(s):  Defer        Medication Therapy Plan:  ON THE MED LIST 24.      Appointments  past 12m or future 3m with PCP    Date Provider   Last Visit   2024 Zhou Gallardo MD   Next Visit   2024 Zhou Gallardo MD   ED visits in past 90 days: 0        Note composed:7:35 PM 2024

## 2024-07-19 ENCOUNTER — OFFICE VISIT (OUTPATIENT)
Dept: INTERNAL MEDICINE | Facility: CLINIC | Age: 69
End: 2024-07-19
Payer: MEDICARE

## 2024-07-19 ENCOUNTER — HOSPITAL ENCOUNTER (OUTPATIENT)
Dept: RADIOLOGY | Facility: HOSPITAL | Age: 69
Discharge: HOME | End: 2024-07-19
Attending: INTERNAL MEDICINE
Payer: MEDICARE

## 2024-07-19 VITALS
WEIGHT: 167.13 LBS | DIASTOLIC BLOOD PRESSURE: 74 MMHG | HEIGHT: 64 IN | BODY MASS INDEX: 28.53 KG/M2 | OXYGEN SATURATION: 96 % | HEART RATE: 76 BPM | SYSTOLIC BLOOD PRESSURE: 126 MMHG

## 2024-07-19 DIAGNOSIS — K59.00 CONSTIPATION, UNSPECIFIED CONSTIPATION TYPE: ICD-10-CM

## 2024-07-19 DIAGNOSIS — J44.9 CHRONIC OBSTRUCTIVE PULMONARY DISEASE, UNSPECIFIED COPD TYPE: Primary | ICD-10-CM

## 2024-07-19 PROCEDURE — 99999 PR PBB SHADOW E&M-EST. PATIENT-LVL III: CPT | Mod: PBBFAC,,, | Performed by: INTERNAL MEDICINE

## 2024-07-19 PROCEDURE — 1126F AMNT PAIN NOTED NONE PRSNT: CPT | Mod: CPTII,S$GLB,, | Performed by: INTERNAL MEDICINE

## 2024-07-19 PROCEDURE — 3078F DIAST BP <80 MM HG: CPT | Mod: CPTII,S$GLB,, | Performed by: INTERNAL MEDICINE

## 2024-07-19 PROCEDURE — 99213 OFFICE O/P EST LOW 20 MIN: CPT | Mod: S$GLB,,, | Performed by: INTERNAL MEDICINE

## 2024-07-19 PROCEDURE — 1101F PT FALLS ASSESS-DOCD LE1/YR: CPT | Mod: CPTII,S$GLB,, | Performed by: INTERNAL MEDICINE

## 2024-07-19 PROCEDURE — 74019 RADEX ABDOMEN 2 VIEWS: CPT | Mod: TC

## 2024-07-19 PROCEDURE — 1159F MED LIST DOCD IN RCRD: CPT | Mod: CPTII,S$GLB,, | Performed by: INTERNAL MEDICINE

## 2024-07-19 PROCEDURE — 3008F BODY MASS INDEX DOCD: CPT | Mod: CPTII,S$GLB,, | Performed by: INTERNAL MEDICINE

## 2024-07-19 PROCEDURE — 3288F FALL RISK ASSESSMENT DOCD: CPT | Mod: CPTII,S$GLB,, | Performed by: INTERNAL MEDICINE

## 2024-07-19 PROCEDURE — 74019 RADEX ABDOMEN 2 VIEWS: CPT | Mod: 26,,, | Performed by: RADIOLOGY

## 2024-07-19 PROCEDURE — 3074F SYST BP LT 130 MM HG: CPT | Mod: CPTII,S$GLB,, | Performed by: INTERNAL MEDICINE

## 2024-07-19 RX ORDER — LACTULOSE 10 G/15ML
10 SOLUTION ORAL; RECTAL DAILY
Qty: 200 ML | Refills: 1 | Status: SHIPPED | OUTPATIENT
Start: 2024-07-19

## 2024-07-19 RX ORDER — FLUTICASONE PROPIONATE AND SALMETEROL 250; 50 UG/1; UG/1
1 POWDER RESPIRATORY (INHALATION) 2 TIMES DAILY
Qty: 180 EACH | Refills: 3 | Status: SHIPPED | OUTPATIENT
Start: 2024-07-19 | End: 2025-07-14

## 2024-07-20 NOTE — PROGRESS NOTES
CC:  Follow-up     HPI: The patient is a 68-year-old female with COPD, hypertension, reflux, right hemicolectomy secondary to a GI bleed following a polypectomy who presents today for follow-up.  The patient did follow-up with Pulmonary on July 1st.  She continues to use 2 L of oxygen.  No changes were made to her medications.  Her new complaint today is abdominal discomfort.  She has been having some discomfort in the abdomen around the level of the belly button.  Symptoms started about 1-2 weeks ago and appears to be exam is exasperated by constipation.  Symptoms improved with a bowel movement.    ROS: Patient reports needing a refill of Advair.  She reports having an episode of left flank pain 3 weeks ago.  This improved with heat.  It is better today but she can feel it with certain movements.  She still has problems with anxiety and has been taking 1 lorazepam a day.  She is currently having problems with her upstairs neighbors.    Physical exam:   General appearance: No acute distress  Pulmonary: Good inspiratory, expiratory breath sounds are heard.  Lungs are clear auscultation.  Cardiovascular: S1-S2, rhythm is regular.  Extremities without edema.    GI: Abdomen is nontender, nondistended without hepatosplenomegaly    Assessment:    Abdominal discomfort  2.  CRP currently stable   3.  Constipation  Plan:    Will order an abdominal film flat and erect   2.  Will try the patient on lactulose once a day to help her bowels move  3.  The patient is to contact us if her symptoms do not improve  4.  The patient was provided a letter regarding at home learning courses for continuing education for her license.

## 2024-07-23 ENCOUNTER — TELEPHONE (OUTPATIENT)
Dept: PULMONOLOGY | Facility: CLINIC | Age: 69
End: 2024-07-23
Payer: MEDICARE

## 2024-07-23 NOTE — TELEPHONE ENCOUNTER
----- Message from Marito Kapoor sent at 7/23/2024  3:18 PM CDT -----  Contact: Ethan  .Type:  Patient Requesting Referral    Who Called:pt  Does the patient already have the specialty appointment scheduled?:  If yes, what is the date of that appointment?:  Referral to What Specialty: Ethan Armendariz  Reason for Referral:Chronic obstructive pulmonary disease, unspecified COPD type [J44.9]  Does the patient want the referral with a specific physician?: Ethan Armendariz  Is the specialist [facility]?: [facility]  Patient Requesting a Response?:  Would the patient rather a call back or a response via [facility]?  Call back  Best Call Back Number:[phone]  Additional Information:  Please call Ethan Armendariz at [phone]

## 2024-07-23 NOTE — TELEPHONE ENCOUNTER
Spoke with Pia and let her know that everything was faxed over on 7/10. Pia states they did not receive it. Faxed referral, clinicals and testing again. [phone]

## 2024-07-23 NOTE — TELEPHONE ENCOUNTER
----- Message from Chan Navarrete sent at 7/23/2024  2:12 PM CDT -----  Consult/Advisory    Name Of Caller:Pia       Contact Preference:[phone]    Nature of call: [facility] called regarding a referral needed for ptn to be faxed to them [phone]

## 2024-07-31 DIAGNOSIS — J44.9 CHRONIC OBSTRUCTIVE PULMONARY DISEASE, UNSPECIFIED COPD TYPE: ICD-10-CM

## 2024-07-31 DIAGNOSIS — J96.11 CHRONIC RESPIRATORY FAILURE WITH HYPOXIA: ICD-10-CM

## 2024-07-31 RX ORDER — METOPROLOL SUCCINATE 100 MG/1
100 TABLET, EXTENDED RELEASE ORAL
Qty: 90 TABLET | Refills: 3 | Status: SHIPPED | OUTPATIENT
Start: 2024-07-31

## 2024-07-31 NOTE — TELEPHONE ENCOUNTER
No care due was identified.  [facility] Embedded Care Due Messages. Reference number: [id number].   7/31/2024 11:52:55 AM CDT

## 2024-07-31 NOTE — TELEPHONE ENCOUNTER
Refill Decision Note   Ana Aguila  is requesting a refill authorization.  Brief Assessment and Rationale for Refill:  Approve     Medication Therapy Plan:        Pharmacist review requested: Yes   Comments:     Note composed:5:11 PM 07/31/2024

## 2024-07-31 NOTE — TELEPHONE ENCOUNTER
Refill Routing Note   Medication(s) are not appropriate for processing by [facility] for the following reason(s):      Drug-disease interaction    [facility] action(s):  Defer Care Due:  None identified     Medication Therapy Plan: metoprolol succinate and Hepatomegaly; Fatty liver disease, nonalcoholic    Pharmacist review requested: Yes     Appointments  past 12m or future 3m with PCP    Date Provider   Last Visit   7/19/2024 Zhou Gallardo MD   Next Visit   Visit date not found Zhou Gallardo MD   ED visits in past 90 days: 0        Note composed:5:07 PM 07/31/2024

## 2024-08-01 RX ORDER — IPRATROPIUM BROMIDE AND ALBUTEROL SULFATE 2.5; .5 MG/3ML; MG/3ML
SOLUTION RESPIRATORY (INHALATION)
Qty: 1 EACH | Refills: 11 | Status: SHIPPED | OUTPATIENT
Start: 2024-08-01

## 2024-08-02 NOTE — TELEPHONE ENCOUNTER
No care due was identified.  [facility] Embedded Care Due Messages. Reference number: [id number].   8/01/2024 9:34:27 PM CDT

## 2024-08-02 NOTE — TELEPHONE ENCOUNTER
Refill Routing Note   Medication(s) are not appropriate for processing by [facility] for the following reason(s):        Outside of protocol: non-delegated    [facility] action(s):  Route      Medication Therapy Plan:         Appointments  past 12m or future 3m with PCP    Date Provider   Last Visit   7/19/2024 Zhou Gallardo MD   Next Visit   Visit date not found Zhou Gallardo MD   ED visits in past 90 days: 0        Note composed:10:32 AM 08/02/2024

## 2024-08-03 RX ORDER — LORAZEPAM 0.5 MG/1
0.5 TABLET ORAL DAILY PRN
Qty: 30 TABLET | Refills: 0 | Status: SHIPPED | OUTPATIENT
Start: 2024-08-03

## 2024-08-05 ENCOUNTER — TELEPHONE (OUTPATIENT)
Dept: PULMONOLOGY | Facility: CLINIC | Age: 69
End: 2024-08-05
Payer: MEDICARE

## 2024-08-06 DIAGNOSIS — Z99.81 CHRONIC RESPIRATORY FAILURE WITH HYPOXIA, ON HOME OXYGEN THERAPY: Primary | ICD-10-CM

## 2024-08-06 DIAGNOSIS — J96.11 CHRONIC RESPIRATORY FAILURE WITH HYPOXIA, ON HOME OXYGEN THERAPY: Primary | ICD-10-CM

## 2024-08-07 ENCOUNTER — TELEPHONE (OUTPATIENT)
Dept: INTERNAL MEDICINE | Facility: CLINIC | Age: 69
End: 2024-08-07
Payer: MEDICARE

## 2024-08-12 ENCOUNTER — TELEPHONE (OUTPATIENT)
Dept: PULMONOLOGY | Facility: CLINIC | Age: 69
End: 2024-08-12
Payer: MEDICARE

## 2024-08-12 NOTE — TELEPHONE ENCOUNTER
----- Message from Latesha Duggan sent at 8/12/2024  3:41 PM CDT -----  Type:  Patient Returning Call    Who Called:pt   Who Left Message for Patient:  Does the patient know what this is regarding?: 6 min walk   Would the patient rather a call back or a response via [facility]? Call   Best Call Back Number:[phone]  Additional Information: would like to reschedule for first available appt next week for 6 min walk

## 2024-08-12 NOTE — TELEPHONE ENCOUNTER
I spoke with patient in regards to rescheduling her 6MWT. Patient states she have family in town. Patient is rescheduled to 8/20/24 at 11:40 AM. Patient confirmed and verbalized understanding.

## 2024-08-13 ENCOUNTER — PATIENT MESSAGE (OUTPATIENT)
Dept: SURGERY | Facility: CLINIC | Age: 69
End: 2024-08-13
Payer: MEDICARE

## 2024-08-15 ENCOUNTER — PATIENT MESSAGE (OUTPATIENT)
Dept: SURGERY | Facility: CLINIC | Age: 69
End: 2024-08-15
Payer: MEDICARE

## 2024-08-19 ENCOUNTER — OFFICE VISIT (OUTPATIENT)
Dept: SURGERY | Facility: CLINIC | Age: 69
End: 2024-08-19
Payer: MEDICARE

## 2024-08-19 ENCOUNTER — TELEPHONE (OUTPATIENT)
Dept: PULMONOLOGY | Facility: CLINIC | Age: 69
End: 2024-08-19
Payer: MEDICARE

## 2024-08-19 ENCOUNTER — HOSPITAL ENCOUNTER (OUTPATIENT)
Dept: PULMONOLOGY | Facility: CLINIC | Age: 69
Discharge: HOME | End: 2024-08-19
Attending: INTERNAL MEDICINE
Payer: MEDICARE

## 2024-08-19 ENCOUNTER — HOSPITAL ENCOUNTER (OUTPATIENT)
Dept: RADIOLOGY | Facility: HOSPITAL | Age: 69
Discharge: HOME | End: 2024-08-19
Attending: INTERNAL MEDICINE
Payer: MEDICARE

## 2024-08-19 VITALS — HEIGHT: 65 IN | WEIGHT: 167.13 LBS | BODY MASS INDEX: 27.85 KG/M2

## 2024-08-19 VITALS
DIASTOLIC BLOOD PRESSURE: 80 MMHG | HEART RATE: 64 BPM | BODY MASS INDEX: 28.51 KG/M2 | SYSTOLIC BLOOD PRESSURE: 119 MMHG | HEIGHT: 64 IN | WEIGHT: 167 LBS

## 2024-08-19 DIAGNOSIS — J96.11 CHRONIC RESPIRATORY FAILURE WITH HYPOXIA, ON HOME OXYGEN THERAPY: ICD-10-CM

## 2024-08-19 DIAGNOSIS — Z12.31 VISIT FOR SCREENING MAMMOGRAM: ICD-10-CM

## 2024-08-19 DIAGNOSIS — Z12.31 ENCOUNTER FOR SCREENING MAMMOGRAM FOR MALIGNANT NEOPLASM OF BREAST: ICD-10-CM

## 2024-08-19 DIAGNOSIS — Z12.39 ENCOUNTER FOR SCREENING BREAST EXAMINATION: Primary | ICD-10-CM

## 2024-08-19 DIAGNOSIS — Z99.81 CHRONIC RESPIRATORY FAILURE WITH HYPOXIA, ON HOME OXYGEN THERAPY: ICD-10-CM

## 2024-08-19 PROCEDURE — 77067 SCR MAMMO BI INCL CAD: CPT | Mod: TC

## 2024-08-19 PROCEDURE — 3074F SYST BP LT 130 MM HG: CPT | Mod: CPTII,S$GLB,, | Performed by: NURSE PRACTITIONER

## 2024-08-19 PROCEDURE — 77063 BREAST TOMOSYNTHESIS BI: CPT | Mod: 26,,, | Performed by: RADIOLOGY

## 2024-08-19 PROCEDURE — 77067 SCR MAMMO BI INCL CAD: CPT | Mod: 26,,, | Performed by: RADIOLOGY

## 2024-08-19 PROCEDURE — 3288F FALL RISK ASSESSMENT DOCD: CPT | Mod: CPTII,S$GLB,, | Performed by: NURSE PRACTITIONER

## 2024-08-19 PROCEDURE — 99213 OFFICE O/P EST LOW 20 MIN: CPT | Mod: S$GLB,,, | Performed by: NURSE PRACTITIONER

## 2024-08-19 PROCEDURE — 1101F PT FALLS ASSESS-DOCD LE1/YR: CPT | Mod: CPTII,S$GLB,, | Performed by: NURSE PRACTITIONER

## 2024-08-19 PROCEDURE — 99999 PR PBB SHADOW E&M-EST. PATIENT-LVL III: CPT | Mod: PBBFAC,,, | Performed by: NURSE PRACTITIONER

## 2024-08-19 PROCEDURE — 3008F BODY MASS INDEX DOCD: CPT | Mod: CPTII,S$GLB,, | Performed by: NURSE PRACTITIONER

## 2024-08-19 PROCEDURE — 94618 PULMONARY STRESS TESTING: CPT | Mod: S$GLB,,, | Performed by: INTERNAL MEDICINE

## 2024-08-19 PROCEDURE — 1126F AMNT PAIN NOTED NONE PRSNT: CPT | Mod: CPTII,S$GLB,, | Performed by: NURSE PRACTITIONER

## 2024-08-19 PROCEDURE — 3079F DIAST BP 80-89 MM HG: CPT | Mod: CPTII,S$GLB,, | Performed by: NURSE PRACTITIONER

## 2024-08-19 PROCEDURE — 1159F MED LIST DOCD IN RCRD: CPT | Mod: CPTII,S$GLB,, | Performed by: NURSE PRACTITIONER

## 2024-08-19 NOTE — PROGRESS NOTES
"[facility]  Department of Surgery        REFERRING PROVIDER:   No referring provider defined for this encounter.    Chief Complaint: F/U after Mammogram    2024    History of Present Illness: Ana Aguila is a 63 y.o. female who presents today with bilateral breast pain. Patient reports she had a right pneumothorax requiring chest tube placement. Per patient, since then, she has had tenderness and "mass-like" are near previous insertion site. Also reports feeling "knots" of the left breast, near her underarm. This is not a new issue, she has had this for years.  She was last seen in clinic by Dr. Garcia on 3/10/2020. Patient denies breast-related skin changes, nipple discharge and nipple retraction.  No other breast-related concerns.     Pt's 3/2019 echo was normal per cardiologist's documentation.    Interval History:   She presents for following. She underwent screening mammogram today which was negative. She continues with intermittent bilateral breast pain. She denies new breast concerns or complaints.        Breast Imagin2024 Mammo Digital Screening Bilat w/ Cyrus     History:  Patient is 69 y.o. and is seen for a screening mammogram.     Films Compared:  Compared to: 2023 Mammo Digital Diagnostic Bilat with Cyrus, 09/15/2022 Mammo Digital Screening Bilat w/ Cyrus, and 2021 Mammo Digital Screening Bilat w/ Cyrus      Findings:  This procedure was performed using tomosynthesis.   Computer-aided detection was utilized in the interpretation of this examination.     There is no evidence of suspicious masses, microcalcifications or architectural distortion.     Breast Density:  There are scattered areas of fibroglandular density.      Impression:   No mammographic evidence of malignancy.     BI-RADS Category 1: Negative    9/15/2022 Mammo Digital Screening Bilat w/ Cyrus     History:  Patient is 67 y.o. and is seen for a screening mammogram.        Films Compared:  Prior images " (if available) were compared.      Findings:   This procedure was performed using tomosynthesis.   Computer-aided detection was utilized in the interpretation of this examination.     The breasts have scattered areas of fibroglandular density. There is no evidence of suspicious masses, microcalcifications or architectural distortion. Findings are stable.         Impression:   No mammographic evidence of malignancy.     BI-RADS Category 1: Negative     Recommendation:  Routine screening mammogram in 1 year is recommended.        Interval Breast History         Patient reports feeling dense breast tissue in 12oc regions of bilateral breasts.  First noticed within past few months.  Size not changing.     Patient reports bilateral axillary shooting pains.  Mild.  Onset a little less than 1 week ago.  Began on L first, and then R.  More intense on L.  These pains come and go.  Heating pad has helped.  Pt has been doing an exercise regimen for the past 2 weeks that she suspects could be the cause of the pains.     Patient denies palpable breast mass, breast-related skin changes, nipple discharge and nipple retraction.  No other breast-related concerns.     Pt's 3/2019 echo was normal per cardiologist's documentation.     GYN History:  Age of menarche:  11 y/o  , first live birth at 15 y/o  Uterus and ovaries:  s/p hysterectomy at 34 y/o, pt unsure whether ovaries remain intact  Menopause:  34 y/o  HRT usage:  never     Other Oncologic History:  Personal history of other cancer not abovementioned:  no  Personal history of genetic testing:  no     Social History:  Tobacco use:  quit smoking around over 20 years ago     Family Oncologic History:     Ashkenazi Jainism ancestry:  no           Family History   Problem Relation Age of Onset    Cancer Mother           lung    Breast cancer Maternal Grandmother           unk age of onset    Breast cancer Daughter 43         negative genetic testing, unilateral breast ca     "Breast cancer Other 44         thinks negative genetic testing, bilat ca    Ovarian cancer Neg Hx           Patient's Breast Cancer Risk Assessment Scores:  the patient's breast cancer risk assessment scores were calculated 2/12/19 as follows:  Tyrer-Cuzick lifetime risk:  9.4%  Valerie model 5-year risk:  2.6%        Review of Systems - See HPI.  Negative for chest pain, unexplained fatigue, recurrent infections, or unexplained weight loss.  Objective:     /80   Pulse 64   Ht 5' 4" (1.626 m)   Wt 75.8 kg (167 lb)   LMP  (LMP Unknown)   BMI 28.67 kg/m²     Physical Exam   Vitals reviewed.  Constitutional: She is oriented to person, place, and time.   Eyes: Right eye exhibits no discharge. Left eye exhibits no discharge.   Pulmonary/Chest: No respiratory distress. She has no wheezes. Right breast exhibits tenderness. Right breast exhibits no inverted nipple, no mass, no nipple discharge and no skin change. Left breast exhibits tenderness. Left breast exhibits no inverted nipple, no mass, no nipple discharge and no skin change.       Abdominal: Normal appearance.   Musculoskeletal:      Comments: Wheelchair  Lymphadenopathy:      Cervical: No cervical adenopathy.     Neurological: She is alert and oriented to person, place, and time.   Skin: Skin is warm and dry. No erythema. No pallor.           Assessment:   This is 69 year old female who presents today for bilateral breast pain L>R, no discrete mass appreciated today.  screening mammogram today was negative.   Plan:   We discussed there was nothing concerning on exam or imaging today. Reassurance given   Patient would like to follow in the breast clinic given family history. Return to clinic in 1 year for  screening mammogram       The patient is in agreement with the plan. Questions were encouraged and answered to patient's satisfaction. Ana will call our office with any questions or concerns.               "

## 2024-08-19 NOTE — PROCEDURES
Ana Aguila is a 69 y.o.  female patient, who presents for a 6 minute walk test ordered by MD Kavya.  The diagnosis is Qualify for Oxygen; COPD/Emphysema.  The patient's BMI is 27.8 kg/m2. Predicted distance (lower limit of normal) is 296.43 meters.    Test Results:    The test was completed without stopping.  The patient stopped 1 time for a total of 45 seconds.  The total time walked was 315 seconds.  During walking, the patient reported:  Dyspnea, Lightheadedness, Dizziness.  The patient used a wheelchair and supplemental oxygenduring testing.     08/19/2024---------Distance: 182.88 meters (600 feet)     O2 Sat % Supplemental Oxygen Heart Rate Blood Pressure Jessi Scale   Pre-exercise  (Resting) 88 % Room Air 69 bpm 110/68 mmHg 1   Pre-exercise  (Resting) 94 % 3 L/M 78 bpm 108/77 mmHg 1   During Exercise 90 % 3 L/M 89 bpm 152/85 mmHg 3   Post-exercise   95 % 3 L/M  75 bpm 149/81 mmHg      Recovery Time:  97 seconds    Oxygen Qualification:     O2 Sat % Supplemental Oxygen Heart Rate Blood Pressure Jessi Scale   Pre-exercise  (Resting) 88 % Room Air  69 bpm  110/68 mmHg 1      Performing nurse/tech: EDWIN Tejeda      PREVIOUS STUDY:   10/18/2023---------Distance: 182.88 meters (600 feet)       O2 Sat % Supplemental Oxygen Heart Rate Blood Pressure Jessi Scale   Pre-exercise  (Resting) 88 % Room Air 71 bpm 147/88 mmHg 0   Pre-exercise  (Resting) 95 % 3 L/M 67 bpm 147/83 mmHg 0   During Exercise 92 % 3 L/M 72 bpm 171/78 mmHg 3   Post-exercise    94 % 3 L/M  70 bpm 158/77 mmHg         CLINICAL INTERPRETATION:  Six minute walk distance is 182.88 meters (600 feet) with moderate dyspnea.  During exercise, there was significant desaturation while breathing supplemental oxygen.  Blood pressure increased significantly and Heart rate remained stable with walking.  The patient reported non-pulmonary symptoms during exercise.  Significant exercise impairment is likely due to desaturation and subjective  symptoms.  The patient did not complete the study, walking 315 seconds of the 360 second test.  The patient may benefit from using supplemental oxygen.  Since the previous study in October 2023, exercise capacity is unchanged.  Based upon age and body mass index, exercise capacity is less than predicted.   Oxygen saturation did improve while breathing supplemental oxygen.

## 2024-08-27 ENCOUNTER — TELEPHONE (OUTPATIENT)
Dept: PULMONOLOGY | Facility: CLINIC | Age: 69
End: 2024-08-27
Payer: MEDICARE

## 2024-08-27 DIAGNOSIS — Z99.81 CHRONIC RESPIRATORY FAILURE WITH HYPOXIA, ON HOME OXYGEN THERAPY: Primary | ICD-10-CM

## 2024-08-27 DIAGNOSIS — J96.11 CHRONIC RESPIRATORY FAILURE WITH HYPOXIA, ON HOME OXYGEN THERAPY: Primary | ICD-10-CM

## 2024-08-27 NOTE — TELEPHONE ENCOUNTER
----- Message from Bea Pizano sent at 8/27/2024  2:46 PM CDT -----  Regarding: needing Ekg order  Contact: [phone]  Type:  Needs Medical Advice    Who Called: Bhavani calling from [facility] called requesting an order for an EKG     Fax [phone] ATTN Bhavani ESCOBAR  Would the patient rather a call back or a response via [facility]? Call Back  Best Call Back Number: [phone] Bhavani   Additional Information:

## 2024-08-27 NOTE — TELEPHONE ENCOUNTER
----- Message from Beata Goldsmith sent at 8/27/2024  3:03 PM CDT -----  Regarding: Appt  Contact: [phone]  Type:  Needs Medical Advice    Who Called: Ana  Would the patient rather a call back or a response via [facility]? Call  Best Call Back Number:  [phone]  Additional Information: Patient is calling to speak to the office directly.

## 2024-08-27 NOTE — TELEPHONE ENCOUNTER
----- Message from Marito Kapoor sent at 8/27/2024  4:19 PM CDT -----  Contact: [facility]  .Type:  Needs Medical Advice    Who Called:  [facility]    Would the patient rather a call back or a response via [facility]?  Call back  Best Call Back Number: [phone]  Additional Information:  Pulmonary Rehab is requesting an order for an EKG for the above pt. Fax# [phone]

## 2024-08-29 ENCOUNTER — TELEPHONE (OUTPATIENT)
Dept: PULMONOLOGY | Facility: CLINIC | Age: 69
End: 2024-08-29
Payer: MEDICARE

## 2024-08-29 NOTE — TELEPHONE ENCOUNTER
----- Message from Lynnette Willoughby sent at 8/28/2024  4:52 PM CDT -----  Contact: [phone]  Pt's [facility]  pulmonologist in [place] ordered her an EKG b/c Ethan Armendariz is requiring her to have one to have a procedure done there. Pt wants to know if Dr Karen marie can do the EKG for her? Can  you please call her to discuss.

## 2024-08-31 ENCOUNTER — PATIENT MESSAGE (OUTPATIENT)
Dept: INTERNAL MEDICINE | Facility: CLINIC | Age: 69
End: 2024-08-31
Payer: MEDICARE

## 2024-08-31 DIAGNOSIS — E55.9 VITAMIN D DEFICIENCY: ICD-10-CM

## 2024-09-01 NOTE — TELEPHONE ENCOUNTER
No care due was identified.  Health [facility] Embedded Care Due Messages. Reference number: [id number].   9/01/2024 7:58:11 AM CDT

## 2024-09-01 NOTE — TELEPHONE ENCOUNTER
Refill Routing Note   Medication(s) are not appropriate for processing by [facility] for the following reason(s):        Outside of protocol    [facility] action(s):  Route               Appointments  past 12m or future 3m with PCP    Date Provider   Last Visit   7/19/2024 Zhou Gallardo MD   Next Visit   Visit date not found Zhou Gallardo MD   ED visits in past 90 days: 0        Note composed:12:30 PM 09/01/2024

## 2024-09-02 RX ORDER — ERGOCALCIFEROL 1.25 MG/1
50000 CAPSULE ORAL
Qty: 12 CAPSULE | Refills: 3 | Status: SHIPPED | OUTPATIENT
Start: 2024-09-02

## 2024-09-03 ENCOUNTER — PATIENT MESSAGE (OUTPATIENT)
Dept: INTERNAL MEDICINE | Facility: CLINIC | Age: 69
End: 2024-09-03
Payer: MEDICARE

## 2024-09-04 ENCOUNTER — TELEPHONE (OUTPATIENT)
Dept: INTERNAL MEDICINE | Facility: CLINIC | Age: 69
End: 2024-09-04
Payer: MEDICARE

## 2024-09-04 RX ORDER — LORAZEPAM 0.5 MG/1
0.5 TABLET ORAL DAILY PRN
Qty: 30 TABLET | Refills: 0 | Status: SHIPPED | OUTPATIENT
Start: 2024-09-04

## 2024-09-04 NOTE — TELEPHONE ENCOUNTER
No care due was identified.  Health [facility] Embedded Care Due Messages. Reference number: [id number].   9/04/2024 9:02:02 AM CDT

## 2024-09-04 NOTE — TELEPHONE ENCOUNTER
----- Message from Batsheva Stephens sent at 9/3/2024  3:46 PM CDT -----  Contact: [phone]  1MEDICALADVICE     Patient is calling for Medical Advice regarding:pt is calling to see what happen to her refill of LORazepam (ATIVAN) 0.5 MG tablet 30 tablet. She requested but no response.  Please call her back and advice.    How long has patient had these symptoms:    Pharmacy name and phone#:    Patient wants a call back or thru [facility]:callback    Comments:    Please advise patient replies from provider may take up to 48 hours.

## 2024-09-04 NOTE — TELEPHONE ENCOUNTER
Fill submitted on Saturday 08/31/2024, clinic was closed on Holiday Monday 09/02/2024 with staff returning on Tuesday 09/03/2024. Fill received and forwarded to PCP on 09/03/2024. Refills can take up to 72 hours for the Physician to respond.

## 2024-09-04 NOTE — TELEPHONE ENCOUNTER
----- Message from Amanda Smart sent at 9/4/2024  2:22 PM CDT -----  Contact: [phone]  Patient is returning a phone call.    Who left a message for the patient: Xiomy Mills LPN    Does patient know what this is regarding:  yes     Would you like a call back, or a response through your [facility] portal?:   call back     Comments:   Pt states she is needing to speak to someone in the office she states she does not want a message through the portal please advise she is asking for Anastasia     LORazepam (ATIVAN) 0.5 MG tablet she states she is needing this she states she only has one pill left you want a call back today in regards to to this

## 2024-09-04 NOTE — TELEPHONE ENCOUNTER
Care Due:                  Date            Visit Type   Department     Provider  --------------------------------------------------------------------------------                                EP -                              PRIMARY      [facility] INTERNAL  Last Visit: 07-      CARE ([facility])   MEDICINE       JOSE CARLOS CHACON  Next Visit: None Scheduled  None         None Found                                                            Last  Test          Frequency    Reason                     Performed    Due Date  --------------------------------------------------------------------------------    Vitamin D...  12 months..  ergocalciferol...........  Not Found    Overdue    [employer] Embedded Care Due Messages. Reference number: [id number].   9/04/2024 8:59:14 AM CDT

## 2024-09-19 ENCOUNTER — TELEPHONE (OUTPATIENT)
Dept: PULMONOLOGY | Facility: CLINIC | Age: 69
End: 2024-09-19
Payer: MEDICARE

## 2024-09-19 NOTE — TELEPHONE ENCOUNTER
----- Message from Rhonda Schuler sent at 9/19/2024 11:15 AM CDT -----  .Type:  Needs Medical Advice    Who Called: pt    Would the patient rather a call back or a response via [facility]? Call back  Best Call Back Number: [phone]  Additional Information:     Pt stated she would like a call back from Theresa

## 2024-09-24 ENCOUNTER — TELEPHONE (OUTPATIENT)
Dept: PULMONOLOGY | Facility: CLINIC | Age: 69
End: 2024-09-24
Payer: MEDICARE

## 2024-09-24 DIAGNOSIS — J44.9 CHRONIC OBSTRUCTIVE PULMONARY DISEASE, UNSPECIFIED COPD TYPE: Primary | ICD-10-CM

## 2024-10-17 ENCOUNTER — IMMUNIZATION (OUTPATIENT)
Dept: INTERNAL MEDICINE | Facility: CLINIC | Age: 69
End: 2024-10-17
Payer: MEDICARE

## 2024-10-17 DIAGNOSIS — Z23 NEED FOR VACCINATION: Primary | ICD-10-CM

## 2024-10-17 PROCEDURE — G0008 ADMIN INFLUENZA VIRUS VAC: HCPCS | Mod: S$GLB,,, | Performed by: INTERNAL MEDICINE

## 2024-10-17 PROCEDURE — 90653 IIV ADJUVANT VACCINE IM: CPT | Mod: S$GLB,,, | Performed by: INTERNAL MEDICINE

## 2024-10-30 ENCOUNTER — TELEPHONE (OUTPATIENT)
Dept: INTERNAL MEDICINE | Facility: CLINIC | Age: 69
End: 2024-10-30
Payer: MEDICARE

## 2024-11-01 DIAGNOSIS — F41.9 ANXIETY: Primary | ICD-10-CM

## 2024-11-01 RX ORDER — LORAZEPAM 0.5 MG/1
0.5 TABLET ORAL DAILY PRN
Qty: 30 TABLET | Refills: 0 | Status: SHIPPED | OUTPATIENT
Start: 2024-11-01

## 2024-11-19 ENCOUNTER — TELEPHONE (OUTPATIENT)
Dept: INTERNAL MEDICINE | Facility: CLINIC | Age: 69
End: 2024-11-19
Payer: MEDICARE

## 2024-11-22 ENCOUNTER — LAB VISIT (OUTPATIENT)
Dept: LAB | Facility: HOSPITAL | Age: 69
End: 2024-11-22
Attending: INTERNAL MEDICINE
Payer: MEDICARE

## 2024-11-22 ENCOUNTER — IMMUNIZATION (OUTPATIENT)
Dept: INTERNAL MEDICINE | Facility: CLINIC | Age: 69
End: 2024-11-22
Payer: MEDICARE

## 2024-11-22 ENCOUNTER — OFFICE VISIT (OUTPATIENT)
Dept: INTERNAL MEDICINE | Facility: CLINIC | Age: 69
End: 2024-11-22
Payer: MEDICARE

## 2024-11-22 VITALS
HEART RATE: 63 BPM | HEIGHT: 65 IN | DIASTOLIC BLOOD PRESSURE: 60 MMHG | BODY MASS INDEX: 27.85 KG/M2 | OXYGEN SATURATION: 92 % | SYSTOLIC BLOOD PRESSURE: 110 MMHG | WEIGHT: 167.13 LBS

## 2024-11-22 DIAGNOSIS — R10.13 EPIGASTRIC PAIN: Primary | ICD-10-CM

## 2024-11-22 DIAGNOSIS — Z00.00 ANNUAL PHYSICAL EXAM: ICD-10-CM

## 2024-11-22 DIAGNOSIS — J44.9 CHRONIC OBSTRUCTIVE PULMONARY DISEASE, UNSPECIFIED COPD TYPE: ICD-10-CM

## 2024-11-22 DIAGNOSIS — J96.11 CHRONIC RESPIRATORY FAILURE WITH HYPOXIA: ICD-10-CM

## 2024-11-22 DIAGNOSIS — E78.5 HYPERLIPIDEMIA, UNSPECIFIED HYPERLIPIDEMIA TYPE: ICD-10-CM

## 2024-11-22 DIAGNOSIS — I10 ESSENTIAL HYPERTENSION: ICD-10-CM

## 2024-11-22 DIAGNOSIS — H53.8 BLURRY VISION: ICD-10-CM

## 2024-11-22 DIAGNOSIS — Z23 NEED FOR VACCINATION: Primary | ICD-10-CM

## 2024-11-22 LAB
ALBUMIN SERPL BCP-MCNC: 3.9 G/DL (ref 3.5–5.2)
ALP SERPL-CCNC: 56 U/L (ref 40–150)
ALT SERPL W/O P-5'-P-CCNC: 10 U/L (ref 10–44)
ANION GAP SERPL CALC-SCNC: 10 MMOL/L (ref 8–16)
AST SERPL-CCNC: 25 U/L (ref 10–40)
BASOPHILS # BLD AUTO: 0.04 K/UL (ref 0–0.2)
BASOPHILS NFR BLD: 0.5 % (ref 0–1.9)
BILIRUB SERPL-MCNC: 0.5 MG/DL (ref 0.1–1)
BUN SERPL-MCNC: 13 MG/DL (ref 8–23)
CALCIUM SERPL-MCNC: 9.5 MG/DL (ref 8.7–10.5)
CHLORIDE SERPL-SCNC: 93 MMOL/L (ref 95–110)
CHOLEST SERPL-MCNC: 165 MG/DL (ref 120–199)
CHOLEST/HDLC SERPL: 2.2 {RATIO} (ref 2–5)
CO2 SERPL-SCNC: 37 MMOL/L (ref 23–29)
CREAT SERPL-MCNC: 0.8 MG/DL (ref 0.5–1.4)
DIFFERENTIAL METHOD BLD: ABNORMAL
EOSINOPHIL # BLD AUTO: 0.1 K/UL (ref 0–0.5)
EOSINOPHIL NFR BLD: 1.6 % (ref 0–8)
ERYTHROCYTE [DISTWIDTH] IN BLOOD BY AUTOMATED COUNT: 13.5 % (ref 11.5–14.5)
EST. GFR  (NO RACE VARIABLE): >60 ML/MIN/1.73 M^2
GLUCOSE SERPL-MCNC: 94 MG/DL (ref 70–110)
HCT VFR BLD AUTO: 40.8 % (ref 37–48.5)
HDLC SERPL-MCNC: 74 MG/DL (ref 40–75)
HDLC SERPL: 44.8 % (ref 20–50)
HGB BLD-MCNC: 13.3 G/DL (ref 12–16)
IMM GRANULOCYTES # BLD AUTO: 0.03 K/UL (ref 0–0.04)
IMM GRANULOCYTES NFR BLD AUTO: 0.4 % (ref 0–0.5)
LDLC SERPL CALC-MCNC: 75 MG/DL (ref 63–159)
LYMPHOCYTES # BLD AUTO: 1.6 K/UL (ref 1–4.8)
LYMPHOCYTES NFR BLD: 18.9 % (ref 18–48)
MCH RBC QN AUTO: 29.8 PG (ref 27–31)
MCHC RBC AUTO-ENTMCNC: 32.6 G/DL (ref 32–36)
MCV RBC AUTO: 91 FL (ref 82–98)
MONOCYTES # BLD AUTO: 0.4 K/UL (ref 0.3–1)
MONOCYTES NFR BLD: 4.5 % (ref 4–15)
NEUTROPHILS # BLD AUTO: 6.3 K/UL (ref 1.8–7.7)
NEUTROPHILS NFR BLD: 74.1 % (ref 38–73)
NONHDLC SERPL-MCNC: 91 MG/DL
NRBC BLD-RTO: 0 /100 WBC
PLATELET # BLD AUTO: 253 K/UL (ref 150–450)
PMV BLD AUTO: 11.4 FL (ref 9.2–12.9)
POTASSIUM SERPL-SCNC: 3.2 MMOL/L (ref 3.5–5.1)
PROT SERPL-MCNC: 7.3 G/DL (ref 6–8.4)
RBC # BLD AUTO: 4.47 M/UL (ref 4–5.4)
SODIUM SERPL-SCNC: 140 MMOL/L (ref 136–145)
TRIGL SERPL-MCNC: 80 MG/DL (ref 30–150)
WBC # BLD AUTO: 8.52 K/UL (ref 3.9–12.7)

## 2024-11-22 PROCEDURE — 99999 PR PBB SHADOW E&M-EST. PATIENT-LVL IV: CPT | Mod: PBBFAC,,, | Performed by: INTERNAL MEDICINE

## 2024-11-22 PROCEDURE — 3288F FALL RISK ASSESSMENT DOCD: CPT | Mod: CPTII,S$GLB,, | Performed by: INTERNAL MEDICINE

## 2024-11-22 PROCEDURE — 85025 COMPLETE CBC W/AUTO DIFF WBC: CPT | Performed by: INTERNAL MEDICINE

## 2024-11-22 PROCEDURE — 1159F MED LIST DOCD IN RCRD: CPT | Mod: CPTII,S$GLB,, | Performed by: INTERNAL MEDICINE

## 2024-11-22 PROCEDURE — 3008F BODY MASS INDEX DOCD: CPT | Mod: CPTII,S$GLB,, | Performed by: INTERNAL MEDICINE

## 2024-11-22 PROCEDURE — 3078F DIAST BP <80 MM HG: CPT | Mod: CPTII,S$GLB,, | Performed by: INTERNAL MEDICINE

## 2024-11-22 PROCEDURE — 99397 PER PM REEVAL EST PAT 65+ YR: CPT | Mod: S$GLB,,, | Performed by: INTERNAL MEDICINE

## 2024-11-22 PROCEDURE — 1101F PT FALLS ASSESS-DOCD LE1/YR: CPT | Mod: CPTII,S$GLB,, | Performed by: INTERNAL MEDICINE

## 2024-11-22 PROCEDURE — 3074F SYST BP LT 130 MM HG: CPT | Mod: CPTII,S$GLB,, | Performed by: INTERNAL MEDICINE

## 2024-11-22 PROCEDURE — 36415 COLL VENOUS BLD VENIPUNCTURE: CPT | Performed by: INTERNAL MEDICINE

## 2024-11-22 PROCEDURE — 80053 COMPREHEN METABOLIC PANEL: CPT | Performed by: INTERNAL MEDICINE

## 2024-11-22 PROCEDURE — 1125F AMNT PAIN NOTED PAIN PRSNT: CPT | Mod: CPTII,S$GLB,, | Performed by: INTERNAL MEDICINE

## 2024-11-22 PROCEDURE — 80061 LIPID PANEL: CPT | Performed by: INTERNAL MEDICINE

## 2024-11-22 RX ORDER — HYDROCHLOROTHIAZIDE 25 MG/1
25 TABLET ORAL DAILY
Qty: 90 TABLET | Refills: 3 | Status: SHIPPED | OUTPATIENT
Start: 2024-11-22

## 2024-11-22 RX ORDER — METOPROLOL SUCCINATE 100 MG/1
100 TABLET, EXTENDED RELEASE ORAL DAILY
Qty: 90 TABLET | Refills: 3 | Status: SHIPPED | OUTPATIENT
Start: 2024-11-22

## 2024-11-22 RX ORDER — IPRATROPIUM BROMIDE AND ALBUTEROL SULFATE 2.5; .5 MG/3ML; MG/3ML
SOLUTION RESPIRATORY (INHALATION)
Qty: 1 EACH | Refills: 11 | Status: SHIPPED | OUTPATIENT
Start: 2024-11-22

## 2024-11-22 RX ORDER — FLUTICASONE PROPIONATE AND SALMETEROL 250; 50 UG/1; UG/1
1 POWDER RESPIRATORY (INHALATION) 2 TIMES DAILY
Qty: 180 EACH | Refills: 3 | Status: SHIPPED | OUTPATIENT
Start: 2024-11-22 | End: 2025-11-17

## 2024-11-22 NOTE — PROGRESS NOTES
CC:  Follow-up     HPI: The patient is a 68-year-old female with COPD, hypertension, reflux, right hemicolectomy secondary to GI bleed following a polypectomy presents today for  follow-up.  It was time for annual checkup.    ROS: Patient reports weight is stable around 167.  She does complain of some blurry vision as well as dry eyes.  No auditory changes.  No trouble swallowing.  No chest pain.  She was in pulmonary rehab.  She was still reports some right upper quadrant discomfort now she reports a tightness across the upper abdomen to involving both flanks.  She can feel it with certain positions.  No bladder changes.  No weakness in arms or legs on clarification, the patient does report her legs are weaker.  She does complain of cramps in her toes.  She would get pain going across the top of his shoulders.  She does get headaches involving the left side of her neck and head.  It can be a shooting pain that is fairly constant.  It hurts with turning  Had a certain way.  This has been going on for few months.  Tylenol seems to help.  Her last mammogram was in August of 2024.  Her last colonoscopy was in 2010.  This has resulted in a hemicolectomy secondary to bleeding.  The patient did have a mammogram in August of 2024.    Physical exam:   General appearance: No acute distress   HEENT: Conjunctiva is clear.  He has bilateral cataracts.  TMs were partially obscured by wax.  Nasal septum is midline without discharge.  Oropharynx is without erythema.  Trachea is midline without JVD or thyromegaly.  Pulmonary:  Fair inspiratory, expiratory breath sounds were heard.  No wheezing or crackles were noted.  Cardiovascular: S1-S2, rhythm is regular.  Extremities without edema.  GI:  the patient had some abdominal tenderness on palpation in the left and right upper quadrant.  No rebound or guarding.  No hepatosplenomegaly was appreciated.    Assessment:    Annual exam   2.  Upper abdominal pain  3.  COPD  currently  oxygen dependent.  Stable on 2 L  4.  Hypertension   5.  Hyperlipidemia  6.  Blurry vision  7.  History of colon polyps and colon resection following polypectomy in 2010  Plan:    We will refer the patient to Optometry   2.  Will schedule a CBC, CMP, lipid panel and UA  3.  Will schedule a CT of the abdomen  4.  Further workup depend on test results.

## 2024-11-24 NOTE — PROGRESS NOTES
Her blood tests look good except for a potassium level.  It was a little low.  Has she missed any doses of her potassium supplement?

## 2024-11-25 ENCOUNTER — TELEPHONE (OUTPATIENT)
Dept: INTERNAL MEDICINE | Facility: CLINIC | Age: 69
End: 2024-11-25
Payer: MEDICARE

## 2024-11-25 NOTE — TELEPHONE ENCOUNTER
----- Message from Zhou Gallardo MD sent at 11/24/2024  2:01 PM CST -----    Her blood tests look good except for a potassium level.  It was a little low.  Has she missed any doses of her potassium supplement?

## 2024-11-25 NOTE — TELEPHONE ENCOUNTER
Spoke with pt, she states that she did miss some of the doses of her medication however she is now back on the medication as prescribed.

## 2024-11-30 ENCOUNTER — HOSPITAL ENCOUNTER (OUTPATIENT)
Dept: RADIOLOGY | Facility: HOSPITAL | Age: 69
Discharge: HOME | End: 2024-11-30
Attending: INTERNAL MEDICINE
Payer: MEDICARE

## 2024-11-30 DIAGNOSIS — F41.9 ANXIETY: ICD-10-CM

## 2024-11-30 DIAGNOSIS — R10.13 EPIGASTRIC PAIN: ICD-10-CM

## 2024-11-30 PROCEDURE — 74160 CT ABDOMEN W/CONTRAST: CPT | Mod: TC

## 2024-11-30 PROCEDURE — 25500020 PHARM REV CODE 255: Performed by: INTERNAL MEDICINE

## 2024-11-30 PROCEDURE — 74160 CT ABDOMEN W/CONTRAST: CPT | Mod: 26,,, | Performed by: STUDENT IN AN ORGANIZED HEALTH CARE EDUCATION/TRAINING PROGRAM

## 2024-11-30 PROCEDURE — A9698 NON-RAD CONTRAST MATERIALNOC: HCPCS | Performed by: INTERNAL MEDICINE

## 2024-11-30 RX ADMIN — BARIUM SULFATE 450 ML: 20 SUSPENSION ORAL at 11:11

## 2024-11-30 RX ADMIN — IOHEXOL 100 ML: 350 INJECTION, SOLUTION INTRAVENOUS at 11:11

## 2024-11-30 NOTE — TELEPHONE ENCOUNTER
Care Due:                  Date            Visit Type   Department     Provider  --------------------------------------------------------------------------------                                EP -                              PRIMARY      [facility]  Last Visit: 11-      CARE ([facility])   MEDICINE       JOSE CARLOS CHACON  Next Visit: None Scheduled  None         None Found                                                            Last  Test          Frequency    Reason                     Performed    Due Date  --------------------------------------------------------------------------------    Vitamin D...  12 months..  ergocalciferol...........  Not Found    Overdue    [employer] Embedded Care Due Messages. Reference number: [id number].   11/30/2024 12:58:52 AM CST

## 2024-12-02 RX ORDER — LORAZEPAM 0.5 MG/1
0.5 TABLET ORAL DAILY PRN
Qty: 30 TABLET | Refills: 0 | Status: SHIPPED | OUTPATIENT
Start: 2024-12-02

## 2024-12-02 NOTE — TELEPHONE ENCOUNTER
Refill Routing Note   Medication(s) are not appropriate for processing by [facility] for the following reason(s):        Outside of protocol    [facility] action(s):  Route               Appointments  past 12m or future 3m with PCP    Date Provider   Last Visit   11/22/2024 Zhou Gallardo MD   Next Visit   Visit date not found Zhou Gallardo MD   ED visits in past 90 days: 0        Note composed:9:48 AM 12/02/2024            Unknown

## 2024-12-04 ENCOUNTER — TELEPHONE (OUTPATIENT)
Dept: INTERNAL MEDICINE | Facility: CLINIC | Age: 69
End: 2024-12-04
Payer: MEDICARE

## 2024-12-04 NOTE — TELEPHONE ENCOUNTER
----- Message from Zhou Gallardo MD sent at 12/4/2024  8:47 AM CST -----    Please contact patient.  Her CT scan did not show a cause for abdominal discomfort.  It  did show she had a small hernia at her belly button.  This would not caused her symptoms.  Please see if she would like to see GI.  If she would, I can put a referral in.

## 2024-12-30 DIAGNOSIS — F41.9 ANXIETY: ICD-10-CM

## 2024-12-31 RX ORDER — LORAZEPAM 0.5 MG/1
0.5 TABLET ORAL DAILY PRN
Qty: 30 TABLET | Refills: 0 | Status: SHIPPED | OUTPATIENT
Start: 2024-12-31

## 2024-12-31 NOTE — TELEPHONE ENCOUNTER
No care due was identified.  Health [facility] Embedded Care Due Messages. Reference number: [id number].   12/30/2024 11:06:24 PM CST

## 2024-12-31 NOTE — TELEPHONE ENCOUNTER
Refill Routing Note   Medication(s) are not appropriate for processing by [facility] for the following reason(s):        Outside of protocol    [facility] action(s):  Route               Appointments  past 12m or future 3m with PCP    Date Provider   Last Visit   11/22/2024 Zhou Gallardo MD   Next Visit   Visit date not found Zhou Gallardo MD   ED visits in past 90 days: 0        Note composed:8:24 AM 12/31/2024

## 2025-01-07 ENCOUNTER — PATIENT MESSAGE (OUTPATIENT)
Dept: INTERNAL MEDICINE | Facility: CLINIC | Age: 70
End: 2025-01-07
Payer: MEDICARE

## 2025-01-07 ENCOUNTER — TELEPHONE (OUTPATIENT)
Dept: INTERNAL MEDICINE | Facility: CLINIC | Age: 70
End: 2025-01-07
Payer: MEDICARE

## 2025-01-07 ENCOUNTER — TELEPHONE (OUTPATIENT)
Dept: PULMONOLOGY | Facility: CLINIC | Age: 70
End: 2025-01-07
Payer: MEDICARE

## 2025-01-07 ENCOUNTER — OFFICE VISIT (OUTPATIENT)
Dept: URGENT CARE | Facility: CLINIC | Age: 70
End: 2025-01-07
Payer: MEDICARE

## 2025-01-07 VITALS
TEMPERATURE: 99 F | HEART RATE: 94 BPM | OXYGEN SATURATION: 95 % | RESPIRATION RATE: 12 BRPM | SYSTOLIC BLOOD PRESSURE: 136 MMHG | DIASTOLIC BLOOD PRESSURE: 89 MMHG

## 2025-01-07 DIAGNOSIS — R05.9 COUGH, UNSPECIFIED TYPE: ICD-10-CM

## 2025-01-07 DIAGNOSIS — Z99.81 CHRONIC RESPIRATORY FAILURE WITH HYPOXIA, ON HOME OXYGEN THERAPY: ICD-10-CM

## 2025-01-07 DIAGNOSIS — J11.1 INFLUENZA: Primary | ICD-10-CM

## 2025-01-07 DIAGNOSIS — J96.11 CHRONIC RESPIRATORY FAILURE WITH HYPOXIA, ON HOME OXYGEN THERAPY: ICD-10-CM

## 2025-01-07 LAB
CTP QC/QA: YES
CTP QC/QA: YES
POC MOLECULAR INFLUENZA A AGN: POSITIVE
POC MOLECULAR INFLUENZA B AGN: NEGATIVE
SARS-COV-2 AG RESP QL IA.RAPID: NEGATIVE

## 2025-01-07 RX ORDER — PROMETHAZINE HYDROCHLORIDE AND DEXTROMETHORPHAN HYDROBROMIDE 6.25; 15 MG/5ML; MG/5ML
5 SYRUP ORAL EVERY 8 HOURS PRN
Qty: 180 ML | Refills: 0 | Status: SHIPPED | OUTPATIENT
Start: 2025-01-07 | End: 2025-01-17

## 2025-01-07 RX ORDER — OSELTAMIVIR PHOSPHATE 75 MG/1
75 CAPSULE ORAL 2 TIMES DAILY
Qty: 10 CAPSULE | Refills: 0 | Status: SHIPPED | OUTPATIENT
Start: 2025-01-07 | End: 2025-01-12

## 2025-01-07 RX ORDER — DEXAMETHASONE 4 MG/1
8 TABLET ORAL DAILY
Qty: 6 TABLET | Refills: 0 | Status: SHIPPED | OUTPATIENT
Start: 2025-01-07 | End: 2025-01-10

## 2025-01-07 NOTE — TELEPHONE ENCOUNTER
Pt has a low grade fever of 99.4 a headache, cough that is productive, stated she is feeling weak. Informed pt to go to urgent care to be evaluated. Pt stated she will go.

## 2025-01-07 NOTE — PROGRESS NOTES
Subjective:      Patient ID: Ana Aguila is a 69 y.o. female.    Vitals:  temperature is 99 °F (37.2 °C). Her blood pressure is 136/89 and her pulse is 94. Her respiration is 12 and oxygen saturation is 95%.     Chief Complaint: Cough and Shortness of Breath    Pt present with cough, SOB, congestion. Sx started  2 days ago. Pt states whenever she moves her sob gets worse, pt is on 2.5lt oxygen at the moment and she use oxygen at home every time. Tx include nothing at home.     Cough  This is a new problem. The current episode started in the past 7 days. The problem has been gradually worsening. The problem occurs constantly. The cough is Productive of sputum. She has tried nothing for the symptoms. The treatment provided no relief.       Respiratory:  Positive for cough.       Objective:     Physical Exam   Constitutional: She is oriented to person, place, and time. She appears well-developed. She is cooperative.  Non-toxic appearance. She does not appear ill. No distress. Nasal cannula in place.      Comments:Speaking in complete sentences      HENT:   Head: Normocephalic and atraumatic.   Ears:   Right Ear: Hearing, tympanic membrane, external ear and ear canal normal.   Left Ear: Hearing, tympanic membrane, external ear and ear canal normal.   Nose: Nose normal. No mucosal edema, rhinorrhea or nasal deformity. No epistaxis. Right sinus exhibits no maxillary sinus tenderness and no frontal sinus tenderness. Left sinus exhibits no maxillary sinus tenderness and no frontal sinus tenderness.   Mouth/Throat: Uvula is midline, oropharynx is clear and moist and mucous membranes are normal. No trismus in the jaw. Normal dentition. No uvula swelling. No oropharyngeal exudate, posterior oropharyngeal edema or posterior oropharyngeal erythema.   Eyes: Conjunctivae and lids are normal. No scleral icterus.   Neck: Trachea normal and phonation normal. Neck supple. No edema present. No erythema present. No neck rigidity  present.   Cardiovascular: Normal rate, regular rhythm, normal heart sounds and normal pulses.   Pulmonary/Chest: Effort normal and breath sounds normal. No respiratory distress. She has no decreased breath sounds. She has no rhonchi.   Abdominal: Normal appearance.   Musculoskeletal: Normal range of motion.         General: No deformity. Normal range of motion.   Neurological: She is alert and oriented to person, place, and time. She exhibits normal muscle tone. Coordination normal.   Skin: Skin is warm, dry, intact, not diaphoretic and not pale.   Psychiatric: Her speech is normal and behavior is normal. Judgment and thought content normal.   Nursing note and vitals reviewed.      Assessment:     1. Influenza    2. Cough, unspecified type    3. Chronic respiratory failure with hypoxia, on home oxygen therapy        Plan:       Influenza  -     dexAMETHasone (DECADRON) 4 MG Tab; Take 2 tablets (8 mg total) by mouth once daily. for 3 doses  Dispense: 6 tablet; Refill: 0  -     promethazine-dextromethorphan (PROMETHAZINE-DM) 6.25-15 mg/5 mL Syrp; Take 5 mLs by mouth every 8 (eight) hours as needed.  Dispense: 180 mL; Refill: 0  -     oseltamivir (TAMIFLU) 75 MG capsule; Take 1 capsule (75 mg total) by mouth 2 (two) times daily. for 5 days  Dispense: 10 capsule; Refill: 0    Cough, unspecified type  -     SARS Coronavirus 2 Antigen, POCT Manual Read  -     POCT Influenza A/B MOLECULAR    Chronic respiratory failure with hypoxia, on home oxygen therapy          Medical Decision Making:   Initial Assessment:   Lungs CTAB> Pt with Stable COPD and has not had to use her albuterol yet.         Thank you for choosing [facility]!     Our goal in the Urgent Care is to always provide outstanding medical care. You may receive a survey by mail or e-mail in the next week regarding your experience today. We would greatly appreciate you completing and returning the survey. Your feedback provides us with a way to recognize  our staff who provide very good care, and it helps us learn how to improve when your experience was below our aspiration of excellence.       We appreciate you trusting us with your medical care. We hope you feel better soon. We will be happy to take care of you for all of your future medical needs.  You must understand that you've received an Urgent Care treatment only and that you may be released before all your medical problems are known or treated. You, the patient, will arrange for follow up care as instructed.  Follow up with your PCP or specialty clinic as directed in the next 1-2 weeks if not improved or as needed.  You can call [phone] to schedule an appointment with the appropriate provider.  Another option is to follow up with [facility] ([email or web address]) virtually for quick simple medical advice.  If your condition worsens we recommend that you receive another evaluation at the emergency room immediately or contact your primary medical clinics after hours call service to discuss your concerns.  Please return here or go to the Emergency Department for any concerns or worsening of condition.      *If you were prescribed a narcotic or controlled medication, do not drive or operate heavy equipment or machinery while taking these medications.

## 2025-01-07 NOTE — TELEPHONE ENCOUNTER
----- Message from Gamerius sent at 1/7/2025  2:53 PM CST -----  Type:  Needs Medical Advice    Who Called: pt  Symptoms (please be specific): headache ,99.4 temp,scratchy throat   How long has patient had these symptoms:  2 days  Would the patient rather a call back or a response via [facility]? Call back  Best Call Back Number: [phone]  Additional Information:    Symptom: Headache  Outcome: Schedule an urgent appointment (within 4 hours) or talk to a nurse or provider within 30 minutes.  Reason: Started within the past 3 days    The caller rejected this outcome.

## 2025-01-07 NOTE — TELEPHONE ENCOUNTER
----- Message from Huyen sent at 1/7/2025  2:16 PM CST -----  Contact: Ana   Patient is returning a phone call.    Who left a message for the patient:  not sure who called     Does patient know what this is regarding: headache weakness & fever 99.4      Would you like a call back, or a response through your [facility] portal?:  call back     Comments:

## 2025-01-13 ENCOUNTER — TELEPHONE (OUTPATIENT)
Dept: INTERNAL MEDICINE | Facility: CLINIC | Age: 70
End: 2025-01-13
Payer: MEDICARE

## 2025-01-13 NOTE — TELEPHONE ENCOUNTER
Pt tested pos. For flu, stated she has history of COPD and would like a chest x-ray and to be listened to. I scheduled pt with Humaira.

## 2025-01-13 NOTE — TELEPHONE ENCOUNTER
----- Message from Corin sent at 1/13/2025 10:56 AM CST -----  Contact: [phone]  .Caller is requesting an earlier appointment then we can schedule.  Caller is requesting a message be sent to the provider.      When is the next available appointment with their provider:  Feb 7, 2025    Reason for the appointment:  f/u from urgent care/flu symptoms/Patient states she has COPD    Patient preference of timeframe to be scheduled:      Would the patient like a call back, or a response through their [facility] portal?:   call    Comments:  Patient would like to be seen today, this week for a chest x-ray and discuss her symptoms with her PCP, Patient was seen in urgent care last week and is still feeling bad.

## 2025-01-15 ENCOUNTER — HOSPITAL ENCOUNTER (OUTPATIENT)
Dept: RADIOLOGY | Facility: HOSPITAL | Age: 70
Discharge: HOME | End: 2025-01-15
Payer: MEDICARE

## 2025-01-15 ENCOUNTER — OFFICE VISIT (OUTPATIENT)
Dept: INTERNAL MEDICINE | Facility: CLINIC | Age: 70
End: 2025-01-15
Payer: MEDICARE

## 2025-01-15 VITALS
BODY MASS INDEX: 26.93 KG/M2 | HEIGHT: 65 IN | DIASTOLIC BLOOD PRESSURE: 78 MMHG | OXYGEN SATURATION: 98 % | HEART RATE: 74 BPM | SYSTOLIC BLOOD PRESSURE: 132 MMHG | WEIGHT: 161.63 LBS

## 2025-01-15 DIAGNOSIS — R05.1 ACUTE COUGH: ICD-10-CM

## 2025-01-15 DIAGNOSIS — R05.1 ACUTE COUGH: Primary | ICD-10-CM

## 2025-01-15 DIAGNOSIS — J98.11 ATELECTASIS OF RIGHT LUNG: Chronic | ICD-10-CM

## 2025-01-15 PROCEDURE — 1101F PT FALLS ASSESS-DOCD LE1/YR: CPT | Mod: CPTII,S$GLB,,

## 2025-01-15 PROCEDURE — 3008F BODY MASS INDEX DOCD: CPT | Mod: CPTII,S$GLB,,

## 2025-01-15 PROCEDURE — 1126F AMNT PAIN NOTED NONE PRSNT: CPT | Mod: CPTII,S$GLB,,

## 2025-01-15 PROCEDURE — 99213 OFFICE O/P EST LOW 20 MIN: CPT | Mod: S$GLB,,,

## 2025-01-15 PROCEDURE — 99999 PR PBB SHADOW E&M-EST. PATIENT-LVL IV: CPT | Mod: PBBFAC,,,

## 2025-01-15 PROCEDURE — 3078F DIAST BP <80 MM HG: CPT | Mod: CPTII,S$GLB,,

## 2025-01-15 PROCEDURE — 3288F FALL RISK ASSESSMENT DOCD: CPT | Mod: CPTII,S$GLB,,

## 2025-01-15 PROCEDURE — 1159F MED LIST DOCD IN RCRD: CPT | Mod: CPTII,S$GLB,,

## 2025-01-15 PROCEDURE — 71046 X-RAY EXAM CHEST 2 VIEWS: CPT | Mod: TC

## 2025-01-15 PROCEDURE — 71046 X-RAY EXAM CHEST 2 VIEWS: CPT | Mod: 26,,, | Performed by: RADIOLOGY

## 2025-01-15 PROCEDURE — 3075F SYST BP GE 130 - 139MM HG: CPT | Mod: CPTII,S$GLB,,

## 2025-01-15 RX ORDER — AMOXICILLIN AND CLAVULANATE POTASSIUM 875; 125 MG/1; MG/1
1 TABLET, FILM COATED ORAL 2 TIMES DAILY
Qty: 20 TABLET | Refills: 0 | Status: SHIPPED | OUTPATIENT
Start: 2025-01-15 | End: 2025-01-25

## 2025-01-15 NOTE — PROGRESS NOTES
INTERNAL MEDICINE URGENT CARE NOTE    CHIEF COMPLAINT     Ana presents today for follow-up of influenza.    HPI     Ana Aguila is a 69 y.o. female who presents for an urgent care visit today.    HISTORY OF PRESENT ILLNESS:  She was diagnosed with influenza 8 days ago at Urgent Care. Initial symptoms included cough, shortness of breath, and congestion, with cough present for 7 days prior to diagnosis. She continues to have a persistent cough with thick, colored sputum production every two hours. She experienced fever that broke on Saturday, returned Sunday, and has been absent for the past two days. She reports pain in the upper head area with movement. She continues to have her baseline shortness of breath.. She denies chest pain, palpitations, or nasal drip.    MEDICAL HISTORY:  She has a history of COPD and chronic respiratory failure. She experienced a pneumothorax in January 2023. She denies history of asthma.    MEDICATIONS:  She takes Ativan at bedtime for anxiety, Advair inhaler twice daily (morning and evening), and Albuterol nebulizer treatments every 6 hours for the last year. Reports she thought it was prescribed as 4 times daily.    SOCIAL HISTORY:  She is a retired  experiencing emotional difficulties coping with recent loss of her brother.    Review of Systems:  General: -fever, -chills, -fatigue, -weight gain, -weight loss  Eyes: -vision changes, -redness, -discharge  ENT: -ear pain, +nasal congestion, -sore throat  Cardiovascular: -chest pain, -palpitations, -lower extremity edema  Respiratory: +cough, +shortness of breath  Gastrointestinal: -abdominal pain, -nausea, -vomiting, -diarrhea, -constipation, -blood in stool  Genitourinary: -dysuria, -hematuria, -frequency  Musculoskeletal: -joint pain, -muscle pain  Skin: -rash, -lesion  Neurological: -headache, -dizziness, -numbness, -tingling  Psychiatric: -anxiety, -depression, -sleep difficulty  Head: +head pain        Past  Medical History:  Past Medical History:   Diagnosis Date    Addiction to drug     Anxiety     Aortic calcification 1/11/2018    Chronic diarrhea     Chronic obstructive pulmonary disease 8/27/2013    Chronic respiratory failure with hypoxia, on home O2 therapy 1/11/2018    Colon polyps 2010    COPD (chronic obstructive pulmonary disease)     Coronary artery calcification seen on CT scan 12/4/2020    Depression     Essential hypertension 8/12/2013    Former smoker 10/18/2016    Hepatomegaly 12/21/2015    Hyperlipidemia     Hypertension     MI (myocardial infarction)     Microscopic hematuria 1/13/2017    Panic disorder     Perianal abscess 6/1/2018    Pneumothorax 1/20/2023    Reflux 2013    S/P right hemicolectomy 9/7/2017    Performed in 2011 after perforation during colonoscopy    Sleep difficulties     Takotsubo cardiomyopathy 10/18/2016    Noted on echo 10/2016, recovered on repeat echo 12/2016     Home Medications:  Prior to Admission medications    Medication Sig Start Date End Date Taking? Authorizing Provider   albuterol (PROAIR HFA) 90 mcg/actuation inhaler INHALE 2 PUFFS FOUR TIMES DAILY AS NEEDED FOR COPD 10/10/23   Zhou Gallardo MD   albuterol-ipratropium (DUO-NEB) 2.5 mg-0.5 mg/3 mL nebulizer solution USE 1 VIAL VIA NEBULIZER EVERY 6 HOURS AS NEEDED FOR WHEEZING OR SHORTNESS OF BREATH 11/22/24   Zhou Gallardo MD   ergocalciferol (ERGOCALCIFEROL) 50,000 unit Cap Take 1 capsule (50,000 Units total) by mouth every 7 days. 9/2/24   Zhou Gallardo MD   fluticasone-salmeterol diskus inhaler 250-50 mcg Inhale 1 puff into the lungs 2 (two) times daily. 11/22/24 11/17/25  Zhou Gallardo MD   hydroCHLOROthiazide (HYDRODIURIL) 25 MG tablet Take 1 tablet (25 mg total) by mouth once daily. 11/22/24   Zhou Gallardo MD   lactulose (CHRONULAC) 10 gram/15 mL solution Take 15 mLs (10 g total) by mouth once daily. 7/19/24   Zhou Gallardo MD   LORazepam (ATIVAN) 0.5 MG tablet Take 1 tablet (0.5 mg total) by  "mouth daily as needed for Anxiety. 12/31/24   Zhou Gallardo MD   metoprolol succinate (TOPROL-XL) 100 MG 24 hr tablet Take 1 tablet (100 mg total) by mouth once daily. 11/22/24   Zhou Gallardo MD   potassium chloride SA (K-DUR,KLOR-CON) 20 MEQ tablet Take 1 tablet (20 mEq total) by mouth 2 (two) times daily. 1/5/24   Zhou Gallardo MD   promethazine-dextromethorphan (PROMETHAZINE-DM) 6.25-15 mg/5 mL Syrp Take 5 mLs by mouth every 8 (eight) hours as needed. 1/7/25 1/17/25  Pablo Carrillo MD   rosuvastatin (CRESTOR) 10 MG tablet Take 1 tablet (10 mg total) by mouth once daily. 4/11/24   Sneha Aguilera, NP   amLODIPine (NORVASC) 2.5 MG tablet Take 1 tablet (2.5 mg total) by mouth once daily. 5/20/22 7/27/22  Zhou Gallardo MD     PHYSICAL EXAM     General: No acute distress. Well-developed. Well-nourished.  Eyes: EOMI. Sclerae anicteric.  HENT: Normocephalic. Atraumatic. Nares patent. Moist oral mucosa. Throat appears erythematous. No pus in throat. No tenderness in sinuses. Nose appears erythematous.  Ears: Bilateral TMs clear. Bilateral EACs clear.  Cardiovascular: Regular rate. Regular rhythm. No murmurs. No rubs. No gallops. Normal S1, S2.  Respiratory: Normal respiratory effort. Wheezing present. Decreased breath sounds to right upper and middle lobe  Abdomen: Soft. Non-tender. Non-distended. Normoactive bowel sounds.  Musculoskeletal: No  obvious deformity.  Extremities: No lower extremity edema.  Neurological: Alert & oriented x3. No slurred speech. Normal gait.  Psychiatric: Normal mood. Normal affect. Good insight. Good judgment.  Skin: Warm. Dry. No rash.        /78 (BP Location: Left arm, Patient Position: Sitting)   Pulse 74   Ht 5' 5" (1.651 m)   Wt 73.3 kg (161 lb 9.6 oz)   LMP  (LMP Unknown)   SpO2 98%   BMI 26.89 kg/m²     ASSESSMENT/PLAN      IMPRESSION:  - Assessed patient with history of COPD and recent influenza infection, presenting with persistent cough and wheezing  - " Noted decreased breath sounds and wheezing on lung exam. O2 sats 98% on 2L NC  - Ordered chest XR to evaluate lung condition before determining further treatment plan  - Determined patient has been overusing nebulizer treatments (daily); adjusted recommendation to as-needed basis    COUGH  - Ordered chest XR for further assessment.-  RESULTS-  FINDINGS:  Heart size normal.  There is a right middle lobe atelectasis and probably associated airspace disease developed since the previous study.  The left lung is clear.  No pleural effusion.  - Continue Advair inhaler twice daily (morning and night)   - Advised to use albuterol nebulizer as needed for wheezing or shortness of breath, not on a fixed schedule.  - Advised to continue Promethazine for nighttime cough if needed and to increase fluid intake.  - Prescribed Augmentin 875 BID x 10 days    Patient education provided from [facility]ian[facility]. Patient was counseled on when and how to seek emergent care.   This note was generated with the assistance of ambient listening technology. Verbal consent was obtained by the patient and accompanying visitor(s) for the recording of patient appointment to facilitate this note. I attest to having reviewed and edited the generated note for accuracy, though some syntax or spelling errors may persist. Please contact the author of this note for any clarification.       Humaira MOONEY, APRN, FNP-c   Department of Internal Medicine - [facility]  12:35 PM

## 2025-01-16 ENCOUNTER — TELEPHONE (OUTPATIENT)
Dept: INTERNAL MEDICINE | Facility: CLINIC | Age: 70
End: 2025-01-16
Payer: MEDICARE

## 2025-01-16 NOTE — TELEPHONE ENCOUNTER
----- Message from Gloria sent at 1/16/2025 10:05 AM CST -----  Name of Who is Calling:DONG DUBON [[id number]]        What is the request in detail:pt is calling about this medication she was giving yesterday and has some questions   amoxicillin-clavulanate 875-125mg (AUGMENTIN) 875-125 mg per tablet please advise thank you         Can the clinic reply by [facility]:call back         What Number to Call Back if not in [facility]: Telephone Information:  Mobile          [phone]

## 2025-01-16 NOTE — TELEPHONE ENCOUNTER
Pt called and asked if she is able to take amoxacillin and her cough medication at the same time. I spoke with Humaira to confirm if she can. Humaira stated she can take both at the same time. Pt informed and verbally expressed understanding. Pt is asking if Humaira has a moment can she give her a call. Please advise.

## 2025-01-17 ENCOUNTER — PATIENT MESSAGE (OUTPATIENT)
Dept: INTERNAL MEDICINE | Facility: CLINIC | Age: 70
End: 2025-01-17
Payer: MEDICARE

## 2025-01-23 DIAGNOSIS — J44.9 CHRONIC OBSTRUCTIVE PULMONARY DISEASE, UNSPECIFIED COPD TYPE: Primary | ICD-10-CM

## 2025-01-23 DIAGNOSIS — R05.1 ACUTE COUGH: ICD-10-CM

## 2025-01-23 RX ORDER — AZITHROMYCIN 250 MG/1
TABLET, FILM COATED ORAL
Qty: 6 TABLET | Refills: 0 | Status: SHIPPED | OUTPATIENT
Start: 2025-01-23 | End: 2025-01-28

## 2025-01-29 ENCOUNTER — PATIENT MESSAGE (OUTPATIENT)
Dept: INTERNAL MEDICINE | Facility: CLINIC | Age: 70
End: 2025-01-29

## 2025-01-29 ENCOUNTER — OFFICE VISIT (OUTPATIENT)
Dept: INTERNAL MEDICINE | Facility: CLINIC | Age: 70
End: 2025-01-29
Payer: MEDICARE

## 2025-01-29 ENCOUNTER — HOSPITAL ENCOUNTER (OUTPATIENT)
Dept: RADIOLOGY | Facility: HOSPITAL | Age: 70
Discharge: HOME | End: 2025-01-29
Payer: MEDICARE

## 2025-01-29 VITALS
BODY MASS INDEX: 26.74 KG/M2 | SYSTOLIC BLOOD PRESSURE: 108 MMHG | HEART RATE: 71 BPM | WEIGHT: 160.69 LBS | DIASTOLIC BLOOD PRESSURE: 72 MMHG | OXYGEN SATURATION: 97 %

## 2025-01-29 DIAGNOSIS — J44.9 CHRONIC OBSTRUCTIVE PULMONARY DISEASE, UNSPECIFIED COPD TYPE: ICD-10-CM

## 2025-01-29 DIAGNOSIS — F41.9 ANXIETY: ICD-10-CM

## 2025-01-29 DIAGNOSIS — R05.1 ACUTE COUGH: Primary | ICD-10-CM

## 2025-01-29 DIAGNOSIS — R05.1 ACUTE COUGH: ICD-10-CM

## 2025-01-29 PROCEDURE — 3288F FALL RISK ASSESSMENT DOCD: CPT | Mod: CPTII,S$GLB,,

## 2025-01-29 PROCEDURE — 1101F PT FALLS ASSESS-DOCD LE1/YR: CPT | Mod: CPTII,S$GLB,,

## 2025-01-29 PROCEDURE — 71046 X-RAY EXAM CHEST 2 VIEWS: CPT | Mod: TC

## 2025-01-29 PROCEDURE — 71046 X-RAY EXAM CHEST 2 VIEWS: CPT | Mod: 26,,, | Performed by: RADIOLOGY

## 2025-01-29 PROCEDURE — 99999 PR PBB SHADOW E&M-EST. PATIENT-LVL III: CPT | Mod: PBBFAC,,,

## 2025-01-29 PROCEDURE — 3074F SYST BP LT 130 MM HG: CPT | Mod: CPTII,S$GLB,,

## 2025-01-29 PROCEDURE — 99213 OFFICE O/P EST LOW 20 MIN: CPT | Mod: S$GLB,,,

## 2025-01-29 PROCEDURE — 3008F BODY MASS INDEX DOCD: CPT | Mod: CPTII,S$GLB,,

## 2025-01-29 PROCEDURE — 3078F DIAST BP <80 MM HG: CPT | Mod: CPTII,S$GLB,,

## 2025-01-29 PROCEDURE — 1159F MED LIST DOCD IN RCRD: CPT | Mod: CPTII,S$GLB,,

## 2025-01-29 RX ORDER — ALBUTEROL SULFATE 90 UG/1
INHALANT RESPIRATORY (INHALATION)
Qty: 8.5 G | Refills: 12 | Status: SHIPPED | OUTPATIENT
Start: 2025-01-29

## 2025-01-29 NOTE — PROGRESS NOTES
INTERNAL MEDICINE URGENT CARE NOTE    CHIEF COMPLAINT     Ana presents today for follow-up after recent antibiotic treatment    HPI     Ana Aguila is a 69 y.o. female who presents for a follow up visit today.    RECENT ANTIBIOTIC TREATMENT:  She completed a Z-Pack course yesterday. She experienced severe, uncontrolled diarrhea initially with Augmentin but obtained relief by taking probiotic supplements prior to antibiotic doses. She completed both antibiotics.    RESPIRATORY:  She reports feeling much better and is breathing easier. She notes ongoing voice raspiness and intermittent cough.  She denies current shortness of breath, fever, or chills.  She reports chest pain which she believes is muscular in origin from coughing    Review of Systems:  General: -fever, -chills, -fatigue, -weight gain, -weight loss  Eyes: -vision changes, -redness, -discharge  ENT: -ear pain, -nasal congestion, -sore throat  Cardiovascular: +chest pain, -palpitations, -lower extremity edema  Respiratory: -cough, -shortness of breath  Gastrointestinal: -abdominal pain, -nausea, -vomiting, -diarrhea, -constipation, -blood in stool  Genitourinary: -dysuria, -hematuria, -frequency  Musculoskeletal: -joint pain, -muscle pain  Skin: -rash, -lesion  Neurological: -headache, -dizziness, -numbness, -tingling  Psychiatric: -anxiety, -depression, -sleep difficulty        Past Medical History:  Past Medical History:   Diagnosis Date    Addiction to drug     Anxiety     Aortic calcification 1/11/2018    Chronic diarrhea     Chronic obstructive pulmonary disease 8/27/2013    Chronic respiratory failure with hypoxia, on home O2 therapy 1/11/2018    Colon polyps 2010    COPD (chronic obstructive pulmonary disease)     Coronary artery calcification seen on CT scan 12/4/2020    Depression     Essential hypertension 8/12/2013    Former smoker 10/18/2016    Hepatomegaly 12/21/2015    Hyperlipidemia     Hypertension     MI (myocardial infarction)      Microscopic hematuria 1/13/2017    Panic disorder     Perianal abscess 6/1/2018    Pneumothorax 1/20/2023    Reflux 2013    S/P right hemicolectomy 9/7/2017    Performed in 2011 after perforation during colonoscopy    Sleep difficulties     Takotsubo cardiomyopathy 10/18/2016    Noted on echo 10/2016, recovered on repeat echo 12/2016     Home Medications:  Prior to Admission medications    Medication Sig Start Date End Date Taking? Authorizing Provider   albuterol (PROAIR HFA) 90 mcg/actuation inhaler INHALE 2 PUFFS FOUR TIMES DAILY AS NEEDED FOR COPD 10/10/23   Zhou Gallardo MD   albuterol-ipratropium (DUO-NEB) 2.5 mg-0.5 mg/3 mL nebulizer solution USE 1 VIAL VIA NEBULIZER EVERY 6 HOURS AS NEEDED FOR WHEEZING OR SHORTNESS OF BREATH 11/22/24   Zhou Gallardo MD   azithromycin (Z-CHRISTOPHER) 250 MG tablet Take 2 tablets by mouth on day 1; Take 1 tablet by mouth on days 2-5 1/23/25 1/28/25  Zhou Gallardo MD   ergocalciferol (ERGOCALCIFEROL) 50,000 unit Cap Take 1 capsule (50,000 Units total) by mouth every 7 days. 9/2/24   Zhou Gallardo MD   fluticasone-salmeterol diskus inhaler 250-50 mcg Inhale 1 puff into the lungs 2 (two) times daily. 11/22/24 11/17/25  Zhou Gallardo MD   hydroCHLOROthiazide (HYDRODIURIL) 25 MG tablet Take 1 tablet (25 mg total) by mouth once daily. 11/22/24   Zhou Gallardo MD   lactulose (CHRONULAC) 10 gram/15 mL solution Take 15 mLs (10 g total) by mouth once daily. 7/19/24   Zhou Gallardo MD   LORazepam (ATIVAN) 0.5 MG tablet Take 1 tablet (0.5 mg total) by mouth daily as needed for Anxiety. 12/31/24   Zhou Gallardo MD   metoprolol succinate (TOPROL-XL) 100 MG 24 hr tablet Take 1 tablet (100 mg total) by mouth once daily. 11/22/24   Zhou Gallardo MD   potassium chloride SA (K-DUR,KLOR-CON) 20 MEQ tablet Take 1 tablet (20 mEq total) by mouth 2 (two) times daily. 1/5/24   Zhou Gallardo MD   rosuvastatin (CRESTOR) 10 MG tablet Take 1 tablet (10 mg total) by mouth once daily.  4/11/24   Sneha Aguilera, NP   amLODIPine (NORVASC) 2.5 MG tablet Take 1 tablet (2.5 mg total) by mouth once daily. 5/20/22 7/27/22  Zhou Gallardo MD     PHYSICAL EXAM     Vitals: Normal SpO2.  General: No acute distress. Well-developed. Well-nourished.  Eyes: EOMI. Sclerae anicteric.  HENT: Normocephalic. Atraumatic. Nares patent. Moist oral mucosa.  Ears: Bilateral TMs clear. Bilateral EACs clear.  Cardiovascular: Regular rate. Regular rhythm. No murmurs. No rubs. No gallops. Normal S1, S2.  Respiratory: Normal respiratory effort. Clear to auscultation bilaterally. No rales. No rhonchi. No wheezing.  Abdomen: Soft. Non-tender. Non-distended. Normoactive bowel sounds.  Musculoskeletal: No  obvious deformity. Muscular tenderness to palpation of posterior left chest wall.  Extremities: No lower extremity edema.  Neurological: Alert & oriented x3. No slurred speech. Normal gait.  Psychiatric: Normal mood. Normal affect. Good insight. Good judgment.  Skin: Warm. Dry. No rash.        /72   Pulse 71   Wt 72.9 kg (160 lb 11.5 oz)   LMP  (LMP Unknown)   SpO2 97%   BMI 26.74 kg/m²     ASSESSMENT/PLAN     IMPRESSION:  - Symptoms significantly improved following completion of antibiotic course (Z-Otis and Augmentin)  - Lung sounds improved from previous visit, though some baseline abnormalities persist due to underlying lung disease  - Chest pain likely musculoskeletal in origin based on characteristics and physical exam  - Considered chest XR to evaluate right middle lobe, given patient's history of lung disease and recent infection  - Discussed RSV vaccination due to patient's compromised respiratory status    POST INFECTION:  - Refill of albuterol inhaler  - Chest x-ray ordered   RESULTS: Impression:  Resolution of the right middle lobe atelectasis when compared to the prior examination.  Thin platelike atelectasis is more pronounced within the left lower lung when compared to the prior exam.  Electronically  signed by:Evgeny Mckinley MD  Date:                                            01/29/2025  Time:                                           14:36       NEED FOR IMMUNIZATION  - Recommended RSV vaccination      Patient education provided from [facility]ianne. Patient was counseled on when and how to seek emergent care.   This note was generated with the assistance of ambient listening technology. Verbal consent was obtained by the patient and accompanying visitor(s) for the recording of patient appointment to facilitate this note. I attest to having reviewed and edited the generated note for accuracy, though some syntax or spelling errors may persist. Please contact the author of this note for any clarification.       Humaira MOONEY, APRN, FNP-c   Department of Internal Medicine - [facility]  12:29 PM

## 2025-01-30 RX ORDER — LORAZEPAM 0.5 MG/1
0.5 TABLET ORAL DAILY PRN
Qty: 30 TABLET | Refills: 0 | Status: SHIPPED | OUTPATIENT
Start: 2025-01-30

## 2025-01-30 NOTE — TELEPHONE ENCOUNTER
Care Due:                  Date            Visit Type   Department     Provider  --------------------------------------------------------------------------------                                EP                               PRIMARY      [facility] INTERNAL  Last Visit: 11-      CARE ([facility])   KAL CHACON                              [facility]                              PRIMARY      [facility] INTERNAL  Next Visit: 04-      CARE ([facility])   [facility]       JOSE CARLOS CHACON                                                            Last  Test          Frequency    Reason                     Performed    Due Date  --------------------------------------------------------------------------------    Vitamin D...  12 months..  ergocalciferol...........  Not Found    Overdue    [employer] Embedded Care Due Messages. Reference number: [id number].   1/29/2025 7:35:23 PM CST

## 2025-01-30 NOTE — TELEPHONE ENCOUNTER
Refill Routing Note   Medication(s) are not appropriate for processing by [facility] for the following reason(s):        Outside of protocol    [facility] action(s):  Route     Requires labs : Yes             Appointments  past 12m or future 3m with PCP    Date Provider   Last Visit   11/22/2024 Zhou Gallardo MD   Next Visit   4/15/2025 Zhou Gallardo MD   ED visits in past 90 days: 0        Note composed:8:16 AM 01/30/2025

## 2025-02-24 ENCOUNTER — LAB VISIT (OUTPATIENT)
Dept: LAB | Facility: HOSPITAL | Age: 70
End: 2025-02-24
Payer: MEDICARE

## 2025-02-24 ENCOUNTER — OFFICE VISIT (OUTPATIENT)
Dept: INTERNAL MEDICINE | Facility: CLINIC | Age: 70
End: 2025-02-24
Payer: MEDICARE

## 2025-02-24 VITALS
DIASTOLIC BLOOD PRESSURE: 60 MMHG | OXYGEN SATURATION: 99 % | WEIGHT: 150 LBS | BODY MASS INDEX: 24.99 KG/M2 | HEIGHT: 65 IN | SYSTOLIC BLOOD PRESSURE: 112 MMHG | HEART RATE: 59 BPM

## 2025-02-24 DIAGNOSIS — K13.70 MOUTH LESION: ICD-10-CM

## 2025-02-24 DIAGNOSIS — K03.81 CRACKED TOOTH: ICD-10-CM

## 2025-02-24 DIAGNOSIS — R73.03 PREDIABETES: ICD-10-CM

## 2025-02-24 DIAGNOSIS — B37.2 CANDIDIASIS, INTERTRIGO: Primary | ICD-10-CM

## 2025-02-24 PROCEDURE — 99213 OFFICE O/P EST LOW 20 MIN: CPT | Mod: S$GLB,,,

## 2025-02-24 PROCEDURE — 36415 COLL VENOUS BLD VENIPUNCTURE: CPT

## 2025-02-24 PROCEDURE — 1159F MED LIST DOCD IN RCRD: CPT | Mod: CPTII,S$GLB,,

## 2025-02-24 PROCEDURE — 3008F BODY MASS INDEX DOCD: CPT | Mod: CPTII,S$GLB,,

## 2025-02-24 PROCEDURE — 3078F DIAST BP <80 MM HG: CPT | Mod: CPTII,S$GLB,,

## 2025-02-24 PROCEDURE — 3288F FALL RISK ASSESSMENT DOCD: CPT | Mod: CPTII,S$GLB,,

## 2025-02-24 PROCEDURE — 1126F AMNT PAIN NOTED NONE PRSNT: CPT | Mod: CPTII,S$GLB,,

## 2025-02-24 PROCEDURE — 3074F SYST BP LT 130 MM HG: CPT | Mod: CPTII,S$GLB,,

## 2025-02-24 PROCEDURE — 99999 PR PBB SHADOW E&M-EST. PATIENT-LVL IV: CPT | Mod: PBBFAC,,,

## 2025-02-24 PROCEDURE — 83036 HEMOGLOBIN GLYCOSYLATED A1C: CPT

## 2025-02-24 PROCEDURE — 1101F PT FALLS ASSESS-DOCD LE1/YR: CPT | Mod: CPTII,S$GLB,,

## 2025-02-24 RX ORDER — CLOTRIMAZOLE 1 %
CREAM (GRAM) TOPICAL 2 TIMES DAILY
Qty: 60 G | Refills: 0 | Status: SHIPPED | OUTPATIENT
Start: 2025-02-24 | End: 2025-03-10

## 2025-02-24 NOTE — PROGRESS NOTES
INTERNAL MEDICINE URGENT CARE NOTE    CHIEF COMPLAINT     Ana presents today for dental concerns and skin rash    HPI     Ana Aguila is a 69 y.o. female who presents for an urgent care visit today.    DENTAL:  She reports recent dental extractions with one remaining tooth beginning to break with exposed filling. She uses Sea-Bond adhesive for partial dentures to maintain stability while eating. She notes development of a bump in the posterior oral cavity at site of previous tooth extraction, possibly bone-related. She has scheduled dental follow-up visit for evaluation tomorrow. She denies jaw claudication, difficulty chewing, difficulty swallowing, fever, chills or fatigue.    SKIN:  She reports rashes in two locations: under her breast and in the right groin area. This began over a week ago.The breast rash was initially identified as a possible yeast infection by a home health aide. The groin rash becomes red with washing and exposure to heat. She has attempted various treatments including Neosporin for the breast rash, and Vaseline, gauze, Gold Bond powder, and medicated powder with menthol for the groin area. She expresses concern about a possible underlying cause for the groin rash.She reports itching and burning to both sites that is exacerbated with heat.    MEDICAL HISTORY:  She has a history of prediabetes.    Review of Systems:  General: -fever, -chills, -fatigue, -weight gain, -weight loss  Eyes: -vision changes, -redness, -discharge  ENT: -ear pain, -nasal congestion, -sore throat  Cardiovascular: -chest pain, -palpitations, -lower extremity edema  Respiratory: -cough, -shortness of breath  Gastrointestinal: -abdominal pain, -nausea, -vomiting, -diarrhea, -constipation, -blood in stool  Genitourinary: -dysuria, -hematuria, -frequency  Musculoskeletal: -joint pain, -muscle pain  Skin: +rash, -lesion  Neurological: -headache, -dizziness, -numbness, -tingling  Psychiatric: -anxiety, -depression,  -sleep difficulty        Past Medical History:  Past Medical History:   Diagnosis Date    Addiction to drug     Anxiety     Aortic calcification 1/11/2018    Chronic diarrhea     Chronic obstructive pulmonary disease 8/27/2013    Chronic respiratory failure with hypoxia, on home O2 therapy 1/11/2018    Colon polyps 2010    COPD (chronic obstructive pulmonary disease)     Coronary artery calcification seen on CT scan 12/4/2020    Depression     Essential hypertension 8/12/2013    Former smoker 10/18/2016    Hepatomegaly 12/21/2015    Hyperlipidemia     Hypertension     MI (myocardial infarction)     Microscopic hematuria 1/13/2017    Panic disorder     Perianal abscess 6/1/2018    Pneumothorax 1/20/2023    Reflux 2013    S/P right hemicolectomy 9/7/2017    Performed in 2011 after perforation during colonoscopy    Sleep difficulties     Takotsubo cardiomyopathy 10/18/2016    Noted on echo 10/2016, recovered on repeat echo 12/2016     Home Medications:  Prior to Admission medications    Medication Sig Start Date End Date Taking? Authorizing Provider   albuterol (PROAIR HFA) 90 mcg/actuation inhaler INHALE 2 PUFFS FOUR TIMES DAILY AS NEEDED FOR COPD 1/29/25   Humaira Pagan, NP-C   albuterol-ipratropium (DUO-NEB) 2.5 mg-0.5 mg/3 mL nebulizer solution USE 1 VIAL VIA NEBULIZER EVERY 6 HOURS AS NEEDED FOR WHEEZING OR SHORTNESS OF BREATH 11/22/24   Zhou Gallardo MD   ergocalciferol (ERGOCALCIFEROL) 50,000 unit Cap Take 1 capsule (50,000 Units total) by mouth every 7 days. 9/2/24   Zhou Gallardo MD   fluticasone-salmeterol diskus inhaler 250-50 mcg Inhale 1 puff into the lungs 2 (two) times daily. 11/22/24 11/17/25  Zhou Gallardo MD   hydroCHLOROthiazide (HYDRODIURIL) 25 MG tablet Take 1 tablet (25 mg total) by mouth once daily. 11/22/24   Zhou Gallardo MD   lactulose (CHRONULAC) 10 gram/15 mL solution Take 15 mLs (10 g total) by mouth once daily.  Patient not taking: Reported on 1/29/2025 7/19/24   Zhou Gallardo  "MD KERRY   LORazepam (ATIVAN) 0.5 MG tablet Take 1 tablet (0.5 mg total) by mouth daily as needed for Anxiety. 1/30/25   Zhou Gallardo MD   metoprolol succinate (TOPROL-XL) 100 MG 24 hr tablet Take 1 tablet (100 mg total) by mouth once daily. 11/22/24   Zhou Gallardo MD   potassium chloride SA (K-DUR,KLOR-CON) 20 MEQ tablet Take 1 tablet (20 mEq total) by mouth 2 (two) times daily. 1/5/24   Zhou Gallardo MD   rosuvastatin (CRESTOR) 10 MG tablet Take 1 tablet (10 mg total) by mouth once daily. 4/11/24   Sneha Aguilera NP   amLODIPine (NORVASC) 2.5 MG tablet Take 1 tablet (2.5 mg total) by mouth once daily. 5/20/22 7/27/22  Zhou Gallardo MD     PHYSICAL EXAM     General: No acute distress. Well-developed. Well-nourished.  Eyes: EOMI. Sclerae anicteric.  HENT: Normocephalic. Atraumatic. Nares patent. Moist oral mucosa. Epiglottis with slight whiteness.  Ears: Bilateral TMs clear. Bilateral EACs clear.  Cardiovascular: Regular rate. Regular rhythm. No murmurs. No rubs. No gallops. Normal S1, S2.  Respiratory: Normal respiratory effort. Clear to auscultation bilaterally. No rales. No rhonchi. No wheezing.  Abdomen: Soft. Non-tender. Non-distended. Normoactive bowel sounds.  Musculoskeletal: No  obvious deformity.  Extremities: No lower extremity edema.  Neurological: Alert & oriented x3. No slurred speech. Normal gait.  Psychiatric: Normal mood. Normal affect. Good insight. Good judgment.  Skin: Warm. Dry. No rash.  Mouth: Oral lumps present. Oral inflammation noted.        /60 (BP Location: Right arm, Patient Position: Sitting)   Pulse (!) 59   Ht 5' 5" (1.651 m)   Wt 68 kg (150 lb)   LMP  (LMP Unknown)   SpO2 99%   BMI 24.96 kg/m²     Physical Exam  HENT:      Mouth/Throat:      Mouth: No oral lesions.      Dentition: Has dentures. No dental tenderness, gingival swelling, dental abscesses or gum lesions.      Tongue: No lesions.        Comments: 1. Bony prominence without erythema or " swelling  Chest:          Comments: Well-demarcated erythematous patches with satellite papules, moist macerated skin. Absence of purulence. No lymphadenopathy noted  Abdominal:          Comments: Well-demarcated erythematous patches with satellite papules, moist macerated skin. Absence of purulence. No lymphadenopathy noted       ASSESSMENT/PLAN     IMPRESSION:  - Assessed oral cavity for reported dental issues; observed exposed filling but no signs of infection or abscess  - Diagnosed intertrigo with candidiasis in inframammary and inguinal regions based on physical exam and patient-reported symptoms  - Considered prediabetes status; no progression to diabetes noted based on recent lab results    CANDIDIASIS (YEAST INFECTION):  - Prescribed Clotrimazole cream 60g: Apply to affected areas in inframammary and inguinal regions 2 times daily for 2 weeks.  - Keep the area clean and dry  - Avoid tight fitting or non-breathable clothing    PREDIABETES  - Acknowledged patient's prediabetes status and ordered hemoglobin A1C test to evaluate for diabetes.  - Provided information on potassium-rich foods.    DENTAL ISSUES:  - Oral Lesion- No treatment recommended, but suggested showing it to the dentist.  - Dental apt tomorrow to address cracked tooth with exposed filling    FOLLOW UP:  - Contact the office if symptoms worsen or do not improve with treatment.          Patient education provided from Lianne. Patient was counseled on when and how to seek emergent care.   This note was generated with the assistance of ambient listening technology. Verbal consent was obtained by the patient and accompanying visitor(s) for the recording of patient appointment to facilitate this note. I attest to having reviewed and edited the generated note for accuracy, though some syntax or spelling errors may persist. Please contact the author of this note for any clarification.       Humaira MOONEY, APRN, FNP-c   Department of Internal  Medicine - [facility]  1:37 PM

## 2025-02-25 ENCOUNTER — RESULTS FOLLOW-UP (OUTPATIENT)
Dept: INTERNAL MEDICINE | Facility: CLINIC | Age: 70
End: 2025-02-25
Payer: MEDICARE

## 2025-02-25 LAB
ESTIMATED AVG GLUCOSE: 111 MG/DL (ref 68–131)
HBA1C MFR BLD: 5.5 % (ref 4–5.6)

## 2025-02-27 DIAGNOSIS — F41.9 ANXIETY: ICD-10-CM

## 2025-02-27 RX ORDER — LORAZEPAM 0.5 MG/1
0.5 TABLET ORAL DAILY PRN
Qty: 30 TABLET | Refills: 0 | Status: SHIPPED | OUTPATIENT
Start: 2025-02-27

## 2025-02-27 NOTE — TELEPHONE ENCOUNTER
No care due was identified.  [facility] Embedded Care Due Messages. Reference number: [id number].   2/27/2025 12:23:21 AM CST

## 2025-03-30 DIAGNOSIS — F41.9 ANXIETY: ICD-10-CM

## 2025-03-30 NOTE — TELEPHONE ENCOUNTER
No care due was identified.  [facility] Embedded Care Due Messages. Reference number: [id number].   3/30/2025 10:49:22 AM CDT

## 2025-03-31 RX ORDER — LORAZEPAM 0.5 MG/1
0.5 TABLET ORAL DAILY PRN
Qty: 30 TABLET | Refills: 0 | Status: SHIPPED | OUTPATIENT
Start: 2025-03-31

## 2025-04-03 ENCOUNTER — TELEPHONE (OUTPATIENT)
Dept: PULMONOLOGY | Facility: CLINIC | Age: 70
End: 2025-04-03
Payer: MEDICARE

## 2025-04-03 NOTE — TELEPHONE ENCOUNTER
Spoke to patient stated that she was previously seen by Dr Kate Owens  who was treating her for SOB and would like to est care with a provider. I advised patient that the first available appointment is 04/07/25@3:30pm with Dr Hull. Patient verbalized understanding.

## 2025-04-07 ENCOUNTER — OFFICE VISIT (OUTPATIENT)
Dept: PULMONOLOGY | Facility: CLINIC | Age: 70
End: 2025-04-07
Payer: MEDICARE

## 2025-04-07 ENCOUNTER — TELEPHONE (OUTPATIENT)
Dept: PULMONOLOGY | Facility: CLINIC | Age: 70
End: 2025-04-07
Payer: MEDICARE

## 2025-04-07 VITALS
WEIGHT: 158.5 LBS | DIASTOLIC BLOOD PRESSURE: 90 MMHG | BODY MASS INDEX: 26.41 KG/M2 | SYSTOLIC BLOOD PRESSURE: 130 MMHG | OXYGEN SATURATION: 93 % | HEIGHT: 65 IN | HEART RATE: 70 BPM

## 2025-04-07 DIAGNOSIS — J44.9 COPD, SEVERE: Primary | ICD-10-CM

## 2025-04-07 DIAGNOSIS — Z29.11 NEED FOR RSV VACCINATION: ICD-10-CM

## 2025-04-07 DIAGNOSIS — Z71.89 GOALS OF CARE, COUNSELING/DISCUSSION: ICD-10-CM

## 2025-04-07 DIAGNOSIS — J44.9 CHRONIC OBSTRUCTIVE PULMONARY DISEASE, UNSPECIFIED COPD TYPE: ICD-10-CM

## 2025-04-07 DIAGNOSIS — J96.11 CHRONIC RESPIRATORY FAILURE WITH HYPOXIA: ICD-10-CM

## 2025-04-07 PROCEDURE — 1101F PT FALLS ASSESS-DOCD LE1/YR: CPT | Mod: CPTII,S$GLB,, | Performed by: INTERNAL MEDICINE

## 2025-04-07 PROCEDURE — 3008F BODY MASS INDEX DOCD: CPT | Mod: CPTII,S$GLB,, | Performed by: INTERNAL MEDICINE

## 2025-04-07 PROCEDURE — 99215 OFFICE O/P EST HI 40 MIN: CPT | Mod: S$GLB,,, | Performed by: INTERNAL MEDICINE

## 2025-04-07 PROCEDURE — 3288F FALL RISK ASSESSMENT DOCD: CPT | Mod: CPTII,S$GLB,, | Performed by: INTERNAL MEDICINE

## 2025-04-07 PROCEDURE — 3044F HG A1C LEVEL LT 7.0%: CPT | Mod: CPTII,S$GLB,, | Performed by: INTERNAL MEDICINE

## 2025-04-07 PROCEDURE — 3075F SYST BP GE 130 - 139MM HG: CPT | Mod: CPTII,S$GLB,, | Performed by: INTERNAL MEDICINE

## 2025-04-07 PROCEDURE — 99999 PR PBB SHADOW E&M-EST. PATIENT-LVL III: CPT | Mod: PBBFAC,,, | Performed by: INTERNAL MEDICINE

## 2025-04-07 PROCEDURE — 1159F MED LIST DOCD IN RCRD: CPT | Mod: CPTII,S$GLB,, | Performed by: INTERNAL MEDICINE

## 2025-04-07 PROCEDURE — 3080F DIAST BP >= 90 MM HG: CPT | Mod: CPTII,S$GLB,, | Performed by: INTERNAL MEDICINE

## 2025-04-07 RX ORDER — TIOTROPIUM BROMIDE INHALATION SPRAY 3.12 UG/1
2 SPRAY, METERED RESPIRATORY (INHALATION) DAILY
Qty: 4 G | Refills: 11 | Status: SHIPPED | OUTPATIENT
Start: 2025-04-07

## 2025-04-07 RX ORDER — IPRATROPIUM BROMIDE AND ALBUTEROL SULFATE 2.5; .5 MG/3ML; MG/3ML
3 SOLUTION RESPIRATORY (INHALATION) EVERY 6 HOURS PRN
Qty: 270 ML | Refills: 11 | Status: SHIPPED | OUTPATIENT
Start: 2025-04-07

## 2025-04-07 RX ORDER — ALBUTEROL SULFATE 90 UG/1
2 INHALANT RESPIRATORY (INHALATION) EVERY 6 HOURS PRN
Qty: 8.5 G | Refills: 12 | Status: SHIPPED | OUTPATIENT
Start: 2025-04-07

## 2025-04-07 RX ORDER — FLUTICASONE PROPIONATE AND SALMETEROL 250; 50 UG/1; UG/1
1 POWDER RESPIRATORY (INHALATION) 2 TIMES DAILY
Qty: 180 EACH | Refills: 3 | Status: SHIPPED | OUTPATIENT
Start: 2025-04-07 | End: 2026-04-02

## 2025-04-07 RX ORDER — RESPIRATORY SYNCYTIAL VISUS VACCINE RECOMBINANT, ADJUVANTED 120MCG/0.5
0.5 KIT INTRAMUSCULAR ONCE
Qty: 0.5 ML | Refills: 0 | Status: SHIPPED | OUTPATIENT
Start: 2025-04-07 | End: 2025-04-07

## 2025-04-07 NOTE — TELEPHONE ENCOUNTER
Returned call spoke to patient wanting to know if any test is needed before today's appointment 04/07/25@3:30pm with Dr Hull. I advised patient no test is needed at this time per Dr Hull. Patient verbalized understanding.

## 2025-04-07 NOTE — PROGRESS NOTES
History & Physical  [facility]    SUBJECTIVE:     Chief Complaint:   COPD    History of Present Illness:  Ana Aguila is a 69 y.o. female who presents for evaluation of COPD.    Pt reports chronic dyspnea on exertion, MMRC 4 dyspnea.    She uses proair MDI prn. She uses duonebs. She uses advair 250/50 one puff BID.    She attends pulmonary rehab at [facility].    She uses supplemental oxygen, 2LPM.    She quit smoking in 1993. She was 39 years old when she quit smoking. She started smoking at 14 years-old. She used to smoke 1 PPD.    She had a spontaneous pneumothorax several years ago treated with chest tube drainage.    Review of patient's allergies indicates:   Allergen Reactions    Ace inhibitors      cough    Coreg [carvedilol]      Headache     Pseudoephedrine hcl Nausea And Vomiting, Other (See Comments) and Anxiety     JITTERY       Past Medical History:   Diagnosis Date    Addiction to drug     Anxiety     Aortic calcification 1/11/2018    Chronic diarrhea     Chronic obstructive pulmonary disease 8/27/2013    Chronic respiratory failure with hypoxia, on home O2 therapy 1/11/2018    Colon polyps 2010    COPD (chronic obstructive pulmonary disease)     Coronary artery calcification seen on CT scan 12/4/2020    Depression     Essential hypertension 8/12/2013    Former smoker 10/18/2016    Hepatomegaly 12/21/2015    Hyperlipidemia     Hypertension     MI (myocardial infarction)     Microscopic hematuria 1/13/2017    Panic disorder     Perianal abscess 6/1/2018    Pneumothorax 1/20/2023    Reflux 2013    S/P right hemicolectomy 9/7/2017    Performed in 2011 after perforation during colonoscopy    Sleep difficulties     Takotsubo cardiomyopathy 10/18/2016    Noted on echo 10/2016, recovered on repeat echo 12/2016     Past Surgical History:   Procedure Laterality Date    ABDOMINAL SURGERY      APPENDECTOMY      CHOLECYSTECTOMY  2013    open    COLON SURGERY  2011    secondary to perforation after  "c-scope    COLONOSCOPY      CORONARY ANGIOGRAPHY N/A 2022    Procedure: ANGIOGRAM, CORONARY ARTERY;  Surgeon: Kylee Carreon MD;  Location: [facility] CATH LAB/EP;  Service: Cardiology;  Laterality: N/A;    FRACTURE SURGERY Left 1999    HERNIA REPAIR      HIATAL HERNIA REPAIR  2013    HYSTERECTOMY  1986    LEFT HEART CATHETERIZATION N/A 2022    Procedure: Left heart cath;  Surgeon: Kylee Carreon MD;  Location: [facility] CATH LAB/EP;  Service: Cardiology;  Laterality: N/A;    Left leg surgery       Family History   Problem Relation Name Age of Onset    Cancer Mother LUNA Cisneros             Hypertension Mother LUNA Cisneros     Coronary artery disease Father      Retinal detachment Father      Hypertension Sister 1     Hypertension Brother 4     Cancer Daughter          breast cancer - double mastectomy    Abnormal EKG Daughter      Breast cancer Daughter  43        negative genetic testing, unilateral breast ca    Cataracts Maternal Grandmother      Breast cancer Maternal Grandmother          unk age of onset    Breast cancer Other niece 44        thinks negative genetic testing, bilat ca    Amblyopia Neg Hx      Blindness Neg Hx      Diabetes Neg Hx      Glaucoma Neg Hx      Macular degeneration Neg Hx      Strabismus Neg Hx      Stroke Neg Hx      Thyroid disease Neg Hx      Colon cancer Neg Hx      Ovarian cancer Neg Hx       Social History[1]  Review of Systems:  No pertinent positives.    OBJECTIVE:     Vital Signs  Vitals:    25 1545   BP: (!) 130/90   BP Location: Right arm   Patient Position: Sitting   Pulse: 70   SpO2: (!) 93%   Weight: 71.9 kg (158 lb 8.2 oz)   Height: 5' 5" (1.651 m)     Body mass index is 26.38 kg/m².    Physical Exam:  General: no distress  Eyes:  conjunctivae/corneas clear  Nose: no discharge  Neck: trachea midline with no masses appreciated  Lungs:  normal respiratory effort, no wheezes, no rales  Heart: regular rate and rhythm and no murmur  Abdomen: " non-distended  Extremities: no cyanosis, no edema, no clubbing  Skin: No rashes or lesions. good skin turgor  Neurologic: alert, oriented, thought content appropriate    Laboratory:  Lab Results   Component Value Date    WBC 8.52 11/22/2024    HGB 13.3 11/22/2024    HCT 40.8 11/22/2024    MCV 91 11/22/2024     11/22/2024     Alpha 1 phenotype MM    Chest Imaging, My Impression:   CXR 1/2025: +hyperinflation  Chest CT 4/2023: significant emphysematous changes    Diagnostic Results:  PFT 10/2023: +obstruction with FEV1 17% of predicted, +air trapping, unable to do DLCO testing    6MWT 2024: 182 meters    ASSESSMENT/PLAN:     Severe COPD/Emphysema  Goals of care counseling  - Disease specific severity indicators: FEV1 17%, BMI 26, 6MWT 182 meters, DELIA index 8.   - Pt had questions regarding her prognosis today. Discussed prognosis. Answered all questions. Advised pt on advanced care planning.  - Add LAMA inhaler (spiriva).  - Continue duonebs, advair, & albuterol MDI.  - Continue pulmonary rehab.    Chronic hypoxemic respiratory failure  - Continue supplemental oxygen.    Need for RSV vaccine  - Provided counseling & education. Rx to pt's pharmacy.    Total professional time spent for the encounter: 40 minutes  Time was spent preparing to see the patient, reviewing results of prior testing, obtaining and/or reviewing separately obtained history, performing a medically appropriate examination and interview, counseling and educating the patient/family, ordering medications/tests/procedures, referring and communicating with other health care professionals, documenting clinical information in the electronic health record, and independently interpreting results.      [place], MD  [facility]           [1]   Social History  Socioeconomic History    Marital status: Single   Tobacco Use    Smoking status: Former     Current packs/day: 0.00     Average packs/day: 1.5 packs/day for 30.0 years (45.0 ttl  pk-yrs)     Types: Cigarettes     Start date: 1970     Quit date: 1997     Years since quittin.2    Smokeless tobacco: Never   Substance and Sexual Activity    Alcohol use: No    Drug use: No    Sexual activity: Not Currently     Partners: Male     Birth control/protection: Abstinence   Social History Narrative    Lives alone, brother lives in [place]. Brothers and sisters also living elsewhere. Children and grandchildren live in [place].     Lives in 2nd-story apartment.    Works as a  with [employer].     Social Drivers of Health     Financial Resource Strain: Low Risk  (7/10/2024)    Overall Financial Resource Strain (CARDIA)     Difficulty of Paying Living Expenses: Not very hard   Food Insecurity: No Food Insecurity (7/10/2024)    Hunger Vital Sign     Worried About Running Out of Food in the Last Year: Never true     Ran Out of Food in the Last Year: Never true   Transportation Needs: No Transportation Needs (7/10/2024)    PRAPARE - Transportation     Lack of Transportation (Medical): No     Lack of Transportation (Non-Medical): No   Physical Activity: Inactive (7/10/2024)    Exercise Vital Sign     Days of Exercise per Week: 0 days     Minutes of Exercise per Session: 0 min   Stress: No Stress Concern Present (7/10/2024)    [employer] - Occupational Stress Questionnaire     Feeling of Stress : Not at all   Housing Stability: Unknown (7/10/2024)    Housing Stability Vital Sign     Unable to Pay for Housing in the Last Year: No     Homeless in the Last Year: No

## 2025-04-07 NOTE — TELEPHONE ENCOUNTER
----- Message from Sita sent at 4/7/2025  9:03 AM CDT -----  Regarding: Appt  Contact: Pt [phone]  Pt is calling to speak to nurse Anastacia about appt scheduled for today no details left please call

## 2025-04-15 ENCOUNTER — PATIENT MESSAGE (OUTPATIENT)
Dept: INTERNAL MEDICINE | Facility: CLINIC | Age: 70
End: 2025-04-15

## 2025-04-15 ENCOUNTER — LAB VISIT (OUTPATIENT)
Dept: LAB | Facility: HOSPITAL | Age: 70
End: 2025-04-15
Attending: INTERNAL MEDICINE
Payer: MEDICARE

## 2025-04-15 ENCOUNTER — OFFICE VISIT (OUTPATIENT)
Dept: INTERNAL MEDICINE | Facility: CLINIC | Age: 70
End: 2025-04-15
Payer: MEDICARE

## 2025-04-15 VITALS
DIASTOLIC BLOOD PRESSURE: 62 MMHG | HEART RATE: 68 BPM | HEIGHT: 65 IN | SYSTOLIC BLOOD PRESSURE: 118 MMHG | OXYGEN SATURATION: 94 % | WEIGHT: 155.63 LBS | BODY MASS INDEX: 25.93 KG/M2

## 2025-04-15 DIAGNOSIS — R73.03 PREDIABETES: ICD-10-CM

## 2025-04-15 DIAGNOSIS — I10 ESSENTIAL HYPERTENSION: ICD-10-CM

## 2025-04-15 DIAGNOSIS — N64.4 BREAST PAIN, LEFT: ICD-10-CM

## 2025-04-15 DIAGNOSIS — E78.5 HYPERLIPIDEMIA, UNSPECIFIED HYPERLIPIDEMIA TYPE: ICD-10-CM

## 2025-04-15 DIAGNOSIS — J44.9 CHRONIC OBSTRUCTIVE PULMONARY DISEASE, UNSPECIFIED COPD TYPE: ICD-10-CM

## 2025-04-15 DIAGNOSIS — R73.03 PREDIABETES: Primary | ICD-10-CM

## 2025-04-15 DIAGNOSIS — E87.6 HYPOKALEMIA: Primary | ICD-10-CM

## 2025-04-15 DIAGNOSIS — R91.8 PULMONARY NODULES: ICD-10-CM

## 2025-04-15 LAB
ABSOLUTE EOSINOPHIL (OHS): 0.05 K/UL
ABSOLUTE MONOCYTE (OHS): 0.39 K/UL (ref 0.3–1)
ABSOLUTE NEUTROPHIL COUNT (OHS): 5.17 K/UL (ref 1.8–7.7)
BASOPHILS # BLD AUTO: 0.02 K/UL
BASOPHILS NFR BLD AUTO: 0.3 %
EAG (OHS): 105 MG/DL (ref 68–131)
ERYTHROCYTE [DISTWIDTH] IN BLOOD BY AUTOMATED COUNT: 13.2 % (ref 11.5–14.5)
HBA1C MFR BLD: 5.3 % (ref 4–5.6)
HCT VFR BLD AUTO: 42.9 % (ref 37–48.5)
HGB BLD-MCNC: 13.3 GM/DL (ref 12–16)
IMM GRANULOCYTES # BLD AUTO: 0.02 K/UL (ref 0–0.04)
IMM GRANULOCYTES NFR BLD AUTO: 0.3 % (ref 0–0.5)
LYMPHOCYTES # BLD AUTO: 1.41 K/UL (ref 1–4.8)
MCH RBC QN AUTO: 28.3 PG (ref 27–31)
MCHC RBC AUTO-ENTMCNC: 31 G/DL (ref 32–36)
MCV RBC AUTO: 91 FL (ref 82–98)
NUCLEATED RBC (/100WBC) (OHS): 0 /100 WBC
PLATELET # BLD AUTO: 275 K/UL (ref 150–450)
PMV BLD AUTO: 10.3 FL (ref 9.2–12.9)
RBC # BLD AUTO: 4.7 M/UL (ref 4–5.4)
RELATIVE EOSINOPHIL (OHS): 0.7 %
RELATIVE LYMPHOCYTE (OHS): 20 % (ref 18–48)
RELATIVE MONOCYTE (OHS): 5.5 % (ref 4–15)
RELATIVE NEUTROPHIL (OHS): 73.2 % (ref 38–73)
WBC # BLD AUTO: 7.06 K/UL (ref 3.9–12.7)

## 2025-04-15 PROCEDURE — 1159F MED LIST DOCD IN RCRD: CPT | Mod: CPTII,S$GLB,, | Performed by: INTERNAL MEDICINE

## 2025-04-15 PROCEDURE — 99999 PR PBB SHADOW E&M-EST. PATIENT-LVL IV: CPT | Mod: PBBFAC,,, | Performed by: INTERNAL MEDICINE

## 2025-04-15 PROCEDURE — 80053 COMPREHEN METABOLIC PANEL: CPT

## 2025-04-15 PROCEDURE — 3074F SYST BP LT 130 MM HG: CPT | Mod: CPTII,S$GLB,, | Performed by: INTERNAL MEDICINE

## 2025-04-15 PROCEDURE — 3044F HG A1C LEVEL LT 7.0%: CPT | Mod: CPTII,S$GLB,, | Performed by: INTERNAL MEDICINE

## 2025-04-15 PROCEDURE — 3078F DIAST BP <80 MM HG: CPT | Mod: CPTII,S$GLB,, | Performed by: INTERNAL MEDICINE

## 2025-04-15 PROCEDURE — 99214 OFFICE O/P EST MOD 30 MIN: CPT | Mod: S$GLB,,, | Performed by: INTERNAL MEDICINE

## 2025-04-15 PROCEDURE — 83036 HEMOGLOBIN GLYCOSYLATED A1C: CPT

## 2025-04-15 PROCEDURE — 36415 COLL VENOUS BLD VENIPUNCTURE: CPT

## 2025-04-15 PROCEDURE — 85025 COMPLETE CBC W/AUTO DIFF WBC: CPT

## 2025-04-15 PROCEDURE — 80061 LIPID PANEL: CPT

## 2025-04-15 PROCEDURE — 3008F BODY MASS INDEX DOCD: CPT | Mod: CPTII,S$GLB,, | Performed by: INTERNAL MEDICINE

## 2025-04-15 NOTE — PROGRESS NOTES
CC:  Follow-up    HPI:  The patient is a 69-year-old female with COPD, hypertension, reflux, right hemicolectomy secondary to GI bleed following a polypectomy presents today for follow-up.  The patient had a annual checkup in November.  The patient reports being seen by Pulmonary who told her that her FEV1 was getting worse.  He prescribed Spiriva which she did not fill.  This medication gave her headaches when was 1st given to her in the past.  Also the cost is prohibitive.  It is causing her 200 dollars for the prescription.  She does complain of pain which goes up behind the ears to the side of her head.  This happens 2 to 3 times a week and can last all day.    ROS: Patient does report weight loss.  She is not eating as much.  Things do not taste as good.  The patient had tooth extraction 2 weeks ago and she was very concerned about infection.  She was placed on amoxicillin by her dentist.  The patient is currently on Advair still for her COPD.  No nausea vomiting or abdominal pain.  She does report some trouble with burping.  She also reports some chest wall discomfort in the left upper chest.  She reports feeling a lump or bump in the left chest near the axilla which is painful to the touch.  She also reports having some discomfort in the mid axillary line.  Her last mammogram was in August of 2024.  The patient does have some concerns.  She is moving to [place] to be near her family.  This is to occur in July.    Physical exam:    General appearance: No acute distress   HEENT: Conjunctiva is clear.  The patient has bilateral cataracts.  Left TM is clear.  The right is partially obscured by wax.  Nasal septum is midline without discharge.  Oropharynx is without erythema.  Trachea is midline without JVD or thyromegaly.  Pulmonary:  Fair inspiratory, expiratory breath sounds are heard in the anterior frontal lung fields.  She has decreasing breath sounds in the posterior lung fields heading toward the  base.  GI: Abdomen is nontender, nondistended.  She had normoactive bowel sounds  Breasts:  The patient's left upper chest was evaluated.  A small nodule which felt like a lymph node was felt at about 11 o'clock in position.  I did not appreciate anything in the left axilla.  I do not feel anything in the axillary chest wall.    Assessment:    Left breast pain  2.  Prediabetes  3.  COPD   4.  Hypertension   5.  Hyperlipidemia  6.  Pulmonary nodules are review of chart  Plan:    We will refer the patient to breast surgery for evaluation of breast pain   2.  Will order a CT of the chest   3.  Will schedule a CBC, CMP, A1c and lipid panel.

## 2025-04-16 ENCOUNTER — TELEPHONE (OUTPATIENT)
Dept: INTERNAL MEDICINE | Facility: CLINIC | Age: 70
End: 2025-04-16
Payer: MEDICARE

## 2025-04-16 ENCOUNTER — RESULTS FOLLOW-UP (OUTPATIENT)
Dept: INTERNAL MEDICINE | Facility: CLINIC | Age: 70
End: 2025-04-16

## 2025-04-16 ENCOUNTER — PATIENT MESSAGE (OUTPATIENT)
Dept: FAMILY MEDICINE | Facility: CLINIC | Age: 70
End: 2025-04-16
Payer: MEDICARE

## 2025-04-16 DIAGNOSIS — E87.6 HYPOKALEMIA: Primary | ICD-10-CM

## 2025-04-16 DIAGNOSIS — E87.6 HYPOKALEMIA: ICD-10-CM

## 2025-04-16 LAB
ALBUMIN SERPL BCP-MCNC: 3.8 G/DL (ref 3.5–5.2)
ALP SERPL-CCNC: 52 UNIT/L (ref 40–150)
ALT SERPL W/O P-5'-P-CCNC: 9 UNIT/L (ref 10–44)
ANION GAP (OHS): 10 MMOL/L (ref 8–16)
AST SERPL-CCNC: 23 UNIT/L (ref 11–45)
BILIRUB SERPL-MCNC: 0.4 MG/DL (ref 0.1–1)
BUN SERPL-MCNC: 7 MG/DL (ref 8–23)
CALCIUM SERPL-MCNC: 9.4 MG/DL (ref 8.7–10.5)
CHLORIDE SERPL-SCNC: 92 MMOL/L (ref 95–110)
CHOLEST SERPL-MCNC: 159 MG/DL (ref 120–199)
CHOLEST/HDLC SERPL: 2.4 {RATIO} (ref 2–5)
CO2 SERPL-SCNC: 40 MMOL/L (ref 23–29)
CREAT SERPL-MCNC: 0.8 MG/DL (ref 0.5–1.4)
GFR SERPLBLD CREATININE-BSD FMLA CKD-EPI: >60 ML/MIN/1.73/M2
GLUCOSE SERPL-MCNC: 59 MG/DL (ref 70–110)
HDLC SERPL-MCNC: 66 MG/DL (ref 40–75)
HDLC SERPL: 41.5 % (ref 20–50)
LDLC SERPL CALC-MCNC: 78 MG/DL (ref 63–159)
NONHDLC SERPL-MCNC: 93 MG/DL
POTASSIUM SERPL-SCNC: 2.7 MMOL/L (ref 3.5–5.1)
PROT SERPL-MCNC: 7.4 GM/DL (ref 6–8.4)
SODIUM SERPL-SCNC: 142 MMOL/L (ref 136–145)
TRIGL SERPL-MCNC: 75 MG/DL (ref 30–150)

## 2025-04-16 RX ORDER — POTASSIUM CHLORIDE 20 MEQ/1
40 TABLET, EXTENDED RELEASE ORAL 2 TIMES DAILY
Qty: 60 TABLET | Refills: 0 | Status: SHIPPED | OUTPATIENT
Start: 2025-04-16 | End: 2025-04-16

## 2025-04-16 RX ORDER — POTASSIUM CHLORIDE 20 MEQ/1
20 TABLET, EXTENDED RELEASE ORAL 2 TIMES DAILY
Qty: 180 TABLET | Refills: 3 | Status: SHIPPED | OUTPATIENT
Start: 2025-04-16

## 2025-04-16 NOTE — PROGRESS NOTES
The patient was contacted about her blood test results by me.  She had not been taking her potassium pills on a routine basis; but, maybe 2 or 3 times a week.  A new prescription was called in by me.  She needs a BMP in one-week.  An order is in.

## 2025-04-16 NOTE — PROGRESS NOTES
Contacted by lab for K= 2.6. Reviewed last two chemistry panels k= 3.2 normal creatinine and CO2 suspect chronic due to thiazide use recommend increasing supplementation to 40meq BID no answer at listed number x 2. My chart message recommending increase. Will defer to PCP on retesting and possible med adjustment

## 2025-04-16 NOTE — TELEPHONE ENCOUNTER
----- Message from Zhou Gallardo MD sent at 4/16/2025  1:54 PM CDT -----   The patient was contacted about her blood test results by me.  She had not been taking her potassium pills on a routine basis; but, maybe 2 or 3 times a week.  A new prescription was called in by   me.  She needs a BMP in one-week.  An order is in.  ----- Message -----  From: Lab, Background User  Sent: 4/15/2025   3:18 PM CDT  To: Zhou Gallardo MD

## 2025-04-16 NOTE — TELEPHONE ENCOUNTER
Pt had K+ 2.7 yesterday, Dr Jamison recommended increasing K+ to 40meq, two times per day . Pended repeat lab.     LOV with Zhou Gallardo MD , 4/15/2025

## 2025-04-17 ENCOUNTER — HOSPITAL ENCOUNTER (EMERGENCY)
Facility: HOSPITAL | Age: 70
Discharge: HOME | End: 2025-04-17
Attending: EMERGENCY MEDICINE
Payer: MEDICARE

## 2025-04-17 VITALS
HEART RATE: 230 BPM | RESPIRATION RATE: 18 BRPM | BODY MASS INDEX: 25.83 KG/M2 | WEIGHT: 155 LBS | TEMPERATURE: 98 F | SYSTOLIC BLOOD PRESSURE: 149 MMHG | OXYGEN SATURATION: 70 % | DIASTOLIC BLOOD PRESSURE: 90 MMHG | HEIGHT: 65 IN

## 2025-04-17 DIAGNOSIS — K64.4 EXTERNAL HEMORRHOID: ICD-10-CM

## 2025-04-17 DIAGNOSIS — R55 VASOVAGAL EPISODE: Primary | ICD-10-CM

## 2025-04-17 DIAGNOSIS — R06.02 SOB (SHORTNESS OF BREATH): ICD-10-CM

## 2025-04-17 LAB
ABSOLUTE EOSINOPHIL (OHS): 0.07 K/UL
ABSOLUTE MONOCYTE (OHS): 0.43 K/UL (ref 0.3–1)
ABSOLUTE NEUTROPHIL COUNT (OHS): 8.25 K/UL (ref 1.8–7.7)
ALBUMIN SERPL BCP-MCNC: 3.6 G/DL (ref 3.5–5.2)
ALP SERPL-CCNC: 52 UNIT/L (ref 40–150)
ALT SERPL W/O P-5'-P-CCNC: 8 UNIT/L (ref 10–44)
ANION GAP (OHS): 10 MMOL/L (ref 8–16)
AST SERPL-CCNC: 23 UNIT/L (ref 11–45)
BASOPHILS # BLD AUTO: 0.03 K/UL
BASOPHILS NFR BLD AUTO: 0.3 %
BILIRUB SERPL-MCNC: 0.4 MG/DL (ref 0.1–1)
BUN SERPL-MCNC: 9 MG/DL (ref 8–23)
CALCIUM SERPL-MCNC: 9 MG/DL (ref 8.7–10.5)
CHLORIDE SERPL-SCNC: 97 MMOL/L (ref 95–110)
CO2 SERPL-SCNC: 35 MMOL/L (ref 23–29)
CREAT SERPL-MCNC: 0.7 MG/DL (ref 0.5–1.4)
ERYTHROCYTE [DISTWIDTH] IN BLOOD BY AUTOMATED COUNT: 13.2 % (ref 11.5–14.5)
GFR SERPLBLD CREATININE-BSD FMLA CKD-EPI: >60 ML/MIN/1.73/M2
GLUCOSE SERPL-MCNC: 89 MG/DL (ref 70–110)
HCT VFR BLD AUTO: 41.2 % (ref 37–48.5)
HCV AB SERPL QL IA: NORMAL
HGB BLD-MCNC: 13.4 GM/DL (ref 12–16)
HIV 1+2 AB+HIV1 P24 AG SERPL QL IA: NORMAL
IMM GRANULOCYTES # BLD AUTO: 0.02 K/UL (ref 0–0.04)
IMM GRANULOCYTES NFR BLD AUTO: 0.2 % (ref 0–0.5)
LYMPHOCYTES # BLD AUTO: 1.16 K/UL (ref 1–4.8)
MAGNESIUM SERPL-MCNC: 1.9 MG/DL (ref 1.6–2.6)
MCH RBC QN AUTO: 29.5 PG (ref 27–31)
MCHC RBC AUTO-ENTMCNC: 32.5 G/DL (ref 32–36)
MCV RBC AUTO: 91 FL (ref 82–98)
NUCLEATED RBC (/100WBC) (OHS): 0 /100 WBC
PLATELET # BLD AUTO: 264 K/UL (ref 150–450)
PMV BLD AUTO: 10.3 FL (ref 9.2–12.9)
POTASSIUM SERPL-SCNC: 3.4 MMOL/L (ref 3.5–5.1)
PROT SERPL-MCNC: 7.3 GM/DL (ref 6–8.4)
RBC # BLD AUTO: 4.54 M/UL (ref 4–5.4)
RELATIVE EOSINOPHIL (OHS): 0.7 %
RELATIVE LYMPHOCYTE (OHS): 11.6 % (ref 18–48)
RELATIVE MONOCYTE (OHS): 4.3 % (ref 4–15)
RELATIVE NEUTROPHIL (OHS): 82.9 % (ref 38–73)
SODIUM SERPL-SCNC: 142 MMOL/L (ref 136–145)
WBC # BLD AUTO: 9.96 K/UL (ref 3.9–12.7)

## 2025-04-17 PROCEDURE — 25000242 PHARM REV CODE 250 ALT 637 W/ HCPCS: Performed by: EMERGENCY MEDICINE

## 2025-04-17 PROCEDURE — 99284 EMERGENCY DEPT VISIT MOD MDM: CPT | Mod: 25

## 2025-04-17 PROCEDURE — 27000221 HC OXYGEN, UP TO 24 HOURS

## 2025-04-17 PROCEDURE — 94640 AIRWAY INHALATION TREATMENT: CPT

## 2025-04-17 PROCEDURE — 94761 N-INVAS EAR/PLS OXIMETRY MLT: CPT

## 2025-04-17 PROCEDURE — 85025 COMPLETE CBC W/AUTO DIFF WBC: CPT | Performed by: NURSE PRACTITIONER

## 2025-04-17 PROCEDURE — 83735 ASSAY OF MAGNESIUM: CPT | Performed by: NURSE PRACTITIONER

## 2025-04-17 PROCEDURE — 99900035 HC TECH TIME PER 15 MIN (STAT)

## 2025-04-17 PROCEDURE — 80053 COMPREHEN METABOLIC PANEL: CPT | Performed by: NURSE PRACTITIONER

## 2025-04-17 PROCEDURE — 87389 HIV-1 AG W/HIV-1&-2 AB AG IA: CPT | Performed by: PHYSICIAN ASSISTANT

## 2025-04-17 PROCEDURE — 86803 HEPATITIS C AB TEST: CPT | Performed by: PHYSICIAN ASSISTANT

## 2025-04-17 RX ORDER — IPRATROPIUM BROMIDE AND ALBUTEROL SULFATE 2.5; .5 MG/3ML; MG/3ML
3 SOLUTION RESPIRATORY (INHALATION)
Status: COMPLETED | OUTPATIENT
Start: 2025-04-17 | End: 2025-04-17

## 2025-04-17 RX ADMIN — IPRATROPIUM BROMIDE AND ALBUTEROL SULFATE 3 ML: 2.5; .5 SOLUTION RESPIRATORY (INHALATION) at 07:04

## 2025-04-17 NOTE — FIRST PROVIDER EVALUATION
"Medical screening examination initiated.  I have conducted a focused provider triage encounter, findings are as follows:    Brief history of present illness:  Patient is a 69-year-old female presenting for generalized weakness while sitting on the toilet earlier today.  Patient denies any associated chest pain.  Patient did say she had 1 episode of shortness of breath.    Vitals:    04/17/25 1601   BP: (!) 149/90   Pulse: 78   Resp: 18   Temp: 98.1 °F (36.7 °C)   TempSrc: Oral   SpO2: 99%   Weight: 70.3 kg (155 lb)   Height: 5' 5" (1.651 m)       Pertinent physical exam:  Vital signs stable.  Anxious appearing.  Mucous membranes pink and moist.    Brief workup plan:  Labs imaging    Preliminary workup initiated; this workup will be continued and followed by the physician or advanced practice provider that is assigned to the patient when roomed.  "

## 2025-04-17 NOTE — ED TRIAGE NOTES
Ana Aguila, a 69 y.o. female presents to the ED w/ complaint of near syncopal episode while having a BM. Reports bright red blood in toilet.     Triage note:  Chief Complaint   Patient presents with    Fatigue     Arrives EMS from home endorsing generalized weakness/fatigue and SOB after having bowel movement.     Shortness of Breath     Hx of COPD; on home O2 2L     Review of patient's allergies indicates:   Allergen Reactions    Ace inhibitors      cough    Coreg [carvedilol]      Headache     Pseudoephedrine hcl Nausea And Vomiting, Other (See Comments) and Anxiety     JITTERY     Past Medical History:   Diagnosis Date    Addiction to drug     Anxiety     Aortic calcification 1/11/2018    Chronic diarrhea     Chronic obstructive pulmonary disease 8/27/2013    Chronic respiratory failure with hypoxia, on home O2 therapy 1/11/2018    Colon polyps 2010    COPD (chronic obstructive pulmonary disease)     Coronary artery calcification seen on CT scan 12/4/2020    Depression     Essential hypertension 8/12/2013    Former smoker 10/18/2016    Hepatomegaly 12/21/2015    Hyperlipidemia     Hypertension     MI (myocardial infarction)     Microscopic hematuria 1/13/2017    Panic disorder     Perianal abscess 6/1/2018    Pneumothorax 1/20/2023    Reflux 2013    S/P right hemicolectomy 9/7/2017    Performed in 2011 after perforation during colonoscopy    Sleep difficulties     Takotsubo cardiomyopathy 10/18/2016    Noted on echo 10/2016, recovered on repeat echo 12/2016

## 2025-04-17 NOTE — ED PROVIDER NOTES
Encounter Date: 4/17/2025       History     Chief Complaint   Patient presents with    Fatigue     Arrives EMS from home endorsing generalized weakness/fatigue and SOB after having bowel movement.     Shortness of Breath     Hx of COPD; on home O2 2L     Ana Aguila is a 69-year-old female with a history of COPD, hypertension, diabetes, anxiety presenting today for near syncopal episode.  She was sitting on the toilet trying to have a bowel movement when she started to feel cold, weak, and nearly pass out.  During this event she felt short of breath, started feeling very anxious.  She felt weak for approximately 20 minutes after the episode.  She called her clinic and asked them to send someone over to check on her.  She then went back to the bathroom to have another bowel movement and she noticed blood on the toilet paper.  She does have a history of hemorrhoids, applied pressure to the area then she noticed the blood was in the toilet bowl as well.  This made her very nervous so she came to the hospital for evaluation.  States she has had this happened 1 time previously.  She denies abdominal cramping, chest pain, fall.  She does report mild shortness of breath, history of COPD on 2 L nasal cannula.  It typically is worse when she gets anxious.  She is currently back to baseline.        Review of patient's allergies indicates:   Allergen Reactions    Ace inhibitors      cough    Coreg [carvedilol]      Headache     Pseudoephedrine hcl Nausea And Vomiting, Other (See Comments) and Anxiety     JITTERY     Past Medical History:   Diagnosis Date    Addiction to drug     Anxiety     Aortic calcification 1/11/2018    Chronic diarrhea     Chronic obstructive pulmonary disease 8/27/2013    Chronic respiratory failure with hypoxia, on home O2 therapy 1/11/2018    Colon polyps 2010    COPD (chronic obstructive pulmonary disease)     Coronary artery calcification seen on CT scan 12/4/2020    Depression     Essential  hypertension 2013    Former smoker 10/18/2016    Hepatomegaly 2015    Hyperlipidemia     Hypertension     MI (myocardial infarction)     Microscopic hematuria 2017    Panic disorder     Perianal abscess 2018    Pneumothorax 2023    Reflux 2013    S/P right hemicolectomy 2017    Performed in  after perforation during colonoscopy    Sleep difficulties     Takotsubo cardiomyopathy 10/18/2016    Noted on echo 10/2016, recovered on repeat echo 2016     Past Surgical History:   Procedure Laterality Date    ABDOMINAL SURGERY      APPENDECTOMY      CHOLECYSTECTOMY      open    COLON SURGERY      secondary to perforation after c-scope    COLONOSCOPY      CORONARY ANGIOGRAPHY N/A 2022    Procedure: ANGIOGRAM, CORONARY ARTERY;  Surgeon: Kylee Carreon MD;  Location: [facility] CATH LAB/EP;  Service: Cardiology;  Laterality: N/A;    FRACTURE SURGERY Left 1999    HERNIA REPAIR      HIATAL HERNIA REPAIR      HYSTERECTOMY  1986    LEFT HEART CATHETERIZATION N/A 2022    Procedure: Left heart cath;  Surgeon: Kylee Carreon MD;  Location: [facility] CATH LAB/EP;  Service: Cardiology;  Laterality: N/A;    Left leg surgery       Family History   Problem Relation Name Age of Onset    Cancer Mother LUNA Cisneros             Hypertension Mother LUNA Cisneros     Coronary artery disease Father      Retinal detachment Father      Hypertension Sister 1     Hypertension Brother 4     Cancer Daughter          breast cancer - double mastectomy    Abnormal EKG Daughter      Breast cancer Daughter  43        negative genetic testing, unilateral breast ca    Cataracts Maternal Grandmother      Breast cancer Maternal Grandmother          unk age of onset    Breast cancer Other niece 44        thinks negative genetic testing, bilat ca    Amblyopia Neg Hx      Blindness Neg Hx      Diabetes Neg Hx      Glaucoma Neg Hx      Macular degeneration Neg Hx      Strabismus Neg Hx      Stroke Neg Hx       Thyroid disease Neg Hx      Colon cancer Neg Hx      Ovarian cancer Neg Hx       Social History[1]  Review of Systems    Physical Exam     Initial Vitals [04/17/25 1601]   BP Pulse Resp Temp SpO2   (!) 149/90 78 18 98.1 °F (36.7 °C) 99 %      MAP       --         Physical Exam    Nursing note and vitals reviewed.  Constitutional: She appears well-developed and well-nourished. No distress.   HENT: Mouth/Throat: Oropharynx is clear and moist.   Eyes: Conjunctivae are normal. No scleral icterus.   Neck: No JVD present.   Cardiovascular:  Normal rate, regular rhythm and intact distal pulses.           Pulmonary/Chest: No respiratory distress. She has no wheezes. She has no rales.   + 2 L nasal cannula, tachypneic   Abdominal: Abdomen is soft. Bowel sounds are normal. She exhibits no distension. There is no abdominal tenderness. There is no rebound.   Genitourinary:    Genitourinary Comments: + small non thrombosed hemorrhoids noted, digital exam performed with no blood     Musculoskeletal:         General: No edema.     Lymphadenopathy:     She has no cervical adenopathy.   Neurological: She is alert and oriented to person, place, and time.   Skin: Skin is warm. No rash noted.         ED Course   Procedures  Labs Reviewed   COMPREHENSIVE METABOLIC PANEL - Abnormal       Result Value    Sodium 142      Potassium 3.4 (*)     Chloride 97      CO2 35 (*)     Glucose 89      BUN 9      Creatinine 0.7      Calcium 9.0      Protein Total 7.3      Albumin 3.6      Bilirubin Total 0.4      ALP 52      AST 23      ALT 8 (*)     Anion Gap 10      eGFR >60     CBC WITH DIFFERENTIAL - Abnormal    WBC 9.96      RBC 4.54      HGB 13.4      HCT 41.2      MCV 91      MCH 29.5      MCHC 32.5      RDW 13.2      Platelet Count 264      MPV 10.3      Nucleated RBC 0      Neut % 82.9 (*)     Lymph % 11.6 (*)     Mono % 4.3      Eos % 0.7      Basophil % 0.3      Imm Grans % 0.2      Neut # 8.25 (*)     Lymph # 1.16      Mono # 0.43      Eos  # 0.07      Baso # 0.03      Imm Grans # 0.02     MAGNESIUM - Normal    Magnesium  1.9     HEPATITIS C ANTIBODY - Normal    Hep C Ab Interp Non-Reactive     HIV 1 / 2 ANTIBODY - Normal    HIV 1/2 Ag/Ab Non-Reactive     CBC W/ AUTO DIFFERENTIAL    Narrative:     The following orders were created for panel order CBC auto differential.  Procedure                               Abnormality         Status                     ---------                               -----------         ------                     CBC with Differential[[id number]]       Abnormal            Final result                 Please view results for these tests on the individual orders.   URINALYSIS, REFLEX TO URINE CULTURE   HEP C VIRUS HOLD SPECIMEN          Imaging Results              X-Ray Chest AP Portable (Final result)  Result time 04/17/25 18:49:10      Final result by Darci Morales MD (04/17/25 18:49:10)                   Impression:      Pulmonary emphysema without radiographic evidence of pneumonia or other source of acute cough/shortness of breath, noting that early/mild viral pneumonia or other nonspecific bronchitis may be radiographically occult.      Electronically signed by: Darci Morales MD  Date:    04/17/2025  Time:    18:49               Narrative:    EXAMINATION:  XR CHEST AP PORTABLE    CLINICAL HISTORY:  Shortness of breath    TECHNIQUE:  Single frontal view of the chest was performed.    COMPARISON:  Chest radiograph 01/29/2025, chest CT 04/29/2023    FINDINGS:  Patient is slightly rotated.  Tubing overlies the chest.    Cardiomediastinal silhouette is midline with similar calcification and tortuosity of the aorta.  Heart is not significantly enlarged.  Pulmonary vasculature and hilar contours are within normal limits for age, stable.    Lower trachea is mildly narrowed and deviated towards the right secondary to the arch but remains patent.  Pulmonary hyperinflation with increased lucency of the upper zones consistent with  known pulmonary emphysema.  Scattered linear opacities throughout the lungs consistent with platelike scarring versus atelectasis.  No consolidation, pleural effusion or pneumothorax.    No acute osseous process seen.  PA and lateral views can be obtained.                                       Medications   albuterol-ipratropium 2.5 mg-0.5 mg/3 mL nebulizer solution 3 mL (3 mLs Nebulization Given by Other 4/17/25 1901)     Medical Decision Making  Emergent evaluation of 69-year-old female presenting today for your syncopal episode while having a bowel movement the noticed blood in the toilet bowl.     Vital signs stable.  Overall, patient is tachypneic but otherwise well-appearing.  Abdomen is soft, nontender.    Differential includes but not limited to vasovagal syncope, arrhythmia, panic attack, COPD exacerbation, acute anemia, acute GI bleed    Plan for labs, EKG, continuous cardiac monitoring.    Labs reviewed with no leukocytosis.  Hemoglobin stable.  CMP largely unremarkable.  Chest x-ray with emphysematous changes, no focal consolidation or effusion.  EKG unremarkable, no evidence of arrhythmia.    Patient treated with nebulizer.  She reports improvement of symptoms.  No active bleeding on rectal exam performed while in the emergency department.    I do not think she requires further workup.  She reports back to baseline.  Patient stable for discharge, recommend follow-up PCP    Amount and/or Complexity of Data Reviewed  Labs: ordered. Decision-making details documented in ED Course.  Radiology: ordered and independent interpretation performed.  ECG/medicine tests: ordered and independent interpretation performed.    Risk  OTC drugs.                                      Clinical Impression:  Final diagnoses:  [R06.02] SOB (shortness of breath)  [R55] Vasovagal episode (Primary)  [K64.4] External hemorrhoid          ED Disposition Condition    Discharge Good          ED Prescriptions    None       Follow-up  Information       Follow up With Specialties Details Why Contact Info    Zhou Gallardo MD Internal Medicine Schedule an appointment as soon as possible for a visit   [address] [facility] [address]  [phone]                   [1]   Social History  Tobacco Use    Smoking status: Former     Current packs/day: 0.00     Average packs/day: 1.5 packs/day for 30.0 years (45.0 ttl pk-yrs)     Types: Cigarettes     Start date: 1970     Quit date: 1997     Years since quittin.3    Smokeless tobacco: Never   Substance Use Topics    Alcohol use: No    Drug use: No        Radha Lara MD  25

## 2025-04-17 NOTE — PROVIDER PROGRESS NOTES - EMERGENCY DEPT.
Encounter Date: 4/17/2025    ED Physician Progress Notes          EKG: Normal sinus rhythm, rate 77, no STEMI

## 2025-04-18 LAB
OHS QRS DURATION: 92 MS
OHS QTC CALCULATION: 461 MS

## 2025-04-18 NOTE — DISCHARGE INSTRUCTIONS
Your labs overall reassuring.  Your chest x-ray shows no evidence of pneumonia.  Your blood counts are all stable.  Please monitor your symptoms, follow up with the primary care team for further evaluation.

## 2025-04-25 ENCOUNTER — OFFICE VISIT (OUTPATIENT)
Dept: SURGERY | Facility: CLINIC | Age: 70
End: 2025-04-25
Payer: MEDICARE

## 2025-04-25 VITALS
SYSTOLIC BLOOD PRESSURE: 130 MMHG | BODY MASS INDEX: 25.83 KG/M2 | HEIGHT: 65 IN | DIASTOLIC BLOOD PRESSURE: 83 MMHG | HEART RATE: 67 BPM | WEIGHT: 155 LBS

## 2025-04-25 DIAGNOSIS — N64.4 BREAST PAIN, LEFT: ICD-10-CM

## 2025-04-25 PROCEDURE — 1101F PT FALLS ASSESS-DOCD LE1/YR: CPT | Mod: CPTII,S$GLB,, | Performed by: PHYSICIAN ASSISTANT

## 2025-04-25 PROCEDURE — 3288F FALL RISK ASSESSMENT DOCD: CPT | Mod: CPTII,S$GLB,, | Performed by: PHYSICIAN ASSISTANT

## 2025-04-25 PROCEDURE — 99213 OFFICE O/P EST LOW 20 MIN: CPT | Mod: S$GLB,,, | Performed by: PHYSICIAN ASSISTANT

## 2025-04-25 PROCEDURE — 3075F SYST BP GE 130 - 139MM HG: CPT | Mod: CPTII,S$GLB,, | Performed by: PHYSICIAN ASSISTANT

## 2025-04-25 PROCEDURE — 3044F HG A1C LEVEL LT 7.0%: CPT | Mod: CPTII,S$GLB,, | Performed by: PHYSICIAN ASSISTANT

## 2025-04-25 PROCEDURE — 3008F BODY MASS INDEX DOCD: CPT | Mod: CPTII,S$GLB,, | Performed by: PHYSICIAN ASSISTANT

## 2025-04-25 PROCEDURE — 3079F DIAST BP 80-89 MM HG: CPT | Mod: CPTII,S$GLB,, | Performed by: PHYSICIAN ASSISTANT

## 2025-04-25 PROCEDURE — 1125F AMNT PAIN NOTED PAIN PRSNT: CPT | Mod: CPTII,S$GLB,, | Performed by: PHYSICIAN ASSISTANT

## 2025-04-25 PROCEDURE — 99999 PR PBB SHADOW E&M-EST. PATIENT-LVL III: CPT | Mod: PBBFAC,,, | Performed by: PHYSICIAN ASSISTANT

## 2025-04-25 PROCEDURE — 1159F MED LIST DOCD IN RCRD: CPT | Mod: CPTII,S$GLB,, | Performed by: PHYSICIAN ASSISTANT

## 2025-04-25 NOTE — PROGRESS NOTES
"[facility]  Department of Surgery        REFERRING PROVIDER:   Zhou Gallardo MD  [address] [place] [address]    Chief Complaint: Breast Pain    2025    History of Present Illness: Ana Aguila is a 63 y.o. female who presents today with bilateral breast pain. Patient reports she had a right pneumothorax requiring chest tube placement. Per patient, since then, she has had tenderness and "mass-like" are near previous insertion site. Also reports feeling "knots" of the left breast, near her underarm. This is not a new issue, she has had this for years.  She was last seen in clinic by Dr. Garcia on 3/10/2020. Patient denies breast-related skin changes, nipple discharge and nipple retraction.  No other breast-related concerns.     Pt's 3/2019 echo was normal per cardiologist's documentation.    Interval History:   She presents for follow up evaluation of worsening left breast pain. Patient has a history of chronic breast pain. Screening mammogram in August was BIRADS 1. States this new change began after that imaging. Denies palpable mass or skin change.        Breast Imagin2024 Mammo Digital Screening Bilat w/ Cyrus     History:  Patient is 69 y.o. and is seen for a screening mammogram.     Films Compared:  Compared to: 2023 Mammo Digital Diagnostic Bilat with Cyrus, 09/15/2022 Mammo Digital Screening Bilat w/ Cyrus, and 2021 Mammo Digital Screening Bilat w/ Cyrus      Findings:  This procedure was performed using tomosynthesis.   Computer-aided detection was utilized in the interpretation of this examination.     There is no evidence of suspicious masses, microcalcifications or architectural distortion.     Breast Density:  There are scattered areas of fibroglandular density.      Impression:   No mammographic evidence of malignancy.     BI-RADS Category 1: Negative    9/15/2022 Mammo Digital Screening Bilat w/ Cyrus     History:  Patient is 67 y.o. and is seen " for a screening mammogram.        Films Compared:  Prior images (if available) were compared.      Findings:   This procedure was performed using tomosynthesis.   Computer-aided detection was utilized in the interpretation of this examination.     The breasts have scattered areas of fibroglandular density. There is no evidence of suspicious masses, microcalcifications or architectural distortion. Findings are stable.         Impression:   No mammographic evidence of malignancy.     BI-RADS Category 1: Negative     Recommendation:  Routine screening mammogram in 1 year is recommended.        Interval Breast History        Patient reports feeling dense breast tissue in 12oc regions of bilateral breasts.      Patient reports bilateral axillary shooting pains. Began on L first, and then R.  More intense on L.  These pains come and go.  Heating pad has helped.     Patient denies palpable breast mass, breast-related skin changes, nipple discharge and nipple retraction.  No other breast-related concerns.     Pt's 3/2019 echo was normal per cardiologist's documentation.     GYN History:  Age of menarche:  13 y/o  , first live birth at 15 y/o  Uterus and ovaries:  s/p hysterectomy at 34 y/o, pt unsure whether ovaries remain intact  Menopause:  34 y/o  HRT usage:  never     Other Oncologic History:  Personal history of other cancer not abovementioned:  no  Personal history of genetic testing:  no     Social History:  Tobacco use:  quit smoking around over 20 years ago     Family Oncologic History:     Ashkenazi Latter-day ancestry:  no           Family History   Problem Relation Age of Onset    Cancer Mother           lung    Breast cancer Maternal Grandmother           unk age of onset    Breast cancer Daughter 43         negative genetic testing, unilateral breast ca    Breast cancer Other 44         thinks negative genetic testing, bilat ca    Ovarian cancer Neg Hx           Patient's Breast Cancer Risk Assessment  "Scores:  the patient's breast cancer risk assessment scores were calculated 2/12/19 as follows:  Tyrer-Cuzick lifetime risk:  9.4%  Valerie model 5-year risk:  2.6%        Review of Systems - See HPI.  Negative for chest pain, unexplained fatigue, recurrent infections, or unexplained weight loss.  Objective:     /83   Pulse 67   Ht 5' 5" (1.651 m)   Wt 70.3 kg (155 lb)   LMP  (LMP Unknown)   BMI 25.79 kg/m²     Physical Exam   Vitals reviewed.  Constitutional: She is oriented to person, place, and time.   Eyes: Right eye exhibits no discharge. Left eye exhibits no discharge.   Pulmonary/Chest: No respiratory distress. She has no wheezes. Right breast exhibits no inverted nipple, no mass, no nipple discharge, no skin change and no tenderness. Left breast exhibits tenderness. Left breast exhibits no inverted nipple, no mass, no nipple discharge and no skin change.       Abdominal: Normal appearance.   Musculoskeletal:      Comments: Wheelchair  Lymphadenopathy:      Cervical: No cervical adenopathy.     Neurological: She is alert and oriented to person, place, and time.   Skin: Skin is warm and dry. No erythema. No pallor.           Assessment:   This is 69 y.o. female who presents today for bilateral breast pain L>R, no discrete mass appreciated today.       Plan:   1. On examination, no mass or skin change at site of focal pain. Will proceed with US of the pinpointed area, but low suspicion for malignancy.   2. Will be in touch with imaging.   3. Patient would like to follow in the breast clinic given family history. Screening mammogram due in August.   4. Patient verbalized understanding of plan. All questions asked and answered. Advised to call our office with any new or worsening sxs.                   "

## 2025-04-26 ENCOUNTER — HOSPITAL ENCOUNTER (OUTPATIENT)
Dept: RADIOLOGY | Facility: HOSPITAL | Age: 70
Discharge: HOME | End: 2025-04-26
Attending: INTERNAL MEDICINE
Payer: MEDICARE

## 2025-04-26 DIAGNOSIS — R91.8 PULMONARY NODULES: ICD-10-CM

## 2025-04-26 DIAGNOSIS — J44.9 CHRONIC OBSTRUCTIVE PULMONARY DISEASE, UNSPECIFIED COPD TYPE: ICD-10-CM

## 2025-04-26 PROCEDURE — 71250 CT THORAX DX C-: CPT | Mod: TC

## 2025-04-26 PROCEDURE — 71250 CT THORAX DX C-: CPT | Mod: 26,,, | Performed by: STUDENT IN AN ORGANIZED HEALTH CARE EDUCATION/TRAINING PROGRAM

## 2025-04-28 ENCOUNTER — RESULTS FOLLOW-UP (OUTPATIENT)
Dept: INTERNAL MEDICINE | Facility: CLINIC | Age: 70
End: 2025-04-28

## 2025-04-30 ENCOUNTER — PATIENT MESSAGE (OUTPATIENT)
Dept: INTERNAL MEDICINE | Facility: CLINIC | Age: 70
End: 2025-04-30
Payer: MEDICARE

## 2025-04-30 ENCOUNTER — TELEPHONE (OUTPATIENT)
Dept: PULMONOLOGY | Facility: CLINIC | Age: 70
End: 2025-04-30
Payer: MEDICARE

## 2025-04-30 ENCOUNTER — PATIENT MESSAGE (OUTPATIENT)
Dept: SURGERY | Facility: CLINIC | Age: 70
End: 2025-04-30
Payer: MEDICARE

## 2025-04-30 ENCOUNTER — PATIENT MESSAGE (OUTPATIENT)
Dept: PULMONOLOGY | Facility: CLINIC | Age: 70
End: 2025-04-30
Payer: MEDICARE

## 2025-04-30 DIAGNOSIS — F41.9 ANXIETY: ICD-10-CM

## 2025-04-30 RX ORDER — LORAZEPAM 0.5 MG/1
0.5 TABLET ORAL DAILY PRN
Qty: 30 TABLET | Refills: 0 | Status: SHIPPED | OUTPATIENT
Start: 2025-04-30

## 2025-04-30 NOTE — TELEPHONE ENCOUNTER
----- Message from Alicia Castellon sent at 4/30/2025  3:46 PM CDT -----  Regarding: return call  PATIENT RETURNING CALLPt returned Anastacia's call regarding medication inquiry. Please call pt at [phone]

## 2025-04-30 NOTE — TELEPHONE ENCOUNTER
----- Message from Mirella sent at 4/30/2025  3:10 PM CDT -----  Contact: [phone]@patient  Type: Returning a callWho left a message? Patient When did the practice call?Does patient know what this is regarding:Would the patient rather a call back or a response via [facility]? Call back Comments:Pt would like a call back to discuss one of her med. Please call pt to advise  [phone]

## 2025-04-30 NOTE — TELEPHONE ENCOUNTER
Spoke with patient stated she has refill on Rx: ADVAIR DISKUS 250-50 mcg/dose diskus inhaler. However, due to change of insurance she have not met her deductible and the cost for the Advair ($230.00) . Patient states she would like to know if there are any Advair samples in clinic. Patient further stated she was admitted to the hospital did a ct-chest 04/26/25 and would like if Dr Hull can review and advised. I advised patient that I will forward her message to Dr Hull. Patient verbalized understanding.

## 2025-04-30 NOTE — TELEPHONE ENCOUNTER
Attempted to contact patient in regards to her message. No answer. Message left for patient to return my call. Office number was provided.

## 2025-04-30 NOTE — TELEPHONE ENCOUNTER
Care Due:                  Date            Visit Type   Department     Provider  --------------------------------------------------------------------------------                                EP -                              PRIMARY      [facility] INTERNAL  Last Visit: 04-      CARE ([facility])   KAL Gallardo                              [facility]                              PRIMARY      [facility] INTERNAL  Next Visit: 05-      CARE ([facility])   KAL Gallardo                                                            Last  Test          Frequency    Reason                     Performed    Due Date  --------------------------------------------------------------------------------    Vitamin D...  12 months..  ergocalciferol...........  Not Found    Overdue    [employer] Embedded Care Due Messages. Reference number: [id number].   4/30/2025 11:37:51 AM CDT

## 2025-05-01 ENCOUNTER — TELEPHONE (OUTPATIENT)
Dept: RADIOLOGY | Facility: HOSPITAL | Age: 70
End: 2025-05-01
Payer: MEDICARE

## 2025-05-01 NOTE — TELEPHONE ENCOUNTER
----- Message from Samantha Leon PA-C sent at 4/30/2025 11:33 AM CDT -----  Patient needs an US evaluation of new focal left breast pain pls and TY :)

## 2025-05-07 ENCOUNTER — TELEPHONE (OUTPATIENT)
Dept: RADIOLOGY | Facility: HOSPITAL | Age: 70
End: 2025-05-07
Payer: MEDICARE

## 2025-05-07 NOTE — TELEPHONE ENCOUNTER
----- Message from Viry sent at 5/7/2025 12:59 PM CDT -----  Contact: pt @ [phone]  Ana Aguila calling regarding Appointment Access  (message) for #pt is calling to reschedule appt 5/7 due to weather , asking for call back

## 2025-05-12 ENCOUNTER — HOSPITAL ENCOUNTER (OUTPATIENT)
Dept: RADIOLOGY | Facility: HOSPITAL | Age: 70
Discharge: HOME | End: 2025-05-12
Attending: PHYSICIAN ASSISTANT
Payer: MEDICARE

## 2025-05-12 DIAGNOSIS — N64.4 BREAST PAIN, LEFT: ICD-10-CM

## 2025-05-12 PROCEDURE — 76642 ULTRASOUND BREAST LIMITED: CPT | Mod: TC,LT

## 2025-05-12 PROCEDURE — 76642 ULTRASOUND BREAST LIMITED: CPT | Mod: 26,LT,, | Performed by: RADIOLOGY

## 2025-05-19 DIAGNOSIS — E78.2 MIXED HYPERLIPIDEMIA: ICD-10-CM

## 2025-05-19 RX ORDER — ROSUVASTATIN CALCIUM 10 MG/1
10 TABLET, COATED ORAL
Qty: 90 TABLET | Refills: 3 | Status: SHIPPED | OUTPATIENT
Start: 2025-05-19

## 2025-05-22 ENCOUNTER — OFFICE VISIT (OUTPATIENT)
Dept: INTERNAL MEDICINE | Facility: CLINIC | Age: 70
End: 2025-05-22
Payer: MEDICARE

## 2025-05-22 VITALS
BODY MASS INDEX: 25.79 KG/M2 | DIASTOLIC BLOOD PRESSURE: 76 MMHG | WEIGHT: 155 LBS | HEART RATE: 71 BPM | SYSTOLIC BLOOD PRESSURE: 128 MMHG | OXYGEN SATURATION: 98 %

## 2025-05-22 DIAGNOSIS — J44.9 CHRONIC OBSTRUCTIVE PULMONARY DISEASE, UNSPECIFIED COPD TYPE: ICD-10-CM

## 2025-05-22 DIAGNOSIS — H61.21 HEARING LOSS OF RIGHT EAR DUE TO CERUMEN IMPACTION: Primary | ICD-10-CM

## 2025-05-22 DIAGNOSIS — Z99.81 DEPENDENCE ON SUPPLEMENTAL OXYGEN: ICD-10-CM

## 2025-05-22 DIAGNOSIS — I10 ESSENTIAL HYPERTENSION: ICD-10-CM

## 2025-05-22 DIAGNOSIS — J44.9 COPD, SEVERE: ICD-10-CM

## 2025-05-22 DIAGNOSIS — B35.3 TINEA PEDIS OF BOTH FEET: ICD-10-CM

## 2025-05-22 PROCEDURE — 99999 PR PBB SHADOW E&M-EST. PATIENT-LVL III: CPT | Mod: PBBFAC,,,

## 2025-05-22 RX ORDER — FLUTICASONE PROPIONATE AND SALMETEROL 250; 50 UG/1; UG/1
1 POWDER RESPIRATORY (INHALATION) 2 TIMES DAILY
Qty: 180 EACH | Refills: 3 | Status: SHIPPED | OUTPATIENT
Start: 2025-05-22 | End: 2026-05-17

## 2025-05-22 RX ORDER — FLUTICASONE PROPIONATE AND SALMETEROL 250; 50 UG/1; UG/1
1 POWDER RESPIRATORY (INHALATION) 2 TIMES DAILY
Qty: 180 EACH | Refills: 3 | Status: SHIPPED | OUTPATIENT
Start: 2025-05-22 | End: 2025-05-22

## 2025-05-23 NOTE — PROGRESS NOTES
"INTERNAL MEDICINE URGENT CARE NOTE    CHIEF COMPLAINT     Chief Complaint   Patient presents with    Ear Fullness     Right      HPI     Ana Aguila is a 69 y.o. female who presents for an urgent care visit today.    ENT:  She reports complete hearing loss in right ear since Monday, describing it as "dead." She experiences headaches associated with earwax buildup.     PULMONARY:  She has a history of stable pulmonary nodules confirmed by recent CT and emphysema which appears unchanged from previous imaging. She experiences dyspnea on exertion, particularly when walking. She uses 2 L of oxygen at home and continues pulmonary rehabilitation at [facility].    MEDICATIONS:  She reports previous adverse reaction to Spiriva causing headaches and ineffectiveness. She reports she has notified Pulmonology. She expresses difficulty obtaining Advair due to formulary restrictions.  She takes hydrochlorothiazide 25mg    DERMATOLOGIC:  She reports peeling skin on feet with intermittent itching that varies in severity. She self-treats with Gold Bond powder.    ROS:  General: -fever, -chills, -fatigue, -weight gain, -weight loss  Eyes: -vision changes, -redness, -discharge  ENT: -ear pain, -nasal congestion, -sore throat, +hearing loss  Cardiovascular: -chest pain, -palpitations, -lower extremity edema  Respiratory: -cough, -shortness of breath, +exertional dyspnea  Gastrointestinal: -abdominal pain, -nausea, -vomiting, -diarrhea, -constipation, -blood in stool  Genitourinary: -dysuria, -hematuria, -frequency  Musculoskeletal: -joint pain, -muscle pain  Skin: -rash, -lesion, +itching  Neurological: +headache, -dizziness, -numbness, -tingling  Psychiatric: -anxiety, -depression, -sleep difficulty        Past Medical History:  Past Medical History:   Diagnosis Date    Addiction to drug     Anxiety     Aortic calcification 1/11/2018    Chronic diarrhea     Chronic obstructive pulmonary disease 8/27/2013    Chronic respiratory " failure with hypoxia, on home O2 therapy 1/11/2018    Colon polyps 2010    COPD (chronic obstructive pulmonary disease)     Coronary artery calcification seen on CT scan 12/4/2020    Depression     Essential hypertension 8/12/2013    Former smoker 10/18/2016    Hepatomegaly 12/21/2015    Hyperlipidemia     Hypertension     MI (myocardial infarction)     Microscopic hematuria 1/13/2017    Panic disorder     Perianal abscess 6/1/2018    Pneumothorax 1/20/2023    Reflux 2013    S/P right hemicolectomy 9/7/2017    Performed in 2011 after perforation during colonoscopy    Sleep difficulties     Takotsubo cardiomyopathy 10/18/2016    Noted on echo 10/2016, recovered on repeat echo 12/2016     Home Medications:  Prior to Admission medications    Medication Sig Start Date End Date Taking? Authorizing Provider   albuterol (PROAIR HFA) 90 mcg/actuation inhaler Inhale 2 puffs into the lungs every 6 (six) hours as needed for Wheezing or Shortness of Breath. INHALE 2 PUFFS FOUR TIMES DAILY AS NEEDED FOR COPD 4/7/25  Yes Tate Hull MD   albuterol-ipratropium (DUO-NEB) 2.5 mg-0.5 mg/3 mL nebulizer solution Take 3 mLs by nebulization every 6 (six) hours as needed for Wheezing or Shortness of Breath. USE 1 VIAL VIA NEBULIZER EVERY 6 HOURS AS NEEDED FOR WHEEZING OR SHORTNESS OF BREATH 4/7/25  Yes Tate Hull MD   ergocalciferol (ERGOCALCIFEROL) 50,000 unit Cap Take 1 capsule (50,000 Units total) by mouth every 7 days. 9/2/24  Yes Zhou Gallardo MD   hydroCHLOROthiazide (HYDRODIURIL) 25 MG tablet Take 1 tablet (25 mg total) by mouth once daily. 11/22/24  Yes Zhou Gallardo MD   lactulose (CHRONULAC) 10 gram/15 mL solution Take 15 mLs (10 g total) by mouth once daily. 7/19/24  Yes Zhou Gallardo MD   LORazepam (ATIVAN) 0.5 MG tablet Take 1 tablet (0.5 mg total) by mouth daily as needed for Anxiety. 4/30/25  Yes Zhou Gallardo MD   metoprolol succinate (TOPROL-XL) 100 MG 24 hr tablet Take 1 tablet (100 mg  total) by mouth once daily. 11/22/24  Yes Zhou Gallardo MD   potassium chloride SA (K-DUR,KLOR-CON) 20 MEQ tablet Take 1 tablet (20 mEq total) by mouth 2 (two) times daily. 4/16/25  Yes Zhou Gallardo MD   rosuvastatin (CRESTOR) 10 MG tablet TAKE 1 TABLET(10 MG) BY MOUTH DAILY 5/19/25  Yes Sneha Aguilera NP   tiotropium bromide (SPIRIVA RESPIMAT) 2.5 mcg/actuation inhaler Inhale 2 puffs into the lungs Daily. Controller 4/7/25  Yes Tate Hull MD   clotrimazole (LOTRIMIN) 1 % cream Apply topically 2 (two) times daily. for 14 days 2/24/25 3/10/25  Humaira Pagan NP-C   fluticasone-salmeterol diskus inhaler 250-50 mcg Inhale 1 puff into the lungs 2 (two) times daily. Rinse mouth after each use. 5/22/25 5/17/26  Humaira Pagan, WILDER   amLODIPine (NORVASC) 2.5 MG tablet Take 1 tablet (2.5 mg total) by mouth once daily. 5/20/22 7/27/22  Zhou Gallardo MD     PHYSICAL EXAM     General: No acute distress. Well-developed. Well-nourished.  Eyes: EOMI. Sclerae anicteric.  HENT: Normocephalic. Atraumatic. Nares patent. Moist oral mucosa.  Ears: Bilateral TMs clear. Cerumen impaction in right ear.  Cardiovascular: Regular rate. Regular rhythm. No murmurs. No rubs. No gallops. Normal S1, S2.  Respiratory: Normal respiratory effort. Clear to auscultation bilaterally. No rales. No rhonchi. No wheezing.  Abdomen: Soft. Non-tender. Non-distended. Normoactive bowel sounds.  Musculoskeletal: No  obvious deformity.  Extremities: No lower extremity edema.  Neurological: Alert & oriented x3. No slurred speech. Normal gait.  Psychiatric: Normal mood. Normal affect. Good insight. Good judgment.  Skin: Warm. Dry. No rash.        /76 (BP Location: Left arm, Patient Position: Sitting)   Pulse 71   Wt 70.3 kg (155 lb)   LMP  (LMP Unknown)   SpO2 98%   BMI 25.79 kg/m²   ASSESSMENT/PLAN       COPD/EMPHYSEMA  - Monitored patient with dyspnea on exertion.  - CT chest results show extensive centro-centrilobar  emphysema, unchanged from previous year.  - Spiriva discontinued - due to side effects. Pt reports Pulmonology is aware.  - Will continue Eliseo RYANI.  - Advised patient to continue pulmonary rehabilitation.    OXYGEN DEPENDENCE:  - Ana currently uses 2 L of oxygen at home.  - Advised to continue using supplemental oxygen as prescribed.    RIGHT EAR HEARING LOSS AND IMPACTED CERUMEN:  - Ana reports inability to hear from right ear since Monday due to significant cerumen impaction.  - Performed earwax removal procedure in office.  - Recommend Debrox for ongoing earwax management to prevent recurrence.  - Resolution of hearing loss after ear wax removal    TINEA PEDIS:  - Ana reports peeling skin on feet, sometimes itchy, consistent with athlete's foot.  - Advised to apply clotrimazole cream twice daily for 4 weeks. Pt has prescription at home.    HYPERTENSION  - Chronic, stable with /76  - Followed by PCP  - Continue hydrochlorothiazide 25 mg and Toprol  mg    FOLLOW-UP:  - Recommend annual follow-up with pulmonology.  - Ana was last seen by pulmonology in April.          Patient education provided from Lianne. Patient was counseled on when and how to seek emergent care.   This note was generated with the assistance of ambient listening technology. Verbal consent was obtained by the patient and accompanying visitor(s) for the recording of patient appointment to facilitate this note. I attest to having reviewed and edited the generated note for accuracy, though some syntax or spelling errors may persist. Please contact the author of this note for any clarification.       Humaira Pagan MN, APRN, FNP-c   Department of Internal Medicine - [facility]  7:11 AM

## 2025-05-26 DIAGNOSIS — F41.9 ANXIETY: ICD-10-CM

## 2025-05-26 RX ORDER — LORAZEPAM 0.5 MG/1
0.5 TABLET ORAL DAILY PRN
Qty: 30 TABLET | Refills: 0 | Status: SHIPPED | OUTPATIENT
Start: 2025-05-26

## 2025-05-26 NOTE — TELEPHONE ENCOUNTER
No care due was identified.  [facility] Embedded Care Due Messages. Reference number: [id number].   5/26/2025 12:08:15 PM CDT

## 2025-05-27 ENCOUNTER — TELEPHONE (OUTPATIENT)
Dept: INTERNAL MEDICINE | Facility: CLINIC | Age: 70
End: 2025-05-27
Payer: MEDICARE

## 2025-05-27 NOTE — TELEPHONE ENCOUNTER
Returned patient phone call regarding a prior auth for Advair , insurance denied b/c they said she needs to try  Symbicort or Breo which doesn't work for her and was told that we would need to call the number and ask for a PA form or medical necessity form to the Advair to be approved    [phone]

## 2025-05-27 NOTE — TELEPHONE ENCOUNTER
----- Message from Josie sent at 5/27/2025 10:22 AM CDT -----  .1MEDICALADVICE Patient is calling for Medical Advice regarding: pt would like a call back in reference to a PA.  How long has patient had these symptoms:Pharmacy name and phone#:[facility] call back [facility]:Comments:Please advise patient replies from provider may take up to 48 hours.

## 2025-05-28 NOTE — TELEPHONE ENCOUNTER
Dr Gallardo see message from [facility].  Telephone  5/27/2025  The [address]     Sissy Dexter DENIAL ADV  Reason for conversation     All Conversations: MAREN MIRAMONTES  (Newest Message First)               5/27/25 10:20 AM  Roxy Canas MA routed this conversation to Humaira Pagan, Sissy Navas to Ej Gerardo Staff        5/27/25 10:07 AM  Hi,      This message is in regards to Ana Aguila's medication ADVAIR. Under the patient's insurance plan, the patient needs to try and fail (2) SYMBICORT, BREO OR have a complete contraindications to the preferred alternatives in order for insurance to cover the medication. After reviewing the patient's chart, there was not information that either one of those criteria applies to the patient. Thus we will place the prescription on hold as we will not be able to submit the prior authorization as the patient does not meet the criteria. If there is more information you would like to provide that would help the patient meet the criteria, please do not hesitate to reach out to us. If you would like to send in a prescription for the alternative, please do so as soon as possible so that we can initiate a prior authorization if needed. Thank you.      Sincerely,   Prior Authorization Department  [facility]

## 2025-05-29 ENCOUNTER — OFFICE VISIT (OUTPATIENT)
Dept: INTERNAL MEDICINE | Facility: CLINIC | Age: 70
End: 2025-05-29
Payer: MEDICARE

## 2025-05-29 ENCOUNTER — TELEPHONE (OUTPATIENT)
Dept: INTERNAL MEDICINE | Facility: CLINIC | Age: 70
End: 2025-05-29
Payer: MEDICARE

## 2025-05-29 VITALS
SYSTOLIC BLOOD PRESSURE: 122 MMHG | OXYGEN SATURATION: 98 % | WEIGHT: 154 LBS | HEART RATE: 68 BPM | DIASTOLIC BLOOD PRESSURE: 70 MMHG | BODY MASS INDEX: 25.63 KG/M2

## 2025-05-29 DIAGNOSIS — J44.9 COPD, SEVERE: Primary | ICD-10-CM

## 2025-05-29 DIAGNOSIS — R30.0 DYSURIA: ICD-10-CM

## 2025-05-29 DIAGNOSIS — I10 ESSENTIAL HYPERTENSION: ICD-10-CM

## 2025-05-29 DIAGNOSIS — K59.00 CONSTIPATION, UNSPECIFIED CONSTIPATION TYPE: ICD-10-CM

## 2025-05-29 DIAGNOSIS — R73.03 PREDIABETES: ICD-10-CM

## 2025-05-29 DIAGNOSIS — Z99.81 DEPENDENCE ON SUPPLEMENTAL OXYGEN: ICD-10-CM

## 2025-05-29 DIAGNOSIS — B37.2 CANDIDIASIS, INTERTRIGO: ICD-10-CM

## 2025-05-29 LAB
BILIRUB UR QL STRIP.AUTO: NEGATIVE
CLARITY UR: CLEAR
COLOR UR AUTO: YELLOW
GLUCOSE UR QL STRIP: NEGATIVE
HGB UR QL STRIP: ABNORMAL
HOLD SPECIMEN: NORMAL
KETONES UR QL STRIP: NEGATIVE
LEUKOCYTE ESTERASE UR QL STRIP: NEGATIVE
NITRITE UR QL STRIP: NEGATIVE
OHS QRS DURATION: 86 MS
OHS QTC CALCULATION: 433 MS
PH UR STRIP: 8 [PH]
PROT UR QL STRIP: NEGATIVE
SP GR UR STRIP: 1.01
UROBILINOGEN UR STRIP-ACNC: NEGATIVE EU/DL

## 2025-05-29 PROCEDURE — 3044F HG A1C LEVEL LT 7.0%: CPT | Mod: CPTII,S$GLB,, | Performed by: INTERNAL MEDICINE

## 2025-05-29 PROCEDURE — 3008F BODY MASS INDEX DOCD: CPT | Mod: CPTII,S$GLB,, | Performed by: INTERNAL MEDICINE

## 2025-05-29 PROCEDURE — 1159F MED LIST DOCD IN RCRD: CPT | Mod: CPTII,S$GLB,, | Performed by: INTERNAL MEDICINE

## 2025-05-29 PROCEDURE — 93005 ELECTROCARDIOGRAM TRACING: CPT | Mod: S$GLB,,, | Performed by: INTERNAL MEDICINE

## 2025-05-29 PROCEDURE — 99999 PR PBB SHADOW E&M-EST. PATIENT-LVL III: CPT | Mod: PBBFAC,,, | Performed by: INTERNAL MEDICINE

## 2025-05-29 PROCEDURE — 93010 ELECTROCARDIOGRAM REPORT: CPT | Mod: S$GLB,,, | Performed by: INTERNAL MEDICINE

## 2025-05-29 PROCEDURE — 1160F RVW MEDS BY RX/DR IN RCRD: CPT | Mod: CPTII,S$GLB,, | Performed by: INTERNAL MEDICINE

## 2025-05-29 PROCEDURE — 81003 URINALYSIS AUTO W/O SCOPE: CPT | Performed by: INTERNAL MEDICINE

## 2025-05-29 PROCEDURE — 99214 OFFICE O/P EST MOD 30 MIN: CPT | Mod: S$GLB,,, | Performed by: INTERNAL MEDICINE

## 2025-05-29 PROCEDURE — 3078F DIAST BP <80 MM HG: CPT | Mod: CPTII,S$GLB,, | Performed by: INTERNAL MEDICINE

## 2025-05-29 PROCEDURE — 3074F SYST BP LT 130 MM HG: CPT | Mod: CPTII,S$GLB,, | Performed by: INTERNAL MEDICINE

## 2025-05-29 RX ORDER — CLOTRIMAZOLE 1 %
CREAM (GRAM) TOPICAL 2 TIMES DAILY
Start: 2025-05-29 | End: 2025-06-12

## 2025-05-29 RX ORDER — HYDROCHLOROTHIAZIDE 12.5 MG/1
12.5 TABLET ORAL DAILY
Qty: 30 TABLET | Refills: 11 | Status: SHIPPED | OUTPATIENT
Start: 2025-05-29 | End: 2026-05-29

## 2025-05-29 RX ORDER — DOCUSATE SODIUM 100 MG/1
100 CAPSULE, LIQUID FILLED ORAL 2 TIMES DAILY
Qty: 60 CAPSULE | Refills: 1 | Status: SHIPPED | OUTPATIENT
Start: 2025-05-29

## 2025-05-29 NOTE — TELEPHONE ENCOUNTER
----- Message from Jose F Childress sent at 5/29/2025 12:37 PM CDT -----  Regarding: EKG Order  Please place and link an EKG order for the appointment scheduled on 5/29/25 thanks. Fior [phone]

## 2025-05-29 NOTE — TELEPHONE ENCOUNTER
A total of 45 minutes spend with this message.   I spoke to the [facility] pharmacy about the denial coverage on Advair brand.  The drug was denied first under medicare then under her secondary insurance.   I received paperwork from the patient about trying to see if we could get the drug approve for brand with the [employer] phone numbers patient provided in the form. I was unable and was transferred to varies departments to no avail. Dr Gallardo informed.

## 2025-05-31 NOTE — PROGRESS NOTES
CC: Follow-up     HPI:  The patient is a 69-year-old female COPD, hypertension, reflux, right hemicolectomy secondary to GI bleed following a polypectomy who presents today for follow-up.  In regards to her blood pressure, her blood pressure at home was 122/76 last night.  In regards to her COPD, the patient is on the generic form of Advair.  She finds that the Wixela he is not as effective as brand.  She applied for a miguel ángel.  The phone number is [phone].Her right ear has felt blocked.  That has cleared up.  Now her left ear gives her pain.  She is not sure if it is coming from her jaw.  She also reports some right flank discomfort.  It feels like pressure or something pulling on her.  She reports that a nurse who evaluated her thought it was swollen.    ROS:  She reports decreased strength and endurance.  She is requesting home physical therapy to build up her strength.  She is not able to physically drive to pulmonary rehab.  Transportation is an issue currently.  Bowels are not moving well.  She has not been using lactulose.  She prefer a pill.  The patient had 3 nights in a row where she woke up at 4:00 a.m. and has some leakage of urine.    Physical exam:   General appearance: No acute distress  HEENT: TMs are clear.  Oropharynx is without erythema.  Pulmonary: Good inspiratory, expiratory breath sounds are heard.  Lungs are clear auscultation.  Cardiovascular: S1-S2, rhythm is regular.  Extremities without edema.  Comments:  The patient is planning on going to [place] in July to be closer to her 2 daughters.    Assessment:    COPD   2.  Oxygen dependent   3.  Prediabetes   4.  Constipation   5.  Dysuria   6.  Hypertension  Plan:    Will arrange for a walker for home use   2.  Will put a request in for home physical therapy  3.  Will check a UA and culture   4.  Will  decrease the patient's hydrochlorothiazide to a half a tablet a day  5. Will order an EKG to be done here in clinic  6.  We will refill  Lotrimin cream and the patient has request   7.  Will order Colace for constipation.

## 2025-06-02 ENCOUNTER — TELEPHONE (OUTPATIENT)
Dept: PULMONOLOGY | Facility: CLINIC | Age: 70
End: 2025-06-02
Payer: MEDICARE

## 2025-06-02 ENCOUNTER — RESULTS FOLLOW-UP (OUTPATIENT)
Dept: INTERNAL MEDICINE | Facility: CLINIC | Age: 70
End: 2025-06-02

## 2025-06-02 ENCOUNTER — PATIENT MESSAGE (OUTPATIENT)
Dept: PULMONOLOGY | Facility: CLINIC | Age: 70
End: 2025-06-02
Payer: MEDICARE

## 2025-06-02 DIAGNOSIS — J44.9 CHRONIC OBSTRUCTIVE PULMONARY DISEASE, UNSPECIFIED COPD TYPE: ICD-10-CM

## 2025-06-02 DIAGNOSIS — J44.9 COPD, SEVERE: ICD-10-CM

## 2025-06-02 DIAGNOSIS — J44.9 CHRONIC OBSTRUCTIVE PULMONARY DISEASE, UNSPECIFIED COPD TYPE: Primary | ICD-10-CM

## 2025-06-02 RX ORDER — FLUTICASONE PROPIONATE AND SALMETEROL 250; 50 UG/1; UG/1
1 POWDER RESPIRATORY (INHALATION) 2 TIMES DAILY
Qty: 60 EACH | Refills: 11 | Status: SHIPPED | OUTPATIENT
Start: 2025-06-02 | End: 2026-05-28

## 2025-06-02 RX ORDER — BUDESONIDE AND FORMOTEROL FUMARATE DIHYDRATE 160; 4.5 UG/1; UG/1
2 AEROSOL RESPIRATORY (INHALATION) EVERY 12 HOURS
Qty: 10.2 G | Refills: 11 | Status: SHIPPED | OUTPATIENT
Start: 2025-06-02 | End: 2025-06-02 | Stop reason: SDUPTHER

## 2025-06-02 RX ORDER — BUDESONIDE AND FORMOTEROL FUMARATE DIHYDRATE 160; 4.5 UG/1; UG/1
2 AEROSOL RESPIRATORY (INHALATION) EVERY 12 HOURS
Qty: 10.2 G | Refills: 11 | Status: SHIPPED | OUTPATIENT
Start: 2025-06-02 | End: 2026-06-02

## 2025-06-02 RX ORDER — FLUTICASONE PROPIONATE AND SALMETEROL 250; 50 UG/1; UG/1
1 POWDER RESPIRATORY (INHALATION) 2 TIMES DAILY
Qty: 60 EACH | Refills: 11 | Status: SHIPPED | OUTPATIENT
Start: 2025-06-02 | End: 2025-06-02 | Stop reason: SDUPTHER

## 2025-06-03 ENCOUNTER — TELEPHONE (OUTPATIENT)
Dept: INTERNAL MEDICINE | Facility: CLINIC | Age: 70
End: 2025-06-03
Payer: MEDICARE

## 2025-06-05 ENCOUNTER — OFFICE VISIT (OUTPATIENT)
Dept: INTERNAL MEDICINE | Facility: CLINIC | Age: 70
End: 2025-06-05
Payer: MEDICARE

## 2025-06-05 VITALS
OXYGEN SATURATION: 100 % | HEART RATE: 71 BPM | BODY MASS INDEX: 25.86 KG/M2 | WEIGHT: 155.44 LBS | SYSTOLIC BLOOD PRESSURE: 124 MMHG | DIASTOLIC BLOOD PRESSURE: 70 MMHG

## 2025-06-05 DIAGNOSIS — K59.00 CONSTIPATION, UNSPECIFIED CONSTIPATION TYPE: ICD-10-CM

## 2025-06-05 DIAGNOSIS — R31.1 BENIGN ESSENTIAL MICROSCOPIC HEMATURIA: ICD-10-CM

## 2025-06-05 DIAGNOSIS — I10 ESSENTIAL HYPERTENSION: Primary | ICD-10-CM

## 2025-06-05 DIAGNOSIS — J44.9 COPD, SEVERE: ICD-10-CM

## 2025-06-05 DIAGNOSIS — Z99.81 DEPENDENCE ON SUPPLEMENTAL OXYGEN: ICD-10-CM

## 2025-06-05 LAB
MICROSCOPIC COMMENT: NORMAL
OHS QRS DURATION: 98 MS
OHS QTC CALCULATION: 466 MS
RBC #/AREA URNS AUTO: 2 /HPF (ref 0–4)
SQUAMOUS #/AREA URNS AUTO: 7 /HPF

## 2025-06-05 PROCEDURE — 93010 ELECTROCARDIOGRAM REPORT: CPT | Mod: S$GLB,,, | Performed by: INTERNAL MEDICINE

## 2025-06-05 PROCEDURE — 99999 PR PBB SHADOW E&M-EST. PATIENT-LVL IV: CPT | Mod: PBBFAC,,, | Performed by: INTERNAL MEDICINE

## 2025-06-05 PROCEDURE — 81001 URINALYSIS AUTO W/SCOPE: CPT | Performed by: INTERNAL MEDICINE

## 2025-06-06 PROCEDURE — G0180 MD CERTIFICATION HHA PATIENT: HCPCS | Mod: ,,, | Performed by: INTERNAL MEDICINE

## 2025-06-09 ENCOUNTER — RESULTS FOLLOW-UP (OUTPATIENT)
Dept: INTERNAL MEDICINE | Facility: CLINIC | Age: 70
End: 2025-06-09

## 2025-06-09 ENCOUNTER — TELEPHONE (OUTPATIENT)
Dept: INTERNAL MEDICINE | Facility: CLINIC | Age: 70
End: 2025-06-09
Payer: MEDICARE

## 2025-06-09 NOTE — TELEPHONE ENCOUNTER
Called pt to let her know that her urinalysis was fine. Pt is still complaining about pain in back (3 weeks) and is still having issues. Can you order an xray or MRI for back?

## 2025-06-11 ENCOUNTER — TELEPHONE (OUTPATIENT)
Dept: INTERNAL MEDICINE | Facility: CLINIC | Age: 70
End: 2025-06-11
Payer: MEDICARE

## 2025-06-11 DIAGNOSIS — J44.9 CHRONIC OBSTRUCTIVE PULMONARY DISEASE, UNSPECIFIED COPD TYPE: ICD-10-CM

## 2025-06-11 RX ORDER — BUDESONIDE AND FORMOTEROL FUMARATE DIHYDRATE 160; 4.5 UG/1; UG/1
2 AEROSOL RESPIRATORY (INHALATION) EVERY 12 HOURS
Qty: 10.2 G | Refills: 11 | Status: SHIPPED | OUTPATIENT
Start: 2025-06-11 | End: 2026-06-11

## 2025-06-11 NOTE — TELEPHONE ENCOUNTER
I spoke with patient to let her know I sent Dr Hull a message this morning to send rx for Symbicort to [facility] awaiting on rx to be signed. Patient verbalized understanding.

## 2025-06-11 NOTE — TELEPHONE ENCOUNTER
Copied from CRM #[id number]. Topic: General Inquiry - Patient Advice  >> Jun 11, 2025  2:18 PM Kirstin wrote:  .1MEDICALADVICE     Patient is calling for Medical Advice regarding:Good AfternoonLauren from Home Nurse need orders for a home aid    How long has patient had these symptoms:    Pharmacy name and phone#:    Patient wants a call back or [facility]:call     Comments:    Please advise patient replies from provider may take up to 48 hours.

## 2025-06-19 ENCOUNTER — TELEPHONE (OUTPATIENT)
Dept: INTERNAL MEDICINE | Facility: CLINIC | Age: 70
End: 2025-06-19
Payer: MEDICARE

## 2025-06-19 NOTE — TELEPHONE ENCOUNTER
Scheduled appt           Copied from CRM #[id number]. Topic: General Inquiry - Patient Advice  >> Jun 19, 2025  3:25 PM Terri wrote:  .1MEDICALADVICE     Patient is calling for Medical Advice regarding: Patient need a call from the staff about medication question     How long has patient had these symptoms: N/a    Pharmacy name and phone#:    [id number] - [place] - [address] AT [facility] [address]  [facility] [address] [address]  Phone: [phone] Fax: [phone]      Patient wants a call back or [facility], provide patient's call back phone number: Patient Phone [phone]    Comments: Thank you     Please advise patient replies from provider may take up to 48 hours.

## 2025-06-24 ENCOUNTER — PATIENT MESSAGE (OUTPATIENT)
Dept: SURGERY | Facility: CLINIC | Age: 70
End: 2025-06-24
Payer: MEDICARE

## 2025-06-26 DIAGNOSIS — F41.9 ANXIETY: ICD-10-CM

## 2025-06-26 RX ORDER — LORAZEPAM 0.5 MG/1
0.5 TABLET ORAL DAILY PRN
Qty: 30 TABLET | Refills: 0 | Status: SHIPPED | OUTPATIENT
Start: 2025-06-26

## 2025-06-26 NOTE — TELEPHONE ENCOUNTER
No care due was identified.  [facility] Embedded Care Due Messages. Reference number: [id number].   6/26/2025 12:28:31 AM CDT

## 2025-06-27 ENCOUNTER — OFFICE VISIT (OUTPATIENT)
Dept: INTERNAL MEDICINE | Facility: CLINIC | Age: 70
End: 2025-06-27
Payer: MEDICARE

## 2025-06-27 VITALS — SYSTOLIC BLOOD PRESSURE: 118 MMHG | HEART RATE: 82 BPM | DIASTOLIC BLOOD PRESSURE: 70 MMHG | OXYGEN SATURATION: 97 %

## 2025-06-27 DIAGNOSIS — J44.9 COPD, SEVERE: Primary | ICD-10-CM

## 2025-06-27 PROCEDURE — 1160F RVW MEDS BY RX/DR IN RCRD: CPT | Mod: CPTII,S$GLB,, | Performed by: INTERNAL MEDICINE

## 2025-06-27 PROCEDURE — 3078F DIAST BP <80 MM HG: CPT | Mod: CPTII,S$GLB,, | Performed by: INTERNAL MEDICINE

## 2025-06-27 PROCEDURE — 3044F HG A1C LEVEL LT 7.0%: CPT | Mod: CPTII,S$GLB,, | Performed by: INTERNAL MEDICINE

## 2025-06-27 PROCEDURE — 3074F SYST BP LT 130 MM HG: CPT | Mod: CPTII,S$GLB,, | Performed by: INTERNAL MEDICINE

## 2025-06-27 PROCEDURE — 99212 OFFICE O/P EST SF 10 MIN: CPT | Mod: S$GLB,,, | Performed by: INTERNAL MEDICINE

## 2025-06-27 PROCEDURE — 99999 PR PBB SHADOW E&M-EST. PATIENT-LVL III: CPT | Mod: PBBFAC,,, | Performed by: INTERNAL MEDICINE

## 2025-06-27 PROCEDURE — 1159F MED LIST DOCD IN RCRD: CPT | Mod: CPTII,S$GLB,, | Performed by: INTERNAL MEDICINE

## 2025-06-27 RX ORDER — FLUTICASONE PROPIONATE AND SALMETEROL 50; 250 UG/1; UG/1
1 POWDER RESPIRATORY (INHALATION) 2 TIMES DAILY
Qty: 90 EACH | Refills: 1 | Status: SHIPPED | OUTPATIENT
Start: 2025-06-27 | End: 2026-06-27

## 2025-06-28 NOTE — PROGRESS NOTES
CC:  Follow up with COPD    HPI:  The patient is a 69-year-old female with COPD, hypertension, reflux, right hemicolectomy secondary GI bleed following a polypectomy who presents today for follow-up of COPD.  The patient was changed to Symbicort.  She has been on Advair for quite some time.  She tried Wixela; but, it was not as effective as the brand Advair.  The patient appealed her denial and was approved for brand Advair.    ROS: Patient does report some weight loss which he attributes to decreased p.o. intake.  She has been having some dental procedures which makes it difficult for her to eat.  Does report some episodes feels like she needs and take a deep breath in and it is hard to breath. She will check her pulse oximeter and she will still be 96%.    Physical exam:  Pulmonary: Good inspiratory, expiratory breath sounds were heard.  Lungs are clear to auscultation.  She had no crackles, wheezing or rhonchi  Cardiovascular: S1-S2, rhythm appeared to be normal.  GI: The patient has normoactive bowel sounds    Assessment:    COPD  Plan:    The patient came in today to change back to Advair   2.  Prescription was sent the patient is pharmacy.

## 2025-07-03 ENCOUNTER — PATIENT MESSAGE (OUTPATIENT)
Dept: INTERNAL MEDICINE | Facility: CLINIC | Age: 70
End: 2025-07-03
Payer: MEDICARE

## 2025-07-03 DIAGNOSIS — E78.2 MIXED HYPERLIPIDEMIA: ICD-10-CM

## 2025-07-03 DIAGNOSIS — F41.9 ANXIETY: ICD-10-CM

## 2025-07-03 RX ORDER — HYDROCHLOROTHIAZIDE 12.5 MG/1
12.5 TABLET ORAL DAILY
Qty: 30 TABLET | Refills: 11 | OUTPATIENT
Start: 2025-07-03 | End: 2026-07-03

## 2025-07-03 RX ORDER — METOPROLOL SUCCINATE 100 MG/1
100 TABLET, EXTENDED RELEASE ORAL DAILY
Qty: 90 TABLET | Refills: 3 | OUTPATIENT
Start: 2025-07-03

## 2025-07-03 RX ORDER — ROSUVASTATIN CALCIUM 10 MG/1
10 TABLET, COATED ORAL
Qty: 90 TABLET | Refills: 3 | OUTPATIENT
Start: 2025-07-03

## 2025-07-03 RX ORDER — LORAZEPAM 0.5 MG/1
0.5 TABLET ORAL DAILY PRN
Qty: 30 TABLET | Refills: 0 | OUTPATIENT
Start: 2025-07-03

## 2025-07-03 NOTE — TELEPHONE ENCOUNTER
Care Due:                  Date            Visit Type   Department     Provider  --------------------------------------------------------------------------------                                EP -                              PRIMARY      [facility]  Last Visit: 06-      CARE ([facility])   MEDICINE       Zhou Gallardo  Next Visit: None Scheduled  None         None Found                                                            Last  Test          Frequency    Reason                     Performed    Due Date  --------------------------------------------------------------------------------    Vitamin D...  12 months..  ergocalciferol...........  Not Found    Overdue    [employer] Embedded Care Due Messages. Reference number: [id number].   7/03/2025 8:27:43 AM CDT

## 2025-07-03 NOTE — TELEPHONE ENCOUNTER
Pt called back and said she found her medications so we did not need to refill them. I refused all as refill not appropriate       Copied from CRM #[id number]. Topic: Medications - Medication Question  >> Jul 3, 2025  1:15 PM Desirae wrote:  Patient would like for you to give her a call in regards to medication.

## 2025-07-03 NOTE — TELEPHONE ENCOUNTER
Pt is in [place] and has lost her medications can you please refill        Copied from CRM #[id number]. Topic: General Inquiry - Patient Advice  >> Jul 3, 2025  8:00 AM Amanda wrote:  .1MEDICALADVICE     Patient is calling for Medical Advice regarding:asking for a call ASAP in regards to her medications she states     How long has patient had these symptoms:    Pharmacy name and phone#:    Patient wants a call back or [facility], provide patient's call back phone number:  [phone]  Comments:    Please advise patient replies from provider may take up to 48 hours.

## 2025-07-08 ENCOUNTER — PATIENT MESSAGE (OUTPATIENT)
Dept: ADMINISTRATIVE | Facility: CLINIC | Age: 70
End: 2025-07-08
Payer: MEDICARE

## 2025-07-24 ENCOUNTER — TELEPHONE (OUTPATIENT)
Dept: INTERNAL MEDICINE | Facility: CLINIC | Age: 70
End: 2025-07-24
Payer: MEDICARE

## 2025-07-24 NOTE — TELEPHONE ENCOUNTER
Patient is going out of [facility] and needs her ativan filled, can you fill script while she is out of state or does she need to do it before she leaves the state of [place]?      Copied from CRM #[id number]. Topic: General Inquiry - Patient Advice  >> Jul 24, 2025 10:52 AM Mirella wrote:  Type:  Needs Medical Advice    Who Called: Patient   Symptoms (please be specific):    How long has patient had these symptoms:    Pharmacy name and phone #:    Would the patient rather a call back or a response via [facility]? Call back   Best Call Back Number: [phone]  Additional Information: Pt would like a call back to discuss some questions she has about one of her med.

## 2025-07-28 DIAGNOSIS — F41.9 ANXIETY: ICD-10-CM

## 2025-07-28 RX ORDER — LORAZEPAM 0.5 MG/1
0.5 TABLET ORAL DAILY PRN
Qty: 30 TABLET | Refills: 0 | Status: SHIPPED | OUTPATIENT
Start: 2025-07-28

## 2025-08-12 ENCOUNTER — EXTERNAL HOME HEALTH (OUTPATIENT)
Dept: HOME HEALTH SERVICES | Facility: HOSPITAL | Age: 70
End: 2025-08-12
Payer: MEDICARE

## (undated) DEVICE — KIT GLIDESHEATH SLEND 6FR 10CM

## (undated) DEVICE — KIT LEFT HEART MANIFOLD CUSTOM

## (undated) DEVICE — COVER PROBE US 5.5X58L NON LTX

## (undated) DEVICE — HEMOSTAT VASC BAND REG 24CM

## (undated) DEVICE — DRAPE ANGIO BRACH 38X44IN

## (undated) DEVICE — CONTRAST VISIPAQUE 150ML

## (undated) DEVICE — CATH IMPULSE 5FR PIGTAIL 125CM

## (undated) DEVICE — GUIDEWIRE EMERALD .035IN 260CM

## (undated) DEVICE — DEFIBRILLATOR STAT PADZ II

## (undated) DEVICE — Device

## (undated) DEVICE — CATH RADIAL TIG 6FR 4.0 110CM